# Patient Record
Sex: FEMALE | Race: WHITE | NOT HISPANIC OR LATINO | Employment: OTHER | URBAN - METROPOLITAN AREA
[De-identification: names, ages, dates, MRNs, and addresses within clinical notes are randomized per-mention and may not be internally consistent; named-entity substitution may affect disease eponyms.]

---

## 2017-01-02 ENCOUNTER — APPOINTMENT (OUTPATIENT)
Dept: LAB | Facility: CLINIC | Age: 77
End: 2017-01-02
Payer: MEDICARE

## 2017-01-02 ENCOUNTER — TRANSCRIBE ORDERS (OUTPATIENT)
Dept: LAB | Facility: CLINIC | Age: 77
End: 2017-01-02

## 2017-01-02 DIAGNOSIS — Z95.2 HEART VALVE REPLACED BY TRANSPLANT: Primary | ICD-10-CM

## 2017-01-02 DIAGNOSIS — I48.20 CHRONIC ATRIAL FIBRILLATION (HCC): ICD-10-CM

## 2017-01-02 LAB
INR PPP: 3 (ref 0.86–1.16)
PROTHROMBIN TIME: 30.6 SECONDS (ref 12–14.3)

## 2017-01-02 PROCEDURE — 36415 COLL VENOUS BLD VENIPUNCTURE: CPT

## 2017-01-02 PROCEDURE — 85610 PROTHROMBIN TIME: CPT

## 2017-02-06 ENCOUNTER — TRANSCRIBE ORDERS (OUTPATIENT)
Dept: LAB | Facility: CLINIC | Age: 77
End: 2017-02-06

## 2017-02-06 ENCOUNTER — APPOINTMENT (OUTPATIENT)
Dept: LAB | Facility: CLINIC | Age: 77
End: 2017-02-06
Payer: MEDICARE

## 2017-02-06 DIAGNOSIS — I48.20 CHRONIC ATRIAL FIBRILLATION (HCC): ICD-10-CM

## 2017-02-06 DIAGNOSIS — Z95.2 HEART VALVE REPLACED BY TRANSPLANT: Primary | ICD-10-CM

## 2017-02-06 LAB
INR PPP: 2.99 (ref 0.86–1.16)
PROTHROMBIN TIME: 30.5 SECONDS (ref 12–14.3)

## 2017-02-06 PROCEDURE — 85610 PROTHROMBIN TIME: CPT

## 2017-02-06 PROCEDURE — 36415 COLL VENOUS BLD VENIPUNCTURE: CPT

## 2017-03-07 ENCOUNTER — TRANSCRIBE ORDERS (OUTPATIENT)
Dept: LAB | Facility: CLINIC | Age: 77
End: 2017-03-07

## 2017-03-07 ENCOUNTER — APPOINTMENT (OUTPATIENT)
Dept: LAB | Facility: CLINIC | Age: 77
End: 2017-03-07
Payer: MEDICARE

## 2017-03-07 DIAGNOSIS — Z95.2 HEART VALVE REPLACED BY TRANSPLANT: Primary | ICD-10-CM

## 2017-03-07 DIAGNOSIS — I48.20 CHRONIC ATRIAL FIBRILLATION (HCC): ICD-10-CM

## 2017-03-07 LAB
INR PPP: 3.83 (ref 0.86–1.16)
PROTHROMBIN TIME: 36.8 SECONDS (ref 12–14.3)

## 2017-03-07 PROCEDURE — 36415 COLL VENOUS BLD VENIPUNCTURE: CPT

## 2017-03-07 PROCEDURE — 85610 PROTHROMBIN TIME: CPT

## 2017-03-20 ENCOUNTER — APPOINTMENT (OUTPATIENT)
Dept: LAB | Facility: CLINIC | Age: 77
End: 2017-03-20
Payer: MEDICARE

## 2017-03-20 ENCOUNTER — TRANSCRIBE ORDERS (OUTPATIENT)
Dept: LAB | Facility: CLINIC | Age: 77
End: 2017-03-20

## 2017-03-20 DIAGNOSIS — Z95.2 HEART VALVE REPLACED BY TRANSPLANT: ICD-10-CM

## 2017-03-20 DIAGNOSIS — I48.20 CHRONIC ATRIAL FIBRILLATION (HCC): ICD-10-CM

## 2017-03-20 DIAGNOSIS — E13.8 DIABETES MELLITUS OF OTHER TYPE WITH COMPLICATION: ICD-10-CM

## 2017-03-20 DIAGNOSIS — E55.9 UNSPECIFIED VITAMIN D DEFICIENCY: ICD-10-CM

## 2017-03-20 DIAGNOSIS — I10 ESSENTIAL HYPERTENSION, MALIGNANT: Primary | ICD-10-CM

## 2017-03-20 DIAGNOSIS — E78.2 MIXED HYPERLIPIDEMIA: ICD-10-CM

## 2017-03-20 DIAGNOSIS — Z79.899 ENCOUNTER FOR LONG-TERM (CURRENT) USE OF OTHER MEDICATIONS: ICD-10-CM

## 2017-03-20 LAB
25(OH)D3 SERPL-MCNC: 34.3 NG/ML (ref 30–100)
ALT SERPL W P-5'-P-CCNC: 26 U/L (ref 12–78)
ANION GAP SERPL CALCULATED.3IONS-SCNC: 6 MMOL/L (ref 4–13)
BUN SERPL-MCNC: 15 MG/DL (ref 5–25)
CALCIUM SERPL-MCNC: 8.8 MG/DL (ref 8.3–10.1)
CHLORIDE SERPL-SCNC: 104 MMOL/L (ref 100–108)
CO2 SERPL-SCNC: 29 MMOL/L (ref 21–32)
CREAT SERPL-MCNC: 0.71 MG/DL (ref 0.6–1.3)
EST. AVERAGE GLUCOSE BLD GHB EST-MCNC: 166 MG/DL
GFR SERPL CREATININE-BSD FRML MDRD: >60 ML/MIN/1.73SQ M
GLUCOSE P FAST SERPL-MCNC: 147 MG/DL (ref 65–99)
HBA1C MFR BLD: 7.4 % (ref 4.2–6.3)
HCT VFR BLD AUTO: 42.8 % (ref 34.8–46.1)
HGB BLD-MCNC: 13.7 G/DL (ref 11.5–15.4)
INR PPP: 3.78 (ref 0.86–1.16)
POTASSIUM SERPL-SCNC: 4 MMOL/L (ref 3.5–5.3)
PROTHROMBIN TIME: 36.4 SECONDS (ref 12–14.3)
SODIUM SERPL-SCNC: 139 MMOL/L (ref 136–145)

## 2017-03-20 PROCEDURE — 83036 HEMOGLOBIN GLYCOSYLATED A1C: CPT

## 2017-03-20 PROCEDURE — 84460 ALANINE AMINO (ALT) (SGPT): CPT

## 2017-03-20 PROCEDURE — 85014 HEMATOCRIT: CPT

## 2017-03-20 PROCEDURE — 80048 BASIC METABOLIC PNL TOTAL CA: CPT

## 2017-03-20 PROCEDURE — 85610 PROTHROMBIN TIME: CPT

## 2017-03-20 PROCEDURE — 85018 HEMOGLOBIN: CPT

## 2017-03-20 PROCEDURE — 82306 VITAMIN D 25 HYDROXY: CPT

## 2017-03-20 PROCEDURE — 36415 COLL VENOUS BLD VENIPUNCTURE: CPT

## 2017-03-31 ENCOUNTER — TRANSCRIBE ORDERS (OUTPATIENT)
Dept: LAB | Facility: CLINIC | Age: 77
End: 2017-03-31

## 2017-03-31 ENCOUNTER — APPOINTMENT (OUTPATIENT)
Dept: LAB | Facility: CLINIC | Age: 77
End: 2017-03-31
Payer: MEDICARE

## 2017-03-31 DIAGNOSIS — I48.20 CHRONIC ATRIAL FIBRILLATION (HCC): ICD-10-CM

## 2017-03-31 DIAGNOSIS — Z95.2 HEART VALVE REPLACED BY TRANSPLANT: Primary | ICD-10-CM

## 2017-03-31 LAB
INR PPP: 2.99 (ref 0.86–1.16)
PROTHROMBIN TIME: 30.5 SECONDS (ref 12–14.3)

## 2017-03-31 PROCEDURE — 85610 PROTHROMBIN TIME: CPT

## 2017-03-31 PROCEDURE — 36415 COLL VENOUS BLD VENIPUNCTURE: CPT

## 2017-05-17 ENCOUNTER — APPOINTMENT (OUTPATIENT)
Dept: LAB | Facility: CLINIC | Age: 77
End: 2017-05-17
Payer: MEDICARE

## 2017-05-17 ENCOUNTER — TRANSCRIBE ORDERS (OUTPATIENT)
Dept: LAB | Facility: CLINIC | Age: 77
End: 2017-05-17

## 2017-05-17 DIAGNOSIS — Z95.2 HEART VALVE REPLACED BY TRANSPLANT: ICD-10-CM

## 2017-05-17 DIAGNOSIS — I48.20 CHRONIC ATRIAL FIBRILLATION (HCC): Primary | ICD-10-CM

## 2017-05-17 LAB
INR PPP: 3.34 (ref 0.86–1.16)
PROTHROMBIN TIME: 34.4 SECONDS (ref 12.1–14.4)

## 2017-05-17 PROCEDURE — 36415 COLL VENOUS BLD VENIPUNCTURE: CPT

## 2017-05-17 PROCEDURE — 85610 PROTHROMBIN TIME: CPT

## 2017-06-02 ENCOUNTER — TRANSCRIBE ORDERS (OUTPATIENT)
Dept: ADMINISTRATIVE | Facility: HOSPITAL | Age: 77
End: 2017-06-02

## 2017-06-02 DIAGNOSIS — M81.0 SENILE OSTEOPOROSIS: Primary | ICD-10-CM

## 2017-06-22 ENCOUNTER — APPOINTMENT (OUTPATIENT)
Dept: LAB | Facility: CLINIC | Age: 77
End: 2017-06-22
Payer: MEDICARE

## 2017-06-22 ENCOUNTER — TRANSCRIBE ORDERS (OUTPATIENT)
Dept: LAB | Facility: CLINIC | Age: 77
End: 2017-06-22

## 2017-06-22 DIAGNOSIS — I48.20 CHRONIC ATRIAL FIBRILLATION (HCC): ICD-10-CM

## 2017-06-22 DIAGNOSIS — Z95.2 HEART VALVE REPLACED BY TRANSPLANT: Primary | ICD-10-CM

## 2017-06-22 LAB
INR PPP: 3.31 (ref 0.86–1.16)
PROTHROMBIN TIME: 34.1 SECONDS (ref 12.1–14.4)

## 2017-06-22 PROCEDURE — 36415 COLL VENOUS BLD VENIPUNCTURE: CPT

## 2017-06-22 PROCEDURE — 85610 PROTHROMBIN TIME: CPT

## 2017-06-27 ENCOUNTER — HOSPITAL ENCOUNTER (OUTPATIENT)
Dept: RADIOLOGY | Facility: HOSPITAL | Age: 77
Discharge: HOME | End: 2017-06-27
Attending: INTERNAL MEDICINE
Payer: MEDICARE

## 2017-06-27 DIAGNOSIS — M81.0 SENILE OSTEOPOROSIS: ICD-10-CM

## 2017-06-27 PROCEDURE — 77080 DXA BONE DENSITY AXIAL: CPT

## 2017-07-03 ENCOUNTER — APPOINTMENT (OUTPATIENT)
Dept: LAB | Facility: CLINIC | Age: 77
End: 2017-07-03
Payer: MEDICARE

## 2017-07-03 ENCOUNTER — TRANSCRIBE ORDERS (OUTPATIENT)
Dept: LAB | Facility: CLINIC | Age: 77
End: 2017-07-03

## 2017-07-03 DIAGNOSIS — I10 ESSENTIAL HYPERTENSION, MALIGNANT: ICD-10-CM

## 2017-07-03 DIAGNOSIS — E78.00 PURE HYPERCHOLESTEROLEMIA: Primary | ICD-10-CM

## 2017-07-03 DIAGNOSIS — D64.9 ANEMIA, UNSPECIFIED: ICD-10-CM

## 2017-07-03 DIAGNOSIS — E03.9 UNSPECIFIED HYPOTHYROIDISM: ICD-10-CM

## 2017-07-03 DIAGNOSIS — E13.9 OTHER SPECIFIED DIABETES MELLITUS: ICD-10-CM

## 2017-07-03 LAB
ALBUMIN SERPL BCP-MCNC: 3.8 G/DL (ref 3.5–5)
ALP SERPL-CCNC: 91 U/L (ref 46–116)
ALT SERPL W P-5'-P-CCNC: 26 U/L (ref 12–78)
ANION GAP SERPL CALCULATED.3IONS-SCNC: 7 MMOL/L (ref 4–13)
AST SERPL W P-5'-P-CCNC: 24 U/L (ref 5–45)
BILIRUB SERPL-MCNC: 0.48 MG/DL (ref 0.2–1)
BUN SERPL-MCNC: 10 MG/DL (ref 5–25)
CALCIUM SERPL-MCNC: 8.7 MG/DL (ref 8.3–10.1)
CHLORIDE SERPL-SCNC: 105 MMOL/L (ref 100–108)
CHOLEST SERPL-MCNC: 203 MG/DL (ref 50–200)
CO2 SERPL-SCNC: 28 MMOL/L (ref 21–32)
CREAT SERPL-MCNC: 0.69 MG/DL (ref 0.6–1.3)
CREAT UR-MCNC: 72.2 MG/DL
EST. AVERAGE GLUCOSE BLD GHB EST-MCNC: 160 MG/DL
GFR SERPL CREATININE-BSD FRML MDRD: >60 ML/MIN/1.73SQ M
GLUCOSE P FAST SERPL-MCNC: 165 MG/DL (ref 65–99)
HBA1C MFR BLD: 7.2 % (ref 4.2–6.3)
HCT VFR BLD AUTO: 41.6 % (ref 34.8–46.1)
HDLC SERPL-MCNC: 38 MG/DL (ref 40–60)
HGB BLD-MCNC: 13.1 G/DL (ref 11.5–15.4)
LDLC SERPL CALC-MCNC: 113 MG/DL (ref 0–100)
MICROALBUMIN UR-MCNC: 20 MG/L (ref 0–20)
MICROALBUMIN/CREAT 24H UR: 28 MG/G CREATININE (ref 0–30)
POTASSIUM SERPL-SCNC: 3.9 MMOL/L (ref 3.5–5.3)
PROT SERPL-MCNC: 7.5 G/DL (ref 6.4–8.2)
SODIUM SERPL-SCNC: 140 MMOL/L (ref 136–145)
TRIGL SERPL-MCNC: 258 MG/DL
TSH SERPL DL<=0.05 MIU/L-ACNC: 3.45 UIU/ML (ref 0.36–3.74)

## 2017-07-03 PROCEDURE — 80061 LIPID PANEL: CPT

## 2017-07-03 PROCEDURE — 82043 UR ALBUMIN QUANTITATIVE: CPT

## 2017-07-03 PROCEDURE — 82570 ASSAY OF URINE CREATININE: CPT

## 2017-07-03 PROCEDURE — 84443 ASSAY THYROID STIM HORMONE: CPT

## 2017-07-03 PROCEDURE — 36415 COLL VENOUS BLD VENIPUNCTURE: CPT

## 2017-07-03 PROCEDURE — 80053 COMPREHEN METABOLIC PANEL: CPT

## 2017-07-03 PROCEDURE — 83036 HEMOGLOBIN GLYCOSYLATED A1C: CPT

## 2017-07-03 PROCEDURE — 85018 HEMOGLOBIN: CPT

## 2017-07-03 PROCEDURE — 85014 HEMATOCRIT: CPT

## 2017-07-13 ENCOUNTER — TRANSCRIBE ORDERS (OUTPATIENT)
Dept: ADMINISTRATIVE | Facility: HOSPITAL | Age: 77
End: 2017-07-13

## 2017-07-13 DIAGNOSIS — Z12.31 ENCOUNTER FOR MAMMOGRAM TO ESTABLISH BASELINE MAMMOGRAM: Primary | ICD-10-CM

## 2017-07-20 ENCOUNTER — HOSPITAL ENCOUNTER (OUTPATIENT)
Dept: RADIOLOGY | Facility: HOSPITAL | Age: 77
Discharge: HOME | End: 2017-07-20
Attending: INTERNAL MEDICINE
Payer: MEDICARE

## 2017-07-20 DIAGNOSIS — Z12.31 ENCOUNTER FOR MAMMOGRAM TO ESTABLISH BASELINE MAMMOGRAM: ICD-10-CM

## 2017-07-20 PROCEDURE — G0202 SCR MAMMO BI INCL CAD: HCPCS

## 2017-07-21 ENCOUNTER — APPOINTMENT (OUTPATIENT)
Dept: LAB | Facility: CLINIC | Age: 77
End: 2017-07-21
Payer: MEDICARE

## 2017-07-21 ENCOUNTER — TRANSCRIBE ORDERS (OUTPATIENT)
Dept: LAB | Facility: CLINIC | Age: 77
End: 2017-07-21

## 2017-07-21 DIAGNOSIS — I48.20 CHRONIC ATRIAL FIBRILLATION (HCC): Primary | ICD-10-CM

## 2017-07-21 DIAGNOSIS — Z95.2 HEART VALVE REPLACED BY TRANSPLANT: ICD-10-CM

## 2017-07-21 LAB
INR PPP: 2.81 (ref 0.86–1.16)
PROTHROMBIN TIME: 30 SECONDS (ref 12.1–14.4)

## 2017-07-21 PROCEDURE — 85610 PROTHROMBIN TIME: CPT

## 2017-07-21 PROCEDURE — 36415 COLL VENOUS BLD VENIPUNCTURE: CPT

## 2017-07-28 ENCOUNTER — APPOINTMENT (OUTPATIENT)
Dept: LAB | Facility: CLINIC | Age: 77
End: 2017-07-28
Payer: MEDICARE

## 2017-07-28 ENCOUNTER — TRANSCRIBE ORDERS (OUTPATIENT)
Dept: LAB | Facility: CLINIC | Age: 77
End: 2017-07-28

## 2017-07-28 DIAGNOSIS — Z95.2 HEART VALVE REPLACED BY TRANSPLANT: Primary | ICD-10-CM

## 2017-07-28 DIAGNOSIS — I48.20 CHRONIC ATRIAL FIBRILLATION (HCC): ICD-10-CM

## 2017-07-28 LAB
INR PPP: 3.05 (ref 0.86–1.16)
PROTHROMBIN TIME: 32 SECONDS (ref 12.1–14.4)

## 2017-07-28 PROCEDURE — 85610 PROTHROMBIN TIME: CPT

## 2017-07-28 PROCEDURE — 36415 COLL VENOUS BLD VENIPUNCTURE: CPT

## 2017-08-17 ENCOUNTER — TRANSCRIBE ORDERS (OUTPATIENT)
Dept: LAB | Facility: CLINIC | Age: 77
End: 2017-08-17

## 2017-08-17 ENCOUNTER — APPOINTMENT (OUTPATIENT)
Dept: LAB | Facility: CLINIC | Age: 77
End: 2017-08-17
Payer: MEDICARE

## 2017-08-17 DIAGNOSIS — Z95.2 HEART VALVE REPLACED BY TRANSPLANT: ICD-10-CM

## 2017-08-17 DIAGNOSIS — I48.20 CHRONIC ATRIAL FIBRILLATION (HCC): Primary | ICD-10-CM

## 2017-08-17 LAB
INR PPP: 3.6 (ref 0.86–1.16)
PROTHROMBIN TIME: 36.5 SECONDS (ref 12.1–14.4)

## 2017-08-17 PROCEDURE — 36415 COLL VENOUS BLD VENIPUNCTURE: CPT

## 2017-08-17 PROCEDURE — 85610 PROTHROMBIN TIME: CPT

## 2017-09-19 ENCOUNTER — TRANSCRIBE ORDERS (OUTPATIENT)
Dept: LAB | Facility: CLINIC | Age: 77
End: 2017-09-19

## 2017-09-19 ENCOUNTER — APPOINTMENT (OUTPATIENT)
Dept: LAB | Facility: CLINIC | Age: 77
End: 2017-09-19
Payer: MEDICARE

## 2017-09-19 DIAGNOSIS — I48.20 CHRONIC ATRIAL FIBRILLATION (HCC): ICD-10-CM

## 2017-09-19 DIAGNOSIS — Z95.2 HEART VALVE REPLACED BY TRANSPLANT: Primary | ICD-10-CM

## 2017-09-19 LAB
INR PPP: 3.42 (ref 0.86–1.16)
PROTHROMBIN TIME: 35 SECONDS (ref 12.1–14.4)

## 2017-09-19 PROCEDURE — 85610 PROTHROMBIN TIME: CPT

## 2017-09-19 PROCEDURE — 36415 COLL VENOUS BLD VENIPUNCTURE: CPT

## 2017-10-03 ENCOUNTER — LAB (OUTPATIENT)
Dept: LAB | Facility: CLINIC | Age: 77
End: 2017-10-03
Payer: MEDICARE

## 2017-10-03 ENCOUNTER — TRANSCRIBE ORDERS (OUTPATIENT)
Dept: LAB | Facility: CLINIC | Age: 77
End: 2017-10-03

## 2017-10-03 DIAGNOSIS — T45.1X5A ANEMIA DUE TO CHEMOTHERAPY: ICD-10-CM

## 2017-10-03 DIAGNOSIS — D64.81 ANEMIA DUE TO CHEMOTHERAPY: ICD-10-CM

## 2017-10-03 DIAGNOSIS — E78.00 PURE HYPERCHOLESTEROLEMIA: ICD-10-CM

## 2017-10-03 DIAGNOSIS — E13.9 OTHER SPECIFIED DIABETES MELLITUS: ICD-10-CM

## 2017-10-03 DIAGNOSIS — I10 ESSENTIAL HYPERTENSION, MALIGNANT: Primary | ICD-10-CM

## 2017-10-03 LAB
ALBUMIN SERPL BCP-MCNC: 3.9 G/DL (ref 3.5–5)
ALP SERPL-CCNC: 93 U/L (ref 46–116)
ALT SERPL W P-5'-P-CCNC: 27 U/L (ref 12–78)
ANION GAP SERPL CALCULATED.3IONS-SCNC: 5 MMOL/L (ref 4–13)
AST SERPL W P-5'-P-CCNC: 25 U/L (ref 5–45)
BILIRUB SERPL-MCNC: 0.5 MG/DL (ref 0.2–1)
BUN SERPL-MCNC: 13 MG/DL (ref 5–25)
CALCIUM SERPL-MCNC: 8.7 MG/DL (ref 8.3–10.1)
CHLORIDE SERPL-SCNC: 104 MMOL/L (ref 100–108)
CHOLEST SERPL-MCNC: 193 MG/DL (ref 50–200)
CO2 SERPL-SCNC: 29 MMOL/L (ref 21–32)
CREAT SERPL-MCNC: 0.64 MG/DL (ref 0.6–1.3)
CREAT UR-MCNC: 101 MG/DL
EST. AVERAGE GLUCOSE BLD GHB EST-MCNC: 148 MG/DL
GFR SERPL CREATININE-BSD FRML MDRD: 86 ML/MIN/1.73SQ M
GLUCOSE P FAST SERPL-MCNC: 154 MG/DL (ref 65–99)
HBA1C MFR BLD: 6.8 % (ref 4.2–6.3)
HCT VFR BLD AUTO: 41.8 % (ref 34.8–46.1)
HDLC SERPL-MCNC: 35 MG/DL (ref 40–60)
HGB BLD-MCNC: 13.7 G/DL (ref 11.5–15.4)
LDLC SERPL CALC-MCNC: 108 MG/DL (ref 0–100)
MICROALBUMIN UR-MCNC: 52.8 MG/L (ref 0–20)
MICROALBUMIN/CREAT 24H UR: 52 MG/G CREATININE (ref 0–30)
POTASSIUM SERPL-SCNC: 4.4 MMOL/L (ref 3.5–5.3)
PROT SERPL-MCNC: 7.8 G/DL (ref 6.4–8.2)
SODIUM SERPL-SCNC: 138 MMOL/L (ref 136–145)
TRIGL SERPL-MCNC: 251 MG/DL

## 2017-10-03 PROCEDURE — 82043 UR ALBUMIN QUANTITATIVE: CPT

## 2017-10-03 PROCEDURE — 83036 HEMOGLOBIN GLYCOSYLATED A1C: CPT

## 2017-10-03 PROCEDURE — 82570 ASSAY OF URINE CREATININE: CPT

## 2017-10-03 PROCEDURE — 80061 LIPID PANEL: CPT

## 2017-10-03 PROCEDURE — 36415 COLL VENOUS BLD VENIPUNCTURE: CPT

## 2017-10-03 PROCEDURE — 85014 HEMATOCRIT: CPT

## 2017-10-03 PROCEDURE — 85018 HEMOGLOBIN: CPT

## 2017-10-03 PROCEDURE — 80053 COMPREHEN METABOLIC PANEL: CPT

## 2017-10-09 ENCOUNTER — HOSPITAL ENCOUNTER (EMERGENCY)
Facility: HOSPITAL | Age: 77
Discharge: HOME | End: 2017-10-09
Attending: EMERGENCY MEDICINE | Admitting: EMERGENCY MEDICINE
Payer: MEDICARE

## 2017-10-09 ENCOUNTER — APPOINTMENT (EMERGENCY)
Dept: RADIOLOGY | Facility: HOSPITAL | Age: 77
End: 2017-10-09
Payer: MEDICARE

## 2017-10-09 VITALS
RESPIRATION RATE: 20 BRPM | DIASTOLIC BLOOD PRESSURE: 72 MMHG | WEIGHT: 162 LBS | OXYGEN SATURATION: 96 % | HEART RATE: 65 BPM | SYSTOLIC BLOOD PRESSURE: 148 MMHG | TEMPERATURE: 98.2 F

## 2017-10-09 DIAGNOSIS — R53.1 WEAKNESS: Primary | ICD-10-CM

## 2017-10-09 LAB
ALBUMIN SERPL BCP-MCNC: 3.8 G/DL (ref 3.5–5)
ALP SERPL-CCNC: 101 U/L (ref 46–116)
ALT SERPL W P-5'-P-CCNC: 39 U/L (ref 12–78)
ANION GAP SERPL CALCULATED.3IONS-SCNC: 7 MMOL/L (ref 4–13)
APTT PPP: 40 SECONDS (ref 24–33)
AST SERPL W P-5'-P-CCNC: 39 U/L (ref 5–45)
ATRIAL RATE: 64 BPM
BACTERIA UR QL AUTO: ABNORMAL /HPF
BASOPHILS # BLD AUTO: 0 THOUSANDS/ΜL (ref 0–0.1)
BASOPHILS NFR BLD AUTO: 0 % (ref 0–1)
BILIRUB SERPL-MCNC: 0.5 MG/DL (ref 0.2–1)
BILIRUB UR QL STRIP: NEGATIVE
BUN SERPL-MCNC: 14 MG/DL (ref 5–25)
CALCIUM SERPL-MCNC: 8.7 MG/DL (ref 8.3–10.1)
CHLORIDE SERPL-SCNC: 104 MMOL/L (ref 100–108)
CLARITY UR: CLEAR
CO2 SERPL-SCNC: 29 MMOL/L (ref 21–32)
COLOR UR: YELLOW
CREAT SERPL-MCNC: 0.63 MG/DL (ref 0.6–1.3)
DIGOXIN SERPL-MCNC: 0.5 NG/ML (ref 0.8–2)
EOSINOPHIL # BLD AUTO: 0.1 THOUSAND/ΜL (ref 0–0.61)
EOSINOPHIL NFR BLD AUTO: 2 % (ref 0–6)
ERYTHROCYTE [DISTWIDTH] IN BLOOD BY AUTOMATED COUNT: 15.6 % (ref 11.6–15.1)
GFR SERPL CREATININE-BSD FRML MDRD: 87 ML/MIN/1.73SQ M
GLUCOSE SERPL-MCNC: 173 MG/DL (ref 65–140)
GLUCOSE UR STRIP-MCNC: NEGATIVE MG/DL
HCT VFR BLD AUTO: 39.7 % (ref 37–47)
HGB BLD-MCNC: 13 G/DL (ref 12–16)
HGB UR QL STRIP.AUTO: NEGATIVE
INR PPP: 3.51 (ref 0.86–1.16)
KETONES UR STRIP-MCNC: NEGATIVE MG/DL
LEUKOCYTE ESTERASE UR QL STRIP: ABNORMAL
LYMPHOCYTES # BLD AUTO: 1.6 THOUSANDS/ΜL (ref 0.6–4.47)
LYMPHOCYTES NFR BLD AUTO: 29 % (ref 14–44)
MAGNESIUM SERPL-MCNC: 1.9 MG/DL (ref 1.6–2.6)
MCH RBC QN AUTO: 29.1 PG (ref 27–31)
MCHC RBC AUTO-ENTMCNC: 32.8 G/DL (ref 31.4–37.4)
MCV RBC AUTO: 89 FL (ref 82–98)
MONOCYTES # BLD AUTO: 0.4 THOUSAND/ΜL (ref 0.17–1.22)
MONOCYTES NFR BLD AUTO: 7 % (ref 4–12)
NEUTROPHILS # BLD AUTO: 3.3 THOUSANDS/ΜL (ref 1.85–7.62)
NEUTS SEG NFR BLD AUTO: 62 % (ref 43–75)
NITRITE UR QL STRIP: NEGATIVE
NON-SQ EPI CELLS URNS QL MICRO: ABNORMAL /HPF
NRBC BLD AUTO-RTO: 0 /100 WBCS
PH UR STRIP.AUTO: 6.5 [PH] (ref 5–9)
PLATELET # BLD AUTO: 195 THOUSANDS/UL (ref 130–400)
PMV BLD AUTO: 9 FL (ref 8.9–12.7)
POTASSIUM SERPL-SCNC: 4.2 MMOL/L (ref 3.5–5.3)
PROT SERPL-MCNC: 7.6 G/DL (ref 6.4–8.2)
PROT UR STRIP-MCNC: NEGATIVE MG/DL
PROTHROMBIN TIME: 37.3 SECONDS (ref 9.4–11.7)
QRS AXIS: 52 DEGREES
QRSD INTERVAL: 94 MS
QT INTERVAL: 380 MS
QTC INTERVAL: 401 MS
RBC # BLD AUTO: 4.47 MILLION/UL (ref 4.2–5.4)
RBC #/AREA URNS AUTO: ABNORMAL /HPF
SODIUM SERPL-SCNC: 140 MMOL/L (ref 136–145)
SP GR UR STRIP.AUTO: <=1.005 (ref 1–1.03)
T WAVE AXIS: 53 DEGREES
TROPONIN I SERPL-MCNC: <0.02 NG/ML
UROBILINOGEN UR QL STRIP.AUTO: 0.2 E.U./DL
VENTRICULAR RATE: 67 BPM
WBC # BLD AUTO: 5.4 THOUSAND/UL (ref 4.8–10.8)
WBC #/AREA URNS AUTO: ABNORMAL /HPF

## 2017-10-09 PROCEDURE — 85025 COMPLETE CBC W/AUTO DIFF WBC: CPT | Performed by: EMERGENCY MEDICINE

## 2017-10-09 PROCEDURE — 83735 ASSAY OF MAGNESIUM: CPT | Performed by: EMERGENCY MEDICINE

## 2017-10-09 PROCEDURE — 80162 ASSAY OF DIGOXIN TOTAL: CPT | Performed by: EMERGENCY MEDICINE

## 2017-10-09 PROCEDURE — 84484 ASSAY OF TROPONIN QUANT: CPT | Performed by: EMERGENCY MEDICINE

## 2017-10-09 PROCEDURE — 71020 HB CHEST X-RAY 2VW FRONTAL&LATL: CPT

## 2017-10-09 PROCEDURE — 36415 COLL VENOUS BLD VENIPUNCTURE: CPT | Performed by: EMERGENCY MEDICINE

## 2017-10-09 PROCEDURE — 80053 COMPREHEN METABOLIC PANEL: CPT | Performed by: EMERGENCY MEDICINE

## 2017-10-09 PROCEDURE — 85730 THROMBOPLASTIN TIME PARTIAL: CPT | Performed by: EMERGENCY MEDICINE

## 2017-10-09 PROCEDURE — 81001 URINALYSIS AUTO W/SCOPE: CPT | Performed by: EMERGENCY MEDICINE

## 2017-10-09 PROCEDURE — 93005 ELECTROCARDIOGRAM TRACING: CPT | Performed by: EMERGENCY MEDICINE

## 2017-10-09 PROCEDURE — 85610 PROTHROMBIN TIME: CPT | Performed by: EMERGENCY MEDICINE

## 2017-10-09 PROCEDURE — 70450 CT HEAD/BRAIN W/O DYE: CPT

## 2017-10-09 PROCEDURE — 99285 EMERGENCY DEPT VISIT HI MDM: CPT

## 2017-10-09 RX ORDER — WARFARIN SODIUM 5 MG/1
5 TABLET ORAL
Status: ON HOLD | COMMUNITY
End: 2022-03-14 | Stop reason: SDUPTHER

## 2017-10-09 RX ORDER — DIGOXIN 250 MCG
250 TABLET ORAL DAILY
COMMUNITY
End: 2021-05-20

## 2017-10-09 RX ORDER — WARFARIN SODIUM 2.5 MG/1
2.5 TABLET ORAL 3 TIMES WEEKLY
Status: ON HOLD | COMMUNITY
End: 2022-03-14 | Stop reason: SDUPTHER

## 2017-10-09 NOTE — ED PROVIDER NOTES
History  Chief Complaint   Patient presents with    Shortness of Breath     short of breath, dizzy, headache , woke up this way      69 yo female with sob, weakness and headache x one day  No cp, no fever, cough  No recent travel  No sick contacts at home  No abdominal pain, no nausea, no vomiting, no diarrhea, no change in medications  History provided by:  Patient   used: No    Fatigue   Severity:  Mild  Onset quality:  Gradual  Duration:  1 day  Timing:  Intermittent  Progression:  Unchanged  Chronicity:  New  Relieved by:  Nothing  Worsened by:  Nothing  Ineffective treatments:  None tried  Associated symptoms: headaches and shortness of breath    Associated symptoms: no abdominal pain, no chest pain and no nausea        Prior to Admission Medications   Prescriptions Last Dose Informant Patient Reported? Taking? Cholecalciferol (VITAMIN D PO)   Yes Yes   Sig: Take by mouth   Multiple Vitamins-Minerals (PRESERVISION AREDS PO)   Yes Yes   Sig: Take 2 tablets by mouth daily   NADOLOL PO   Yes Yes   Sig: Take 2 5 mg by mouth daily   digoxin (LANOXIN) 0 25 mg tablet   Yes Yes   Sig: Take 250 mcg by mouth daily   warfarin (COUMADIN) 2 5 mg tablet   Yes Yes   Sig: Take 2 5 mg by mouth 3 (three) times a week   warfarin (COUMADIN) 5 mg tablet   Yes Yes   Sig: Take 5 mg by mouth 4 (four) times a week      Facility-Administered Medications: None       Past Medical History:   Diagnosis Date    Atrial fibrillation (HCC)     Cancer (HCC)     hx of cervical    Cardiac disease     Hyperlipidemia        Past Surgical History:   Procedure Laterality Date    EYE SURGERY      HYSTERECTOMY      MITRAL VALVE REPLACEMENT  1994       History reviewed  No pertinent family history  I have reviewed and agree with the history as documented      Social History   Substance Use Topics    Smoking status: Never Smoker    Smokeless tobacco: Never Used    Alcohol use No        Review of Systems Constitutional: Positive for fatigue  Respiratory: Positive for shortness of breath  Cardiovascular: Negative for chest pain  Gastrointestinal: Negative for abdominal pain and nausea  Neurological: Positive for headaches  All other systems reviewed and are negative  Physical Exam  ED Triage Vitals   Temperature Pulse Respirations Blood Pressure SpO2   10/09/17 1203 10/09/17 1015 10/09/17 1015 10/09/17 1015 10/09/17 1015   98 2 °F (36 8 °C) 70 19 (!) 216/95 96 %      Temp Source Heart Rate Source Patient Position - Orthostatic VS BP Location FiO2 (%)   10/09/17 1203 10/09/17 1015 10/09/17 1015 10/09/17 1015 --   Oral Monitor Lying Left arm       Pain Score       --                  Physical Exam   Constitutional: She is oriented to person, place, and time  She appears well-developed and well-nourished  HENT:   Head: Normocephalic and atraumatic  Eyes: EOM are normal  Pupils are equal, round, and reactive to light  Neck: Normal range of motion  Neck supple  Cardiovascular: Normal rate and regular rhythm  Pulmonary/Chest: Effort normal and breath sounds normal    Abdominal: Soft  Bowel sounds are normal    Musculoskeletal: Normal range of motion  Neurological: She is alert and oriented to person, place, and time  Skin: Skin is warm and dry  Capillary refill takes less than 2 seconds  Psychiatric: She has a normal mood and affect  Her behavior is normal    Nursing note and vitals reviewed        ED Medications  Medications - No data to display    Diagnostic Studies  Labs Reviewed   CBC AND DIFFERENTIAL - Abnormal        Result Value Ref Range Status    RDW 15 6 (*) 11 6 - 15 1 % Final    WBC 5 40  4 80 - 10 80 Thousand/uL Final    RBC 4 47  4 20 - 5 40 Million/uL Final    Hemoglobin 13 0  12 0 - 16 0 g/dL Final    Hematocrit 39 7  37 0 - 47 0 % Final    MCV 89  82 - 98 fL Final    MCH 29 1  27 0 - 31 0 pg Final    MCHC 32 8  31 4 - 37 4 g/dL Final    MPV 9 0  8 9 - 12 7 fL Final Platelets 106  545 - 400 Thousands/uL Final    nRBC 0  /100 WBCs Final    Neutrophils Relative 62  43 - 75 % Final    Lymphocytes Relative 29  14 - 44 % Final    Monocytes Relative 7  4 - 12 % Final    Eosinophils Relative 2  0 - 6 % Final    Basophils Relative 0  0 - 1 % Final    Neutrophils Absolute 3 30  1 85 - 7 62 Thousands/µL Final    Lymphocytes Absolute 1 60  0 60 - 4 47 Thousands/µL Final    Monocytes Absolute 0 40  0 17 - 1 22 Thousand/µL Final    Eosinophils Absolute 0 10  0 00 - 0 61 Thousand/µL Final    Basophils Absolute 0 00  0 00 - 0 10 Thousands/µL Final   COMPREHENSIVE METABOLIC PANEL - Abnormal     Glucose 173 (*) 65 - 140 mg/dL Final    Comment:   If the patient is fasting, the ADA then defines impaired fasting glucose as > 100 mg/dL and diabetes as > or equal to 123 mg/dL  Specimen collection should occur prior to Sulfasalazine administration due to the potential for falsely depressed results  Specimen collection should occur prior to Sulfapyridine administration due to the potential for falsely elevated results  Sodium 140  136 - 145 mmol/L Final    Potassium 4 2  3 5 - 5 3 mmol/L Final    Comment: Slightly Hemolyzed; Results May be Affected    Chloride 104  100 - 108 mmol/L Final    CO2 29  21 - 32 mmol/L Final    Anion Gap 7  4 - 13 mmol/L Final    BUN 14  5 - 25 mg/dL Final    Creatinine 0 63  0 60 - 1 30 mg/dL Final    Comment: Standardized to IDMS reference method    Calcium 8 7  8 3 - 10 1 mg/dL Final    AST 39  5 - 45 U/L Final    Comment: Slightly Hemolyzed; Results May be Affected  Specimen collection should occur prior to Sulfasalazine administration due to the potential for falsely depressed results  ALT 39  12 - 78 U/L Final    Comment:   Specimen collection should occur prior to Sulfasalazine administration due to the potential for falsely depressed results       Alkaline Phosphatase 101  46 - 116 U/L Final    Total Protein 7 6  6 4 - 8 2 g/dL Final    Albumin 3 8  3 5 - 5 0 g/dL Final    Total Bilirubin 0 50  0 20 - 1 00 mg/dL Final    eGFR 87  ml/min/1 73sq m Final    Narrative:     National Kidney Disease Education Program recommendations are as follows:  GFR calculation is accurate only with a steady state creatinine  Chronic Kidney disease less than 60 ml/min/1 73 sq  meters  Kidney failure less than 15 ml/min/1 73 sq  meters  PROTIME-INR - Abnormal     Protime 37 3 (*) 9 4 - 11 7 seconds Final    INR 3 51 (*) 0 86 - 1 16 Final   APTT - Abnormal     PTT 40 (*) 24 - 33 seconds Final    Narrative:      Therapeutic Heparin Range = 60-90 seconds   URINALYSIS WITH REFLEX TO MICROSCOPIC - Abnormal     Leukocytes, UA Small (*) Negative Final    Color, UA Yellow   Final    Clarity, UA Clear   Final    Specific Gravity, UA <=1 005  1 000 - 1 030 Final    pH, UA 6 5  5 0 - 9 0 Final    Nitrite, UA Negative  Negative Final    Protein, UA Negative  Negative mg/dl Final    Glucose, UA Negative  Negative mg/dl Final    Ketones, UA Negative  Negative mg/dl Final    Urobilinogen, UA 0 2  0 2, 1 0 E U /dl E U /dl Final    Bilirubin, UA Negative  Negative Final    Blood, UA Negative  Negative Final   DIGOXIN LEVEL - Abnormal     Digoxin Lvl 0 5 (*) 0 8 - 2 0 ng/mL Final   URINE MICROSCOPIC - Abnormal     WBC, UA 2-4 (*) None Seen, 0-5, 5-55, 5-65 /hpf Final    Bacteria, UA Innumerable (*) None Seen, Occasional /hpf Final    RBC, UA None Seen  None Seen, 0-5 /hpf Final    Epithelial Cells None Seen  None Seen, Occasional /hpf Final   MAGNESIUM - Normal    Magnesium 1 9  1 6 - 2 6 mg/dL Final   TROPONIN I - Normal    Troponin I <0 02  <=0 04 ng/mL Final    Comment: 3Autovalidation override    Narrative:     [employer] Chemistry analyzer 99% cutoff is > 0 04 ng/mL in network labs    o cTnI 99% cutoff is useful only when applied to patients in the clinical setting of myocardial ischemia  o cTnI 99% cutoff should be interpreted in the context of clinical history, ECG findings and possibly cardiac imaging to establish correct diagnosis  o cTnI 99% cutoff may be suggestive but clearly not indicative of a coronary event without the clinical setting of myocardial ischemia  CT head wo contrast   Final Result   No acute intracranial abnormality  Workstation performed: [id number]         XR chest 2 views   Final Result      No acute pulmonary disease, status post cardiac valve replacement surgery                Workstation performed: [id number]             Procedures  ECG 12 Lead Documentation  Date/Time: 10/9/2017 11:53 AM  Performed by: Leona Bryant by: Lynda Room     ECG reviewed by me, the ED Provider: yes    Patient location:  ED  Rate:     ECG rate:  67    ECG rate assessment: normal    Rhythm:     Rhythm: atrial fibrillation    QRS:     QRS axis:  Normal  Conduction:     Conduction: normal    ST segments:     ST segments:  Normal  T waves:     T waves: normal            Phone Contacts  ED Phone Contact    ED Course  ED Course                                MDM  Number of Diagnoses or Management Options  Diagnosis management comments: Weakness, headache    Patient states improvement  Reviewed labs with patient  Patient's urine demonstrates small amount of bacteria which patient states her urologist is already addressing  Patient CT scan of the head is negative  Patient states not truly short of breath however felt like she cannot get a deep breath this morning  Symptoms have resolved  Discussed case with PMD Dr Thomas Goldberg who saw patient in office today as well  He is comfortable discharging patient home for follow-up  Patient do not wish to stay in hospital   I will discharge patient home to follow with PMD in next 1-2 days         Amount and/or Complexity of Data Reviewed  Clinical lab tests: ordered and reviewed  Tests in the radiology section of CPT®: ordered and reviewed  Tests in the medicine section of CPT®: ordered and reviewed  Discuss the patient with other providers: yes    Risk of Complications, Morbidity, and/or Mortality  Presenting problems: high  Diagnostic procedures: high  Management options: high    Patient Progress  Patient progress: stable    CritCare Time    Disposition  Final diagnoses:   Weakness     ED Disposition     ED Disposition Condition Comment    Discharge  Jonathon Vazquez discharge to home/self care  Condition at discharge: Stable        Follow-up Information     Follow up With Specialties Details Why [address]  Internal Medicine In 1 day return If symptoms worsen, take medications as prescribed, call to make an appointment [place] [address]  [phone]          Discharge Medication List as of 10/9/2017 11:56 AM      CONTINUE these medications which have NOT CHANGED    Details   Cholecalciferol (VITAMIN D PO) Take by mouth, Historical Med      digoxin (LANOXIN) 0 25 mg tablet Take 250 mcg by mouth daily, Historical Med      Multiple Vitamins-Minerals (PRESERVISION AREDS PO) Take 2 tablets by mouth daily, Historical Med      NADOLOL PO Take 2 5 mg by mouth daily, Historical Med      !! warfarin (COUMADIN) 2 5 mg tablet Take 2 5 mg by mouth 3 (three) times a week, Historical Med      !! warfarin (COUMADIN) 5 mg tablet Take 5 mg by mouth 4 (four) times a week, Historical Med       !! - Potential duplicate medications found  Please discuss with provider  No discharge procedures on file      ED Provider  Electronically Signed by       Rony Cole MD  10/09/17 9197

## 2017-10-09 NOTE — DISCHARGE INSTRUCTIONS
Weakness   WHAT YOU NEED TO KNOW:   Weakness is a loss of muscle strength  It may be caused by brain, nerve, or muscle problems  Physical and mental conditions such as heart problems, pregnancy, dehydration, or depression may also cause weakness  Reactions to certain drugs can cause weakness  Parts of your body may become weak if you need to wear a cast or splint or have been on bed rest for a long time  DISCHARGE INSTRUCTIONS:   Call 911 for any of the following:   · You have any of the following signs of a stroke:      ¨ Numbness or drooping on one side of your face     ¨ Weakness in an arm or leg    ¨ Confusion or difficulty speaking    ¨ Dizziness, a severe headache, or vision loss    · You lose feeling in your weakened body area  · You have electric shock-like feelings down your arms and legs when you flex or move your neck  · You have sudden or increased trouble speaking, swallowing, or breathing  Return to the emergency department if:   · You have severe pain in your back, arms, or legs that worsens  · You have sudden or worsened muscle weakness or loss of movement  · You are not able to control when you urinate or have a bowel movement  Contact your healthcare provider if:   · You feel depressed or anxious  · You have questions or concerns about your condition or care  Manage weakness:   · Use assistive devices as directed  These help protect you from injury  Examples include a walker or cane  Have someone install handrails in your home  These will help you get out of a bathtub or stand up from a toilet  Use a shower chair so you can sit while you shower  Sit down on the toilet or another chair to dry off and put on your clothes  Get help going up and down stairs if your legs are weak  · Go to physical or occupational therapy if directed  A physical therapist can teach you exercises to help strengthen weak muscles   An occupational therapist can show you ways to do your daily activities more easily  For example, light forks and spoons can be easier to use if you have hand weakness  You may also learn ways to organize your household items so you are not moving heavy items  · Balance rest with exercise  Exercise can help increase your muscle strength and energy  Do not exercise for long periods at a time  Take breaks often to rest  Too much exercise can cause muscle strain or make you more tired  Ask your healthcare provider how much exercise is right for you  · Eat a variety of healthy foods  Too much or too little food may cause weakness or tiredness  Ask your healthcare provider what a healthy amount of food is for you  Healthy foods include fruits, vegetables, whole-grain breads, low-fat dairy products, lean meats and fish, nuts, and cooked beans  · Do not smoke  Nicotine and other chemicals in cigarettes and cigars can make your symptoms worse, and can cause lung damage  Ask your healthcare provider for information if you currently smoke and need help to quit  E-cigarettes or smokeless tobacco still contain nicotine  Talk to your healthcare provider before you use these products  · Do not use caffeine, alcohol, or illegal drugs  These may cause muscle twitching, which could lead to worsened weakness  Follow up with your healthcare provider as directed:  Write down your questions so you remember to ask them during your visits  © 2017 [address] Information is for End User's use only and may not be sold, redistributed or otherwise used for commercial purposes  All illustrations and images included in CareNotes® are the copyrighted property of A D A M , Inc  or Jose Cox  The above information is an  only  It is not intended as medical advice for individual conditions or treatments  Talk to your doctor, nurse or pharmacist before following any medical regimen to see if it is safe and effective for you

## 2017-11-03 ENCOUNTER — APPOINTMENT (OUTPATIENT)
Dept: LAB | Facility: CLINIC | Age: 77
End: 2017-11-03
Payer: MEDICARE

## 2017-11-03 DIAGNOSIS — Z95.2 HEART VALVE REPLACED BY TRANSPLANT: Primary | ICD-10-CM

## 2017-11-03 LAB
INR PPP: 3.28 (ref 0.86–1.16)
PROTHROMBIN TIME: 33.9 SECONDS (ref 12.1–14.4)

## 2017-11-03 PROCEDURE — 85610 PROTHROMBIN TIME: CPT

## 2017-11-03 PROCEDURE — 36415 COLL VENOUS BLD VENIPUNCTURE: CPT

## 2017-12-22 ENCOUNTER — LAB (OUTPATIENT)
Dept: LAB | Facility: CLINIC | Age: 77
End: 2017-12-22
Payer: MEDICARE

## 2017-12-22 ENCOUNTER — TRANSCRIBE ORDERS (OUTPATIENT)
Dept: LAB | Facility: CLINIC | Age: 77
End: 2017-12-22

## 2017-12-22 DIAGNOSIS — I48.20 CHRONIC ATRIAL FIBRILLATION (HCC): ICD-10-CM

## 2017-12-22 DIAGNOSIS — Z95.2 HEART VALVE REPLACED BY TRANSPLANT: Primary | ICD-10-CM

## 2017-12-22 LAB
INR PPP: 3.05 (ref 0.86–1.16)
PROTHROMBIN TIME: 32 SECONDS (ref 12.1–14.4)

## 2017-12-22 PROCEDURE — 36415 COLL VENOUS BLD VENIPUNCTURE: CPT

## 2017-12-22 PROCEDURE — 85610 PROTHROMBIN TIME: CPT

## 2018-01-19 ENCOUNTER — APPOINTMENT (OUTPATIENT)
Dept: LAB | Facility: HOSPITAL | Age: 78
End: 2018-01-19
Attending: FAMILY MEDICINE

## 2018-01-19 ENCOUNTER — TRANSCRIBE ORDERS (OUTPATIENT)
Dept: ADMINISTRATIVE | Facility: HOSPITAL | Age: 78
End: 2018-01-19

## 2018-01-19 DIAGNOSIS — Z11.1 SCREENING EXAMINATION FOR PULMONARY TUBERCULOSIS: ICD-10-CM

## 2018-01-19 DIAGNOSIS — Z11.1 SCREENING EXAMINATION FOR PULMONARY TUBERCULOSIS: Primary | ICD-10-CM

## 2018-01-19 PROCEDURE — 36415 COLL VENOUS BLD VENIPUNCTURE: CPT

## 2018-01-19 PROCEDURE — 86480 TB TEST CELL IMMUN MEASURE: CPT

## 2018-01-21 LAB
ANNOTATION COMMENT IMP: NORMAL
GAMMA INTERFERON BACKGROUND BLD IA-ACNC: 0.04 IU/ML
M TB IFN-G BLD-IMP: NEGATIVE
M TB IFN-G CD4+ BCKGRND COR BLD-ACNC: 0.01 IU/ML
M TB IFN-G CD4+ T-CELLS BLD-ACNC: 0.05 IU/ML
MITOGEN IGNF BLD-ACNC: 7.05 IU/ML
QUANTIFERON-TB GOLD IN TUBE: NORMAL
SERVICE CMNT-IMP: NORMAL

## 2018-01-23 ENCOUNTER — TRANSCRIBE ORDERS (OUTPATIENT)
Dept: LAB | Facility: CLINIC | Age: 78
End: 2018-01-23

## 2018-01-23 ENCOUNTER — LAB (OUTPATIENT)
Dept: LAB | Facility: CLINIC | Age: 78
End: 2018-01-23

## 2018-01-23 ENCOUNTER — APPOINTMENT (OUTPATIENT)
Dept: LAB | Facility: CLINIC | Age: 78
End: 2018-01-23
Payer: MEDICARE

## 2018-01-23 ENCOUNTER — GENERIC CONVERSION - ENCOUNTER (OUTPATIENT)
Dept: OTHER | Facility: OTHER | Age: 78
End: 2018-01-23

## 2018-01-23 DIAGNOSIS — Z95.2 HEART VALVE REPLACED BY TRANSPLANT: ICD-10-CM

## 2018-01-23 DIAGNOSIS — E55.9 VITAMIN D DEFICIENCY: ICD-10-CM

## 2018-01-23 DIAGNOSIS — D64.89 OTHER SPECIFIED ANEMIAS (CODE): ICD-10-CM

## 2018-01-23 DIAGNOSIS — I10 ESSENTIAL HYPERTENSION, MALIGNANT: ICD-10-CM

## 2018-01-23 DIAGNOSIS — Z79.02 ENCOUNTER FOR LONG-TERM (CURRENT) USE OF ANTIPLATELETS/ANTITHROMBOTICS: ICD-10-CM

## 2018-01-23 DIAGNOSIS — I48.20 CHRONIC ATRIAL FIBRILLATION (HCC): ICD-10-CM

## 2018-01-23 DIAGNOSIS — Z79.899 ENCOUNTER FOR LONG-TERM (CURRENT) USE OF MEDICATIONS: Primary | ICD-10-CM

## 2018-01-23 DIAGNOSIS — E78.00 PURE HYPERCHOLESTEROLEMIA: ICD-10-CM

## 2018-01-23 DIAGNOSIS — E13.8 DIABETES MELLITUS OF OTHER TYPE WITH COMPLICATION, UNSPECIFIED LONG TERM INSULIN USE STATUS: ICD-10-CM

## 2018-01-23 LAB
25(OH)D3 SERPL-MCNC: 26 NG/ML (ref 30–100)
ALBUMIN SERPL BCP-MCNC: 4.1 G/DL (ref 3.5–5)
ALP SERPL-CCNC: 84 U/L (ref 46–116)
ALT SERPL W P-5'-P-CCNC: 32 U/L (ref 12–78)
ANION GAP SERPL CALCULATED.3IONS-SCNC: 5 MMOL/L (ref 4–13)
AST SERPL W P-5'-P-CCNC: 30 U/L (ref 5–45)
BILIRUB SERPL-MCNC: 0.56 MG/DL (ref 0.2–1)
BUN SERPL-MCNC: 13 MG/DL (ref 5–25)
CALCIUM SERPL-MCNC: 9 MG/DL (ref 8.3–10.1)
CHLORIDE SERPL-SCNC: 101 MMOL/L (ref 100–108)
CO2 SERPL-SCNC: 30 MMOL/L (ref 21–32)
CREAT SERPL-MCNC: 0.66 MG/DL (ref 0.6–1.3)
CREAT UR-MCNC: 40.8 MG/DL
EST. AVERAGE GLUCOSE BLD GHB EST-MCNC: 157 MG/DL
GFR SERPL CREATININE-BSD FRML MDRD: 85 ML/MIN/1.73SQ M
GLUCOSE P FAST SERPL-MCNC: 139 MG/DL (ref 65–99)
HBA1C MFR BLD: 7.1 % (ref 4.2–6.3)
HCT VFR BLD AUTO: 40.9 % (ref 34.8–46.1)
HGB BLD-MCNC: 13.1 G/DL (ref 11.5–15.4)
INR PPP: 4.49 (ref 0.86–1.16)
MICROALBUMIN UR-MCNC: 10.4 MG/L (ref 0–20)
MICROALBUMIN/CREAT 24H UR: 25 MG/G CREATININE (ref 0–30)
POTASSIUM SERPL-SCNC: 4.1 MMOL/L (ref 3.5–5.3)
PROT SERPL-MCNC: 8 G/DL (ref 6.4–8.2)
PROTHROMBIN TIME: 43.5 SECONDS (ref 12.1–14.4)
SODIUM SERPL-SCNC: 136 MMOL/L (ref 136–145)

## 2018-01-23 PROCEDURE — 82043 UR ALBUMIN QUANTITATIVE: CPT

## 2018-01-23 PROCEDURE — 80053 COMPREHEN METABOLIC PANEL: CPT

## 2018-01-23 PROCEDURE — 85610 PROTHROMBIN TIME: CPT

## 2018-01-23 PROCEDURE — 83036 HEMOGLOBIN GLYCOSYLATED A1C: CPT

## 2018-01-23 PROCEDURE — 85014 HEMATOCRIT: CPT

## 2018-01-23 PROCEDURE — 36415 COLL VENOUS BLD VENIPUNCTURE: CPT

## 2018-01-23 PROCEDURE — 82570 ASSAY OF URINE CREATININE: CPT

## 2018-01-23 PROCEDURE — 85018 HEMOGLOBIN: CPT

## 2018-01-23 PROCEDURE — 82306 VITAMIN D 25 HYDROXY: CPT

## 2018-01-24 NOTE — RESULT NOTES
Verified Results  (1) QUANTIFERON - TB GOLD [id number] 11:33AM Leonid Phoenixkyle Ritter     Test Name Result Flag Reference   QUANTIFERON TB GOLD      Specimen incubated at [place], [place]    Performed at:  [facility] [address]  [id number]  : Kermit Willis MD, Phone:  [phone]   QUANTIFERON TB GOLD Negative  Negative   QUANTIFERON CRITERIA Comment     To be considered positive a specimen should have a TB Ag minus Nil  value greater than or equal to 0 35 IU/mL and in addition the TB Ag  minus Nil value must be greater than or equal to 25% of the Nil  value  There may be insufficient information in these values to  differentiate between some negative and some indeterminate test  values  QUANTIFERON TB AG VALUE 0 05 IU/mL     QUANTIFERON NIL VALUE 0 04 IU/mL     QUANTIFERON MITOGEN VALUE 7 05 IU/mL     QFT TB AG MINUS NIL VALUE 0 01 IU/mL     QFT INTERPRETATION Comment     The QuantiFERON TB Gold (in Tube) assay is intended for use as an aid  in the diagnosis of TB infection  Negative results suggest that there  is no TB infection  In patients with high suspicion of exposure, a  negative test should be repeated  A positive test indicates infection  with Mycobacterium tuberculosis  Among individuals without  tuberculosis infection, a positive test may be due to exposure to  [place], M  martha or M  maripareshum  On the Internet, go to  cdc gov/tb for further details    Performed at:  [facility] [address]  [id number]  : Kermit Willis MD, Phone:  [phone]

## 2018-02-12 ENCOUNTER — APPOINTMENT (OUTPATIENT)
Dept: LAB | Facility: CLINIC | Age: 78
End: 2018-02-12
Payer: MEDICARE

## 2018-02-12 ENCOUNTER — TRANSCRIBE ORDERS (OUTPATIENT)
Dept: LAB | Facility: CLINIC | Age: 78
End: 2018-02-12

## 2018-02-12 DIAGNOSIS — I48.20 CHRONIC ATRIAL FIBRILLATION (HCC): ICD-10-CM

## 2018-02-12 DIAGNOSIS — Z95.2 HEART VALVE REPLACED BY TRANSPLANT: Primary | ICD-10-CM

## 2018-02-12 LAB
INR PPP: 2.52 (ref 0.86–1.16)
PROTHROMBIN TIME: 27.5 SECONDS (ref 12.1–14.4)

## 2018-02-12 PROCEDURE — 36415 COLL VENOUS BLD VENIPUNCTURE: CPT

## 2018-02-12 PROCEDURE — 85610 PROTHROMBIN TIME: CPT

## 2018-02-15 ENCOUNTER — TRANSCRIBE ORDERS (OUTPATIENT)
Dept: LAB | Facility: CLINIC | Age: 78
End: 2018-02-15

## 2018-02-15 ENCOUNTER — APPOINTMENT (OUTPATIENT)
Dept: LAB | Facility: CLINIC | Age: 78
End: 2018-02-15
Payer: MEDICARE

## 2018-02-15 DIAGNOSIS — I48.20 CHRONIC ATRIAL FIBRILLATION (HCC): ICD-10-CM

## 2018-02-15 DIAGNOSIS — Z95.2 HEART VALVE REPLACED BY TRANSPLANT: Primary | ICD-10-CM

## 2018-02-15 LAB
INR PPP: 2.36 (ref 0.86–1.16)
PROTHROMBIN TIME: 26.1 SECONDS (ref 12.1–14.4)

## 2018-02-15 PROCEDURE — 36415 COLL VENOUS BLD VENIPUNCTURE: CPT

## 2018-02-15 PROCEDURE — 85610 PROTHROMBIN TIME: CPT

## 2018-02-23 ENCOUNTER — APPOINTMENT (OUTPATIENT)
Dept: LAB | Facility: CLINIC | Age: 78
End: 2018-02-23
Payer: MEDICARE

## 2018-02-23 ENCOUNTER — TRANSCRIBE ORDERS (OUTPATIENT)
Dept: LAB | Facility: CLINIC | Age: 78
End: 2018-02-23

## 2018-02-23 DIAGNOSIS — Z95.2 HEART VALVE REPLACED BY TRANSPLANT: Primary | ICD-10-CM

## 2018-02-23 DIAGNOSIS — I48.20 CHRONIC ATRIAL FIBRILLATION (HCC): ICD-10-CM

## 2018-02-23 LAB
INR PPP: 3.18 (ref 0.86–1.16)
PROTHROMBIN TIME: 33.1 SECONDS (ref 12.1–14.4)

## 2018-02-23 PROCEDURE — 85610 PROTHROMBIN TIME: CPT

## 2018-02-23 PROCEDURE — 36415 COLL VENOUS BLD VENIPUNCTURE: CPT

## 2018-03-15 ENCOUNTER — APPOINTMENT (OUTPATIENT)
Dept: LAB | Facility: CLINIC | Age: 78
End: 2018-03-15
Payer: MEDICARE

## 2018-03-15 ENCOUNTER — TRANSCRIBE ORDERS (OUTPATIENT)
Dept: LAB | Facility: CLINIC | Age: 78
End: 2018-03-15

## 2018-03-15 DIAGNOSIS — Z95.2 HEART VALVE REPLACED BY TRANSPLANT: Primary | ICD-10-CM

## 2018-03-15 DIAGNOSIS — I48.20 CHRONIC ATRIAL FIBRILLATION (HCC): ICD-10-CM

## 2018-03-15 LAB
INR PPP: 3.22 (ref 0.86–1.16)
PROTHROMBIN TIME: 33.4 SECONDS (ref 12.1–14.4)

## 2018-03-15 PROCEDURE — 36415 COLL VENOUS BLD VENIPUNCTURE: CPT

## 2018-03-15 PROCEDURE — 85610 PROTHROMBIN TIME: CPT

## 2018-03-16 ENCOUNTER — OFFICE VISIT (OUTPATIENT)
Dept: PULMONOLOGY | Facility: MEDICAL CENTER | Age: 78
End: 2018-03-16
Payer: MEDICARE

## 2018-03-16 VITALS
BODY MASS INDEX: 27.48 KG/M2 | HEART RATE: 87 BPM | DIASTOLIC BLOOD PRESSURE: 80 MMHG | SYSTOLIC BLOOD PRESSURE: 138 MMHG | TEMPERATURE: 98.4 F | HEIGHT: 66 IN | RESPIRATION RATE: 18 BRPM | OXYGEN SATURATION: 94 % | WEIGHT: 171 LBS

## 2018-03-16 DIAGNOSIS — Z99.89 OBSTRUCTIVE SLEEP APNEA ON CPAP: ICD-10-CM

## 2018-03-16 DIAGNOSIS — R06.02 SHORTNESS OF BREATH: Primary | ICD-10-CM

## 2018-03-16 DIAGNOSIS — G47.33 OBSTRUCTIVE SLEEP APNEA ON CPAP: ICD-10-CM

## 2018-03-16 PROCEDURE — 94010 BREATHING CAPACITY TEST: CPT | Performed by: INTERNAL MEDICINE

## 2018-03-16 PROCEDURE — 99204 OFFICE O/P NEW MOD 45 MIN: CPT | Performed by: INTERNAL MEDICINE

## 2018-03-16 NOTE — PROGRESS NOTES
Assessment/Plan:       Problem List Items Addressed This Visit        Respiratory    Obstructive sleep apnea on CPAP     Continued compliance with CPAP is encouraged  Her machine appears to be malfunctioning and therefore she will bring this in to be evaluated by the therapist             Other    Shortness of breath - Primary     Patient's shortness of breath may be in relation to deconditioning  However she also has chest congestion at this time  She has postnasal drip and sinus pressure  She will use Flonase for her postnasal drip  She is given Arnuity for her bronchial congestion  Result of her spirometry are reviewed with her today  Will repeat spirometry at her next visit if her symptoms are improved  Relevant Orders    POCT spirometry (Completed)             Follow-up in 2 weeks  All questions are answered to the patient's satisfaction and understanding  She verbalizes understanding  She is encouraged to call with any further questions or concerns  Portions of the record may have been created with voice recognition software  Occasional wrong word or "sound a like" substitutions may have occurred due to the inherent limitations of voice recognition software  Read the chart carefully and recognize, using context, where substitutions have occurred  ______________________________________________________________________    Chief Complaint:   Chief Complaint   Patient presents with    Shortness of Breath     Occasional; on exertion Pt would like to establish new pulmonologist    Cough     Occasional at night    Sleep Apnea     Uses CPAP for 15 + years        Patient ID: Alaina Conway is a 66 y o  y o  female has a past medical history of Atrial fibrillation ([facility] [address] ); Cancer ([facility] [address] ); Cardiac disease; and Hyperlipidemia  3/16/2018  Patient presents for initial visit   She has a history of obstructive sleep apnea and uses a CPAP machine    Also most recently she has been having shortness of breath  Was away for 2 weeks in [place]-over there this is flat ground without any steps  Steps at home  And now has shortness of breath  It is improving  No history of lung problems  She has a history of Arrhythmia  She has not been driving is she has Macular degeneration  Occasionally wakes up at night with cough  Not even once per week  JAKE- originally diagnosed about 30 years ago  Constantly using the machine for about 20 years  Nasal pillows  Changes supplies once every 2 weeks  The tubing and filters change every 3 months  She gets about 7-8 hours of sleep per night  Does wake up refreshed  No drowsy driving  CPAP pressure is 7 cm water  Currently machine knob broken  She would like to have this checked  Currently feels like she has a cold  Head congestion- ?sinus difficulty  Little post nasal drip  No GERD  No prior lung function test      She has arthritis- knuckles are started to swell  Hip and knees are bothering her as well  Shortness of Breath   This is a new problem  The current episode started in the past 7 days  The problem occurs intermittently  The problem has been gradually improving  Pertinent negatives include no abdominal pain, chest pain, sore throat, sputum production or wheezing  The symptoms are aggravated by exercise  She has tried nothing for the symptoms  Cough   Associated symptoms include postnasal drip and shortness of breath  Pertinent negatives include no chest pain, sore throat or wheezing  There is no history of environmental allergies  Occupational/Exposure history: worked in the [employer] business  Travel history: Recently travelled to [place]  Review of Systems   Constitutional: Negative for activity change and unexpected weight change  HENT: Positive for congestion, postnasal drip and sinus pressure  Negative for sore throat  Eyes: Negative for discharge  Respiratory: Positive for cough and shortness of breath  Negative for sputum production and wheezing  Cardiovascular: Negative for chest pain  Gastrointestinal: Negative for abdominal distention and abdominal pain  Endocrine: Negative for polyuria  Genitourinary: Negative for difficulty urinating  Musculoskeletal: Positive for arthralgias  Skin: Negative for color change and pallor  Allergic/Immunologic: Negative for environmental allergies  Neurological: Negative for dizziness and facial asymmetry  Hematological: Negative for adenopathy  Psychiatric/Behavioral: Negative for agitation and behavioral problems  Social history: She reports that she has quit smoking  She has a 60 00 pack-year smoking history  She has never used smokeless tobacco  She reports that she drinks alcohol  She reports that she does not use drugs  Past surgical history:   Past Surgical History:   Procedure Laterality Date    EYE SURGERY      HYSTERECTOMY      MITRAL VALVE REPLACEMENT  1994     Family history: No family history on file  no family history of lung disease known  There is no immunization history on file for this patient  Current Outpatient Prescriptions   Medication Sig Dispense Refill    Calcium Citrate-Vitamin D (CALCITRATE/VITAMIN D PO) Take by mouth 3 (three) times a week      Cholecalciferol (VITAMIN D PO) Take by mouth      digoxin (LANOXIN) 0 25 mg tablet Take 250 mcg by mouth daily      Lactobacillus (PROBIOTIC ACIDOPHILUS PO) Take by mouth      metFORMIN (GLUCOPHAGE) 500 mg tablet       Multiple Vitamins-Minerals (PRESERVISION AREDS PO) Take 2 tablets by mouth daily      NADOLOL PO Take 2 5 mg by mouth daily      warfarin (COUMADIN) 2 5 mg tablet Take 2 5 mg by mouth 3 (three) times a week      warfarin (COUMADIN) 5 mg tablet Take 5 mg by mouth 4 (four) times a week       No current facility-administered medications for this visit        Allergies: Sulfa antibiotics    Objective:  Vitals:    03/16/18 0845   BP: 138/80   BP Location: Right arm   Patient Position: Sitting   Cuff Size: Standard   Pulse: 87   Resp: 18   Temp: 98 4 °F (36 9 °C)   TempSrc: Oral   SpO2: 94%   Weight: 77 6 kg (171 lb)   Height: 5' 5 5" (1 664 m)   Oxygen Therapy  SpO2: 94 %    Wt Readings from Last 3 Encounters:   03/16/18 77 6 kg (171 lb)   10/09/17 73 5 kg (162 lb)     Body mass index is 28 02 kg/m²  Physical Exam   Constitutional: She is oriented to person, place, and time  She appears well-developed and well-nourished  No distress  HENT:   Head: Normocephalic and atraumatic  Mouth/Throat: Oropharynx is clear and moist  No oropharyngeal exudate  Erythema   Eyes: EOM are normal  Pupils are equal, round, and reactive to light  Neck: Normal range of motion  Neck supple  No tracheal deviation present  No thyromegaly present  Cardiovascular: Normal rate and regular rhythm  No murmur heard   +click   Pulmonary/Chest: Effort normal  No respiratory distress  She has no wheezes  She has no rales  She exhibits no tenderness  Patient has bilateral coarse breath sounds  Abdominal: Soft  Bowel sounds are normal  She exhibits no distension  There is no tenderness  Musculoskeletal: Normal range of motion  She exhibits no edema  Lymphadenopathy:     She has no cervical adenopathy  Neurological: She is alert and oriented to person, place, and time  No cranial nerve deficit  Skin: Skin is warm and dry  She is not diaphoretic  Psychiatric: She has a normal mood and affect  Her behavior is normal    Vitals reviewed        Lab Review:   Lab Results   Component Value Date     01/23/2018     11/09/2015    K 4 1 01/23/2018    K 4 1 11/09/2015     01/23/2018     11/09/2015    CO2 30 01/23/2018    CO2 29 11/09/2015    BUN 13 01/23/2018    BUN 13 11/09/2015    CREATININE 0 66 01/23/2018    CREATININE 0 7 11/09/2015    GLUCOSE 173 (H) 10/09/2017    GLUCOSE 147 (H) 11/09/2015    CALCIUM 9 0 01/23/2018    CALCIUM 8 6 11/09/2015 Lab Results   Component Value Date    WBC 5 40 10/09/2017    HGB 13 1 01/23/2018    HGB 13 3 11/09/2015    HCT 40 9 01/23/2018    HCT 40 2 11/09/2015    MCV 89 10/09/2017     10/09/2017       Diagnostics:  I have personally reviewed pertinent reports  and I have personally reviewed pertinent films in PACS  Chest x-ray performed October 2017 was without any infiltrates  Office Spirometry Results:  Spirometry performed in the office today shows decreased obstructive ratio with FEV1 of 89%  Compliance data obtained 2/14/2000  2018 showed adequate usage with minimal leak and average AHI of 1 4/hour on CPAP 7  Data obtained from her prior sleep studies is reviewed  2008 she had a diagnostic study which showed AHI of 22 1  Her weight at that time was 168 lb  Last CPAP titration was 2014

## 2018-03-18 PROBLEM — Z99.89 OBSTRUCTIVE SLEEP APNEA ON CPAP: Status: ACTIVE | Noted: 2018-03-18

## 2018-03-18 PROBLEM — G47.33 OBSTRUCTIVE SLEEP APNEA ON CPAP: Status: ACTIVE | Noted: 2018-03-18

## 2018-03-18 PROBLEM — R06.02 SHORTNESS OF BREATH: Status: ACTIVE | Noted: 2018-03-18

## 2018-03-18 NOTE — ASSESSMENT & PLAN NOTE
Continued compliance with CPAP is encouraged    Her machine appears to be malfunctioning and therefore she will bring this in to be evaluated by the therapist

## 2018-03-18 NOTE — ASSESSMENT & PLAN NOTE
Patient's shortness of breath may be in relation to deconditioning  However she also has chest congestion at this time  She has postnasal drip and sinus pressure  She will use Flonase for her postnasal drip  She is given Arnuity for her bronchial congestion  Result of her spirometry are reviewed with her today  Will repeat spirometry at her next visit if her symptoms are improved

## 2018-04-12 ENCOUNTER — APPOINTMENT (OUTPATIENT)
Dept: LAB | Facility: CLINIC | Age: 78
End: 2018-04-12
Payer: MEDICARE

## 2018-04-12 ENCOUNTER — TRANSCRIBE ORDERS (OUTPATIENT)
Dept: LAB | Facility: CLINIC | Age: 78
End: 2018-04-12

## 2018-04-12 DIAGNOSIS — I48.20 CHRONIC ATRIAL FIBRILLATION (HCC): Primary | ICD-10-CM

## 2018-04-12 DIAGNOSIS — Z95.2 HEART VALVE REPLACED BY TRANSPLANT: ICD-10-CM

## 2018-04-12 LAB
INR PPP: 3.44 (ref 0.86–1.16)
PROTHROMBIN TIME: 35.2 SECONDS (ref 12.1–14.4)

## 2018-04-12 PROCEDURE — 85610 PROTHROMBIN TIME: CPT

## 2018-04-12 PROCEDURE — 36415 COLL VENOUS BLD VENIPUNCTURE: CPT

## 2018-04-13 ENCOUNTER — OFFICE VISIT (OUTPATIENT)
Dept: PULMONOLOGY | Facility: MEDICAL CENTER | Age: 78
End: 2018-04-13
Payer: MEDICARE

## 2018-04-13 VITALS
TEMPERATURE: 97.8 F | RESPIRATION RATE: 16 BRPM | HEIGHT: 66 IN | DIASTOLIC BLOOD PRESSURE: 80 MMHG | HEART RATE: 76 BPM | WEIGHT: 171 LBS | SYSTOLIC BLOOD PRESSURE: 130 MMHG | BODY MASS INDEX: 27.48 KG/M2 | OXYGEN SATURATION: 98 %

## 2018-04-13 DIAGNOSIS — Z99.89 OBSTRUCTIVE SLEEP APNEA ON CPAP: Primary | ICD-10-CM

## 2018-04-13 DIAGNOSIS — R06.02 SHORTNESS OF BREATH: ICD-10-CM

## 2018-04-13 DIAGNOSIS — G47.33 OBSTRUCTIVE SLEEP APNEA ON CPAP: Primary | ICD-10-CM

## 2018-04-13 PROCEDURE — 94010 BREATHING CAPACITY TEST: CPT | Performed by: INTERNAL MEDICINE

## 2018-04-13 PROCEDURE — 99213 OFFICE O/P EST LOW 20 MIN: CPT | Performed by: INTERNAL MEDICINE

## 2018-04-13 NOTE — LETTER
April 14, 2018     Yo Valentino  [address]  [address]    Patient: Janneth Jefferson   YOB: 1940   Date of Visit: 4/13/2018       Dear Dr Rosie Kilpatrick: Thank you for referring Ariadna Tapias to me for evaluation  Below are my notes for this consultation  If you have questions, please do not hesitate to call me  I look forward to following your patient along with you           Sincerely,        Flaco Queen MD        CC: No Recipients

## 2018-04-13 NOTE — PROGRESS NOTES
Assessment/Plan:     Problem List Items Addressed This Visit        Respiratory    Obstructive sleep apnea on CPAP - Primary     Compliance data is reviewed with her today  She will continue with CPAP 7 cm of water  Other    Shortness of breath     Result of her spirometry are reviewed today and are normalized  No evidence of COPD  Shortness of breath is now resolved  Relevant Orders    POCT spirometry (Completed)             Follow-up in 6 months  All questions are answered to the patient's satisfaction and understanding  She verbalizes understanding  She is encouraged to call with any further questions or concerns  Portions of the record may have been created with voice recognition software  Occasional wrong word or "sound a like" substitutions may have occurred due to the inherent limitations of voice recognition software  Read the chart carefully and recognize, using context, where substitutions have occurred  ______________________________________________________________________    Chief Complaint:   Chief Complaint   Patient presents with    Shortness of Breath     At rest -random times       Patient ID: Rory Finley is a 66 y o  y o  female has a past medical history of Atrial fibrillation ([facility] [address] ); Cancer ([facility] [address] ); Cardiac disease; and Hyperlipidemia  4/13/2018  Patient presents today for follow-up visit  She has been feeling much better  No congestion in her chest   She has been walking up and down the stairs without any shortness of breath  She was given Breo at her last visit due to her congestion and diminished lung function however she states that upon review with her family she did not want to take a steroid inhaler  Her symptoms resolved on their own  She does have sleep apnea in utilizes her CPAP every night for the entire duration of her sleep time    She states that she did have her machine checked at her last visit in our office and everything was functioning properly  The knob on her CPAP machine was also fixed  She changes her supplies regularly  She denies any daytime sleepiness  Patient also feels discouraged because she has gained weight  However she states that she has not been as active due to the winter weather  Once the weather improves she states that she will resume her exertional activity  Patient has a history of atrial fibrillation and takes Coumadin  She reports no bleeding  Also no recent palpitations  Review of Systems   All other systems reviewed and are negative  Smoking history: She reports that she quit smoking about 29 years ago  She has a 60 00 pack-year smoking history  She has never used smokeless tobacco     The following portions of the patient's history were reviewed and updated as appropriate: allergies, current medications, past family history, past medical history, past social history, past surgical history and problem list       There is no immunization history on file for this patient  Current Outpatient Prescriptions   Medication Sig Dispense Refill    Calcium Citrate-Vitamin D (CALCITRATE/VITAMIN D PO) Take by mouth 3 (three) times a week      Cholecalciferol (VITAMIN D PO) Take by mouth      digoxin (LANOXIN) 0 25 mg tablet Take 250 mcg by mouth daily      Lactobacillus (PROBIOTIC ACIDOPHILUS PO) Take by mouth      metFORMIN (GLUCOPHAGE) 500 mg tablet       Multiple Vitamins-Minerals (PRESERVISION AREDS PO) Take 2 tablets by mouth daily      NADOLOL PO Take 2 5 mg by mouth daily      warfarin (COUMADIN) 2 5 mg tablet Take 2 5 mg by mouth 3 (three) times a week      warfarin (COUMADIN) 5 mg tablet Take 5 mg by mouth 4 (four) times a week       No current facility-administered medications for this visit        Allergies: Sulfa antibiotics    Objective:  Vitals:    04/13/18 0845   BP: 130/80   BP Location: Right arm   Patient Position: Sitting   Cuff Size: Standard   Pulse: 76   Resp: 16   Temp: 97 8 °F (36 6 °C)   TempSrc: Oral   SpO2: 98%   Weight: 77 6 kg (171 lb)   Height: 5' 6" (1 676 m)   Oxygen Therapy  SpO2: 98 %    Wt Readings from Last 3 Encounters:   04/13/18 77 6 kg (171 lb)   03/16/18 77 6 kg (171 lb)   10/09/17 73 5 kg (162 lb)     Body mass index is 27 6 kg/m²  Physical Exam   Constitutional: She is oriented to person, place, and time  She appears well-developed and well-nourished  No distress  HENT:   Head: Normocephalic and atraumatic  Mouth/Throat: Oropharynx is clear and moist  No oropharyngeal exudate  Eyes: EOM are normal  Pupils are equal, round, and reactive to light  Neck: Normal range of motion  Neck supple  No tracheal deviation present  No thyromegaly present  Cardiovascular: Normal rate and regular rhythm  No murmur heard  Pulmonary/Chest: Effort normal and breath sounds normal  No respiratory distress  She has no wheezes  She has no rales  She exhibits no tenderness  Abdominal: Soft  Bowel sounds are normal  She exhibits no distension  There is no tenderness  Musculoskeletal: Normal range of motion  She exhibits no edema  Lymphadenopathy:     She has no cervical adenopathy  Neurological: She is alert and oriented to person, place, and time  No cranial nerve deficit  Skin: Skin is warm and dry  She is not diaphoretic  Psychiatric: She has a normal mood and affect  Her behavior is normal    Vitals reviewed        Lab Review:  Reviewed  Lab Results   Component Value Date     01/23/2018     11/09/2015    K 4 1 01/23/2018    K 4 1 11/09/2015     01/23/2018     11/09/2015    CO2 30 01/23/2018    CO2 29 11/09/2015    BUN 13 01/23/2018    BUN 13 11/09/2015    CREATININE 0 66 01/23/2018    CREATININE 0 7 11/09/2015    GLUCOSE 173 (H) 10/09/2017    GLUCOSE 147 (H) 11/09/2015    CALCIUM 9 0 01/23/2018    CALCIUM 8 6 11/09/2015     Lab Results   Component Value Date    WBC 5 40 10/09/2017    HGB 13 1 01/23/2018    HGB 13 3 11/09/2015    HCT 40 9 01/23/2018    HCT 40 2 11/09/2015    MCV 89 10/09/2017     10/09/2017       Diagnostics:  Repeat spirometry is performed today and shows normal obstructive ratio with normal lung volumes  Compliance download is obtained from 2/14/2000 18315 2018 and patient's usage is 90% on average 8 hr and 8 min used  Patient is on CPAP 7  Average AHI of 1 4 on the settings with 2 sec leak

## 2018-04-24 ENCOUNTER — TRANSCRIBE ORDERS (OUTPATIENT)
Dept: LAB | Facility: CLINIC | Age: 78
End: 2018-04-24

## 2018-04-24 ENCOUNTER — LAB (OUTPATIENT)
Dept: LAB | Facility: CLINIC | Age: 78
End: 2018-04-24
Payer: MEDICARE

## 2018-04-24 DIAGNOSIS — I10 ESSENTIAL HYPERTENSION, BENIGN: Primary | ICD-10-CM

## 2018-04-24 DIAGNOSIS — E11.9 DIABETES MELLITUS WITHOUT COMPLICATION (HCC): ICD-10-CM

## 2018-04-24 DIAGNOSIS — E78.00 PURE HYPERCHOLESTEROLEMIA: ICD-10-CM

## 2018-04-24 DIAGNOSIS — D50.9 IRON DEFICIENCY ANEMIA, UNSPECIFIED IRON DEFICIENCY ANEMIA TYPE: ICD-10-CM

## 2018-04-24 LAB
ALBUMIN SERPL BCP-MCNC: 4 G/DL (ref 3.5–5)
ALP SERPL-CCNC: 82 U/L (ref 46–116)
ALT SERPL W P-5'-P-CCNC: 28 U/L (ref 12–78)
ANION GAP SERPL CALCULATED.3IONS-SCNC: 8 MMOL/L (ref 4–13)
AST SERPL W P-5'-P-CCNC: 28 U/L (ref 5–45)
BILIRUB SERPL-MCNC: 0.59 MG/DL (ref 0.2–1)
BUN SERPL-MCNC: 12 MG/DL (ref 5–25)
CALCIUM SERPL-MCNC: 8.7 MG/DL (ref 8.3–10.1)
CHLORIDE SERPL-SCNC: 105 MMOL/L (ref 100–108)
CHOLEST SERPL-MCNC: 195 MG/DL (ref 50–200)
CO2 SERPL-SCNC: 26 MMOL/L (ref 21–32)
CREAT SERPL-MCNC: 0.7 MG/DL (ref 0.6–1.3)
CREAT UR-MCNC: 101 MG/DL
EST. AVERAGE GLUCOSE BLD GHB EST-MCNC: 174 MG/DL
GFR SERPL CREATININE-BSD FRML MDRD: 83 ML/MIN/1.73SQ M
GLUCOSE P FAST SERPL-MCNC: 176 MG/DL (ref 65–99)
HBA1C MFR BLD: 7.7 % (ref 4.2–6.3)
HDLC SERPL-MCNC: 36 MG/DL (ref 40–60)
LDLC SERPL CALC-MCNC: 114 MG/DL (ref 0–100)
MICROALBUMIN UR-MCNC: 26.5 MG/L (ref 0–20)
MICROALBUMIN/CREAT 24H UR: 26 MG/G CREATININE (ref 0–30)
NONHDLC SERPL-MCNC: 159 MG/DL
POTASSIUM SERPL-SCNC: 4 MMOL/L (ref 3.5–5.3)
PROT SERPL-MCNC: 7.6 G/DL (ref 6.4–8.2)
SODIUM SERPL-SCNC: 139 MMOL/L (ref 136–145)
TRIGL SERPL-MCNC: 224 MG/DL

## 2018-04-24 PROCEDURE — 80061 LIPID PANEL: CPT

## 2018-04-24 PROCEDURE — 36415 COLL VENOUS BLD VENIPUNCTURE: CPT

## 2018-04-24 PROCEDURE — 80053 COMPREHEN METABOLIC PANEL: CPT

## 2018-04-24 PROCEDURE — 82043 UR ALBUMIN QUANTITATIVE: CPT

## 2018-04-24 PROCEDURE — 82570 ASSAY OF URINE CREATININE: CPT

## 2018-04-24 PROCEDURE — 83036 HEMOGLOBIN GLYCOSYLATED A1C: CPT

## 2018-05-10 ENCOUNTER — TRANSCRIBE ORDERS (OUTPATIENT)
Dept: LAB | Facility: CLINIC | Age: 78
End: 2018-05-10

## 2018-05-10 ENCOUNTER — APPOINTMENT (OUTPATIENT)
Dept: LAB | Facility: CLINIC | Age: 78
End: 2018-05-10
Payer: MEDICARE

## 2018-05-10 DIAGNOSIS — Z95.2 PERSONAL HISTORY OF HEART VALVE REPLACEMENT: ICD-10-CM

## 2018-05-10 DIAGNOSIS — I48.20 CHRONIC ATRIAL FIBRILLATION (HCC): Primary | ICD-10-CM

## 2018-05-10 LAB
INR PPP: 3.11 (ref 0.86–1.16)
PROTHROMBIN TIME: 32.5 SECONDS (ref 12.1–14.4)

## 2018-05-10 PROCEDURE — 36415 COLL VENOUS BLD VENIPUNCTURE: CPT

## 2018-05-10 PROCEDURE — 85610 PROTHROMBIN TIME: CPT

## 2018-05-31 ENCOUNTER — APPOINTMENT (OUTPATIENT)
Dept: LAB | Facility: CLINIC | Age: 78
End: 2018-05-31
Payer: MEDICARE

## 2018-05-31 ENCOUNTER — TRANSCRIBE ORDERS (OUTPATIENT)
Dept: LAB | Facility: CLINIC | Age: 78
End: 2018-05-31

## 2018-05-31 DIAGNOSIS — Z95.2 HEART VALVE REPLACED BY TRANSPLANT: ICD-10-CM

## 2018-05-31 DIAGNOSIS — Z95.2 HEART VALVE REPLACED BY TRANSPLANT: Primary | ICD-10-CM

## 2018-05-31 DIAGNOSIS — I48.20 CHRONIC ATRIAL FIBRILLATION (HCC): ICD-10-CM

## 2018-05-31 LAB
INR PPP: 3.39 (ref 0.86–1.17)
PROTHROMBIN TIME: 34.3 SECONDS (ref 11.8–14.2)

## 2018-05-31 PROCEDURE — 36415 COLL VENOUS BLD VENIPUNCTURE: CPT

## 2018-05-31 PROCEDURE — 85610 PROTHROMBIN TIME: CPT

## 2018-06-15 ENCOUNTER — LAB (OUTPATIENT)
Dept: LAB | Facility: CLINIC | Age: 78
End: 2018-06-15
Payer: MEDICARE

## 2018-06-15 ENCOUNTER — TRANSCRIBE ORDERS (OUTPATIENT)
Dept: LAB | Facility: CLINIC | Age: 78
End: 2018-06-15

## 2018-06-15 DIAGNOSIS — I10 ESSENTIAL HYPERTENSION, MALIGNANT: Primary | ICD-10-CM

## 2018-06-15 DIAGNOSIS — E13.8 DIABETES MELLITUS OF OTHER TYPE WITH COMPLICATION, UNSPECIFIED WHETHER LONG TERM INSULIN USE: ICD-10-CM

## 2018-06-15 LAB
ALT SERPL W P-5'-P-CCNC: 33 U/L (ref 12–78)
ANION GAP SERPL CALCULATED.3IONS-SCNC: 7 MMOL/L (ref 4–13)
BUN SERPL-MCNC: 14 MG/DL (ref 5–25)
CALCIUM SERPL-MCNC: 8.9 MG/DL (ref 8.3–10.1)
CHLORIDE SERPL-SCNC: 104 MMOL/L (ref 100–108)
CO2 SERPL-SCNC: 28 MMOL/L (ref 21–32)
CREAT SERPL-MCNC: 0.66 MG/DL (ref 0.6–1.3)
GFR SERPL CREATININE-BSD FRML MDRD: 85 ML/MIN/1.73SQ M
GLUCOSE SERPL-MCNC: 136 MG/DL (ref 65–140)
POTASSIUM SERPL-SCNC: 4.2 MMOL/L (ref 3.5–5.3)
SODIUM SERPL-SCNC: 139 MMOL/L (ref 136–145)

## 2018-06-15 PROCEDURE — 82985 ASSAY OF GLYCATED PROTEIN: CPT

## 2018-06-15 PROCEDURE — 36415 COLL VENOUS BLD VENIPUNCTURE: CPT

## 2018-06-15 PROCEDURE — 80048 BASIC METABOLIC PNL TOTAL CA: CPT

## 2018-06-15 PROCEDURE — 84460 ALANINE AMINO (ALT) (SGPT): CPT

## 2018-06-16 LAB — FRUCTOSAMINE SERPL-SCNC: 277 UMOL/L (ref 0–285)

## 2018-07-02 ENCOUNTER — TRANSCRIBE ORDERS (OUTPATIENT)
Dept: LAB | Facility: CLINIC | Age: 78
End: 2018-07-02

## 2018-07-03 ENCOUNTER — APPOINTMENT (OUTPATIENT)
Dept: LAB | Facility: CLINIC | Age: 78
End: 2018-07-03
Payer: MEDICARE

## 2018-07-03 ENCOUNTER — TRANSCRIBE ORDERS (OUTPATIENT)
Dept: LAB | Facility: CLINIC | Age: 78
End: 2018-07-03

## 2018-07-03 DIAGNOSIS — I48.91 ATRIAL FIBRILLATION, UNSPECIFIED TYPE (HCC): ICD-10-CM

## 2018-07-03 DIAGNOSIS — I48.91 ATRIAL FIBRILLATION, UNSPECIFIED TYPE (HCC): Primary | ICD-10-CM

## 2018-07-03 LAB
INR PPP: 2.95 (ref 0.86–1.17)
PROTHROMBIN TIME: 30.8 SECONDS (ref 11.8–14.2)

## 2018-07-03 PROCEDURE — 36415 COLL VENOUS BLD VENIPUNCTURE: CPT

## 2018-07-03 PROCEDURE — 85610 PROTHROMBIN TIME: CPT

## 2018-07-31 ENCOUNTER — TRANSCRIBE ORDERS (OUTPATIENT)
Dept: LAB | Facility: CLINIC | Age: 78
End: 2018-07-31

## 2018-07-31 ENCOUNTER — APPOINTMENT (OUTPATIENT)
Dept: LAB | Facility: CLINIC | Age: 78
End: 2018-07-31
Payer: MEDICARE

## 2018-07-31 DIAGNOSIS — I48.91 ATRIAL FIBRILLATION, UNSPECIFIED TYPE (HCC): Primary | ICD-10-CM

## 2018-07-31 LAB
INR PPP: 3.54 (ref 0.86–1.17)
PROTHROMBIN TIME: 35.4 SECONDS (ref 11.8–14.2)

## 2018-07-31 PROCEDURE — 36415 COLL VENOUS BLD VENIPUNCTURE: CPT

## 2018-07-31 PROCEDURE — 85610 PROTHROMBIN TIME: CPT

## 2018-08-17 ENCOUNTER — TRANSCRIBE ORDERS (OUTPATIENT)
Dept: LAB | Facility: CLINIC | Age: 78
End: 2018-08-17

## 2018-08-17 ENCOUNTER — LAB (OUTPATIENT)
Dept: LAB | Facility: CLINIC | Age: 78
End: 2018-08-17
Payer: MEDICARE

## 2018-08-17 DIAGNOSIS — E13.8 DIABETES MELLITUS OF OTHER TYPE WITH COMPLICATION, UNSPECIFIED WHETHER LONG TERM INSULIN USE: ICD-10-CM

## 2018-08-17 DIAGNOSIS — D63.8 CHRONIC DISEASE ANEMIA: ICD-10-CM

## 2018-08-17 DIAGNOSIS — I48.91 ATRIAL FIBRILLATION, UNSPECIFIED TYPE (HCC): ICD-10-CM

## 2018-08-17 DIAGNOSIS — E78.00 PURE HYPERCHOLESTEROLEMIA: ICD-10-CM

## 2018-08-17 DIAGNOSIS — I10 ESSENTIAL HYPERTENSION, MALIGNANT: ICD-10-CM

## 2018-08-17 DIAGNOSIS — I48.20 CHRONIC ATRIAL FIBRILLATION (HCC): ICD-10-CM

## 2018-08-17 DIAGNOSIS — Z79.899 ENCOUNTER FOR LONG-TERM (CURRENT) USE OF MEDICATIONS: Primary | ICD-10-CM

## 2018-08-17 LAB
ALBUMIN SERPL BCP-MCNC: 4 G/DL (ref 3.5–5)
ALP SERPL-CCNC: 83 U/L (ref 46–116)
ALT SERPL W P-5'-P-CCNC: 29 U/L (ref 12–78)
ANION GAP SERPL CALCULATED.3IONS-SCNC: 7 MMOL/L (ref 4–13)
AST SERPL W P-5'-P-CCNC: 24 U/L (ref 5–45)
BILIRUB SERPL-MCNC: 0.37 MG/DL (ref 0.2–1)
BUN SERPL-MCNC: 12 MG/DL (ref 5–25)
CALCIUM SERPL-MCNC: 8.6 MG/DL (ref 8.3–10.1)
CHLORIDE SERPL-SCNC: 102 MMOL/L (ref 100–108)
CHOLEST SERPL-MCNC: 197 MG/DL (ref 50–200)
CO2 SERPL-SCNC: 28 MMOL/L (ref 21–32)
CREAT SERPL-MCNC: 0.66 MG/DL (ref 0.6–1.3)
CREAT UR-MCNC: 153 MG/DL
DIGOXIN SERPL-MCNC: 1 NG/ML (ref 0.8–2)
EST. AVERAGE GLUCOSE BLD GHB EST-MCNC: 143 MG/DL
GFR SERPL CREATININE-BSD FRML MDRD: 85 ML/MIN/1.73SQ M
GLUCOSE P FAST SERPL-MCNC: 152 MG/DL (ref 65–99)
HBA1C MFR BLD: 6.6 % (ref 4.2–6.3)
HCT VFR BLD AUTO: 39.4 % (ref 34.8–46.1)
HDLC SERPL-MCNC: 36 MG/DL (ref 40–60)
HGB BLD-MCNC: 11.6 G/DL (ref 11.5–15.4)
INR PPP: 3.15 (ref 0.86–1.17)
LDLC SERPL CALC-MCNC: 106 MG/DL (ref 0–100)
MICROALBUMIN UR-MCNC: 70 MG/L (ref 0–20)
MICROALBUMIN/CREAT 24H UR: 46 MG/G CREATININE (ref 0–30)
NONHDLC SERPL-MCNC: 161 MG/DL
POTASSIUM SERPL-SCNC: 4.1 MMOL/L (ref 3.5–5.3)
PROT SERPL-MCNC: 7.6 G/DL (ref 6.4–8.2)
PROTHROMBIN TIME: 32.4 SECONDS (ref 11.8–14.2)
SODIUM SERPL-SCNC: 137 MMOL/L (ref 136–145)
TRIGL SERPL-MCNC: 277 MG/DL

## 2018-08-17 PROCEDURE — 80061 LIPID PANEL: CPT

## 2018-08-17 PROCEDURE — 85610 PROTHROMBIN TIME: CPT

## 2018-08-17 PROCEDURE — 85014 HEMATOCRIT: CPT

## 2018-08-17 PROCEDURE — 36415 COLL VENOUS BLD VENIPUNCTURE: CPT

## 2018-08-17 PROCEDURE — 82043 UR ALBUMIN QUANTITATIVE: CPT

## 2018-08-17 PROCEDURE — 80053 COMPREHEN METABOLIC PANEL: CPT

## 2018-08-17 PROCEDURE — 85018 HEMOGLOBIN: CPT

## 2018-08-17 PROCEDURE — 82570 ASSAY OF URINE CREATININE: CPT

## 2018-08-17 PROCEDURE — 83036 HEMOGLOBIN GLYCOSYLATED A1C: CPT

## 2018-08-17 PROCEDURE — 80162 ASSAY OF DIGOXIN TOTAL: CPT

## 2018-09-10 ENCOUNTER — TRANSCRIBE ORDERS (OUTPATIENT)
Dept: ADMINISTRATIVE | Facility: HOSPITAL | Age: 78
End: 2018-09-10

## 2018-09-10 DIAGNOSIS — Z12.39 SCREENING BREAST EXAMINATION: Primary | ICD-10-CM

## 2018-09-24 ENCOUNTER — HOSPITAL ENCOUNTER (OUTPATIENT)
Dept: RADIOLOGY | Facility: HOSPITAL | Age: 78
Discharge: HOME | End: 2018-09-24
Attending: INTERNAL MEDICINE
Payer: MEDICARE

## 2018-09-24 DIAGNOSIS — Z12.39 SCREENING BREAST EXAMINATION: ICD-10-CM

## 2018-09-24 PROCEDURE — 77067 SCR MAMMO BI INCL CAD: CPT

## 2018-10-05 ENCOUNTER — TRANSCRIBE ORDERS (OUTPATIENT)
Dept: LAB | Facility: CLINIC | Age: 78
End: 2018-10-05

## 2018-10-05 ENCOUNTER — APPOINTMENT (OUTPATIENT)
Dept: LAB | Facility: CLINIC | Age: 78
End: 2018-10-05
Payer: MEDICARE

## 2018-10-05 DIAGNOSIS — I48.91 ATRIAL FIBRILLATION, UNSPECIFIED TYPE (HCC): Primary | ICD-10-CM

## 2018-10-05 LAB
INR PPP: 3.37 (ref 0.86–1.17)
PROTHROMBIN TIME: 34.1 SECONDS (ref 11.8–14.2)

## 2018-10-05 PROCEDURE — 36415 COLL VENOUS BLD VENIPUNCTURE: CPT

## 2018-10-05 PROCEDURE — 85610 PROTHROMBIN TIME: CPT

## 2018-11-26 ENCOUNTER — TRANSCRIBE ORDERS (OUTPATIENT)
Dept: LAB | Facility: CLINIC | Age: 78
End: 2018-11-26

## 2018-11-26 ENCOUNTER — LAB (OUTPATIENT)
Dept: LAB | Facility: CLINIC | Age: 78
End: 2018-11-26
Payer: MEDICARE

## 2018-11-26 DIAGNOSIS — E13.8 DIABETES MELLITUS OF OTHER TYPE WITH COMPLICATION, UNSPECIFIED WHETHER LONG TERM INSULIN USE: ICD-10-CM

## 2018-11-26 DIAGNOSIS — I10 ESSENTIAL HYPERTENSION, MALIGNANT: Primary | ICD-10-CM

## 2018-11-26 DIAGNOSIS — I48.91 ATRIAL FIBRILLATION, UNSPECIFIED TYPE (HCC): ICD-10-CM

## 2018-11-26 DIAGNOSIS — E78.00 PURE HYPERCHOLESTEROLEMIA: ICD-10-CM

## 2018-11-26 DIAGNOSIS — D63.8 CHRONIC DISEASE ANEMIA: ICD-10-CM

## 2018-11-26 LAB
ALBUMIN SERPL BCP-MCNC: 4.1 G/DL (ref 3.5–5)
ALP SERPL-CCNC: 88 U/L (ref 46–116)
ALT SERPL W P-5'-P-CCNC: 26 U/L (ref 12–78)
ANION GAP SERPL CALCULATED.3IONS-SCNC: 2 MMOL/L (ref 4–13)
AST SERPL W P-5'-P-CCNC: 24 U/L (ref 5–45)
BILIRUB SERPL-MCNC: 0.44 MG/DL (ref 0.2–1)
BUN SERPL-MCNC: 12 MG/DL (ref 5–25)
CALCIUM SERPL-MCNC: 9.5 MG/DL (ref 8.3–10.1)
CHLORIDE SERPL-SCNC: 102 MMOL/L (ref 100–108)
CHOLEST SERPL-MCNC: 230 MG/DL (ref 50–200)
CO2 SERPL-SCNC: 28 MMOL/L (ref 21–32)
CREAT SERPL-MCNC: 0.69 MG/DL (ref 0.6–1.3)
CREAT UR-MCNC: 65.7 MG/DL
EST. AVERAGE GLUCOSE BLD GHB EST-MCNC: 163 MG/DL
GFR SERPL CREATININE-BSD FRML MDRD: 84 ML/MIN/1.73SQ M
GLUCOSE P FAST SERPL-MCNC: 166 MG/DL (ref 65–99)
HBA1C MFR BLD: 7.3 % (ref 4.2–6.3)
HCT VFR BLD AUTO: 40.9 % (ref 34.8–46.1)
HDLC SERPL-MCNC: 36 MG/DL (ref 40–60)
HGB BLD-MCNC: 12.4 G/DL (ref 11.5–15.4)
INR PPP: 2.77 (ref 0.86–1.17)
LDLC SERPL CALC-MCNC: 139 MG/DL (ref 0–100)
MICROALBUMIN UR-MCNC: 39.4 MG/L (ref 0–20)
MICROALBUMIN/CREAT 24H UR: 60 MG/G CREATININE (ref 0–30)
NONHDLC SERPL-MCNC: 194 MG/DL
POTASSIUM SERPL-SCNC: 4.1 MMOL/L (ref 3.5–5.3)
PROT SERPL-MCNC: 7.8 G/DL (ref 6.4–8.2)
PROTHROMBIN TIME: 29.3 SECONDS (ref 11.8–14.2)
SODIUM SERPL-SCNC: 132 MMOL/L (ref 136–145)
TRIGL SERPL-MCNC: 275 MG/DL

## 2018-11-26 PROCEDURE — 82570 ASSAY OF URINE CREATININE: CPT

## 2018-11-26 PROCEDURE — 85610 PROTHROMBIN TIME: CPT

## 2018-11-26 PROCEDURE — 36415 COLL VENOUS BLD VENIPUNCTURE: CPT

## 2018-11-26 PROCEDURE — 85014 HEMATOCRIT: CPT

## 2018-11-26 PROCEDURE — 82043 UR ALBUMIN QUANTITATIVE: CPT

## 2018-11-26 PROCEDURE — 83036 HEMOGLOBIN GLYCOSYLATED A1C: CPT

## 2018-11-26 PROCEDURE — 80053 COMPREHEN METABOLIC PANEL: CPT

## 2018-11-26 PROCEDURE — 80061 LIPID PANEL: CPT

## 2018-11-26 PROCEDURE — 85018 HEMOGLOBIN: CPT

## 2019-01-02 ENCOUNTER — OFFICE VISIT (OUTPATIENT)
Dept: URGENT CARE | Facility: CLINIC | Age: 79
End: 2019-01-02
Payer: MEDICARE

## 2019-01-02 VITALS
HEIGHT: 65 IN | SYSTOLIC BLOOD PRESSURE: 130 MMHG | OXYGEN SATURATION: 98 % | TEMPERATURE: 97.3 F | HEART RATE: 88 BPM | BODY MASS INDEX: 28.12 KG/M2 | RESPIRATION RATE: 16 BRPM | DIASTOLIC BLOOD PRESSURE: 70 MMHG | WEIGHT: 168.8 LBS

## 2019-01-02 DIAGNOSIS — R07.81 RIB PAIN ON RIGHT SIDE: ICD-10-CM

## 2019-01-02 DIAGNOSIS — R05.9 COUGH: Primary | ICD-10-CM

## 2019-01-02 PROCEDURE — 99213 OFFICE O/P EST LOW 20 MIN: CPT | Performed by: PHYSICIAN ASSISTANT

## 2019-01-02 NOTE — PROGRESS NOTES
[id number] [facility]        NAME: Richard Moncada is a 78 y o  female  : 1940    MRN: [id number]  DATE: January 10, 2019  TIME: 1:09 PM    Assessment and Plan   Cough [R05]  1  Cough     2  Rib pain on right side           Patient Instructions     Discussed pt's condition with her  She has a cough which appears to be driven by PND, most likely due to an acute URI  She wanted reassurance that her lungs were clear and I provided this to her  She has mild anterior lower rib discomfort but I do not suspect a fracture  I rec ice/heat, Tylenol, and OTC cough medicine  Follow up with PCP in 3-5 days  Proceed to  ER if symptoms worsen  Chief Complaint     Chief Complaint   Patient presents with    Cough     right sided rib pain         History of Present Illness       Pt presents with a few day history of cough and now she has acute pain in her right ribcage  She denies significant congestion, ST, fever, chills, SOB, wheezing, or any other symptoms  She just wants to be reassured that her lungs are clear  Review of Systems   Review of Systems   Constitutional: Negative  HENT: Negative  Respiratory: Positive for cough  Negative for shortness of breath and wheezing  Cardiovascular: Negative  Gastrointestinal: Negative  Genitourinary: Negative      Musculoskeletal:        Right rib pain         Current Medications       Current Outpatient Prescriptions:     Calcium Citrate-Vitamin D (CALCITRATE/VITAMIN D PO), Take by mouth 3 (three) times a week, Disp: , Rfl:     Cholecalciferol (VITAMIN D PO), Take by mouth, Disp: , Rfl:     digoxin (LANOXIN) 0 25 mg tablet, Take 250 mcg by mouth daily, Disp: , Rfl:     Lactobacillus (PROBIOTIC ACIDOPHILUS PO), Take by mouth, Disp: , Rfl:     metFORMIN (GLUCOPHAGE) 500 mg tablet, , Disp: , Rfl:     Multiple Vitamins-Minerals (PRESERVISION AREDS PO), Take 2 tablets by mouth daily, Disp: , Rfl:     NADOLOL PO, Take 2 5 mg by mouth daily, Disp: , Rfl:   warfarin (COUMADIN) 2 5 mg tablet, Take 2 5 mg by mouth 3 (three) times a week, Disp: , Rfl:     warfarin (COUMADIN) 5 mg tablet, Take 5 mg by mouth 4 (four) times a week, Disp: , Rfl:     Current Allergies     Allergies as of 01/02/2019 - Reviewed 01/02/2019   Allergen Reaction Noted    Sulfa antibiotics  10/09/2017    Sulfasalazine  10/09/2017            The following portions of the patient's history were reviewed and updated as appropriate: allergies, current medications, past family history, past medical history, past social history, past surgical history and problem list      Past Medical History:   Diagnosis Date    Atrial fibrillation ([facility] [address] )     Cancer ([facility] [address] )     hx of cervical    Cardiac disease     Hyperlipidemia        Past Surgical History:   Procedure Laterality Date    EYE SURGERY      HYSTERECTOMY      MITRAL VALVE REPLACEMENT  1994       No family history on file  Medications have been verified  Objective   /70   Pulse 88   Temp (!) 97 3 °F (36 3 °C)   Resp 16   Ht 5' 5" (1 651 m)   Wt 76 6 kg (168 lb 12 8 oz)   SpO2 98%   BMI 28 09 kg/m²        Physical Exam     Physical Exam   Constitutional: She is oriented to person, place, and time  She appears well-developed and well-nourished  No distress  Pulmonary/Chest: Effort normal and breath sounds normal    Musculoskeletal:   TTP right anterior ribcage just below the breast  No visible or palpable deformity noted  Neurological: She is alert and oriented to person, place, and time  Psychiatric: She has a normal mood and affect  Vitals reviewed

## 2019-01-10 ENCOUNTER — APPOINTMENT (OUTPATIENT)
Dept: LAB | Facility: CLINIC | Age: 79
End: 2019-01-10
Payer: MEDICARE

## 2019-01-10 ENCOUNTER — TRANSCRIBE ORDERS (OUTPATIENT)
Dept: LAB | Facility: CLINIC | Age: 79
End: 2019-01-10

## 2019-01-10 DIAGNOSIS — I48.91 ATRIAL FIBRILLATION, UNSPECIFIED TYPE (HCC): Primary | ICD-10-CM

## 2019-01-10 LAB
INR PPP: 2.64 (ref 0.86–1.17)
PROTHROMBIN TIME: 28.2 SECONDS (ref 11.8–14.2)

## 2019-01-10 PROCEDURE — 36415 COLL VENOUS BLD VENIPUNCTURE: CPT

## 2019-01-10 PROCEDURE — 85610 PROTHROMBIN TIME: CPT

## 2019-02-15 ENCOUNTER — APPOINTMENT (OUTPATIENT)
Dept: LAB | Facility: CLINIC | Age: 79
End: 2019-02-15
Payer: MEDICARE

## 2019-02-15 ENCOUNTER — TRANSCRIBE ORDERS (OUTPATIENT)
Dept: LAB | Facility: CLINIC | Age: 79
End: 2019-02-15

## 2019-02-15 DIAGNOSIS — I48.91 ATRIAL FIBRILLATION, UNSPECIFIED TYPE (HCC): Primary | ICD-10-CM

## 2019-02-15 LAB
INR PPP: 3.08 (ref 0.86–1.17)
PROTHROMBIN TIME: 31.8 SECONDS (ref 11.8–14.2)

## 2019-02-15 PROCEDURE — 36415 COLL VENOUS BLD VENIPUNCTURE: CPT

## 2019-02-15 PROCEDURE — 85610 PROTHROMBIN TIME: CPT

## 2019-02-25 ENCOUNTER — APPOINTMENT (OUTPATIENT)
Dept: LAB | Facility: CLINIC | Age: 79
End: 2019-02-25
Payer: MEDICARE

## 2019-02-25 ENCOUNTER — TRANSCRIBE ORDERS (OUTPATIENT)
Dept: LAB | Facility: CLINIC | Age: 79
End: 2019-02-25

## 2019-02-25 DIAGNOSIS — E55.9 VITAMIN D DEFICIENCY, UNSPECIFIED: ICD-10-CM

## 2019-02-25 DIAGNOSIS — E13.8 DIABETES MELLITUS OF OTHER TYPE WITH COMPLICATION, UNSPECIFIED WHETHER LONG TERM INSULIN USE: ICD-10-CM

## 2019-02-25 DIAGNOSIS — I10 ESSENTIAL HYPERTENSION, MALIGNANT: Primary | ICD-10-CM

## 2019-02-25 DIAGNOSIS — I50.22 CHRONIC SYSTOLIC HEART FAILURE (HCC): ICD-10-CM

## 2019-02-25 DIAGNOSIS — D63.8 CHRONIC DISEASE ANEMIA: ICD-10-CM

## 2019-02-25 DIAGNOSIS — E78.00 PURE HYPERCHOLESTEROLEMIA: ICD-10-CM

## 2019-02-25 LAB
25(OH)D3 SERPL-MCNC: 41.4 NG/ML (ref 30–100)
ALBUMIN SERPL BCP-MCNC: 4.1 G/DL (ref 3.5–5)
ALP SERPL-CCNC: 85 U/L (ref 46–116)
ALT SERPL W P-5'-P-CCNC: 22 U/L (ref 12–78)
ANION GAP SERPL CALCULATED.3IONS-SCNC: 7 MMOL/L (ref 4–13)
AST SERPL W P-5'-P-CCNC: 21 U/L (ref 5–45)
BILIRUB SERPL-MCNC: 0.42 MG/DL (ref 0.2–1)
BUN SERPL-MCNC: 11 MG/DL (ref 5–25)
CALCIUM SERPL-MCNC: 8.9 MG/DL (ref 8.3–10.1)
CHLORIDE SERPL-SCNC: 102 MMOL/L (ref 100–108)
CHOLEST SERPL-MCNC: 203 MG/DL (ref 50–200)
CO2 SERPL-SCNC: 28 MMOL/L (ref 21–32)
CREAT SERPL-MCNC: 0.66 MG/DL (ref 0.6–1.3)
CREAT UR-MCNC: 56.8 MG/DL
EST. AVERAGE GLUCOSE BLD GHB EST-MCNC: 171 MG/DL
GFR SERPL CREATININE-BSD FRML MDRD: 84 ML/MIN/1.73SQ M
GLUCOSE P FAST SERPL-MCNC: 157 MG/DL (ref 65–99)
HBA1C MFR BLD: 7.6 % (ref 4.2–6.3)
HCT VFR BLD AUTO: 38.7 % (ref 34.8–46.1)
HDLC SERPL-MCNC: 34 MG/DL (ref 40–60)
HGB BLD-MCNC: 11.7 G/DL (ref 11.5–15.4)
LDLC SERPL CALC-MCNC: 109 MG/DL (ref 0–100)
MICROALBUMIN UR-MCNC: 34.6 MG/L (ref 0–20)
MICROALBUMIN/CREAT 24H UR: 61 MG/G CREATININE (ref 0–30)
NONHDLC SERPL-MCNC: 169 MG/DL
POTASSIUM SERPL-SCNC: 4.1 MMOL/L (ref 3.5–5.3)
PROT SERPL-MCNC: 8 G/DL (ref 6.4–8.2)
SODIUM SERPL-SCNC: 137 MMOL/L (ref 136–145)
TRIGL SERPL-MCNC: 298 MG/DL

## 2019-02-25 PROCEDURE — 85018 HEMOGLOBIN: CPT

## 2019-02-25 PROCEDURE — 36415 COLL VENOUS BLD VENIPUNCTURE: CPT

## 2019-02-25 PROCEDURE — 82306 VITAMIN D 25 HYDROXY: CPT

## 2019-02-25 PROCEDURE — 80061 LIPID PANEL: CPT

## 2019-02-25 PROCEDURE — 85014 HEMATOCRIT: CPT

## 2019-02-25 PROCEDURE — 83036 HEMOGLOBIN GLYCOSYLATED A1C: CPT

## 2019-02-25 PROCEDURE — 82043 UR ALBUMIN QUANTITATIVE: CPT

## 2019-02-25 PROCEDURE — 82570 ASSAY OF URINE CREATININE: CPT

## 2019-02-25 PROCEDURE — 80053 COMPREHEN METABOLIC PANEL: CPT

## 2019-03-11 ENCOUNTER — APPOINTMENT (OUTPATIENT)
Dept: LAB | Facility: CLINIC | Age: 79
End: 2019-03-11
Payer: MEDICARE

## 2019-03-11 ENCOUNTER — TRANSCRIBE ORDERS (OUTPATIENT)
Dept: LAB | Facility: CLINIC | Age: 79
End: 2019-03-11

## 2019-03-11 DIAGNOSIS — I48.91 ATRIAL FIBRILLATION, UNSPECIFIED TYPE (HCC): Primary | ICD-10-CM

## 2019-03-11 LAB
INR PPP: 2.23 (ref 0.86–1.17)
PROTHROMBIN TIME: 24.8 SECONDS (ref 11.8–14.2)

## 2019-03-11 PROCEDURE — 85610 PROTHROMBIN TIME: CPT

## 2019-03-11 PROCEDURE — 36415 COLL VENOUS BLD VENIPUNCTURE: CPT

## 2019-03-25 ENCOUNTER — TRANSCRIBE ORDERS (OUTPATIENT)
Dept: LAB | Facility: CLINIC | Age: 79
End: 2019-03-25

## 2019-03-25 ENCOUNTER — APPOINTMENT (OUTPATIENT)
Dept: LAB | Facility: CLINIC | Age: 79
End: 2019-03-25
Payer: MEDICARE

## 2019-03-25 DIAGNOSIS — I48.91 ATRIAL FIBRILLATION, UNSPECIFIED TYPE (HCC): Primary | ICD-10-CM

## 2019-03-25 LAB
INR PPP: 2.54 (ref 0.86–1.17)
PROTHROMBIN TIME: 27.4 SECONDS (ref 11.8–14.2)

## 2019-03-25 PROCEDURE — 85610 PROTHROMBIN TIME: CPT

## 2019-03-25 PROCEDURE — 36415 COLL VENOUS BLD VENIPUNCTURE: CPT

## 2019-04-12 ENCOUNTER — TRANSCRIBE ORDERS (OUTPATIENT)
Dept: LAB | Facility: CLINIC | Age: 79
End: 2019-04-12

## 2019-04-12 ENCOUNTER — APPOINTMENT (OUTPATIENT)
Dept: LAB | Facility: CLINIC | Age: 79
End: 2019-04-12
Payer: MEDICARE

## 2019-04-12 DIAGNOSIS — I48.91 ATRIAL FIBRILLATION, UNSPECIFIED TYPE (HCC): Primary | ICD-10-CM

## 2019-04-12 LAB
INR PPP: 2.5 (ref 0.86–1.17)
PROTHROMBIN TIME: 27.1 SECONDS (ref 11.8–14.2)

## 2019-04-12 PROCEDURE — 85610 PROTHROMBIN TIME: CPT

## 2019-04-12 PROCEDURE — 36415 COLL VENOUS BLD VENIPUNCTURE: CPT

## 2019-04-15 ENCOUNTER — OFFICE VISIT (OUTPATIENT)
Dept: PULMONOLOGY | Facility: MEDICAL CENTER | Age: 79
End: 2019-04-15
Payer: MEDICARE

## 2019-04-15 VITALS
HEIGHT: 66 IN | BODY MASS INDEX: 27 KG/M2 | SYSTOLIC BLOOD PRESSURE: 140 MMHG | WEIGHT: 168 LBS | TEMPERATURE: 98.4 F | HEART RATE: 80 BPM | RESPIRATION RATE: 12 BRPM | OXYGEN SATURATION: 96 % | DIASTOLIC BLOOD PRESSURE: 78 MMHG

## 2019-04-15 DIAGNOSIS — Z99.89 OBSTRUCTIVE SLEEP APNEA ON CPAP: Primary | ICD-10-CM

## 2019-04-15 DIAGNOSIS — G47.33 OBSTRUCTIVE SLEEP APNEA ON CPAP: Primary | ICD-10-CM

## 2019-04-15 PROBLEM — R06.02 SHORTNESS OF BREATH: Status: RESOLVED | Noted: 2018-03-18 | Resolved: 2019-04-15

## 2019-04-15 PROCEDURE — 99214 OFFICE O/P EST MOD 30 MIN: CPT | Performed by: NURSE PRACTITIONER

## 2019-04-15 RX ORDER — DIGOXIN 125 MCG
125 TABLET ORAL DAILY
COMMUNITY
Start: 2018-11-30 | End: 2022-07-14

## 2019-04-15 RX ORDER — ACETAMINOPHEN 500 MG
500 TABLET ORAL
COMMUNITY
Start: 2010-06-24 | End: 2022-03-14 | Stop reason: HOSPADM

## 2019-04-15 RX ORDER — WARFARIN SODIUM 5 MG/1
5 TABLET ORAL DAILY
COMMUNITY
Start: 2018-11-30 | End: 2020-06-09

## 2019-05-07 ENCOUNTER — APPOINTMENT (OUTPATIENT)
Dept: LAB | Facility: CLINIC | Age: 79
End: 2019-05-07
Payer: MEDICARE

## 2019-05-07 ENCOUNTER — TRANSCRIBE ORDERS (OUTPATIENT)
Dept: LAB | Facility: CLINIC | Age: 79
End: 2019-05-07

## 2019-05-07 DIAGNOSIS — I48.91 ATRIAL FIBRILLATION, UNSPECIFIED TYPE (HCC): Primary | ICD-10-CM

## 2019-05-07 DIAGNOSIS — I48.91 ATRIAL FIBRILLATION, UNSPECIFIED TYPE (HCC): ICD-10-CM

## 2019-05-07 LAB
INR PPP: 3.47 (ref 0.86–1.17)
PROTHROMBIN TIME: 34.9 SECONDS (ref 11.8–14.2)

## 2019-05-07 PROCEDURE — 36415 COLL VENOUS BLD VENIPUNCTURE: CPT

## 2019-05-07 PROCEDURE — 85610 PROTHROMBIN TIME: CPT

## 2019-05-20 ENCOUNTER — TELEPHONE (OUTPATIENT)
Dept: PULMONOLOGY | Facility: MEDICAL CENTER | Age: 79
End: 2019-05-20

## 2019-05-28 ENCOUNTER — APPOINTMENT (OUTPATIENT)
Dept: LAB | Facility: CLINIC | Age: 79
End: 2019-05-28
Payer: MEDICARE

## 2019-05-28 ENCOUNTER — TRANSCRIBE ORDERS (OUTPATIENT)
Dept: LAB | Facility: CLINIC | Age: 79
End: 2019-05-28

## 2019-05-28 DIAGNOSIS — Z79.01 LONG TERM (CURRENT) USE OF ANTICOAGULANTS: Primary | ICD-10-CM

## 2019-05-28 DIAGNOSIS — Z51.81 ENCOUNTER FOR THERAPEUTIC DRUG MONITORING: ICD-10-CM

## 2019-05-28 DIAGNOSIS — I48.91 ATRIAL FIBRILLATION, UNSPECIFIED TYPE (HCC): ICD-10-CM

## 2019-05-28 LAB
INR PPP: 2.33 (ref 0.86–1.17)
PROTHROMBIN TIME: 25.6 SECONDS (ref 11.8–14.2)

## 2019-05-28 PROCEDURE — 36415 COLL VENOUS BLD VENIPUNCTURE: CPT

## 2019-05-28 PROCEDURE — 85610 PROTHROMBIN TIME: CPT

## 2019-06-03 ENCOUNTER — APPOINTMENT (OUTPATIENT)
Dept: LAB | Facility: CLINIC | Age: 79
End: 2019-06-03
Payer: MEDICARE

## 2019-06-03 DIAGNOSIS — Z51.81 ENCOUNTER FOR THERAPEUTIC DRUG MONITORING: ICD-10-CM

## 2019-06-03 LAB — DIGOXIN SERPL-MCNC: 0.5 NG/ML (ref 0.8–2)

## 2019-06-03 PROCEDURE — 80162 ASSAY OF DIGOXIN TOTAL: CPT

## 2019-06-03 PROCEDURE — 36415 COLL VENOUS BLD VENIPUNCTURE: CPT

## 2019-06-04 ENCOUNTER — LAB (OUTPATIENT)
Dept: LAB | Facility: CLINIC | Age: 79
End: 2019-06-04
Payer: MEDICARE

## 2019-06-04 DIAGNOSIS — E78.00 HYPERCHOLESTEREMIA: ICD-10-CM

## 2019-06-04 DIAGNOSIS — I10 ESSENTIAL HYPERTENSION, MALIGNANT: Primary | ICD-10-CM

## 2019-06-04 DIAGNOSIS — E11.9 DIABETES MELLITUS WITHOUT COMPLICATION (HCC): ICD-10-CM

## 2019-06-04 LAB
ALBUMIN SERPL BCP-MCNC: 4.1 G/DL (ref 3.5–5)
ALP SERPL-CCNC: 78 U/L (ref 46–116)
ALT SERPL W P-5'-P-CCNC: 27 U/L (ref 12–78)
ANION GAP SERPL CALCULATED.3IONS-SCNC: 5 MMOL/L (ref 4–13)
AST SERPL W P-5'-P-CCNC: 25 U/L (ref 5–45)
BILIRUB SERPL-MCNC: 0.41 MG/DL (ref 0.2–1)
BUN SERPL-MCNC: 15 MG/DL (ref 5–25)
CALCIUM SERPL-MCNC: 8.6 MG/DL (ref 8.3–10.1)
CHLORIDE SERPL-SCNC: 106 MMOL/L (ref 100–108)
CHOLEST SERPL-MCNC: 223 MG/DL (ref 50–200)
CO2 SERPL-SCNC: 30 MMOL/L (ref 21–32)
CREAT SERPL-MCNC: 0.64 MG/DL (ref 0.6–1.3)
CREAT UR-MCNC: 55.5 MG/DL
EST. AVERAGE GLUCOSE BLD GHB EST-MCNC: 157 MG/DL
GFR SERPL CREATININE-BSD FRML MDRD: 85 ML/MIN/1.73SQ M
GLUCOSE P FAST SERPL-MCNC: 103 MG/DL (ref 65–99)
HBA1C MFR BLD: 7.1 % (ref 4.2–6.3)
HCT VFR BLD AUTO: 41 % (ref 34.8–46.1)
HDLC SERPL-MCNC: 41 MG/DL (ref 40–60)
HGB BLD-MCNC: 12.2 G/DL (ref 11.5–15.4)
INR PPP: 2.97 (ref 0.86–1.17)
LDLC SERPL CALC-MCNC: 135 MG/DL (ref 0–100)
MICROALBUMIN UR-MCNC: 24.5 MG/L (ref 0–20)
MICROALBUMIN/CREAT 24H UR: 44 MG/G CREATININE (ref 0–30)
NONHDLC SERPL-MCNC: 182 MG/DL
POTASSIUM SERPL-SCNC: 4.2 MMOL/L (ref 3.5–5.3)
PROT SERPL-MCNC: 7.5 G/DL (ref 6.4–8.2)
PROTHROMBIN TIME: 30.9 SECONDS (ref 11.8–14.2)
SODIUM SERPL-SCNC: 141 MMOL/L (ref 136–145)
TRIGL SERPL-MCNC: 233 MG/DL

## 2019-06-04 PROCEDURE — 36415 COLL VENOUS BLD VENIPUNCTURE: CPT

## 2019-06-04 PROCEDURE — 82570 ASSAY OF URINE CREATININE: CPT

## 2019-06-04 PROCEDURE — 80053 COMPREHEN METABOLIC PANEL: CPT

## 2019-06-04 PROCEDURE — 80061 LIPID PANEL: CPT

## 2019-06-04 PROCEDURE — 82043 UR ALBUMIN QUANTITATIVE: CPT

## 2019-06-04 PROCEDURE — 85610 PROTHROMBIN TIME: CPT

## 2019-06-04 PROCEDURE — 83036 HEMOGLOBIN GLYCOSYLATED A1C: CPT

## 2019-06-04 PROCEDURE — 85014 HEMATOCRIT: CPT

## 2019-06-04 PROCEDURE — 85018 HEMOGLOBIN: CPT

## 2019-06-24 ENCOUNTER — TRANSCRIBE ORDERS (OUTPATIENT)
Dept: LAB | Facility: CLINIC | Age: 79
End: 2019-06-24

## 2019-06-24 ENCOUNTER — APPOINTMENT (OUTPATIENT)
Dept: LAB | Facility: CLINIC | Age: 79
End: 2019-06-24
Payer: MEDICARE

## 2019-06-24 DIAGNOSIS — N39.0 URINARY TRACT INFECTION WITHOUT HEMATURIA, SITE UNSPECIFIED: Primary | ICD-10-CM

## 2019-06-24 LAB
BACTERIA UR QL AUTO: ABNORMAL /HPF
BILIRUB UR QL STRIP: NEGATIVE
CLARITY UR: ABNORMAL
COLOR UR: YELLOW
GLUCOSE UR STRIP-MCNC: NEGATIVE MG/DL
HGB UR QL STRIP.AUTO: ABNORMAL
HYALINE CASTS #/AREA URNS LPF: ABNORMAL /LPF
KETONES UR STRIP-MCNC: NEGATIVE MG/DL
LEUKOCYTE ESTERASE UR QL STRIP: ABNORMAL
NITRITE UR QL STRIP: POSITIVE
NON-SQ EPI CELLS URNS QL MICRO: ABNORMAL /HPF
PH UR STRIP.AUTO: 5.5 [PH]
PROT UR STRIP-MCNC: ABNORMAL MG/DL
RBC #/AREA URNS AUTO: ABNORMAL /HPF
SP GR UR STRIP.AUTO: 1.01 (ref 1–1.03)
UROBILINOGEN UR QL STRIP.AUTO: 0.2 E.U./DL
WBC #/AREA URNS AUTO: ABNORMAL /HPF

## 2019-06-24 PROCEDURE — 87086 URINE CULTURE/COLONY COUNT: CPT

## 2019-06-24 PROCEDURE — 81001 URINALYSIS AUTO W/SCOPE: CPT

## 2019-06-24 PROCEDURE — 87186 SC STD MICRODIL/AGAR DIL: CPT

## 2019-06-24 PROCEDURE — 87077 CULTURE AEROBIC IDENTIFY: CPT

## 2019-06-28 LAB
BACTERIA UR CULT: ABNORMAL
BACTERIA UR CULT: ABNORMAL

## 2019-07-02 ENCOUNTER — HOSPITAL ENCOUNTER (OUTPATIENT)
Dept: RADIOLOGY | Facility: HOSPITAL | Age: 79
Discharge: HOME | End: 2019-07-02
Attending: INTERNAL MEDICINE
Payer: MEDICARE

## 2019-07-02 ENCOUNTER — TRANSCRIBE ORDERS (OUTPATIENT)
Dept: ADMINISTRATIVE | Facility: HOSPITAL | Age: 79
End: 2019-07-02

## 2019-07-02 ENCOUNTER — APPOINTMENT (OUTPATIENT)
Dept: LAB | Facility: HOSPITAL | Age: 79
End: 2019-07-02
Attending: INTERNAL MEDICINE
Payer: MEDICARE

## 2019-07-02 DIAGNOSIS — I10 ESSENTIAL HYPERTENSION, MALIGNANT: Primary | ICD-10-CM

## 2019-07-02 DIAGNOSIS — R53.83 FATIGUE, UNSPECIFIED TYPE: ICD-10-CM

## 2019-07-02 DIAGNOSIS — Z79.01 LONG TERM (CURRENT) USE OF ANTICOAGULANTS: ICD-10-CM

## 2019-07-02 DIAGNOSIS — I51.9 MYXEDEMA HEART DISEASE: ICD-10-CM

## 2019-07-02 DIAGNOSIS — I10 ESSENTIAL HYPERTENSION, MALIGNANT: ICD-10-CM

## 2019-07-02 DIAGNOSIS — I48.20 CHRONIC ATRIAL FIBRILLATION (HCC): ICD-10-CM

## 2019-07-02 DIAGNOSIS — R50.9 FEVER, UNSPECIFIED FEVER CAUSE: ICD-10-CM

## 2019-07-02 DIAGNOSIS — A41.9 SEPSIS, DUE TO UNSPECIFIED ORGANISM: ICD-10-CM

## 2019-07-02 DIAGNOSIS — E03.9 MYXEDEMA HEART DISEASE: ICD-10-CM

## 2019-07-02 DIAGNOSIS — Z79.02 ENCOUNTER FOR LONG-TERM (CURRENT) USE OF ANTIPLATELETS/ANTITHROMBOTICS: ICD-10-CM

## 2019-07-02 DIAGNOSIS — J18.9 PNEUMONIA OF LEFT LUNG DUE TO INFECTIOUS ORGANISM, UNSPECIFIED PART OF LUNG: ICD-10-CM

## 2019-07-02 DIAGNOSIS — N39.0 URINARY TRACT INFECTION WITHOUT HEMATURIA, SITE UNSPECIFIED: ICD-10-CM

## 2019-07-02 LAB
ALBUMIN SERPL BCP-MCNC: 4 G/DL (ref 3.5–5)
ALP SERPL-CCNC: 207 U/L (ref 46–116)
ALT SERPL W P-5'-P-CCNC: 341 U/L (ref 12–78)
ANION GAP SERPL CALCULATED.3IONS-SCNC: 9 MMOL/L (ref 4–13)
AST SERPL W P-5'-P-CCNC: 248 U/L (ref 5–45)
BACTERIA UR QL AUTO: ABNORMAL /HPF
BILIRUB SERPL-MCNC: 0.6 MG/DL (ref 0.2–1)
BILIRUB UR QL STRIP: NEGATIVE
BUN SERPL-MCNC: 12 MG/DL (ref 5–25)
CALCIUM SERPL-MCNC: 9 MG/DL (ref 8.3–10.1)
CHLORIDE SERPL-SCNC: 101 MMOL/L (ref 100–108)
CLARITY UR: CLEAR
CO2 SERPL-SCNC: 28 MMOL/L (ref 21–32)
COLOR UR: ABNORMAL
CREAT SERPL-MCNC: 0.61 MG/DL (ref 0.6–1.3)
ERYTHROCYTE [DISTWIDTH] IN BLOOD BY AUTOMATED COUNT: 17.8 % (ref 11.6–15.1)
ERYTHROCYTE [SEDIMENTATION RATE] IN BLOOD: 36 MM/HOUR (ref 2–25)
GFR SERPL CREATININE-BSD FRML MDRD: 87 ML/MIN/1.73SQ M
GLUCOSE SERPL-MCNC: 106 MG/DL (ref 65–140)
GLUCOSE UR STRIP-MCNC: NEGATIVE MG/DL
HCT VFR BLD AUTO: 38.1 % (ref 34.8–46.1)
HGB BLD-MCNC: 11.4 G/DL (ref 11.5–15.4)
HGB UR QL STRIP.AUTO: ABNORMAL
INR PPP: 4.78 (ref 0.86–1.16)
KETONES UR STRIP-MCNC: ABNORMAL MG/DL
LEUKOCYTE ESTERASE UR QL STRIP: ABNORMAL
MCH RBC QN AUTO: 26.3 PG (ref 26.8–34.3)
MCHC RBC AUTO-ENTMCNC: 29.9 G/DL (ref 31.4–37.4)
MCV RBC AUTO: 88 FL (ref 82–98)
NITRITE UR QL STRIP: NEGATIVE
NON-SQ EPI CELLS URNS QL MICRO: ABNORMAL /HPF
PH UR STRIP.AUTO: 6 [PH]
PLATELET # BLD AUTO: 266 THOUSANDS/UL (ref 149–390)
PMV BLD AUTO: 10.7 FL (ref 8.9–12.7)
POTASSIUM SERPL-SCNC: 4 MMOL/L (ref 3.5–5.3)
PROT SERPL-MCNC: 8.1 G/DL (ref 6.4–8.2)
PROT UR STRIP-MCNC: NEGATIVE MG/DL
PROTHROMBIN TIME: 45.9 SECONDS (ref 9.4–11.7)
RBC # BLD AUTO: 4.33 MILLION/UL (ref 3.81–5.12)
RBC #/AREA URNS AUTO: ABNORMAL /HPF
SODIUM SERPL-SCNC: 138 MMOL/L (ref 136–145)
SP GR UR STRIP.AUTO: 1.01 (ref 1–1.03)
TSH SERPL DL<=0.05 MIU/L-ACNC: 3.04 UIU/ML (ref 0.36–3.74)
UROBILINOGEN UR QL STRIP.AUTO: 0.2 E.U./DL
WBC # BLD AUTO: 5.06 THOUSAND/UL (ref 4.31–10.16)
WBC #/AREA URNS AUTO: ABNORMAL /HPF

## 2019-07-02 PROCEDURE — 85652 RBC SED RATE AUTOMATED: CPT | Performed by: INTERNAL MEDICINE

## 2019-07-02 PROCEDURE — 36415 COLL VENOUS BLD VENIPUNCTURE: CPT | Performed by: INTERNAL MEDICINE

## 2019-07-02 PROCEDURE — 87086 URINE CULTURE/COLONY COUNT: CPT

## 2019-07-02 PROCEDURE — 71046 X-RAY EXAM CHEST 2 VIEWS: CPT

## 2019-07-02 PROCEDURE — 85610 PROTHROMBIN TIME: CPT

## 2019-07-02 PROCEDURE — 87040 BLOOD CULTURE FOR BACTERIA: CPT

## 2019-07-02 PROCEDURE — 84443 ASSAY THYROID STIM HORMONE: CPT

## 2019-07-02 PROCEDURE — 80053 COMPREHEN METABOLIC PANEL: CPT | Performed by: INTERNAL MEDICINE

## 2019-07-02 PROCEDURE — 81001 URINALYSIS AUTO W/SCOPE: CPT | Performed by: INTERNAL MEDICINE

## 2019-07-02 PROCEDURE — 85027 COMPLETE CBC AUTOMATED: CPT | Performed by: INTERNAL MEDICINE

## 2019-07-03 ENCOUNTER — TRANSCRIBE ORDERS (OUTPATIENT)
Dept: ADMINISTRATIVE | Facility: HOSPITAL | Age: 79
End: 2019-07-03

## 2019-07-03 ENCOUNTER — HOSPITAL ENCOUNTER (OUTPATIENT)
Dept: RADIOLOGY | Facility: HOSPITAL | Age: 79
Discharge: HOME | End: 2019-07-03
Attending: INTERNAL MEDICINE
Payer: MEDICARE

## 2019-07-03 DIAGNOSIS — N39.0 URINARY TRACT INFECTION WITHOUT HEMATURIA, SITE UNSPECIFIED: ICD-10-CM

## 2019-07-03 DIAGNOSIS — R79.89 ABNORMAL LFTS (LIVER FUNCTION TESTS): ICD-10-CM

## 2019-07-03 DIAGNOSIS — R10.9 STOMACH ACHE: Primary | ICD-10-CM

## 2019-07-03 DIAGNOSIS — R10.9 STOMACH ACHE: ICD-10-CM

## 2019-07-03 PROCEDURE — 76700 US EXAM ABDOM COMPLETE: CPT

## 2019-07-04 LAB — BACTERIA UR CULT: NORMAL

## 2019-07-05 ENCOUNTER — TRANSCRIBE ORDERS (OUTPATIENT)
Dept: ADMINISTRATIVE | Facility: HOSPITAL | Age: 79
End: 2019-07-05

## 2019-07-05 ENCOUNTER — APPOINTMENT (OUTPATIENT)
Dept: LAB | Facility: HOSPITAL | Age: 79
End: 2019-07-05
Attending: INTERNAL MEDICINE
Payer: MEDICARE

## 2019-07-05 DIAGNOSIS — A41.9 UNSPECIFIED SEPTICEMIA(038.9) (HCC): ICD-10-CM

## 2019-07-05 DIAGNOSIS — R94.5 NONSPECIFIC ABNORMAL RESULTS OF LIVER FUNCTION STUDY: ICD-10-CM

## 2019-07-05 DIAGNOSIS — B19.20 HEPATITIS C VIRUS INFECTION WITHOUT HEPATIC COMA, UNSPECIFIED CHRONICITY: ICD-10-CM

## 2019-07-05 DIAGNOSIS — Z79.01 LONG TERM (CURRENT) USE OF ANTICOAGULANTS: ICD-10-CM

## 2019-07-05 DIAGNOSIS — B19.10 HEPATITIS B INFECTION WITHOUT DELTA AGENT WITHOUT HEPATIC COMA, UNSPECIFIED CHRONICITY: ICD-10-CM

## 2019-07-05 DIAGNOSIS — I10 ESSENTIAL HYPERTENSION, MALIGNANT: ICD-10-CM

## 2019-07-05 DIAGNOSIS — I43 DILATED CARDIOMYOPATHY SECONDARY TO HEMOCHROMATOSIS (HCC): ICD-10-CM

## 2019-07-05 DIAGNOSIS — D50.0 IRON DEFICIENCY ANEMIA SECONDARY TO BLOOD LOSS (CHRONIC): ICD-10-CM

## 2019-07-05 DIAGNOSIS — I10 ESSENTIAL HYPERTENSION, MALIGNANT: Primary | ICD-10-CM

## 2019-07-05 DIAGNOSIS — E83.119 DILATED CARDIOMYOPATHY SECONDARY TO HEMOCHROMATOSIS (HCC): ICD-10-CM

## 2019-07-05 DIAGNOSIS — I48.20 CHRONIC ATRIAL FIBRILLATION (HCC): ICD-10-CM

## 2019-07-05 DIAGNOSIS — D63.8 CHRONIC DISEASE ANEMIA: ICD-10-CM

## 2019-07-05 LAB
ALBUMIN SERPL BCP-MCNC: 3.8 G/DL (ref 3.5–5)
ALP SERPL-CCNC: 163 U/L (ref 46–116)
ALT SERPL W P-5'-P-CCNC: 157 U/L (ref 12–78)
ANION GAP SERPL CALCULATED.3IONS-SCNC: 7 MMOL/L (ref 4–13)
AST SERPL W P-5'-P-CCNC: 48 U/L (ref 5–45)
BILIRUB SERPL-MCNC: 0.4 MG/DL (ref 0.2–1)
BUN SERPL-MCNC: 16 MG/DL (ref 5–25)
CALCIUM SERPL-MCNC: 9.1 MG/DL (ref 8.3–10.1)
CHLORIDE SERPL-SCNC: 104 MMOL/L (ref 100–108)
CO2 SERPL-SCNC: 28 MMOL/L (ref 21–32)
CREAT SERPL-MCNC: 0.6 MG/DL (ref 0.6–1.3)
ERYTHROCYTE [DISTWIDTH] IN BLOOD BY AUTOMATED COUNT: 17.9 % (ref 11.6–15.1)
FERRITIN SERPL-MCNC: 23 NG/ML (ref 8–388)
GFR SERPL CREATININE-BSD FRML MDRD: 87 ML/MIN/1.73SQ M
GLUCOSE SERPL-MCNC: 103 MG/DL (ref 65–140)
HBV SURFACE AG SER QL: NORMAL
HCT VFR BLD AUTO: 37.7 % (ref 34.8–46.1)
HCV AB SER QL: NORMAL
HGB BLD-MCNC: 11.2 G/DL (ref 11.5–15.4)
INR PPP: 3.04 (ref 0.86–1.16)
MCH RBC QN AUTO: 26.3 PG (ref 26.8–34.3)
MCHC RBC AUTO-ENTMCNC: 29.7 G/DL (ref 31.4–37.4)
MCV RBC AUTO: 89 FL (ref 82–98)
PLATELET # BLD AUTO: 255 THOUSANDS/UL (ref 149–390)
PMV BLD AUTO: 10.3 FL (ref 8.9–12.7)
POTASSIUM SERPL-SCNC: 4.1 MMOL/L (ref 3.5–5.3)
PROT SERPL-MCNC: 7.7 G/DL (ref 6.4–8.2)
PROTHROMBIN TIME: 30 SECONDS (ref 9.4–11.7)
RBC # BLD AUTO: 4.26 MILLION/UL (ref 3.81–5.12)
SODIUM SERPL-SCNC: 139 MMOL/L (ref 136–145)
WBC # BLD AUTO: 5.85 THOUSAND/UL (ref 4.31–10.16)

## 2019-07-05 PROCEDURE — 82728 ASSAY OF FERRITIN: CPT

## 2019-07-05 PROCEDURE — 85027 COMPLETE CBC AUTOMATED: CPT

## 2019-07-05 PROCEDURE — 85610 PROTHROMBIN TIME: CPT

## 2019-07-05 PROCEDURE — 36415 COLL VENOUS BLD VENIPUNCTURE: CPT | Performed by: INTERNAL MEDICINE

## 2019-07-05 PROCEDURE — 86256 FLUORESCENT ANTIBODY TITER: CPT

## 2019-07-05 PROCEDURE — 86038 ANTINUCLEAR ANTIBODIES: CPT

## 2019-07-05 PROCEDURE — 86803 HEPATITIS C AB TEST: CPT

## 2019-07-05 PROCEDURE — 80053 COMPREHEN METABOLIC PANEL: CPT | Performed by: INTERNAL MEDICINE

## 2019-07-05 PROCEDURE — 87340 HEPATITIS B SURFACE AG IA: CPT

## 2019-07-06 LAB — MITOCHONDRIA M2 IGG SER-ACNC: <20 UNITS (ref 0–20)

## 2019-07-07 LAB
BACTERIA BLD CULT: NORMAL
BACTERIA BLD CULT: NORMAL

## 2019-07-08 LAB — RYE IGE QN: NEGATIVE

## 2019-07-11 ENCOUNTER — APPOINTMENT (OUTPATIENT)
Dept: LAB | Facility: CLINIC | Age: 79
End: 2019-07-11
Payer: MEDICARE

## 2019-07-11 DIAGNOSIS — Z79.01 LONG TERM (CURRENT) USE OF ANTICOAGULANTS: ICD-10-CM

## 2019-07-11 LAB
INR PPP: 2.07 (ref 0.84–1.19)
PROTHROMBIN TIME: 22.8 SECONDS (ref 11.6–14.5)

## 2019-07-11 PROCEDURE — 85610 PROTHROMBIN TIME: CPT

## 2019-07-11 PROCEDURE — 36415 COLL VENOUS BLD VENIPUNCTURE: CPT

## 2019-07-15 ENCOUNTER — APPOINTMENT (OUTPATIENT)
Dept: LAB | Facility: CLINIC | Age: 79
End: 2019-07-15
Payer: MEDICARE

## 2019-07-15 DIAGNOSIS — Z79.01 LONG TERM (CURRENT) USE OF ANTICOAGULANTS: ICD-10-CM

## 2019-07-15 LAB
INR PPP: 2.24 (ref 0.84–1.19)
PROTHROMBIN TIME: 24.3 SECONDS (ref 11.6–14.5)

## 2019-07-15 PROCEDURE — 36415 COLL VENOUS BLD VENIPUNCTURE: CPT

## 2019-07-15 PROCEDURE — 85610 PROTHROMBIN TIME: CPT

## 2019-07-19 ENCOUNTER — APPOINTMENT (OUTPATIENT)
Dept: LAB | Facility: CLINIC | Age: 79
End: 2019-07-19
Payer: MEDICARE

## 2019-07-19 DIAGNOSIS — Z79.01 LONG TERM (CURRENT) USE OF ANTICOAGULANTS: ICD-10-CM

## 2019-07-19 LAB
INR PPP: 1.94 (ref 0.84–1.19)
PROTHROMBIN TIME: 21.7 SECONDS (ref 11.6–14.5)

## 2019-07-19 PROCEDURE — 36415 COLL VENOUS BLD VENIPUNCTURE: CPT

## 2019-07-19 PROCEDURE — 85610 PROTHROMBIN TIME: CPT

## 2019-07-29 ENCOUNTER — APPOINTMENT (OUTPATIENT)
Dept: LAB | Facility: CLINIC | Age: 79
End: 2019-07-29
Payer: MEDICARE

## 2019-07-29 DIAGNOSIS — Z79.01 LONG TERM (CURRENT) USE OF ANTICOAGULANTS: ICD-10-CM

## 2019-07-29 LAB
INR PPP: 3.12 (ref 0.84–1.19)
PROTHROMBIN TIME: 31.6 SECONDS (ref 11.6–14.5)

## 2019-07-29 PROCEDURE — 85610 PROTHROMBIN TIME: CPT

## 2019-07-29 PROCEDURE — 36415 COLL VENOUS BLD VENIPUNCTURE: CPT

## 2019-08-08 ENCOUNTER — APPOINTMENT (OUTPATIENT)
Dept: LAB | Facility: CLINIC | Age: 79
End: 2019-08-08
Payer: MEDICARE

## 2019-08-08 DIAGNOSIS — Z79.01 LONG TERM (CURRENT) USE OF ANTICOAGULANTS: ICD-10-CM

## 2019-08-08 LAB
INR PPP: 2.43 (ref 0.84–1.19)
PROTHROMBIN TIME: 25.9 SECONDS (ref 11.6–14.5)

## 2019-08-08 PROCEDURE — 85610 PROTHROMBIN TIME: CPT

## 2019-08-08 PROCEDURE — 36415 COLL VENOUS BLD VENIPUNCTURE: CPT

## 2019-09-10 ENCOUNTER — TRANSCRIBE ORDERS (OUTPATIENT)
Dept: ADMINISTRATIVE | Facility: HOSPITAL | Age: 79
End: 2019-09-10

## 2019-09-10 DIAGNOSIS — Z12.39 SPECIAL SCREENING EXAMINATION FOR NEOPLASM OF BREAST: Primary | ICD-10-CM

## 2019-12-13 ENCOUNTER — TRANSCRIBE ORDERS (OUTPATIENT)
Dept: LAB | Facility: CLINIC | Age: 79
End: 2019-12-13

## 2019-12-13 ENCOUNTER — APPOINTMENT (OUTPATIENT)
Dept: LAB | Facility: CLINIC | Age: 79
End: 2019-12-13
Payer: MEDICARE

## 2019-12-13 DIAGNOSIS — Z79.01 LONG TERM (CURRENT) USE OF ANTICOAGULANTS: Primary | ICD-10-CM

## 2019-12-13 DIAGNOSIS — I48.91 ATRIAL FIBRILLATION, UNSPECIFIED TYPE (HCC): ICD-10-CM

## 2019-12-13 DIAGNOSIS — Z79.01 LONG TERM (CURRENT) USE OF ANTICOAGULANTS: ICD-10-CM

## 2019-12-13 LAB
INR PPP: 2.86 (ref 0.84–1.19)
PROTHROMBIN TIME: 29.5 SECONDS (ref 11.6–14.5)

## 2019-12-13 PROCEDURE — 36415 COLL VENOUS BLD VENIPUNCTURE: CPT

## 2019-12-13 PROCEDURE — 85610 PROTHROMBIN TIME: CPT

## 2020-01-13 PROBLEM — Z95.2 PRESENCE OF PROSTHETIC HEART VALVE: Status: ACTIVE | Noted: 2018-11-12

## 2020-01-13 PROBLEM — I48.20 CHRONIC ATRIAL FIBRILLATION (HCC): Status: ACTIVE | Noted: 2018-11-12

## 2020-01-13 PROBLEM — Z95.2 H/O MITRAL VALVE REPLACEMENT WITH MECHANICAL VALVE: Status: ACTIVE | Noted: 2018-11-12

## 2020-01-13 PROBLEM — J06.9 VIRAL UPPER RESPIRATORY TRACT INFECTION: Status: ACTIVE | Noted: 2020-01-13

## 2020-01-13 PROBLEM — Z79.01 LONG TERM (CURRENT) USE OF ANTICOAGULANTS: Status: ACTIVE | Noted: 2018-11-12

## 2020-01-13 PROBLEM — E78.00 HYPERCHOLESTEREMIA: Status: ACTIVE | Noted: 2020-01-13

## 2020-01-17 ENCOUNTER — TRANSCRIBE ORDERS (OUTPATIENT)
Dept: LAB | Facility: CLINIC | Age: 80
End: 2020-01-17

## 2020-01-17 ENCOUNTER — APPOINTMENT (OUTPATIENT)
Dept: LAB | Facility: CLINIC | Age: 80
End: 2020-01-17
Payer: MEDICARE

## 2020-01-17 DIAGNOSIS — E11.9 TYPE 2 DIABETES MELLITUS WITHOUT COMPLICATION, WITHOUT LONG-TERM CURRENT USE OF INSULIN (HCC): ICD-10-CM

## 2020-01-17 DIAGNOSIS — J06.9 VIRAL UPPER RESPIRATORY TRACT INFECTION: ICD-10-CM

## 2020-01-17 DIAGNOSIS — I48.20 CHRONIC ATRIAL FIBRILLATION (HCC): ICD-10-CM

## 2020-01-17 DIAGNOSIS — E78.00 HYPERCHOLESTEREMIA: ICD-10-CM

## 2020-01-17 DIAGNOSIS — Z79.01 LONG TERM (CURRENT) USE OF ANTICOAGULANTS: ICD-10-CM

## 2020-01-17 LAB
ALBUMIN SERPL BCP-MCNC: 4.2 G/DL (ref 3.5–5)
ALP SERPL-CCNC: 89 U/L (ref 46–116)
ALT SERPL W P-5'-P-CCNC: 25 U/L (ref 12–78)
ANION GAP SERPL CALCULATED.3IONS-SCNC: 4 MMOL/L (ref 4–13)
AST SERPL W P-5'-P-CCNC: 21 U/L (ref 5–45)
BILIRUB SERPL-MCNC: 0.42 MG/DL (ref 0.2–1)
BUN SERPL-MCNC: 14 MG/DL (ref 5–25)
CALCIUM SERPL-MCNC: 9 MG/DL (ref 8.3–10.1)
CHLORIDE SERPL-SCNC: 108 MMOL/L (ref 100–108)
CHOLEST SERPL-MCNC: 201 MG/DL (ref 50–200)
CO2 SERPL-SCNC: 29 MMOL/L (ref 21–32)
CREAT SERPL-MCNC: 0.69 MG/DL (ref 0.6–1.3)
DIGOXIN SERPL-MCNC: 0.7 NG/ML (ref 0.8–2)
ERYTHROCYTE [DISTWIDTH] IN BLOOD BY AUTOMATED COUNT: 17.8 % (ref 11.6–15.1)
EST. AVERAGE GLUCOSE BLD GHB EST-MCNC: 146 MG/DL
GFR SERPL CREATININE-BSD FRML MDRD: 82 ML/MIN/1.73SQ M
GLUCOSE P FAST SERPL-MCNC: 125 MG/DL (ref 65–99)
HBA1C MFR BLD: 6.7 % (ref 4.2–6.3)
HCT VFR BLD AUTO: 36.8 % (ref 34.8–46.1)
HDLC SERPL-MCNC: 40 MG/DL
HGB BLD-MCNC: 10.7 G/DL (ref 11.5–15.4)
LDLC SERPL CALC-MCNC: 120 MG/DL (ref 0–100)
MCH RBC QN AUTO: 23.7 PG (ref 26.8–34.3)
MCHC RBC AUTO-ENTMCNC: 29.1 G/DL (ref 31.4–37.4)
MCV RBC AUTO: 82 FL (ref 82–98)
NONHDLC SERPL-MCNC: 161 MG/DL
PLATELET # BLD AUTO: 265 THOUSANDS/UL (ref 149–390)
PMV BLD AUTO: 10.9 FL (ref 8.9–12.7)
POTASSIUM SERPL-SCNC: 4.2 MMOL/L (ref 3.5–5.3)
PROT SERPL-MCNC: 7.7 G/DL (ref 6.4–8.2)
RBC # BLD AUTO: 4.51 MILLION/UL (ref 3.81–5.12)
SODIUM SERPL-SCNC: 141 MMOL/L (ref 136–145)
TRIGL SERPL-MCNC: 204 MG/DL
WBC # BLD AUTO: 5.74 THOUSAND/UL (ref 4.31–10.16)

## 2020-01-17 PROCEDURE — 85027 COMPLETE CBC AUTOMATED: CPT

## 2020-01-17 PROCEDURE — 36415 COLL VENOUS BLD VENIPUNCTURE: CPT

## 2020-01-17 PROCEDURE — 80061 LIPID PANEL: CPT

## 2020-01-17 PROCEDURE — 80053 COMPREHEN METABOLIC PANEL: CPT

## 2020-01-17 PROCEDURE — 80162 ASSAY OF DIGOXIN TOTAL: CPT

## 2020-01-17 PROCEDURE — 83036 HEMOGLOBIN GLYCOSYLATED A1C: CPT

## 2020-01-28 ENCOUNTER — OFFICE VISIT (OUTPATIENT)
Dept: PULMONOLOGY | Facility: MEDICAL CENTER | Age: 80
End: 2020-01-28
Payer: MEDICARE

## 2020-01-28 VITALS
OXYGEN SATURATION: 97 % | BODY MASS INDEX: 26.36 KG/M2 | TEMPERATURE: 97.8 F | RESPIRATION RATE: 12 BRPM | WEIGHT: 164 LBS | HEART RATE: 77 BPM | SYSTOLIC BLOOD PRESSURE: 124 MMHG | HEIGHT: 66 IN | DIASTOLIC BLOOD PRESSURE: 82 MMHG

## 2020-01-28 DIAGNOSIS — G47.33 OBSTRUCTIVE SLEEP APNEA: Primary | ICD-10-CM

## 2020-01-28 DIAGNOSIS — I48.20 CHRONIC ATRIAL FIBRILLATION (HCC): ICD-10-CM

## 2020-01-28 PROCEDURE — 99213 OFFICE O/P EST LOW 20 MIN: CPT | Performed by: INTERNAL MEDICINE

## 2020-01-28 NOTE — PROGRESS NOTES
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 and presently is on CPAP setting of 7 cm water  Uses a nasal pillow interface with a chin strap this is working well for her  She has been compliant with using her CPAP and feels benefit from it  She is not have any nocturnal dyspnea and she is feeling she rested satisfactory after sleeping at night    She did have a diagnostic polysomnogram done July 30, 2014 at [address]  She had 2 obstructive apneas and 44 hypopneas for overall AHI of 6 7 and this increased to 14 1 during REM sleep  Mean O2 saturation was 92% with andrey of 84%  She will continue with CPAP of 7 cm water  I did advise her to try to maintain ideal bodyweight  She does have an older CPAP machine and I did order a new auto CPAP machine for [place] because set at 7-10 cm water  Her medical company is [employer]         Relevant Orders    CPAP Auto New DME       Cardiovascular and Mediastinum    Chronic atrial fibrillation     She does have history of mitral valve replacement with mechanical titanium valve 1994  This was done at [facility]   She is on chronic anticoagulation with warfarin  Sleep Apnea (pt has had machine for about 5 yrs )      HPI     Sourav Cardoza has history of mild JAKE and is using CPAP at 7 cm water  She has been compliant and benefitting from the CPAP  She has had obstructive sleep apnea for several years  She uses a nasal pillowsand chin strap and this has been working well for period before she was diagnosed with her JAKE she would often wake up gasping for air at night  She does need a new CPAP her machine and her present company is [employer]   She is not having any excessive daytime somnolence  No morning headache      She does have history of mitral valve replacement surgery 1994 at [facility]   She has attempt to quit:      Years since quittin 1    Smokeless tobacco: Never Used   Substance Use Topics    Alcohol use: Yes     Comment: special occasional         Family History   Problem Relation Age of Onset    Heart failure Mother     Heart attack Father     Sleep apnea Brother        Review of Systems   Constitutional: Negative for activity change and fatigue  HENT: Negative for congestion and rhinorrhea  Eyes: Negative for redness  Respiratory: Negative for shortness of breath  Cardiovascular: Negative for chest pain  Gastrointestinal: Negative for abdominal pain and anal bleeding  Endocrine: Negative for polydipsia and polyphagia  Musculoskeletal: Negative for arthralgias  Neurological: Negative for dizziness  Psychiatric/Behavioral: Negative for decreased concentration  Vitals:    20 1052   BP: 124/82   Pulse: 77   Resp: 12   Temp: 97 8 °F (36 6 °C)   SpO2: 97%           Physical Exam   Constitutional: She is oriented to person, place, and time  She appears well-developed and well-nourished  No distress  HENT:   Head: Normocephalic  Nose: Nose normal    Mouth/Throat: Oropharynx is clear and moist  No oropharyngeal exudate  Mild narrowing of  oropharynx   Eyes: Pupils are equal, round, and reactive to light  Conjunctivae are normal    Neck: Neck supple  No JVD present  Cardiovascular: Normal rate and normal heart sounds  Heart sounds are irregular irregular   Pulmonary/Chest: Effort normal    Lung sounds are clear to auscultation   Abdominal: Soft  She exhibits no distension  There is no tenderness  Musculoskeletal:   No edema   Neurological: She is alert and oriented to person, place, and time  Skin: Skin is warm and dry  Psychiatric: She has a normal mood and affect

## 2020-01-31 ENCOUNTER — TRANSCRIBE ORDERS (OUTPATIENT)
Dept: LAB | Facility: CLINIC | Age: 80
End: 2020-01-31

## 2020-01-31 ENCOUNTER — APPOINTMENT (OUTPATIENT)
Dept: LAB | Facility: CLINIC | Age: 80
End: 2020-01-31
Payer: MEDICARE

## 2020-01-31 DIAGNOSIS — Z79.01 LONG TERM (CURRENT) USE OF ANTICOAGULANTS: Primary | ICD-10-CM

## 2020-01-31 DIAGNOSIS — I48.20 CHRONIC ATRIAL FIBRILLATION (HCC): ICD-10-CM

## 2020-01-31 DIAGNOSIS — Z79.01 LONG TERM (CURRENT) USE OF ANTICOAGULANTS: ICD-10-CM

## 2020-01-31 LAB
INR PPP: 2.88 (ref 0.84–1.19)
PROTHROMBIN TIME: 29.6 SECONDS (ref 11.6–14.5)

## 2020-01-31 PROCEDURE — 85610 PROTHROMBIN TIME: CPT

## 2020-01-31 PROCEDURE — 36415 COLL VENOUS BLD VENIPUNCTURE: CPT

## 2020-02-02 NOTE — ASSESSMENT & PLAN NOTE
Gabriela Marroquin has mild JAKE and presently is on CPAP setting of 7 cm water  Uses a nasal pillow interface with a chin strap this is working well for her  She has been compliant with using her CPAP and feels benefit from it  She is not have any nocturnal dyspnea and she is feeling she rested satisfactory after sleeping at night    She did have a diagnostic polysomnogram done July 30, 2014 at [address]  She had 2 obstructive apneas and 44 hypopneas for overall AHI of 6 7 and this increased to 14 1 during REM sleep  Mean O2 saturation was 92% with andrey of 84%  She will continue with CPAP of 7 cm water  I did advise her to try to maintain ideal bodyweight  She does have an older CPAP machine and I did order a new auto CPAP machine for [place] because set at 7-10 cm water    Her medical company is [facility]

## 2020-02-02 NOTE — ASSESSMENT & PLAN NOTE
She does have history of mitral valve replacement with mechanical titanium valve 1994  This was done at [facility]   She is on chronic anticoagulation with warfarin

## 2020-02-10 PROBLEM — J30.9 ALLERGIC RHINITIS: Status: ACTIVE | Noted: 2020-02-10

## 2020-02-10 PROBLEM — E11.9 TYPE 2 DIABETES MELLITUS WITHOUT COMPLICATION, WITHOUT LONG-TERM CURRENT USE OF INSULIN (HCC): Status: ACTIVE | Noted: 2020-02-10

## 2020-02-10 PROBLEM — N18.9 ANEMIA DUE TO CHRONIC KIDNEY DISEASE: Status: ACTIVE | Noted: 2020-02-10

## 2020-02-10 PROBLEM — D63.1 ANEMIA DUE TO CHRONIC KIDNEY DISEASE: Status: ACTIVE | Noted: 2020-02-10

## 2020-02-28 ENCOUNTER — TRANSCRIBE ORDERS (OUTPATIENT)
Dept: LAB | Facility: CLINIC | Age: 80
End: 2020-02-28

## 2020-02-28 ENCOUNTER — APPOINTMENT (OUTPATIENT)
Dept: LAB | Facility: CLINIC | Age: 80
End: 2020-02-28
Payer: MEDICARE

## 2020-02-28 DIAGNOSIS — Z79.01 LONG TERM (CURRENT) USE OF ANTICOAGULANTS: ICD-10-CM

## 2020-02-28 DIAGNOSIS — I48.20 CHRONIC ATRIAL FIBRILLATION (HCC): ICD-10-CM

## 2020-02-28 DIAGNOSIS — I48.20 CHRONIC ATRIAL FIBRILLATION (HCC): Primary | ICD-10-CM

## 2020-02-28 LAB
INR PPP: 2.23 (ref 0.84–1.19)
PROTHROMBIN TIME: 24.2 SECONDS (ref 11.6–14.5)

## 2020-02-28 PROCEDURE — 36415 COLL VENOUS BLD VENIPUNCTURE: CPT

## 2020-02-28 PROCEDURE — 85610 PROTHROMBIN TIME: CPT

## 2020-03-12 ENCOUNTER — HOSPITAL ENCOUNTER (OUTPATIENT)
Dept: RADIOLOGY | Facility: HOSPITAL | Age: 80
Discharge: HOME | End: 2020-03-12
Attending: INTERNAL MEDICINE
Payer: MEDICARE

## 2020-03-12 ENCOUNTER — TRANSCRIBE ORDERS (OUTPATIENT)
Dept: ADMINISTRATIVE | Facility: HOSPITAL | Age: 80
End: 2020-03-12

## 2020-03-12 ENCOUNTER — APPOINTMENT (OUTPATIENT)
Dept: LAB | Facility: CLINIC | Age: 80
End: 2020-03-12
Payer: MEDICARE

## 2020-03-12 DIAGNOSIS — I48.20 CHRONIC ATRIAL FIBRILLATION (HCC): Primary | ICD-10-CM

## 2020-03-12 DIAGNOSIS — Z95.2 HEART VALVE REPLACED BY TRANSPLANT: ICD-10-CM

## 2020-03-12 DIAGNOSIS — Z12.31 SCREENING MAMMOGRAM, ENCOUNTER FOR: ICD-10-CM

## 2020-03-12 DIAGNOSIS — Z12.39 SPECIAL SCREENING EXAMINATION FOR NEOPLASM OF BREAST: ICD-10-CM

## 2020-03-12 LAB
INR PPP: 2.56 (ref 0.84–1.19)
PROTHROMBIN TIME: 27 SECONDS (ref 11.6–14.5)

## 2020-03-12 PROCEDURE — 85610 PROTHROMBIN TIME: CPT | Performed by: INTERNAL MEDICINE

## 2020-03-12 PROCEDURE — 77067 SCR MAMMO BI INCL CAD: CPT

## 2020-03-12 PROCEDURE — 77063 BREAST TOMOSYNTHESIS BI: CPT

## 2020-03-12 PROCEDURE — 36415 COLL VENOUS BLD VENIPUNCTURE: CPT | Performed by: INTERNAL MEDICINE

## 2020-05-07 ENCOUNTER — APPOINTMENT (OUTPATIENT)
Dept: LAB | Facility: CLINIC | Age: 80
End: 2020-05-07
Payer: MEDICARE

## 2020-06-09 ENCOUNTER — APPOINTMENT (OUTPATIENT)
Dept: LAB | Facility: CLINIC | Age: 80
End: 2020-06-09
Payer: MEDICARE

## 2020-06-09 DIAGNOSIS — I48.20 CHRONIC ATRIAL FIBRILLATION (HCC): ICD-10-CM

## 2020-06-09 DIAGNOSIS — Z79.01 LONG TERM (CURRENT) USE OF ANTICOAGULANTS: ICD-10-CM

## 2020-06-09 DIAGNOSIS — Z95.2 H/O MITRAL VALVE REPLACEMENT WITH MECHANICAL VALVE: ICD-10-CM

## 2020-06-09 DIAGNOSIS — D63.1 ANEMIA DUE TO STAGE 3 CHRONIC KIDNEY DISEASE (HCC): ICD-10-CM

## 2020-06-09 DIAGNOSIS — N18.30 ANEMIA DUE TO STAGE 3 CHRONIC KIDNEY DISEASE (HCC): ICD-10-CM

## 2020-06-09 DIAGNOSIS — E11.9 TYPE 2 DIABETES MELLITUS WITHOUT COMPLICATION, WITHOUT LONG-TERM CURRENT USE OF INSULIN (HCC): ICD-10-CM

## 2020-06-09 DIAGNOSIS — R10.13 EPIGASTRIC PAIN: ICD-10-CM

## 2020-06-09 DIAGNOSIS — D50.0 IRON DEFICIENCY ANEMIA DUE TO CHRONIC BLOOD LOSS: ICD-10-CM

## 2020-06-09 DIAGNOSIS — E53.8 VITAMIN B12 DEFICIENCY: ICD-10-CM

## 2020-06-09 PROBLEM — J06.9 VIRAL UPPER RESPIRATORY TRACT INFECTION: Status: RESOLVED | Noted: 2020-01-13 | Resolved: 2020-06-09

## 2020-06-09 PROBLEM — D50.9 IRON DEFICIENCY ANEMIA: Status: ACTIVE | Noted: 2020-06-09

## 2020-06-09 PROBLEM — E03.8 OTHER SPECIFIED HYPOTHYROIDISM: Status: ACTIVE | Noted: 2020-06-09

## 2020-06-09 LAB
ALBUMIN SERPL BCP-MCNC: 4 G/DL (ref 3.5–5)
ALP SERPL-CCNC: 91 U/L (ref 46–116)
ALT SERPL W P-5'-P-CCNC: 29 U/L (ref 12–78)
AMYLASE SERPL-CCNC: 51 IU/L (ref 25–115)
ANION GAP SERPL CALCULATED.3IONS-SCNC: 7 MMOL/L (ref 4–13)
AST SERPL W P-5'-P-CCNC: 23 U/L (ref 5–45)
BILIRUB SERPL-MCNC: 0.39 MG/DL (ref 0.2–1)
BUN SERPL-MCNC: 16 MG/DL (ref 5–25)
CALCIUM SERPL-MCNC: 8.7 MG/DL (ref 8.3–10.1)
CHLORIDE SERPL-SCNC: 104 MMOL/L (ref 100–108)
CO2 SERPL-SCNC: 27 MMOL/L (ref 21–32)
CREAT SERPL-MCNC: 0.68 MG/DL (ref 0.6–1.3)
ERYTHROCYTE [DISTWIDTH] IN BLOOD BY AUTOMATED COUNT: 20.2 % (ref 11.6–15.1)
EST. AVERAGE GLUCOSE BLD GHB EST-MCNC: 134 MG/DL
FERRITIN SERPL-MCNC: 6 NG/ML (ref 8–388)
FOLATE SERPL-MCNC: 8 NG/ML (ref 3.1–17.5)
GFR SERPL CREATININE-BSD FRML MDRD: 83 ML/MIN/1.73SQ M
GLUCOSE SERPL-MCNC: 161 MG/DL (ref 65–140)
HBA1C MFR BLD: 6.3 %
HCT VFR BLD AUTO: 35.8 % (ref 34.8–46.1)
HGB BLD-MCNC: 10.3 G/DL (ref 11.5–15.4)
IRON SATN MFR SERPL: 6 %
IRON SERPL-MCNC: 27 UG/DL (ref 50–170)
LDH SERPL-CCNC: 270 U/L (ref 81–234)
LIPASE SERPL-CCNC: 191 U/L (ref 73–393)
MCH RBC QN AUTO: 22.1 PG (ref 26.8–34.3)
MCHC RBC AUTO-ENTMCNC: 28.8 G/DL (ref 31.4–37.4)
MCV RBC AUTO: 77 FL (ref 82–98)
PLATELET # BLD AUTO: 279 THOUSANDS/UL (ref 149–390)
PMV BLD AUTO: 10.5 FL (ref 8.9–12.7)
POTASSIUM SERPL-SCNC: 4.1 MMOL/L (ref 3.5–5.3)
PROT SERPL-MCNC: 7.9 G/DL (ref 6.4–8.2)
RBC # BLD AUTO: 4.66 MILLION/UL (ref 3.81–5.12)
RETICS # AUTO: ABNORMAL 10*3/UL (ref 14097–95744)
RETICS # CALC: 1.99 % (ref 0.37–1.87)
SODIUM SERPL-SCNC: 138 MMOL/L (ref 136–145)
TIBC SERPL-MCNC: 484 UG/DL (ref 250–450)
TSH SERPL DL<=0.05 MIU/L-ACNC: 2.21 UIU/ML (ref 0.36–3.74)
VIT B12 SERPL-MCNC: 187 PG/ML (ref 100–900)
WBC # BLD AUTO: 6.27 THOUSAND/UL (ref 4.31–10.16)

## 2020-06-09 PROCEDURE — 85027 COMPLETE CBC AUTOMATED: CPT

## 2020-06-09 PROCEDURE — 83615 LACTATE (LD) (LDH) ENZYME: CPT

## 2020-06-09 PROCEDURE — 80053 COMPREHEN METABOLIC PANEL: CPT

## 2020-06-09 PROCEDURE — 83540 ASSAY OF IRON: CPT

## 2020-06-09 PROCEDURE — 83550 IRON BINDING TEST: CPT

## 2020-06-09 PROCEDURE — 82728 ASSAY OF FERRITIN: CPT

## 2020-06-09 PROCEDURE — 84443 ASSAY THYROID STIM HORMONE: CPT

## 2020-06-09 PROCEDURE — 36415 COLL VENOUS BLD VENIPUNCTURE: CPT

## 2020-06-09 PROCEDURE — 82746 ASSAY OF FOLIC ACID SERUM: CPT

## 2020-06-09 PROCEDURE — 85045 AUTOMATED RETICULOCYTE COUNT: CPT

## 2020-06-09 PROCEDURE — 82607 VITAMIN B-12: CPT

## 2020-06-09 PROCEDURE — 83690 ASSAY OF LIPASE: CPT

## 2020-06-09 PROCEDURE — 83036 HEMOGLOBIN GLYCOSYLATED A1C: CPT

## 2020-06-09 PROCEDURE — 82150 ASSAY OF AMYLASE: CPT

## 2020-06-09 PROCEDURE — 83010 ASSAY OF HAPTOGLOBIN QUANT: CPT

## 2020-06-10 LAB — HAPTOGLOB SERPL-MCNC: <10 MG/DL (ref 42–346)

## 2020-06-15 ENCOUNTER — APPOINTMENT (OUTPATIENT)
Dept: LAB | Facility: CLINIC | Age: 80
End: 2020-06-15
Payer: MEDICARE

## 2020-06-15 ENCOUNTER — TRANSCRIBE ORDERS (OUTPATIENT)
Dept: LAB | Facility: CLINIC | Age: 80
End: 2020-06-15

## 2020-06-15 DIAGNOSIS — D50.9 IRON DEFICIENCY ANEMIA, UNSPECIFIED IRON DEFICIENCY ANEMIA TYPE: Primary | ICD-10-CM

## 2020-06-15 LAB — HEMOCCULT STL QL: POSITIVE

## 2020-06-15 PROCEDURE — 82272 OCCULT BLD FECES 1-3 TESTS: CPT

## 2020-06-30 ENCOUNTER — TRANSCRIBE ORDERS (OUTPATIENT)
Dept: LAB | Facility: CLINIC | Age: 80
End: 2020-06-30

## 2020-06-30 ENCOUNTER — APPOINTMENT (OUTPATIENT)
Dept: LAB | Facility: CLINIC | Age: 80
End: 2020-06-30
Payer: MEDICARE

## 2020-06-30 DIAGNOSIS — D50.0 IRON DEFICIENCY ANEMIA SECONDARY TO BLOOD LOSS (CHRONIC): ICD-10-CM

## 2020-06-30 DIAGNOSIS — D63.8 CHRONIC DISEASE ANEMIA: Primary | ICD-10-CM

## 2020-06-30 LAB
ERYTHROCYTE [DISTWIDTH] IN BLOOD BY AUTOMATED COUNT: 21 % (ref 11.6–15.1)
HCT VFR BLD AUTO: 35.7 % (ref 34.8–46.1)
HGB BLD-MCNC: 10.1 G/DL (ref 11.5–15.4)
MCH RBC QN AUTO: 22.4 PG (ref 26.8–34.3)
MCHC RBC AUTO-ENTMCNC: 28.3 G/DL (ref 31.4–37.4)
MCV RBC AUTO: 79 FL (ref 82–98)
PLATELET # BLD AUTO: 268 THOUSANDS/UL (ref 149–390)
PMV BLD AUTO: 10.5 FL (ref 8.9–12.7)
RBC # BLD AUTO: 4.51 MILLION/UL (ref 3.81–5.12)
WBC # BLD AUTO: 6.69 THOUSAND/UL (ref 4.31–10.16)

## 2020-06-30 PROCEDURE — 36415 COLL VENOUS BLD VENIPUNCTURE: CPT

## 2020-06-30 PROCEDURE — 85027 COMPLETE CBC AUTOMATED: CPT

## 2020-07-14 ENCOUNTER — APPOINTMENT (OUTPATIENT)
Dept: LAB | Facility: CLINIC | Age: 80
End: 2020-07-14
Payer: MEDICARE

## 2020-08-03 PROBLEM — Z96.0 PRESENCE OF PESSARY: Status: ACTIVE | Noted: 2020-08-03

## 2020-08-03 PROBLEM — R31.29 OTHER MICROSCOPIC HEMATURIA: Status: ACTIVE | Noted: 2020-08-03

## 2020-08-03 PROBLEM — N93.9 VAGINAL BLEEDING: Status: ACTIVE | Noted: 2020-08-03

## 2020-08-04 ENCOUNTER — TELEPHONE (OUTPATIENT)
Dept: SURGICAL ONCOLOGY | Facility: CLINIC | Age: 80
End: 2020-08-04

## 2020-08-04 PROBLEM — K90.0 CELIAC DISEASE: Status: ACTIVE | Noted: 2020-08-04

## 2020-08-04 NOTE — TELEPHONE ENCOUNTER
New Patient Encounter    New Patient Intake Form   Patient Details:  Lennie Brito  1940  [id number]    Background Information:  [address] starts by opening a telephone encounter and gathering the following information   Who is calling to schedule? If not self, relationship to patient? self   Referring Provider pcp   What is the diagnosis? R31 29 (ICD-10-CM) - Other microscopic hematuria   I48 20 (ICD-10-CM) - Chronic atrial fibrillation (HCC)   D50 0 (ICD-10-CM) - Iron deficiency anemia due to chronic blood loss   E53 8 (ICD-10-CM) - Vitamin B12 deficiency   Z95 2 (ICD-10-CM) - Presence of prosthetic heart valve   Z79 01 (ICD-10-CM) - Long term (current) use of anticoagulants    Is this diagnosis confirmed? Yes   When was the diagnosis? ongoing   Is there a confirmed diagnosis from a biopsy/tissue reviewed by pathology? na   Is patient aware of diagnosis? Yes   Is there a personal history and what kind? na   Is there a family history and what kind? na   Reason for visit? New Diagnosis   Have you had any imaging or labs done? If so: when, where? yes  [facility]   Are records in "[employer]? yes   Was the patient told to bring a disk? no   Does the patient smoke or Vape? no   If yes, how many packs or cartridges per day? na   Scheduling Information:   Preferred [place]:  [facility]     Are there any dates/time the patient cannot be seen? na   Miscellaneous: na   After completing the above information, please route to Financial Counselor and the appropriate Nurse Navigator for review

## 2020-08-14 ENCOUNTER — APPOINTMENT (OUTPATIENT)
Dept: LAB | Facility: CLINIC | Age: 80
End: 2020-08-14
Payer: MEDICARE

## 2020-08-17 ENCOUNTER — TRANSCRIBE ORDERS (OUTPATIENT)
Dept: LAB | Facility: CLINIC | Age: 80
End: 2020-08-17

## 2020-08-17 ENCOUNTER — APPOINTMENT (OUTPATIENT)
Dept: LAB | Facility: CLINIC | Age: 80
End: 2020-08-17
Payer: MEDICARE

## 2020-08-17 DIAGNOSIS — E53.8 VITAMIN B12 DEFICIENCY: ICD-10-CM

## 2020-08-17 DIAGNOSIS — I48.20 CHRONIC ATRIAL FIBRILLATION (HCC): ICD-10-CM

## 2020-08-17 DIAGNOSIS — Z79.01 LONG TERM (CURRENT) USE OF ANTICOAGULANTS: ICD-10-CM

## 2020-08-17 DIAGNOSIS — Z95.2 PRESENCE OF PROSTHETIC HEART VALVE: ICD-10-CM

## 2020-08-17 DIAGNOSIS — D50.0 IRON DEFICIENCY ANEMIA DUE TO CHRONIC BLOOD LOSS: ICD-10-CM

## 2020-08-17 DIAGNOSIS — R31.29 OTHER MICROSCOPIC HEMATURIA: ICD-10-CM

## 2020-08-17 LAB — HGB BLD-MCNC: 11.8 G/DL (ref 11.5–15.4)

## 2020-08-17 PROCEDURE — 85018 HEMOGLOBIN: CPT

## 2020-08-17 PROCEDURE — 36415 COLL VENOUS BLD VENIPUNCTURE: CPT

## 2020-09-01 ENCOUNTER — APPOINTMENT (OUTPATIENT)
Dept: LAB | Facility: CLINIC | Age: 80
End: 2020-09-01
Payer: MEDICARE

## 2020-09-01 ENCOUNTER — TRANSCRIBE ORDERS (OUTPATIENT)
Dept: LAB | Facility: CLINIC | Age: 80
End: 2020-09-01

## 2020-09-01 DIAGNOSIS — R10.84 GENERALIZED ABDOMINAL PAIN: ICD-10-CM

## 2020-09-01 DIAGNOSIS — D63.1 ANEMIA DUE TO STAGE 3 CHRONIC KIDNEY DISEASE (HCC): ICD-10-CM

## 2020-09-01 DIAGNOSIS — E11.9 TYPE 2 DIABETES MELLITUS WITHOUT COMPLICATION, WITHOUT LONG-TERM CURRENT USE OF INSULIN (HCC): ICD-10-CM

## 2020-09-01 DIAGNOSIS — N18.30 ANEMIA DUE TO STAGE 3 CHRONIC KIDNEY DISEASE (HCC): ICD-10-CM

## 2020-09-01 DIAGNOSIS — D50.0 IRON DEFICIENCY ANEMIA DUE TO CHRONIC BLOOD LOSS: ICD-10-CM

## 2020-09-01 DIAGNOSIS — Z95.2 PRESENCE OF PROSTHETIC HEART VALVE: ICD-10-CM

## 2020-09-01 DIAGNOSIS — K90.0 CELIAC DISEASE: ICD-10-CM

## 2020-09-01 DIAGNOSIS — E78.00 HYPERCHOLESTEREMIA: ICD-10-CM

## 2020-09-01 DIAGNOSIS — R10.13 ABDOMINAL PAIN, EPIGASTRIC: Primary | ICD-10-CM

## 2020-09-01 DIAGNOSIS — E53.8 VITAMIN B12 DEFICIENCY: ICD-10-CM

## 2020-09-01 DIAGNOSIS — D50.0 BLOOD LOSS ANEMIA: ICD-10-CM

## 2020-09-01 DIAGNOSIS — Z79.01 LONG TERM (CURRENT) USE OF ANTICOAGULANTS: ICD-10-CM

## 2020-09-01 LAB
ALBUMIN SERPL BCP-MCNC: 4.2 G/DL (ref 3.5–5)
ALP SERPL-CCNC: 79 U/L (ref 46–116)
ALT SERPL W P-5'-P-CCNC: 29 U/L (ref 12–78)
ANION GAP SERPL CALCULATED.3IONS-SCNC: 6 MMOL/L (ref 4–13)
AST SERPL W P-5'-P-CCNC: 25 U/L (ref 5–45)
BILIRUB SERPL-MCNC: 0.41 MG/DL (ref 0.2–1)
BUN SERPL-MCNC: 13 MG/DL (ref 5–25)
CALCIUM SERPL-MCNC: 9.2 MG/DL (ref 8.3–10.1)
CHLORIDE SERPL-SCNC: 103 MMOL/L (ref 100–108)
CHOLEST SERPL-MCNC: 224 MG/DL (ref 50–200)
CO2 SERPL-SCNC: 30 MMOL/L (ref 21–32)
CREAT SERPL-MCNC: 0.65 MG/DL (ref 0.6–1.3)
ERYTHROCYTE [DISTWIDTH] IN BLOOD BY AUTOMATED COUNT: 21.8 % (ref 11.6–15.1)
GFR SERPL CREATININE-BSD FRML MDRD: 84 ML/MIN/1.73SQ M
GLUCOSE P FAST SERPL-MCNC: 145 MG/DL (ref 65–99)
HCT VFR BLD AUTO: 41.1 % (ref 34.8–46.1)
HDLC SERPL-MCNC: 38 MG/DL
HGB BLD-MCNC: 12.4 G/DL (ref 11.5–15.4)
LDLC SERPL CALC-MCNC: 126 MG/DL (ref 0–100)
MCH RBC QN AUTO: 26 PG (ref 26.8–34.3)
MCHC RBC AUTO-ENTMCNC: 30.2 G/DL (ref 31.4–37.4)
MCV RBC AUTO: 86 FL (ref 82–98)
NONHDLC SERPL-MCNC: 186 MG/DL
PLATELET # BLD AUTO: 222 THOUSANDS/UL (ref 149–390)
PMV BLD AUTO: 10.6 FL (ref 8.9–12.7)
POTASSIUM SERPL-SCNC: 4.1 MMOL/L (ref 3.5–5.3)
PROT SERPL-MCNC: 7.8 G/DL (ref 6.4–8.2)
RBC # BLD AUTO: 4.77 MILLION/UL (ref 3.81–5.12)
SODIUM SERPL-SCNC: 139 MMOL/L (ref 136–145)
TRIGL SERPL-MCNC: 301 MG/DL
WBC # BLD AUTO: 5.57 THOUSAND/UL (ref 4.31–10.16)

## 2020-09-01 PROCEDURE — 82784 ASSAY IGA/IGD/IGG/IGM EACH: CPT

## 2020-09-01 PROCEDURE — 83516 IMMUNOASSAY NONANTIBODY: CPT

## 2020-09-01 PROCEDURE — 85027 COMPLETE CBC AUTOMATED: CPT

## 2020-09-01 PROCEDURE — 80061 LIPID PANEL: CPT

## 2020-09-01 PROCEDURE — 86255 FLUORESCENT ANTIBODY SCREEN: CPT

## 2020-09-01 PROCEDURE — 36415 COLL VENOUS BLD VENIPUNCTURE: CPT

## 2020-09-01 PROCEDURE — 80053 COMPREHEN METABOLIC PANEL: CPT

## 2020-09-02 LAB
ENDOMYSIUM IGA SER QL: NEGATIVE
GLIADIN PEPTIDE IGA SER-ACNC: 11 UNITS (ref 0–19)
GLIADIN PEPTIDE IGG SER-ACNC: 20 UNITS (ref 0–19)
IGA SERPL-MCNC: 191 MG/DL (ref 64–422)
TTG IGA SER-ACNC: 4 U/ML (ref 0–3)
TTG IGG SER-ACNC: <2 U/ML (ref 0–5)

## 2020-09-09 ENCOUNTER — APPOINTMENT (EMERGENCY)
Dept: RADIOLOGY | Facility: HOSPITAL | Age: 80
End: 2020-09-09
Payer: MEDICARE

## 2020-09-09 ENCOUNTER — HOSPITAL ENCOUNTER (EMERGENCY)
Facility: HOSPITAL | Age: 80
Discharge: HOME | End: 2020-09-09
Attending: EMERGENCY MEDICINE | Admitting: EMERGENCY MEDICINE
Payer: MEDICARE

## 2020-09-09 VITALS
OXYGEN SATURATION: 97 % | RESPIRATION RATE: 18 BRPM | SYSTOLIC BLOOD PRESSURE: 160 MMHG | TEMPERATURE: 98.7 F | HEART RATE: 82 BPM | DIASTOLIC BLOOD PRESSURE: 82 MMHG
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
## 2020-09-09 DIAGNOSIS — S53.402A SPRAIN OF LEFT UPPER ARM, INITIAL ENCOUNTER: ICD-10-CM

## 2020-09-09 DIAGNOSIS — M25.559 HIP PAIN: ICD-10-CM

## 2020-09-09 PROCEDURE — 99284 EMERGENCY DEPT VISIT MOD MDM: CPT | Performed by: EMERGENCY MEDICINE

## 2020-09-09 PROCEDURE — 73502 X-RAY EXAM HIP UNI 2-3 VIEWS: CPT

## 2020-09-09 PROCEDURE — 70450 CT HEAD/BRAIN W/O DYE: CPT

## 2020-09-09 PROCEDURE — G1004 CDSM NDSC: HCPCS

## 2020-09-09 PROCEDURE — 99284 EMERGENCY DEPT VISIT MOD MDM: CPT

## 2020-09-09 PROCEDURE — 73060 X-RAY EXAM OF HUMERUS: CPT

## 2020-09-09 PROCEDURE — 71250 CT THORAX DX C-: CPT

## 2020-09-09 RX ORDER — ACETAMINOPHEN 325 MG/1
650 TABLET ORAL ONCE
Status: COMPLETED | OUTPATIENT
Start: 2020-09-09 | End: 2020-09-09

## 2020-09-09 RX ADMIN — ACETAMINOPHEN 650 MG: 325 TABLET, FILM COATED ORAL at 17:16

## 2020-09-09 NOTE — ED PROVIDER NOTES
History  Chief Complaint   Patient presents with   Angelique Ray     states fell while bowling  let go of ball and landed on her L side, hit L side of face on floor  no loc, on warfarin  also c/o L hip and shoulder and rib cage     [de-identified] yo female was bowling and foot got caught while left arm and ball kept going forward and pt  Matthew Gifford onto left side and landed in gutter  Occurred just prior to arrival   C/o left face bruise, left arm pain, left rib pain and left hip pain  Hurts to take a deep breath  Did get up and walk after fall  No LOC  No neck or back pain  No belly pain  Had been well prior to this  Pt  Is on coumadin  History provided by:  Patient   used: No    Fall   Associated symptoms: no abdominal pain, no back pain, no neck pain and no vomiting        Prior to Admission Medications   Prescriptions Last Dose Informant Patient Reported? Taking?    Calcium Citrate-Vitamin D (CALCITRATE/VITAMIN D PO)  Self Yes No   Sig: Take by mouth 3 (three) times a week   Cholecalciferol (VITAMIN D PO)  Self Yes No   Sig: Take by mouth   Multiple Vitamins-Minerals (PRESERVISION AREDS PO)  Self Yes No   Sig: Take 2 tablets by mouth daily   NADOLOL PO  Self Yes No   Sig: Take 2 5 mg by mouth daily   acetaminophen (TYLENOL) 500 mg tablet   Yes No   Sig: Take 500 mg by mouth   digoxin (LANOXIN) 0 125 mg tablet   Yes No   Sig: Take 125 mcg by mouth daily   digoxin (LANOXIN) 0 25 mg tablet  Self Yes No   Sig: Take 250 mcg by mouth daily   ferrous sulfate 324 (65 Fe) mg   Yes No   Sig: Take 324 mg by mouth daily before breakfast   metFORMIN (GLUCOPHAGE) 500 mg tablet   No No   Si tablets Twice Daily   warfarin (COUMADIN) 2 5 mg tablet  Self Yes No   Sig: Take 2 5 mg by mouth 3 (three) times a week   warfarin (COUMADIN) 5 mg tablet  Self Yes No   Sig: Take 5 mg by mouth 4 (four) times a week      Facility-Administered Medications: None       Past Medical History:   Diagnosis Date    [facility] fibrillation ([facility] )     Cancer ([facility] )     hx of cervical    Cardiac disease     Hyperlipidemia        Past Surgical History:   Procedure Laterality Date    EYE SURGERY      HYSTERECTOMY      MITRAL VALVE REPLACEMENT         Family History   Problem Relation Age of Onset    Heart failure Mother     Heart attack Father     Sleep apnea Brother      I have reviewed and agree with the history as documented  E-Cigarette/Vaping    E-Cigarette Use Never User      E-Cigarette/Vaping Substances     Social History     Tobacco Use    Smoking status: Former Smoker     Packs/day: 2 00     Years: 30 00     Pack years: 60 00     Last attempt to quit:      Years since quittin 7    Smokeless tobacco: Never Used   Substance Use Topics    Alcohol use: Yes     Comment: special occasional    Drug use: No       Review of Systems   Constitutional: Negative for fever  Respiratory: Negative for cough and shortness of breath  Gastrointestinal: Negative for abdominal pain, diarrhea and vomiting  Musculoskeletal: Negative for back pain and neck pain  Skin: Negative for wound  Physical Exam  Physical Exam  Vitals signs and nursing note reviewed  Constitutional:       General: She is not in acute distress  Appearance: Normal appearance  She is well-developed  She is not ill-appearing or diaphoretic  HENT:      Head: Normocephalic  Comments: Mild ttp and bruise left cheek, no bony step off or tenderness  Eyes:      Conjunctiva/sclera: Conjunctivae normal    Neck:      Musculoskeletal: Normal range of motion and neck supple  Comments: c-spine not tender  Cardiovascular:      Rate and Rhythm: Normal rate  Rhythm irregular  Heart sounds: Normal heart sounds  No murmur  Pulmonary:      Effort: Pulmonary effort is normal  No respiratory distress  Breath sounds: Normal breath sounds  Chest:      Chest wall: Tenderness present     Abdominal:      General: Bowel sounds are normal  There is no distension  Palpations: Abdomen is soft  Tenderness: There is no abdominal tenderness  Musculoskeletal: Normal range of motion  General: Tenderness present  No deformity  Right lower leg: No edema  Left lower leg: No edema  Comments: Left arm + ttp humerus and elbow with pain in all rom  Distal m/s intact  Left lateral hip ttp  Rest of left leg not tender  Skin:     General: Skin is warm and dry  Coloration: Skin is not pale  Findings: No rash  Neurological:      General: No focal deficit present  Mental Status: She is alert and oriented to person, place, and time  Cranial Nerves: No cranial nerve deficit  Psychiatric:         Mood and Affect: Mood normal          Behavior: Behavior normal          Vital Signs  ED Triage Vitals [09/09/20 1545]   Temperature Pulse Respirations Blood Pressure SpO2   98 7 °F (37 1 °C) 80 20 (!) 182/86 96 %      Temp Source Heart Rate Source Patient Position - Orthostatic VS BP Location FiO2 (%)   Tympanic Monitor Sitting Right arm --      Pain Score       9           Vitals:    09/09/20 1545   BP: (!) 182/86   Pulse: 80   Patient Position - Orthostatic VS: Sitting         Visual Acuity      ED Medications  Medications   acetaminophen (TYLENOL) tablet 650 mg (has no administration in time range)       Diagnostic Studies  Results Reviewed     None                 XR humerus LEFT   ED Interpretation by Ovidio Murphy MD (69/04 1239)   No fracture      Final Result by Anjum Trimble MD (09/09 1656)      No acute osseous abnormality  Workstation performed: [id number]         XR hip/pelv 2-3 vws left   ED Interpretation by Ovidio Murphy MD (99/75 9595)   No fracture      Final Result by Aneudy Colvin MD (09/09 1702)      No acute osseous abnormality  This report is in agreement with the preliminary interpretation           Workstation performed: [id number]         CT chest without contrast   Final Result by Briana Swift Dean Galindo MD (09/09 1653)      No displaced left rib fracture  No fracture about the left shoulder  No hemothorax or pneumothorax  Mild emphysema  Cardiomegaly with left atrial enlargement, status post MVR  Workstation performed: [id number]         CT head without contrast   Final Result by Melchor Mondragon MD (09/09 1648)      No acute intracranial abnormality  Workstation performed: [id number]                    Procedures  Procedures         ED Course                                       MDM  Number of Diagnoses or Management Options  Fall, initial encounter:   Hip pain:   Sprain of left upper arm, initial encounter:   Diagnosis management comments: xrays and scans ok  Advised rest, tylenol prn, follow up if any problems or worsening  Disposition  Final diagnoses:   Fall, initial encounter   Sprain of left upper arm, initial encounter   Hip pain     Time reflects when diagnosis was documented in both MDM as applicable and the Disposition within this note     Time User Action Codes Description Comment    4/0/9789  0:52 PM Mireya Olivas Add [C65  TGAD] Fall, initial encounter     1/2/3585  8:43 PM Bakari MORENO Add [C25 301C] Sprain of left upper arm, initial encounter     2/6/7907  7:51 PM Mireya Kerrys Add [V66 484] Hip pain       ED Disposition     ED Disposition Condition Date/Time Comment    Discharge Stable Wed Sep 9, 2020  5:03 PM Zeenat Marie discharge to home/self care  Follow-up Information     Follow up With Specialties Details Why Contact Heather Segura MD Family Medicine Schedule an appointment as soon as possible for a visit  As needed [address]   [phone]            Patient's Medications   Discharge Prescriptions    No medications on file     No discharge procedures on file      PDMP Review     None          ED Provider  Electronically Signed by           Galina Elder MD  09/09/20 6250

## 2020-09-09 NOTE — DISCHARGE INSTRUCTIONS
Your xrays and CT scans do not show any fractures or acute injuries  The areas are bruised from the impact and you can expect to be sore for a few days  Rest as needed  Tylenol as needed for discomfort  Return to doctor if any severe worsening or unusual symptoms

## 2020-09-21 ENCOUNTER — APPOINTMENT (OUTPATIENT)
Dept: LAB | Facility: CLINIC | Age: 80
End: 2020-09-21
Payer: MEDICARE

## 2020-10-22 ENCOUNTER — APPOINTMENT (OUTPATIENT)
Dept: LAB | Facility: CLINIC | Age: 80
End: 2020-10-22
Payer: MEDICARE

## 2020-10-22 DIAGNOSIS — E11.9 TYPE 2 DIABETES MELLITUS WITHOUT COMPLICATION, WITHOUT LONG-TERM CURRENT USE OF INSULIN (HCC): ICD-10-CM

## 2020-10-22 DIAGNOSIS — I48.20 CHRONIC ATRIAL FIBRILLATION (HCC): ICD-10-CM

## 2020-10-22 DIAGNOSIS — D50.0 IRON DEFICIENCY ANEMIA DUE TO CHRONIC BLOOD LOSS: ICD-10-CM

## 2020-10-22 DIAGNOSIS — E78.00 HYPERCHOLESTEREMIA: ICD-10-CM

## 2020-10-22 DIAGNOSIS — Z95.2 H/O MITRAL VALVE REPLACEMENT WITH MECHANICAL VALVE: ICD-10-CM

## 2020-10-22 LAB
ANION GAP SERPL CALCULATED.3IONS-SCNC: 5 MMOL/L (ref 4–13)
BUN SERPL-MCNC: 12 MG/DL (ref 5–25)
CALCIUM SERPL-MCNC: 9.6 MG/DL (ref 8.3–10.1)
CHLORIDE SERPL-SCNC: 105 MMOL/L (ref 100–108)
CO2 SERPL-SCNC: 29 MMOL/L (ref 21–32)
CREAT SERPL-MCNC: 0.68 MG/DL (ref 0.6–1.3)
EST. AVERAGE GLUCOSE BLD GHB EST-MCNC: 131 MG/DL
GFR SERPL CREATININE-BSD FRML MDRD: 83 ML/MIN/1.73SQ M
GLUCOSE SERPL-MCNC: 142 MG/DL (ref 65–140)
HBA1C MFR BLD: 6.2 %
HGB BLD-MCNC: 12.6 G/DL (ref 11.5–15.4)
POTASSIUM SERPL-SCNC: 4.3 MMOL/L (ref 3.5–5.3)
SODIUM SERPL-SCNC: 139 MMOL/L (ref 136–145)

## 2020-10-22 PROCEDURE — 85018 HEMOGLOBIN: CPT

## 2020-10-22 PROCEDURE — 83036 HEMOGLOBIN GLYCOSYLATED A1C: CPT

## 2020-10-22 PROCEDURE — 80048 BASIC METABOLIC PNL TOTAL CA: CPT

## 2020-11-02 ENCOUNTER — EVALUATION (OUTPATIENT)
Dept: PHYSICAL THERAPY | Facility: CLINIC | Age: 80
End: 2020-11-02
Payer: MEDICARE

## 2020-11-02 DIAGNOSIS — R26.89 BALANCE DISORDER: Primary | ICD-10-CM

## 2020-11-02 DIAGNOSIS — R26.89 OTHER ABNORMALITIES OF GAIT AND MOBILITY: ICD-10-CM

## 2020-11-02 PROCEDURE — 97162 PT EVAL MOD COMPLEX 30 MIN: CPT

## 2020-11-03 ENCOUNTER — OFFICE VISIT (OUTPATIENT)
Dept: PHYSICAL THERAPY | Facility: CLINIC | Age: 80
End: 2020-11-03
Payer: MEDICARE

## 2020-11-03 DIAGNOSIS — R26.89 BALANCE DISORDER: Primary | ICD-10-CM

## 2020-11-03 DIAGNOSIS — R26.89 OTHER ABNORMALITIES OF GAIT AND MOBILITY: ICD-10-CM

## 2020-11-03 PROCEDURE — 97530 THERAPEUTIC ACTIVITIES: CPT

## 2020-11-03 PROCEDURE — 97112 NEUROMUSCULAR REEDUCATION: CPT

## 2020-12-01 ENCOUNTER — OFFICE VISIT (OUTPATIENT)
Dept: PHYSICAL THERAPY | Facility: CLINIC | Age: 80
End: 2020-12-01
Payer: MEDICARE

## 2020-12-01 DIAGNOSIS — R26.89 BALANCE DISORDER: Primary | ICD-10-CM

## 2020-12-01 DIAGNOSIS — R26.89 OTHER ABNORMALITIES OF GAIT AND MOBILITY: ICD-10-CM

## 2020-12-01 PROCEDURE — 97530 THERAPEUTIC ACTIVITIES: CPT

## 2020-12-01 PROCEDURE — 97112 NEUROMUSCULAR REEDUCATION: CPT

## 2020-12-03 ENCOUNTER — APPOINTMENT (OUTPATIENT)
Dept: PHYSICAL THERAPY | Facility: CLINIC | Age: 80
End: 2020-12-03
Payer: MEDICARE

## 2020-12-04 ENCOUNTER — OFFICE VISIT (OUTPATIENT)
Dept: PHYSICAL THERAPY | Facility: CLINIC | Age: 80
End: 2020-12-04
Payer: MEDICARE

## 2020-12-04 DIAGNOSIS — R26.89 OTHER ABNORMALITIES OF GAIT AND MOBILITY: ICD-10-CM

## 2020-12-04 DIAGNOSIS — R26.89 BALANCE DISORDER: Primary | ICD-10-CM

## 2020-12-04 PROCEDURE — 97530 THERAPEUTIC ACTIVITIES: CPT | Performed by: PHYSICAL THERAPIST

## 2020-12-04 PROCEDURE — 97112 NEUROMUSCULAR REEDUCATION: CPT | Performed by: PHYSICAL THERAPIST

## 2020-12-08 ENCOUNTER — TELEPHONE (OUTPATIENT)
Dept: PHYSICAL THERAPY | Facility: CLINIC | Age: 80
End: 2020-12-08

## 2020-12-08 ENCOUNTER — APPOINTMENT (OUTPATIENT)
Dept: PHYSICAL THERAPY | Facility: CLINIC | Age: 80
End: 2020-12-08
Payer: MEDICARE

## 2020-12-10 ENCOUNTER — OFFICE VISIT (OUTPATIENT)
Dept: PHYSICAL THERAPY | Facility: CLINIC | Age: 80
End: 2020-12-10
Payer: MEDICARE

## 2020-12-10 DIAGNOSIS — R26.89 OTHER ABNORMALITIES OF GAIT AND MOBILITY: ICD-10-CM

## 2020-12-10 DIAGNOSIS — R26.89 BALANCE DISORDER: Primary | ICD-10-CM

## 2020-12-10 PROCEDURE — 97112 NEUROMUSCULAR REEDUCATION: CPT

## 2020-12-11 ENCOUNTER — OFFICE VISIT (OUTPATIENT)
Dept: PHYSICAL THERAPY | Facility: CLINIC | Age: 80
End: 2020-12-11
Payer: MEDICARE

## 2020-12-11 DIAGNOSIS — R26.89 OTHER ABNORMALITIES OF GAIT AND MOBILITY: ICD-10-CM

## 2020-12-11 DIAGNOSIS — R26.89 BALANCE DISORDER: Primary | ICD-10-CM

## 2020-12-11 PROCEDURE — 97530 THERAPEUTIC ACTIVITIES: CPT

## 2020-12-11 PROCEDURE — 97112 NEUROMUSCULAR REEDUCATION: CPT

## 2020-12-15 ENCOUNTER — OFFICE VISIT (OUTPATIENT)
Dept: PHYSICAL THERAPY | Facility: CLINIC | Age: 80
End: 2020-12-15
Payer: MEDICARE

## 2020-12-15 DIAGNOSIS — R26.89 BALANCE DISORDER: Primary | ICD-10-CM

## 2020-12-15 DIAGNOSIS — R26.89 OTHER ABNORMALITIES OF GAIT AND MOBILITY: ICD-10-CM

## 2020-12-15 PROCEDURE — 97112 NEUROMUSCULAR REEDUCATION: CPT | Performed by: PHYSICAL THERAPIST

## 2020-12-17 ENCOUNTER — APPOINTMENT (OUTPATIENT)
Dept: PHYSICAL THERAPY | Facility: CLINIC | Age: 80
End: 2020-12-17
Payer: MEDICARE

## 2020-12-18 ENCOUNTER — OFFICE VISIT (OUTPATIENT)
Dept: PHYSICAL THERAPY | Facility: CLINIC | Age: 80
End: 2020-12-18
Payer: MEDICARE

## 2020-12-18 DIAGNOSIS — R26.89 OTHER ABNORMALITIES OF GAIT AND MOBILITY: ICD-10-CM

## 2020-12-18 DIAGNOSIS — R26.89 BALANCE DISORDER: Primary | ICD-10-CM

## 2020-12-18 PROCEDURE — 97112 NEUROMUSCULAR REEDUCATION: CPT

## 2020-12-18 PROCEDURE — 97530 THERAPEUTIC ACTIVITIES: CPT

## 2020-12-22 ENCOUNTER — OFFICE VISIT (OUTPATIENT)
Dept: PHYSICAL THERAPY | Facility: CLINIC | Age: 80
End: 2020-12-22
Payer: MEDICARE

## 2020-12-22 DIAGNOSIS — R26.89 BALANCE DISORDER: Primary | ICD-10-CM

## 2020-12-22 DIAGNOSIS — R26.89 OTHER ABNORMALITIES OF GAIT AND MOBILITY: ICD-10-CM

## 2020-12-22 PROCEDURE — 97112 NEUROMUSCULAR REEDUCATION: CPT | Performed by: PHYSICAL THERAPIST

## 2020-12-24 ENCOUNTER — APPOINTMENT (OUTPATIENT)
Dept: PHYSICAL THERAPY | Facility: CLINIC | Age: 80
End: 2020-12-24
Payer: MEDICARE

## 2020-12-29 ENCOUNTER — OFFICE VISIT (OUTPATIENT)
Dept: PHYSICAL THERAPY | Facility: CLINIC | Age: 80
End: 2020-12-29
Payer: MEDICARE

## 2020-12-29 DIAGNOSIS — R26.89 BALANCE DISORDER: Primary | ICD-10-CM

## 2020-12-29 DIAGNOSIS — R26.89 OTHER ABNORMALITIES OF GAIT AND MOBILITY: ICD-10-CM

## 2020-12-29 PROCEDURE — 97112 NEUROMUSCULAR REEDUCATION: CPT

## 2020-12-31 ENCOUNTER — APPOINTMENT (OUTPATIENT)
Dept: PHYSICAL THERAPY | Facility: CLINIC | Age: 80
End: 2020-12-31
Payer: MEDICARE

## 2021-01-05 ENCOUNTER — APPOINTMENT (OUTPATIENT)
Dept: LAB | Facility: CLINIC | Age: 81
End: 2021-01-05
Payer: MEDICARE

## 2021-01-05 ENCOUNTER — OFFICE VISIT (OUTPATIENT)
Dept: PHYSICAL THERAPY | Facility: CLINIC | Age: 81
End: 2021-01-05
Payer: MEDICARE

## 2021-01-05 DIAGNOSIS — R26.89 OTHER ABNORMALITIES OF GAIT AND MOBILITY: ICD-10-CM

## 2021-01-05 DIAGNOSIS — R26.89 BALANCE DISORDER: Primary | ICD-10-CM

## 2021-01-05 PROCEDURE — 97112 NEUROMUSCULAR REEDUCATION: CPT | Performed by: PHYSICAL THERAPIST

## 2021-01-05 NOTE — PROGRESS NOTES
Progress Note  IE: 2020 (POC: 2021)     Today's date: 2021  Patient name: Iliana Banerjee  : 1940  MRN: [id number]  Referring provider: Jenni Arceo MD  Dx:   Encounter Diagnosis     ICD-10-CM    1  Balance disorder  R26 89    2  Other abnormalities of gait and mobility  R26 89                   Subjective: Pt notes feeling stronger since starting therapy feels more confident with balance  Objective: See treatment diary below    TE:  - STS w/ foam under feet: 4 sets 5 reps timed average time 9 seconds to 12 seconds   - FAEC on foam: 30 sec, 4 reps  - Step taps from foam: 6", 20 reps B/L, 0 UE  - Sidestepping w/ cone taps: 4 cycles, 10 ft down/back, 0 UE  - Sidestepping on foam: 5 cycles, 10 ft down/back    NMR:  Speed walking 10 m 10 times timed: 7 75 to 11 seconds due to fatigue with 3 lb wts on ankles  amb fwd and backwards with P ball kick 5 minutes  amb fwd and backwards with P ball bounce     Assessment: Patient had made excellent progress with skilled therapy since starting  Patient is deemed low risk for fall for all fall risk measures except FGA which still indicates higher risk for falls  Did not test endurance this date will test next session  Expected to achieve all goals in plan of care by end  with expected DC at that time  Patient is agreeable with therapy plan and notes functional improvement in ADLS and iADLs  Patient will benefit from skilled PT for 1 more month to achieve goals  Plan: Continue per plan of care         Short Term Goals (4 weeks):    - Patient will be independent in basic HEP 2-3 weeks - MET  - Patient will improve 5xSTS score by 2 3 seconds from 17 98 seconds to 15 68 seconds to promote improved LE functional strength needed for ADLs - MET  - Patient will be able to perform 30 seconds for FTEC on firm in order to improve her overall safety - NOT MET    Long Term Goals (8 weeks):  - Patient will be independent in a comprehensive home exercise program  - Patient will improve gait speed by 0 59 ft/sec from 3 59 ft/sec to 4 18 ft/sec to improve safety with community ambulation MET  - Patient will improve scoring on FGA by 4 points from 16/30 to 20/30 to progress safety with dynamic tasks  - Patient will be able to demonstrate HT in gait without veering  - Patient will improve 6 Minute Walk Test score by 190 feet to promote improved cardiovascular endurance  - Patient will report 50% reduction in near falls in order to improve safety with functional tasks and reduce his risk for falls  - Patient will report going on walks at least 3 days per week to promote independence and improved cardiovascular endurance  - Patient will be able to ascend/descend stairs reciprocally without UE assist to promote independence and safety with ADLs MET  - Patient will report 50% reduction in near falls when ambulating on uneven terrain    Precautions:   Past Medical History:   Diagnosis Date    Atrial fibrillation (HCC)     Cancer (HCC)     hx of cervical    Cardiac disease     Hyperlipidemia            Outcome Measures IE 11-2-2020 PN  12-1-2020 PN:  1/05/2021    5xSTS 17 98 sec 10 82 sec 9 99 sec     TUG 11 0 sec 8 95 sec 7 77 sec     10 meter 3 59 ft/sec  1 08 m/s 3 59 ft/sec  1 08 m/s 1 27 m/sec    FGA 16/30 16/30     6MWT Next Session 1050 ft     mCTSIB  - FTEO (firm)  - FTEC (firm)  - FTEO (foam)  - FTEC (foam)   30 sec  10 sec  30 sec  3 sec   30 sec  10 sec  30 sec  3 sec   30 sec   30 sec  30 sec   11 sec

## 2021-01-06 DIAGNOSIS — E11.9 TYPE 2 DIABETES MELLITUS WITHOUT COMPLICATION, WITHOUT LONG-TERM CURRENT USE OF INSULIN (HCC): ICD-10-CM

## 2021-01-07 ENCOUNTER — OFFICE VISIT (OUTPATIENT)
Dept: PHYSICAL THERAPY | Facility: CLINIC | Age: 81
End: 2021-01-07
Payer: MEDICARE

## 2021-01-07 DIAGNOSIS — R26.89 BALANCE DISORDER: Primary | ICD-10-CM

## 2021-01-07 DIAGNOSIS — R26.89 OTHER ABNORMALITIES OF GAIT AND MOBILITY: ICD-10-CM

## 2021-01-07 PROCEDURE — 97530 THERAPEUTIC ACTIVITIES: CPT | Performed by: PHYSICAL THERAPIST

## 2021-01-07 PROCEDURE — 97112 NEUROMUSCULAR REEDUCATION: CPT | Performed by: PHYSICAL THERAPIST

## 2021-01-07 NOTE — PROGRESS NOTES
Progress Note  PN 2021 (POC: 2021)     Today's date: 2021  Patient name: Vangie Rose  : 1940  MRN: [id number]  Referring provider: Hank Bailey MD  Dx:   Encounter Diagnosis     ICD-10-CM    1  Balance disorder  R26 89    2  Other abnormalities of gait and mobility  R26 89        Start Time: 1015  Stop Time: 1100  Total time in clinic (min): 45 minutes    Subjective: Client states having mild L knee pain from bowling yesterday  She denies any injury      Objective: See treatment diary below    NMR:  - STS w/ foam under feet: 4 sets 5 reps timed: fluctuated between 10- 14 seconds   - FAEC on foam: 30 sec, 4 reps  - Sidestepping w/ cone taps: 4 cycles, 10 ft down/back, 0 UE  - Sidestepping on foam: 5 cycles, 10 ft down/back  - SLS on foam with fwd cone tapping  - SLS on foam with lat cone tapping    Gait:  - fwd and backwards walking  with P ball kick 30' x 6  - fwd and backwards walking with P ball bounce 30' x 6    Assessment: Client with good participation throughout session  Noted challenged with lateral and backwards activities today  Short duration rest break throughout  She will benefit from further skilled PT services to further improve balance reactions  Plan: Continue per plan of care     6mwt and FGA next session Anticipated d/c by 21        Precautions:   Past Medical History:   Diagnosis Date    [facility]     Cancer ([facility] )     hx of cervical    Cardiac disease     Hyperlipidemia            Outcome Measures IE 2020 PN  2020 PN:  2021    5xSTS 17 98 sec 10 82 sec 9 99 sec     TUG 11 0 sec 8 95 sec 7 77 sec     10 meter 3 59 ft/sec  1 08 m/s 3 59 ft/sec  1 08 m/s 1 27 m/sec    FGA      6MWT Next Session 1050 ft     mCTSIB  - FTEO (firm)  - FTEC (firm)  - FTEO (foam)  - FTEC (foam)   30 sec  10 sec  30 sec  3 sec   30 sec  10 sec  30 sec  3 sec   30 sec   30 sec  30 sec   11 sec

## 2021-01-08 ENCOUNTER — OFFICE VISIT (OUTPATIENT)
Dept: INTERNAL MEDICINE CLINIC | Facility: CLINIC | Age: 81
End: 2021-01-08
Payer: MEDICARE

## 2021-01-08 VITALS
HEART RATE: 83 BPM | BODY MASS INDEX: 26.84 KG/M2 | TEMPERATURE: 97.5 F | OXYGEN SATURATION: 97 % | SYSTOLIC BLOOD PRESSURE: 122 MMHG | HEIGHT: 66 IN | DIASTOLIC BLOOD PRESSURE: 76 MMHG | WEIGHT: 167 LBS

## 2021-01-08 DIAGNOSIS — N18.30 ANEMIA DUE TO STAGE 3 CHRONIC KIDNEY DISEASE, UNSPECIFIED WHETHER STAGE 3A OR 3B CKD (HCC): ICD-10-CM

## 2021-01-08 DIAGNOSIS — D50.0 IRON DEFICIENCY ANEMIA DUE TO CHRONIC BLOOD LOSS: ICD-10-CM

## 2021-01-08 DIAGNOSIS — J30.1 ALLERGIC RHINITIS DUE TO POLLEN, UNSPECIFIED SEASONALITY: ICD-10-CM

## 2021-01-08 DIAGNOSIS — E78.00 HYPERCHOLESTEREMIA: ICD-10-CM

## 2021-01-08 DIAGNOSIS — I48.20 CHRONIC ATRIAL FIBRILLATION (HCC): ICD-10-CM

## 2021-01-08 DIAGNOSIS — E03.8 OTHER SPECIFIED HYPOTHYROIDISM: ICD-10-CM

## 2021-01-08 DIAGNOSIS — Z79.01 LONG TERM (CURRENT) USE OF ANTICOAGULANTS: ICD-10-CM

## 2021-01-08 DIAGNOSIS — D63.1 ANEMIA DUE TO STAGE 3 CHRONIC KIDNEY DISEASE, UNSPECIFIED WHETHER STAGE 3A OR 3B CKD (HCC): ICD-10-CM

## 2021-01-08 DIAGNOSIS — I71.4 ABDOMINAL AORTIC ANEURYSM (AAA) WITHOUT RUPTURE (HCC): ICD-10-CM

## 2021-01-08 DIAGNOSIS — E11.9 TYPE 2 DIABETES MELLITUS WITHOUT COMPLICATION, WITHOUT LONG-TERM CURRENT USE OF INSULIN (HCC): Primary | ICD-10-CM

## 2021-01-08 PROBLEM — I71.40 ABDOMINAL AORTIC ANEURYSM (AAA) WITHOUT RUPTURE: Status: ACTIVE | Noted: 2021-01-08

## 2021-01-08 PROCEDURE — 99214 OFFICE O/P EST MOD 30 MIN: CPT | Performed by: INTERNAL MEDICINE

## 2021-01-08 NOTE — PROGRESS NOTES
[facility] Office Visit Note  21     Radha Armstrong 80 y o  female MRN: [id number]  : 1940    Assessment:     Type 2 diabetes mellitus without complication, without long-term current use of insulin (HCC)  Diabetes    Check your fingerstick Accu-Chek once a day  Please check your fingerstick Accu-Chek different time of the day either at 7:00 a m  or 11:00 a m  for for p m  4 9:00 p m  Nikkie Jimenez Follow Diabetic diet for diabetes as advised  If you would sugar less than 80 or more than 300 to please call us on next visit is day  If the sugar is less than 80 follow-up hypoglycemia instructions as advised  Take your diabetic medicine as advised  Continue meformin      Lab Results   Component Value Date    HGBA1C 6 2 (H) 10/22/2020       Other specified hypothyroidism  Watch periodically tsh    Allergic rhinitis  Allergic rhinitis fair control of symptoms    Recommend : 1  Avoidance of allergan that gives you allergic symptom  2  Saline nasal spray three times a day as needed  3  Nasal [place] and/or Oral Medicine for allergy as advised or prescribed as appropriate        Chronic atrial fibrillation  anticoag and metoprolol    Watch hr controlled    Abdominal aortic aneurysm (AAA) without rupture ([facility] [address] )  Advised to have cardiologist follow through and get surveillance study    Hypercholesteremia  Cholesterol    Eat low cholesterol diet    Continue taking your cholesterol medicine as advised    Call if any side effects    Lipid Profile and LFT prior to next visit or as advised  ( You should Get periodically to monitor liver side effects)    KNOW YOUR DIABETIC GOAL( HBA1C AND SUGAR), BLOOD PRESSURE TARGET NUMBER AND CHOLESTEROL( LDL, HDL AND TRIGLYCERIDE)         Anemia due to chronic kidney disease  Multifactorial    Improved    Check hb and ferritin    Consider stopping ferrous if stable       Diagnoses and all orders for this visit:    Type 2 diabetes mellitus without complication, without long-term current use of insulin (HCC)  -     Hemoglobin A1C; Future  -     Microalbumin / creatinine urine ratio    Other specified hypothyroidism    Allergic rhinitis due to pollen, unspecified seasonality    Long term (current) use of anticoagulants  -     CBC; Future    Iron deficiency anemia due to chronic blood loss  -     CBC; Future  -     Ferritin; Future    Hypercholesteremia  -     Comprehensive metabolic panel; Future  -     Lipid panel; Future    Anemia due to stage 3 chronic kidney disease, unspecified whether stage 3a or 3b CKD  -     CBC; Future    Abdominal aortic aneurysm (AAA) without rupture (HCC)    Chronic atrial fibrillation (HCC)  -     CBC; Future          Discussion Summary and Plan: Today's care plan and medications were reviewed with patient in detail and all their questions answered to their satisfaction  Chief Complaint   Patient presents with    Hypertension    Hyperlipidemia    Diabetes    Atrial Fibrillation    Abnormal Lab     anemia    Follow-up      Subjective:  Patient is here for follow-up of multiple medical problems and anemia and care transition     Hx of  fall while at [employer], resolved, back bowling    Anemia improved, on iron tablet twice a day    She is allergic to sulfur  She is on Coumadin  Celiac panel:  Gliadin Ig G 20 and TTIGA 4 slightly high, pt watches glueten diet, no abdominal pain, no gas    Chronic disease management    Hypertension symptom-free her blood pressure is top-normal   Currently she is not on currently she is on nadolol    Diabetes last hemoglobin A1c as outlined currently on metformin 500 mg 2 tablets twice a day   HbA1c in 6/2020 6 3    Hyperlipidemia not on statin    tg 301 and , treatment advised, pt prefers no meds or statin, target explained    Atrial fibrillation remains on anticoagulation weight control under care of cardiologist     Vitamin-D deficiency last level was normal     Protein in the urine as outlined underneath  INR being monitored by a cardiologist last time was done on December Gait fair  No recent urinary tract infection sees urologist periodically  Macular degeneration under care of ophthalmologist     Anemia will monitor hemoglobin  History of abdominal aortic aneurysm, hepatic steatosis, hepatomegaly will monitor  Remote history of uterine prolapse     No more mammogram    Going for balance center by ENT    Ancillary Orders on 11/13/2020  Protime                   Value: 38  9(seconds)*     Dt: 01/05/2021  INR                       Value: 4 04*              Dt: 01/05/2021  ------------  Appointment on 10/22/2020  Hemoglobin                Value: 12 6(g/dL)         Dt: 10/22/2020  Sodium                    Value: 139(mmol/L)        Dt: 10/22/2020  Potassium                 Value: 4 3(mmol/L)        Dt: 10/22/2020  Chloride                  Value: 105(mmol/L)        Dt: 10/22/2020  CO2                       Value: 29(mmol/L)         Dt: 10/22/2020  ANION GAP                 Value: 5(mmol/L)          Dt: 10/22/2020  BUN                       Value: 12(mg/dL)          Dt: 10/22/2020  Creatinine                Value: 0 68(mg/dL)        Dt: 10/22/2020  Glucose                   Value: 142(mg/dL)*        Dt: 10/22/2020  Calcium                   Value: 9 6(mg/dL)         Dt: 10/22/2020  eGFR                      Value: 83(ml/min/1 73sq m) Dt: 10/22/2020  Hemoglobin A1C            Value: 6 2(%)*            Dt: 10/22/2020  EAG                       Value: 131(mg/dl)         Dt: 10/22/2020  ------------  Office Visit on 09/22/2020  Creatinine, Ur            Value: 69  6(mg/dL)        Dt: 10/22/2020  Microalbum  ,U,Random      Value: 73  9(mg/L)*        Dt: 10/22/2020  Microalb Creat Ratio      Value: 106(mg/g creatinine)*Dt: 10/22/2020  ------------  Ancillary Orders on 09/18/2020  Protime                   Value: 30  2(seconds)*     Dt: 09/21/2020  INR                       Value: 2 91* Dt: 09/21/2020  ------------  Appointment on 09/01/2020  WBC                       Value: 5 57(Thousand/uL)  Dt: 09/01/2020  RBC                       Value: 4 77(Million/uL)   Dt: 09/01/2020  Hemoglobin                Value: 12 4(g/dL)         Dt: 09/01/2020  Hematocrit                Value: 41 1(%)            Dt: 09/01/2020  MCV                       Value: 86(fL)             Dt: 09/01/2020  MCH                       Value: 26 0(pg)*          Dt: 09/01/2020  MCHC                      Value: 30 2(g/dL)*        Dt: 09/01/2020  RDW                       Value: 21 8(%)*           Dt: 09/01/2020  Platelets                 Value: 222(Thousands/uL)  Dt: 09/01/2020  MPV                       Value: 10 6(fL)           Dt: 09/01/2020  Cholesterol               Value: 224(mg/dL)*        Dt: 09/01/2020  Triglycerides             Value: 301(mg/dL)*        Dt: 09/01/2020  HDL, Direct               Value: 38(mg/dL)*         Dt: 09/01/2020  LDL Calculated            Value: 126(mg/dL)*        Dt: 09/01/2020  Non-HDL-Chol (CHOL-HDL)   Value: 186(mg/dl)         Dt: 09/01/2020  Sodium                    Value: 139(mmol/L)        Dt: 09/01/2020  Potassium                 Value: 4  1(mmol/L)        Dt: 09/01/2020  Chloride                  Value: 103(mmol/L)        Dt: 09/01/2020  CO2                       Value: 30(mmol/L)         Dt: 09/01/2020  ANION GAP                 Value:  6(mmol/L)          Dt: 09/01/2020  BUN                       Value: 13(mg/dL)          Dt: 09/01/2020  Creatinine                Value: 0 65(mg/dL)        Dt: 09/01/2020  Glucose, Fasting          Value: 145(mg/dL)*        Dt: 09/01/2020  Calcium                   Value: 9 2(mg/dL)         Dt: 09/01/2020  AST                       Value: 25(U/L)            Dt: 09/01/2020  ALT                       Value: 29(U/L)            Dt: 09/01/2020  Alkaline Phosphatase      Value: 79(U/L)            Dt: 09/01/2020  Total Protein             Value: 7 8(g/dL)          Dt: 09/01/2020  Albumin                   Value: 4 2(g/dL)          Dt: 09/01/2020  Total Bilirubin           Value: 0 41(mg/dL)        Dt: 09/01/2020  eGFR                      Value: 84(ml/min/1 73sq m) Dt: 09/01/2020  ------------  Transcribe Orders on 09/01/2020  IgA                       Value: 191(mg/dL)         Dt: 09/01/2020  Gliadin IgA               Value: 11(units)          Dt: 09/01/2020  Gliadin IgG               Value: 20(units)*         Dt: 09/01/2020  Tissue Transglut Ab IGG   Value: <2(U/mL)           Dt: 09/01/2020  TISSUE TRANSGLUTAMINASE * Value: 4(U/mL)*           Dt: 09/01/2020  Endomysial IgA            Value: Negative           Dt: 09/01/2020  ------------  Appointment on 08/17/2020  Hemoglobin                Value: 11 8(g/dL)         Dt: 08/17/2020  ------------  Ancillary Orders on 07/24/2020  Protime                   Value: 31 4(seconds)*     Dt: 08/14/2020  INR                       Value: 3 06*              Dt: 08/14/2020  ------------ - 6 moths labs      WBC                       Value: 5 57(Thousand/uL)  Dt: 09/01/2020  RBC                       Value: 4 77(Million/uL)   Dt: 09/01/2020  Hemoglobin                Value: 12 4(g/dL)         Dt: 09/01/2020  Hematocrit                Value: 41 1(%)            Dt: 09/01/2020  MCV                       Value: 86(fL)             Dt: 09/01/2020  MCH                       Value: 26 0(pg)*          Dt: 09/01/2020  MCHC                      Value: 30 2(g/dL)*        Dt: 09/01/2020  RDW                       Value: 21 8(%)*           Dt: 09/01/2020  Platelets                 Value: 222(Thousands/uL)  Dt: 09/01/2020  MPV                       Value: 10 6(fL)           Dt: 09/01/2020  Cholesterol               Value: 224(mg/dL)*        Dt: 09/01/2020  Triglycerides             Value: 301(mg/dL)*        Dt: 09/01/2020  HDL, Direct               Value: 38(mg/dL)*         Dt: 09/01/2020  LDL Calculated            Value: 126(mg/dL)*        Dt: 09/01/2020  Non-HDL-Chol (CHOL-HDL)   Value: 186(mg/dl)         Dt: 09/01/2020  Sodium                    Value: 139(mmol/L)        Dt: 09/01/2020  Potassium                 Value: 4  1(mmol/L)        Dt: 09/01/2020  Chloride                  Value: 103(mmol/L)        Dt: 09/01/2020  CO2                       Value: 30(mmol/L)         Dt: 09/01/2020  ANION GAP                 Value: 6(mmol/L)          Dt: 09/01/2020  BUN                       Value: 13(mg/dL)          Dt: 09/01/2020  Creatinine                Value: 0 65(mg/dL)        Dt: 09/01/2020  Glucose, Fasting          Value: 145(mg/dL)*        Dt: 09/01/2020  Calcium                   Value: 9 2(mg/dL)         Dt: 09/01/2020  AST                       Value: 25(U/L)            Dt: 09/01/2020  ALT                       Value: 29(U/L)            Dt: 09/01/2020  Alkaline Phosphatase      Value: 79(U/L)            Dt: 09/01/2020  Total Protein             Value: 7 8(g/dL)          Dt: 09/01/2020  Albumin                   Value: 4 2(g/dL)          Dt: 09/01/2020  Total Bilirubin           Value: 0 41(mg/dL)        Dt: 09/01/2020  eGFR                      Value: 84(ml/min/1 73sq m) Dt: 09/01/2020  ------------  Transcribe Orders on 09/01/2020  IgA                       Value: 191(mg/dL)         Dt: 09/01/2020  Gliadin IgA               Value: 11(units)          Dt: 09/01/2020  Gliadin IgG               Value: 20(units)*         Dt: 09/01/2020  Tissue Transglut Ab IGG   Value: <2(U/mL)           Dt: 09/01/2020  TISSUE TRANSGLUTAMINASE * Value: 4(U/mL)*           Dt: 09/01/2020  Endomysial IgA            Value: Negative           Dt: 09/01/2020  ------------ - 2 month labs    Procedure: Xr Humerus Left    Result Date: 9/9/2020  Narrative: LEFT HUMERUS INDICATION:   fall, pain  COMPARISON:  None VIEWS:  XR HUMERUS LEFT FINDINGS: There is no acute fracture or dislocation  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 is noted  Diffuse osteopenia is present  There are mild degenerative changes of the acromioclavicular joint present  No lytic or blastic osseous lesion  Soft tissues are unremarkable  Impression: No acute osseous abnormality  Workstation performed: [id number]     Procedure: Xr Hip/pelv 2-3 Vws Left    Result Date: 9/9/2020  Narrative: LEFT HIP INDICATION:   fall, pain  COMPARISON:  11/10/2013 VIEWS:  XR HIP/PELV 2-3 VWS LEFT  W PELVIS IF PERFORMED FINDINGS: There is no acute fracture or dislocation  There are bilateral hip degenerative changes  No lytic or blastic osseous lesion  Soft tissues are unremarkable  A pessary is present  Degenerative changes pubic symphysis and visualized lower lumbar spine  Impression: No acute osseous abnormality  This report is in agreement with the preliminary interpretation  Workstation performed: [id number]     Procedure: Ct Head Without Contrast    Result Date: 9/9/2020  Narrative: CT BRAIN - WITHOUT CONTRAST INDICATION:   Head trauma, minor (Age => 65y)  COMPARISON:  917 TECHNIQUE:  CT examination of the brain was performed  In addition to axial images, sagittal and coronal 2D reformatted images were created and submitted for interpretation  Radiation dose length product (DLP) for this visit:  870 42 mGy-cm   This examination, like all CT scans performed in the [facility], was performed utilizing techniques to minimize radiation dose exposure, including the use of iterative  reconstruction and automated exposure control  IMAGE QUALITY:  Diagnostic  FINDINGS: PARENCHYMA: Decreased attenuation is noted in periventricular and subcortical white matter demonstrating an appearance that is statistically most likely to represent mild microangiopathic change  Chronic lacunar infarction(s) are noted in basal ganglia  No CT signs of acute infarction  No intracranial mass, mass effect or midline shift  No acute parenchymal hemorrhage   VENTRICLES AND EXTRA-AXIAL SPACES:  Small bilateral choroid plexus xanthogranulomas are noted  Ventricles are midline in position and have normal configuration  No extra-axial mass, hemorrhage or fluid collection is identified  VISUALIZED ORBITS AND PARANASAL SINUSES: Bilateral ocular lens implants are present  The visualized paranasal sinuses are free of mucosal disease  CALVARIUM AND EXTRACRANIAL SOFT TISSUES:  Normal      Impression: No acute intracranial abnormality  Workstation performed: [id number]     Procedure: Ct Chest Without Contrast    Result Date: 9/9/2020  Narrative: CT CHEST WITHOUT IV CONTRAST INDICATION:   Rib fracture suspected, traumatic Chest trauma, blunt, low energy  Status post fall  Landed on left side  COMPARISON:  Chest radiograph from 7/2/2019  Chest CT from 11/10/2013  Left humerus radiograph from today  TECHNIQUE: CT examination of the chest was performed without intravenous contrast   Axial, sagittal, and coronal 2D reformatted images were created from the source data and submitted for interpretation  Radiation dose length product (DLP) for this visit:  248 08 mGy-cm   This examination, like all CT scans performed in the [facility], was performed utilizing techniques to minimize radiation dose exposure, including the use of iterative  reconstruction and automated exposure control  FINDINGS: LUNGS:  Mild emphysema  No acute disease  No significant filling defects in the trachea and bronchi  PLEURA:  No hemothorax or pneumothorax  HEART/GREAT VESSELS:  Mild cardiomegaly with left atrial enlargement  Moderate coronary artery calcification indicating atherosclerotic heart disease  MVR  MEDIASTINUM AND LILIBETH:  Unremarkable  CHEST WALL AND LOWER NECK:   Unremarkable  VISUALIZED STRUCTURES IN THE UPPER ABDOMEN:  Unremarkable  OSSEOUS STRUCTURES:  No displaced left rib fracture with no fracture about the left shoulder  Moderate bilateral glenohumeral degenerative disease  Mild chronic superior endplate deformity of L5  Impression: No displaced left rib fracture  No fracture about the left shoulder  No hemothorax or pneumothorax  Mild emphysema  Cardiomegaly with left atrial enlargement, status post MVR  Workstation performed: [id number]          Appointment on 01/31/2020  Protime                   Value: 29  6(seconds)*     Dt: 01/31/2020  INR                       Value: 2 88*              Dt: 01/31/2020  ------------  Appointment on 01/17/2020  Sodium                    Value: 141(mmol/L)        Dt: 01/17/2020  Potassium                 Value: 4 2(mmol/L)        Dt: 01/17/2020  Chloride                  Value: 108(mmol/L)        Dt: 01/17/2020  CO2                       Value: 29(mmol/L)         Dt: 01/17/2020  ANION GAP                 Value:  4(mmol/L)          Dt: 01/17/2020  BUN                       Value: 14(mg/dL)          Dt: 01/17/2020  Creatinine                Value: 0 69(mg/dL)        Dt: 01/17/2020  Glucose, Fasting          Value: 125(mg/dL)*        Dt: 01/17/2020  Calcium                   Value: 9 0(mg/dL)         Dt: 01/17/2020  AST                       Value: 21(U/L)            Dt: 01/17/2020  ALT                       Value: 25(U/L)            Dt: 01/17/2020  Alkaline Phosphatase      Value: 89(U/L)            Dt: 01/17/2020  Total Protein             Value: 7 7(g/dL)          Dt: 01/17/2020  Albumin                   Value: 4 2(g/dL)          Dt: 01/17/2020  Total Bilirubin           Value: 0 42(mg/dL)        Dt: 01/17/2020  eGFR                      Value: 82(ml/min/1 73sq m) Dt: 01/17/2020  Hemoglobin A1C            Value: 6 7(%)*            Dt: 01/17/2020  EAG                       Value: 146(mg/dl)         Dt: 01/17/2020  Cholesterol               Value: 201(mg/dL)*        Dt: 01/17/2020  Triglycerides             Value: 204(mg/dL)*        Dt: 01/17/2020  HDL, Direct               Value: 40(mg/dL)          Dt: 01/17/2020  LDL Calculated            Value: 120(mg/dL)*        Dt: 01/17/2020  Non-HDL-Chol (CHOL-HDL)   Value: 161(mg/dl)         Dt: 01/17/2020  Digoxin Lvl               Value: 0 7(ng/mL)*        Dt: 01/17/2020  WBC                       Value: 5 74(Thousand/uL)  Dt: 01/17/2020  RBC                       Value: 4 51(Million/uL)   Dt: 01/17/2020  Hemoglobin                Value: 10 7(g/dL)*        Dt: 01/17/2020  Hematocrit                Value: 36 8(%)            Dt: 01/17/2020  MCV                       Value: 82(fL)             Dt: 01/17/2020  MCH                       Value: 23 7(pg)*          Dt: 01/17/2020  MCHC                      Value: 29 1(g/dL)*        Dt: 01/17/2020  RDW                       Value: 17 8(%)*           Dt: 01/17/2020  Platelets                 Value: 265(Thousands/uL)  Dt: 01/17/2020  MPV                       Value: 10 9(fL)           Dt: 01/17/2020  ------------  Office Visit on 01/13/2020  RAPID FLU A               Value: negative           Dt: 01/13/2020  RAPID FLU B               Value: negative           Dt: 01/13/2020  Creatinine, Ur            Value: 30  5(mg/dL)        Dt: 01/17/2020  Microalbum  ,U,Random      Value: <5 0(mg/L)         Dt: 01/17/2020  Microalb Creat Ratio      Value: <16(mg/g creatinine) Dt: 01/17/2020  ------------ - 2 month labs  ------------ - 2 weeks        The following portions of the patient's history were reviewed and updated as appropriate: allergies, current medications, past family history, past medical history, past social history, past surgical history and problem list     Review of Systems   Constitutional: Negative for chills, fatigue, fever and unexpected weight change  HENT: Positive for hearing loss  Negative for ear pain, nosebleeds, postnasal drip, sinus pain and sore throat  Eyes: Negative for pain, redness and visual disturbance  Respiratory: Negative for cough and shortness of breath  Cardiovascular: Negative for chest pain, palpitations and leg swelling     Gastrointestinal: Negative for abdominal pain, diarrhea and nausea  Endocrine: Negative for cold intolerance, polydipsia and polyuria  Genitourinary: Negative for dysuria, frequency, hematuria and urgency  Musculoskeletal: Negative for arthralgias, gait problem and myalgias  Skin: Negative for rash  Allergic/Immunologic: Negative  Neurological: Negative for dizziness, tremors, syncope, weakness and headaches  Hematological: Negative for adenopathy  Psychiatric/Behavioral: Negative for behavioral problems, dysphoric mood, self-injury and sleep disturbance  The patient is not nervous/anxious and is not hyperactive            Historical Information   Patient Active Problem List   Diagnosis    Obstructive sleep apnea    Chronic atrial fibrillation ([facility] )    H/O mitral valve replacement with mechanical valve    Long term (current) use of anticoagulants    Presence of prosthetic heart valve    Hypercholesteremia    Anemia due to chronic kidney disease    Allergic rhinitis    Type 2 diabetes mellitus without complication, without long-term current use of insulin (HCC)    Iron deficiency anemia    Other specified hypothyroidism    Vitamin B12 deficiency    Other microscopic hematuria    Presence of pessary    Vaginal bleeding    Celiac disease    Abdominal aortic aneurysm (AAA) without rupture (HCC)     Past Medical History:   Diagnosis Date    Atrial fibrillation ([facility] )     Cancer (HCC)     hx of cervical    Cardiac disease     Hyperlipidemia      Past Surgical History:   Procedure Laterality Date    EYE SURGERY      HYSTERECTOMY      MITRAL VALVE REPLACEMENT       Social History     Substance and Sexual Activity   Alcohol Use Yes    Comment: special occasional     Social History     Substance and Sexual Activity   Drug Use No     Social History     Tobacco Use   Smoking Status Former Smoker    Packs/day: 2 00    Years: 30 00    Pack years: 60 00    Quit date: 46    Years since quittin 0   Smokeless Tobacco Never Used     Family History   Problem Relation Age of Onset    Heart failure Mother     Heart attack Father     Sleep apnea Brother      Health Maintenance Due   Topic    Medicare Annual Wellness Visit (AWV)     Diabetic Foot Exam     DM Eye Exam     Depression Screening PHQ     BMI: Followup Plan     COVID-19 Vaccine (1)    Influenza Vaccine (1)    PT PLAN OF CARE       Meds/Allergies       Current Outpatient Medications:     acetaminophen (TYLENOL) 500 mg tablet, Take 500 mg by mouth, Disp: , Rfl:     Calcium Citrate-Vitamin D (CALCITRATE/VITAMIN D PO), Take by mouth 3 (three) times a week, Disp: , Rfl:     Cholecalciferol (VITAMIN D PO), Take by mouth, Disp: , Rfl:     digoxin (LANOXIN) 0 125 mg tablet, Take 125 mcg by mouth daily, Disp: , Rfl:     digoxin (LANOXIN) 0 25 mg tablet, Take 250 mcg by mouth daily, Disp: , Rfl:     ferrous sulfate 324 (65 Fe) mg, Take 1 tablet (324 mg total) by mouth 2 (two) times a day before meals, Disp: 180 tablet, Rfl: 1    metFORMIN (GLUCOPHAGE) 500 mg tablet, 2 tablets Twice Daily, Disp: 360 tablet, Rfl: 1    Multiple Vitamins-Minerals (PRESERVISION AREDS PO), Take 2 tablets by mouth daily, Disp: , Rfl:     NADOLOL PO, Take 2 5 mg by mouth daily, Disp: , Rfl:     warfarin (COUMADIN) 2 5 mg tablet, Take 2 5 mg by mouth 3 (three) times a week, Disp: , Rfl:     warfarin (COUMADIN) 5 mg tablet, Take 5 mg by mouth 4 (four) times a week, Disp: , Rfl:       Objective:    Vitals:   /76 Comment: irregular  Pulse 83   Temp 97 5 °F (36 4 °C)   Ht 5' 6" (1 676 m)   Wt 75 8 kg (167 lb)   SpO2 97%   BMI 26 95 kg/m²   Body mass index is 26 95 kg/m²  Vitals:    01/08/21 1503   Weight: 75 8 kg (167 lb)       Physical Exam  Vitals signs and nursing note reviewed  Constitutional:       Appearance: She is well-developed  Comments: Short grey hair   HENT:      Head: Normocephalic and atraumatic     Eyes:      Conjunctiva/sclera: Conjunctivae normal    Neck:      Thyroid: No thyromegaly  Vascular: No JVD  Cardiovascular:      Rate and Rhythm: Regular rhythm  Pulses: Pulses are weak  Dorsalis pedis pulses are 2+ on the right side and 2+ on the left side  Posterior tibial pulses are 2+ on the right side and 1+ on the left side  Heart sounds: S1 normal  Murmur present  Systolic murmur present with a grade of 2/6  Comments: S2 elevated  Pulmonary:      Effort: No respiratory distress  Breath sounds: Normal breath sounds  No wheezing or rales  Abdominal:      General: Bowel sounds are normal  There is no distension  Palpations: There is no hepatomegaly, mass or pulsatile mass  Tenderness: There is no abdominal tenderness  There is no rebound  Hernia: There is no hernia in the umbilical area, left inguinal area or right inguinal area  Feet:      Right foot:      Skin integrity: No ulcer, skin breakdown, erythema, warmth, callus or dry skin  Left foot:      Skin integrity: No ulcer, skin breakdown, erythema, warmth, callus or dry skin  Lymphadenopathy:      Cervical: No cervical adenopathy  Skin:     General: Skin is warm  Findings: No rash  Neurological:      General: No focal deficit present  Mental Status: Mental status is at baseline  Psychiatric:         Mood and Affect: Mood normal          Behavior: Behavior normal          Thought Content: Thought content normal          Judgment: Judgment normal        Diabetic Foot Exam    Patient's shoes and socks removed  Right Foot/Ankle   Right Foot Inspection  Skin Exam: no dry skin, no warmth, no callus, no erythema, no maceration, no ulcer and no callus                          Toe Exam: right toe deformity  Sensory   Vibration: diminished  Proprioception: diminished   Monofilament testing: diminished  Vascular    The right DP pulse is 2+  The right PT pulse is 2+       Left Foot/Ankle  Left Foot Inspection  Skin Exam: no dry skin, no warmth, no erythema, no maceration, no ulcer and no callus                         Toe Exam: left toe deformity                   Sensory   Vibration: diminished  Proprioception: diminished  Monofilament: diminished  Vascular    The left DP pulse is 2+  The left PT pulse is 1+  Assign Risk Category:  Deformity present; Loss of protective sensation; Weak pulses       Risk: 2    Lab Review   Ancillary Orders on 11/13/2020   Component Date Value Ref Range Status    Protime 01/05/2021 38 9* 11 6 - 14 5 seconds Final    INR 01/05/2021 4 04* 0 84 - 1 19 Final         Patient Instructions   Follow with Consultants as per their and our suggestion    Follow up in 4  week(s) or as needed earlier    Follow all instructions as advised and discussed  Take your medications as prescribed  Call the office immediately if you experience any side effects  Ask questions if you do not understand  Keep your scheduled appointment as advised or come sooner if necessary or in doubt  Best time to call for non-urgent matter or questions on weekdays is between 9am and 12 noon  See physician for any new symptoms or worsening of current symptoms  Urgent or emergent situations call 911 and report to nearest emergency room  I spent  25 minutes taking care of this patient including clinical care, conseling, collaboration, chart, lab and consultaion review  Patient is to get labs 1 week(s) prior to next visit if advised      Hypertension( High Blood Pressure ):    Continue Home BP check daily and bring log, if you are not checking consider checking daily    Take your Blood Pressure medicine as advised    Do not take your Blood pressure medicine if systolic Blood Pressure (top number) is less than 100 or heart rate less than 60  Notify you physician  Diabetes    Check your fingerstick Accu-Chek once a day      Please check your fingerstick Accu-Chek different time of the day either at 7:00 a m  or 11:00 a m  for for p m  4 9:00 p  m     Follow Diabetic 1800 calorie diet for diabetes as advised  If you would sugar less than 80 or more than 300 to please call us on next visit is day  If the sugar is less than 80 follow-up hypoglycemia instructions as advised  Take your diabetic medicine as advised  Cholesterol    Eat low cholesterol diet    Continue taking your cholesterol medicine as advised    Call if any side effects    Lipid Profile and LFT prior to next visit or as advised  ( You should Get periodically to monitor liver side effects)    KNOW YOUR DIABETIC GOAL( HBA1C AND SUGAR), BLOOD PRESSURE TARGET NUMBER AND CHOLESTEROL( LDL, HDL AND TRIGLYCERIDE)   Dr Sade Castrejon MD  [facility]       "This note has been constructed using a voice recognition system  Therefore there may be syntax, spelling, and/or grammatical errors   Please call if you have any questions  "

## 2021-01-08 NOTE — PATIENT INSTRUCTIONS
Follow with Consultants as per their and our suggestion    Follow up in 4  week(s) or as needed earlier    Follow all instructions as advised and discussed  Take your medications as prescribed  Call the office immediately if you experience any side effects  Ask questions if you do not understand  Keep your scheduled appointment as advised or come sooner if necessary or in doubt  Best time to call for non-urgent matter or questions on weekdays is between 9am and 12 noon  See physician for any new symptoms or worsening of current symptoms  Urgent or emergent situations call 911 and report to nearest emergency room  I spent  25 minutes taking care of this patient including clinical care, conseling, collaboration, chart, lab and consultaion review  Patient is to get labs 1 week(s) prior to next visit if advised      Hypertension( High Blood Pressure ):    Continue Home BP check daily and bring log, if you are not checking consider checking daily    Take your Blood Pressure medicine as advised    Do not take your Blood pressure medicine if systolic Blood Pressure (top number) is less than 100 or heart rate less than 60  Notify you physician  Diabetes    Check your fingerstick Accu-Chek once a day  Please check your fingerstick Accu-Chek different time of the day either at 7:00 a m  or 11:00 a m  for for p m  4 9:00 p m  Jesusita Liu Follow Diabetic 1800 calorie diet for diabetes as advised  If you would sugar less than 80 or more than 300 to please call us on next visit is day  If the sugar is less than 80 follow-up hypoglycemia instructions as advised  Take your diabetic medicine as advised  Cholesterol    Eat low cholesterol diet    Continue taking your cholesterol medicine as advised    Call if any side effects    Lipid Profile and LFT prior to next visit or as advised  ( You should Get periodically to monitor liver side effects)    KNOW YOUR DIABETIC GOAL( HBA1C AND SUGAR), BLOOD PRESSURE TARGET NUMBER AND CHOLESTEROL( LDL, HDL AND TRIGLYCERIDE)

## 2021-01-09 NOTE — ASSESSMENT & PLAN NOTE
Cholesterol    Eat low cholesterol diet    Continue taking your cholesterol medicine as advised    Call if any side effects    Lipid Profile and LFT prior to next visit or as advised  ( You should Get periodically to monitor liver side effects)    KNOW YOUR DIABETIC GOAL( HBA1C AND SUGAR), BLOOD PRESSURE TARGET NUMBER AND CHOLESTEROL( LDL, HDL AND TRIGLYCERIDE)

## 2021-01-09 NOTE — ASSESSMENT & PLAN NOTE
Diabetes    Check your fingerstick Accu-Chek once a day  Please check your fingerstick Accu-Chek different time of the day either at 7:00 a m  or 11:00 a m  for for p m  4 9:00 p m  Elicia Given Follow Diabetic diet for diabetes as advised  If you would sugar less than 80 or more than 300 to please call us on next visit is day  If the sugar is less than 80 follow-up hypoglycemia instructions as advised  Take your diabetic medicine as advised        Continue meformin      Lab Results   Component Value Date    HGBA1C 6 2 (H) 10/22/2020

## 2021-01-09 NOTE — ASSESSMENT & PLAN NOTE
Allergic rhinitis fair control of symptoms    Recommend : 1  Avoidance of allergan that gives you allergic symptom  2  Saline nasal spray three times a day as needed  3   Nasal Dalton and/or Oral Medicine for allergy as advised or prescribed as appropriate

## 2021-01-12 ENCOUNTER — OFFICE VISIT (OUTPATIENT)
Dept: PHYSICAL THERAPY | Facility: CLINIC | Age: 81
End: 2021-01-12
Payer: MEDICARE

## 2021-01-12 DIAGNOSIS — R26.89 BALANCE DISORDER: Primary | ICD-10-CM

## 2021-01-12 DIAGNOSIS — R26.89 OTHER ABNORMALITIES OF GAIT AND MOBILITY: ICD-10-CM

## 2021-01-12 PROCEDURE — 97112 NEUROMUSCULAR REEDUCATION: CPT

## 2021-01-12 NOTE — PROGRESS NOTES
Daily Note     Today's date: 2021  Patient name: Sonja Armenta  : 1940  MRN: [id number]  Referring provider: Dilan Buenrostro MD  Dx:   Encounter Diagnosis     ICD-10-CM    1  Balance disorder  R26 89    2  Other abnormalities of gait and mobility  R26 89                   Subjective: pt with no new complaints on arrival, STS bothered her knee during session today        Objective: See treatment diary below      Assessment: Tolerated treatment well  Patient would benefit from continued PT Pt with deficits in SL balance with uneven surfaces, L knee bothersome during [employer] activities  Plan: Continue per plan of care         Precautions:   Past Medical History:   Diagnosis Date    Atrial fibrillation ([facility] [address] )     Cancer (HCC)     hx of cervical    Cardiac disease     Hyperlipidemia      NMR:  - STS on foam w/ 5# DB: 20 reps   -Standing hip flexion: 20 reps, 3 sec holds, 2 UE (to be performed at stable surface, holding on, with a chair behind her)  - Standing hip abduction: 20 reps, 3 sec holds, 2 UE (to be performed at stable surface, holding on, with a chair behind her)  - Standing hip extension: 20 reps, 3 sec holds, 2 UE (to be performed at stable surface, holding on, with a chair behind her)  - Heel raises: 30 reps, 2 UE (to be performed at stable surface, holding on, with a chair behind her)   - Step taps on BOSU: fwd/lat, 20 reps B/L, 0 UE  - Step ups on BOSU w/ 3 sec holds at top w/ 0 UE: 20 reps fwd

## 2021-01-14 ENCOUNTER — LAB (OUTPATIENT)
Dept: LAB | Facility: CLINIC | Age: 81
End: 2021-01-14
Payer: MEDICARE

## 2021-01-14 ENCOUNTER — OFFICE VISIT (OUTPATIENT)
Dept: PHYSICAL THERAPY | Facility: CLINIC | Age: 81
End: 2021-01-14
Payer: MEDICARE

## 2021-01-14 DIAGNOSIS — R26.89 OTHER ABNORMALITIES OF GAIT AND MOBILITY: ICD-10-CM

## 2021-01-14 DIAGNOSIS — R26.89 BALANCE DISORDER: Primary | ICD-10-CM

## 2021-01-14 PROCEDURE — 97112 NEUROMUSCULAR REEDUCATION: CPT

## 2021-01-14 PROCEDURE — 97530 THERAPEUTIC ACTIVITIES: CPT

## 2021-01-14 NOTE — PROGRESS NOTES
Daily Note  PN: 2021 (POC: 2021)     Today's date: 2021  Patient name: Radha Armstrong  : 1940  MRN: [id number]  Referring provider: Ignacia Cartagena MD  Dx:   Encounter Diagnosis     ICD-10-CM    1  Balance disorder  R26 89    2  Other abnormalities of gait and mobility  R26 89        Start Time: 930  Stop Time: 1015  Total time in clinic (min): 45 minutes    Subjective: Pt notes feeling stronger since starting therapy feels more confident with balance  Objective: See treatment diary below    TA:  - FGA:   - 6MWT: 1010 ft    NMR:  - STS w/ foam under feet and EC: 2 sets, 5 reps  - FTEO on foam w/ head turns: 15 reps B/L, PTCS  - Sidestepping on foam w/ cone taps: 2 cycles, 10 ft down/back, PT CS/CG        Assessment: Patient has continued to make steady progress and reassessed FGA today with improvements noted  Discussed with patient to continue with PT for another 2 weeks to continue to focus on dynamic stability and she was in good verbal agreement  Significant difficulty with head turns and balance on foam requiring PT verbal and tactile cues  Patient will benefit from skilled PT for 2 more weeks to achieve goals  Plan: Continue per plan of care         Short Term Goals (4 weeks):    - Patient will be independent in basic HEP 2-3 weeks - MET  - Patient will improve 5xSTS score by 2 3 seconds from 17 98 seconds to 15 68 seconds to promote improved LE functional strength needed for ADLs - MET  - Patient will be able to perform 30 seconds for FTEC on firm in order to improve her overall safety - NOT MET    Long Term Goals (8 weeks):  - Patient will be independent in a comprehensive home exercise program  - Patient will improve gait speed by 0 59 ft/sec from 3 59 ft/sec to 4 18 ft/sec to improve safety with community ambulation MET  - Patient will improve scoring on FGA by 4 points from 16/30 to 20/30 to progress safety with dynamic tasks - NOT MET  - Patient will be able to demonstrate HT in gait without veering - NOT MET  - Patient will improve 6 Minute Walk Test score by 190 feet to promote improved cardiovascular endurance  - Patient will report 50% reduction in near falls in order to improve safety with functional tasks and reduce his risk for falls  - Patient will report going on walks at least 3 days per week to promote independence and improved cardiovascular endurance  - Patient will be able to ascend/descend stairs reciprocally without UE assist to promote independence and safety with ADLs MET  - Patient will report 50% reduction in near falls when ambulating on uneven terrain    Precautions:   Past Medical History:   Diagnosis Date    Atrial fibrillation (HCC)     Cancer (HCC)     hx of cervical    Cardiac disease     Hyperlipidemia            Outcome Measures IE 11-2-2020 PN  12-1-2020 PN:  1/05/2021 1-   5xSTS 17 98 sec 10 82 sec 9 99 sec     TUG 11 0 sec 8 95 sec 7 77 sec     10 meter 3 59 ft/sec  1 08 m/s 3 59 ft/sec  1 08 m/s 1 27 m/sec    FGA 16/30 16/30 19/30   6MWT Next Session 1050 ft  1010 ft   mCTSIB  - FTEO (firm)  - FTEC (firm)  - FTEO (foam)  - FTEC (foam)   30 sec  10 sec  30 sec  3 sec   30 sec  10 sec  30 sec  3 sec   30 sec   30 sec  30 sec   11 sec

## 2021-01-19 ENCOUNTER — OFFICE VISIT (OUTPATIENT)
Dept: PHYSICAL THERAPY | Facility: CLINIC | Age: 81
End: 2021-01-19
Payer: MEDICARE

## 2021-01-19 DIAGNOSIS — R26.89 OTHER ABNORMALITIES OF GAIT AND MOBILITY: ICD-10-CM

## 2021-01-19 DIAGNOSIS — R26.89 BALANCE DISORDER: Primary | ICD-10-CM

## 2021-01-19 PROCEDURE — 97112 NEUROMUSCULAR REEDUCATION: CPT

## 2021-01-19 NOTE — PROGRESS NOTES
Daily Note  PN: 2021 (POC: 2021)     Today's date: 2021  Patient name: Donn Godinez  : 1940  MRN: [id number]  Referring provider: Juan Ramon Ayala MD  Dx:   Encounter Diagnosis     ICD-10-CM    1  Balance disorder  R26 89    2  Other abnormalities of gait and mobility  R26 89                   Subjective: Pt notes feeling stronger since starting therapy feels more confident with balance  She arrives 8 minutes late today  Objective: See treatment diary below      NMR:  - STS w/ foam under feet and EC: 10 reps  - FTEO on foam w/ head turns: 20 reps B/L, PTCS  - Sidestepping on foam w/ cone taps: 3 cycles, 10 ft down/back, PT CS/CG  - Step taps from foam: 8", 20 reps B/L, 0 UE, PT CS  - Bowling w/ 8# med ball on foam: 10 reps  - Bowling w/ walk up w/ 8# med ball: 2 reps        Assessment: Patient able to tolerate treatment session well today with addition of bowling functional task with most balance challenge when stopping following bowing walk up  Improvements noted in stability during head turns on foam and sidestepping on foam beam today with increased use of hip strategy, however LoB as she fatigued requiring UE use/PT Sherif  Patient will benefit from skilled PT for 2 more weeks to achieve goals  Plan: Continue per plan of care    FTEC,          Precautions:   Past Medical History:   Diagnosis Date    At[facility] fibrillation ([facility] [address] )     Cancer ([facility] [address] )     hx of cervical    Cardiac disease     Hyperlipidemia            Outcome Measures IE 2020 PN  2020 PN:  2021   5xSTS 17 98 sec 10 82 sec 9 99 sec     TUG 11 0 sec 8 95 sec 7 77 sec     10 meter 3 59 ft/sec  1 08 m/s 3 59 ft/sec  1 08 m/s 1 27 m/sec    FGA    6MWT Next Session 1050 ft  1010 ft   mCTSIB  - FTEO (firm)  - FTEC (firm)  - FTEO (foam)  - FTEC (foam)   30 sec  10 sec  30 sec  3 sec   30 sec  10 sec  30 sec  3 sec   30 sec   30 sec  30 sec   11 sec

## 2021-01-20 ENCOUNTER — OFFICE VISIT (OUTPATIENT)
Dept: PHYSICAL THERAPY | Facility: CLINIC | Age: 81
End: 2021-01-20
Payer: MEDICARE

## 2021-01-20 DIAGNOSIS — R26.89 OTHER ABNORMALITIES OF GAIT AND MOBILITY: ICD-10-CM

## 2021-01-20 DIAGNOSIS — R26.89 BALANCE DISORDER: Primary | ICD-10-CM

## 2021-01-20 PROCEDURE — 97112 NEUROMUSCULAR REEDUCATION: CPT | Performed by: PHYSICAL THERAPIST

## 2021-01-20 NOTE — PROGRESS NOTES
Daily Note  PN: 2021 (POC: 2021)     Today's date: 2021  Patient name: Kaylee White  : 1940  MRN: [id number]  Referring provider: Vito Rubio MD  Dx:   Encounter Diagnosis     ICD-10-CM    1  Balance disorder  R26 89    2  Other abnormalities of gait and mobility  R26 89                   Subjective: Pt notes feeling stronger since starting therapy feels more confident with balance  She arrives 8 minutes late today  Objective: See treatment diary below      NMR:  - STS w/ foam under feet and EC: 15 reps with 10lb wt   - High knee march with alt knee tap, 50 feet down and back 2 times   - tandem walking 15 feet down and back 3 cycles   - side stepping foam beam, 3 over and back 4 times   - Step taps from foam: 8", 20 reps B/L, 0 UE, PT CS  - Bowling w/ walk up w/ 8# med ball: 2 reps  - braiding 15 feet x 4 cycles over and back  Assessment: Requires Min A for loss of balance on foam pads  Challenged with narrow base of support and sequencing with braiding activity  Patient will benefit from skilled PT for 2 more weeks to achieve goals  Plan: Continue per plan of care    FTEC,          Precautions:   Past Medical History:   Diagnosis Date    Atrial fibrillation ([facility] [address] )     Cancer ([facility] [address] )     hx of cervical    Cardiac disease     Hyperlipidemia            Outcome Measures IE 2020 PN  2020 PN:  2021   5xSTS 17 98 sec 10 82 sec 9 99 sec     TUG 11 0 sec 8 95 sec 7 77 sec     10 meter 3 59 ft/sec  1 08 m/s 3 59 ft/sec  1 08 m/s 1 27 m/sec    FGA /30   6MWT Next Session 1050 ft  1010 ft   mCTSIB  - FTEO (firm)  - FTEC (firm)  - FTEO (foam)  - FTEC (foam)   30 sec  10 sec  30 sec  3 sec   30 sec  10 sec  30 sec  3 sec   30 sec   30 sec  30 sec   11 sec

## 2021-01-21 ENCOUNTER — APPOINTMENT (OUTPATIENT)
Dept: PHYSICAL THERAPY | Facility: CLINIC | Age: 81
End: 2021-01-21
Payer: MEDICARE

## 2021-01-21 ENCOUNTER — APPOINTMENT (OUTPATIENT)
Dept: LAB | Facility: CLINIC | Age: 81
End: 2021-01-21
Payer: MEDICARE

## 2021-01-21 DIAGNOSIS — E78.00 HYPERCHOLESTEREMIA: ICD-10-CM

## 2021-01-21 DIAGNOSIS — Z79.01 LONG TERM (CURRENT) USE OF ANTICOAGULANTS: ICD-10-CM

## 2021-01-21 DIAGNOSIS — D63.1 ANEMIA DUE TO STAGE 3 CHRONIC KIDNEY DISEASE, UNSPECIFIED WHETHER STAGE 3A OR 3B CKD (HCC): ICD-10-CM

## 2021-01-21 DIAGNOSIS — N18.30 ANEMIA DUE TO STAGE 3 CHRONIC KIDNEY DISEASE, UNSPECIFIED WHETHER STAGE 3A OR 3B CKD (HCC): ICD-10-CM

## 2021-01-21 DIAGNOSIS — E11.9 TYPE 2 DIABETES MELLITUS WITHOUT COMPLICATION, WITHOUT LONG-TERM CURRENT USE OF INSULIN (HCC): ICD-10-CM

## 2021-01-21 DIAGNOSIS — I48.20 CHRONIC ATRIAL FIBRILLATION (HCC): ICD-10-CM

## 2021-01-21 DIAGNOSIS — D50.0 IRON DEFICIENCY ANEMIA DUE TO CHRONIC BLOOD LOSS: ICD-10-CM

## 2021-01-21 LAB
ALBUMIN SERPL BCP-MCNC: 4.3 G/DL (ref 3.5–5)
ALP SERPL-CCNC: 81 U/L (ref 46–116)
ALT SERPL W P-5'-P-CCNC: 29 U/L (ref 12–78)
ANION GAP SERPL CALCULATED.3IONS-SCNC: 3 MMOL/L (ref 4–13)
AST SERPL W P-5'-P-CCNC: 23 U/L (ref 5–45)
BILIRUB SERPL-MCNC: 0.57 MG/DL (ref 0.2–1)
BUN SERPL-MCNC: 13 MG/DL (ref 5–25)
CALCIUM SERPL-MCNC: 9.3 MG/DL (ref 8.3–10.1)
CHLORIDE SERPL-SCNC: 106 MMOL/L (ref 100–108)
CHOLEST SERPL-MCNC: 216 MG/DL (ref 50–200)
CO2 SERPL-SCNC: 31 MMOL/L (ref 21–32)
CREAT SERPL-MCNC: 0.66 MG/DL (ref 0.6–1.3)
CREAT UR-MCNC: 55.3 MG/DL
ERYTHROCYTE [DISTWIDTH] IN BLOOD BY AUTOMATED COUNT: 14.9 % (ref 11.6–15.1)
EST. AVERAGE GLUCOSE BLD GHB EST-MCNC: 134 MG/DL
FERRITIN SERPL-MCNC: 17 NG/ML (ref 8–388)
GFR SERPL CREATININE-BSD FRML MDRD: 83 ML/MIN/1.73SQ M
GLUCOSE P FAST SERPL-MCNC: 130 MG/DL (ref 65–99)
HBA1C MFR BLD: 6.3 %
HCT VFR BLD AUTO: 42.6 % (ref 34.8–46.1)
HDLC SERPL-MCNC: 38 MG/DL
HGB BLD-MCNC: 13 G/DL (ref 11.5–15.4)
LDLC SERPL CALC-MCNC: 127 MG/DL (ref 0–100)
MCH RBC QN AUTO: 29.7 PG (ref 26.8–34.3)
MCHC RBC AUTO-ENTMCNC: 30.5 G/DL (ref 31.4–37.4)
MCV RBC AUTO: 98 FL (ref 82–98)
MICROALBUMIN UR-MCNC: 39.6 MG/L (ref 0–20)
MICROALBUMIN/CREAT 24H UR: 72 MG/G CREATININE (ref 0–30)
NONHDLC SERPL-MCNC: 178 MG/DL
PLATELET # BLD AUTO: 252 THOUSANDS/UL (ref 149–390)
PMV BLD AUTO: 11.3 FL (ref 8.9–12.7)
POTASSIUM SERPL-SCNC: 4.4 MMOL/L (ref 3.5–5.3)
PROT SERPL-MCNC: 7.9 G/DL (ref 6.4–8.2)
RBC # BLD AUTO: 4.37 MILLION/UL (ref 3.81–5.12)
SODIUM SERPL-SCNC: 140 MMOL/L (ref 136–145)
TRIGL SERPL-MCNC: 255 MG/DL
WBC # BLD AUTO: 5.96 THOUSAND/UL (ref 4.31–10.16)

## 2021-01-21 PROCEDURE — 82728 ASSAY OF FERRITIN: CPT

## 2021-01-21 PROCEDURE — 80061 LIPID PANEL: CPT

## 2021-01-21 PROCEDURE — 83036 HEMOGLOBIN GLYCOSYLATED A1C: CPT

## 2021-01-21 PROCEDURE — 82043 UR ALBUMIN QUANTITATIVE: CPT | Performed by: INTERNAL MEDICINE

## 2021-01-21 PROCEDURE — 80053 COMPREHEN METABOLIC PANEL: CPT

## 2021-01-21 PROCEDURE — 82570 ASSAY OF URINE CREATININE: CPT | Performed by: INTERNAL MEDICINE

## 2021-01-21 PROCEDURE — 36415 COLL VENOUS BLD VENIPUNCTURE: CPT

## 2021-01-21 PROCEDURE — 85027 COMPLETE CBC AUTOMATED: CPT

## 2021-01-22 ENCOUNTER — APPOINTMENT (OUTPATIENT)
Dept: PHYSICAL THERAPY | Facility: CLINIC | Age: 81
End: 2021-01-22
Payer: MEDICARE

## 2021-01-22 LAB
LEFT EYE DIABETIC RETINOPATHY: NORMAL
RIGHT EYE DIABETIC RETINOPATHY: NORMAL

## 2021-01-26 ENCOUNTER — OFFICE VISIT (OUTPATIENT)
Dept: PHYSICAL THERAPY | Facility: CLINIC | Age: 81
End: 2021-01-26
Payer: MEDICARE

## 2021-01-26 DIAGNOSIS — R26.89 BALANCE DISORDER: ICD-10-CM

## 2021-01-26 DIAGNOSIS — R26.89 OTHER ABNORMALITIES OF GAIT AND MOBILITY: Primary | ICD-10-CM

## 2021-01-26 PROCEDURE — 97112 NEUROMUSCULAR REEDUCATION: CPT | Performed by: PHYSICAL THERAPIST

## 2021-01-26 NOTE — PROGRESS NOTES
Daily Note  PN: 2021 (POC: 2021)     Today's date: 2021  Patient name: Neal Sams  : 1940  MRN: [id number]  Referring provider: Reed Rajan MD  Dx:   Encounter Diagnosis     ICD-10-CM    1  Other abnormalities of gait and mobility  R26 89    2  Balance disorder  R26 89        Start Time: 1018  Stop Time: 1100  Total time in clinic (min): 42 minutes    Subjective: Client reports her balance feels a "little off" due to the weather today  Objective: See treatment diary below      NMR:  - STS w/ foam under feet and EC: 15 reps with 10lb wt   - High knee march with alt knee tap, 50 feet down and back 2 times   - tandem walking 15 feet down and back 3 cycles   - side stepping foam beam, 3 over and back 4 times   - Step taps from foam: 8", 20 reps B/L, 0 UE, PT CS  - 180 degree turns  - braiding 15 feet x 4 cycles over and back  Assessment: Client with good participation throughout session  Initiated 180 degree turns on blue airex today with good tolerance  She is on target for D/C next session        Plan: D/C next session           Precautions:   Past Medical History:   Diagnosis Date    Atrial fibrillation ([facility] [address] )     Cancer ([facility] [address] )     hx of cervical    Cardiac disease     Hyperlipidemia            Outcome Measures IE 2020 PN  2020 PN:  2021   5xSTS 17 98 sec 10 82 sec 9 99 sec     TUG 11 0 sec 8 95 sec 7 77 sec     10 meter 3 59 ft/sec  1 08 m/s 3 59 ft/sec  1 08 m/s 1 27 m/sec    FGA   19/30   6MWT Next Session 1050 ft  1010 ft   mCTSIB  - FTEO (firm)  - FTEC (firm)  - FTEO (foam)  - FTEC (foam)   30 sec  10 sec  30 sec  3 sec   30 sec  10 sec  30 sec  3 sec   30 sec   30 sec  30 sec   11 sec

## 2021-01-28 ENCOUNTER — OFFICE VISIT (OUTPATIENT)
Dept: PHYSICAL THERAPY | Facility: CLINIC | Age: 81
End: 2021-01-28
Payer: MEDICARE

## 2021-01-28 DIAGNOSIS — R26.89 BALANCE DISORDER: Primary | ICD-10-CM

## 2021-01-28 DIAGNOSIS — R26.89 OTHER ABNORMALITIES OF GAIT AND MOBILITY: ICD-10-CM

## 2021-01-28 PROCEDURE — 97530 THERAPEUTIC ACTIVITIES: CPT | Performed by: PHYSICAL THERAPIST

## 2021-01-28 PROCEDURE — 97112 NEUROMUSCULAR REEDUCATION: CPT | Performed by: PHYSICAL THERAPIST

## 2021-01-28 NOTE — PROGRESS NOTES
Discharge Note      Today's date: 2021  Patient name: Julianna Luna  : 1940  MRN: [id number]  Referring provider: Sondra Olson MD  Dx:   Encounter Diagnosis     ICD-10-CM    1  Balance disorder  R26 89    2  Other abnormalities of gait and mobility  R26 89        Start Time: 1021  Stop Time: 1100  Total time in clinic (min): 39 minutes    Subjective: Client reports feeling like she made great progress  Objective: See treatment diary below    NMR:  - this PT provided and reviewed comprehensive HEP for her balance  This PT reviewed safety strategies to complete while standing in a corner of a room in order to maximize her safety  She verbalized good understanding  Assessment: Patient had made excellent progress with skilled therapy since starting  She's met  of her personalized STG/LTG  She demonstrated good understanding of her comprehensive HEP and all questions addressed and reviewed  Client denies any other functional limitations at this time  She verbalized having "improved awareness to balance recovery" since starting therapy  She was educated on [employer] if she ever begins to feel unsteady or worsening balance  Client and this PT in agreement for d/c today due to meeting goals        Plan: d/c to HEP      Short Term Goals (4 weeks):    - Patient will be independent in basic HEP 2-3 weeks - MET  - Patient will improve 5xSTS score by 2 3 seconds from 17 98 seconds to 15 68 seconds to promote improved LE functional strength needed for ADLs - MET  - Patient will be able to perform 30 seconds for FTEC on firm in order to improve her overall safety - NOT MET    Long Term Goals (8 weeks):  - Patient will be independent in a comprehensive home exercise program MET  - Patient will improve gait speed by 0 59 ft/sec from 3 59 ft/sec to 4 18 ft/sec to improve safety with community ambulation MET  - Patient will improve scoring on FGA by 4 points from 16/30 to 20/30 to progress safety with dynamic tasks MET  - Patient will be able to demonstrate HT in gait without veering MET  - Patient will improve 6 Minute Walk Test score by 190 feet to promote improved cardiovascular endurance NOT MET  - Patient will report 50% reduction in near falls in order to improve safety with functional tasks and reduce his risk for falls MET  - Patient will report going on walks at least 3 days per week to promote independence and improved cardiovascular endurance NOT MET (due to weather, not endurance)  - Patient will be able to ascend/descend stairs reciprocally without UE assist to promote independence and safety with ADLs MET  - Patient will report 50% reduction in near falls when ambulating on uneven terrain MET    Precautions:   Past Medical History:   Diagnosis Date    Atrial fibrillation (HCC)     Cancer (HCC)     hx of cervical    Cardiac disease     Hyperlipidemia            Outcome Measures IE 11-2-2020 PN  12-1-2020 PN:  1/05/2021 D/C   5xSTS 17 98 sec 10 82 sec 9 99 sec     TUG 11 0 sec 8 95 sec 7 77 sec     10 meter 3 59 ft/sec  1 08 m/s 3 59 ft/sec  1 08 m/s 1 27 m/sec    FGA 16/30 16/30 23/30    6MWT Next Session 1050 ft 1075 feet    mCTSIB  - FTEO (firm)  - FTEC (firm)  - FTEO (foam)  - FTEC (foam)   30 sec  10 sec  30 sec  3 sec   30 sec  10 sec  30 sec  3 sec   30 sec   30 sec  30 sec   11 sec

## 2021-02-09 ENCOUNTER — OFFICE VISIT (OUTPATIENT)
Dept: INTERNAL MEDICINE CLINIC | Facility: CLINIC | Age: 81
End: 2021-02-09
Payer: MEDICARE

## 2021-02-09 ENCOUNTER — TELEPHONE (OUTPATIENT)
Dept: ADMINISTRATIVE | Facility: OTHER | Age: 81
End: 2021-02-09

## 2021-02-09 VITALS
DIASTOLIC BLOOD PRESSURE: 80 MMHG | SYSTOLIC BLOOD PRESSURE: 142 MMHG | OXYGEN SATURATION: 97 % | BODY MASS INDEX: 26.68 KG/M2 | HEIGHT: 66 IN | TEMPERATURE: 96.7 F | WEIGHT: 166 LBS | HEART RATE: 70 BPM

## 2021-02-09 DIAGNOSIS — Z79.01 LONG TERM (CURRENT) USE OF ANTICOAGULANTS: ICD-10-CM

## 2021-02-09 DIAGNOSIS — I71.4 ABDOMINAL AORTIC ANEURYSM (AAA) WITHOUT RUPTURE (HCC): ICD-10-CM

## 2021-02-09 DIAGNOSIS — I48.20 CHRONIC ATRIAL FIBRILLATION (HCC): ICD-10-CM

## 2021-02-09 DIAGNOSIS — E11.9 TYPE 2 DIABETES MELLITUS WITHOUT COMPLICATION, WITHOUT LONG-TERM CURRENT USE OF INSULIN (HCC): ICD-10-CM

## 2021-02-09 DIAGNOSIS — R51.9 ACUTE NONINTRACTABLE HEADACHE, UNSPECIFIED HEADACHE TYPE: Primary | ICD-10-CM

## 2021-02-09 DIAGNOSIS — E78.00 HYPERCHOLESTEREMIA: ICD-10-CM

## 2021-02-09 DIAGNOSIS — R27.0 ATAXIA: ICD-10-CM

## 2021-02-09 DIAGNOSIS — J30.1 ALLERGIC RHINITIS DUE TO POLLEN, UNSPECIFIED SEASONALITY: ICD-10-CM

## 2021-02-09 DIAGNOSIS — Z95.2 PRESENCE OF PROSTHETIC HEART VALVE: ICD-10-CM

## 2021-02-09 DIAGNOSIS — E03.8 OTHER SPECIFIED HYPOTHYROIDISM: ICD-10-CM

## 2021-02-09 DIAGNOSIS — R31.29 OTHER MICROSCOPIC HEMATURIA: ICD-10-CM

## 2021-02-09 DIAGNOSIS — D50.0 IRON DEFICIENCY ANEMIA DUE TO CHRONIC BLOOD LOSS: ICD-10-CM

## 2021-02-09 PROBLEM — N93.9 VAGINAL BLEEDING: Status: RESOLVED | Noted: 2020-08-03 | Resolved: 2021-02-09

## 2021-02-09 PROCEDURE — 99214 OFFICE O/P EST MOD 30 MIN: CPT | Performed by: INTERNAL MEDICINE

## 2021-02-09 NOTE — TELEPHONE ENCOUNTER
Upon review of the In Basket request and the patient's chart, initial outreach has been made via fax, please see Contacts section for details       Thank you  Cee Crane MA

## 2021-02-09 NOTE — PROGRESS NOTES
[facility] Office Visit Note  21     Jazmín Boyle 80 y o  female MRN: [id number]  : 1940    Assessment:     No problem-specific Assessment & Plan notes found for this encounter  Diagnoses and all orders for this visit:    Acute nonintractable headache, unspecified headache type    Ataxia    Type 2 diabetes mellitus without complication, without long-term current use of insulin (HCC)    Other specified hypothyroidism    Allergic rhinitis due to pollen, unspecified seasonality    Chronic atrial fibrillation (HCC)    Presence of prosthetic heart valve    Iron deficiency anemia due to chronic blood loss    Hypercholesteremia    Long term (current) use of anticoagulants    Abdominal aortic aneurysm (AAA) without rupture ([facility] [address] )    Other microscopic hematuria          Discussion Summary and Plan: Today's care plan and medications were reviewed with patient in detail and all their questions answered to their satisfaction  Patient has a mild nonspecific headache for 1 day on top of the head without any focal neurological finding  Recommend CT scan of the brain see wants to wait she will try Tylenol if she is no better or worse he will go to the emergency room  Of patient remains on chronic anticoagulation  Patient has a wall replacement mitral wall with mechanical valve in the past     Hypertension fair control  Atrial fibrillation fair controlled  Anticoagulation per cardiologist   Anemia better  Diabetes is well controlled  Up-to-date with eye examination  Urine for microalbumin 72  Patient does have a pessary no vaginal bleeding  Celiac disease stable  Anemia fair  LDL high patient does not want to take statin  Of    Of see does have a chronic off balance feeling a patient [facility] and was seen by ENT physician no significant ENT symptoms  Recommend to use cane fall precautions reviewed  Will recheck back in 3 days      Chief Complaint   Patient presents with   Christina Jackson Headache    Gait Problem    Hypertension    Hyperlipidemia    Diabetes    Abnormal Lab    Follow-up     anemia  , sp/valve replacement, a  Fib, chronic anticoag      Subjective:  Patient is here for follow-up of multiple medical problems  Patient also has a new complain as outlined underneath  Patient complains of the headache mild on top of the head started yesterday  Patient also had is feeling off balance since yesterday though she had problem with off balance in the past   His patient denies any fever chills  Her allergy is symptoms are well controlled  She denies any fall injury loss of consciousness or passing out  She denies any focal weakness on 1 side of the body denies any tingling numbness  Patient is on chronic anticoagulation  Chronic disease management as underneath      Anemia improved, on iron tablet twice a day  Hemoglobin 13  Ferritin 17  She is allergic to sulfur  She is on Coumadin  Celiac disease :Celiac panel: slightly high, pt watches glueten diet, no abdominal pain, no gas      Hypertension symptom-free her blood pressure is top-normal   Currently she is not on currently she is on nadolol    Diabetes last hemoglobin A1c as outlined currently on metformin 500 mg 2 tablets twice a day  HbA1c  6 3    Hyperlipidemia not on statin    tg 255 and , treatment advised, pt prefers no meds or statin, target explained    Atrial fibrillation remains on anticoagulation weight control under care of cardiologist     Vitamin-D deficiency last level was normal     Protein in the urine as outlined underneath  INR being monitored by a cardiologist last time was done on December  Gait fair  No recent urinary tract infection sees urologist periodically  Macular degeneration under care of ophthalmologist     Anemia will monitor hemoglobin  History of abdominal aortic aneurysm, hepatic steatosis, hepatomegaly will monitor      Remote history of uterine prolapse No more mammogram    Patient finished [facility] last week  Patient had seen ear nose throat doctor in January  No visits with results within 2 Week(s) from this visit  Latest known visit with results is:  Appointment on 01/21/2021  WBC                       Value: 5 96(Thousand/uL)  Dt: 01/21/2021  RBC                       Value: 4 37(Million/uL)   Dt: 01/21/2021  Hemoglobin                Value: 13 0(g/dL)         Dt: 01/21/2021  Hematocrit                Value: 42 6(%)            Dt: 01/21/2021  MCV                       Value: 98(fL)             Dt: 01/21/2021  MCH                       Value: 29 7(pg)           Dt: 01/21/2021  MCHC                      Value: 30 5(g/dL)*        Dt: 01/21/2021  RDW                       Value: 14 9(%)            Dt: 01/21/2021  Platelets                 Value: 252(Thousands/uL)  Dt: 01/21/2021  MPV                       Value: 11 3(fL)           Dt: 01/21/2021  Sodium                    Value: 140(mmol/L)        Dt: 01/21/2021  Potassium                 Value: 4 4(mmol/L)        Dt: 01/21/2021  Chloride                  Value: 106(mmol/L)        Dt: 01/21/2021  CO2                       Value: 31(mmol/L)         Dt: 01/21/2021  ANION GAP                 Value:  3(mmol/L)*         Dt: 01/21/2021  BUN                       Value: 13(mg/dL)          Dt: 01/21/2021  Creatinine                Value: 0 66(mg/dL)        Dt: 01/21/2021  Glucose, Fasting          Value: 130(mg/dL)*        Dt: 01/21/2021  Calcium                   Value: 9 3(mg/dL)         Dt: 01/21/2021  AST                       Value: 23(U/L)            Dt: 01/21/2021  ALT                       Value: 29(U/L)            Dt: 01/21/2021  Alkaline Phosphatase      Value: 81(U/L)            Dt: 01/21/2021  Total Protein             Value: 7 9(g/dL)          Dt: 01/21/2021  Albumin                   Value: 4 3(g/dL)          Dt: 01/21/2021  Total Bilirubin           Value: 0 57(mg/dL)        Dt: 01/21/2021  eGFR                      Value: 83(ml/min/1 73sq m) Dt: 01/21/2021  Hemoglobin A1C            Value: 6 3(%)*            Dt: 01/21/2021  EAG                       Value: 134(mg/dl)         Dt: 01/21/2021  Cholesterol               Value: 216(mg/dL)*        Dt: 01/21/2021  Triglycerides             Value: 255(mg/dL)*        Dt: 01/21/2021  HDL, Direct               Value: 38(mg/dL)*         Dt: 01/21/2021  LDL Calculated            Value: 127(mg/dL)*        Dt: 01/21/2021  Non-HDL-Chol (CHOL-HDL)   Value: 178(mg/dl)         Dt: 01/21/2021  Ferritin                  Value: 17(ng/mL)          Dt: 01/21/2021  ------------ - 2 weeks    Ancillary Orders on 11/13/2020  Protime                   Value: 38  9(seconds)*     Dt: 01/05/2021  INR                       Value: 4 04*              Dt: 01/05/2021  ------------  Appointment on 10/22/2020  Hemoglobin                Value: 12 6(g/dL)         Dt: 10/22/2020  Sodium                    Value: 139(mmol/L)        Dt: 10/22/2020  Potassium                 Value: 4 3(mmol/L)        Dt: 10/22/2020  Chloride                  Value: 105(mmol/L)        Dt: 10/22/2020  CO2                       Value: 29(mmol/L)         Dt: 10/22/2020  ANION GAP                 Value: 5(mmol/L)          Dt: 10/22/2020  BUN                       Value: 12(mg/dL)          Dt: 10/22/2020  Creatinine                Value: 0 68(mg/dL)        Dt: 10/22/2020  Glucose                   Value: 142(mg/dL)*        Dt: 10/22/2020  Calcium                   Value: 9 6(mg/dL)         Dt: 10/22/2020  eGFR                      Value: 83(ml/min/1 73sq m) Dt: 10/22/2020  Hemoglobin A1C            Value: 6 2(%)*            Dt: 10/22/2020  EAG                       Value: 131(mg/dl)         Dt: 10/22/2020  ------------  Office Visit on 09/22/2020  Creatinine, Ur            Value: 69  6(mg/dL)        Dt: 10/22/2020  Microalbum  ,U,Random      Value: 73  9(mg/L)*        Dt: 10/22/2020  Microalb Creat Ratio Value: 106(mg/g creatinine)*Dt: 10/22/2020  ------------  Ancillary Orders on 09/18/2020  Protime                   Value: 30  2(seconds)*     Dt: 09/21/2020  INR                       Value: 2 91*              Dt: 09/21/2020  ------------  Appointment on 09/01/2020  WBC                       Value: 5 57(Thousand/uL)  Dt: 09/01/2020  RBC                       Value: 4 77(Million/uL)   Dt: 09/01/2020  Hemoglobin                Value: 12 4(g/dL)         Dt: 09/01/2020  Hematocrit                Value: 41 1(%)            Dt: 09/01/2020  MCV                       Value: 86(fL)             Dt: 09/01/2020  MCH                       Value: 26 0(pg)*          Dt: 09/01/2020  MCHC                      Value: 30 2(g/dL)*        Dt: 09/01/2020  RDW                       Value: 21 8(%)*           Dt: 09/01/2020  Platelets                 Value: 222(Thousands/uL)  Dt: 09/01/2020  MPV                       Value: 10 6(fL)           Dt: 09/01/2020  Cholesterol               Value: 224(mg/dL)*        Dt: 09/01/2020  Triglycerides             Value: 301(mg/dL)*        Dt: 09/01/2020  HDL, Direct               Value: 38(mg/dL)*         Dt: 09/01/2020  LDL Calculated            Value: 126(mg/dL)*        Dt: 09/01/2020  Non-HDL-Chol (CHOL-HDL)   Value: 186(mg/dl)         Dt: 09/01/2020  Sodium                    Value: 139(mmol/L)        Dt: 09/01/2020  Potassium                 Value: 4  1(mmol/L)        Dt: 09/01/2020  Chloride                  Value: 103(mmol/L)        Dt: 09/01/2020  CO2                       Value: 30(mmol/L)         Dt: 09/01/2020  ANION GAP                 Value:  6(mmol/L)          Dt: 09/01/2020  BUN                       Value: 13(mg/dL)          Dt: 09/01/2020  Creatinine                Value: 0 65(mg/dL)        Dt: 09/01/2020  Glucose, Fasting          Value: 145(mg/dL)*        Dt: 09/01/2020  Calcium                   Value: 9 2(mg/dL)         Dt: 09/01/2020  AST                       Value: 25(U/L) Dt: 09/01/2020  ALT                       Value: 29(U/L)            Dt: 09/01/2020  Alkaline Phosphatase      Value: 79(U/L)            Dt: 09/01/2020  Total Protein             Value: 7 8(g/dL)          Dt: 09/01/2020  Albumin                   Value: 4 2(g/dL)          Dt: 09/01/2020  Total Bilirubin           Value: 0 41(mg/dL)        Dt: 09/01/2020  eGFR                      Value: 84(ml/min/1 73sq m) Dt: 09/01/2020  ------------  Transcribe Orders on 09/01/2020  IgA                       Value: 191(mg/dL)         Dt: 09/01/2020  Gliadin IgA               Value: 11(units)          Dt: 09/01/2020  Gliadin IgG               Value: 20(units)*         Dt: 09/01/2020  Tissue Transglut Ab IGG   Value: <2(U/mL)           Dt: 09/01/2020  TISSUE TRANSGLUTAMINASE * Value: 4(U/mL)*           Dt: 09/01/2020  Endomysial IgA            Value: Negative           Dt: 09/01/2020  ------------  Appointment on 08/17/2020  Hemoglobin                Value: 11 8(g/dL)         Dt: 08/17/2020  ------------  Ancillary Orders on 07/24/2020  Protime                   Value: 31 4(seconds)*     Dt: 08/14/2020  INR                       Value: 3 06*              Dt: 08/14/2020  ------------ - 6 moths labs      WBC                       Value: 5 57(Thousand/uL)  Dt: 09/01/2020  RBC                       Value: 4 77(Million/uL)   Dt: 09/01/2020  Hemoglobin                Value: 12 4(g/dL)         Dt: 09/01/2020  Hematocrit                Value: 41 1(%)            Dt: 09/01/2020  MCV                       Value: 86(fL)             Dt: 09/01/2020  MCH                       Value: 26 0(pg)*          Dt: 09/01/2020  MCHC                      Value: 30 2(g/dL)*        Dt: 09/01/2020  RDW                       Value: 21 8(%)*           Dt: 09/01/2020  Platelets                 Value: 222(Thousands/uL)  Dt: 09/01/2020  MPV                       Value: 10 6(fL)           Dt: 09/01/2020  Cholesterol               Value: 224(mg/dL)*        Dt: 09/01/2020  Triglycerides             Value: 301(mg/dL)*        Dt: 09/01/2020  HDL, Direct               Value: 38(mg/dL)*         Dt: 09/01/2020  LDL Calculated            Value: 126(mg/dL)*        Dt: 09/01/2020  Non-HDL-Chol (CHOL-HDL)   Value: 186(mg/dl)         Dt: 09/01/2020  Sodium                    Value: 139(mmol/L)        Dt: 09/01/2020  Potassium                 Value: 4  1(mmol/L)        Dt: 09/01/2020  Chloride                  Value: 103(mmol/L)        Dt: 09/01/2020  CO2                       Value: 30(mmol/L)         Dt: 09/01/2020  ANION GAP                 Value:  6(mmol/L)          Dt: 09/01/2020  BUN                       Value: 13(mg/dL)          Dt: 09/01/2020  Creatinine                Value: 0 65(mg/dL)        Dt: 09/01/2020  Glucose, Fasting          Value: 145(mg/dL)*        Dt: 09/01/2020  Calcium                   Value: 9 2(mg/dL)         Dt: 09/01/2020  AST                       Value: 25(U/L)            Dt: 09/01/2020  ALT                       Value: 29(U/L)            Dt: 09/01/2020  Alkaline Phosphatase      Value: 79(U/L)            Dt: 09/01/2020  Total Protein             Value: 7 8(g/dL)          Dt: 09/01/2020  Albumin                   Value: 4 2(g/dL)          Dt: 09/01/2020  Total Bilirubin           Value: 0 41(mg/dL)        Dt: 09/01/2020  eGFR                      Value: 84(ml/min/1 73sq m) Dt: 09/01/2020  ------------  Transcribe Orders on 09/01/2020  IgA                       Value: 191(mg/dL)         Dt: 09/01/2020  Gliadin IgA               Value: 11(units)          Dt: 09/01/2020  Gliadin IgG               Value: 20(units)*         Dt: 09/01/2020  Tissue Transglut Ab IGG   Value: <2(U/mL)           Dt: 09/01/2020  TISSUE TRANSGLUTAMINASE * Value: 4(U/mL)*           Dt: 09/01/2020  Endomysial IgA            Value: Negative           Dt: 09/01/2020  ------------ - 2 month labs    Procedure: Xr Humerus Left    Result Date: 9/9/2020  Narrative: LEFT HUMERUS INDICATION: fall, pain  COMPARISON:  None VIEWS:  XR HUMERUS LEFT FINDINGS: There is no acute fracture or dislocation  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 is noted  Diffuse osteopenia is present  There are mild degenerative changes of the acromioclavicular joint present  No lytic or blastic osseous lesion  Soft tissues are unremarkable  Impression: No acute osseous abnormality  Workstation performed: [id number]     Procedure: Xr Hip/pelv 2-3 Vws Left    Result Date: 9/9/2020  Narrative: LEFT HIP INDICATION:   fall, pain  COMPARISON:  11/10/2013 VIEWS:  XR HIP/PELV 2-3 VWS LEFT  W PELVIS IF PERFORMED FINDINGS: There is no acute fracture or dislocation  There are bilateral hip degenerative changes  No lytic or blastic osseous lesion  Soft tissues are unremarkable  A pessary is present  Degenerative changes pubic symphysis and visualized lower lumbar spine  Impression: No acute osseous abnormality  This report is in agreement with the preliminary interpretation  Workstation performed: [id number]     Procedure: Ct Head Without Contrast    Result Date: 9/9/2020  Narrative: CT BRAIN - WITHOUT CONTRAST INDICATION:   Head trauma, minor (Age => 65y)  COMPARISON:  917 TECHNIQUE:  CT examination of the brain was performed  In addition to axial images, sagittal and coronal 2D reformatted images were created and submitted for interpretation  Radiation dose length product (DLP) for this visit:  870 42 mGy-cm   This examination, like all CT scans performed in the [facility], was performed utilizing techniques to minimize radiation dose exposure, including the use of iterative  reconstruction and automated exposure control  IMAGE QUALITY:  Diagnostic  FINDINGS: PARENCHYMA: Decreased attenuation is noted in periventricular and subcortical white matter demonstrating an appearance that is statistically most likely to represent mild microangiopathic change    Chronic lacunar infarction(s) are noted in basal ganglia  No CT signs of acute infarction  No intracranial mass, mass effect or midline shift  No acute parenchymal hemorrhage  VENTRICLES AND EXTRA-AXIAL SPACES:  Small bilateral choroid plexus xanthogranulomas are noted  Ventricles are midline in position and have normal configuration  No extra-axial mass, hemorrhage or fluid collection is identified  VISUALIZED ORBITS AND PARANASAL SINUSES: Bilateral ocular lens implants are present  The visualized paranasal sinuses are free of mucosal disease  CALVARIUM AND EXTRACRANIAL SOFT TISSUES:  Normal      Impression: No acute intracranial abnormality  Workstation performed: [id number]     Procedure: Ct Chest Without Contrast    Result Date: 9/9/2020  Narrative: CT CHEST WITHOUT IV CONTRAST INDICATION:   Rib fracture suspected, traumatic Chest trauma, blunt, low energy  Status post fall  Landed on left side  COMPARISON:  Chest radiograph from 7/2/2019  Chest CT from 11/10/2013  Left humerus radiograph from today  TECHNIQUE: CT examination of the chest was performed without intravenous contrast   Axial, sagittal, and coronal 2D reformatted images were created from the source data and submitted for interpretation  Radiation dose length product (DLP) for this visit:  248 08 mGy-cm   This examination, like all CT scans performed in the [facility], was performed utilizing techniques to minimize radiation dose exposure, including the use of iterative  reconstruction and automated exposure control  FINDINGS: LUNGS:  Mild emphysema  No acute disease  No significant filling defects in the trachea and bronchi  PLEURA:  No hemothorax or pneumothorax  HEART/GREAT VESSELS:  Mild cardiomegaly with left atrial enlargement  Moderate coronary artery calcification indicating atherosclerotic heart disease  MVR  MEDIASTINUM AND LILIBETH:  Unremarkable  CHEST WALL AND LOWER NECK:   Unremarkable  VISUALIZED STRUCTURES IN THE UPPER ABDOMEN:  Unremarkable  OSSEOUS STRUCTURES:  No displaced left rib fracture with no fracture about the left shoulder  Moderate bilateral glenohumeral degenerative disease  Mild chronic superior endplate deformity of L5  Impression: No displaced left rib fracture  No fracture about the left shoulder  No hemothorax or pneumothorax  Mild emphysema  Cardiomegaly with left atrial enlargement, status post MVR  Workstation performed: [id number]          Appointment on 01/31/2020  Protime                   Value: 29  6(seconds)*     Dt: 01/31/2020  INR                       Value: 2 88*              Dt: 01/31/2020  ------------  Appointment on 01/17/2020  Sodium                    Value: 141(mmol/L)        Dt: 01/17/2020  Potassium                 Value: 4 2(mmol/L)        Dt: 01/17/2020  Chloride                  Value: 108(mmol/L)        Dt: 01/17/2020  CO2                       Value: 29(mmol/L)         Dt: 01/17/2020  ANION GAP                 Value:  4(mmol/L)          Dt: 01/17/2020  BUN                       Value: 14(mg/dL)          Dt: 01/17/2020  Creatinine                Value: 0 69(mg/dL)        Dt: 01/17/2020  Glucose, Fasting          Value: 125(mg/dL)*        Dt: 01/17/2020  Calcium                   Value: 9 0(mg/dL)         Dt: 01/17/2020  AST                       Value: 21(U/L)            Dt: 01/17/2020  ALT                       Value: 25(U/L)            Dt: 01/17/2020  Alkaline Phosphatase      Value: 89(U/L)            Dt: 01/17/2020  Total Protein             Value: 7 7(g/dL)          Dt: 01/17/2020  Albumin                   Value: 4 2(g/dL)          Dt: 01/17/2020  Total Bilirubin           Value: 0 42(mg/dL)        Dt: 01/17/2020  eGFR                      Value: 82(ml/min/1 73sq m) Dt: 01/17/2020  Hemoglobin A1C            Value: 6 7(%)*            Dt: 01/17/2020  EAG                       Value: 146(mg/dl)         Dt: 01/17/2020  Cholesterol               Value: 201(mg/dL)*        Dt: 01/17/2020  Triglycerides Value: 204(mg/dL)*        Dt: 01/17/2020  HDL, Direct               Value: 40(mg/dL)          Dt: 01/17/2020  LDL Calculated            Value: 120(mg/dL)*        Dt: 01/17/2020  Non-HDL-Chol (CHOL-HDL)   Value: 161(mg/dl)         Dt: 01/17/2020  Digoxin Lvl               Value: 0 7(ng/mL)*        Dt: 01/17/2020  WBC                       Value: 5 74(Thousand/uL)  Dt: 01/17/2020  RBC                       Value: 4 51(Million/uL)   Dt: 01/17/2020  Hemoglobin                Value: 10 7(g/dL)*        Dt: 01/17/2020  Hematocrit                Value: 36 8(%)            Dt: 01/17/2020  MCV                       Value: 82(fL)             Dt: 01/17/2020  MCH                       Value: 23 7(pg)*          Dt: 01/17/2020  MCHC                      Value: 29 1(g/dL)*        Dt: 01/17/2020  RDW                       Value: 17 8(%)*           Dt: 01/17/2020  Platelets                 Value: 265(Thousands/uL)  Dt: 01/17/2020  MPV                       Value: 10 9(fL)           Dt: 01/17/2020  ------------  Office Visit on 01/13/2020  RAPID FLU A               Value: negative           Dt: 01/13/2020  RAPID FLU B               Value: negative           Dt: 01/13/2020  Creatinine, Ur            Value: 30  5(mg/dL)        Dt: 01/17/2020  Microalbum  ,U,Random      Value: <5 0(mg/L)         Dt: 01/17/2020  Microalb Creat Ratio      Value: <16(mg/g creatinine) Dt: 01/17/2020  ------------ - 2 month labs  ------------ - 2 weeks        The following portions of the patient's history were reviewed and updated as appropriate: allergies, current medications, past family history, past medical history, past social history, past surgical history and problem list     Review of Systems   Constitutional: Negative for chills, fatigue, fever and unexpected weight change  HENT: Positive for hearing loss  Negative for ear pain, nosebleeds, postnasal drip, sinus pain and sore throat  Eyes: Positive for visual disturbance   Negative for pain and redness  Respiratory: Negative for cough and shortness of breath  Cardiovascular: Negative for chest pain, palpitations and leg swelling  Gastrointestinal: Negative for abdominal pain, diarrhea and nausea  Endocrine: Negative for cold intolerance, polydipsia and polyuria  Genitourinary: Negative for dysuria, frequency, hematuria and urgency  Musculoskeletal: Positive for gait problem  Negative for arthralgias and myalgias  Skin: Negative for rash  Allergic/Immunologic: Negative  Neurological: Positive for headaches  Negative for dizziness, tremors, seizures, syncope, facial asymmetry, speech difficulty, weakness, light-headedness and numbness  Hematological: Negative for adenopathy  Psychiatric/Behavioral: Negative for behavioral problems, dysphoric mood, self-injury and sleep disturbance  The patient is not nervous/anxious and is not hyperactive            Historical Information   Patient Active Problem List   Diagnosis    Obstructive sleep apnea    Chronic atrial fibrillation ([facility] )    H/O mitral valve replacement with mechanical valve    Long term (current) use of anticoagulants    Presence of prosthetic heart valve    Hypercholesteremia    Anemia due to chronic kidney disease    Allergic rhinitis    Type 2 diabetes mellitus without complication, without long-term current use of insulin (HCC)    Iron deficiency anemia    Other specified hypothyroidism    Vitamin B12 deficiency    Other microscopic hematuria    Presence of pessary    Celiac disease    Abdominal aortic aneurysm (AAA) without rupture (HCC)    Headache    Ataxia     Past Medical History:   Diagnosis Date    Atrial fibrillation ([facility] )     Cancer ([facility] )     hx of cervical    Cardiac disease     Hyperlipidemia      Past Surgical History:   Procedure Laterality Date    EYE SURGERY      HYSTERECTOMY      MITRAL VALVE REPLACEMENT  1994     Social History     Substance and Sexual Activity   Alcohol Use Yes Comment: special occasional     Social History     Substance and Sexual Activity   Drug Use No     Social History     Tobacco Use   Smoking Status Former Smoker    Packs/day: 2 00    Years: 30 00    Pack years: 60 00    Quit date:     Years since quittin 1   Smokeless Tobacco Never Used     Family History   Problem Relation Age of Onset    Heart failure Mother     Heart attack Father     Sleep apnea Brother      Health Maintenance Due   Topic    Medicare Annual Wellness Visit (AWV)     DM Eye Exam     Depression Screening PHQ     COVID-19 Vaccine (1 of 2)    Influenza Vaccine (1)      Meds/Allergies       Current Outpatient Medications:     acetaminophen (TYLENOL) 500 mg tablet, Take 500 mg by mouth, Disp: , Rfl:     Calcium Citrate-Vitamin D (CALCITRATE/VITAMIN D PO), Take by mouth 3 (three) times a week, Disp: , Rfl:     Cholecalciferol (VITAMIN D PO), Take by mouth, Disp: , Rfl:     digoxin (LANOXIN) 0 25 mg tablet, Take 250 mcg by mouth daily, Disp: , Rfl:     ferrous sulfate 324 (65 Fe) mg, Take 1 tablet (324 mg total) by mouth 2 (two) times a day before meals, Disp: 180 tablet, Rfl: 1    metFORMIN (GLUCOPHAGE) 500 mg tablet, 2 tablets Twice Daily, Disp: 360 tablet, Rfl: 1    Multiple Vitamins-Minerals (PRESERVISION AREDS PO), Take 2 tablets by mouth daily, Disp: , Rfl:     NADOLOL PO, Take 2 5 mg by mouth daily, Disp: , Rfl:     warfarin (COUMADIN) 2 5 mg tablet, Take 2 5 mg by mouth 3 (three) times a week, Disp: , Rfl:     warfarin (COUMADIN) 5 mg tablet, Take 5 mg by mouth 4 (four) times a week, Disp: , Rfl:     digoxin (LANOXIN) 0 125 mg tablet, Take 125 mcg by mouth daily, Disp: , Rfl:       Objective:    Vitals:   /80   Pulse 70   Temp (!) 96 7 °F (35 9 °C)   Ht 5' 6" (1 676 m)   Wt 75 3 kg (166 lb)   SpO2 97%   BMI 26 79 kg/m²   Body mass index is 26 79 kg/m²    Vitals:    21 0945   Weight: 75 3 kg (166 lb)       Physical Exam  Vitals signs and nursing note reviewed  Constitutional:       General: She is not in acute distress  Appearance: She is well-developed  She is not ill-appearing, toxic-appearing or diaphoretic  Comments: Short grey hair   HENT:      Head: Normocephalic and atraumatic  Eyes:      Conjunctiva/sclera: Conjunctivae normal    Neck:      Thyroid: No thyroid mass, thyromegaly or thyroid tenderness  Vascular: Normal carotid pulses  No carotid bruit or JVD  Cardiovascular:      Rate and Rhythm: Rhythm irregularly irregular  Pulses:           Dorsalis pedis pulses are 2+ on the right side and 2+ on the left side  Posterior tibial pulses are 2+ on the right side and 1+ on the left side  Heart sounds: S1 normal  Murmur present  Systolic murmur present with a grade of 2/6  Comments: S2 elevated  Pulmonary:      Effort: No respiratory distress  Breath sounds: Normal breath sounds  No wheezing or rales  Abdominal:      General: Bowel sounds are normal  There is no distension  Palpations: There is no hepatomegaly, mass or pulsatile mass  Tenderness: There is no abdominal tenderness  There is no rebound  Hernia: There is no hernia in the umbilical area, left inguinal area or right inguinal area  Musculoskeletal:      Right lower leg: No edema  Left lower leg: No edema  Feet:      Right foot:      Skin integrity: No ulcer, skin breakdown, erythema, warmth, callus or dry skin  Left foot:      Skin integrity: No ulcer, skin breakdown, erythema, warmth, callus or dry skin  Lymphadenopathy:      Cervical: No cervical adenopathy  Right cervical: No superficial cervical adenopathy  Skin:     General: Skin is warm  Findings: No rash  Neurological:      General: No focal deficit present  Mental Status: Mental status is at baseline  Cranial Nerves: Cranial nerves are intact  Sensory: Sensation is intact  Motor: Motor function is intact        Gait: Tandem walk abnormal  Gait normal    Psychiatric:         Mood and Affect: Mood normal          Behavior: Behavior normal          Thought Content: Thought content normal          Judgment: Judgment normal          Lab Review   Appointment on 01/21/2021   Component Date Value Ref Range Status    WBC 01/21/2021 5 96  4 31 - 10 16 Thousand/uL Final    RBC 01/21/2021 4 37  3 81 - 5 12 Million/uL Final    Hemoglobin 01/21/2021 13 0  11 5 - 15 4 g/dL Final    Hematocrit 01/21/2021 42 6  34 8 - 46 1 % Final    MCV 01/21/2021 98  82 - 98 fL Final    MCH 01/21/2021 29 7  26 8 - 34 3 pg Final    MCHC 01/21/2021 30 5* 31 4 - 37 4 g/dL Final    RDW 01/21/2021 14 9  11 6 - 15 1 % Final    Platelets 30/11/7395 252  149 - 390 Thousands/uL Final    MPV 01/21/2021 11 3  8 9 - 12 7 fL Final    Sodium 01/21/2021 140  136 - 145 mmol/L Final    Potassium 01/21/2021 4 4  3 5 - 5 3 mmol/L Final    Chloride 01/21/2021 106  100 - 108 mmol/L Final    CO2 01/21/2021 31  21 - 32 mmol/L Final    ANION GAP 01/21/2021 3* 4 - 13 mmol/L Final    BUN 01/21/2021 13  5 - 25 mg/dL Final    Creatinine 01/21/2021 0 66  0 60 - 1 30 mg/dL Final    Standardized to IDMS reference method    Glucose, Fasting 01/21/2021 130* 65 - 99 mg/dL Final    Specimen collection should occur prior to Sulfasalazine administration due to the potential for falsely depressed results  Specimen collection should occur prior to Sulfapyridine administration due to the potential for falsely elevated results   Calcium 01/21/2021 9 3  8 3 - 10 1 mg/dL Final    AST 01/21/2021 23  5 - 45 U/L Final    Specimen collection should occur prior to Sulfasalazine administration due to the potential for falsely depressed results   ALT 01/21/2021 29  12 - 78 U/L Final    Specimen collection should occur prior to Sulfasalazine and/or Sulfapyridine administration due to the potential for falsely depressed results       Alkaline Phosphatase 01/21/2021 81  46 - 116 U/L Final    Total Protein 01/21/2021 7 9  6 4 - 8 2 g/dL Final    Albumin 01/21/2021 4 3  3 5 - 5 0 g/dL Final    Total Bilirubin 01/21/2021 0 57  0 20 - 1 00 mg/dL Final    Use of this assay is not recommended for patients undergoing treatment with eltrombopag due to the potential for falsely elevated results   eGFR 01/21/2021 83  ml/min/1 73sq m Final    Hemoglobin A1C 01/21/2021 6 3* Normal 3 8-5 6%; PreDiabetic 5 7-6 4%; Diabetic >=6 5%; Glycemic control for adults with diabetes <7 0% % Final    EAG 01/21/2021 134  mg/dl Final    Cholesterol 01/21/2021 216* 50 - 200 mg/dL Final    Cholesterol:       Desirable         <200 mg/dl       Borderline         200-239 mg/dl       High              >239           Triglycerides 01/21/2021 255* <=150 mg/dL Final    Triglyceride:     Normal          <150 mg/dl     Borderline High 150-199 mg/dl     High            200-499 mg/dl        Very High       >499 mg/dl    Specimen collection should occur prior to N-Acetylcysteine or Metamizole administration due to the potential for falsely depressed results   HDL, Direct 01/21/2021 38* >=40 mg/dL Final    HDL Cholesterol:       Low     <41 mg/dL  Specimen collection should occur prior to Metamizole administration due to the potential for falsley depressed results   LDL Calculated 01/21/2021 127* 0 - 100 mg/dL Final    LDL Cholesterol:     Optimal           <100 mg/dl     Near Optimal      100-129 mg/dl     Above Optimal       Borderline High 130-159 mg/dl       High            160-189 mg/dl       Very High       >189 mg/dl         This screening LDL is a calculated result  It does not have the accuracy of the Direct Measured LDL in the monitoring of patients with hyperlipidemia and/or statin therapy  Direct Measure LDL (KZJ040) must be ordered separately in these patients      Non-HDL-Chol (CHOL-HDL) 01/21/2021 178  mg/dl Final    Ferritin 01/21/2021 17  8 - 388 ng/mL Final   Office Visit on 01/08/2021   Component Date Value Ref Range Status    Creatinine, Ur 01/21/2021 55 3  mg/dL Final    Microalbum  ,U,Random 01/21/2021 39 6* 0 0 - 20 0 mg/L Final    Microalb Creat Ratio 01/21/2021 72* 0 - 30 mg/g creatinine Final   Ancillary Orders on 01/08/2021   Component Date Value Ref Range Status    Protime 01/14/2021 30 0* 11 6 - 14 5 seconds Final    INR 01/14/2021 2 88* 0 84 - 1 19 Final         Patient Instructions   Take Tylenol  If headache does not get better or gets worse go to the emergency room  I would suggest to get CT scan of the brain we will not do it as per your request for right now but if you get worse you will need to do it  If you have any other neurological symptoms call 911  The consider using cane  Follow with Consultants as per their and our suggestion    Follow up in 3  day(s) or as needed earlier    Follow all instructions as advised and discussed  Take your medications as prescribed  Call the office immediately if you experience any side effects  Ask questions if you do not understand  Keep your scheduled appointment as advised or come sooner if necessary or in doubt  Best time to call for non-urgent matter or questions on weekdays is between 9am and 12 noon  See physician for any new symptoms or worsening of current symptoms  Urgent or emergent situations call 911 and report to nearest emergency room  I spent  30 minutes taking care of this patient including clinical care, conseling, collaboration, chart, lab and consultaion review  Dr Kamaljit Walsh MD  [facility]       "This note has been constructed using a voice recognition system  Therefore there may be syntax, spelling, and/or grammatical errors   Please call if you have any questions  "

## 2021-02-09 NOTE — LETTER
Diabetic Eye Exam Form    Date Requested: 21  Patient: Vangie Rose  Patient : 1940   Referring Provider: Cindy Geller MD    Dilated Retinal Exam, Optomap-Iris Exam, or Fundus Photography Done         Yes (Knik one above)         No     Date of Diabetic Eye Exam ______________________________  Left Eye      Exam did show retinopathy    Exam did not show retinopathy         Mild       Moderate       None       Proliferative       Severe     Right Eye     Exam did show retinopathy    Exam did not show retinopathy         Mild       Moderate       None       Proliferative       Severe     Comments __________________________________________________________    Practice Providing Exam ______________________________________________    Exam Performed By (print name) _______________________________________      Provider Signature ___________________________________________________      These reports are needed for  compliance    Please fax this completed form and a copy of the Diabetic Eye Exam report to our office located at [place] [address] as soon as possible to [phone] attention Hui: Phone [phone]    We thank you for your assistance in treating our mutual patient     (sent to [facility])

## 2021-02-09 NOTE — PATIENT INSTRUCTIONS
Take Tylenol  If headache does not get better or gets worse go to the emergency room  I would suggest to get CT scan of the brain we will not do it as per your request for right now but if you get worse you will need to do it  If you have any other neurological symptoms call 911  The consider using cane  Follow with Consultants as per their and our suggestion    Follow up in 3  day(s) or as needed earlier    Follow all instructions as advised and discussed  Take your medications as prescribed  Call the office immediately if you experience any side effects  Ask questions if you do not understand  Keep your scheduled appointment as advised or come sooner if necessary or in doubt  Best time to call for non-urgent matter or questions on weekdays is between 9am and 12 noon  See physician for any new symptoms or worsening of current symptoms  Urgent or emergent situations call 911 and report to nearest emergency room  I spent  30 minutes taking care of this patient including clinical care, conseling, collaboration, chart, lab and consultaion review

## 2021-02-09 NOTE — TELEPHONE ENCOUNTER
----- Message from Charlesetta Mortimer, MD sent at 2/9/2021  9:56 AM EST -----  Regarding: retinopathy  02/09/21 9:57 AM    Hello, our patient Layla Kramer has had Diabetic Eye Exam completed/performed  Please assist in updating the patient chart by making an External outreach to Dr Alfred Board facility located in [place] ( at retina center)  The date of service is January 21, 2021      Thank you,  Charlesetta Mortimer, MD  [facility]

## 2021-02-11 NOTE — TELEPHONE ENCOUNTER
As a follow-up, a second attempt has been made for outreach via telephone call, please see Contacts section for details      Thank you  Yoon Domingo MA

## 2021-02-11 NOTE — TELEPHONE ENCOUNTER
Revieved record request response form with incomplete information- missing Date of Service & type of exam (if Diabetic)

## 2021-02-12 ENCOUNTER — TELEMEDICINE (OUTPATIENT)
Dept: INTERNAL MEDICINE CLINIC | Facility: CLINIC | Age: 81
End: 2021-02-12
Payer: MEDICARE

## 2021-02-12 DIAGNOSIS — R51.9 ACUTE NONINTRACTABLE HEADACHE, UNSPECIFIED HEADACHE TYPE: ICD-10-CM

## 2021-02-12 DIAGNOSIS — D50.0 IRON DEFICIENCY ANEMIA DUE TO CHRONIC BLOOD LOSS: ICD-10-CM

## 2021-02-12 DIAGNOSIS — Z23 ENCOUNTER FOR IMMUNIZATION: ICD-10-CM

## 2021-02-12 DIAGNOSIS — I48.20 CHRONIC ATRIAL FIBRILLATION (HCC): ICD-10-CM

## 2021-02-12 DIAGNOSIS — E03.8 OTHER SPECIFIED HYPOTHYROIDISM: ICD-10-CM

## 2021-02-12 DIAGNOSIS — R27.0 ATAXIA: Primary | ICD-10-CM

## 2021-02-12 DIAGNOSIS — E11.9 TYPE 2 DIABETES MELLITUS WITHOUT COMPLICATION, WITHOUT LONG-TERM CURRENT USE OF INSULIN (HCC): ICD-10-CM

## 2021-02-12 PROCEDURE — 99442 PR PHYS/QHP TELEPHONE EVALUATION 11-20 MIN: CPT | Performed by: INTERNAL MEDICINE

## 2021-02-12 NOTE — PROGRESS NOTES
Virtual Brief Visit    Assessment/Plan:  Headache and dizziness and ataxia resolved  Patient is back to the baseline  Multiple chronic disease diabetes will need hemoglobin A1c and urine for microalbumin prior to next visit  Of a chronic anticoagulation for atrial fibrillation being monitored by cardiologist   Anemia multifactorial will monitor CBC prior to the next visit  Hypothyroidism clinically stable will check TSH  How will also check CMP for surveillance of the lid renal functions  Hyperlipidemia will continue to monitor as statin related li for diet  No statin per her choice  Problem List Items Addressed This Visit        Endocrine    Type 2 diabetes mellitus without complication, without long-term current use of insulin ([address] )     Diabetes    Check your fingerstick Accu-Chek once a day  Please check your fingerstick Accu-Chek different time of the day either at 7:00 a m  or 11:00 a m  for for p m  4 9:00 p m  Andre Reyes Follow Diabetic diet for diabetes as advised  If you would sugar less than 80 or more than 300 to please call us on next visit is day  If the sugar is less than 80 follow-up hypoglycemia instructions as advised  Take your diabetic medicine as advised  Lab Results   Component Value Date    HGBA1C 6 3 (H) 01/21/2021            Relevant Orders    Comprehensive metabolic panel    Hemoglobin A1C    Microalbumin / creatinine urine ratio    CBC    TSH, 3rd generation with Free T4 reflex    Other specified hypothyroidism     Stable clinically  Periodic TSH  Continue same levothyroxine  Relevant Orders    TSH, 3rd generation with Free T4 reflex       Cardiovascular and Mediastinum    Chronic atrial fibrillation [facility])     Per cardiologist   Continue anticoagulation  Watch hemoglobin  INR being monitored by cardiologist             Other    Iron deficiency anemia     Follow-up CBC prior to the next visit           Relevant Orders    CBC    Headache     Headache resolved and symptom-free for 48 hours  Ataxia - Primary     In improved dizziness ataxia walking normally per patient                     Reason for visit is   Chief Complaint   Patient presents with    Virtual Brief Visit    Headache     positionla dizziness        Encounter provider Cristal Holly MD    Provider located at [address] [id number]    Recent Visits  Date Type Provider Dept   02/09/21 Office Visit Cristal Holly MD [facility] Internal Med   Showing recent visits within past 7 days and meeting all other requirements     Today's Visits  Date Type Provider Dept   02/12/21 Telemedicine Cristal Holly MD [facility] Internal Med   Showing today's visits and meeting all other requirements     Future Appointments  No visits were found meeting these conditions  Showing future appointments within next 150 days and meeting all other requirements        After connecting through telephone, the patient was identified by name and date of birth  Radha Armstrong was informed that this is a telemedicine visit and that the visit is being conducted through telephone  My office door was closed  No one else was in the room  She acknowledged consent and understanding of privacy and security of the platform  The patient has agreed to participate and understands she can discontinue the visit at any time  Patient is aware this is a billable service  Subjective    Radha Armstrong is a 80 y o  female headache, ataxia  This telephone visit is done for follow-up of her headache and dizziness  Patient offers no further headache in last 2 days  Patient denies any dizziness  Denies any positional symptoms  Patient is on chronic anticoagulation  Chronic disease management as underneath      Anemia improved, on iron tablet twice a day  Hemoglobin 13  Ferritin 17       She is allergic to sulfur  She is on Coumadin  Celiac disease :Celiac panel: slightly high, pt watches glueten diet, no abdominal pain, no gas      Hypertension symptom-free her blood pressure is top-normal   Currently she is not on currently she is on nadolol    Diabetes last hemoglobin A1c as outlined currently on metformin 500 mg 2 tablets twice a day  HbA1c  6 3    Hyperlipidemia not on statin    tg 255 and , treatment advised, pt prefers no meds or statin, target explained    Atrial fibrillation remains on anticoagulation weight control under care of cardiologist     Vitamin-D deficiency last level was normal     Protein in the urine as outlined underneath  INR being monitored by a cardiologist last time was done on December  Gait fair  No recent urinary tract infection sees urologist periodically  Macular degeneration under care of ophthalmologist     Anemia will monitor hemoglobin  History of abdominal aortic aneurysm, hepatic steatosis, hepatomegaly will monitor  Remote history of uterine prolapse     No more mammogram    Patient finished [facility] last week  Patient had seen ear nose throat doctor in January  No visits with results within 2 Week(s) from this visit    Latest known visit with results is:  Appointment on 01/21/2021  WBC                       Value: 5 96(Thousand/uL)  Dt: 01/21/2021  RBC                       Value: 4 37(Million/uL)   Dt: 01/21/2021  Hemoglobin                Value: 13 0(g/dL)         Dt: 01/21/2021  Hematocrit                Value: 42 6(%)            Dt: 01/21/2021  MCV                       Value: 98(fL)             Dt: 01/21/2021  MCH                       Value: 29 7(pg)           Dt: 01/21/2021  MCHC                      Value: 30 5(g/dL)*        Dt: 01/21/2021  RDW                       Value: 14 9(%)            Dt: 01/21/2021  Platelets                 Value: 252(Thousands/uL)  Dt: 01/21/2021  MPV                       Value: 11 3(fL)           Dt: 01/21/2021  Sodium                    Value: 140(mmol/L)        Dt: 01/21/2021  Potassium                 Value: 4 4(mmol/L)        Dt: 01/21/2021  Chloride                  Value: 106(mmol/L)        Dt: 01/21/2021  CO2                       Value: 31(mmol/L)         Dt: 01/21/2021  ANION GAP                 Value:  3(mmol/L)*         Dt: 01/21/2021  BUN                       Value: 13(mg/dL)          Dt: 01/21/2021  Creatinine                Value: 0 66(mg/dL)        Dt: 01/21/2021  Glucose, Fasting          Value: 130(mg/dL)*        Dt: 01/21/2021  Calcium                   Value: 9 3(mg/dL)         Dt: 01/21/2021  AST                       Value: 23(U/L)            Dt: 01/21/2021  ALT                       Value: 29(U/L)            Dt: 01/21/2021  Alkaline Phosphatase      Value: 81(U/L)            Dt: 01/21/2021  Total Protein             Value: 7 9(g/dL)          Dt: 01/21/2021  Albumin                   Value: 4 3(g/dL)          Dt: 01/21/2021  Total Bilirubin           Value: 0 57(mg/dL)        Dt: 01/21/2021  eGFR                      Value: 83(ml/min/1 73sq m) Dt: 01/21/2021  Hemoglobin A1C            Value: 6 3(%)*            Dt: 01/21/2021  EAG                       Value: 134(mg/dl)         Dt: 01/21/2021  Cholesterol               Value: 216(mg/dL)*        Dt: 01/21/2021  Triglycerides             Value: 255(mg/dL)*        Dt: 01/21/2021  HDL, Direct               Value: 38(mg/dL)*         Dt: 01/21/2021  LDL Calculated            Value: 127(mg/dL)*        Dt: 01/21/2021  Non-HDL-Chol (CHOL-HDL)   Value: 178(mg/dl)         Dt: 01/21/2021  Ferritin                  Value: 17(ng/mL)          Dt: 01/21/2021  ------------ - 2 weeks    ------------  Transcribe Orders on 09/01/2020  IgA                       Value: 191(mg/dL)         Dt: 09/01/2020  Gliadin IgA               Value: 11(units)          Dt: 09/01/2020  Gliadin IgG               Value: 20(units)*         Dt: 09/01/2020  Tissue Transglut Ab IGG Value: <2(U/mL)           Dt: 09/01/2020  TISSUE TRANSGLUTAMINASE * Value: 4(U/mL)*           Dt: 09/01/2020  Endomysial IgA            Value: Negative           Dt: 09/01/2020  ------------ - 2 month labs    Procedure: Xr Humerus Left    Result Date: 9/9/2020  Narrative: LEFT HUMERUS INDICATION:   fall, pain  COMPARISON:  None VIEWS:  XR HUMERUS LEFT FINDINGS: There is no acute fracture or dislocation  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 is noted  Diffuse osteopenia is present  There are mild degenerative changes of the acromioclavicular joint present  No lytic or blastic osseous lesion  Soft tissues are unremarkable  Impression: No acute osseous abnormality  Workstation performed: [id number]           Appointment on 01/31/2020  Protime                   Value: 29  6(seconds)*     Dt: 01/31/2020  INR                       Value: 2 88*              Dt: 01/31/2020  ------------  Appointment on 01/17/2020  Sodium                    Value: 141(mmol/L)        Dt: 01/17/2020  Potassium                 Value: 4 2(mmol/L)        Dt: 01/17/2020  Chloride                  Value: 108(mmol/L)        Dt: 01/17/2020  CO2                       Value: 29(mmol/L)         Dt: 01/17/2020  ANION GAP                 Value:  4(mmol/L)          Dt: 01/17/2020  BUN                       Value: 14(mg/dL)          Dt: 01/17/2020  Creatinine                Value: 0 69(mg/dL)        Dt: 01/17/2020  Glucose, Fasting          Value: 125(mg/dL)*        Dt: 01/17/2020  Calcium                   Value: 9 0(mg/dL)         Dt: 01/17/2020  AST                       Value: 21(U/L)            Dt: 01/17/2020  ALT                       Value: 25(U/L)            Dt: 01/17/2020  Alkaline Phosphatase      Value: 89(U/L)            Dt: 01/17/2020  Total Protein             Value: 7 7(g/dL)          Dt: 01/17/2020  Albumin                   Value: 4 2(g/dL)          Dt: 01/17/2020  Total Bilirubin           Value: 0 42(mg/dL)        Dt: 01/17/2020  eGFR                      Value: 82(ml/min/1 73sq m) Dt: 01/17/2020  Hemoglobin A1C            Value: 6 7(%)*            Dt: 01/17/2020  EAG                       Value: 146(mg/dl)         Dt: 01/17/2020  Cholesterol               Value: 201(mg/dL)*        Dt: 01/17/2020  Triglycerides             Value: 204(mg/dL)*        Dt: 01/17/2020  HDL, Direct               Value: 40(mg/dL)          Dt: 01/17/2020  LDL Calculated            Value: 120(mg/dL)*        Dt: 01/17/2020  Non-HDL-Chol (CHOL-HDL)   Value: 161(mg/dl)         Dt: 01/17/2020  Digoxin Lvl               Value: 0 7(ng/mL)*        Dt: 01/17/2020  WBC                       Value: 5 74(Thousand/uL)  Dt: 01/17/2020  RBC                       Value: 4 51(Million/uL)   Dt: 01/17/2020  Hemoglobin                Value: 10 7(g/dL)*        Dt: 01/17/2020  Hematocrit                Value: 36 8(%)            Dt: 01/17/2020  MCV                       Value: 82(fL)             Dt: 01/17/2020  MCH                       Value: 23 7(pg)*          Dt: 01/17/2020  MCHC                      Value: 29 1(g/dL)*        Dt: 01/17/2020  RDW                       Value: 17 8(%)*           Dt: 01/17/2020  Platelets                 Value: 265(Thousands/uL)  Dt: 01/17/2020  MPV                       Value: 10 9(fL)           Dt: 01/17/2020  ------------         Past Medical History:   Diagnosis Date    Atrial fibrillation (HCC)     Cancer (HCC)     hx of cervical    Cardiac disease     Hyperlipidemia        Past Surgical History:   Procedure Laterality Date    EYE SURGERY      HYSTERECTOMY      MITRAL VALVE REPLACEMENT  1994       Current Outpatient Medications   Medication Sig Dispense Refill    acetaminophen (TYLENOL) 500 mg tablet Take 500 mg by mouth      Calcium Citrate-Vitamin D (CALCITRATE/VITAMIN D PO) Take by mouth 3 (three) times a week      Cholecalciferol (VITAMIN D PO) Take by mouth      digoxin (LANOXIN) 0 25 mg tablet Take 250 mcg by mouth daily  ferrous sulfate 324 (65 Fe) mg Take 1 tablet (324 mg total) by mouth 2 (two) times a day before meals 180 tablet 1    metFORMIN (GLUCOPHAGE) 500 mg tablet 2 tablets Twice Daily 360 tablet 1    Multiple Vitamins-Minerals (PRESERVISION AREDS PO) Take 2 tablets by mouth daily      NADOLOL PO Take 2 5 mg by mouth daily      warfarin (COUMADIN) 2 5 mg tablet Take 2 5 mg by mouth 3 (three) times a week      warfarin (COUMADIN) 5 mg tablet Take 5 mg by mouth 4 (four) times a week      digoxin (LANOXIN) 0 125 mg tablet Take 125 mcg by mouth daily       No current facility-administered medications for this visit  Allergies   Allergen Reactions    Sulfa Antibiotics     Sulfasalazine        Review of Systems   Constitutional: Negative for chills, fatigue, fever and unexpected weight change  HENT: Positive for hearing loss  Negative for ear pain, nosebleeds, postnasal drip, sinus pain and sore throat  Eyes: Positive for visual disturbance  Negative for pain and redness  Respiratory: Negative for cough and shortness of breath  Cardiovascular: Negative for chest pain, palpitations and leg swelling  Gastrointestinal: Negative for abdominal pain, diarrhea and nausea  Endocrine: Negative for cold intolerance, polydipsia and polyuria  Genitourinary: Negative for dysuria, frequency, hematuria and urgency  Musculoskeletal: Positive for gait problem  Negative for arthralgias and myalgias  Skin: Negative for rash  Allergic/Immunologic: Negative  Neurological: Positive for headaches  Negative for dizziness, tremors, seizures, syncope, facial asymmetry, speech difficulty, weakness, light-headedness and numbness  Hematological: Negative for adenopathy  Psychiatric/Behavioral: Negative for behavioral problems, dysphoric mood, self-injury and sleep disturbance  The patient is not nervous/anxious and is not hyperactive  There were no vitals filed for this visit        I spent 15 minutes directly with the patient during this visit    VIRTUAL VISIT Shelbie William acknowledges that she has consented to an online visit or consultation  She understands that the online visit is based solely on information provided by her, and that, in the absence of a face-to-face physical evaluation by the physician, the diagnosis she receives is both limited and provisional in terms of accuracy and completeness  This is not intended to replace a full medical face-to-face evaluation by the physician  Rah Randhawa understands and accepts these terms

## 2021-02-12 NOTE — ASSESSMENT & PLAN NOTE
Per cardiologist   Continue anticoagulation  Watch hemoglobin    INR being monitored by cardiologist

## 2021-02-12 NOTE — ASSESSMENT & PLAN NOTE
Diabetes    Check your fingerstick Accu-Chek once a day  Please check your fingerstick Accu-Chek different time of the day either at 7:00 a m  or 11:00 a m  for for p m  4 9:00 p m  Shanique Serum Follow Diabetic diet for diabetes as advised  If you would sugar less than 80 or more than 300 to please call us on next visit is day  If the sugar is less than 80 follow-up hypoglycemia instructions as advised  Take your diabetic medicine as advised              Lab Results   Component Value Date    HGBA1C 6 3 (H) 01/21/2021

## 2021-02-15 ENCOUNTER — TRANSCRIBE ORDERS (OUTPATIENT)
Dept: LAB | Facility: CLINIC | Age: 81
End: 2021-02-15

## 2021-02-15 ENCOUNTER — LAB (OUTPATIENT)
Dept: LAB | Facility: CLINIC | Age: 81
End: 2021-02-15
Payer: MEDICARE

## 2021-02-15 NOTE — TELEPHONE ENCOUNTER
Upon review of the In Basket request we were able to locate, review, and update the patient chart as requested for Diabetic Eye Exam     Any additional questions or concerns should be emailed to the Practice Liaisons via [email or web address] email, please do not reply via In Basket      Thank you  Chito Carter MA

## 2021-03-16 ENCOUNTER — APPOINTMENT (OUTPATIENT)
Dept: LAB | Facility: CLINIC | Age: 81
End: 2021-03-16
Payer: MEDICARE

## 2021-03-16 ENCOUNTER — TRANSCRIBE ORDERS (OUTPATIENT)
Dept: LAB | Facility: CLINIC | Age: 81
End: 2021-03-16

## 2021-03-16 DIAGNOSIS — I48.20 CHRONIC A-FIB (HCC): Primary | ICD-10-CM

## 2021-03-16 DIAGNOSIS — Z51.81 ADMISSION FOR LONG-TERM (CURRENT) USE OF ANTICOAGULANTS: ICD-10-CM

## 2021-03-16 DIAGNOSIS — Z79.01 ADMISSION FOR LONG-TERM (CURRENT) USE OF ANTICOAGULANTS: ICD-10-CM

## 2021-03-16 DIAGNOSIS — I48.20 CHRONIC A-FIB (HCC): ICD-10-CM

## 2021-03-16 LAB
INR PPP: 3.05 (ref 0.84–1.19)
PROTHROMBIN TIME: 31.3 SECONDS (ref 11.6–14.5)

## 2021-03-16 PROCEDURE — 85610 PROTHROMBIN TIME: CPT

## 2021-03-16 PROCEDURE — 36415 COLL VENOUS BLD VENIPUNCTURE: CPT

## 2021-04-03 ENCOUNTER — NURSE TRIAGE (OUTPATIENT)
Dept: OTHER | Facility: OTHER | Age: 81
End: 2021-04-03

## 2021-04-03 NOTE — TELEPHONE ENCOUNTER
Regarding: Covid/ Symptomatic/ Lost of taste and smell  ----- Message from Danny Coronado, [address] [place] sent at 4/3/2021  7:26 PM EDT -----  "Im calling for medical advice my mother is congested and has lost her sense of taste and smell "  1  Were you within 6 feet or less, for up to 15 minutes or more with a person that has a confirmed COVID-19 test? Yes   2  What was the date of your exposure? 3/28/21  3  Are you experiencing any symptoms attributed to the virus?  (Assess for SOB, cough, fever, difficulty breathing) no taste no smell  4  HIGH RISK: Do you have any history heart or lung conditions, weakened immune system, diabetes, Asthma, CHF, HIV, COPD, Chemo, renal failure, sickle cell, etc? Heart disease   5  PREGNANCY: Are you pregnant or did you recently give birth? Spoke with emergency contact Erick Pope 83 stated mom was exposed and now has lost of taste and smell adv to have her tested said that she lives 45 mins away andno one can take her advised to monitor her symptoms and told her what to watch for and call back if mom becomes worse

## 2021-04-03 NOTE — TELEPHONE ENCOUNTER
Reason for Disposition   [1] COVID-19 infection suspected by caller or triager AND [2] mild symptoms (cough, fever, or others) AND [9] no complications or SOB    Additional Information   [1] Symptoms of COVID-19 (e g , cough, fever, SOB, or others) AND [2] within 14 days of EXPOSURE (close contact) with diagnosed or suspected COVID-19 patient   Negative: [1] COVID-19 diagnosed by positive lab test AND [2] mild symptoms (e g , cough, fever, others) AND [7] no complications or SOB    Protocols used: CORONAVIRUS (COVID-19) DIAGNOSED OR SUSPECTED-ADULT-, CORONAVIRUS (COVID-19) EXPOSURE-ADULT-AH

## 2021-04-05 ENCOUNTER — TELEPHONE (OUTPATIENT)
Dept: INTERNAL MEDICINE CLINIC | Facility: CLINIC | Age: 81
End: 2021-04-05

## 2021-04-05 DIAGNOSIS — R43.0 LOSS OF SMELL: ICD-10-CM

## 2021-04-05 DIAGNOSIS — R43.2 LOSS OF TASTE: Primary | ICD-10-CM

## 2021-04-05 DIAGNOSIS — R09.81 HEAD CONGESTION: ICD-10-CM

## 2021-04-05 NOTE — TELEPHONE ENCOUNTER
Called pt in response to her daughter's phone call  She feels like she has a head cold  No SOB  No fever  She lost her taste and smell on Friday  She refused COVID 19 test because she does not drive and she has no one to take her  Setting up virtual aapt for today or tomorrow

## 2021-04-06 ENCOUNTER — TELEMEDICINE (OUTPATIENT)
Dept: INTERNAL MEDICINE CLINIC | Facility: CLINIC | Age: 81
End: 2021-04-06
Payer: MEDICARE

## 2021-04-06 VITALS
SYSTOLIC BLOOD PRESSURE: 139 MMHG | BODY MASS INDEX: 25.71 KG/M2 | TEMPERATURE: 97.6 F | DIASTOLIC BLOOD PRESSURE: 70 MMHG | HEIGHT: 66 IN | WEIGHT: 160 LBS

## 2021-04-06 DIAGNOSIS — R43.2 LOSS OF TASTE: ICD-10-CM

## 2021-04-06 DIAGNOSIS — R09.81 HEAD CONGESTION: ICD-10-CM

## 2021-04-06 DIAGNOSIS — R43.0 LOSS OF SMELL: ICD-10-CM

## 2021-04-06 DIAGNOSIS — U07.1 COVID-19: Primary | ICD-10-CM

## 2021-04-06 PROCEDURE — U0003 INFECTIOUS AGENT DETECTION BY NUCLEIC ACID (DNA OR RNA); SEVERE ACUTE RESPIRATORY SYNDROME CORONAVIRUS 2 (SARS-COV-2) (CORONAVIRUS DISEASE [COVID-19]), AMPLIFIED PROBE TECHNIQUE, MAKING USE OF HIGH THROUGHPUT TECHNOLOGIES AS DESCRIBED BY CMS-2020-01-R: HCPCS | Performed by: INTERNAL MEDICINE

## 2021-04-06 PROCEDURE — 99213 OFFICE O/P EST LOW 20 MIN: CPT | Performed by: INTERNAL MEDICINE

## 2021-04-06 PROCEDURE — U0005 INFEC AGEN DETEC AMPLI PROBE: HCPCS | Performed by: INTERNAL MEDICINE

## 2021-04-06 NOTE — ASSESSMENT & PLAN NOTE
Extensive discussion with the patient  The patient is unable to go further testing home  We did educate that patient's symptoms should get worse such as fever chills confusion shortness of breath wheezing or temperature more than 100 4 to go to the emergency room she can not get tested as well as get can be checked  Patient's daughter checks on to mom 3 to 4 times a day  They were educated about the but the stages the 1st 5 days and last 5 days  Patient is advised 14 days of isolation and quarantine  Keep social distancing at least 6 feet, Drink plenty of fluid, Take temp  Twice a day or more frequently as needed  Use Mask to cover your nose and face when appropriate and possible  Take enough rest   If your breathing gets worse, or persistent high fever or if you get worse go to Emergency room  Ask questions  Treatment options including monoclonal antibody etc   Were reviewed  Patient is sounds like patient has COVID-19 dose he is not sick fortunately  But is at risk  All were reviewed with the patient  Will follow back on Monday    Patient does not want to go for test at this time

## 2021-04-06 NOTE — PROGRESS NOTES
Virtual Brief Visit    Assessment/Plan:  Patient with headache with loss of smell and taste with his time sequence as outlined above  Extensive discussion with patient and patient's daughter on conference call  Patient has nobody to take for the COVID-19 testing  Of the importance of testing were reviewed with them which gives us the diagnosis as well as possible guideline for the monoclonal antibody as well as what to expect  Patient does not sound very sick  Patient prefers to stay home home will monitor herself will hydrate will take vitamin-C 500 mg seeing 25 mg and vitamin-D 1000 unit  She will monitor temperature  If any worse with see will go to the emergency room  The home patient's daughter will keep an eye on her  Sequence of events and what to anticipate over next 10 days reviewed  I would recommend from based upon the timeline it could be a exposure on 03/31 on Tuesday the or it could be prior to 327  In any event I would extend that that she should stay home till 417  She should reschedule her eye doctor's appointment  Usual home COVID prevention protections symptoms to watch were watch for reviewed  Keep social distancing at least 6 feet, Drink plenty of fluid, Take temp  Twice a day or more frequently as needed  Use Mask to cover your nose and face when appropriate and possible  Take enough rest   If your breathing gets worse, or persistent high fever or if you get worse go to Emergency room  Ask questions  [address]    Your healthcare provider and/or public health staff have evaluated you and have determined that you do not need to remain in the hospital at this time  At this time you can be isolated at home where you will be monitored by staff from your local or state health department   You should carefully follow the prevention and isolation steps below until a healthcare provider or local or state health department says that you can return to your normal activities  Stay home except to get medical care    People who are mildly ill with COVID-19 are able to isolate at home during their illness  You should restrict activities outside your home, except for getting medical care  Do not go to work, school, or public areas  Avoid using public transportation, ride-sharing, or taxis  Separate yourself from other people and animals in your home    People: As much as possible, you should stay in a specific room and away from other people in your home  Also, you should use a separate bathroom, if available  Animals: You should restrict contact with pets and other animals while you are sick with COVID-19, just like you would around other people  Although there have not been reports of pets or other animals becoming sick with COVID-19, it is still recommended that people sick with COVID-19 limit contact with animals until more information is known about the virus  When possible, have another member of your household care for your animals while you are sick  If you are sick with COVID-19, avoid contact with your pet, including petting, snuggling, being kissed or licked, and sharing food  If you must care for your pet or be around animals while you are sick, wash your hands before and after you interact with pets and wear a facemask  See COVID-19 and Animals for more information  Call ahead before visiting your doctor    If you have a medical appointment, call the healthcare provider and tell them that you have or may have COVID-19  This will help the healthcare providers office take steps to keep other people from getting infected or exposed  Wear a facemask    You should wear a facemask when you are around other people (e g , sharing a room or vehicle) or pets and before you enter a healthcare providers office   If you are not able to wear a facemask (for example, because it causes trouble breathing), then people who live with you should not stay in the same room with you, or they should wear a facemask if they enter your room  Cover your coughs and sneezes    Cover your mouth and nose with a tissue when you cough or sneeze  Throw used tissues in a lined trash can  Immediately wash your hands with soap and water for at least 20 seconds or, if soap and water are not available, clean your hands with an alcohol-based hand  that contains at least 60% alcohol  Clean your hands often    Wash your hands often with soap and water for at least 20 seconds, especially after blowing your nose, coughing, or sneezing; going to the bathroom; and before eating or preparing food  If soap and water are not readily available, use an alcohol-based hand  with at least 60% alcohol, covering all surfaces of your hands and rubbing them together until they feel dry  Soap and water are the best option if hands are visibly dirty  Avoid touching your eyes, nose, and mouth with unwashed hands  Avoid sharing personal household items    You should not share dishes, drinking glasses, cups, eating utensils, towels, or bedding with other people or pets in your home  After using these items, they should be washed thoroughly with soap and water  Clean all high-touch surfaces everyday    High touch surfaces include counters, tabletops, doorknobs, bathroom fixtures, toilets, phones, keyboards, tablets, and bedside tables  Also, clean any surfaces that may have blood, stool, or body fluids on them  Use a household cleaning spray or wipe, according to the label instructions  Labels contain instructions for safe and effective use of the cleaning product including precautions you should take when applying the product, such as wearing gloves and making sure you have good ventilation during use of the product  Monitor your symptoms    Seek prompt medical attention if your illness is worsening (e g , difficulty breathing)   Before seeking care, call your healthcare provider and tell them that you have, or are being evaluated for, COVID-19  Put on a facemask before you enter the facility  These steps will help the healthcare providers office to keep other people in the office or waiting room from getting infected or exposed  Ask your healthcare provider to call the local or [facility] health department  Persons who are placed under active monitoring or facilitated self-monitoring should follow instructions provided by their local health department or occupational health professionals, as appropriate  If you have a medical emergency and need to call 911, notify the dispatch personnel that you have, or are being evaluated for COVID-19  If possible, put on a facemask before emergency medical services arrive  Discontinuing home isolation    Patients with confirmed COVID-19 should remain under home isolation precautions until the following conditions are met:   - They have had no fever for at least 24 hours (that is one full day of no fever without the use medicine that reduces fevers)  AND  - other symptoms have improved (for example, when their cough or shortness of breath have improved)  AND  - If had mild or moderate illness, at least 10 days have passed since their symptoms first appeared or if severe illness (needed oxygen) or immunosuppressed, at least 20 days have passed since symptoms first appeared  Patients with confirmed COVID-19 should also notify close contacts (including their workplace) and ask that they self-quarantine  Currently, close contact is defined as being within 6 feet for 15 minutes or more from the period 24 hours starting 48 hours before symptom onset to the time at which the patient went into isolation  Close contacts of patients diagnosed with COVID-19 should be instructed by the patient to self-quarantine for 14 days from the last time of their last contact with the patient       Source: [employer]       Problem List Items Addressed This Visit        Other    COVID-19 - Primary     Extensive discussion with the patient  The patient is unable to go further testing home  We did educate that patient's symptoms should get worse such as fever chills confusion shortness of breath wheezing or temperature more than 100 4 to go to the emergency room she can not get tested as well as get can be checked  Patient's daughter checks on to mom 3 to 4 times a day  They were educated about the but the stages the 1st 5 days and last 5 days  Patient is advised 14 days of isolation and quarantine  Keep social distancing at least 6 feet, Drink plenty of fluid, Take temp  Twice a day or more frequently as needed  Use Mask to cover your nose and face when appropriate and possible  Take enough rest   If your breathing gets worse, or persistent high fever or if you get worse go to Emergency room  Ask questions  Treatment options including monoclonal antibody etc   Were reviewed  Patient is sounds like patient has COVID-19 dose he is not sick fortunately  But is at risk  All were reviewed with the patient  Will follow back on Monday    Patient does not want to go for test at this time                     Reason for visit is   Chief Complaint   Patient presents with    Virtual Brief Visit    Headache     Loss of smell and taste as well as exposure last Tuesday to COVID-19        Encounter provider Elke Warren MD    Provider located at [address] [place] [id number] [phone]    Recent Visits  Date Type Provider Dept   04/05/21 Telephone [address] [place] [address] Internal Med   Showing recent visits within past 7 days and meeting all other requirements     Today's Visits  Date Type Provider Dept   04/06/21 Telemedicine Elke Warren MD [facility]   Showing today's visits and meeting all other requirements     Future Appointments  No visits were found meeting these conditions  Showing future appointments within next 150 days and meeting all other requirements        After connecting through telephone, the patient was identified by name and date of birth  Zeenat Marie was informed that this is a telemedicine visit and that the visit is being conducted through telephone  My office door was closed  No one else was in the room  She acknowledged consent and understanding of privacy and security of the platform  The patient has agreed to participate and understands she can discontinue the visit at any time  Patient is aware this is a billable service  Subjective    Zeenat Marie is a 80 y o  female headaches loss of smell loss loss of taste post COVID exposure evaluation we had reached out to the patient yesterday and offered to go for the testing patient did not want to and does not want to go for any testing as he does not have any ride available  Pt started feeling 3-  Pt started with headache off and on and then felt like getting head cold or sinus or allergy  Did not have fever or cough or SOB or Smell or taste or abdominal pain or nausea or vomiting or diarrhea  Saturday morning 4-3-2021 pt noticed  Loss of smell and taste  Since Pt does have tickle cough couple of times a day   Does get some headache off and on constant but better with tylenol  Since Saturday pt reports no stuffy nose or runny nose or fever or chills or sob or abdominal pain or nausea or vomiting or diarrhea  Appetite is borderline  Pt is trying to drink and hydrate  Pt was exposed to neighbor across the street  Neighbor was diagnosed last Wednesday afternoon  Our pt was with her Tuesday one week ago for couple of hours  Pt did not get vaccine yet             Past Medical History:   Diagnosis Date    Atrial fibrillation ([facility] [address] )  Cancer ([facility] [address] )     hx of cervical    Cardiac disease     Hyperlipidemia        Past Surgical History:   Procedure Laterality Date    EYE SURGERY      HYSTERECTOMY      MITRAL VALVE REPLACEMENT  1994       Current Outpatient Medications   Medication Sig Dispense Refill    acetaminophen (TYLENOL) 500 mg tablet Take 500 mg by mouth      Calcium Citrate-Vitamin D (CALCITRATE/VITAMIN D PO) Take by mouth 3 (three) times a week      Cholecalciferol (VITAMIN D PO) Take by mouth      digoxin (LANOXIN) 0 25 mg tablet Take 250 mcg by mouth daily      ferrous sulfate 324 (65 Fe) mg Take 1 tablet (324 mg total) by mouth 2 (two) times a day before meals 180 tablet 1    metFORMIN (GLUCOPHAGE) 500 mg tablet 2 tablets Twice Daily 360 tablet 1    Multiple Vitamins-Minerals (PRESERVISION AREDS PO) Take 2 tablets by mouth daily      NADOLOL PO Take 2 5 mg by mouth daily      warfarin (COUMADIN) 2 5 mg tablet Take 2 5 mg by mouth 3 (three) times a week      warfarin (COUMADIN) 5 mg tablet Take 5 mg by mouth 4 (four) times a week      digoxin (LANOXIN) 0 125 mg tablet Take 125 mcg by mouth daily       No current facility-administered medications for this visit  Allergies   Allergen Reactions    Sulfa Antibiotics     Sulfasalazine        Review of Systems   Constitutional: Positive for activity change and appetite change  Negative for chills, fatigue, fever and unexpected weight change  HENT: Positive for hearing loss  Negative for ear pain, nosebleeds, postnasal drip, sinus pressure, sinus pain, sneezing, sore throat, tinnitus, trouble swallowing and voice change  Eyes: Negative for pain, redness and visual disturbance  Respiratory: Negative for cough and shortness of breath  Cardiovascular: Negative for chest pain, palpitations and leg swelling  Gastrointestinal: Negative for abdominal pain, anal bleeding, diarrhea and nausea  Endocrine: Negative for cold intolerance, polydipsia and polyuria  Genitourinary: Negative for decreased urine volume, dysuria, frequency, hematuria, urgency and vaginal bleeding  Musculoskeletal: Negative for arthralgias, gait problem and myalgias  Skin: Negative for rash  Allergic/Immunologic: Negative  Neurological: Positive for headaches  Negative for dizziness, tremors, seizures, syncope, speech difficulty, weakness, light-headedness and numbness  Hematological: Negative for adenopathy  Psychiatric/Behavioral: Negative for agitation, behavioral problems, confusion, dysphoric mood, self-injury and sleep disturbance  The patient is not nervous/anxious and is not hyperactive  Vitals:    04/06/21 1144   BP: 139/70   Temp: 97 6 °F (36 4 °C)   Weight: 72 6 kg (160 lb)   Height: 5' 6" (1 676 m)         I spent 20 minutes directly with the patient during this visit    VIRTUAL VISIT [address] acknowledges that she has consented to an online visit or consultation  She understands that the online visit is based solely on information provided by her, and that, in the absence of a face-to-face physical evaluation by the physician, the diagnosis she receives is both limited and provisional in terms of accuracy and completeness  This is not intended to replace a full medical face-to-face evaluation by the physician  Angela Casanova understands and accepts these terms

## 2021-04-06 NOTE — PATIENT INSTRUCTIONS
Keep social distancing at least 6 feet, Drink plenty of fluid, Take temp  Twice a day or more frequently as needed  Use Mask to cover your nose and face when appropriate and possible  Take enough rest   If your breathing gets worse, or persistent high fever or if you get worse go to Emergency room  Ask questions  [address]    Your healthcare provider and/or public health staff have evaluated you and have determined that you do not need to remain in the hospital at this time  At this time you can be isolated at home where you will be monitored by staff from your local or state health department  You should carefully follow the prevention and isolation steps below until a healthcare provider or local or state health department says that you can return to your normal activities  Stay home except to get medical care    People who are mildly ill with COVID-19 are able to isolate at home during their illness  You should restrict activities outside your home, except for getting medical care  Do not go to work, school, or public areas  Avoid using public transportation, ride-sharing, or taxis  Separate yourself from other people and animals in your home    People: As much as possible, you should stay in a specific room and away from other people in your home  Also, you should use a separate bathroom, if available  Animals: You should restrict contact with pets and other animals while you are sick with COVID-19, just like you would around other people  Although there have not been reports of pets or other animals becoming sick with COVID-19, it is still recommended that people sick with COVID-19 limit contact with animals until more information is known about the virus  When possible, have another member of your household care for your animals while you are sick  If you are sick with COVID-19, avoid contact with your pet, including petting, snuggling, being kissed or licked, and sharing food  If you must care for your pet or be around animals while you are sick, wash your hands before and after you interact with pets and wear a facemask  See COVID-19 and Animals for more information  Call ahead before visiting your doctor    If you have a medical appointment, call the healthcare provider and tell them that you have or may have COVID-19  This will help the healthcare providers office take steps to keep other people from getting infected or exposed  Wear a facemask    You should wear a facemask when you are around other people (e g , sharing a room or vehicle) or pets and before you enter a healthcare providers office  If you are not able to wear a facemask (for example, because it causes trouble breathing), then people who live with you should not stay in the same room with you, or they should wear a facemask if they enter your room  Cover your coughs and sneezes    Cover your mouth and nose with a tissue when you cough or sneeze  Throw used tissues in a lined trash can  Immediately wash your hands with soap and water for at least 20 seconds or, if soap and water are not available, clean your hands with an alcohol-based hand  that contains at least 60% alcohol  Clean your hands often    Wash your hands often with soap and water for at least 20 seconds, especially after blowing your nose, coughing, or sneezing; going to the bathroom; and before eating or preparing food  If soap and water are not readily available, use an alcohol-based hand  with at least 60% alcohol, covering all surfaces of your hands and rubbing them together until they feel dry  Soap and water are the best option if hands are visibly dirty  Avoid touching your eyes, nose, and mouth with unwashed hands  Avoid sharing personal household items    You should not share dishes, drinking glasses, cups, eating utensils, towels, or bedding with other people or pets in your home   After using these items, they should be washed thoroughly with soap and water  Clean all high-touch surfaces everyday    High touch surfaces include counters, tabletops, doorknobs, bathroom fixtures, toilets, phones, keyboards, tablets, and bedside tables  Also, clean any surfaces that may have blood, stool, or body fluids on them  Use a household cleaning spray or wipe, according to the label instructions  Labels contain instructions for safe and effective use of the cleaning product including precautions you should take when applying the product, such as wearing gloves and making sure you have good ventilation during use of the product  Monitor your symptoms    Seek prompt medical attention if your illness is worsening (e g , difficulty breathing)  Before seeking care, call your healthcare provider and tell them that you have, or are being evaluated for, COVID-19  Put on a facemask before you enter the facility  These steps will help the healthcare providers office to keep other people in the office or waiting room from getting infected or exposed  Ask your healthcare provider to call the local or [facility] health department  Persons who are placed under active monitoring or facilitated self-monitoring should follow instructions provided by their local health department or occupational health professionals, as appropriate  If you have a medical emergency and need to call 911, notify the dispatch personnel that you have, or are being evaluated for COVID-19  If possible, put on a facemask before emergency medical services arrive      Discontinuing home isolation    Patients with confirmed COVID-19 should remain under home isolation precautions until the following conditions are met:   - They have had no fever for at least 24 hours (that is one full day of no fever without the use medicine that reduces fevers)  AND  - other symptoms have improved (for example, when their cough or shortness of breath have improved)  AND  - If had mild or moderate illness, at least 10 days have passed since their symptoms first appeared or if severe illness (needed oxygen) or immunosuppressed, at least 20 days have passed since symptoms first appeared  Patients with confirmed COVID-19 should also notify close contacts (including their workplace) and ask that they self-quarantine  Currently, close contact is defined as being within 6 feet for 15 minutes or more from the period 24 hours starting 48 hours before symptom onset to the time at which the patient went into isolation  Close contacts of patients diagnosed with COVID-19 should be instructed by the patient to self-quarantine for 14 days from the last time of their last contact with the patient       Source: [employer]

## 2021-04-07 LAB — SARS-COV-2 RNA RESP QL NAA+PROBE: POSITIVE

## 2021-04-08 ENCOUNTER — TELEPHONE (OUTPATIENT)
Dept: INTERNAL MEDICINE CLINIC | Facility: CLINIC | Age: 81
End: 2021-04-08

## 2021-04-08 NOTE — TELEPHONE ENCOUNTER
Pt and her daughter called  She is feeling much better today  Some of her taste and smell are coming back  She refused to go for Monoclonnal Antibody treatment because she feels better  She will continue quarantine just to be safe and keep her follow up appt on the phone with us on Monday the 12th

## 2021-04-09 DIAGNOSIS — R51.9 ACUTE NONINTRACTABLE HEADACHE, UNSPECIFIED HEADACHE TYPE: ICD-10-CM

## 2021-04-09 DIAGNOSIS — U07.1 COVID-19: Primary | ICD-10-CM

## 2021-04-12 ENCOUNTER — TRANSCRIBE ORDERS (OUTPATIENT)
Dept: LAB | Facility: CLINIC | Age: 81
End: 2021-04-12

## 2021-04-12 ENCOUNTER — APPOINTMENT (OUTPATIENT)
Dept: LAB | Facility: CLINIC | Age: 81
End: 2021-04-12
Payer: MEDICARE

## 2021-04-12 ENCOUNTER — TELEMEDICINE (OUTPATIENT)
Dept: INTERNAL MEDICINE CLINIC | Facility: CLINIC | Age: 81
End: 2021-04-12
Payer: MEDICARE

## 2021-04-12 VITALS — WEIGHT: 160 LBS | BODY MASS INDEX: 25.71 KG/M2 | HEIGHT: 66 IN | TEMPERATURE: 97.8 F

## 2021-04-12 DIAGNOSIS — Z95.2 HEART VALVE REPLACED BY TRANSPLANT: Primary | ICD-10-CM

## 2021-04-12 DIAGNOSIS — I48.20 CHRONIC ATRIAL FIBRILLATION (HCC): ICD-10-CM

## 2021-04-12 DIAGNOSIS — Z79.01 LONG TERM (CURRENT) USE OF ANTICOAGULANTS: ICD-10-CM

## 2021-04-12 DIAGNOSIS — U07.1 COVID-19: Primary | ICD-10-CM

## 2021-04-12 LAB
INR PPP: 2.98 (ref 0.84–1.19)
PROTHROMBIN TIME: 30.8 SECONDS (ref 11.6–14.5)

## 2021-04-12 PROCEDURE — 85610 PROTHROMBIN TIME: CPT

## 2021-04-12 PROCEDURE — 36415 COLL VENOUS BLD VENIPUNCTURE: CPT

## 2021-04-12 PROCEDURE — 99442 PR PHYS/QHP TELEPHONE EVALUATION 11-20 MIN: CPT | Performed by: INTERNAL MEDICINE

## 2021-04-12 NOTE — PROGRESS NOTES
Virtual Brief Visit    Assessment/Plan:  [id number] with initial symptoms started 03/27/2021  Patient was related to go for test as recommended by health call as well as by us on last Monday puff finally again after discussion on Tuesday patient went for the test test came back positive on Wednesday  Patient symptoms were minor patient was starting to feel much better  We talked about monoclonal antibodies  On 04/07/2021 when test came back positive  Patient refused to go next day call that she does not want to go for the monoclonal antibodies  In patient is being followed today  Patient is real reasonably totally symptom-free except for residual problem with smell  Headache is gone  Patient wants to go for repeat testing for eye examination  Recommend to go any time upper for 15  We also advised to go for the vaccine sometime at your convenience  Patient has recovered    Problem List Items Addressed This Visit     None                Reason for visit is   Chief Complaint   Patient presents with    Virtual Brief Visit        Encounter provider Madelyn Sanchez MD    Provider located at [address]    Recent Visits  Date Type Provider Dept   04/08/21 Telephone [facility] Internal Med   04/06/21 Telemedicine Madelyn Sanchez MD [id number] Med   04/05/21 Telephone [facility] Med   Showing recent visits within past 7 days and meeting all other requirements     Today's Visits  Date Type Provider Dept   04/12/21 Telemedicine Madelyn Sanchez MD [facility] Internal Med   Showing today's visits and meeting all other requirements     Future Appointments  No visits were found meeting these conditions     Showing future appointments within next 150 days and meeting all other requirements        After connecting through telephone, the patient was identified by name and date of birth  Aguila Lund was informed that this is a telemedicine visit and that the visit is being conducted through telephone  My office door was closed  No one else was in the room  She acknowledged consent and understanding of privacy and security of the platform  The patient has agreed to participate and understands she can discontinue the visit at any time  Patient is aware this is a billable service  Subjective    Aguila Lund is a 80 y o  female COVID-19 follow-up  Patient had initially started with symptoms of 03/27/2021 with headache off and on  Patient is friend also got sick at the same time who came back positive  Patient had called health call over the weekend  On Saturday 04/03/2021 as patient noticed loss of smell and taste at that point  We had talked to her on Monday we had advised to go for testing patient did not want to go for testing we had talked to her again on Tuesday 04/06/2021  After extensive is discussion patient went for the testing which came back positive  We talked to her on 04/07/2021 with patient and daughter  Advised monoclonal antibodies  Patient pretty much had no major symptoms at that point except for some residual headache and some smell issue  Pay    Patient wanted to think it over of his we talked about the window or narrow  Patient call next day and decided not to go for monoclonal antibodies  Patient is here for follow-up today  Patient is essentially symptom-free predicted except for the leftovers problem with smell  Headache is gone  Patient denies any cough chest congestion shortness of breath abdominal pain nausea vomiting diarrhea stuffy nose runny nose sore throat headache body ache  Appetite is fairly good is a residual smell smell problem is that taste is fairly good      Patient has appointment with ophthalmologist and they would require negative test before the can see her  Patient advised to go for test anytime after for 15 they have appointment after 420  Patient will call any time if there is any recurrent symptoms         Past Medical History:   Diagnosis Date    Atrial fibrillation [facility] [address] )     Cancer ([facility] [address] )     hx of cervical    Cardiac disease     Hyperlipidemia        Past Surgical History:   Procedure Laterality Date    EYE SURGERY      HYSTERECTOMY      MITRAL VALVE REPLACEMENT  1994       Current Outpatient Medications   Medication Sig Dispense Refill    acetaminophen (TYLENOL) 500 mg tablet Take 500 mg by mouth      Calcium Citrate-Vitamin D (CALCITRATE/VITAMIN D PO) Take by mouth 3 (three) times a week      Cholecalciferol (VITAMIN D PO) Take by mouth      digoxin (LANOXIN) 0 125 mg tablet Take 125 mcg by mouth daily      digoxin (LANOXIN) 0 25 mg tablet Take 250 mcg by mouth daily      ferrous sulfate 324 (65 Fe) mg Take 1 tablet (324 mg total) by mouth 2 (two) times a day before meals 180 tablet 1    metFORMIN (GLUCOPHAGE) 500 mg tablet 2 tablets Twice Daily 360 tablet 1    Multiple Vitamins-Minerals (PRESERVISION AREDS PO) Take 2 tablets by mouth daily      NADOLOL PO Take 2 5 mg by mouth daily      warfarin (COUMADIN) 2 5 mg tablet Take 2 5 mg by mouth 3 (three) times a week      warfarin (COUMADIN) 5 mg tablet Take 5 mg by mouth 4 (four) times a week       No current facility-administered medications for this visit  Allergies   Allergen Reactions    Sulfa Antibiotics     Sulfasalazine        Review of Systems   All other systems reviewed and are negative  Vitals:    04/12/21 0854   Temp: 97 8 °F (36 6 °C)   Weight: 72 6 kg (160 lb)   Height: 5' 6" (1 676 m)         I spent 15 minutes directly with the patient during this visit    VIRTUAL VISIT [address] acknowledges that she has consented to an online visit or consultation   She understands that the online visit is based solely on information provided by her, and that, in the absence of a face-to-face physical evaluation by the physician, the diagnosis she receives is both limited and provisional in terms of accuracy and completeness  This is not intended to replace a full medical face-to-face evaluation by the physician  Ra Drew understands and accepts these terms

## 2021-04-12 NOTE — PATIENT INSTRUCTIONS
1  Go for repeat testing as required by your eye doctor any type after 04/15/2021      2  Consider taking vaccine any time strongly recommend 3  If there is any recurrent symptoms or any new issues give us a call      Now we will see you as per your scheduled visit    No further isolation or quadrant and needed

## 2021-04-19 DIAGNOSIS — U07.1 COVID-19: ICD-10-CM

## 2021-04-19 DIAGNOSIS — R51.9 ACUTE NONINTRACTABLE HEADACHE, UNSPECIFIED HEADACHE TYPE: ICD-10-CM

## 2021-04-19 PROCEDURE — U0003 INFECTIOUS AGENT DETECTION BY NUCLEIC ACID (DNA OR RNA); SEVERE ACUTE RESPIRATORY SYNDROME CORONAVIRUS 2 (SARS-COV-2) (CORONAVIRUS DISEASE [COVID-19]), AMPLIFIED PROBE TECHNIQUE, MAKING USE OF HIGH THROUGHPUT TECHNOLOGIES AS DESCRIBED BY CMS-2020-01-R: HCPCS | Performed by: INTERNAL MEDICINE

## 2021-04-19 PROCEDURE — U0005 INFEC AGEN DETEC AMPLI PROBE: HCPCS | Performed by: INTERNAL MEDICINE

## 2021-04-20 LAB — SARS-COV-2 RNA RESP QL NAA+PROBE: POSITIVE

## 2021-05-03 ENCOUNTER — TRANSCRIBE ORDERS (OUTPATIENT)
Dept: LAB | Facility: CLINIC | Age: 81
End: 2021-05-03

## 2021-05-03 ENCOUNTER — APPOINTMENT (OUTPATIENT)
Dept: LAB | Facility: CLINIC | Age: 81
End: 2021-05-03
Payer: MEDICARE

## 2021-05-03 DIAGNOSIS — I48.91 ATRIAL FIBRILLATION, UNSPECIFIED TYPE (HCC): ICD-10-CM

## 2021-05-03 DIAGNOSIS — Z79.01 LONG TERM (CURRENT) USE OF ANTICOAGULANTS: ICD-10-CM

## 2021-05-03 DIAGNOSIS — Z95.2 HEART VALVE REPLACED: Primary | ICD-10-CM

## 2021-05-03 DIAGNOSIS — Z95.2 HEART VALVE REPLACED: ICD-10-CM

## 2021-05-03 DIAGNOSIS — Z79.01 LONG TERM (CURRENT) USE OF ANTICOAGULANTS: Primary | ICD-10-CM

## 2021-05-03 LAB
INR PPP: 3 (ref 0.84–1.19)
PROTHROMBIN TIME: 30.9 SECONDS (ref 11.6–14.5)

## 2021-05-03 PROCEDURE — 85610 PROTHROMBIN TIME: CPT

## 2021-05-03 PROCEDURE — 36415 COLL VENOUS BLD VENIPUNCTURE: CPT

## 2021-05-19 PROBLEM — J43.9 PULMONARY EMPHYSEMA (HCC): Status: ACTIVE | Noted: 2021-05-19

## 2021-05-20 ENCOUNTER — OFFICE VISIT (OUTPATIENT)
Dept: INTERNAL MEDICINE CLINIC | Facility: CLINIC | Age: 81
End: 2021-05-20
Payer: MEDICARE

## 2021-05-20 VITALS
SYSTOLIC BLOOD PRESSURE: 150 MMHG | BODY MASS INDEX: 26.68 KG/M2 | OXYGEN SATURATION: 97 % | TEMPERATURE: 97.3 F | DIASTOLIC BLOOD PRESSURE: 60 MMHG | WEIGHT: 166 LBS | HEIGHT: 66 IN | HEART RATE: 82 BPM

## 2021-05-20 DIAGNOSIS — K90.0 CELIAC DISEASE: ICD-10-CM

## 2021-05-20 DIAGNOSIS — D50.0 IRON DEFICIENCY ANEMIA DUE TO CHRONIC BLOOD LOSS: ICD-10-CM

## 2021-05-20 DIAGNOSIS — Z79.01 LONG TERM (CURRENT) USE OF ANTICOAGULANTS: ICD-10-CM

## 2021-05-20 DIAGNOSIS — U07.1 COVID-19: Primary | ICD-10-CM

## 2021-05-20 DIAGNOSIS — E03.8 OTHER SPECIFIED HYPOTHYROIDISM: ICD-10-CM

## 2021-05-20 DIAGNOSIS — J30.1 ALLERGIC RHINITIS DUE TO POLLEN, UNSPECIFIED SEASONALITY: ICD-10-CM

## 2021-05-20 DIAGNOSIS — I71.4 ABDOMINAL AORTIC ANEURYSM (AAA) WITHOUT RUPTURE (HCC): ICD-10-CM

## 2021-05-20 DIAGNOSIS — Z96.0 PRESENCE OF PESSARY: ICD-10-CM

## 2021-05-20 DIAGNOSIS — Z95.2 H/O MITRAL VALVE REPLACEMENT WITH MECHANICAL VALVE: ICD-10-CM

## 2021-05-20 DIAGNOSIS — I48.20 CHRONIC ATRIAL FIBRILLATION (HCC): ICD-10-CM

## 2021-05-20 DIAGNOSIS — E11.9 TYPE 2 DIABETES MELLITUS WITHOUT COMPLICATION, WITHOUT LONG-TERM CURRENT USE OF INSULIN (HCC): ICD-10-CM

## 2021-05-20 DIAGNOSIS — Z00.00 MEDICARE ANNUAL WELLNESS VISIT, SUBSEQUENT: ICD-10-CM

## 2021-05-20 DIAGNOSIS — G47.33 OBSTRUCTIVE SLEEP APNEA: ICD-10-CM

## 2021-05-20 PROCEDURE — 99214 OFFICE O/P EST MOD 30 MIN: CPT | Performed by: INTERNAL MEDICINE

## 2021-05-20 PROCEDURE — G0439 PPPS, SUBSEQ VISIT: HCPCS | Performed by: INTERNAL MEDICINE

## 2021-05-20 PROCEDURE — 1123F ACP DISCUSS/DSCN MKR DOCD: CPT | Performed by: INTERNAL MEDICINE

## 2021-05-20 RX ORDER — FERROUS SULFATE TAB EC 324 MG (65 MG FE EQUIVALENT) 324 (65 FE) MG
324 TABLET DELAYED RESPONSE ORAL
Qty: 180 TABLET | Refills: 1 | Status: SHIPPED | OUTPATIENT
Start: 2021-05-20 | End: 2022-04-01

## 2021-05-20 NOTE — PATIENT INSTRUCTIONS
Medicare Preventive Visit Patient Instructions  Thank you for completing your Welcome to Medicare Visit or Medicare Annual Wellness Visit today  Your next wellness visit will be due in one year (5/21/2022)  The screening/preventive services that you may require over the next 5-10 years are detailed below  Some tests may not apply to you based off risk factors and/or age  Screening tests ordered at today's visit but not completed yet may show as past due  Also, please note that scanned in results may not display below  Preventive Screenings:  Service Recommendations Previous Testing/Comments   Colorectal Cancer Screening  * Colonoscopy    * Fecal Occult Blood Test (FOBT)/Fecal Immunochemical Test (FIT)  * Fecal DNA/Cologuard Test  * Flexible Sigmoidoscopy Age: 54-65 years old   Colonoscopy: every 10 years (may be performed more frequently if at higher risk)  OR  FOBT/FIT: every 1 year  OR  Cologuard: every 3 years  OR  Sigmoidoscopy: every 5 years  Screening may be recommended earlier than age 48 if at higher risk for colorectal cancer  Also, an individualized decision between you and your healthcare provider will decide whether screening between the ages of 74-80 would be appropriate  Colonoscopy: Not on file  FOBT/FIT: 06/15/2020  Cologuard: Not on file  Sigmoidoscopy: Not on file          Breast Cancer Screening Age: 36 years old  Frequency: every 1-2 years  Not required if history of left and right mastectomy Mammogram: 03/12/2020        Cervical Cancer Screening Between the ages of 21-29, pap smear recommended once every 3 years  Between the ages of 33-67, can perform pap smear with HPV co-testing every 5 years     Recommendations may differ for women with a history of total hysterectomy, cervical cancer, or abnormal pap smears in past  Pap Smear: Not on file        Hepatitis C Screening Once for adults born between [facility]  More frequently in patients at high risk for Hepatitis C Hep C Antibody: 07/05/2019        Diabetes Screening 1-2 times per year if you're at risk for diabetes or have pre-diabetes Fasting glucose: 130 mg/dL   A1C: 6 3 %        Cholesterol Screening Once every 5 years if you don't have a lipid disorder  May order more often based on risk factors  Lipid panel: 01/21/2021          Other Preventive Screenings Covered by Medicare:  1  Abdominal Aortic Aneurysm (AAA) Screening: covered once if your at risk  You're considered to be at risk if you have a family history of AAA  2  Lung Cancer Screening: covers low dose CT scan once per year if you meet all of the following conditions: (1) Age 50-69; (2) No signs or symptoms of lung cancer; (3) Current smoker or have quit smoking within the last 15 years; (4) You have a tobacco smoking history of at least 30 pack years (packs per day multiplied by number of years you smoked); (5) You get a written order from a healthcare provider  3  Glaucoma Screening: covered annually if you're considered high risk: (1) You have diabetes OR (2) Family history of glaucoma OR (3)  aged 48 and older OR (3)  American aged 72 and older  3  Osteoporosis Screening: covered every 2 years if you meet one of the following conditions: (1) You're estrogen deficient and at risk for osteoporosis based off medical history and other findings; (2) Have a vertebral abnormality; (3) On glucocorticoid therapy for more than 3 months; (4) Have primary hyperparathyroidism; (5) On osteoporosis medications and need to assess response to drug therapy  · Last bone density test (DXA Scan): 06/27/2017  5  HIV Screening: covered annually if you're between the age of 12-76  Also covered annually if you are younger than 13 and older than 72 with risk factors for HIV infection  For pregnant patients, it is covered up to 3 times per pregnancy      Immunizations:  Immunization Recommendations   Influenza Vaccine Annual influenza vaccination during flu season is recommended for all persons aged >= 6 months who do not have contraindications   Pneumococcal Vaccine (Prevnar and Pneumovax)  * Prevnar = PCV13  * Pneumovax = PPSV23   Adults 25-60 years old: 1-3 doses may be recommended based on certain risk factors  Adults 72 years old: Prevnar (PCV13) vaccine recommended followed by Pneumovax (PPSV23) vaccine  If already received PPSV23 since turning 65, then PCV13 recommended at least one year after PPSV23 dose  Hepatitis B Vaccine 3 dose series if at intermediate or high risk (ex: diabetes, end stage renal disease, liver disease)   Tetanus (Td) Vaccine - COST NOT COVERED BY MEDICARE PART B Following completion of primary series, a booster dose should be given every 10 years to maintain immunity against tetanus  Td may also be given as tetanus wound prophylaxis  Tdap Vaccine - COST NOT COVERED BY MEDICARE PART B Recommended at least once for all adults  For pregnant patients, recommended with each pregnancy  Shingles Vaccine (Shingrix) - COST NOT COVERED BY MEDICARE PART B  2 shot series recommended in those aged 48 and above     Health Maintenance Due:      Topic Date Due    Hepatitis C Screening  Completed     Immunizations Due:      Topic Date Due    COVID-19 Vaccine (1) Never done    DTaP,Tdap,and Td Vaccines (1 - Tdap) Never done    Pneumococcal Vaccine: 65+ Years (1 of 1 - PPSV23) Never done     Advance Directives   What are advance directives? Advance directives are legal documents that state your wishes and plans for medical care  These plans are made ahead of time in case you lose your ability to make decisions for yourself  Advance directives can apply to any medical decision, such as the treatments you want, and if you want to donate organs  What are the types of advance directives? There are many types of advance directives, and each state has rules about how to use them  You may choose a combination of any of the following:  · Living will:   This is a written record of the treatment you want  You can also choose which treatments you do not want, which to limit, and which to stop at a certain time  This includes surgery, medicine, IV fluid, and tube feedings  · Durable power of  for healthcare [facility]): This is a written record that states who you want to make healthcare choices for you when you are unable to make them for yourself  This person, called a proxy, is usually a family member or a friend  You may choose more than 1 proxy  · Do not resuscitate (DNR) order:  A DNR order is used in case your heart stops beating or you stop breathing  It is a request not to have certain forms of treatment, such as CPR  A DNR order may be included in other types of advance directives  · Medical directive: This covers the care that you want if you are in a coma, near death, or unable to make decisions for yourself  You can list the treatments you want for each condition  Treatment may include pain medicine, surgery, blood transfusions, dialysis, IV or tube feedings, and a ventilator (breathing machine)  · Values history: This document has questions about your views, beliefs, and how you feel and think about life  This information can help others choose the care that you would choose  Why are advance directives important? An advance directive helps you control your care  Although spoken wishes may be used, it is better to have your wishes written down  Spoken wishes can be misunderstood, or not followed  Treatments may be given even if you do not want them  An advance directive may make it easier for your family to make difficult choices about your care  Weight Management   Why it is important to manage your weight:  Being overweight increases your risk of health conditions such as heart disease, high blood pressure, type 2 diabetes, and certain types of cancer  It can also increase your risk for osteoarthritis, sleep apnea, and other respiratory problems  Aim for a slow, steady weight loss  Even a small amount of weight loss can lower your risk of health problems  How to lose weight safely:  A safe and healthy way to lose weight is to eat fewer calories and get regular exercise  You can lose up about 1 pound a week by decreasing the number of calories you eat by 500 calories each day  Healthy meal plan for weight management:  A healthy meal plan includes a variety of foods, contains fewer calories, and helps you stay healthy  A healthy meal plan includes the following:  · Eat whole-grain foods more often  A healthy meal plan should contain fiber  Fiber is the part of grains, fruits, and vegetables that is not broken down by your body  Whole-grain foods are healthy and provide extra fiber in your diet  Some examples of whole-grain foods are whole-wheat breads and pastas, oatmeal, brown rice, and bulgur  · Eat a variety of vegetables every day  Include dark, leafy greens such as spinach, kale, reina greens, and mustard greens  Eat yellow and orange vegetables such as carrots, sweet potatoes, and winter squash  · Eat a variety of fruits every day  Choose fresh or canned fruit (canned in its own juice or light syrup) instead of juice  Fruit juice has very little or no fiber  · Eat low-fat dairy foods  Drink fat-free (skim) milk or 1% milk  Eat fat-free yogurt and low-fat cottage cheese  Try low-fat cheeses such as mozzarella and other reduced-fat cheeses  · Choose meat and other protein foods that are low in fat  Choose beans or other legumes such as split peas or lentils  Choose fish, skinless poultry (chicken or turkey), or lean cuts of red meat (beef or pork)  Before you cook meat or poultry, cut off any visible fat  · Use less fat and oil  Try baking foods instead of frying them  Add less fat, such as margarine, sour cream, regular salad dressing and mayonnaise to foods  Eat fewer high-fat foods   Some examples of high-fat foods include french fries, doughnuts, ice cream, and cakes  · Eat fewer sweets  Limit foods and drinks that are high in sugar  This includes candy, cookies, regular soda, and sweetened drinks  Exercise:  Exercise at least 30 minutes per day on most days of the week  Some examples of exercise include walking, biking, dancing, and swimming  You can also fit in more physical activity by taking the stairs instead of the elevator or parking farther away from stores  Ask your healthcare provider about the best exercise plan for you  © Copyright [employer] 2018 Information is for End User's use only and may not be sold, redistributed or otherwise used for commercial purposes  All illustrations and images included in CareNotes® are the copyrighted property of A D A M [employer] Inc  or [facility] Elias Russo  Follow with Consultants as per their and our suggestion    Follow up in 12 week(s) or as needed earlier    Follow all instructions as advised and discussed  Take your medications as prescribed  Call the office immediately if you experience any side effects  Ask questions if you do not understand  Keep your scheduled appointment as advised or come sooner if necessary or in doubt  Best time to call for non-urgent matter or questions on weekdays is between 9am and 12 noon  See physician for any new symptoms or worsening of current symptoms  Urgent or emergent situations call 911 and report to nearest emergency room      I spent  30 -40 minutes taking care of this patient including clinical care, conseling, collaboration, chart, lab and consultaion review as appropriate    Patient is to get labs 1 week(s) prior to next visit if advised

## 2021-05-20 NOTE — PROGRESS NOTES
Assessment and Plan:     Problem List Items Addressed This Visit     None      Visit Diagnoses     Medicare annual wellness visit, subsequent    -  Primary        BMI Counseling: There is no height or weight on file to calculate BMI  The BMI is above normal  Nutrition recommendations include decreasing portion sizes, encouraging healthy choices of fruits and vegetables, decreasing fast food intake, consuming healthier snacks, limiting drinks that contain sugar, moderation in carbohydrate intake, increasing intake of lean protein, reducing intake of saturated and trans fat and reducing intake of cholesterol  Exercise recommendations include exercising 3-5 times per week  Preventive health issues were discussed with patient, and age appropriate screening tests were ordered as noted in patient's After Visit Summary  Personalized health advice and appropriate referrals for health education or preventive services given if needed, as noted in patient's After Visit Summary       History of Present Illness:     Patient presents for Medicare Annual Wellness visit    Patient Care Team:  Gen Alexandre MD as PCP - General (Internal Medicine)     Problem List:     Patient Active Problem List   Diagnosis    Obstructive sleep apnea    Chronic atrial fibrillation ([facility] )    H/O mitral valve replacement with mechanical valve    Long term (current) use of anticoagulants    Presence of prosthetic heart valve    Hypercholesteremia    Anemia due to chronic kidney disease    Allergic rhinitis    Type 2 diabetes mellitus without complication, without long-term current use of insulin (HCC)    Iron deficiency anemia    Other specified hypothyroidism    Vitamin B12 deficiency    Other microscopic hematuria    Presence of pessary    Celiac disease    Abdominal aortic aneurysm (AAA) without rupture ([facility] [address] )    Headache    Ataxia    COVID-19    [facility]      Past Medical and Surgical History:     Past Medical History:   Diagnosis Date    Atrial fibrillation ([facility] [address] [facility]     Cancer (HCC)     hx of cervical    Cardiac disease     Hyperlipidemia      Past Surgical History:   Procedure Laterality Date    EYE SURGERY      HYSTERECTOMY      MITRAL VALVE REPLACEMENT        Family History:     Family History   Problem Relation Age of Onset    Heart failure Mother     Heart attack Father     Sleep apnea Brother       Social History:        Social History     Socioeconomic History    Marital status:      Spouse name: Not on file    Number of children: Not on file    Years of education: Not on file    Highest education level: Not on file   Occupational History    Not on file   Social Needs    Financial resource strain: Not on file    Food insecurity     Worry: Not on file     Inability: Not on file    Transportation needs     Medical: Not on file     Non-medical: Not on file   Tobacco Use    Smoking status: Former Smoker     Packs/day: 2 00     Years: 30 00     Pack years: 60 00     Quit date:      Years since quittin 4    Smokeless tobacco: Never Used   Substance and Sexual Activity    Alcohol use: Yes     Comment: special occasional    Drug use: No    Sexual activity: Not on file   Lifestyle    Physical activity     Days per week: Not on file     Minutes per session: Not on file    Stress: Not on file   Relationships    Social connections     Talks on phone: Not on file     Gets together: Not on file     Attends Advent service: Not on file     Active member of club or organization: Not on file     Attends meetings of clubs or organizations: Not on file     Relationship status: Not on file    Intimate partner violence     Fear of current or ex partner: Not on file     Emotionally abused: Not on file     Physically abused: Not on file     Forced sexual activity: Not on file   Other Topics Concern    Not on file   Social History Narrative    Not on file      Medications and Allergies:     Current Outpatient Medications   Medication Sig Dispense Refill    acetaminophen (TYLENOL) 500 mg tablet Take 500 mg by mouth      Calcium Citrate-Vitamin D (CALCITRATE/VITAMIN D PO) Take by mouth 3 (three) times a week      Cholecalciferol (VITAMIN D PO) Take by mouth      digoxin (LANOXIN) 0 125 mg tablet Take 125 mcg by mouth daily      digoxin (LANOXIN) 0 25 mg tablet Take 250 mcg by mouth daily      ferrous sulfate 324 (65 Fe) mg Take 1 tablet (324 mg total) by mouth 2 (two) times a day before meals 180 tablet 1    metFORMIN (GLUCOPHAGE) 500 mg tablet 2 tablets Twice Daily 360 tablet 1    Multiple Vitamins-Minerals (PRESERVISION AREDS PO) Take 2 tablets by mouth daily      NADOLOL PO Take 2 5 mg by mouth daily      warfarin (COUMADIN) 2 5 mg tablet Take 2 5 mg by mouth 3 (three) times a week      warfarin (COUMADIN) 5 mg tablet Take 5 mg by mouth 4 (four) times a week       No current facility-administered medications for this visit  Allergies   Allergen Reactions    Sulfa Antibiotics     Sulfasalazine       Immunizations: There is no immunization history on file for this patient  Health Maintenance:         Topic Date Due    Hepatitis C Screening  Completed         Topic Date Due    COVID-19 Vaccine (1) Never done    DTaP,Tdap,and Td Vaccines (1 - Tdap) Never done    Pneumococcal Vaccine: 65+ Years (1 of 1 - PPSV23) Never done      Medicare Health Risk Assessment:     There were no vitals taken for this visit  Pavithra Toribio is here for her Subsequent Wellness visit  Last Medicare Wellness visit information reviewed, patient interviewed and updates made to the record today  Health Risk Assessment:   Patient rates overall health as very good  Patient feels that their physical health rating is same  Patient is satisfied with their life  Eyesight was rated as slightly worse  Hearing was rated as same   Patient feels that their emotional and mental health rating is same  Patients states they are never, rarely angry  Patient states they are often unusually tired/fatigued  Pain experienced in the last 7 days has been none  Patient states that she has experienced weight loss or gain in last 6 months  Used to weigh 172  Does not think this is an issue  She has a good appetite  She has some tiredness post covid infection  Her eye sight is not great  She gave up driving 2 years ago because of this  She sees a specialist for her eye issues  Depression Screening:   PHQ-2 Score: 0      Fall Risk Screening: In the past year, patient has experienced: no history of falling in past year      Urinary Incontinence Screening:   Patient has not leaked urine accidently in the last six months  Home Safety:  Patient does not have trouble with stairs inside or outside of their home  Patient has working smoke alarms and has working carbon monoxide detector  Home safety hazards include: none  No Home hazards  She feels safe in her home  She is regularly up and down her stairs  Nutrition:   Current diet is Regular  Eats a balanced diet  She does not drink  Medications:   Patient is currently taking over-the-counter supplements  OTC medications include: see medication list  Patient is able to manage medications  Pt is very independent  She mages her meds with no issues  Activities of Daily Living (ADLs)/Instrumental Activities of Daily Living (IADLs):   Walk and transfer into and out of bed and chair?: Yes  Dress and groom yourself?: Yes    Bathe or shower yourself?: Yes    Feed yourself? Yes  Do your laundry/housekeeping?: Yes  Manage your money, pay your bills and track your expenses?: Yes  Make your own meals?: Yes    Do your own shopping?: Yes    ADL comments: Pt is independent  She no longer drives because of deteriorating eye sight  Family assists her in driving      Previous Hospitalizations:   Any hospitalizations or ED visits within the last 12 months?: No      Hospitalization Comments: Her chronic conditions have been stable  No ED or admissions in the past one year  Advance Care Planning:   Living will: Yes    Durable POA for healthcare: Yes    Advanced directive: Yes    Advanced directive counseling given: No    Five wishes given: No    Patient declined ACP directive: No    End of Life Decisions reviewed with patient: No    Provider agrees with end of life decisions: Yes      Comments: Pt has a Living will and healthcare POA  She will bring a copy for upload at next visit  Cognitive Screening:   Provider or family/friend/caregiver concerned regarding cognition?: No    PREVENTIVE SCREENINGS      Cardiovascular Screening:    General: Screening Not Indicated and History Lipid Disorder      Diabetes Screening:     General: Screening Not Indicated and History Diabetes      Colorectal Cancer Screening:     General: Screening Current      Breast Cancer Screening:     General: Screening Current      Cervical Cancer Screening:    General: Screening Not Indicated      Osteoporosis Screening:    General: Risks and Benefits Discussed and Patient Declines      Abdominal Aortic Aneurysm (AAA) Screening:        General: Screening Not Indicated and History AAA      Lung Cancer Screening:     General: Screening Not Indicated      Hepatitis C Screening:    General: Screening Current      Preventive Screening Comments: [facility]  She will think about a pneumonia vax  Rec Dtap if not boosted in last 10 years  She does not wish to have any more mammograms or screening colonoscopies  Does not want Dexa Scan    Screening, Brief Intervention, and Referral to Treatment (SBIRT)    Screening  Typical number of drinks in a day: 0  Typical number of drinks in a week: 0  Interpretation: Low risk drinking behavior      Single Item Drug Screening:  How often have you used an illegal drug (including marijuana) or a prescription medication for non-medical reasons in the past year? never    Single Item Drug Screen Score: 0  Interpretation: Negative screen for possible drug use disorder    Brief Intervention  Alcohol & drug use screenings were reviewed  No concerns regarding substance use disorder identified  No issues      Other Counseling Topics:   Skin self-exam        Hany Solomon MD

## 2021-06-01 ENCOUNTER — APPOINTMENT (OUTPATIENT)
Dept: LAB | Facility: CLINIC | Age: 81
End: 2021-06-01
Payer: MEDICARE

## 2021-06-01 ENCOUNTER — TRANSCRIBE ORDERS (OUTPATIENT)
Dept: LAB | Facility: CLINIC | Age: 81
End: 2021-06-01

## 2021-06-15 ENCOUNTER — TRANSCRIBE ORDERS (OUTPATIENT)
Dept: ADMINISTRATIVE | Facility: HOSPITAL | Age: 81
End: 2021-06-15

## 2021-06-15 ENCOUNTER — APPOINTMENT (OUTPATIENT)
Dept: LAB | Facility: CLINIC | Age: 81
End: 2021-06-15
Payer: MEDICARE

## 2021-06-15 DIAGNOSIS — R10.84 ABDOMINAL PAIN, GENERALIZED: ICD-10-CM

## 2021-06-15 DIAGNOSIS — R10.84 GENERALIZED ABDOMINAL PAIN: ICD-10-CM

## 2021-06-15 DIAGNOSIS — R10.84 GENERALIZED ABDOMINAL PAIN: Primary | ICD-10-CM

## 2021-06-15 LAB
ANION GAP SERPL CALCULATED.3IONS-SCNC: 6 MMOL/L (ref 4–13)
BUN SERPL-MCNC: 14 MG/DL (ref 5–25)
CALCIUM SERPL-MCNC: 9.4 MG/DL (ref 8.3–10.1)
CHLORIDE SERPL-SCNC: 101 MMOL/L (ref 100–108)
CO2 SERPL-SCNC: 30 MMOL/L (ref 21–32)
CREAT SERPL-MCNC: 0.58 MG/DL (ref 0.6–1.3)
GFR SERPL CREATININE-BSD FRML MDRD: 87 ML/MIN/1.73SQ M
GLUCOSE SERPL-MCNC: 91 MG/DL (ref 65–140)
POTASSIUM SERPL-SCNC: 4.1 MMOL/L (ref 3.5–5.3)
SODIUM SERPL-SCNC: 137 MMOL/L (ref 136–145)

## 2021-06-15 PROCEDURE — 80048 BASIC METABOLIC PNL TOTAL CA: CPT

## 2021-06-15 PROCEDURE — 36415 COLL VENOUS BLD VENIPUNCTURE: CPT

## 2021-06-17 ENCOUNTER — HOSPITAL ENCOUNTER (OUTPATIENT)
Dept: RADIOLOGY | Facility: HOSPITAL | Age: 81
Discharge: HOME | End: 2021-06-17
Attending: UROLOGY
Payer: MEDICARE

## 2021-06-17 DIAGNOSIS — R10.84 GENERALIZED ABDOMINAL PAIN: ICD-10-CM

## 2021-06-17 PROCEDURE — G1004 CDSM NDSC: HCPCS

## 2021-06-17 PROCEDURE — 74177 CT ABD & PELVIS W/CONTRAST: CPT

## 2021-06-17 RX ADMIN — IOHEXOL 100 ML: 350 INJECTION, SOLUTION INTRAVENOUS at 09:46

## 2021-06-18 ENCOUNTER — APPOINTMENT (OUTPATIENT)
Dept: LAB | Facility: CLINIC | Age: 81
End: 2021-06-18
Payer: MEDICARE

## 2021-06-18 DIAGNOSIS — K90.0 CELIAC DISEASE: ICD-10-CM

## 2021-06-18 DIAGNOSIS — E03.8 OTHER SPECIFIED HYPOTHYROIDISM: ICD-10-CM

## 2021-06-18 DIAGNOSIS — E11.9 TYPE 2 DIABETES MELLITUS WITHOUT COMPLICATION, WITHOUT LONG-TERM CURRENT USE OF INSULIN (HCC): ICD-10-CM

## 2021-06-18 DIAGNOSIS — D50.0 IRON DEFICIENCY ANEMIA DUE TO CHRONIC BLOOD LOSS: ICD-10-CM

## 2021-06-18 LAB
ALBUMIN SERPL BCP-MCNC: 4.2 G/DL (ref 3.5–5)
ALP SERPL-CCNC: 82 U/L (ref 46–116)
ALT SERPL W P-5'-P-CCNC: 33 U/L (ref 12–78)
ANION GAP SERPL CALCULATED.3IONS-SCNC: 5 MMOL/L (ref 4–13)
AST SERPL W P-5'-P-CCNC: 28 U/L (ref 5–45)
BASOPHILS # BLD AUTO: 0.05 THOUSANDS/ΜL (ref 0–0.1)
BASOPHILS NFR BLD AUTO: 1 % (ref 0–1)
BILIRUB SERPL-MCNC: 0.47 MG/DL (ref 0.2–1)
BUN SERPL-MCNC: 13 MG/DL (ref 5–25)
CALCIUM SERPL-MCNC: 9.5 MG/DL (ref 8.3–10.1)
CHLORIDE SERPL-SCNC: 100 MMOL/L (ref 100–108)
CO2 SERPL-SCNC: 31 MMOL/L (ref 21–32)
CREAT SERPL-MCNC: 0.62 MG/DL (ref 0.6–1.3)
CREAT UR-MCNC: 101 MG/DL
EOSINOPHIL # BLD AUTO: 0.15 THOUSAND/ΜL (ref 0–0.61)
EOSINOPHIL NFR BLD AUTO: 3 % (ref 0–6)
ERYTHROCYTE [DISTWIDTH] IN BLOOD BY AUTOMATED COUNT: 14.5 % (ref 11.6–15.1)
EST. AVERAGE GLUCOSE BLD GHB EST-MCNC: 143 MG/DL
FERRITIN SERPL-MCNC: 18 NG/ML (ref 8–388)
GFR SERPL CREATININE-BSD FRML MDRD: 85 ML/MIN/1.73SQ M
GLUCOSE P FAST SERPL-MCNC: 149 MG/DL (ref 65–99)
HBA1C MFR BLD: 6.6 %
HCT VFR BLD AUTO: 40.1 % (ref 34.8–46.1)
HGB BLD-MCNC: 12.7 G/DL (ref 11.5–15.4)
IMM GRANULOCYTES # BLD AUTO: 0.01 THOUSAND/UL (ref 0–0.2)
IMM GRANULOCYTES NFR BLD AUTO: 0 % (ref 0–2)
LYMPHOCYTES # BLD AUTO: 1.27 THOUSANDS/ΜL (ref 0.6–4.47)
LYMPHOCYTES NFR BLD AUTO: 27 % (ref 14–44)
MCH RBC QN AUTO: 30.5 PG (ref 26.8–34.3)
MCHC RBC AUTO-ENTMCNC: 31.7 G/DL (ref 31.4–37.4)
MCV RBC AUTO: 96 FL (ref 82–98)
MICROALBUMIN UR-MCNC: 70.9 MG/L (ref 0–20)
MICROALBUMIN/CREAT 24H UR: 70 MG/G CREATININE (ref 0–30)
MONOCYTES # BLD AUTO: 0.54 THOUSAND/ΜL (ref 0.17–1.22)
MONOCYTES NFR BLD AUTO: 11 % (ref 4–12)
NEUTROPHILS # BLD AUTO: 2.75 THOUSANDS/ΜL (ref 1.85–7.62)
NEUTS SEG NFR BLD AUTO: 58 % (ref 43–75)
NRBC BLD AUTO-RTO: 0 /100 WBCS
PLATELET # BLD AUTO: 232 THOUSANDS/UL (ref 149–390)
PMV BLD AUTO: 11.3 FL (ref 8.9–12.7)
POTASSIUM SERPL-SCNC: 4.5 MMOL/L (ref 3.5–5.3)
PROT SERPL-MCNC: 7.8 G/DL (ref 6.4–8.2)
RBC # BLD AUTO: 4.17 MILLION/UL (ref 3.81–5.12)
SODIUM SERPL-SCNC: 136 MMOL/L (ref 136–145)
TSH SERPL DL<=0.05 MIU/L-ACNC: 2.49 UIU/ML (ref 0.36–3.74)
WBC # BLD AUTO: 4.77 THOUSAND/UL (ref 4.31–10.16)

## 2021-06-18 PROCEDURE — 82570 ASSAY OF URINE CREATININE: CPT | Performed by: INTERNAL MEDICINE

## 2021-06-18 PROCEDURE — 84443 ASSAY THYROID STIM HORMONE: CPT

## 2021-06-18 PROCEDURE — 36415 COLL VENOUS BLD VENIPUNCTURE: CPT

## 2021-06-18 PROCEDURE — 80053 COMPREHEN METABOLIC PANEL: CPT

## 2021-06-18 PROCEDURE — 82728 ASSAY OF FERRITIN: CPT

## 2021-06-18 PROCEDURE — 83036 HEMOGLOBIN GLYCOSYLATED A1C: CPT

## 2021-06-18 PROCEDURE — 82043 UR ALBUMIN QUANTITATIVE: CPT | Performed by: INTERNAL MEDICINE

## 2021-06-18 PROCEDURE — 85025 COMPLETE CBC W/AUTO DIFF WBC: CPT

## 2021-06-22 ENCOUNTER — OFFICE VISIT (OUTPATIENT)
Dept: INTERNAL MEDICINE CLINIC | Facility: CLINIC | Age: 81
End: 2021-06-22
Payer: MEDICARE

## 2021-06-22 VITALS
HEIGHT: 66 IN | TEMPERATURE: 95 F | SYSTOLIC BLOOD PRESSURE: 142 MMHG | DIASTOLIC BLOOD PRESSURE: 80 MMHG | WEIGHT: 164 LBS | OXYGEN SATURATION: 96 % | BODY MASS INDEX: 26.36 KG/M2 | HEART RATE: 68 BPM

## 2021-06-22 DIAGNOSIS — N30.90 CYSTITIS: ICD-10-CM

## 2021-06-22 DIAGNOSIS — I10 ESSENTIAL HYPERTENSION: ICD-10-CM

## 2021-06-22 DIAGNOSIS — E11.9 TYPE 2 DIABETES MELLITUS WITHOUT COMPLICATION, WITHOUT LONG-TERM CURRENT USE OF INSULIN (HCC): ICD-10-CM

## 2021-06-22 DIAGNOSIS — D50.0 IRON DEFICIENCY ANEMIA DUE TO CHRONIC BLOOD LOSS: ICD-10-CM

## 2021-06-22 DIAGNOSIS — I48.20 CHRONIC ATRIAL FIBRILLATION (HCC): ICD-10-CM

## 2021-06-22 DIAGNOSIS — E53.8 VITAMIN B12 DEFICIENCY: ICD-10-CM

## 2021-06-22 DIAGNOSIS — J30.1 ALLERGIC RHINITIS DUE TO POLLEN, UNSPECIFIED SEASONALITY: ICD-10-CM

## 2021-06-22 DIAGNOSIS — I10 HYPERTENSION, UNSPECIFIED TYPE: ICD-10-CM

## 2021-06-22 DIAGNOSIS — E03.8 OTHER SPECIFIED HYPOTHYROIDISM: ICD-10-CM

## 2021-06-22 DIAGNOSIS — I71.4 ABDOMINAL AORTIC ANEURYSM (AAA) WITHOUT RUPTURE (HCC): ICD-10-CM

## 2021-06-22 DIAGNOSIS — K90.0 CELIAC DISEASE: ICD-10-CM

## 2021-06-22 DIAGNOSIS — E78.00 HYPERCHOLESTEREMIA: ICD-10-CM

## 2021-06-22 DIAGNOSIS — Z85.41 HISTORY OF CERVICAL CANCER: Primary | ICD-10-CM

## 2021-06-22 DIAGNOSIS — Z87.442 HISTORY OF RENAL CALCULI: ICD-10-CM

## 2021-06-22 DIAGNOSIS — R80.8 OTHER PROTEINURIA: ICD-10-CM

## 2021-06-22 DIAGNOSIS — Z96.0 PRESENCE OF PESSARY: ICD-10-CM

## 2021-06-22 DIAGNOSIS — R10.13 EPIGASTRIC PAIN: ICD-10-CM

## 2021-06-22 DIAGNOSIS — Z79.01 LONG TERM (CURRENT) USE OF ANTICOAGULANTS: ICD-10-CM

## 2021-06-22 DIAGNOSIS — Z95.2 PRESENCE OF PROSTHETIC HEART VALVE: ICD-10-CM

## 2021-06-22 PROBLEM — U07.1 COVID-19: Status: RESOLVED | Noted: 2021-04-06 | Resolved: 2021-06-22

## 2021-06-22 PROCEDURE — 99214 OFFICE O/P EST MOD 30 MIN: CPT | Performed by: INTERNAL MEDICINE

## 2021-06-22 RX ORDER — LOSARTAN POTASSIUM 25 MG/1
25 TABLET ORAL DAILY
Qty: 90 TABLET | Refills: 1 | Status: SHIPPED | OUTPATIENT
Start: 2021-06-22 | End: 2021-08-06 | Stop reason: SDUPTHER

## 2021-06-22 NOTE — ASSESSMENT & PLAN NOTE
Patient had a CT scan of abdomen and pelvis done will be interesting for the follow-up of abdominal aortic aneurysm

## 2021-06-22 NOTE — PROGRESS NOTES
[facility] Office Visit Note  21     Mello Marsh 80 y o  female MRN: [id number]  : 1940    Assessment:     History of cervical cancer  Being followed by urologist   At 8:36 a m  No reoccurrence  Status post hysterectomy  History of renal calculi  No symptoms related to kidney stone  Recently had a CT scan of abdomen and pelvis done    Iron deficiency anemia  Resolved  Hemoglobin is normal     Hypercholesteremia  Cholesterol well controlled  Continue diet    LDL was 127  We talked about going on statin  Will consider next visit after checking new data is in 3 months    Type 2 diabetes mellitus without complication, without long-term current use of insulin (HCC)    Lab Results   Component Value Date    HGBA1C 6 6 (H) 2021   Diabetes is excellent control  Recommend continue home sugar checkup  Patient is up-to-date with the of high examination  Protein in the urine persistent  We talked about going on Ace or Arb    The patient is willing to go on it  Also blood pressure is on the high normal this will help blood pressure 2  Recheck blood pressure back in 2-3 weeks    Will add losartan 25 mg 1 a day    Essential hypertension  Mildly elevated blood pressure  Will start losartan 25 mg daily that should have protein in the urine as well as her cardiac status 2  Risk benefits were reviewed  Recommend  A blood pressure check when possible at home  Hypertension( High Blood Pressure ):    Continue Home BP check daily and bring log, if you are not checking consider checking daily    Take your Blood Pressure medicine as advised    Do not take your Blood pressure medicine if systolic Blood Pressure (top number) is less than 100 or heart rate less than 60  Notify you physician  Chronic atrial fibrillation (HCC)  Controlled rate    Continue digoxin as well as warfarin as well as the nadolol per cardiologist    Abdominal aortic aneurysm (AAA) without rupture [facility])  Patient had a CT scan of abdomen and pelvis done will be interesting for the follow-up of abdominal aortic aneurysm    Presence of pessary  Recommend to follow-up with gynecologist regarding patient he changed periodically    Vitamin B12 deficiency  Continue vitamin B12 supplement    Cystitis  Seen by urologist   The recently had 100,000 colonies of E coli in the urine sensitive to cephalosporin finishing up CefaDoxil  Also recently had a CT scan done report of which is awaited  Other microscopic hematuria  Seen by urologist   The recently had 100,000 colonies of E coli in the urine sensitive to cephalosporin finishing up CefaDoxil  Also recently had a CT scan done report of which is awaited  Other proteinuria  Mild persistent protein in the urine more likely related to diabetes hypertension a  We will start losartan 25 mg daily  The risk benefits were reviewed  Epigastric pain  Mild, intermittent, every couple of months, related to foot, had history of upper GI endoscopy done in the past P, no acid reflux  Home    Will do ultrasound of upper abdomen  CT scan of abdomen and pelvis awaited  Diagnoses and all orders for this visit:    History of cervical cancer    History of renal calculi    Presence of pessary    Iron deficiency anemia due to chronic blood loss    Hypercholesteremia    Celiac disease    Type 2 diabetes mellitus without complication, without long-term current use of insulin (HCC)    Other specified hypothyroidism    Chronic atrial fibrillation (HCC)    Allergic rhinitis due to pollen, unspecified seasonality    Essential hypertension    Abdominal aortic aneurysm (AAA) without rupture (HCC)    Presence of prosthetic heart valve    Long term (current) use of anticoagulants    Vitamin B12 deficiency    Cystitis    Other proteinuria  -     losartan (COZAAR) 25 mg tablet;  Take 1 tablet (25 mg total) by mouth daily    Epigastric pain  -     US abdomen limited; Future    Hypertension, unspecified type  -     losartan (COZAAR) 25 mg tablet; Take 1 tablet (25 mg total) by mouth daily  -     US abdomen limited; Future          Discussion Summary and Plan: Today's care plan and medications were reviewed with patient in detail and all their questions answered to their satisfaction  Intermittent every couple of months for few minutes epigastric discomfort they are interested in getting gallbladder check will do ultrasound had upper GI endoscopy no persistent symptoms if there is a persistent symptoms they will need to go back to gastroenterologist and get repeat upper GI endoscopy done and consider PPI a educated differential diagnosis discussed CT scan of the abdomen done by urologist report of which is awaited  Hypertension mildly elevated  Also has protein in the urine we will add losartan 25 mg daily  Paragraphs anemia fair  Home diabetes excellent control  Paragraphs vision remains unchanged  Recent UTI with E coli recurrent now finishing antibiotic head CT scan done seen by urologist   No further hematuria  History of iron deficiency anemia multifactorial fair  Remains on vitamin B 12 supplement  Atrial fibrillation controlled ventricular rate 2, home no active bleeding,    History of renal stone symptom-free  History of pessary the seeing urologist periodically  Remains on chronic anticoagulation with hypothyroidism clinically and chemically stable  Abdominal aortic aneurysm CT scan of abdomen and pelvis awaited  History of prostatic heart wall stable  History of vitamin B12 deficiency event vitamin-D deficiency on supplement  Discussed with daughter at length  Chief Complaint   Patient presents with    Hypertension    Hyperlipidemia    Diabetes    Cancer    Follow-up     Anemia,    Atrial Fibrillation    Abdominal Pain    Urinary Tract Infection      Subjective:  Patient is here for follow-up of COVID 19      Patient went to see urologist on regular checkup find to have bacteria in the urine as well as microscopic blood in the urine which is not uncommon for her she does get urinary tract infection periodically  His patient had a CT scan of abdomen and pelvis done patient empirically started on antibiotics short course with further decision to adjust per culture  Per urologist note:  1  Postmenopausal atrophic vaginitis, Estrace cream 3x/wk  Uses pessary since fall 2018  Dr Jordan Medeiros  2  Microscopic & gross hematuria  3  Remote Hx of renal calculi  4  Cervical CA - s/p hysterectomy  NO XRT  5  Chronic cystitis - feels better after antibiotics  Urine analysis noted  Urine culture grew more than 100,000 colonies of E coli sensitive to a cephalo seen  Patient was put on the cefadroxil 500 mg twice a day for 3 days  CT scan of abdomen was done on 17th  Report of which is awaited  COVID-19 resolved  Chronic disease management as underneath  Anemia, a stable   Most likely it is combination of factor including patient being on 1  Anticoagulation with warfarin 2  Mechanical wall probably destroying some of the red blood cell 3  Had micro hematuria 4  His celiac disease suspected on biopsy 5  Possible bleed from vaginal or pessary 6  Probable to poor intake cannot be ruled out  Celiac disease:  Symptom-free  Hypertension:  Mildly elevated, continue same regimen with nadolol and add losartan for multiple purpose  Hyperlipidemia how continue to follow lipid profile and ALT periodically  Atrial fibrillation on chronic anticoagulation by cardiologist  Vitamin D deficiency continue supplement  The protein urea , blood pressure mildly elevated  Will add losartan 25 mg daily      INR being followed by cardiologist   Iron deficiency anemia with negative workup for obvious GI loss now on iron supplement with improving hemoglobin  Abdominal aortic aneurysm, hepatic steatosis, hepatomegaly all stable    Macular degeneration be being followed by ophthalmologist   Diabetes:  Hemoglobin A1c 6 6  Diagnosed:  Current Medicine for Diabetes: Per Med List  Finger stick: Once a day  Glucose Log:   Hypoglycemia event and intervention: None  Diet: reviewed,  Exercise: None  Comorbidity: see HPI and Assessment/Plan  Last Eye Exam:  He patient was seen by eye doctor yesterday  Last Foot exam      LDL was 127 in January we talked about putting on statin  Protein in the urine noted  We talked about going on Ace or Arb MA/Creat: 79    Had a recurrent epigastric pain lasting for few minutes every couple of months isolated episode family interested in getting gallbladder check does not want to go for upper GI endoscopy does not have any associated nausea vomiting reflux symptoms also had history of abdominal aortic aneurysm CT scan done report awaited  No weight loss no hematemesis melena melena hematochezia no urinary symptoms asymptomatic bacteriuria found treated with days of antibiotic seen by urologist     Differential diagnosis of epigastric pain discussed with the family and patient    They do not want to pursue any other further workup      Appointment on 06/18/2021  Hemoglobin A1C            Value: 6 6(%)*            Dt: 06/18/2021  EAG                       Value: 143(mg/dl)         Dt: 06/18/2021  Ferritin                  Value: 18(ng/mL)          Dt: 06/18/2021  WBC                       Value: 4 77(Thousand/uL)  Dt: 06/18/2021  RBC                       Value: 4 17(Million/uL)   Dt: 06/18/2021  Hemoglobin                Value: 12 7(g/dL)         Dt: 06/18/2021  Hematocrit                Value: 40 1(%)            Dt: 06/18/2021  MCV                       Value: 96(fL)             Dt: 06/18/2021  MCH                       Value: 30 5(pg)           Dt: 06/18/2021  MCHC                      Value: 31 7(g/dL)         Dt: 06/18/2021  RDW                       Value: 14 5(%)            Dt: 06/18/2021  MPV                       Value: 11 3(fL) Dt: 06/18/2021  Platelets                 Value: 232(Thousands/uL)  Dt: 06/18/2021  nRBC                      Value: 0(/100 WBCs)       Dt: 06/18/2021  Neutrophils Relative      Value: 58(%)              Dt: 06/18/2021  Immat GRANS %             Value: 0(%)               Dt: 06/18/2021  Lymphocytes Relative      Value: 27(%)              Dt: 06/18/2021  Monocytes Relative        Value: 11(%)              Dt: 06/18/2021  Eosinophils Relative      Value: 3(%)               Dt: 06/18/2021  Basophils Relative        Value: 1(%)               Dt: 06/18/2021  Neutrophils Absolute      Value: 2 75(Thousands/µL) Dt: 06/18/2021  Immature Grans Absolute   Value: 0 01(Thousand/uL)  Dt: 06/18/2021  Lymphocytes Absolute      Value: 1 27(Thousands/µL) Dt: 06/18/2021  Monocytes Absolute        Value: 0 54(Thousand/µL)  Dt: 06/18/2021  Eosinophils Absolute      Value: 0 15(Thousand/µL)  Dt: 06/18/2021  Basophils Absolute        Value: 0 05(Thousands/µL) Dt: 06/18/2021  Sodium                    Value: 136(mmol/L)        Dt: 06/18/2021  Potassium                 Value: 4 5(mmol/L)        Dt: 06/18/2021  Chloride                  Value: 100(mmol/L)        Dt: 06/18/2021  CO2                       Value: 31(mmol/L)         Dt: 06/18/2021  ANION GAP                 Value: 5(mmol/L)          Dt: 06/18/2021  BUN                       Value: 13(mg/dL)          Dt: 06/18/2021  Creatinine                Value: 0 62(mg/dL)        Dt: 06/18/2021  Glucose, Fasting          Value: 149(mg/dL)*        Dt: 06/18/2021  Calcium                   Value: 9 5(mg/dL)         Dt: 06/18/2021  AST                       Value: 28(U/L)            Dt: 06/18/2021  ALT                       Value: 33(U/L)            Dt: 06/18/2021  Alkaline Phosphatase      Value: 82(U/L)            Dt: 06/18/2021  Total Protein             Value: 7 8(g/dL)          Dt: 06/18/2021  Albumin                   Value: 4 2(g/dL)          Dt: 06/18/2021  Total Bilirubin Value: 0 47(mg/dL)        Dt: 06/18/2021  eGFR                      Value: 85(ml/min/1 73sq m) Dt: 06/18/2021  TSH 3RD GENERATON         Value: 2 490(uIU/mL)      Dt: 06/18/2021  ------------  Appointment on 06/15/2021  Sodium                    Value: 137(mmol/L)        Dt: 06/15/2021  Potassium                 Value: 4  1(mmol/L)        Dt: 06/15/2021  Chloride                  Value: 101(mmol/L)        Dt: 06/15/2021  CO2                       Value: 30(mmol/L)         Dt: 06/15/2021  ANION GAP                 Value: 6(mmol/L)          Dt: 06/15/2021  BUN                       Value: 14(mg/dL)          Dt: 06/15/2021  Creatinine                Value: 0 58(mg/dL)*       Dt: 06/15/2021  Glucose                   Value: 91(mg/dL)          Dt: 06/15/2021  Calcium                   Value: 9 4(mg/dL)         Dt: 06/15/2021  eGFR                      Value: 87(ml/min/1 73sq m) Dt: 06/15/2021  ------------ - 2 weeks                  The following portions of the patient's history were reviewed and updated as appropriate: allergies, current medications, past family history, past medical history, past social history, past surgical history and problem list     Review of Systems   Constitutional: Negative for chills, fatigue, fever and unexpected weight change  HENT: Negative for ear pain, postnasal drip, sinus pain and sore throat  Eyes: Negative for pain and redness  Respiratory: Negative for cough and shortness of breath  Cardiovascular: Negative for chest pain, palpitations and leg swelling  Gastrointestinal: Negative for abdominal pain, blood in stool, constipation, diarrhea, nausea, rectal pain and vomiting  Endocrine: Negative for cold intolerance, polydipsia and polyuria  Genitourinary: Negative for dysuria, frequency and urgency  Musculoskeletal: Negative for arthralgias, gait problem and myalgias  Skin: Negative for rash  Allergic/Immunologic: Negative      Neurological: Negative for dizziness and headaches  Hematological: Negative for adenopathy  Psychiatric/Behavioral: Negative for behavioral problems           Historical Information   Patient Active Problem List   Diagnosis    Obstructive sleep apnea    Chronic atrial fibrillation ([address] )    H/O mitral valve replacement with mechanical valve    Long term (current) use of anticoagulants    Presence of prosthetic heart valve    Hypercholesteremia    Anemia due to chronic kidney disease    Allergic rhinitis    Type 2 diabetes mellitus without complication, without long-term current use of insulin (HCC)    Iron deficiency anemia    Other specified hypothyroidism    Vitamin B12 deficiency    Other microscopic hematuria    Presence of pessary    Celiac disease    Abdominal aortic aneurysm (AAA) without rupture (HCC)    Ataxia    Pulmonary emphysema (HCC)    Cystitis    History of renal calculi    History of cervical cancer    Essential hypertension    Other proteinuria    Epigastric pain     Past Medical History:   Diagnosis Date    Atrial fibrillation (HCC)     Cancer (HCC)     hx of cervical    Cardiac disease     Hyperlipidemia      Past Surgical History:   Procedure Laterality Date    EYE SURGERY      HYSTERECTOMY      MITRAL VALVE REPLACEMENT       Social History     Substance and Sexual Activity   Alcohol Use Yes    Comment: special occasional     Social History     Substance and Sexual Activity   Drug Use No     Social History     Tobacco Use   Smoking Status Former Smoker    Packs/day: 2 00    Years: 30 00    Pack years: 60 00    Quit date:     Years since quittin 4   Smokeless Tobacco Never Used     Family History   Problem Relation Age of Onset    Heart failure Mother     Heart attack Father     Sleep apnea Brother      Health Maintenance Due   Topic    Pneumococcal Vaccine: 65+ Years (1 of 2 - PPSV23)    COVID-19 Vaccine (1)    DTaP,Tdap,and Td Vaccines (1 - Tdap)    Influenza Vaccine (Season Ended)      Meds/Allergies       Current Outpatient Medications:     acetaminophen (TYLENOL) 500 mg tablet, Take 500 mg by mouth, Disp: , Rfl:     Cholecalciferol (VITAMIN D PO), Take by mouth, Disp: , Rfl:     digoxin (LANOXIN) 0 125 mg tablet, Take 125 mcg by mouth daily, Disp: , Rfl:     ferrous sulfate 324 (65 Fe) mg, Take 1 tablet (324 mg total) by mouth 2 (two) times a day before meals, Disp: 180 tablet, Rfl: 1    metFORMIN (GLUCOPHAGE) 500 mg tablet, 2 tablets Twice Daily, Disp: 360 tablet, Rfl: 1    Multiple Vitamins-Minerals (PRESERVISION AREDS PO), Take 2 tablets by mouth daily, Disp: , Rfl:     NADOLOL PO, Take 2 5 mg by mouth daily, Disp: , Rfl:     warfarin (COUMADIN) 2 5 mg tablet, Take 2 5 mg by mouth 3 (three) times a week, Disp: , Rfl:     warfarin (COUMADIN) 5 mg tablet, Take 5 mg by mouth 4 (four) times a week, Disp: , Rfl:     losartan (COZAAR) 25 mg tablet, Take 1 tablet (25 mg total) by mouth daily, Disp: 90 tablet, Rfl: 1      Objective:    Vitals:   /80   Pulse 68   Temp (!) 95 °F (35 °C)   Ht 5' 6" (1 676 m)   Wt 74 4 kg (164 lb)   SpO2 96%   BMI 26 47 kg/m²   Body mass index is 26 47 kg/m²  Vitals:    06/22/21 1210   Weight: 74 4 kg (164 lb)       Physical Exam  Vitals and nursing note reviewed  Constitutional:       General: She is not in acute distress  Appearance: Normal appearance  She is well-developed  She is not ill-appearing, toxic-appearing or diaphoretic  Comments: Short grey hair   HENT:      Head: Normocephalic and atraumatic  Right Ear: External ear normal       Left Ear: External ear normal    Eyes:      General: No scleral icterus  Conjunctiva/sclera: Conjunctivae normal    Neck:      Thyroid: No thyroid mass, thyromegaly or thyroid tenderness  Vascular: Normal carotid pulses  No carotid bruit or JVD  Cardiovascular:      Rate and Rhythm: Normal rate and regular rhythm        Pulses:           Dorsalis pedis pulses are 2+ on the right side and 2+ on the left side  Posterior tibial pulses are 2+ on the right side and 1+ on the left side  Heart sounds: S1 normal  Murmur heard  Systolic murmur is present with a grade of 2/6  Comments: S2 elevated  Pulmonary:      Effort: No respiratory distress  Breath sounds: Normal breath sounds  No stridor  No wheezing, rhonchi or rales  Abdominal:      General: Bowel sounds are normal  There is no distension  Palpations: There is no shifting dullness, fluid wave, hepatomegaly, mass or pulsatile mass  Tenderness: There is no abdominal tenderness  There is no rebound  Hernia: There is no hernia in the umbilical area, ventral area, left inguinal area, left femoral area or right inguinal area  Musculoskeletal:         General: Normal range of motion  Cervical back: Normal range of motion  Right lower leg: No edema  Left lower leg: No edema  Feet:      Right foot:      Skin integrity: No ulcer, skin breakdown, erythema, warmth, callus or dry skin  Left foot:      Skin integrity: No ulcer, skin breakdown, erythema, warmth, callus or dry skin  Lymphadenopathy:      Cervical: No cervical adenopathy  Right cervical: No superficial cervical adenopathy  Skin:     General: Skin is warm  Findings: No rash  Neurological:      General: No focal deficit present  Mental Status: She is alert  Mental status is at baseline  Cranial Nerves: Cranial nerves are intact  Sensory: Sensation is intact  Motor: Motor function is intact  Gait: Tandem walk abnormal  Gait normal    Psychiatric:         Mood and Affect: Mood normal          Behavior: Behavior normal          Thought Content: Thought content normal          Judgment: Judgment normal          Lab Review   Appointment on 06/18/2021   Component Date Value Ref Range Status    Hemoglobin A1C 06/18/2021 6 6* Normal 3 8-5 6%; PreDiabetic 5 7-6 4%;  Diabetic >=6 5%; Glycemic control for adults with diabetes <7 0% % Final    EAG 06/18/2021 143  mg/dl Final    Ferritin 06/18/2021 18  8 - 388 ng/mL Final    WBC 06/18/2021 4 77  4 31 - 10 16 Thousand/uL Final    RBC 06/18/2021 4 17  3 81 - 5 12 Million/uL Final    Hemoglobin 06/18/2021 12 7  11 5 - 15 4 g/dL Final    Hematocrit 06/18/2021 40 1  34 8 - 46 1 % Final    MCV 06/18/2021 96  82 - 98 fL Final    MCH 06/18/2021 30 5  26 8 - 34 3 pg Final    MCHC 06/18/2021 31 7  31 4 - 37 4 g/dL Final    RDW 06/18/2021 14 5  11 6 - 15 1 % Final    MPV 06/18/2021 11 3  8 9 - 12 7 fL Final    Platelets 67/76/4011 232  149 - 390 Thousands/uL Final    nRBC 06/18/2021 0  /100 WBCs Final    Neutrophils Relative 06/18/2021 58  43 - 75 % Final    Immat GRANS % 06/18/2021 0  0 - 2 % Final    Lymphocytes Relative 06/18/2021 27  14 - 44 % Final    Monocytes Relative 06/18/2021 11  4 - 12 % Final    Eosinophils Relative 06/18/2021 3  0 - 6 % Final    Basophils Relative 06/18/2021 1  0 - 1 % Final    Neutrophils Absolute 06/18/2021 2 75  1 85 - 7 62 Thousands/µL Final    Immature Grans Absolute 06/18/2021 0 01  0 00 - 0 20 Thousand/uL Final    Lymphocytes Absolute 06/18/2021 1 27  0 60 - 4 47 Thousands/µL Final    Monocytes Absolute 06/18/2021 0 54  0 17 - 1 22 Thousand/µL Final    Eosinophils Absolute 06/18/2021 0 15  0 00 - 0 61 Thousand/µL Final    Basophils Absolute 06/18/2021 0 05  0 00 - 0 10 Thousands/µL Final    Sodium 06/18/2021 136  136 - 145 mmol/L Final    Potassium 06/18/2021 4 5  3 5 - 5 3 mmol/L Final    Chloride 06/18/2021 100  100 - 108 mmol/L Final    CO2 06/18/2021 31  21 - 32 mmol/L Final    ANION GAP 06/18/2021 5  4 - 13 mmol/L Final    BUN 06/18/2021 13  5 - 25 mg/dL Final    Creatinine 06/18/2021 0 62  0 60 - 1 30 mg/dL Final    Standardized to IDMS reference method    Glucose, Fasting 06/18/2021 149* 65 - 99 mg/dL Final    Specimen collection should occur prior to Sulfasalazine administration due to the potential for falsely depressed results  Specimen collection should occur prior to Sulfapyridine administration due to the potential for falsely elevated results   Calcium 06/18/2021 9 5  8 3 - 10 1 mg/dL Final    AST 06/18/2021 28  5 - 45 U/L Final    Specimen collection should occur prior to Sulfasalazine administration due to the potential for falsely depressed results   ALT 06/18/2021 33  12 - 78 U/L Final    Specimen collection should occur prior to Sulfasalazine and/or Sulfapyridine administration due to the potential for falsely depressed results   Alkaline Phosphatase 06/18/2021 82  46 - 116 U/L Final    Total Protein 06/18/2021 7 8  6 4 - 8 2 g/dL Final    Albumin 06/18/2021 4 2  3 5 - 5 0 g/dL Final    Total Bilirubin 06/18/2021 0 47  0 20 - 1 00 mg/dL Final    Use of this assay is not recommended for patients undergoing treatment with eltrombopag due to the potential for falsely elevated results   eGFR 06/18/2021 85  ml/min/1 73sq m Final    TSH 3RD GENERATON 06/18/2021 2 490  0 358 - 3 740 uIU/mL Final    The recommended reference ranges for TSH during pregnancy are as follows:   First trimester 0 1 to 2 5 uIU/mL   Second trimester  0 2 to 3 0 uIU/mL   Third trimester 0 3 to 3 0 uIU/m    Note: Normal ranges may not apply to patients who are transgender, non-binary, or whose legal sex, sex at birth, and gender identity differ     Appointment on 06/15/2021   Component Date Value Ref Range Status    Sodium 06/15/2021 137  136 - 145 mmol/L Final    Potassium 06/15/2021 4 1  3 5 - 5 3 mmol/L Final    Chloride 06/15/2021 101  100 - 108 mmol/L Final    CO2 06/15/2021 30  21 - 32 mmol/L Final    ANION GAP 06/15/2021 6  4 - 13 mmol/L Final    BUN 06/15/2021 14  5 - 25 mg/dL Final    Creatinine 06/15/2021 0 58* 0 60 - 1 30 mg/dL Final    Standardized to IDMS reference method    Glucose 06/15/2021 91  65 - 140 mg/dL Final    If the patient is fasting, the ADA then defines impaired fasting glucose as > 100 mg/dL and diabetes as > or equal to 123 mg/dL  Specimen collection should occur prior to Sulfasalazine administration due to the potential for falsely depressed results  Specimen collection should occur prior to Sulfapyridine administration due to the potential for falsely elevated results   Calcium 06/15/2021 9 4  8 3 - 10 1 mg/dL Final    eGFR 06/15/2021 87  ml/min/1 73sq m Final   Ancillary Orders on 05/28/2021   Component Date Value Ref Range Status    Protime 06/01/2021 32 4* 11 6 - 14 5 seconds Final    INR 06/01/2021 3 19* 0 84 - 1 19 Final   Appointment on 05/03/2021   Component Date Value Ref Range Status    Protime 05/03/2021 30 9* 11 6 - 14 5 seconds Final    INR 05/03/2021 3 00* 0 84 - 1 19 Final         Patient Instructions     Hypertension   AMBULATORY CARE:   Hypertension  is high blood pressure  Your blood pressure is the force of your blood moving against the walls of your arteries  Hypertension causes your blood pressure to get so high that your heart has to work much harder than normal  This can damage your heart  The cause of hypertension may not be known  This is called essential or primary hypertension  Hypertension caused by another medical condition, such as kidney disease, is called secondary hypertension  Common symptoms include the following:   · Headache     · Blurred vision     · Chest pain     · Dizziness or weakness     · Trouble breathing    · Nosebleeds    Call 911 for any of the following:   · You have chest pain  · You have any of the following signs of a heart attack:      ? Squeezing, pressure, or pain in your chest    ? You may  also have any of the following:     § Discomfort or pain in your back, neck, jaw, stomach, or arm    § Shortness of breath    § Nausea or vomiting    § Lightheadedness or a sudden cold sweat    · You become confused or have difficulty speaking  · You suddenly feel lightheaded or have trouble breathing      Seek care immediately if:   · You have a severe headache or vision loss  · You have weakness in an arm or leg  Contact your healthcare provider if:   · You feel faint, dizzy, confused, or drowsy  · You have been taking your blood pressure medicine but your pressure is higher than your provider says it should be  · You have questions or concerns about your condition or care  What you need to know about the stages of hypertension:       · Normal blood pressure is 119/79 or lower   Your healthcare provider may only check your blood pressure each year if it stays at a normal level  · Elevated blood pressure is 120/79 to 129/79   This is sometimes called prehypertension  Your healthcare provider may suggest lifestyle changes to help lower your blood pressure to a normal level  He or she may then check it again in 3 to 6 months  · Stage 1 hypertension is 130/80  to 139/89   Your provider may recommend lifestyle changes, medication, and checks every 3 to 6 months until your blood pressure is controlled  · Stage 2 hypertension is 140/90 or higher   Your provider will recommend lifestyle changes and have you take 2 kinds of hypertension medicines  You will also need to have your blood pressure checked monthly until it is controlled  Treatment for hypertension  may include medicine to lower your blood pressure and lower your cholesterol level  A low cholesterol level helps prevent heart disease and makes it easier to control your blood pressure  Take your medicine exactly as directed  You may also need to make lifestyle changes  Manage hypertension:   · Check your blood pressure at home  Avoid smoking, caffeine, and exercise at least 30 minutes before checking your blood pressure  Sit and rest for 5 minutes before you take your blood pressure  Extend your arm and support it on a flat surface  Your arm should be at the same level as your heart   Follow the directions that came with your blood pressure monitor  Check your blood pressure 2 times, 1 minute apart, before you take your medicine in the morning  Also check your blood pressure before your evening meal  Keep a record of your readings and bring it to your follow-up visits  Ask your healthcare provider what your blood pressure should be  · Manage any other health conditions you have  Health conditions such as diabetes can increase your risk for hypertension  Follow your healthcare provider's instructions and take all your medicines as directed  · Ask about all medicines  Certain medicines can increase your blood pressure  Examples include oral birth control pills, decongestants, herbal supplements, and NSAIDs, such as ibuprofen  Your healthcare provider can tell you which medicines are safe for you to take  This includes prescription and over-the-counter medicines  Lifestyle changes you can make to manage hypertension:   · Limit sodium (salt) as directed  Too much sodium can affect your fluid balance  Check labels to find low-sodium or no-salt-added foods  Some low-sodium foods use potassium salts for flavor  Too much potassium can also cause health problems  Your healthcare provider will tell you how much sodium and potassium are safe for you to have in a day  He or she may recommend that you limit sodium to 2,300 mg a day  · Follow the meal plan recommended by your healthcare provider  A dietitian or your provider can give you more information on low-sodium plans or the DASH (Dietary Approaches to Stop Hypertension) eating plan  The DASH plan is low in sodium, unhealthy fats, and total fat  It is high in potassium, calcium, and fiber  · Exercise to maintain a healthy weight  Exercise at least 30 minutes per day, on most days of the week  This will help decrease your blood pressure  Ask your healthcare provider about the best exercise plan for you  · Decrease stress  This may help lower your blood pressure  Learn ways to relax, such as deep breathing or listening to music  · Limit alcohol as directed  Alcohol can increase your blood pressure  A drink of alcohol is 12 ounces of beer, 5 ounces of wine, or 1½ ounces of liquor  · Do not smoke  Nicotine and other chemicals in cigarettes and cigars can increase your blood pressure and also cause lung damage  Ask your healthcare provider for information if you currently smoke and need help to quit  E-cigarettes or smokeless tobacco still contain nicotine  Talk to your healthcare provider before you use these products  Follow up with your healthcare provider as directed: You will need to return to have your blood pressure checked and to have other lab tests done  Write down your questions so you remember to ask them during your visits  © Copyright [address] Information is for End User's use only and may not be sold, redistributed or otherwise used for commercial purposes  All illustrations and images included in CareNotes® are the copyrighted property of A D A M [employer]  or [address] [place] [address]  The above information is an  only  It is not intended as medical advice for individual conditions or treatments  Talk to your doctor, nurse or pharmacist before following any medical regimen to see if it is safe and effective for you  Type 2 Diabetes in Adults: New Diagnosis   AMBULATORY CARE:   Type 2 diabetes  is a disease that affects how your body uses glucose (sugar)  Normally, when the blood sugar level increases, the pancreas makes more insulin  Insulin helps move sugar out of the blood so it can be used for energy  Type 2 diabetes develops because either the body cannot make enough insulin, or it cannot use the insulin correctly  Type 2 diabetes can be controlled to prevent damage to your heart, blood vessels, and other organs     Common symptoms include the following:   · Numbness in your fingers or toes    · Blurred vision     · Frequent urination     · More hunger or thirst than usual    Have someone call your local emergency number (911 in the [address]) if:   · You have any of the following signs of a stroke:      ? Numbness or drooping on one side of your face     ? Weakness in an arm or leg    ? Confusion or difficulty speaking    ? Dizziness, a severe headache, or vision loss    · You have any of the following signs of a heart attack:      ? Squeezing, pressure, or pain in your chest    ? You may  also have any of the following:     § Discomfort or pain in your back, neck, jaw, stomach, or arm    § Shortness of breath    § Nausea or vomiting    § Lightheadedness or a sudden cold sweat    · You have trouble breathing  Call your doctor or care team if:   · You have severe abdominal pain  · You vomit for more than 2 hours  · You have trouble staying awake or focusing  · You are shaking or sweating  · You feel weak or more tired than usual      · You have blurred or double vision  · Your breath has a fruity, sweet smell  · Your arms and legs are swollen  · You have an upset stomach and cannot eat the foods on your meal plan  · You feel dizzy, have headaches, or are easily irritated  · Your skin is red, warm, dry, or swollen  · You have a wound that does not heal      · You have numbness in your arms or legs  · You have trouble coping with your illness, or you feel anxious or depressed  · You have questions or concerns about your condition or care  Treatment for type 2 diabetes  helps prevent or delay complications, including heart and kidney disease  You must eat healthy meals and do regular physical activity  You may  need any of the following:  · Diabetes medicines or insulin  may be given to decrease the amount of sugar in your blood  · Blood pressure medicine  may be given to lower your blood pressure  · Cholesterol-lowering medicine  may be given to prevent heart disease  · Antiplatelets , such as aspirin, help prevent blood clots  Take your antiplatelet medicine exactly as directed  These medicines make it more likely for you to bleed or bruise  If you are told to take aspirin, do not take acetaminophen or ibuprofen instead  · Take your medicine as directed  Contact your healthcare provider if you think your medicine is not helping or if you have side effects  Tell him or her if you are allergic to any medicine  Keep a list of the medicines, vitamins, and herbs you take  Include the amounts, and when and why you take them  Bring the list or the pill bottles to follow-up visits  Carry your medicine list with you in case of an emergency  Diabetes education:  You may have providers come to your house and teach you more about diabetes  You may instead attend classes with others who have diabetes  You will be taught the following:  · About nutrition:  A dietitian will help you make a meal plan to keep your blood sugar level steady  You will learn how food affects your blood sugar levels  You will also learn to keep track of sugar and starchy foods (carbohydrates)  Do not skip meals  Your blood sugar level may drop too low if you have taken diabetes medicine and do not eat  You may be taught to use the plate method when eating  The plate method will help with portion control  With the plate method, ½ of your plate contains vegetables  The other half is divided so that ¼ of your plate contains protein or meat, and ¼ contains starches, such as potatoes  · Physical activity and diabetes: You will learn why physical activity, such as walking, is important  You and your care team provider will make a plan for your activity  Your care team provider will tell you what a healthy weight will be for you  He or she will help you make a plan to get to that weight and stay there  Maintain a healthy weight to help delay or prevent complications of diabetes       · How to check your blood sugar level: You will learn what your blood sugar level should be  You will be given information on when to check your blood sugar level  You will learn what to do if your level is too high or too low  Write down the times of your checks and your levels  Take them to all follow-up appointments  · If you need insulin:  You and your family members will be taught how to draw up and give insulin  You will learn how much insulin you need and what time to inject insulin  You will be taught when to not give insulin  Your education team will also teach you how to dispose of needles and syringes  Other ways to help manage type 2 diabetes:   · Check your feet each day for sores  Wear shoes and socks that fit correctly  Do not trim your toenails  Ask your care team provider for more information about foot care  · Know the risks if you choose to drink alcohol  Alcohol can cause your blood sugar levels to be low if you use insulin  Alcohol can cause high blood sugar levels and weight gain if you drink too much  Women 21 years or older and men 72 years or older should limit alcohol to 1 drink a day  Men aged 24 to 59 years should limit alcohol to 2 drinks a day  A drink of alcohol is 12 ounces of beer, 5 ounces of wine, or 1½ ounces of liquor  · Do not smoke  Nicotine and other chemicals in cigarettes and cigars can cause lung damage and make diabetes harder to manage  Ask your care team provider for information if you currently smoke and need help to quit  E-cigarettes or smokeless tobacco still contain nicotine  Talk to your care team provider before you use these products  · Check your blood pressure as directed  You may have high blood pressure when you have type 2 diabetes  Talk to your care team provider about your blood pressure goals  Together you can create a plan to lower your blood pressure if needed and keep it in a healthy range  The plan may include lifestyle changes or medicines   A normal blood pressure is 119/79 or lower  A normal blood pressure can help prevent or delay certain complications from diabetes  Examples include retinopathy (eye damage) and kidney damage  · Wear medical alert identification  Wear medical alert jewelry or carry a card that says you have diabetes  Ask your care team provider where to get these items  · Ask about vaccines  You have a higher risk for serious illness if you get the flu, pneumonia, or hepatitis  Ask your care team provider if you should get a flu, pneumonia, or hepatitis B vaccine, and when to get the vaccine  Follow up with your diabetes care team providers as directed: You may need to return to have your A1c checked every 3 months  You will need to return at least once each year to have your feet checked  You will need an eye exam once a year to check for retinopathy  You will also need urine tests every year to check for kidney problems  You may need tests to monitor for heart disease, such as an EKG, stress test, blood pressure monitoring, and blood tests  Write down your questions so you remember to ask them during your visits  © Copyright [address] Information is for End User's use only and may not be sold, redistributed or otherwise used for commercial purposes  All illustrations and images included in CareNotes® are the copyrighted property of [employer] M [employer]  or [address] [place] [address]  The above information is an  only  It is not intended as medical advice for individual conditions or treatments  Talk to your doctor, nurse or pharmacist before following any medical regimen to see if it is safe and effective for you  Gallstones   WHAT YOU NEED TO KNOW:   What are gallstones? Gallstones are hard substances that form in your gallbladder or bile duct  Your gallbladder and bile duct are located on the right side of your abdomen, near your liver  Your gallbladder stores bile   Bile helps break down the fat that you eat  Your gallbladder also helps remove certain chemicals from your body  What causes gallstones? Gallstones develop when your gallbladder does not empty correctly  Stones can form from different bile materials  The following may increase your risk:  · Obesity or not enough physical activity    · Pregnancy    · A family history of gallstones    · A health condition such as diabetes, cirrhosis, or nonalcoholic fatty liver disease    · Rapid weight loss    · Surgery on your intestines    · Certain medicines, such as estrogen, antibiotics, and cholesterol-lowering medicines    What are the signs and symptoms of gallstones? The most common symptom is severe, constant pain in the right upper abdomen  It is usually just below the ribcage  The pain may also be felt in the right shoulder and between the shoulder blades  You may also have any of the following:  · Nausea and vomiting    · Feeling bloated    · [place]-colored bowel movements    · Dark urine    How are gallstones diagnosed? Ultrasound pictures may be used to check for gallstones  If these tests do not show clear signs of gallstones, you may need a test that uses contrast liquid  Examples include a gallbladder scan, endoscopic retrograde cholangiopancreatography (ERCP), and an oral cholecystography  For any of these tests, tell your healthcare provider if you have ever had an allergic reaction to contrast liquid  If you are a woman, tell your healthcare provider if there is a chance you may be pregnant  How are gallstones treated? You may not need treatment if you do not have signs or symptoms  Your healthcare provider may want to monitor the gallstones over time  You may need any of the following:  · Antinausea medicine  may be given to calm your stomach and to help prevent vomiting  · Prescription pain medicine  may be given  Ask your healthcare provider how to take this medicine safely   Some prescription pain medicines contain acetaminophen  Do not take other medicines that contain acetaminophen without talking to your healthcare provider  Too much acetaminophen may cause liver damage  Prescription pain medicine may cause constipation  Ask your healthcare provider how to prevent or treat constipation  · Surgery  may be needed to remove your gallbladder  This may be done after symptoms become severe or you develop health problems from the gallstones  Your healthcare provider may recommend gallbladder removal before you develop symptoms  This may be done if you are at high risk for gallstones or health problems they can cause  What can I do to manage or prevent gallstones? · Eat a variety of healthy foods  This may help you have more energy and heal faster  Healthy foods include fruits, vegetables, whole-grain breads, low-fat dairy products, beans, lean meat, and fish  Ask if you need to be on a special diet  Try to eat regular meals during the day  This will help your gallbladder empty  · Exercise as directed  Talk to your healthcare provider about the best exercise plan for you  Exercise can help you lose weight and improve your health  · Manage your weight  If you are overweight, it is important to reach a healthy weight  You will need to lose weight slowly because rapid weight loss can increase your risk for gallstones  Talk to your healthcare provider about your weight  He or she can help you create a safe weight loss plan if you need to lose weight  When should I seek immediate care? · You have a fever and chills  · Your eyes or skin turn yellow  · You have severe pain in your upper abdomen, just below the right ribcage  When should I contact my healthcare provider? · You have nausea and are vomiting  · Your urine is dark  · You have justin-colored bowel movements  · You have questions or concerns about your condition or care  CARE AGREEMENT:   You have the right to help plan your care  Learn about your health condition and how it may be treated  Discuss treatment options with your healthcare providers to decide what care you want to receive  You always have the right to refuse treatment  The above information is an  only  It is not intended as medical advice for individual conditions or treatments  Talk to your doctor, nurse or pharmacist before following any medical regimen to see if it is safe and effective for you  © Copyright [address] Information is for End User's use only and may not be sold, redistributed or otherwise used for commercial purposes  All illustrations and images included in CareNotes® are the copyrighted property of [employer]  or [employer]       Follow with Consultants as per their and our suggestion    Follow up in one month or as needed earlier    Follow all instructions as advised and discussed  Take your medications as prescribed  Call the office immediately if you experience any side effects  Ask questions if you do not understand  Keep your scheduled appointment as advised or come sooner if necessary or in doubt  Best time to call for non-urgent matter or questions on weekdays is between 9am and 12 noon  See physician for any new symptoms or worsening of current symptoms  Urgent or emergent situations call 911 and report to nearest emergency room  I spent  30 -40 minutes taking care of this patient including clinical care, conseling, collaboration, chart, lab and consultaion review as appropriate    Patient is to get labs 1 week(s) prior to next visit if advised    We will start losartan 25 mg daily  Schedule ultrasound of the gallbladder  If you need any assistance please give us    A call [facility]           Dr Yovany Chow MD  [facility]       "This note has been constructed using a voice recognition system  Therefore there may be syntax, spelling, and/or grammatical errors   Please call if you have any questions  "

## 2021-06-22 NOTE — ASSESSMENT & PLAN NOTE
Lab Results   Component Value Date    HGBA1C 6 6 (H) 06/18/2021   Diabetes is excellent control  Recommend continue home sugar checkup  Patient is up-to-date with the of high examination  Protein in the urine persistent  We talked about going on Ace or Arb    The patient is willing to go on it  Also blood pressure is on the high normal this will help blood pressure 2   Recheck blood pressure back in 2-3 weeks    Will add losartan 25 mg 1 a day

## 2021-06-22 NOTE — PATIENT INSTRUCTIONS
Hypertension   AMBULATORY CARE:   Hypertension  is high blood pressure  Your blood pressure is the force of your blood moving against the walls of your arteries  Hypertension causes your blood pressure to get so high that your heart has to work much harder than normal  This can damage your heart  The cause of hypertension may not be known  This is called essential or primary hypertension  Hypertension caused by another medical condition, such as kidney disease, is called secondary hypertension  Common symptoms include the following:   · Headache     · Blurred vision     · Chest pain     · Dizziness or weakness     · Trouble breathing    · Nosebleeds    Call 911 for any of the following:   · You have chest pain  · You have any of the following signs of a heart attack:      ? Squeezing, pressure, or pain in your chest    ? You may  also have any of the following:     § Discomfort or pain in your back, neck, jaw, stomach, or arm    § Shortness of breath    § Nausea or vomiting    § Lightheadedness or a sudden cold sweat    · You become confused or have difficulty speaking  · You suddenly feel lightheaded or have trouble breathing  Seek care immediately if:   · You have a severe headache or vision loss  · You have weakness in an arm or leg  Contact your healthcare provider if:   · You feel faint, dizzy, confused, or drowsy  · You have been taking your blood pressure medicine but your pressure is higher than your provider says it should be  · You have questions or concerns about your condition or care  What you need to know about the stages of hypertension:       · Normal blood pressure is 119/79 or lower   Your healthcare provider may only check your blood pressure each year if it stays at a normal level  · Elevated blood pressure is 120/79 to 129/79   This is sometimes called prehypertension   Your healthcare provider may suggest lifestyle changes to help lower your blood pressure to a normal level  He or she may then check it again in 3 to 6 months  · Stage 1 hypertension is 130/80  to 139/89   Your provider may recommend lifestyle changes, medication, and checks every 3 to 6 months until your blood pressure is controlled  · Stage 2 hypertension is 140/90 or higher   Your provider will recommend lifestyle changes and have you take 2 kinds of hypertension medicines  You will also need to have your blood pressure checked monthly until it is controlled  Treatment for hypertension  may include medicine to lower your blood pressure and lower your cholesterol level  A low cholesterol level helps prevent heart disease and makes it easier to control your blood pressure  Take your medicine exactly as directed  You may also need to make lifestyle changes  Manage hypertension:   · Check your blood pressure at home  Avoid smoking, caffeine, and exercise at least 30 minutes before checking your blood pressure  Sit and rest for 5 minutes before you take your blood pressure  Extend your arm and support it on a flat surface  Your arm should be at the same level as your heart  Follow the directions that came with your blood pressure monitor  Check your blood pressure 2 times, 1 minute apart, before you take your medicine in the morning  Also check your blood pressure before your evening meal  Keep a record of your readings and bring it to your follow-up visits  Ask your healthcare provider what your blood pressure should be  · Manage any other health conditions you have  Health conditions such as diabetes can increase your risk for hypertension  Follow your healthcare provider's instructions and take all your medicines as directed  · Ask about all medicines  Certain medicines can increase your blood pressure  Examples include oral birth control pills, decongestants, herbal supplements, and NSAIDs, such as ibuprofen  Your healthcare provider can tell you which medicines are safe for you to take  This includes prescription and over-the-counter medicines  Lifestyle changes you can make to manage hypertension:   · Limit sodium (salt) as directed  Too much sodium can affect your fluid balance  Check labels to find low-sodium or no-salt-added foods  Some low-sodium foods use potassium salts for flavor  Too much potassium can also cause health problems  Your healthcare provider will tell you how much sodium and potassium are safe for you to have in a day  He or she may recommend that you limit sodium to 2,300 mg a day  · Follow the meal plan recommended by your healthcare provider  A dietitian or your provider can give you more information on low-sodium plans or the DASH (Dietary Approaches to Stop Hypertension) eating plan  The DASH plan is low in sodium, unhealthy fats, and total fat  It is high in potassium, calcium, and fiber  · Exercise to maintain a healthy weight  Exercise at least 30 minutes per day, on most days of the week  This will help decrease your blood pressure  Ask your healthcare provider about the best exercise plan for you  · Decrease stress  This may help lower your blood pressure  Learn ways to relax, such as deep breathing or listening to music  · Limit alcohol as directed  Alcohol can increase your blood pressure  A drink of alcohol is 12 ounces of beer, 5 ounces of wine, or 1½ ounces of liquor  · Do not smoke  Nicotine and other chemicals in cigarettes and cigars can increase your blood pressure and also cause lung damage  Ask your healthcare provider for information if you currently smoke and need help to quit  E-cigarettes or smokeless tobacco still contain nicotine  Talk to your healthcare provider before you use these products  Follow up with your healthcare provider as directed: You will need to return to have your blood pressure checked and to have other lab tests done   Write down your questions so you remember to ask them during your visits  © Copyright [address] Information is for End User's use only and may not be sold, redistributed or otherwise used for commercial purposes  All illustrations and images included in CareNotes® are the copyrighted property of [employer]  or Kathie Russo  The above information is an  only  It is not intended as medical advice for individual conditions or treatments  Talk to your doctor, nurse or pharmacist before following any medical regimen to see if it is safe and effective for you  Type 2 Diabetes in Adults: New Diagnosis   AMBULATORY CARE:   Type 2 diabetes  is a disease that affects how your body uses glucose (sugar)  Normally, when the blood sugar level increases, the pancreas makes more insulin  Insulin helps move sugar out of the blood so it can be used for energy  Type 2 diabetes develops because either the body cannot make enough insulin, or it cannot use the insulin correctly  Type 2 diabetes can be controlled to prevent damage to your heart, blood vessels, and other organs  Common symptoms include the following:   · Numbness in your fingers or toes    · Blurred vision     · Frequent urination     · More hunger or thirst than usual    Have someone call your local emergency number (911 in the [address] [place] [address],[address]) if:   · You have any of the following signs of a stroke:      ? Numbness or drooping on one side of your face     ? Weakness in an arm or leg    ? Confusion or difficulty speaking    ? Dizziness, a severe headache, or vision loss    · You have any of the following signs of a heart attack:      ? Squeezing, pressure, or pain in your chest    ? You may  also have any of the following:     § Discomfort or pain in your back, neck, jaw, stomach, or arm    § Shortness of breath    § Nausea or vomiting    § Lightheadedness or a sudden cold sweat    · You have trouble breathing  Call your doctor or care team if:   · You have severe abdominal pain       · You vomit for more than 2 hours  · You have trouble staying awake or focusing  · You are shaking or sweating  · You feel weak or more tired than usual      · You have blurred or double vision  · Your breath has a fruity, sweet smell  · Your arms and legs are swollen  · You have an upset stomach and cannot eat the foods on your meal plan  · You feel dizzy, have headaches, or are easily irritated  · Your skin is red, warm, dry, or swollen  · You have a wound that does not heal      · You have numbness in your arms or legs  · You have trouble coping with your illness, or you feel anxious or depressed  · You have questions or concerns about your condition or care  Treatment for type 2 diabetes  helps prevent or delay complications, including heart and kidney disease  You must eat healthy meals and do regular physical activity  You may  need any of the following:  · Diabetes medicines or insulin  may be given to decrease the amount of sugar in your blood  · Blood pressure medicine  may be given to lower your blood pressure  · Cholesterol-lowering medicine  may be given to prevent heart disease  · Antiplatelets , such as aspirin, help prevent blood clots  Take your antiplatelet medicine exactly as directed  These medicines make it more likely for you to bleed or bruise  If you are told to take aspirin, do not take acetaminophen or ibuprofen instead  · Take your medicine as directed  Contact your healthcare provider if you think your medicine is not helping or if you have side effects  Tell him or her if you are allergic to any medicine  Keep a list of the medicines, vitamins, and herbs you take  Include the amounts, and when and why you take them  Bring the list or the pill bottles to follow-up visits  Carry your medicine list with you in case of an emergency  Diabetes education:  You may have providers come to your house and teach you more about diabetes   You may instead attend classes with others who have diabetes  You will be taught the following:  · About nutrition:  A dietitian will help you make a meal plan to keep your blood sugar level steady  You will learn how food affects your blood sugar levels  You will also learn to keep track of sugar and starchy foods (carbohydrates)  Do not skip meals  Your blood sugar level may drop too low if you have taken diabetes medicine and do not eat  You may be taught to use the plate method when eating  The plate method will help with portion control  With the plate method, ½ of your plate contains vegetables  The other half is divided so that ¼ of your plate contains protein or meat, and ¼ contains starches, such as potatoes  · Physical activity and diabetes: You will learn why physical activity, such as walking, is important  You and your care team provider will make a plan for your activity  Your care team provider will tell you what a healthy weight will be for you  He or she will help you make a plan to get to that weight and stay there  Maintain a healthy weight to help delay or prevent complications of diabetes  · How to check your blood sugar level: You will learn what your blood sugar level should be  You will be given information on when to check your blood sugar level  You will learn what to do if your level is too high or too low  Write down the times of your checks and your levels  Take them to all follow-up appointments  · If you need insulin:  You and your family members will be taught how to draw up and give insulin  You will learn how much insulin you need and what time to inject insulin  You will be taught when to not give insulin  Your education team will also teach you how to dispose of needles and syringes  Other ways to help manage type 2 diabetes:   · Check your feet each day for sores  Wear shoes and socks that fit correctly  Do not trim your toenails   Ask your care team provider for more information about foot care  · Know the risks if you choose to drink alcohol  Alcohol can cause your blood sugar levels to be low if you use insulin  Alcohol can cause high blood sugar levels and weight gain if you drink too much  Women 21 years or older and men 72 years or older should limit alcohol to 1 drink a day  Men aged 24 to 59 years should limit alcohol to 2 drinks a day  A drink of alcohol is 12 ounces of beer, 5 ounces of wine, or 1½ ounces of liquor  · Do not smoke  Nicotine and other chemicals in cigarettes and cigars can cause lung damage and make diabetes harder to manage  Ask your care team provider for information if you currently smoke and need help to quit  E-cigarettes or smokeless tobacco still contain nicotine  Talk to your care team provider before you use these products  · Check your blood pressure as directed  You may have high blood pressure when you have type 2 diabetes  Talk to your care team provider about your blood pressure goals  Together you can create a plan to lower your blood pressure if needed and keep it in a healthy range  The plan may include lifestyle changes or medicines  A normal blood pressure is 119/79 or lower  A normal blood pressure can help prevent or delay certain complications from diabetes  Examples include retinopathy (eye damage) and kidney damage  · Wear medical alert identification  Wear medical alert jewelry or carry a card that says you have diabetes  Ask your care team provider where to get these items  · Ask about vaccines  You have a higher risk for serious illness if you get the flu, pneumonia, or hepatitis  Ask your care team provider if you should get a flu, pneumonia, or hepatitis B vaccine, and when to get the vaccine  Follow up with your diabetes care team providers as directed: You may need to return to have your A1c checked every 3 months   You will need to return at least once each year to have your feet checked  You will need an eye exam once a year to check for retinopathy  You will also need urine tests every year to check for kidney problems  You may need tests to monitor for heart disease, such as an EKG, stress test, blood pressure monitoring, and blood tests  Write down your questions so you remember to ask them during your visits  © Copyright [address] Information is for End User's use only and may not be sold, redistributed or otherwise used for commercial purposes  All illustrations and images included in CareNotes® are the copyrighted property of [employer] Inc  or [facility] Elias Blandon   The above information is an  only  It is not intended as medical advice for individual conditions or treatments  Talk to your doctor, nurse or pharmacist before following any medical regimen to see if it is safe and effective for you  Gallstones   WHAT YOU NEED TO KNOW:   What are gallstones? Gallstones are hard substances that form in your gallbladder or bile duct  Your gallbladder and bile duct are located on the right side of your abdomen, near your liver  Your gallbladder stores bile  Bile helps break down the fat that you eat  Your gallbladder also helps remove certain chemicals from your body  What causes gallstones? Gallstones develop when your gallbladder does not empty correctly  Stones can form from different bile materials  The following may increase your risk:  · Obesity or not enough physical activity    · Pregnancy    · A family history of gallstones    · A health condition such as diabetes, cirrhosis, or nonalcoholic fatty liver disease    · Rapid weight loss    · Surgery on your intestines    · Certain medicines, such as estrogen, antibiotics, and cholesterol-lowering medicines    What are the signs and symptoms of gallstones? The most common symptom is severe, constant pain in the right upper abdomen  It is usually just below the ribcage   The pain may also be felt in the right shoulder and between the shoulder blades  You may also have any of the following:  · Nausea and vomiting    · Feeling bloated    · [place]-colored bowel movements    · Dark urine    How are gallstones diagnosed? Ultrasound pictures may be used to check for gallstones  If these tests do not show clear signs of gallstones, you may need a test that uses contrast liquid  Examples include a gallbladder scan, endoscopic retrograde cholangiopancreatography (ERCP), and an oral cholecystography  For any of these tests, tell your healthcare provider if you have ever had an allergic reaction to contrast liquid  If you are a woman, tell your healthcare provider if there is a chance you may be pregnant  How are gallstones treated? You may not need treatment if you do not have signs or symptoms  Your healthcare provider may want to monitor the gallstones over time  You may need any of the following:  · Antinausea medicine  may be given to calm your stomach and to help prevent vomiting  · Prescription pain medicine  may be given  Ask your healthcare provider how to take this medicine safely  Some prescription pain medicines contain acetaminophen  Do not take other medicines that contain acetaminophen without talking to your healthcare provider  Too much acetaminophen may cause liver damage  Prescription pain medicine may cause constipation  Ask your healthcare provider how to prevent or treat constipation  · Surgery  may be needed to remove your gallbladder  This may be done after symptoms become severe or you develop health problems from the gallstones  Your healthcare provider may recommend gallbladder removal before you develop symptoms  This may be done if you are at high risk for gallstones or health problems they can cause  What can I do to manage or prevent gallstones? · Eat a variety of healthy foods  This may help you have more energy and heal faster   Healthy foods include fruits, vegetables, whole-grain breads, low-fat dairy products, beans, lean meat, and fish  Ask if you need to be on a special diet  Try to eat regular meals during the day  This will help your gallbladder empty  · Exercise as directed  Talk to your healthcare provider about the best exercise plan for you  Exercise can help you lose weight and improve your health  · Manage your weight  If you are overweight, it is important to reach a healthy weight  You will need to lose weight slowly because rapid weight loss can increase your risk for gallstones  Talk to your healthcare provider about your weight  He or she can help you create a safe weight loss plan if you need to lose weight  When should I seek immediate care? · You have a fever and chills  · Your eyes or skin turn yellow  · You have severe pain in your upper abdomen, just below the right ribcage  When should I contact my healthcare provider? · You have nausea and are vomiting  · Your urine is dark  · You have justin-colored bowel movements  · You have questions or concerns about your condition or care  CARE AGREEMENT:   You have the right to help plan your care  Learn about your health condition and how it may be treated  Discuss treatment options with your healthcare providers to decide what care you want to receive  You always have the right to refuse treatment  The above information is an  only  It is not intended as medical advice for individual conditions or treatments  Talk to your doctor, nurse or pharmacist before following any medical regimen to see if it is safe and effective for you  © Copyright [address] Information is for End User's use only and may not be sold, redistributed or otherwise used for commercial purposes   All illustrations and images included in CareNotes® are the copyrighted property of [employer]  or Kathie Russo      Follow with Consultants as per their and our suggestion    Follow up in one month or as needed earlier    Follow all instructions as advised and discussed  Take your medications as prescribed  Call the office immediately if you experience any side effects  Ask questions if you do not understand  Keep your scheduled appointment as advised or come sooner if necessary or in doubt  Best time to call for non-urgent matter or questions on weekdays is between 9am and 12 noon  See physician for any new symptoms or worsening of current symptoms  Urgent or emergent situations call 911 and report to nearest emergency room  I spent  30 -40 minutes taking care of this patient including clinical care, conseling, collaboration, chart, lab and consultaion review as appropriate    Patient is to get labs 1 week(s) prior to next visit if advised    We will start losartan 25 mg daily  Schedule ultrasound of the gallbladder  If you need any assistance please give us    A call  [facility]

## 2021-06-22 NOTE — ASSESSMENT & PLAN NOTE
Mildly elevated blood pressure  Will start losartan 25 mg daily that should have protein in the urine as well as her cardiac status 2  Risk benefits were reviewed  Recommend  A blood pressure check when possible at home  Hypertension( High Blood Pressure ):    Continue Home BP check daily and bring log, if you are not checking consider checking daily    Take your Blood Pressure medicine as advised    Do not take your Blood pressure medicine if systolic Blood Pressure (top number) is less than 100 or heart rate less than 60  Notify you physician

## 2021-06-22 NOTE — ASSESSMENT & PLAN NOTE
Seen by urologist   The recently had 100,000 colonies of E coli in the urine sensitive to cephalosporin finishing up CefaDoxil  Also recently had a CT scan done report of which is awaited

## 2021-06-22 NOTE — ASSESSMENT & PLAN NOTE
Mild persistent protein in the urine more likely related to diabetes hypertension a  We will start losartan 25 mg daily  The risk benefits were reviewed

## 2021-06-22 NOTE — ASSESSMENT & PLAN NOTE
Cholesterol well controlled  Continue diet    LDL was 127  We talked about going on statin    Will consider next visit after checking new data is in 3 months

## 2021-06-22 NOTE — ASSESSMENT & PLAN NOTE
Mild, intermittent, every couple of months, related to foot, had history of upper GI endoscopy done in the past P, no acid reflux  Home    Will do ultrasound of upper abdomen  CT scan of abdomen and pelvis awaited

## 2021-06-29 ENCOUNTER — APPOINTMENT (OUTPATIENT)
Dept: LAB | Facility: CLINIC | Age: 81
End: 2021-06-29
Payer: MEDICARE

## 2021-07-01 ENCOUNTER — HOSPITAL ENCOUNTER (OUTPATIENT)
Dept: RADIOLOGY | Facility: HOSPITAL | Age: 81
Discharge: HOME | End: 2021-07-01
Attending: INTERNAL MEDICINE
Payer: MEDICARE

## 2021-07-01 DIAGNOSIS — R10.13 EPIGASTRIC PAIN: ICD-10-CM

## 2021-07-01 DIAGNOSIS — I10 HYPERTENSION, UNSPECIFIED TYPE: ICD-10-CM

## 2021-07-01 PROCEDURE — 76705 ECHO EXAM OF ABDOMEN: CPT

## 2021-07-24 DIAGNOSIS — E11.9 TYPE 2 DIABETES MELLITUS WITHOUT COMPLICATION, WITHOUT LONG-TERM CURRENT USE OF INSULIN (HCC): ICD-10-CM

## 2021-07-30 ENCOUNTER — APPOINTMENT (OUTPATIENT)
Dept: LAB | Facility: CLINIC | Age: 81
End: 2021-07-30
Payer: MEDICARE

## 2021-08-03 ENCOUNTER — OFFICE VISIT (OUTPATIENT)
Dept: INTERNAL MEDICINE CLINIC | Facility: CLINIC | Age: 81
End: 2021-08-03
Payer: MEDICARE

## 2021-08-03 VITALS
OXYGEN SATURATION: 96 % | WEIGHT: 161.8 LBS | HEART RATE: 93 BPM | HEIGHT: 66 IN | BODY MASS INDEX: 26 KG/M2 | DIASTOLIC BLOOD PRESSURE: 41 MMHG | SYSTOLIC BLOOD PRESSURE: 139 MMHG

## 2021-08-03 DIAGNOSIS — I10 ESSENTIAL HYPERTENSION: ICD-10-CM

## 2021-08-03 DIAGNOSIS — Z95.2 H/O MITRAL VALVE REPLACEMENT WITH MECHANICAL VALVE: ICD-10-CM

## 2021-08-03 DIAGNOSIS — I71.4 ABDOMINAL AORTIC ANEURYSM (AAA) WITHOUT RUPTURE (HCC): ICD-10-CM

## 2021-08-03 DIAGNOSIS — D50.0 IRON DEFICIENCY ANEMIA DUE TO CHRONIC BLOOD LOSS: ICD-10-CM

## 2021-08-03 DIAGNOSIS — J43.2 CENTRILOBULAR EMPHYSEMA (HCC): ICD-10-CM

## 2021-08-03 DIAGNOSIS — E78.00 HYPERCHOLESTEREMIA: ICD-10-CM

## 2021-08-03 DIAGNOSIS — E03.8 OTHER SPECIFIED HYPOTHYROIDISM: ICD-10-CM

## 2021-08-03 DIAGNOSIS — R80.8 OTHER PROTEINURIA: ICD-10-CM

## 2021-08-03 DIAGNOSIS — Z79.01 LONG TERM (CURRENT) USE OF ANTICOAGULANTS: ICD-10-CM

## 2021-08-03 DIAGNOSIS — R10.13 EPIGASTRIC PAIN: ICD-10-CM

## 2021-08-03 DIAGNOSIS — I48.20 CHRONIC ATRIAL FIBRILLATION (HCC): ICD-10-CM

## 2021-08-03 DIAGNOSIS — E11.9 TYPE 2 DIABETES MELLITUS WITHOUT COMPLICATION, WITHOUT LONG-TERM CURRENT USE OF INSULIN (HCC): Primary | ICD-10-CM

## 2021-08-03 DIAGNOSIS — R31.29 OTHER MICROSCOPIC HEMATURIA: ICD-10-CM

## 2021-08-03 PROCEDURE — 99214 OFFICE O/P EST MOD 30 MIN: CPT | Performed by: INTERNAL MEDICINE

## 2021-08-03 RX ORDER — CEFADROXIL 500 MG/1
500 CAPSULE ORAL 2 TIMES DAILY
COMMUNITY
Start: 2021-06-29 | End: 2021-08-03

## 2021-08-03 NOTE — ASSESSMENT & PLAN NOTE
Symptom-free  The finish antibiotic  Home no hematuria microscopic gross  His microscopic due to multiple as outlined in the HPI    Seen by urologist

## 2021-08-03 NOTE — PATIENT INSTRUCTIONS
Check your blood pressure daily  If blood pressure systolic top number is less than 100 skip losartan that day  If blood pressure is between 100-130 please take losartan  Hypertension( High Blood Pressure ):    Continue Home BP check daily and bring log, if you are not checking consider checking daily    Take your Blood Pressure medicine as advised    Do not take your Blood pressure medicine if systolic Blood Pressure (top number) is less than 100 or heart rate less than 60  Notify you physician  Diabetes    Check your fingerstick Accu-Chek once a day  Please check your fingerstick Accu-Chek different time of the day either at 7:00 a m  or 11:00 a m  for for p m  4 9:00 p m  Catalina Reynolds Follow Diabetic 1800 calorie diet for diabetes as advised  If you would sugar less than 80 or more than 300 to please call us on next visit is day  If the sugar is less than 80 follow-up hypoglycemia instructions as advised  Take your diabetic medicine as advised  Cholesterol    Eat low cholesterol diet    Continue taking your cholesterol medicine as advised    Call if any side effects    Lipid Profile and LFT prior to next visit or as advised  ( You should Get periodically to monitor liver side effects)    KNOW YOUR DIABETIC GOAL( HBA1C AND SUGAR), BLOOD PRESSURE TARGET NUMBER AND CHOLESTEROL( LDL, HDL AND TRIGLYCERIDE)   Follow with Consultants as per their and our suggestion    Follow up in 12 week(s) or as needed earlier    Follow all instructions as advised and discussed  Take your medications as prescribed  Call the office immediately if you experience any side effects  Ask questions if you do not understand  Keep your scheduled appointment as advised or come sooner if necessary or in doubt  Best time to call for non-urgent matter or questions on weekdays is between 9am and 12 noon  See physician for any new symptoms or worsening of current symptoms      Urgent or emergent situations call 911 and report to nearest emergency room      I spent  30 -40 minutes taking care of this patient including clinical care, conseling, collaboration, chart, lab and consultaion review as appropriate    Patient is to get labs 1 week(s) prior to next visit if advised

## 2021-08-03 NOTE — ASSESSMENT & PLAN NOTE
Generally symptom-free hemoglobin A1c was to the target remains on metformin 500 mg   Will do hemoglobin A1c and urine for microalbumin prior to next visit  Lab Results   Component Value Date    HGBA1C 6 6 (H) 06/18/2021

## 2021-08-03 NOTE — ASSESSMENT & PLAN NOTE
06/17/2021:  CT scan of abdomen and pelvis     No acute inflammatory stranding  No bowel obstruction  Diverticulosis  Incidentally detected small infrarenal abdominal aortic aneurysm which measures about 3 1 cm, surveillance with ultrasound can be performed to evaluate growth     Mild hepatic steatosis,

## 2021-08-03 NOTE — ASSESSMENT & PLAN NOTE
Suboptimal control of the blood pressure  Recommend to start taking losartan 25 mg daily  Continue nadolol  Check blood pressure daily  If blood pressure is less than 076 systolic see will is hold losartan that day  Target systolic blood pressure is between 100-130  And target diastolic blood pressure should be less than 85

## 2021-08-03 NOTE — ASSESSMENT & PLAN NOTE
Epigastric pain resolved  Ultrasound unremarkable        Patient will see gastroenterologist if recurrent pain for endoscopy

## 2021-08-03 NOTE — ASSESSMENT & PLAN NOTE
Controlled ventricular rate    Will continue nadolol as well as anticoagulation being monitored by cardiologist

## 2021-08-03 NOTE — PROGRESS NOTES
[facility] Office Visit Note  21     Mello Pinon 80 y o  female MRN: [id number]  : 1940    Assessment:     No problem-specific Assessment & Plan notes found for this encounter  Diagnoses and all orders for this visit:    Type 2 diabetes mellitus without complication, without long-term current use of insulin (HCC)    Other specified hypothyroidism    Chronic atrial fibrillation (HCC)    Abdominal aortic aneurysm (AAA) without rupture ([address] )    Other orders  -     cefadroxil (DURICEF) 500 mg capsule; Take 500 mg by mouth 2 (two) times a day          Discussion Summary and Plan: Today's care plan and medications were reviewed with patient in detail and all their questions answered to their satisfaction  No chief complaint on file  Subjective:  Patient is here for follow-up     For epigastric pain  She does not have any further epigastric pain  Ultrasound of upper abdomen was unremarkable  She is not requiring any medications  Patient is also here for follow-up of her high blood pressure  Home blood pressure is in the range of 139  Blood pressure is here 150  Patient was going away on vacation to [place] and she did not get a chance to start her losartan  I strongly recommend that she should start losartan not only for the blood pressure purpose of but also for the protein in the urine in a diabetic patients  Also it will help her heart  She will restart the Cl radius medicine at home  Of see does not have any further blood in the urine  She finished antibiotic without any side effect    Per urologist note:  1  Postmenopausal atrophic vaginitis, Estrace cream 3x/wk  Uses pessary since 2018  Dr Romy Byers  2  Microscopic & gross hematuria  3  Remote Hx of renal calculi  4  Cervical CA - s/p hysterectomy  NO XRT  5  Chronic cystitis - feels better after antibiotics  Urine analysis noted    Urine culture grew more than 100,000 colonies of E coli sensitive to a cephalo seen   Patient was put on the cefadroxil 500 mg twice a day for 3 days  CT scan of abdomen was done on 17th  Report of which is awaited  COVID-19 resolved  Chronic disease management as underneath  Anemia, a stable   Most likely it is combination of factor including patient being on 1  Anticoagulation with warfarin 2  Mechanical wall probably destroying some of the red blood cell 3  Had micro hematuria 4  His celiac disease suspected on biopsy 5  Possible bleed from vaginal or pessary 6  Probable to poor intake cannot be ruled out  Celiac disease:  Symptom-free  Hypertension:  Mildly elevated, continue same regimen with nadolol and add losartan for multiple purpose  Hyperlipidemia how continue to follow lipid profile and ALT periodically  Atrial fibrillation on chronic anticoagulation by cardiologist  Vitamin D deficiency continue supplement  The protein urea , blood pressure mildly elevated  Will add losartan 25 mg daily  INR being followed by cardiologist   Iron deficiency anemia with negative workup for obvious GI loss now on iron supplement with improving hemoglobin  Abdominal aortic aneurysm, hepatic steatosis, hepatomegaly all stable  Macular degeneration be being followed by ophthalmologist         LDL was 127 in January we talked about putting on statin  Protein in the urine noted  We talked about going on Ace or Arb MA/Creat: 79    Had a recurrent epigastric pain lasting for few minutes every couple of months isolated episode family interested in getting gallbladder check does not want to go for upper GI endoscopy does not have any associated nausea vomiting reflux symptoms also had history of abdominal aortic aneurysm CT scan done report awaited      No weight loss no hematemesis melena melena hematochezia no urinary symptoms asymptomatic bacteriuria found treated with days of antibiotic seen by urologist     Differential diagnosis of epigastric pain discussed with the family and patient  They do not want to pursue any other further workup    06/18/2021:  Hemoglobin A1c 6 6, CMP normal except blood sugar 149, CBC normal, with hemoglobin 12 7 normal WBC and ferritin 18 as well as TSH 2 49  06/15/2021:  Blood sugar 91, GFR 87      07/01/2021:  Ultrasound abdomen:Hepatomegaly   Hepatic moderate steatosis   Mid/distal aorta aneurysm again noted without significant change    No gallstones    06/17/2021:  CT scan of abdomen pelvis:  No acute inflammatory stranding  No bowel obstruction  Diverticulosis  Incidentally detected small infrarenal abdominal aortic aneurysm which measures about 3 1 cm, surveillance with ultrasound can be performed to evaluate growth     Mild hepatic steatosis,                   The following portions of the patient's history were reviewed and updated as appropriate: allergies, current medications, past family history, past medical history, past social history, past surgical history and problem list     Review of Systems   Constitutional: Negative for chills, fatigue, fever and unexpected weight change  HENT: Negative for ear pain, postnasal drip, sinus pain and sore throat  Eyes: Negative for pain and redness  Respiratory: Negative for cough and shortness of breath  Cardiovascular: Negative for chest pain, palpitations and leg swelling  Gastrointestinal: Negative for abdominal pain, blood in stool, constipation, diarrhea, nausea, rectal pain and vomiting  Endocrine: Negative for cold intolerance, polydipsia and polyuria  Genitourinary: Negative for dysuria, frequency and urgency  Musculoskeletal: Negative for arthralgias, gait problem and myalgias  Skin: Negative for rash  Allergic/Immunologic: Negative  Neurological: Negative for dizziness and headaches  Hematological: Negative for adenopathy  Psychiatric/Behavioral: Negative for behavioral problems           Historical Information   Patient Active Problem List   Diagnosis    Obstructive sleep apnea    Chronic atrial fibrillation (HCC)    H/O mitral valve replacement with mechanical valve    Long term (current) use of anticoagulants    Presence of prosthetic heart valve    Hypercholesteremia    Anemia due to chronic kidney disease    Allergic rhinitis    Type 2 diabetes mellitus without complication, without long-term current use of insulin (HCC)    Iron deficiency anemia    Other specified hypothyroidism    Vitamin B12 deficiency    Other microscopic hematuria    Presence of pessary    Celiac disease    Abdominal aortic aneurysm (AAA) without rupture (HCC)    Ataxia    Pulmonary emphysema (HCC)    Cystitis    History of renal calculi    History of cervical cancer    Essential hypertension    Other proteinuria    Epigastric pain     Past Medical History:   Diagnosis Date    Atrial fibrillation (HCC)     Cancer (HCC)     hx of cervical    Cardiac disease     Hyperlipidemia      Past Surgical History:   Procedure Laterality Date    EYE SURGERY      HYSTERECTOMY      MITRAL VALVE REPLACEMENT       Social History     Substance and Sexual Activity   Alcohol Use Yes    Comment: special occasional     Social History     Substance and Sexual Activity   Drug Use No     Social History     Tobacco Use   Smoking Status Former Smoker    Packs/day: 2 00    Years: 30 00    Pack years: 60 00    Quit date:     Years since quittin 6   Smokeless Tobacco Never Used     Family History   Problem Relation Age of Onset    Heart failure Mother     Heart attack Father     Sleep apnea Brother      Health Maintenance Due   Topic    Pneumococcal Vaccine: 65+ Years (1 of 2 - PPSV23)    COVID-19 Vaccine (1)    DTaP,Tdap,and Td Vaccines (1 - Tdap)    Influenza Vaccine (1)      Meds/Allergies       Current Outpatient Medications:     acetaminophen (TYLENOL) 500 mg tablet, Take 500 mg by mouth, Disp: , Rfl:     cefadroxil (DURICEF) 500 mg capsule, Take 500 mg by mouth 2 (two) times a day, Disp: , Rfl:     Cholecalciferol (VITAMIN D PO), Take by mouth, Disp: , Rfl:     digoxin (LANOXIN) 0 125 mg tablet, Take 125 mcg by mouth daily, Disp: , Rfl:     ferrous sulfate 324 (65 Fe) mg, Take 1 tablet (324 mg total) by mouth 2 (two) times a day before meals, Disp: 180 tablet, Rfl: 1    losartan (COZAAR) 25 mg tablet, Take 1 tablet (25 mg total) by mouth daily, Disp: 90 tablet, Rfl: 1    metFORMIN (GLUCOPHAGE) 500 mg tablet, Take 2 tablets by mouth twice daily, Disp: 360 tablet, Rfl: 0    Multiple Vitamins-Minerals (PRESERVISION AREDS PO), Take 2 tablets by mouth daily, Disp: , Rfl:     NADOLOL PO, Take 2 5 mg by mouth daily, Disp: , Rfl:     warfarin (COUMADIN) 2 5 mg tablet, Take 2 5 mg by mouth 3 (three) times a week, Disp: , Rfl:     warfarin (COUMADIN) 5 mg tablet, Take 5 mg by mouth 4 (four) times a week, Disp: , Rfl:       Objective:    Vitals:   Pulse 93   Ht 5' 6" (1 676 m)   Wt 73 4 kg (161 lb 12 8 oz)   SpO2 96%   BMI 26 12 kg/m²   Body mass index is 26 12 kg/m²  Vitals:    08/03/21 1359   Weight: 73 4 kg (161 lb 12 8 oz)       Physical Exam  Vitals and nursing note reviewed  Constitutional:       General: She is not in acute distress  Appearance: Normal appearance  She is well-developed  She is not ill-appearing, toxic-appearing or diaphoretic  Comments: Short grey hair   HENT:      Head: Normocephalic and atraumatic  Right Ear: External ear normal       Left Ear: External ear normal    Eyes:      General: No scleral icterus  Conjunctiva/sclera: Conjunctivae normal    Neck:      Thyroid: No thyroid mass, thyromegaly or thyroid tenderness  Vascular: Normal carotid pulses  No carotid bruit or JVD  Cardiovascular:      Rate and Rhythm: Normal rate and regular rhythm  Pulses:           Dorsalis pedis pulses are 2+ on the right side and 2+ on the left side  Posterior tibial pulses are 2+ on the right side and 1+ on the left side  Heart sounds: S1 normal  Murmur heard  Systolic murmur is present with a grade of 2/6  Comments: S2 elevated  Pulmonary:      Effort: No respiratory distress  Breath sounds: Normal breath sounds  No stridor  No wheezing, rhonchi or rales  Abdominal:      General: Bowel sounds are normal  There is no distension  Palpations: There is no shifting dullness, fluid wave, hepatomegaly, mass or pulsatile mass  Tenderness: There is no abdominal tenderness  There is no rebound  Hernia: There is no hernia in the umbilical area, ventral area, left inguinal area, left femoral area or right inguinal area  Musculoskeletal:         General: Normal range of motion  Cervical back: Normal range of motion  Right lower leg: No edema  Left lower leg: No edema  Feet:      Right foot:      Skin integrity: No ulcer, skin breakdown, erythema, warmth, callus or dry skin  Left foot:      Skin integrity: No ulcer, skin breakdown, erythema, warmth, callus or dry skin  Lymphadenopathy:      Cervical: No cervical adenopathy  Right cervical: No superficial cervical adenopathy  Skin:     General: Skin is warm  Findings: No rash  Neurological:      General: No focal deficit present  Mental Status: She is alert  Mental status is at baseline  Cranial Nerves: Cranial nerves are intact  Sensory: Sensation is intact  Motor: Motor function is intact  Gait: Tandem walk abnormal  Gait normal    Psychiatric:         Mood and Affect: Mood normal          Behavior: Behavior normal          Thought Content:  Thought content normal          Judgment: Judgment normal          Lab Review   Transcribe Orders on 07/30/2021   Component Date Value Ref Range Status    Protime 07/30/2021 34 6* 11 6 - 14 5 seconds Final    INR 07/30/2021 3 47* 0 84 - 1 19 Final   Orders Only on 07/27/2021   Component Date Value Ref Range Status    Right Eye Diabetic Retinopathy 07/27/2021 None Final    Left Eye Diabetic Retinopathy 07/27/2021 None   Final   Transcribe Orders on 06/29/2021   Component Date Value Ref Range Status    Lipoprotein (a) 06/29/2021 43 1  <75 0 nmol/L Final    Note:  Values greater than or equal to 75 0 nmol/L may         indicate an independent risk factor for CHD,         but must be evaluated with caution when applied         to non- populations due to the         influence of genetic factors on Lp(a) across         ethnicities  Ancillary Orders on 06/25/2021   Component Date Value Ref Range Status    Protime 06/29/2021 33 9* 11 6 - 14 5 seconds Final    INR 06/29/2021 3 37* 0 84 - 1 19 Final   Appointment on 06/18/2021   Component Date Value Ref Range Status    Hemoglobin A1C 06/18/2021 6 6* Normal 3 8-5 6%; PreDiabetic 5 7-6 4%;  Diabetic >=6 5%; Glycemic control for adults with diabetes <7 0% % Final    EAG 06/18/2021 143  mg/dl Final    Ferritin 06/18/2021 18  8 - 388 ng/mL Final    WBC 06/18/2021 4 77  4 31 - 10 16 Thousand/uL Final    RBC 06/18/2021 4 17  3 81 - 5 12 Million/uL Final    Hemoglobin 06/18/2021 12 7  11 5 - 15 4 g/dL Final    Hematocrit 06/18/2021 40 1  34 8 - 46 1 % Final    MCV 06/18/2021 96  82 - 98 fL Final    MCH 06/18/2021 30 5  26 8 - 34 3 pg Final    MCHC 06/18/2021 31 7  31 4 - 37 4 g/dL Final    RDW 06/18/2021 14 5  11 6 - 15 1 % Final    MPV 06/18/2021 11 3  8 9 - 12 7 fL Final    Platelets 65/81/6814 232  149 - 390 Thousands/uL Final    nRBC 06/18/2021 0  /100 WBCs Final    Neutrophils Relative 06/18/2021 58  43 - 75 % Final    Immat GRANS % 06/18/2021 0  0 - 2 % Final    Lymphocytes Relative 06/18/2021 27  14 - 44 % Final    Monocytes Relative 06/18/2021 11  4 - 12 % Final    Eosinophils Relative 06/18/2021 3  0 - 6 % Final    Basophils Relative 06/18/2021 1  0 - 1 % Final    Neutrophils Absolute 06/18/2021 2 75  1 85 - 7 62 Thousands/µL Final    Immature Grans Absolute 06/18/2021 0 01  0 00 - 0 20 Thousand/uL Final    Lymphocytes Absolute 06/18/2021 1 27  0 60 - 4 47 Thousands/µL Final    Monocytes Absolute 06/18/2021 0 54  0 17 - 1 22 Thousand/µL Final    Eosinophils Absolute 06/18/2021 0 15  0 00 - 0 61 Thousand/µL Final    Basophils Absolute 06/18/2021 0 05  0 00 - 0 10 Thousands/µL Final    Sodium 06/18/2021 136  136 - 145 mmol/L Final    Potassium 06/18/2021 4 5  3 5 - 5 3 mmol/L Final    Chloride 06/18/2021 100  100 - 108 mmol/L Final    CO2 06/18/2021 31  21 - 32 mmol/L Final    ANION GAP 06/18/2021 5  4 - 13 mmol/L Final    BUN 06/18/2021 13  5 - 25 mg/dL Final    Creatinine 06/18/2021 0 62  0 60 - 1 30 mg/dL Final    Standardized to IDMS reference method    Glucose, Fasting 06/18/2021 149* 65 - 99 mg/dL Final    Specimen collection should occur prior to Sulfasalazine administration due to the potential for falsely depressed results  Specimen collection should occur prior to Sulfapyridine administration due to the potential for falsely elevated results   Calcium 06/18/2021 9 5  8 3 - 10 1 mg/dL Final    AST 06/18/2021 28  5 - 45 U/L Final    Specimen collection should occur prior to Sulfasalazine administration due to the potential for falsely depressed results   ALT 06/18/2021 33  12 - 78 U/L Final    Specimen collection should occur prior to Sulfasalazine and/or Sulfapyridine administration due to the potential for falsely depressed results   Alkaline Phosphatase 06/18/2021 82  46 - 116 U/L Final    Total Protein 06/18/2021 7 8  6 4 - 8 2 g/dL Final    Albumin 06/18/2021 4 2  3 5 - 5 0 g/dL Final    Total Bilirubin 06/18/2021 0 47  0 20 - 1 00 mg/dL Final    Use of this assay is not recommended for patients undergoing treatment with eltrombopag due to the potential for falsely elevated results      eGFR 06/18/2021 85  ml/min/1 73sq m Final    TSH 3RD GENERATON 06/18/2021 2 490  0 358 - 3 740 uIU/mL Final    The recommended reference ranges for TSH during pregnancy are as follows:   First trimester 0 1 to 2 5 uIU/mL   Second trimester  0 2 to 3 0 uIU/mL   Third trimester 0 3 to 3 0 uIU/m    Note: Normal ranges may not apply to patients who are transgender, non-binary, or whose legal sex, sex at birth, and gender identity differ  Appointment on 06/15/2021   Component Date Value Ref Range Status    Sodium 06/15/2021 137  136 - 145 mmol/L Final    Potassium 06/15/2021 4 1  3 5 - 5 3 mmol/L Final    Chloride 06/15/2021 101  100 - 108 mmol/L Final    CO2 06/15/2021 30  21 - 32 mmol/L Final    ANION GAP 06/15/2021 6  4 - 13 mmol/L Final    BUN 06/15/2021 14  5 - 25 mg/dL Final    Creatinine 06/15/2021 0 58* 0 60 - 1 30 mg/dL Final    Standardized to IDMS reference method    Glucose 06/15/2021 91  65 - 140 mg/dL Final    If the patient is fasting, the ADA then defines impaired fasting glucose as > 100 mg/dL and diabetes as > or equal to 123 mg/dL  Specimen collection should occur prior to Sulfasalazine administration due to the potential for falsely depressed results  Specimen collection should occur prior to Sulfapyridine administration due to the potential for falsely elevated results   Calcium 06/15/2021 9 4  8 3 - 10 1 mg/dL Final    eGFR 06/15/2021 87  ml/min/1 73sq m Final         There are no Patient Instructions on file for this visit  Dr Ariela Bateman MD  [facility]       "This note has been constructed using a voice recognition system  Therefore there may be syntax, spelling, and/or grammatical errors   Please call if you have any questions  "

## 2021-08-06 DIAGNOSIS — R80.8 OTHER PROTEINURIA: ICD-10-CM

## 2021-08-06 DIAGNOSIS — I10 HYPERTENSION, UNSPECIFIED TYPE: ICD-10-CM

## 2021-08-06 RX ORDER — LOSARTAN POTASSIUM 25 MG/1
25 TABLET ORAL DAILY
Qty: 90 TABLET | Refills: 1 | Status: ON HOLD | OUTPATIENT
Start: 2021-08-06 | End: 2022-03-14 | Stop reason: SDUPTHER

## 2021-08-31 ENCOUNTER — APPOINTMENT (OUTPATIENT)
Dept: LAB | Facility: CLINIC | Age: 81
End: 2021-08-31
Payer: MEDICARE

## 2021-09-30 ENCOUNTER — APPOINTMENT (OUTPATIENT)
Dept: LAB | Facility: CLINIC | Age: 81
End: 2021-09-30
Payer: MEDICARE

## 2021-09-30 DIAGNOSIS — Z95.2 HEART VALVE REPLACED BY TRANSPLANT: ICD-10-CM

## 2021-09-30 DIAGNOSIS — I48.91 ATRIAL FIBRILLATION, UNSPECIFIED TYPE (HCC): ICD-10-CM

## 2021-09-30 DIAGNOSIS — Z79.01 LONG TERM (CURRENT) USE OF ANTICOAGULANTS: ICD-10-CM

## 2021-09-30 LAB
INR PPP: 3 (ref 0.84–1.19)
PROTHROMBIN TIME: 29.6 SECONDS (ref 11.6–14.5)

## 2021-09-30 PROCEDURE — 36415 COLL VENOUS BLD VENIPUNCTURE: CPT

## 2021-09-30 PROCEDURE — 85610 PROTHROMBIN TIME: CPT

## 2021-10-29 ENCOUNTER — APPOINTMENT (OUTPATIENT)
Dept: LAB | Facility: CLINIC | Age: 81
End: 2021-10-29
Payer: MEDICARE

## 2021-10-29 DIAGNOSIS — E78.00 HYPERCHOLESTEREMIA: ICD-10-CM

## 2021-10-29 DIAGNOSIS — E11.9 TYPE 2 DIABETES MELLITUS WITHOUT COMPLICATION, WITHOUT LONG-TERM CURRENT USE OF INSULIN (HCC): ICD-10-CM

## 2021-10-29 DIAGNOSIS — I10 ESSENTIAL HYPERTENSION: ICD-10-CM

## 2021-10-29 LAB
ALBUMIN SERPL BCP-MCNC: 4 G/DL (ref 3.5–5)
ALP SERPL-CCNC: 75 U/L (ref 46–116)
ALT SERPL W P-5'-P-CCNC: 33 U/L (ref 12–78)
ANION GAP SERPL CALCULATED.3IONS-SCNC: 1 MMOL/L (ref 4–13)
AST SERPL W P-5'-P-CCNC: 27 U/L (ref 5–45)
BILIRUB SERPL-MCNC: 0.36 MG/DL (ref 0.2–1)
BUN SERPL-MCNC: 18 MG/DL (ref 5–25)
CALCIUM SERPL-MCNC: 9.3 MG/DL (ref 8.3–10.1)
CHLORIDE SERPL-SCNC: 106 MMOL/L (ref 100–108)
CHOLEST SERPL-MCNC: 223 MG/DL (ref 50–200)
CO2 SERPL-SCNC: 30 MMOL/L (ref 21–32)
CREAT SERPL-MCNC: 0.72 MG/DL (ref 0.6–1.3)
CREAT UR-MCNC: 28.3 MG/DL
EST. AVERAGE GLUCOSE BLD GHB EST-MCNC: 134 MG/DL
GFR SERPL CREATININE-BSD FRML MDRD: 79 ML/MIN/1.73SQ M
GLUCOSE P FAST SERPL-MCNC: 156 MG/DL (ref 65–99)
HBA1C MFR BLD: 6.3 %
HDLC SERPL-MCNC: 37 MG/DL
LDLC SERPL CALC-MCNC: 124 MG/DL (ref 0–100)
MICROALBUMIN UR-MCNC: 12.1 MG/L (ref 0–20)
MICROALBUMIN/CREAT 24H UR: 43 MG/G CREATININE (ref 0–30)
NONHDLC SERPL-MCNC: 186 MG/DL
POTASSIUM SERPL-SCNC: 4.1 MMOL/L (ref 3.5–5.3)
PROT SERPL-MCNC: 7.8 G/DL (ref 6.4–8.2)
SODIUM SERPL-SCNC: 137 MMOL/L (ref 136–145)
TRIGL SERPL-MCNC: 309 MG/DL

## 2021-10-29 PROCEDURE — 82043 UR ALBUMIN QUANTITATIVE: CPT | Performed by: INTERNAL MEDICINE

## 2021-10-29 PROCEDURE — 83036 HEMOGLOBIN GLYCOSYLATED A1C: CPT

## 2021-10-29 PROCEDURE — 80061 LIPID PANEL: CPT

## 2021-10-29 PROCEDURE — 80053 COMPREHEN METABOLIC PANEL: CPT

## 2021-10-29 PROCEDURE — 82570 ASSAY OF URINE CREATININE: CPT | Performed by: INTERNAL MEDICINE

## 2021-10-29 PROCEDURE — 36415 COLL VENOUS BLD VENIPUNCTURE: CPT

## 2021-11-02 ENCOUNTER — TRANSCRIBE ORDERS (OUTPATIENT)
Dept: LAB | Facility: CLINIC | Age: 81
End: 2021-11-02

## 2021-11-02 ENCOUNTER — APPOINTMENT (OUTPATIENT)
Dept: LAB | Facility: CLINIC | Age: 81
End: 2021-11-02
Payer: MEDICARE

## 2021-11-02 DIAGNOSIS — Z79.01 LONG TERM (CURRENT) USE OF ANTICOAGULANTS: ICD-10-CM

## 2021-11-02 DIAGNOSIS — Z95.2 HEART VALVE REPLACED: Primary | ICD-10-CM

## 2021-11-02 LAB
INR PPP: 2.79 (ref 0.84–1.19)
PROTHROMBIN TIME: 28 SECONDS (ref 11.6–14.5)

## 2021-11-02 PROCEDURE — 85610 PROTHROMBIN TIME: CPT

## 2021-11-02 PROCEDURE — 36415 COLL VENOUS BLD VENIPUNCTURE: CPT

## 2021-11-04 DIAGNOSIS — E11.9 TYPE 2 DIABETES MELLITUS WITHOUT COMPLICATION, WITHOUT LONG-TERM CURRENT USE OF INSULIN (HCC): ICD-10-CM

## 2021-11-09 ENCOUNTER — OFFICE VISIT (OUTPATIENT)
Dept: INTERNAL MEDICINE CLINIC | Facility: CLINIC | Age: 81
End: 2021-11-09
Payer: MEDICARE

## 2021-11-09 VITALS
OXYGEN SATURATION: 97 % | SYSTOLIC BLOOD PRESSURE: 120 MMHG | HEART RATE: 76 BPM | HEIGHT: 66 IN | WEIGHT: 158 LBS | DIASTOLIC BLOOD PRESSURE: 60 MMHG | BODY MASS INDEX: 25.39 KG/M2

## 2021-11-09 DIAGNOSIS — E55.9 VITAMIN D DEFICIENCY: ICD-10-CM

## 2021-11-09 DIAGNOSIS — G47.33 OBSTRUCTIVE SLEEP APNEA: ICD-10-CM

## 2021-11-09 DIAGNOSIS — J43.9 PULMONARY EMPHYSEMA, UNSPECIFIED EMPHYSEMA TYPE (HCC): ICD-10-CM

## 2021-11-09 DIAGNOSIS — E78.00 HYPERCHOLESTEREMIA: ICD-10-CM

## 2021-11-09 DIAGNOSIS — I71.4 ABDOMINAL AORTIC ANEURYSM (AAA) WITHOUT RUPTURE (HCC): ICD-10-CM

## 2021-11-09 DIAGNOSIS — E03.8 OTHER SPECIFIED HYPOTHYROIDISM: ICD-10-CM

## 2021-11-09 DIAGNOSIS — K90.0 CELIAC DISEASE: ICD-10-CM

## 2021-11-09 DIAGNOSIS — E11.9 TYPE 2 DIABETES MELLITUS WITHOUT COMPLICATION, WITHOUT LONG-TERM CURRENT USE OF INSULIN (HCC): ICD-10-CM

## 2021-11-09 DIAGNOSIS — R80.8 OTHER PROTEINURIA: ICD-10-CM

## 2021-11-09 DIAGNOSIS — Z95.2 H/O MITRAL VALVE REPLACEMENT WITH MECHANICAL VALVE: ICD-10-CM

## 2021-11-09 DIAGNOSIS — D50.0 IRON DEFICIENCY ANEMIA DUE TO CHRONIC BLOOD LOSS: ICD-10-CM

## 2021-11-09 DIAGNOSIS — I48.20 CHRONIC ATRIAL FIBRILLATION (HCC): Primary | ICD-10-CM

## 2021-11-09 DIAGNOSIS — I10 ESSENTIAL HYPERTENSION: ICD-10-CM

## 2021-11-09 PROBLEM — R10.13 EPIGASTRIC PAIN: Status: RESOLVED | Noted: 2021-06-22 | Resolved: 2021-11-09

## 2021-11-09 PROBLEM — Z96.0 PRESENCE OF PESSARY: Status: RESOLVED | Noted: 2020-08-03 | Resolved: 2021-11-09

## 2021-11-09 PROCEDURE — 99214 OFFICE O/P EST MOD 30 MIN: CPT | Performed by: INTERNAL MEDICINE

## 2021-12-21 ENCOUNTER — APPOINTMENT (OUTPATIENT)
Dept: LAB | Facility: CLINIC | Age: 81
End: 2021-12-21
Payer: MEDICARE

## 2022-01-25 ENCOUNTER — APPOINTMENT (OUTPATIENT)
Dept: LAB | Facility: CLINIC | Age: 82
End: 2022-01-25
Payer: MEDICARE

## 2022-01-28 ENCOUNTER — APPOINTMENT (OUTPATIENT)
Dept: RADIOLOGY | Facility: CLINIC | Age: 82
End: 2022-01-28
Payer: MEDICARE

## 2022-01-28 ENCOUNTER — OFFICE VISIT (OUTPATIENT)
Dept: OBGYN CLINIC | Facility: CLINIC | Age: 82
End: 2022-01-28
Payer: MEDICARE

## 2022-01-28 VITALS
WEIGHT: 157 LBS | HEIGHT: 66 IN | BODY MASS INDEX: 25.23 KG/M2 | DIASTOLIC BLOOD PRESSURE: 76 MMHG | HEART RATE: 85 BPM | SYSTOLIC BLOOD PRESSURE: 136 MMHG

## 2022-01-28 DIAGNOSIS — M19.012 ARTHRITIS OF BOTH GLENOHUMERAL JOINTS: Primary | ICD-10-CM

## 2022-01-28 DIAGNOSIS — M25.511 RIGHT SHOULDER PAIN, UNSPECIFIED CHRONICITY: ICD-10-CM

## 2022-01-28 DIAGNOSIS — M19.011 ARTHRITIS OF BOTH GLENOHUMERAL JOINTS: Primary | ICD-10-CM

## 2022-01-28 DIAGNOSIS — E11.9 TYPE 2 DIABETES MELLITUS WITHOUT COMPLICATION, WITHOUT LONG-TERM CURRENT USE OF INSULIN (HCC): ICD-10-CM

## 2022-01-28 PROCEDURE — 99204 OFFICE O/P NEW MOD 45 MIN: CPT | Performed by: ORTHOPAEDIC SURGERY

## 2022-01-28 PROCEDURE — 73030 X-RAY EXAM OF SHOULDER: CPT
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
## 2022-01-28 NOTE — PATIENT INSTRUCTIONS
Exercises for Internal and External Shoulder Rotation   WHAT YOU NEED TO KNOW:   Exercises for internal shoulder rotation work the muscles in your chest and front of your shoulder  Exercises for external shoulder rotation work the muscles in the back of your shoulder and upper back  DISCHARGE INSTRUCTIONS:   Contact your healthcare provider if:   · You have sharp or worsening pain during exercise or at rest     · You have questions or concerns about your shoulder exercises  Before you exercise:  Warm up and stretch before you exercise  Walk or ride a stationary bike for 5 to 10 minutes to help you warm up  Stretching helps increase range of motion  It may also decrease muscle soreness and help prevent another injury  Your healthcare provider will tell you which of the following stretches to do:  · Crossover arm stretch:  Relax your shoulders  Hold your upper arm with the opposite hand  Pull your arm across your chest until you feel a stretch  Hold the stretch for 30 seconds  Return to the starting position  · Shoulder flexion stretch:  Stand facing a wall  Slowly walk your fingers up the wall until you feel a stretch  Hold the stretch for 30 seconds  Return to the starting position  · Sleeper stretch:  Lie on your injured side on a firm, flat surface  Bend the elbow of your injured arm 90° with your hand facing up  Use your arm that is not injured to slowly push your injured arm down  Stop when you feel a stretch at the back of your injured shoulder  Hold the stretch for 30 seconds  Slowly return to the starting position  How to exercise with a weight:  Your healthcare provider will tell you how much weight to exercise with  · Shoulder internal rotation:  Sit in a chair  Place a rolled up towel between your elbow and your side  Bend your elbow to 90°  Gently squeeze the towel with your elbow to prevent it from falling out  Hold the weight with your thumb pointing up   Slowly move the weight across your chest  Stop when your hand reaches your opposite arm  Hold this position for as many seconds as directed  Slowly return to the starting position  · Shoulder external rotation:  Lie on your side with your injured shoulder facing up  Bend your elbow 90°  Place a rolled up towel between your elbow and your side  Hold a weight in your hand  Gently squeeze the towel with your elbow to prevent it from falling out  Slowly rotate your arm outward, but keep your elbow bent  Stop when you feel a stretch  Hold this position for 30 seconds or as directed  Slowly return to the starting position  How to exercise with an exercise band:   · Shoulder internal rotation:  Tie one end of the exercise band to a heavy, secure object  Sit in a chair  Place a rolled up towel between your elbow and your side  Bend your elbow to 90°  Gently squeeze the towel with your elbow to prevent it from falling out  Slowly pull the band across your chest  Stop when your hand reaches your opposite arm  Hold this position for as many seconds as directed  Slowly return to the starting position  · Shoulder external rotation:  Hold one end of the exercise band on the side that is not injured  Place a rolled up towel between your elbow and your side  Bend your elbow 90°  Squeeze the towel with your elbow  Grab the end of the band and slowly turn your arm outward, but keep your elbow bent  Stop when you feel a stretch  Hold this position for 30 seconds or as directed  Slowly return to the starting position  Follow up with your physical therapist as directed:  Write down your questions so you remember to ask them at your visits  © Copyright [employer] 2021 Information is for End User's use only and may not be sold, redistributed or otherwise used for commercial purposes   All illustrations and images included in CareNotes® are the copyrighted property of [employer]  or Kathie Russo  The above information is an  only  It is not intended as medical advice for individual conditions or treatments  Talk to your doctor, nurse or pharmacist before following any medical regimen to see if it is safe and effective for you  Exercises for Shoulder Abduction and Adduction   WHAT YOU NEED TO KNOW:   Shoulder abduction and adduction exercises work the muscles at the back of your shoulder and your upper back  DISCHARGE INSTRUCTIONS:   Contact your healthcare provider if:   · You have sharp or worsening pain during exercise or at rest     · You have questions or concerns about your shoulder exercises  Before you exercise:  Warm up and stretch before you exercise  Walk or ride a stationary bike for 5 to 10 minutes to help you warm up  Stretching helps increase range of motion  It may also decrease muscle soreness and help prevent another injury  Your healthcare provider will tell you which of the following stretches to do:  · Crossover arm stretch:  Relax your shoulders  Hold your upper arm with the opposite hand  Pull your arm across your chest until you feel a stretch  Hold the stretch for 30 seconds  Return to the starting position  · Shoulder flexion stretch:  Stand facing a wall  Slowly walk your fingers up the wall until you feel a stretch  Hold the stretch for 30 seconds  Return to the starting position  · Sleeper stretch:  Lie on your injured side on a firm, flat surface  Bend the elbow of your injured arm 90° with your hand facing up  Use your arm that is not injured to slowly push your injured arm down  Stop when you feel a stretch at the back of your injured shoulder  Hold the stretch for 30 seconds  Slowly return to the starting position  How to exercise with a weight:  Your healthcare provider will tell you how much weight to use  · Shoulder abduction:  Stand and hold a weight in your hand with your palm facing your body   Slowly raise your arm to the side with your thumb pointing up  Then raise your arm over your head as far as you can without pain  Hold this position for as long as directed  Do not raise your arm over your head unless your healthcare provider says it is okay  · Shoulder adduction:  Lie on your back on a firm surface  Extend your arm out to a "T " Bend your elbow so your forearm in the air  Hold a weight in your hand  Slowly raise your arm toward the ceiling and straighten your elbow  Hold this position for as long as directed  Slowly return to the starting position  How to exercise with an exercise band:   · Shoulder abduction:  Wrap the exercise band around a heavy, stable object near your foot  Grab the band with the hand of your injured shoulder  Keep your arm straight  Slowly raise your arm to the side with your thumb pointing up  Then, slowly pull the band over your head as far as you can without pain  Do not raise your arm over your head unless your healthcare provider says it is okay  Do not let your shoulder shrug  Hold this position for as long as directed  Slowly return to the starting position  · Shoulder adduction:  Wrap the exercise band around a heavy, stable object  Stand and face away from where the band is anchored  Hold each end of the band in both hands with your elbows bent  Your elbows should not be behind your body  Keep your arms parallel to the floor and slowly straighten your elbows  Hold this position for as long as directed  Slowly return to the starting position  Follow up with your physical therapist as directed:  Write down your questions so you remember to ask them at your visits  © Copyright [employer] 2021 Information is for End User's use only and may not be sold, redistributed or otherwise used for commercial purposes  All illustrations and images included in CareNotes® are the copyrighted property of [employer]  or Kathie Russo  The above information is an  only   It is not intended as medical advice for individual conditions or treatments  Talk to your doctor, nurse or pharmacist before following any medical regimen to see if it is safe and effective for you

## 2022-02-17 ENCOUNTER — LAB (OUTPATIENT)
Dept: LAB | Facility: CLINIC | Age: 82
End: 2022-02-17
Payer: MEDICARE

## 2022-02-17 DIAGNOSIS — E55.9 VITAMIN D DEFICIENCY: ICD-10-CM

## 2022-02-17 DIAGNOSIS — Z95.2 H/O MITRAL VALVE REPLACEMENT WITH MECHANICAL VALVE: ICD-10-CM

## 2022-02-17 DIAGNOSIS — I48.20 CHRONIC ATRIAL FIBRILLATION (HCC): ICD-10-CM

## 2022-02-17 DIAGNOSIS — E03.8 OTHER SPECIFIED HYPOTHYROIDISM: ICD-10-CM

## 2022-02-17 DIAGNOSIS — E11.9 TYPE 2 DIABETES MELLITUS WITHOUT COMPLICATION, WITHOUT LONG-TERM CURRENT USE OF INSULIN (HCC): ICD-10-CM

## 2022-02-17 DIAGNOSIS — K90.0 CELIAC DISEASE: ICD-10-CM

## 2022-02-17 DIAGNOSIS — D50.0 IRON DEFICIENCY ANEMIA DUE TO CHRONIC BLOOD LOSS: ICD-10-CM

## 2022-02-17 DIAGNOSIS — J43.9 PULMONARY EMPHYSEMA, UNSPECIFIED EMPHYSEMA TYPE (HCC): ICD-10-CM

## 2022-02-17 DIAGNOSIS — E78.00 HYPERCHOLESTEREMIA: ICD-10-CM

## 2022-02-17 LAB
25(OH)D3 SERPL-MCNC: 90.9 NG/ML (ref 30–100)
ALBUMIN SERPL BCP-MCNC: 4.1 G/DL (ref 3.5–5)
ALP SERPL-CCNC: 78 U/L (ref 46–116)
ALT SERPL W P-5'-P-CCNC: 36 U/L (ref 12–78)
ANION GAP SERPL CALCULATED.3IONS-SCNC: 4 MMOL/L (ref 4–13)
AST SERPL W P-5'-P-CCNC: 29 U/L (ref 5–45)
BASOPHILS # BLD AUTO: 0.05 THOUSANDS/ΜL (ref 0–0.1)
BASOPHILS NFR BLD AUTO: 1 % (ref 0–1)
BILIRUB SERPL-MCNC: 0.41 MG/DL (ref 0.2–1)
BUN SERPL-MCNC: 15 MG/DL (ref 5–25)
CALCIUM SERPL-MCNC: 9.2 MG/DL (ref 8.3–10.1)
CHLORIDE SERPL-SCNC: 104 MMOL/L (ref 100–108)
CHOLEST SERPL-MCNC: 228 MG/DL
CO2 SERPL-SCNC: 28 MMOL/L (ref 21–32)
CREAT SERPL-MCNC: 0.73 MG/DL (ref 0.6–1.3)
CREAT UR-MCNC: 80.7 MG/DL
DIGOXIN SERPL-MCNC: 1 NG/ML (ref 0.8–2)
EOSINOPHIL # BLD AUTO: 0.12 THOUSAND/ΜL (ref 0–0.61)
EOSINOPHIL NFR BLD AUTO: 2 % (ref 0–6)
ERYTHROCYTE [DISTWIDTH] IN BLOOD BY AUTOMATED COUNT: 15.8 % (ref 11.6–15.1)
EST. AVERAGE GLUCOSE BLD GHB EST-MCNC: 148 MG/DL
GFR SERPL CREATININE-BSD FRML MDRD: 76 ML/MIN/1.73SQ M
GLUCOSE P FAST SERPL-MCNC: 178 MG/DL (ref 65–99)
HBA1C MFR BLD: 6.8 %
HCT VFR BLD AUTO: 40.6 % (ref 34.8–46.1)
HDLC SERPL-MCNC: 39 MG/DL
HGB BLD-MCNC: 12.6 G/DL (ref 11.5–15.4)
IMM GRANULOCYTES # BLD AUTO: 0.01 THOUSAND/UL (ref 0–0.2)
IMM GRANULOCYTES NFR BLD AUTO: 0 % (ref 0–2)
LDLC SERPL CALC-MCNC: 136 MG/DL (ref 0–100)
LYMPHOCYTES # BLD AUTO: 1.32 THOUSANDS/ΜL (ref 0.6–4.47)
LYMPHOCYTES NFR BLD AUTO: 25 % (ref 14–44)
MCH RBC QN AUTO: 29.3 PG (ref 26.8–34.3)
MCHC RBC AUTO-ENTMCNC: 31 G/DL (ref 31.4–37.4)
MCV RBC AUTO: 94 FL (ref 82–98)
MICROALBUMIN UR-MCNC: 40.2 MG/L (ref 0–20)
MICROALBUMIN/CREAT 24H UR: 50 MG/G CREATININE (ref 0–30)
MONOCYTES # BLD AUTO: 0.44 THOUSAND/ΜL (ref 0.17–1.22)
MONOCYTES NFR BLD AUTO: 8 % (ref 4–12)
NEUTROPHILS # BLD AUTO: 3.36 THOUSANDS/ΜL (ref 1.85–7.62)
NEUTS SEG NFR BLD AUTO: 64 % (ref 43–75)
NONHDLC SERPL-MCNC: 189 MG/DL
NRBC BLD AUTO-RTO: 0 /100 WBCS
PLATELET # BLD AUTO: 248 THOUSANDS/UL (ref 149–390)
PMV BLD AUTO: 10.6 FL (ref 8.9–12.7)
POTASSIUM SERPL-SCNC: 4.4 MMOL/L (ref 3.5–5.3)
PROT SERPL-MCNC: 8 G/DL (ref 6.4–8.2)
RBC # BLD AUTO: 4.3 MILLION/UL (ref 3.81–5.12)
SODIUM SERPL-SCNC: 136 MMOL/L (ref 136–145)
TRIGL SERPL-MCNC: 263 MG/DL
TSH SERPL DL<=0.05 MIU/L-ACNC: 1.64 UIU/ML (ref 0.36–3.74)
WBC # BLD AUTO: 5.3 THOUSAND/UL (ref 4.31–10.16)

## 2022-02-17 PROCEDURE — 80061 LIPID PANEL: CPT

## 2022-02-17 PROCEDURE — 82043 UR ALBUMIN QUANTITATIVE: CPT | Performed by: INTERNAL MEDICINE

## 2022-02-17 PROCEDURE — 36415 COLL VENOUS BLD VENIPUNCTURE: CPT

## 2022-02-17 PROCEDURE — 82306 VITAMIN D 25 HYDROXY: CPT

## 2022-02-17 PROCEDURE — 82570 ASSAY OF URINE CREATININE: CPT | Performed by: INTERNAL MEDICINE

## 2022-02-17 PROCEDURE — 85025 COMPLETE CBC W/AUTO DIFF WBC: CPT

## 2022-02-17 PROCEDURE — 80162 ASSAY OF DIGOXIN TOTAL: CPT

## 2022-02-17 PROCEDURE — 83036 HEMOGLOBIN GLYCOSYLATED A1C: CPT

## 2022-02-17 PROCEDURE — 80053 COMPREHEN METABOLIC PANEL: CPT

## 2022-02-17 PROCEDURE — 84443 ASSAY THYROID STIM HORMONE: CPT

## 2022-02-21 ENCOUNTER — OFFICE VISIT (OUTPATIENT)
Dept: INTERNAL MEDICINE CLINIC | Facility: CLINIC | Age: 82
End: 2022-02-21
Payer: MEDICARE

## 2022-02-21 VITALS
SYSTOLIC BLOOD PRESSURE: 120 MMHG | BODY MASS INDEX: 25.88 KG/M2 | WEIGHT: 161 LBS | DIASTOLIC BLOOD PRESSURE: 80 MMHG | OXYGEN SATURATION: 98 % | HEIGHT: 66 IN | HEART RATE: 71 BPM

## 2022-02-21 DIAGNOSIS — J43.9 PULMONARY EMPHYSEMA, UNSPECIFIED EMPHYSEMA TYPE (HCC): ICD-10-CM

## 2022-02-21 DIAGNOSIS — I48.20 CHRONIC ATRIAL FIBRILLATION (HCC): ICD-10-CM

## 2022-02-21 DIAGNOSIS — Z95.2 H/O MITRAL VALVE REPLACEMENT WITH MECHANICAL VALVE: ICD-10-CM

## 2022-02-21 DIAGNOSIS — N18.30 ANEMIA DUE TO STAGE 3 CHRONIC KIDNEY DISEASE, UNSPECIFIED WHETHER STAGE 3A OR 3B CKD (HCC): ICD-10-CM

## 2022-02-21 DIAGNOSIS — D63.1 ANEMIA DUE TO STAGE 3 CHRONIC KIDNEY DISEASE, UNSPECIFIED WHETHER STAGE 3A OR 3B CKD (HCC): ICD-10-CM

## 2022-02-21 DIAGNOSIS — I71.4 ABDOMINAL AORTIC ANEURYSM (AAA) WITHOUT RUPTURE (HCC): ICD-10-CM

## 2022-02-21 DIAGNOSIS — D50.0 IRON DEFICIENCY ANEMIA DUE TO CHRONIC BLOOD LOSS: ICD-10-CM

## 2022-02-21 DIAGNOSIS — E03.8 OTHER SPECIFIED HYPOTHYROIDISM: ICD-10-CM

## 2022-02-21 DIAGNOSIS — I10 ESSENTIAL HYPERTENSION: ICD-10-CM

## 2022-02-21 DIAGNOSIS — E78.00 HYPERCHOLESTEREMIA: ICD-10-CM

## 2022-02-21 DIAGNOSIS — J30.1 ALLERGIC RHINITIS DUE TO POLLEN, UNSPECIFIED SEASONALITY: ICD-10-CM

## 2022-02-21 DIAGNOSIS — E11.9 TYPE 2 DIABETES MELLITUS WITHOUT COMPLICATION, WITHOUT LONG-TERM CURRENT USE OF INSULIN (HCC): Primary | ICD-10-CM

## 2022-02-21 DIAGNOSIS — R80.8 OTHER PROTEINURIA: ICD-10-CM

## 2022-02-21 DIAGNOSIS — K90.0 CELIAC DISEASE: ICD-10-CM

## 2022-02-21 PROBLEM — K76.0 HEPATIC STEATOSIS: Status: ACTIVE | Noted: 2022-02-21

## 2022-02-21 PROBLEM — N30.90 CYSTITIS: Status: RESOLVED | Noted: 2021-06-15 | Resolved: 2022-02-21

## 2022-02-21 PROCEDURE — 99214 OFFICE O/P EST MOD 30 MIN: CPT | Performed by: INTERNAL MEDICINE

## 2022-02-21 NOTE — ASSESSMENT & PLAN NOTE
Diabetes excellent control  Hemoglobin A1c/slightly went of the still within acceptable range  Up-to-date with the eye examination  Urine the does reveal some protein in the urine    Continue same regimen diet more carefully continue metformin  Lab Results   Component Value Date    HGBA1C 6 8 (H) 02/17/2022

## 2022-02-21 NOTE — ASSESSMENT & PLAN NOTE
Lab Results   Component Value Date    [id number] 1 640 02/17/2022   Hypothyroidism clinically and chemically stable  Continue TSH periodically    Patient is not hypothyroid bind lab

## 2022-02-21 NOTE — PROGRESS NOTES
Marguerite Quiñonez Office Visit Note  22     Aguila Lund 80 y o  female MRN: [id number]  : 1940    Assessment:     1  Type 2 diabetes mellitus without complication, without long-term current use of insulin ([facility])  Assessment & Plan:  Diabetes excellent control  Hemoglobin A1c/slightly went of the still within acceptable range  Up-to-date with the eye examination  Urine the does reveal some protein in the urine  Continue same regimen diet more carefully continue metformin  Lab Results   Component Value Date    HGBA1C 6 8 (H) 2022         2  Other specified hypothyroidism  Assessment & Plan:  Lab Results   Component Value Date    [id number] 1 640 2022   Hypothyroidism clinically and chemically stable  Continue TSH periodically  Patient is not hypothyroid bind lab      3  Allergic rhinitis due to pollen, unspecified seasonality  Assessment & Plan:  Allergic rhinitis symptoms are fair control continue saline nasal      4  Pulmonary emphysema, unspecified emphysema type ([address] )  Assessment & Plan:  COPD fair not requiring any inhaler up-to-date with vaccinations      5  Celiac disease    6  Chronic atrial fibrillation [facility])  Assessment & Plan:  Atrial fibrillation controlled ventricular rate  Continue days, and nadolol, warfarin  The INR being monitored by cardiologist   Hemoglobin stable      7  Essential hypertension  Assessment & Plan:  Excellent control of the blood pressure  Will continue losartan 25 mg daily as well as nadolol 2 5 mg daily      8  H/O mitral valve replacement with mechanical valve    9  Hypercholesteremia  Assessment & Plan:  Continue diet  No statin per patient's choice patient does have a hepatic steatosis      10  Anemia due to stage 3 chronic kidney disease, unspecified whether stage 3a or 3b CKD (HCC)  Assessment & Plan:  Hemoglobin normal   MCV normal   The need of iron pill      11   Iron deficiency anemia due to chronic blood loss  Assessment & Plan:  No bleeding  Iron deficiency state is resolved  Number watch monitor periodically not enough are      12  Other proteinuria  Assessment & Plan:  Protein in the urine present  Continue losartan 25 mg daily  Recommend risk factor control      13  Abdominal aortic aneurysm (AAA) without rupture [facility])  Assessment & Plan:  06/17/2021:  A CT scan revealed 3 1 cm abdominal aortic aneurysm  Recommend follow-up CT scan on 06/17/2022          Discussion Summary and Plan: Today's care plan and medications were reviewed with patient in detail and all their questions answered to their satisfaction  Chief Complaint   Patient presents with    Hypertension    Hyperlipidemia    Diabetes    Hypothyroidism    Allergic Rhinitis    COPD    Follow-up     anemia      Subjective:  Patient is here for chronic disease management         epigastric pain  She does not have any further epigastric pain  Ultrasound of upper abdomen was unremarkable  She is not requiring any medications  UTI:  Resolved,  Postmenopausal atrophic vaginitis symptom-free remains on Estrace cream  Pessary was taken out due to sore internally which has healed at this time and see prefers to keep the pessary out by gynecologist  Remote history of renal calculi symptom-free  History of cervical CA status post hysterectomy no radiation treatment  Chronic cystitis symptom-free      Hypertension:  Symptom-free  Patient is taking losartan daily  CT scan of abdomen was done on 17th  Report of which is awaited  COVID-19 resolved  Chronic disease management as underneath  Anemia, a stable   Most likely it is combination of factor including patient being on 1  Anticoagulation with warfarin 2  Mechanical wall probably destroying some of the red blood cell 3  Had micro hematuria 4  His celiac disease suspected on biopsy 5  Possible bleed from vaginal or pessary 6  Probable to poor intake cannot be ruled out    Celiac disease:  Symptom-free  Hypertension: Mildly elevated, continue same regimen with nadolol and add losartan for multiple purpose  Hyperlipidemia how continue to follow lipid profile and ALT periodically  Atrial fibrillation on chronic anticoagulation by cardiologist  Vitamin D deficiency continue supplement  The protein urea , blood pressure mildly elevated  Will add losartan 25 mg daily  INR being followed by cardiologist   Iron deficiency anemia with negative workup for obvious GI loss now on iron supplement with improving hemoglobin  Abdominal aortic aneurysm, hepatic steatosis, hepatomegaly all stable  Macular degeneration be being followed by ophthalmologist         LDL was 127 in January we talked about putting on statin  Protein in the urine noted  We talked about going on Ace or Arb MA/Creat: 79    Had a recurrent epigastric pain lasting for few minutes every couple of months isolated episode family interested in getting gallbladder check does not want to go for upper GI endoscopy does not have any associated nausea vomiting reflux symptoms also had history of abdominal aortic aneurysm CT scan done report awaited      02/17/2022:  Blood sugar 178 GFR 76 rest of the CMP normal, CBC normal cholesterol 228, , triglyceride 263, HDL 39, vitamin-D 90,tsh 1 64, days level 1 0, hemoglobin A1c 6 8, urine for microalbumin/creatinine ratio 50,  10/29/2021:  CMP normal except blood sugar 156, hemoglobin A1c 6 3, cholesterol 223, ,, triglyceride 309 microalbumin/creatinine ratio 43 mildly elevated  06/18/2021:  Hemoglobin A1c 6 6, CMP normal except blood sugar 149, CBC normal, with hemoglobin 12 7 normal WBC and ferritin 18 as well as TSH 2 49  06/15/2021:  Blood sugar 91, GFR 87      07/01/2021:  Ultrasound abdomen:Hepatomegaly   Hepatic moderate steatosis   Mid/distal aorta aneurysm again noted without significant change    No gallstones    06/17/2021:  CT scan of abdomen pelvis:  No acute inflammatory stranding  No bowel obstruction  Diverticulosis  Incidentally detected small infrarenal abdominal aortic aneurysm which measures about 3 1 cm, surveillance with ultrasound can be performed to evaluate growth     Mild hepatic steatosis,                 The following portions of the patient's history were reviewed and updated as appropriate: allergies, current medications, past family history, past medical history, past social history, past surgical history and problem list     Review of Systems   All other systems reviewed and are negative  Historical Information   Patient Active Problem List   Diagnosis    Obstructive sleep apnea    Chronic atrial fibrillation ([facility] )    H/O mitral valve replacement with mechanical valve    Long term (current) use of anticoagulants    Presence of prosthetic heart valve    Hypercholesteremia    Anemia due to chronic kidney disease    Allergic rhinitis    Type 2 diabetes mellitus without complication, without long-term current use of insulin (HCC)    Iron deficiency anemia    Other specified hypothyroidism    Vitamin B12 deficiency    Other microscopic hematuria    Celiac disease    Abdominal aortic aneurysm (AAA) ([facility] )    Ataxia    Pulmonary emphysema (HCC)    History of renal calculi    History of cervical cancer    Essential hypertension    Other proteinuria    Hepatic steatosis     History reviewed  No pertinent past medical history    Past Surgical History:   Procedure Laterality Date    EYE SURGERY      HYSTERECTOMY      MITRAL VALVE REPLACEMENT       Social History     Substance and Sexual Activity   Alcohol Use Yes    Comment: special occasional     Social History     Substance and Sexual Activity   Drug Use No     Social History     Tobacco Use   Smoking Status Former Smoker    Packs/day: 2 00    Years: 30 00    Pack years: 60 00    Quit date:     Years since quittin 1   Smokeless Tobacco Never Used     Family History   Problem Relation Age of Onset  Heart failure Mother     Heart attack Father     Sleep apnea Brother      Health Maintenance Due   Topic    COVID-19 Vaccine (1)    Pneumococcal Vaccine: 65+ Years (1 of 2 - PPSV23)    DTaP,Tdap,and Td Vaccines (1 - Tdap)    Influenza Vaccine (1)    Diabetic Foot Exam     Fall Risk     Medicare Annual Wellness Visit (AWV)     BMI: Followup Plan     Depression Screening       Meds/Allergies       Current Outpatient Medications:     acetaminophen (TYLENOL) 500 mg tablet, Take 500 mg by mouth, Disp: , Rfl:     Cholecalciferol (VITAMIN D PO), Take by mouth, Disp: , Rfl:     digoxin (LANOXIN) 0 125 mg tablet, Take 125 mcg by mouth daily, Disp: , Rfl:     ferrous sulfate 324 (65 Fe) mg, Take 1 tablet (324 mg total) by mouth 2 (two) times a day before meals, Disp: 180 tablet, Rfl: 1    losartan (COZAAR) 25 mg tablet, Take 1 tablet (25 mg total) by mouth daily, Disp: 90 tablet, Rfl: 1    metFORMIN (GLUCOPHAGE) 500 mg tablet, Take 2 tablets by mouth twice daily, Disp: 360 tablet, Rfl: 0    Multiple Vitamins-Minerals (PRESERVISION AREDS PO), Take 2 tablets by mouth daily  , Disp: , Rfl:     NADOLOL PO, Take 2 5 mg by mouth daily, Disp: , Rfl:     warfarin (COUMADIN) 2 5 mg tablet, Take 2 5 mg by mouth 3 (three) times a week, Disp: , Rfl:     warfarin (COUMADIN) 5 mg tablet, Take 5 mg by mouth 4 (four) times a week, Disp: , Rfl:       Objective:    Vitals:   /80   Pulse 71   Ht 5' 6" (1 676 m)   Wt 73 kg (161 lb)   SpO2 98%   BMI 25 99 kg/m²   Body mass index is 25 99 kg/m²  Vitals:    02/21/22 0911   Weight: 73 kg (161 lb)       Physical Exam  Vitals and nursing note reviewed  Constitutional:       General: She is not in acute distress  Appearance: Normal appearance  She is well-developed  She is not ill-appearing, toxic-appearing or diaphoretic  Comments: Short grey hair   HENT:      Head: Normocephalic and atraumatic        Right Ear: External ear normal       Left Ear: External ear normal    Eyes:      General: No scleral icterus  Right eye: No discharge  Left eye: No discharge  Conjunctiva/sclera: Conjunctivae normal    Neck:      Thyroid: No thyroid mass, thyromegaly or thyroid tenderness  Vascular: Normal carotid pulses  No carotid bruit or JVD  Cardiovascular:      Rate and Rhythm: Normal rate  Rhythm irregular  Pulses: Pulses are weak  Dorsalis pedis pulses are 2+ on the right side and 2+ on the left side  Posterior tibial pulses are 1+ on the right side and 1+ on the left side  Heart sounds: S1 normal  Murmur heard  Systolic murmur is present with a grade of 2/6  Comments: S2 elevated  Pulmonary:      Effort: No respiratory distress  Breath sounds: Normal breath sounds  No stridor  No wheezing, rhonchi or rales  Abdominal:      General: Bowel sounds are normal  There is no distension  Palpations: There is no shifting dullness, fluid wave, hepatomegaly, mass or pulsatile mass  Tenderness: There is no abdominal tenderness  There is no rebound  Hernia: There is no hernia in the umbilical area, ventral area, left inguinal area, left femoral area or right inguinal area  Musculoskeletal:         General: Normal range of motion  Cervical back: Normal range of motion  Right lower leg: No edema  Left lower leg: No edema  Feet:      Right foot:      Skin integrity: Callus present  No ulcer, skin breakdown, erythema, warmth or dry skin  Left foot:      Skin integrity: Callus present  No ulcer, skin breakdown, erythema, warmth or dry skin  Lymphadenopathy:      Cervical: No cervical adenopathy  Right cervical: No superficial cervical adenopathy  Skin:     General: Skin is warm  Findings: No rash  Neurological:      General: No focal deficit present  Mental Status: She is alert and oriented to person, place, and time  Mental status is at baseline        Cranial Nerves: Cranial nerves are intact  Sensory: Sensation is intact  Motor: Motor function is intact  Gait: Tandem walk abnormal  Gait normal    Psychiatric:         Mood and Affect: Mood normal          Behavior: Behavior normal          Thought Content: Thought content normal          Judgment: Judgment normal        Diabetic Foot Exam    Patient's shoes and socks removed  Right Foot/Ankle   Right Foot Inspection  Skin Exam: callus and callus  No dry skin, no warmth, no erythema, no maceration and no ulcer  Toe Exam: No tenderness and  no right toe deformity    Sensory   Proprioception: intact  Monofilament testing: intact    Vascular  Capillary refills: < 3 seconds  The right DP pulse is 2+  The right PT pulse is 1+  Left Foot/Ankle  Left Foot Inspection  Skin Exam: callus  No dry skin, no warmth, no erythema, no maceration and no ulcer  Toe Exam: No tenderness and no left toe deformity  Sensory   Proprioception: intact  Monofilament testing: intact    Vascular  Capillary refills: < 3 seconds  The left DP pulse is 2+  The left PT pulse is 1+  Assign Risk Category  No deformity present  No loss of protective sensation  Weak pulses  Risk: 1    Lab Review   Lab on 02/17/2022   Component Date Value Ref Range Status    Sodium 02/17/2022 136  136 - 145 mmol/L Final    Potassium 02/17/2022 4 4  3 5 - 5 3 mmol/L Final    Chloride 02/17/2022 104  100 - 108 mmol/L Final    CO2 02/17/2022 28  21 - 32 mmol/L Final    ANION GAP 02/17/2022 4  4 - 13 mmol/L Final    BUN 02/17/2022 15  5 - 25 mg/dL Final    Creatinine 02/17/2022 0 73  0 60 - 1 30 mg/dL Final    Standardized to IDMS reference method    Glucose, Fasting 02/17/2022 178* 65 - 99 mg/dL Final    Specimen collection should occur prior to Sulfasalazine administration due to the potential for falsely depressed results   Specimen collection should occur prior to Sulfapyridine administration due to the potential for falsely elevated results   Calcium 02/17/2022 9 2  8 3 - 10 1 mg/dL Final    AST 02/17/2022 29  5 - 45 U/L Final    Specimen collection should occur prior to Sulfasalazine administration due to the potential for falsely depressed results   ALT 02/17/2022 36  12 - 78 U/L Final    Specimen collection should occur prior to Sulfasalazine and/or Sulfapyridine administration due to the potential for falsely depressed results   Alkaline Phosphatase 02/17/2022 78  46 - 116 U/L Final    Total Protein 02/17/2022 8 0  6 4 - 8 2 g/dL Final    Albumin 02/17/2022 4 1  3 5 - 5 0 g/dL Final    Total Bilirubin 02/17/2022 0 41  0 20 - 1 00 mg/dL Final    Use of this assay is not recommended for patients undergoing treatment with eltrombopag due to the potential for falsely elevated results      eGFR 02/17/2022 76  ml/min/1 73sq m Final    WBC 02/17/2022 5 30  4 31 - 10 16 Thousand/uL Final    RBC 02/17/2022 4 30  3 81 - 5 12 Million/uL Final    Hemoglobin 02/17/2022 12 6  11 5 - 15 4 g/dL Final    Hematocrit 02/17/2022 40 6  34 8 - 46 1 % Final    MCV 02/17/2022 94  82 - 98 fL Final    MCH 02/17/2022 29 3  26 8 - 34 3 pg Final    MCHC 02/17/2022 31 0* 31 4 - 37 4 g/dL Final    RDW 02/17/2022 15 8* 11 6 - 15 1 % Final    MPV 02/17/2022 10 6  8 9 - 12 7 fL Final    Platelets 37/62/6739 248  149 - 390 Thousands/uL Final    nRBC 02/17/2022 0  /100 WBCs Final    Neutrophils Relative 02/17/2022 64  43 - 75 % Final    Immat GRANS % 02/17/2022 0  0 - 2 % Final    Lymphocytes Relative 02/17/2022 25  14 - 44 % Final    Monocytes Relative 02/17/2022 8  4 - 12 % Final    Eosinophils Relative 02/17/2022 2  0 - 6 % Final    Basophils Relative 02/17/2022 1  0 - 1 % Final    Neutrophils Absolute 02/17/2022 3 36  1 85 - 7 62 Thousands/µL Final    Immature Grans Absolute 02/17/2022 0 01  0 00 - 0 20 Thousand/uL Final    Lymphocytes Absolute 02/17/2022 1 32  0 60 - 4 47 Thousands/µL Final    Monocytes Absolute 02/17/2022 0 44  0 17 - 1 22 Thousand/µL Final    Eosinophils Absolute 02/17/2022 0 12  0 00 - 0 61 Thousand/µL Final    Basophils Absolute 02/17/2022 0 05  0 00 - 0 10 Thousands/µL Final    Cholesterol 02/17/2022 228* See Comment mg/dL Final    Cholesterol:         Pediatric <18 Years        Desirable          <170 mg/dL      Borderline High    170-199 mg/dL      High               >=200 mg/dL        Adult >=18 Years            Desirable         <200 mg/dL      Borderline High   200-239 mg/dL      High              >239 mg/dL      Triglycerides 02/17/2022 263* See Comment mg/dL Final    Triglyceride:     0-9Y            <75mg/dL     10Y-17Y         <90 mg/dL       >=18Y     Normal          <150 mg/dL     Borderline High 150-199 mg/dL     High            200-499 mg/dL        Very High       >499 mg/dL    Specimen collection should occur prior to N-Acetylcysteine or Metamizole administration due to the potential for falsely depressed results   HDL, Direct 02/17/2022 39* >=50 mg/dL Final    Specimen collection should occur prior to Metamizole administration due to the potential for falsley depressed results   LDL Calculated 02/17/2022 136* 0 - 100 mg/dL Final    LDL Cholesterol:     Optimal           <100 mg/dl     Near Optimal      100-129 mg/dl     Above Optimal       Borderline High 130-159 mg/dl       High            160-189 mg/dl       Very High       >189 mg/dl         This screening LDL is a calculated result  It does not have the accuracy of the Direct Measured LDL in the monitoring of patients with hyperlipidemia and/or statin therapy  Direct Measure LDL (AVH923) must be ordered separately in these patients      Non-HDL-Chol (CHOL-HDL) 02/17/2022 189  mg/dl Final    Vit D, 25-Hydroxy 02/17/2022 90 9  30 0 - 100 0 ng/mL Final    TSH 3RD GENERATON 02/17/2022 1 640  0 358 - 3 740 uIU/mL Final    The recommended reference ranges for TSH during pregnancy are as follows:   First trimester 0 1 to 2 5 uIU/mL   Second trimester  0 2 to 3 0 uIU/mL   Third trimester 0 3 to 3 0 uIU/m    Note: Normal ranges may not apply to patients who are transgender, non-binary, or whose legal sex, sex at birth, and gender identity differ   Hemoglobin A1C 02/17/2022 6 8* Normal 3 8-5 6%; PreDiabetic 5 7-6 4%; Diabetic >=6 5%; Glycemic control for adults with diabetes <7 0% % Final    EAG 02/17/2022 148  mg/dl Final    Digoxin Lvl 02/17/2022 1 0  0 8 - 2 0 ng/mL Final   Transcribe Orders on 01/25/2022   Component Date Value Ref Range Status    Protime 01/25/2022 28 4* 11 6 - 14 5 seconds Final    INR 01/25/2022 2 84* 0 84 - 1 19 Final         Patient Instructions   Follow with Consultants as per their and our suggestion    Follow up in 12 week(s) or as needed earlier    Follow all instructions as advised and discussed  Take your medications as prescribed  Call the office immediately if you experience any side effects  Ask questions if you do not understand  Keep your scheduled appointment as advised or come sooner if necessary or in doubt  Best time to call for non-urgent matter or questions on weekdays is between 9am and 12 noon  See physician for any new symptoms or worsening of current symptoms  Urgent or emergent situations call 911 and report to nearest emergency room  I spent  30 -40 minutes taking care of this patient including clinical care, conseling, collaboration, chart, lab and consultaion review as appropriate    Patient is to get labs 1 week(s) prior to next visit if advised               Dr Jonn Elaine MD  [facility]       "This note has been constructed using a voice recognition system  Therefore there may be syntax, spelling, and/or grammatical errors   Please call if you have any questions  "

## 2022-02-21 NOTE — ASSESSMENT & PLAN NOTE
Excellent control of the blood pressure    Will continue losartan 25 mg daily as well as nadolol 2 5 mg daily

## 2022-02-21 NOTE — ASSESSMENT & PLAN NOTE
06/17/2021:  A CT scan revealed 3 1 cm abdominal aortic aneurysm    Recommend follow-up CT scan on 06/17/2022

## 2022-02-21 NOTE — ASSESSMENT & PLAN NOTE
Atrial fibrillation controlled ventricular rate  Continue days, and nadolol, warfarin    The INR being monitored by cardiologist   Hemoglobin stable

## 2022-03-01 ENCOUNTER — APPOINTMENT (OUTPATIENT)
Dept: LAB | Facility: CLINIC | Age: 82
End: 2022-03-01
Payer: MEDICARE

## 2022-03-11 ENCOUNTER — APPOINTMENT (EMERGENCY)
Dept: RADIOLOGY | Facility: HOSPITAL | Age: 82
DRG: 065 | End: 2022-03-11
Payer: MEDICARE

## 2022-03-11 ENCOUNTER — HOSPITAL ENCOUNTER (INPATIENT)
Facility: HOSPITAL | Age: 82
LOS: 2 days | DRG: 065 | End: 2022-03-14
Attending: EMERGENCY MEDICINE | Admitting: INTERNAL MEDICINE
Payer: MEDICARE

## 2022-03-11 DIAGNOSIS — G45.9 TIA (TRANSIENT ISCHEMIC ATTACK): ICD-10-CM

## 2022-03-11 DIAGNOSIS — R51.9 HEADACHE: ICD-10-CM

## 2022-03-11 DIAGNOSIS — R40.4 EPISODES OF STARING: ICD-10-CM

## 2022-03-11 DIAGNOSIS — R53.1 RIGHT SIDED WEAKNESS: ICD-10-CM

## 2022-03-11 DIAGNOSIS — N39.0 UTI (URINARY TRACT INFECTION): ICD-10-CM

## 2022-03-11 DIAGNOSIS — I10 ESSENTIAL HYPERTENSION: ICD-10-CM

## 2022-03-11 DIAGNOSIS — G47.00 INSOMNIA: ICD-10-CM

## 2022-03-11 DIAGNOSIS — R09.81 NASAL CONGESTION: ICD-10-CM

## 2022-03-11 DIAGNOSIS — Z95.2 H/O MITRAL VALVE REPLACEMENT WITH MECHANICAL VALVE: ICD-10-CM

## 2022-03-11 DIAGNOSIS — R80.8 OTHER PROTEINURIA: ICD-10-CM

## 2022-03-11 DIAGNOSIS — I48.20 CHRONIC ATRIAL FIBRILLATION (HCC): ICD-10-CM

## 2022-03-11 DIAGNOSIS — E11.9 TYPE 2 DIABETES MELLITUS WITHOUT COMPLICATION, WITHOUT LONG-TERM CURRENT USE OF INSULIN (HCC): ICD-10-CM

## 2022-03-11 DIAGNOSIS — I63.9 CEREBROVASCULAR ACCIDENT (CVA), UNSPECIFIED MECHANISM (HCC): Primary | ICD-10-CM

## 2022-03-11 DIAGNOSIS — I10 HYPERTENSION, UNSPECIFIED TYPE: ICD-10-CM

## 2022-03-11 LAB
2HR DELTA HS TROPONIN: 1 NG/L
ANION GAP SERPL CALCULATED.3IONS-SCNC: 8 MMOL/L (ref 4–13)
APTT PPP: 46 SECONDS (ref 23–37)
BUN SERPL-MCNC: 15 MG/DL (ref 5–25)
CALCIUM SERPL-MCNC: 8.6 MG/DL (ref 8.3–10.1)
CARDIAC TROPONIN I PNL SERPL HS: 4 NG/L
CARDIAC TROPONIN I PNL SERPL HS: 5 NG/L
CHLORIDE SERPL-SCNC: 99 MMOL/L (ref 100–108)
CO2 SERPL-SCNC: 29 MMOL/L (ref 21–32)
CREAT SERPL-MCNC: 0.74 MG/DL (ref 0.6–1.3)
DIGOXIN SERPL-MCNC: 0.5 NG/ML (ref 0.8–2)
ERYTHROCYTE [DISTWIDTH] IN BLOOD BY AUTOMATED COUNT: 14.9 % (ref 11.6–15.1)
FLUAV RNA RESP QL NAA+PROBE: NEGATIVE
FLUBV RNA RESP QL NAA+PROBE: NEGATIVE
GFR SERPL CREATININE-BSD FRML MDRD: 75 ML/MIN/1.73SQ M
GLUCOSE SERPL-MCNC: 118 MG/DL (ref 65–140)
GLUCOSE SERPL-MCNC: 129 MG/DL (ref 65–140)
GLUCOSE SERPL-MCNC: 164 MG/DL (ref 65–140)
GLUCOSE SERPL-MCNC: 171 MG/DL (ref 65–140)
HCT VFR BLD AUTO: 40.6 % (ref 34.8–46.1)
HGB BLD-MCNC: 12.6 G/DL (ref 11.5–15.4)
INR PPP: 2.82 (ref 0.84–1.19)
MCH RBC QN AUTO: 28.9 PG (ref 26.8–34.3)
MCHC RBC AUTO-ENTMCNC: 31 G/DL (ref 31.4–37.4)
MCV RBC AUTO: 93 FL (ref 82–98)
PLATELET # BLD AUTO: 211 THOUSANDS/UL (ref 149–390)
PMV BLD AUTO: 10.6 FL (ref 8.9–12.7)
POTASSIUM SERPL-SCNC: 4.1 MMOL/L (ref 3.5–5.3)
PROTHROMBIN TIME: 28.7 SECONDS (ref 11.6–14.5)
QRS AXIS: 40 DEGREES
QRSD INTERVAL: 92 MS
QT INTERVAL: 370 MS
QTC INTERVAL: 390 MS
RBC # BLD AUTO: 4.36 MILLION/UL (ref 3.81–5.12)
RSV RNA RESP QL NAA+PROBE: NEGATIVE
SARS-COV-2 RNA RESP QL NAA+PROBE: NEGATIVE
SODIUM SERPL-SCNC: 136 MMOL/L (ref 136–145)
T WAVE AXIS: 26 DEGREES
VENTRICULAR RATE: 67 BPM
WBC # BLD AUTO: 5.71 THOUSAND/UL (ref 4.31–10.16)

## 2022-03-11 PROCEDURE — 93005 ELECTROCARDIOGRAM TRACING: CPT

## 2022-03-11 PROCEDURE — 84484 ASSAY OF TROPONIN QUANT: CPT | Performed by: EMERGENCY MEDICINE

## 2022-03-11 PROCEDURE — 80048 BASIC METABOLIC PNL TOTAL CA: CPT | Performed by: EMERGENCY MEDICINE

## 2022-03-11 PROCEDURE — 80162 ASSAY OF DIGOXIN TOTAL: CPT | Performed by: INTERNAL MEDICINE

## 2022-03-11 PROCEDURE — 99223 1ST HOSP IP/OBS HIGH 75: CPT | Performed by: PSYCHIATRY & NEUROLOGY

## 2022-03-11 PROCEDURE — 99220 PR INITIAL OBSERVATION CARE/DAY 70 MINUTES: CPT | Performed by: INTERNAL MEDICINE

## 2022-03-11 PROCEDURE — 94760 N-INVAS EAR/PLS OXIMETRY 1: CPT

## 2022-03-11 PROCEDURE — 36415 COLL VENOUS BLD VENIPUNCTURE: CPT | Performed by: EMERGENCY MEDICINE

## 2022-03-11 PROCEDURE — 99285 EMERGENCY DEPT VISIT HI MDM: CPT

## 2022-03-11 PROCEDURE — 82948 REAGENT STRIP/BLOOD GLUCOSE: CPT

## 2022-03-11 PROCEDURE — 93010 ELECTROCARDIOGRAM REPORT: CPT | Performed by: INTERNAL MEDICINE

## 2022-03-11 PROCEDURE — G1004 CDSM NDSC: HCPCS

## 2022-03-11 PROCEDURE — 70496 CT ANGIOGRAPHY HEAD: CPT

## 2022-03-11 PROCEDURE — 70498 CT ANGIOGRAPHY NECK: CPT

## 2022-03-11 PROCEDURE — 85027 COMPLETE CBC AUTOMATED: CPT | Performed by: EMERGENCY MEDICINE

## 2022-03-11 PROCEDURE — 99285 EMERGENCY DEPT VISIT HI MDM: CPT | Performed by: EMERGENCY MEDICINE

## 2022-03-11 PROCEDURE — 0241U HB NFCT DS VIR RESP RNA 4 TRGT: CPT | Performed by: EMERGENCY MEDICINE

## 2022-03-11 PROCEDURE — 85610 PROTHROMBIN TIME: CPT | Performed by: EMERGENCY MEDICINE

## 2022-03-11 PROCEDURE — 85730 THROMBOPLASTIN TIME PARTIAL: CPT | Performed by: EMERGENCY MEDICINE

## 2022-03-11 PROCEDURE — 94660 CPAP INITIATION&MGMT: CPT

## 2022-03-11 RX ORDER — SODIUM CHLORIDE 9 MG/ML
75 INJECTION, SOLUTION INTRAVENOUS CONTINUOUS
Status: DISCONTINUED | OUTPATIENT
Start: 2022-03-11 | End: 2022-03-13

## 2022-03-11 RX ORDER — ASPIRIN 81 MG/1
81 TABLET ORAL DAILY
Status: DISCONTINUED | OUTPATIENT
Start: 2022-03-12 | End: 2022-03-14 | Stop reason: HOSPADM

## 2022-03-11 RX ORDER — WARFARIN SODIUM 5 MG/1
5 TABLET ORAL
Status: DISCONTINUED | OUTPATIENT
Start: 2022-03-12 | End: 2022-03-14

## 2022-03-11 RX ORDER — DIGOXIN 125 MCG
125 TABLET ORAL DAILY
Status: DISCONTINUED | OUTPATIENT
Start: 2022-03-12 | End: 2022-03-14 | Stop reason: HOSPADM

## 2022-03-11 RX ORDER — ASPIRIN 81 MG/1
81 TABLET, CHEWABLE ORAL DAILY
Status: DISCONTINUED | OUTPATIENT
Start: 2022-03-12 | End: 2022-03-11

## 2022-03-11 RX ORDER — ASPIRIN 325 MG
325 TABLET ORAL ONCE
Status: COMPLETED | OUTPATIENT
Start: 2022-03-11 | End: 2022-03-11

## 2022-03-11 RX ORDER — WARFARIN SODIUM 2.5 MG/1
2.5 TABLET ORAL
Status: DISCONTINUED | OUTPATIENT
Start: 2022-03-11 | End: 2022-03-14

## 2022-03-11 RX ORDER — ACETAMINOPHEN 325 MG/1
650 TABLET ORAL EVERY 6 HOURS PRN
Status: DISCONTINUED | OUTPATIENT
Start: 2022-03-11 | End: 2022-03-14 | Stop reason: HOSPADM

## 2022-03-11 RX ORDER — ATORVASTATIN CALCIUM 80 MG/1
80 TABLET, FILM COATED ORAL EVERY EVENING
Status: DISCONTINUED | OUTPATIENT
Start: 2022-03-11 | End: 2022-03-14 | Stop reason: HOSPADM

## 2022-03-11 RX ORDER — CLOPIDOGREL BISULFATE 75 MG/1
300 TABLET ORAL ONCE
Status: COMPLETED | OUTPATIENT
Start: 2022-03-11 | End: 2022-03-11

## 2022-03-11 RX ORDER — ATORVASTATIN CALCIUM 40 MG/1
40 TABLET, FILM COATED ORAL EVERY EVENING
Status: DISCONTINUED | OUTPATIENT
Start: 2022-03-11 | End: 2022-03-11

## 2022-03-11 RX ADMIN — ATORVASTATIN CALCIUM 80 MG: 80 TABLET, FILM COATED ORAL at 15:45

## 2022-03-11 RX ADMIN — IOHEXOL 85 ML: 350 INJECTION, SOLUTION INTRAVENOUS at 10:38

## 2022-03-11 RX ADMIN — CLOPIDOGREL BISULFATE 300 MG: 75 TABLET ORAL at 15:45

## 2022-03-11 RX ADMIN — WARFARIN SODIUM 2.5 MG: 2.5 TABLET ORAL at 22:00

## 2022-03-11 RX ADMIN — ASPIRIN 325 MG: 325 TABLET ORAL at 12:47

## 2022-03-11 RX ADMIN — ACETAMINOPHEN 650 MG: 325 TABLET, FILM COATED ORAL at 18:29

## 2022-03-11 RX ADMIN — SODIUM CHLORIDE 100 ML/HR: 0.9 INJECTION, SOLUTION INTRAVENOUS at 15:24

## 2022-03-11 NOTE — CONSULTS
[address]   Neurology Initial Consult    Esther Coy is a 80 y o  female  [address] [facility]-*          Information obtained from:   Chief Complaint   Patient presents with    STROKE Alert     right side  defecits started at 0930, since resolved, now has headache         Assessment/Plan:    1  TIA vs left lacunar stroke  2  A fib  3  HTN  4  HLD  5  DM II    Patient is experiencing right sided symptoms concerning for left small lacunar ischemic event  There is no thrombus or flow limiting stenosis on CTA  Adding plavix load one time as she had recrudescence  of symptoms  She has aspirin earlier  Will decide on long term AP after MRI brain  lipitor 80mg  MRI brain if mitral valve is compatible  Patient had MRI in 2016 despite having metal valve since 1996  Permissive HTN  TTE w/ shunt  Cont warfarin  PT/OT  Speech and swallow evaluation  Discussed plan with family and primary team            HPI:  Esther Coy is an 79 yo F with PMH Of MVR, A fib that presents with cc of right sided weakness  Patient was home and on phone with her daughter  She felt she couldn't move her all of right side  She felt some speech disturbance as well  Her symptoms lasted about half an hour  She's on coumadin and her INR today is 2 8  During encounter, she had returned to her baseline  Around 3:40pm, patient had recurrence of same symptoms with right sided headache  She is not a tPA candidate due to INR being >1 7  At home, she's not on AP  Her most recent LDL is 136 and she's not on statin either  Patient lives alone and is very independent per daughter  History reviewed  No pertinent past medical history      Past Surgical History:   Procedure Laterality Date    EYE SURGERY      HYSTERECTOMY      MITRAL VALVE REPLACEMENT  1994       Allergies   Allergen Reactions    Sulfa Antibiotics     Sulfasalazine          Current Facility-Administered Medications:     [START ON 3/12/2022] aspirin chewable tablet 81 mg, 81 mg, Oral, Daily, Migdalia Spring MD    atorvastatin (LIPITOR) tablet 40 mg, 40 mg, Oral, QPM, Migdalia Spring MD    sodium chloride 0 9 % infusion, 100 mL/hr, Intravenous, Continuous, Migdalia Spring MD, Last Rate: 100 mL/hr at 22 1524, 100 mL/hr at 22 1524    Social History     Socioeconomic History    Marital status:      Spouse name: Not on file    Number of children: Not on file    Years of education: Not on file    Highest education level: Not on file   Occupational History    Not on file   Tobacco Use    Smoking status: Former Smoker     Packs/day: 2 00     Years: 30 00     Pack years: 60 00     Quit date:      Years since quittin 2    Smokeless tobacco: Never Used   Vaping Use    Vaping Use: Never used   Substance and Sexual Activity    Alcohol use: Yes     Comment: special occasional    Drug use: No    Sexual activity: Not on file   Other Topics Concern    Not on file   Social History Narrative    Not on file     Social Determinants of Health     Financial Resource Strain: Not on file   Food Insecurity: Not on file   Transportation Needs: Not on file   Physical Activity: Not on file   Stress: Not on file   Social Connections: Not on file   Intimate Partner Violence: Not on file   Housing Stability: Not on file       Family History   Problem Relation Age of Onset    Heart failure Mother     Heart attack Father     Sleep apnea Brother          Review of systems:  Please see HPI for positive symptoms  No fever, no chills, no weight change  Ocular: No drainage, no blurred vision  HEENT:  No sore throat, earache, or congestion  No neck pain  COR:  No chest pain  No palpitations  Lungs:  no sob, wheezing,  GI:  no  nausea, no vomiting, no diarrhea, no constipation, no anorexia  :  No dysuria, frequency, or urgency  No hematuria  Musculoskeletal:  No joint pain or swelling or edema  Skin:  No rash or itching   Psychiatric:  no anxiety, no depression  Endocrine:  No polyuria or polydipsia  Physical examination:  Vitals:    03/11/22 1510   BP: 149/84   Pulse: 76   Resp:    Temp:    SpO2: 97%       GENERAL APPEARANCE:  The patient is alert, oriented  He is in no acute distress  HEENT:  Head is normocephalic  The sinuses are otherwise nontender  Pupils are equal and reactive  NECK:  Supple without lymphadenopathy  HEART:  Regular rate and rhythm  LUNGS:  clear to auscultation  No crackles or wheezes are heard  ABDOMEN:  Soft, nontender, nondistended with good bowel sounds heard  EXTREMITIES:  Without cyanosis, clubbing or edema  Mental status: The patient is alert, attentive, and oriented  Speech is clear and fluent, good repetition, comprehension, and naming  he recalls 3/3 objects at 5 minutes  Cranial nerves:  CN II: Visual fields are full to confrontation  Fundoscopic exam is normal with sharp discs and no vascular changes    Pupils are 3 mm and briskly reactive to light  CN III, IV, VI: At primary gaze, there is no eye deviation  CN V: Facial sensation is intact to pinprick in all 3 divisions bilaterally  Corneal responses are intact  CN VII: Face is symmetric with normal eye closure and smile  CN VIII: Hearing is normal to rubbing fingers  CN IX, X: Palate elevates symmetrically  Phonation is normal   CN XI: Head turning and shoulder shrug are intact  CN XII: Tongue is midline with normal movements and no atrophy  Motor: There is no pronator drift of out-stretched arms    Muscle bulk and tone are normal      Muscle exam  Arm Right Left Leg Right Left   Deltoid 5/5 5/5 Iliopsoas 5/5 5/5   Biceps 5/5 5/5 Quads 5/5 5/5   Triceps 5/5 5/5 Hamstrings 5/5 5/5   Wrist Extension 5/5 5/5 Ankle Dorsi Flexion 5/5 5/5   Wrist Flexion 5/5 5/5 Ankle Plantar Flexion 5/5 5/5   Interossei 5/5 5/5 Ankle Eversion 5/5 5/5   APB 5/5 5/5 Ankle Inversion 5/5 5/5       Reflexes   RJ BJ TJ KJ AJ Plantars Montes's   Right 2+ 2+ 2+ 2+ 2+ Downgoing Not present   Left 2+ 2+ 2+ 2+ 2+ Downgoing Not present     Sensory:  Light touch, pinprick, position sense, and vibration sense are intact in fingers and toes  Coordination:  Rapid alternating movements and fine finger movements are intact  There is no dysmetria on finger-to-nose and heel-knee-shin  There are no abnormal or extraneous movements  Romberg negative  Gait/Stance:  Deferred due to multiple attachments     Lab Results   Component Value Date    WBC 5 71 03/11/2022    HGB 12 6 03/11/2022    HCT 40 6 03/11/2022    MCV 93 03/11/2022     03/11/2022     Lab Results   Component Value Date    HGBA1C 6 8 (H) 02/17/2022     Lab Results   Component Value Date    ALT 36 02/17/2022    AST 29 02/17/2022    ALKPHOS 78 02/17/2022    BILITOT 0 3 11/09/2015     Lab Results   Component Value Date    GLUCOSE 147 (H) 11/09/2015    CALCIUM 8 6 03/11/2022     11/09/2015    K 4 1 03/11/2022    CO2 29 03/11/2022    CL 99 (L) 03/11/2022    BUN 15 03/11/2022    CREATININE 0 74 03/11/2022         Radiology          Review of reports and notes reveal:    Independent Interpretation of images or specimens:  CT stroke alert brain    Result Date: 3/11/2022  No acute intracranial abnormality  CTA stroke alert (head/neck)    Result Date: 3/11/2022  Negative CTA head and neck for large vessel occlusion, dissection, aneurysm, or high-grade stenosis  Additional chronic/incidental findings as detailed above  Thank you for this consult  Total time of encounter: 70 min  More than 50% of time was spent in counseling and coordination of care of patient  TOM Mathis    [facility]  [address]  [address]

## 2022-03-11 NOTE — QUICK NOTE
Stroke Alert    Called 10:18am    79 y/o F with HTN, HLD, DM, Afib on warfarin, and hx of mechanical mitral valve replacement that presents with onset of confusion, dysarthria, and R sided weakness  LKN around 9am  Symptoms have since resolved and currently has no appreciable deficit on exam per discussion with ED  NIHSS 0  Most recent INR on 3/1 was 3 05; unclear if dose was changed  HCT and CTA reviewed - no acute evidence of ischemia/hemorrhage; chronic lacunar infarcts; no significant vascular pathology  Not a tPA candidate as pt has no current deficit  Would admit to stroke pathway as TIA  Continue warfarin if not supratherapeutic; can bridge if subtherapeutic  Does not require permissive HTN if asymptomatic

## 2022-03-11 NOTE — H&P
History and Physical - [facility] Internal Medicine    Patient Information: Mello Marsh 80 y o  female MRN: [id number]  Unit/Bed#: [address] [address] Encounter: [id number]  Admitting Physician: Ildefonso Alves MD  PCP: Danielle Donnelly MD  Date of Admission:  03/11/22        Hospital Problem List:     Principal Problem:    TIA (transient ischemic attack)  Active Problems:    [facility]ronic [facility] )    H/O mitral valve replacement with mechanical valve    Type 2 diabetes mellitus without complication, without long-term current use of insulin ([facility] )    Essential hypertension    Obstructive sleep apnea    Hypercholesteremia      Assessment/Plan:    * TIA (transient ischemic attack)  Assessment & Plan  Presented with speech difficulties, right-sided weakness, headache started 9:00 a m  On day of presentation, self resolved in 30 minutes  NIH stroke scale 0 on presentation  CT head and neck without any acute ischemia/hemorrhage  Evidence of old lacunar infarct  No evidence of vascular abnormality    Not a candidate for tPA due to improvement in symptoms and anticoagulation  Patient again had a recurrence of symptoms around 3:30 p m  and again improved gradually  Read by Neurology, input appreciated  · Telemetry, neuro checks  ·  Loaded with aspirin 325 mg, also loaded with Plavix due to recurrence of symptoms  · Lipitor 80 mg  · Permissive hypertension, continue IV fluids  · 2D echo with bubble study  · MRI of the brain (patient has MVR since 1986 but had MRI in 2016)  · Check LDL, hemoglobin A1c  · Continue anticoagulation with Coumadin, monitor PT/INR  · Neurology evaluation  · PT/OT/ST    Essential hypertension  Assessment & Plan  Hold nadolol and losartan to allow permissive hypertension    Type 2 diabetes mellitus without complication, without long-term current use of insulin [facility])  Assessment & Plan  Lab Results   Component Value Date    HGBA1C 6 8 (H) 02/17/2022       Recent Labs     03/11/22  1052 03/11/22  1628 03/11/22 2020   POCGLU 171* 129 118       Blood Sugar Average: Last 72 hrs:  (P) 987 [id number]     Control  Hold metformin  Monitor on sliding scale at present    H/O mitral valve replacement with mechanical valve  Assessment & Plan  History of mechanical MVR, 1996    Chronic atrial fibrillation (HCC)  Assessment & Plan  Controlled  · Continue digoxin, Coumadin  · Digoxin level  · Holding nadolol      Hypercholesteremia  Assessment & Plan  Previous , 2/22  Patient is not on statin due to history of hepatic steatosis  · Start Lipitor 80 mg daily    Obstructive sleep apnea  Assessment & Plan  On CPAP 7 cm H2O          VTE Prophylaxis: Warfarin (Coumadin)  / sequential compression device   Code Status: Level 1 - Full Code    Anticipated Length of Stay:  Patient will be admitted on an Observation basis with an anticipated length of stay of  > 2 midnights  Justification for Hospital Stay:  TIA, CVA    Total Time for Visit, including Counseling / Coordination of Care: 45 minutes  Greater than 50% of this total time spent on direct patient counseling and coordination of care  Chief Complaint:     STROKE Alert (right side  defecits started at 0930, since resolved, now has headache)    History of Present Illness:    Deena Thompson is a 80 y o  female with history of diabetes mellitus type 2, hypothyroidism, emphysema, chronic AFib on anticoagulation, hypertension, history of MVR with mechanical valve, dyslipidemia, anemia, history of AAA who presents with sudden onset of difficulty in speaking, right-sided weakness, headache and confusion restarted around 9:00 a m  while talking her daughter on the phone  Patient was brought to ER by EMT  Patient's symptoms self-resolved within 30 minutes upon presentation to ED  Patient's blood pressure was elevated other vital signs were stable  Stroke alert was initiated, NIH stroke scale was 0    CTA head and neck revealed no evidence of ischemia or hemorrhage  Old lacunar infarcts noted  No significant vascular abnormality noted  Patient was not deemed a tPA candidate due to resolution of symptoms and elevated INR  Patient remained stable and was subsequently admitted for further workup  After admission , patient again had recurrence of symptoms with mild speech difficulty, recurrence of the right-sided headache though milder and right upper extremity and lower extremity weakness  Repeat NIH stroke scale was 7, blood pressure noted to be 149/84  Patient was started on IV fluid  Case was discussed with Neurology, Dr Ramona Conti, recommended Plavix load in addition to aspirin  Recommended to continue permissive hypertension  Symptoms again improved gradually  Review of Systems:    Review of Systems   Constitutional: Positive for activity change  Negative for chills and fever  HENT: Positive for voice change  Negative for sore throat and trouble swallowing  Eyes: Negative for visual disturbance  Respiratory: Negative for cough, chest tightness and shortness of breath  Cardiovascular: Negative for chest pain and leg swelling  Gastrointestinal: Negative for abdominal pain, blood in stool, nausea and vomiting  Genitourinary: Negative for difficulty urinating  Musculoskeletal: Positive for gait problem  Neurological: Positive for speech difficulty, weakness and headaches  Negative for dizziness and tremors  Psychiatric/Behavioral: Negative for behavioral problems  Past Medical and Surgical History:     History reviewed  No pertinent past medical history  Past Surgical History:   Procedure Laterality Date    EYE SURGERY      HYSTERECTOMY      MITRAL VALVE REPLACEMENT  1994       Meds/Allergies:    PTA meds:   Prior to Admission Medications   Prescriptions Last Dose Informant Patient Reported? Taking?    Cholecalciferol (VITAMIN D PO) 3/11/2022 at Unknown time Self Yes Yes   Sig: Take by mouth   Multiple Vitamins-Minerals (PRESERVISION AREDS PO) Unknown at Unknown time Self Yes No   Sig: Take 2 tablets by mouth daily     NADOLOL PO  Self Yes No   Sig: Take 2 5 mg by mouth daily   acetaminophen (TYLENOL) 500 mg tablet 3/11/2022 at Unknown time  Yes Yes   Sig: Take 500 mg by mouth   digoxin (LANOXIN) 0 125 mg tablet   Yes No   Sig: Take 125 mcg by mouth daily   ferrous sulfate 324 (65 Fe) mg 3/11/2022 at Unknown time  No Yes   Sig: Take 1 tablet (324 mg total) by mouth 2 (two) times a day before meals   losartan (COZAAR) 25 mg tablet 3/11/2022 at Unknown time  No Yes   Sig: Take 1 tablet (25 mg total) by mouth daily   metFORMIN (GLUCOPHAGE) 500 mg tablet 3/11/2022 at Unknown time  No Yes   Sig: Take 2 tablets by mouth twice daily   warfarin (COUMADIN) 2 5 mg tablet Past Week at Unknown time Self Yes Yes   Sig: Take 2 5 mg by mouth 3 (three) times a week   warfarin (COUMADIN) 5 mg tablet 3/10/2022 at Unknown time Self Yes Yes   Sig: Take 5 mg by mouth 4 (four) times a week      Facility-Administered Medications: None       Allergies:    Allergies   Allergen Reactions    Sulfa Antibiotics     Sulfasalazine      History:     Marital Status:      Substance Use History:   Social History     Substance and Sexual Activity   Alcohol Use Yes    Comment: special occasional     Social History     Tobacco Use   Smoking Status Former Smoker    Packs/day: 2 00    Years: 30 00    Pack years: 60 00    Quit date: Patsy Guidry Years since quittin 2   Smokeless Tobacco Never Used     Social History     Substance and Sexual Activity   Drug Use No       Family History:    Family History   Problem Relation Age of Onset    Heart failure Mother     Heart attack Father     Sleep apnea Brother        Physical Exam:     Vitals:   Blood Pressure: 149/84 (22 1510)  Pulse: 76 (22 1510)  Temperature: 98 7 °F (37 1 °C) (22)  Temp Source: Oral (22)  Respirations: 16 (22)  Height: 5' 5 5" (166 4 cm) (03/11/22 1045)  Weight - Scale: 72 1 kg (159 lb) (03/11/22 1045)  SpO2: 97 % (03/11/22 1510)    Physical Exam  Constitutional:       General: She is not in acute distress  HENT:      Head: Normocephalic and atraumatic  Eyes:      General: No visual field deficit  Cardiovascular:      Rate and Rhythm: Normal rate  Rhythm irregular  Heart sounds: Murmur heard  Pulmonary:      Effort: Pulmonary effort is normal  No respiratory distress  Breath sounds: Normal breath sounds  No wheezing or rales  Abdominal:      General: Bowel sounds are normal  There is no distension  Palpations: Abdomen is soft  Tenderness: There is no abdominal tenderness  There is no guarding or rebound  Musculoskeletal:      Right lower leg: No edema  Left lower leg: No edema  Skin:     General: Skin is warm and dry  Findings: No rash  Neurological:      Mental Status: She is alert and oriented to person, place, and time  GCS: GCS eye subscore is 4  GCS verbal subscore is 5  GCS motor subscore is 6  Cranial Nerves: Cranial nerve deficit (Mild facial droop) and dysarthria present  Sensory: No sensory deficit  Motor: Weakness (Right upper and lower extremity) present  Coordination: Coordination normal    Psychiatric:         Mood and Affect: Mood normal                  Lab Results: I have personally reviewed pertinent reports  Results from last 7 days   Lab Units 03/11/22  1053   WBC Thousand/uL 5 71   HEMOGLOBIN g/dL 12 6   HEMATOCRIT % 40 6   PLATELETS Thousands/uL 211     Results from last 7 days   Lab Units 03/11/22  1053   POTASSIUM mmol/L 4 1   CHLORIDE mmol/L 99*   CO2 mmol/L 29   BUN mg/dL 15   CREATININE mg/dL 0 74   CALCIUM mg/dL 8 6     Results from last 7 days   Lab Units 03/11/22  1053   INR  2 82*       Imaging: I have personally reviewed pertinent reports        CT stroke alert brain    Result Date: 3/11/2022  Narrative: CT BRAIN - STROKE ALERT PROTOCOL INDICATION:   Neuro deficit, acute, stroke suspected stroke evaluation in progress  COMPARISON:  Same day CTA stroke alert head neck  CT head without contrast September 9, 2022  TECHNIQUE:  CT examination of the brain was performed  In addition to axial images, coronal reformatted images were created and submitted for interpretation  Radiation dose length product (DLP) for this visit:  894 73 mGy-cm   This examination, like all CT scans performed in the [facility], was performed utilizing techniques to minimize radiation dose exposure, including the use of iterative  reconstruction and automated exposure control  IMAGE QUALITY:  Diagnostic  FINDINGS:  PARENCHYMA: Decreased attenuation is noted in periventricular and subcortical white matter demonstrating an appearance that is statistically most likely to represent mild microangiopathic change  Unchanged chronic lacunar infarcts in bilateral caudate heads  Unchanged old small infarct in right cerebellum  No CT signs of acute infarction  No intracranial mass, mass effect or midline shift  No acute parenchymal hemorrhage  Mild calcification of the cavernous internal carotid arteries and left intradural vertebral artery  VENTRICLES AND EXTRA-AXIAL SPACES:  Normal for patient's age  VISUALIZED ORBITS AND PARANASAL SINUSES:  Sequela of bilateral cataract surgery  Clear sinuses  CALVARIUM AND EXTRACRANIAL SOFT TISSUES:   Normal      Impression: No acute intracranial abnormality  Findings were directly discussed with Adonay Maldonado at 10:45 AM  Workstation performed: [id number]     CTA stroke alert (head/neck)    Result Date: 3/11/2022  Narrative: CTA NECK AND BRAIN WITH CONTRAST INDICATION: Neuro deficit, acute, stroke suspected stroke evaluatio in progress COMPARISON:   Same day CT stroke alert brain  CT chest without contrast September 9, 2020   TECHNIQUE:   Post contrast imaging was performed after administration of iodinated contrast through the neck and brain  Post contrast axial 0 625 mm images timed to opacify the arterial system  3D rendering was performed on an independent workstation  MIP reconstructions performed  Coronal reconstructions were performed of the noncontrast portion of the brain  Radiation dose length product (DLP) for this visit:  466 71 mGy-cm   This examination, like all CT scans performed in the [facility], was performed utilizing techniques to minimize radiation dose exposure, including the use of iterative  reconstruction and automated exposure control  IV Contrast:  85 mL of iohexol (OMNIPAQUE)  IMAGE QUALITY:   Diagnostic FINDINGS: CERVICAL VASCULATURE AORTIC ARCH AND GREAT VESSELS:  Moderate atherosclerotic disease of the arch and great vessels  RIGHT VERTEBRAL ARTERY CERVICAL SEGMENT:  Normal origin  The vessel is mildly diminutive in caliber throughout the neck  LEFT VERTEBRAL ARTERY CERVICAL SEGMENT:  Normal origin  The vessel is normal and dominant in caliber throughout the neck  RIGHT EXTRACRANIAL CAROTID SEGMENT:  Mild calcified atherosclerotic disease of the distal common carotid artery and proximal cervical internal carotid artery without significant stenosis compared to the more distal ICA  Less than 50% stenosis  No dissection  LEFT EXTRACRANIAL CAROTID SEGMENT:  Mild calcified atherosclerotic disease of the distal common carotid artery and proximal cervical internal carotid artery without significant stenosis compared to the more distal ICA  Less than 50% stenosis  No dissection  NASCET criteria was used to determine the degree of internal carotid artery diameter stenosis  INTRACRANIAL VASCULATURE INTERNAL CAROTID ARTERIES: Mild calcified atherosclerotic disease in bilateral ICA (bilateral cavernous, clinoid, supraclinoid segments) without significant stenosis  Normal enhancement of the intracranial portions of the internal carotid arteries  Normal ophthalmic artery origins    Normal ICA terminus  ANTERIOR CIRCULATION:  Hypoplastic right A1 segment  Normal caliber left A1 segment  Anterior cerebral arteries with normal enhancement  Normal anterior communicating artery  MIDDLE CEREBRAL ARTERY CIRCULATION:  M1 segment and middle cerebral artery branches demonstrate normal enhancement bilaterally  DISTAL VERTEBRAL ARTERIES:  Patent dominant left and mildly diminutive right distal vertebral arteries  Minimal calcified atherosclerotic disease in bilateral V4 segments (left greater than right) without significant stenosis  Posterior inferior cerebellar artery origins are normal  Normal vertebral basilar junction  BASILAR ARTERY:  Basilar artery is normal in caliber  Normal superior cerebellar arteries  POSTERIOR CEREBRAL ARTERIES: Both posterior cerebral arteries arises from the basilar tip  Both arteries demonstrate normal enhancement  VENOUS STRUCTURES:  Normal  NON VASCULAR ANATOMY BONY STRUCTURES:  No acute osseous abnormality  Diffuse osteopenia  Poststernotomy  Mild multilevel spinal degenerative changes  Chronic T5 superior endplate compression deformity with mild height loss  SOFT TISSUES OF THE NECK:  Normal  THORACIC INLET:  Mild emphysema  No focal consolidation in visualized upper lung zones  Partially imaged left atrial enlargement  Impression: Negative CTA head and neck for large vessel occlusion, dissection, aneurysm, or high-grade stenosis  Additional chronic/incidental findings as detailed above  Findings were directly discussed with Bubba Scales at 10:45 AM  Workstation performed: [id number]       CTA stroke alert (head/neck)   Final Result      Negative CTA head and neck for large vessel occlusion, dissection, aneurysm, or high-grade stenosis  Additional chronic/incidental findings as detailed above              Findings were directly discussed with Bubba Scales at 10:45 AM                      Workstation performed: [id number]         CT stroke alert brain   Final Result      No acute intracranial abnormality  Findings were directly discussed with Andressa Montoya at 10:45 AM       Workstation performed: [id number]         MRI Inpatient Order    (Results Pending)       EKG, Pathology, and Other Studies Reviewed on Admission:   · EKG-AFib    Allscripts/EPIC Records Reviewed: Yes     ** Please Note: "This note has been constructed using a voice recognition system  Therefore there may be syntax, spelling, and/or grammatical errors   Please call if you have any questions  "**

## 2022-03-12 ENCOUNTER — APPOINTMENT (OUTPATIENT)
Dept: RADIOLOGY | Facility: HOSPITAL | Age: 82
DRG: 065 | End: 2022-03-12
Payer: MEDICARE

## 2022-03-12 ENCOUNTER — APPOINTMENT (OUTPATIENT)
Dept: NON INVASIVE DIAGNOSTICS | Facility: HOSPITAL | Age: 82
DRG: 065 | End: 2022-03-12
Payer: MEDICARE

## 2022-03-12 PROBLEM — I63.9 CVA (CEREBRAL VASCULAR ACCIDENT) (HCC): Status: ACTIVE | Noted: 2022-03-11

## 2022-03-12 LAB
ALBUMIN SERPL BCP-MCNC: 3.5 G/DL (ref 3.5–5)
ALP SERPL-CCNC: 67 U/L (ref 46–116)
ALT SERPL W P-5'-P-CCNC: 35 U/L (ref 12–78)
ANION GAP SERPL CALCULATED.3IONS-SCNC: 10 MMOL/L (ref 4–13)
AORTIC ROOT: 3 CM
AORTIC VALVE MEAN VELOCITY: 8.5 M/S
APICAL FOUR CHAMBER EJECTION FRACTION: 45 %
AST SERPL W P-5'-P-CCNC: 27 U/L (ref 5–45)
AV AREA BY CONTINUOUS VTI: 3 CM2
AV AREA PEAK VELOCITY: 2.1 CM2
AV LVOT MEAN GRADIENT: 3 MMHG
AV LVOT PEAK GRADIENT: 6 MMHG
AV MEAN GRADIENT: 4 MMHG
AV PEAK GRADIENT: 12 MMHG
AV VALVE AREA: 2.95 CM2
AV VELOCITY RATIO: 0.73
BILIRUB SERPL-MCNC: 0.34 MG/DL (ref 0.2–1)
BUN SERPL-MCNC: 15 MG/DL (ref 5–25)
CALCIUM SERPL-MCNC: 8.1 MG/DL (ref 8.3–10.1)
CHLORIDE SERPL-SCNC: 105 MMOL/L (ref 100–108)
CHOLEST SERPL-MCNC: 200 MG/DL
CO2 SERPL-SCNC: 26 MMOL/L (ref 21–32)
CREAT SERPL-MCNC: 0.63 MG/DL (ref 0.6–1.3)
DOP CALC AO PEAK VEL: 1.7 M/S
DOP CALC AO VTI: 25.26 CM
DOP CALC LVOT AREA: 2.83 CM2
DOP CALC LVOT DIAMETER: 1.9 CM
DOP CALC LVOT PEAK VEL VTI: 26.33 CM
DOP CALC LVOT PEAK VEL: 1.24 M/S
DOP CALC LVOT STROKE INDEX: 41.7 ML/M2
DOP CALC LVOT STROKE VOLUME: 74.62 CM3
DOP CALC MV VTI: 40.73 CM
E WAVE DECELERATION TIME: 202 MS
ERYTHROCYTE [DISTWIDTH] IN BLOOD BY AUTOMATED COUNT: 15 % (ref 11.6–15.1)
EST. AVERAGE GLUCOSE BLD GHB EST-MCNC: 140 MG/DL
FRACTIONAL SHORTENING: 26 % (ref 28–44)
GFR SERPL CREATININE-BSD FRML MDRD: 83 ML/MIN/1.73SQ M
GLUCOSE P FAST SERPL-MCNC: 129 MG/DL (ref 65–99)
GLUCOSE SERPL-MCNC: 118 MG/DL (ref 65–140)
GLUCOSE SERPL-MCNC: 120 MG/DL (ref 65–140)
GLUCOSE SERPL-MCNC: 129 MG/DL (ref 65–140)
GLUCOSE SERPL-MCNC: 153 MG/DL (ref 65–140)
GLUCOSE SERPL-MCNC: 198 MG/DL (ref 65–140)
GLUCOSE SERPL-MCNC: 96 MG/DL (ref 65–140)
HBA1C MFR BLD: 6.5 %
HCT VFR BLD AUTO: 38.3 % (ref 34.8–46.1)
HDLC SERPL-MCNC: 38 MG/DL
HGB BLD-MCNC: 12.1 G/DL (ref 11.5–15.4)
INR PPP: 2.99 (ref 0.84–1.19)
INTERVENTRICULAR SEPTUM IN DIASTOLE (PARASTERNAL SHORT AXIS VIEW): 1.1 CM
INTERVENTRICULAR SEPTUM: 1.1 CM (ref 0.52–0.96)
LAAS-AP2: 34.9 CM2
LAAS-AP4: 34.1 CM2
LDLC SERPL CALC-MCNC: 116 MG/DL (ref 0–100)
LEFT ATRIUM AREA SYSTOLE SINGLE PLANE A4C: 31.8 CM2
LEFT ATRIUM SIZE: 3.9 CM
LEFT INTERNAL DIMENSION IN SYSTOLE: 3.2 CM (ref 2.56–3.88)
LEFT VENTRICULAR INTERNAL DIMENSION IN DIASTOLE: 4.3 CM (ref 4.18–6.23)
LEFT VENTRICULAR POSTERIOR WALL IN END DIASTOLE: 1.2 CM (ref 0.5–0.95)
LEFT VENTRICULAR STROKE VOLUME: 43 ML
LVSV (TEICH): 43 ML
MCH RBC QN AUTO: 29.5 PG (ref 26.8–34.3)
MCHC RBC AUTO-ENTMCNC: 31.6 G/DL (ref 31.4–37.4)
MCV RBC AUTO: 93 FL (ref 82–98)
MV E'TISSUE VEL-LAT: 10 CM/S
MV E'TISSUE VEL-SEP: 6 CM/S
MV MEAN GRADIENT: 5 MMHG
MV PEAK A VEL: 0.75 M/S
MV PEAK E VEL: 187 CM/S
MV PEAK GRADIENT: 13 MMHG
MV VALVE AREA BY CONTINUITY EQUATION: 1.83 CM2
PLATELET # BLD AUTO: 198 THOUSANDS/UL (ref 149–390)
PMV BLD AUTO: 10.6 FL (ref 8.9–12.7)
POTASSIUM SERPL-SCNC: 4.1 MMOL/L (ref 3.5–5.3)
PROT SERPL-MCNC: 6.9 G/DL (ref 6.4–8.2)
PROTHROMBIN TIME: 30 SECONDS (ref 11.6–14.5)
RBC # BLD AUTO: 4.1 MILLION/UL (ref 3.81–5.12)
RIGHT ATRIUM AREA SYSTOLE A4C: 19.2 CM2
RIGHT VENTRICLE ID DIMENSION: 2.9 CM
SL CV LEFT ATRIUM LENGTH A2C: 7.9 CM
SL CV LV EF: 52
SL CV PED ECHO LEFT VENTRICLE DIASTOLIC VOLUME (MOD BIPLANE) 2D: 83 ML
SL CV PED ECHO LEFT VENTRICLE SYSTOLIC VOLUME (MOD BIPLANE) 2D: 40 ML
SODIUM SERPL-SCNC: 141 MMOL/L (ref 136–145)
TR MAX PG: 28 MMHG
TR PEAK VELOCITY: 2.7 M/S
TRICUSPID VALVE PEAK REGURGITATION VELOCITY: 2.66 M/S
TRIGL SERPL-MCNC: 230 MG/DL
WBC # BLD AUTO: 4.74 THOUSAND/UL (ref 4.31–10.16)
Z-SCORE OF INTERVENTRICULAR SEPTUM IN END DIASTOLE: 3.13
Z-SCORE OF LEFT VENTRICULAR DIMENSION IN END DIASTOLE: -1.72
Z-SCORE OF LEFT VENTRICULAR DIMENSION IN END SYSTOLE: 0.12
Z-SCORE OF LEFT VENTRICULAR POSTERIOR WALL IN END DIASTOLE: 4.11

## 2022-03-12 PROCEDURE — 85027 COMPLETE CBC AUTOMATED: CPT | Performed by: INTERNAL MEDICINE

## 2022-03-12 PROCEDURE — 82948 REAGENT STRIP/BLOOD GLUCOSE: CPT

## 2022-03-12 PROCEDURE — 97167 OT EVAL HIGH COMPLEX 60 MIN: CPT

## 2022-03-12 PROCEDURE — 97163 PT EVAL HIGH COMPLEX 45 MIN: CPT

## 2022-03-12 PROCEDURE — 85610 PROTHROMBIN TIME: CPT | Performed by: INTERNAL MEDICINE

## 2022-03-12 PROCEDURE — 83036 HEMOGLOBIN GLYCOSYLATED A1C: CPT | Performed by: INTERNAL MEDICINE

## 2022-03-12 PROCEDURE — 70551 MRI BRAIN STEM W/O DYE: CPT

## 2022-03-12 PROCEDURE — 99232 SBSQ HOSP IP/OBS MODERATE 35: CPT | Performed by: INTERNAL MEDICINE

## 2022-03-12 PROCEDURE — 93306 TTE W/DOPPLER COMPLETE: CPT

## 2022-03-12 PROCEDURE — 97530 THERAPEUTIC ACTIVITIES: CPT

## 2022-03-12 PROCEDURE — 80061 LIPID PANEL: CPT | Performed by: INTERNAL MEDICINE

## 2022-03-12 PROCEDURE — 97110 THERAPEUTIC EXERCISES: CPT

## 2022-03-12 PROCEDURE — 80053 COMPREHEN METABOLIC PANEL: CPT | Performed by: INTERNAL MEDICINE

## 2022-03-12 PROCEDURE — 93306 TTE W/DOPPLER COMPLETE: CPT | Performed by: INTERNAL MEDICINE

## 2022-03-12 PROCEDURE — G1004 CDSM NDSC: HCPCS

## 2022-03-12 PROCEDURE — 97535 SELF CARE MNGMENT TRAINING: CPT

## 2022-03-12 RX ADMIN — Medication 1 TABLET: at 08:00

## 2022-03-12 RX ADMIN — ACETAMINOPHEN 650 MG: 325 TABLET, FILM COATED ORAL at 16:13

## 2022-03-12 RX ADMIN — INSULIN LISPRO 1 UNITS: 100 INJECTION, SOLUTION INTRAVENOUS; SUBCUTANEOUS at 07:58

## 2022-03-12 RX ADMIN — ACETAMINOPHEN 650 MG: 325 TABLET, FILM COATED ORAL at 08:10

## 2022-03-12 RX ADMIN — ASPIRIN 81 MG: 81 TABLET, COATED ORAL at 08:00

## 2022-03-12 RX ADMIN — ATORVASTATIN CALCIUM 80 MG: 80 TABLET, FILM COATED ORAL at 17:17

## 2022-03-12 RX ADMIN — SODIUM CHLORIDE 75 ML/HR: 0.9 INJECTION, SOLUTION INTRAVENOUS at 21:35

## 2022-03-12 RX ADMIN — WARFARIN SODIUM 5 MG: 5 TABLET ORAL at 17:17

## 2022-03-12 RX ADMIN — SODIUM CHLORIDE 100 ML/HR: 0.9 INJECTION, SOLUTION INTRAVENOUS at 07:57

## 2022-03-12 RX ADMIN — INSULIN LISPRO 1 UNITS: 100 INJECTION, SOLUTION INTRAVENOUS; SUBCUTANEOUS at 13:00

## 2022-03-12 RX ADMIN — DIGOXIN 125 MCG: 125 TABLET ORAL at 08:00

## 2022-03-12 NOTE — PROGRESS NOTES
[facility] Progress Note  Patient: Evaristo Sheehan 80 y o  female   MRN: [id number]  PCP: Diana Blanc MD  Unit/Bed#: [address] [place] [address] Encounter: [id number]  Date Of Visit: 03/12/22    Problem List:    Principal Problem:    CVA (cerebral vascular accident) [facility])  Active Problems:    Chronic atrial fibrillation ([facility] )    H/O mitral valve replacement with mechanical valve    Type 2 diabetes mellitus without complication, without long-term current use of insulin ([facility] )    Essential hypertension    Obstructive sleep apnea    Hypercholesteremia      Assessment & Plan:    * CVA (cerebral vascular accident) [facility])  Assessment & Plan  Presented with speech difficulties, right-sided weakness, headache started 9:00 a m  On day of presentation, self resolved in 30 minutes  NIH stroke scale 0 on presentation  Patient again had a recurrence of symptoms around 3:30 p m  on 3/11 and again improved gradually  CT head and neck without any acute ischemia/hemorrhage  Evidence of old lacunar infarct  No evidence of vascular abnormality  Not a candidate for tPA due to improvement in symptoms and anticoagulation  MRI of the brain confirmed-left lacunar infarct involving left basal ganglia, extending into the periventricular white matter of the left parietal lobe, adjacent to the posterior aspect of the left lateral ventricle  Discussed with Neurology,, input appreciated  Remains with mild right-sided weakness  · Telemetry, neuro checks  ·  Loaded with aspirin 325 mg, also loaded with Plavix on 03/11 due to recurrence of symptoms  · Continue aspirin 81 mg, Lipitor 80 mg  · Permissive hypertension, continue IV fluids  Continue to hold antihypertensive  · 2D echo with bubble study-EF 52%, positive PFO  Mechanical MVR    · , hemoglobin A1c 6 5  · Continue anticoagulation with Coumadin, monitor PT/INR  · Neurology follow-up  · PT/OT/ST-consider acute rehab    Essential hypertension  Assessment & Plan  Hold nadolol and losartan to allow permissive hypertension    Type 2 diabetes mellitus without complication, without long-term current use of insulin [facility])  Assessment & Plan  Lab Results   Component Value Date    HGBA1C 6 5 (H) 03/12/2022       Recent Labs     03/12/22  0128 03/12/22  0724 03/12/22  1109 03/12/22  1612   POCGLU 118 153* 198* 96       Blood Sugar Average: Last 72 hrs:  (P) 140 [id number]     Controlled  Hold metformin  Monitor on sliding scale at present    H/O mitral valve replacement with mechanical valve  Assessment & Plan  History of mechanical MVR, 1996    Chronic atrial fibrillation (HCC)  Assessment & Plan  Controlled  · Continue digoxin, Coumadin  · Digoxin level-0 5  · Holding nadolol  · Will monitor heart rate with activity      Hypercholesteremia  Assessment & Plan  Previous , 2/22  Patient is not on statin due to history of hepatic steatosis   currently  · Continue Lipitor 80 mg daily, patient requested rosuvastatin on discharge    Obstructive sleep apnea  Assessment & Plan  On CPAP 7 cm H2O          VTE Pharmacologic Prophylaxis: VTE Score: 9 High Risk (Score >/= 5) - Pharmacological DVT Prophylaxis Ordered: warfarin (Coumadin)  Sequential Compression Devices Ordered  Patient Centered Rounds: I performed bedside rounds with nursing staff today  Discussions with Specialists or Other Care Team Provider:  Neurology    Education and Discussions with Family / Patient: Updated  (son) at bedside  Time Spent for Care: 45 minutes  More than 50% of total time spent on counseling and coordination of care as described above  Current Length of Stay: 0 day(s)  Current Patient Status: Inpatient   Certification Statement: The patient, admitted on an observation basis, will now require > 2 midnight hospital stay due to Acute CVA  Discharge Plan: Anticipate discharge in 48-72 hrs to rehab facility      Code Status: Level 1 - Full Code    Subjective:   Reports right-sided weakness, speech appears better  Mild right-sided headache  Tolerating diet without any difficulties  Denies chest pain or shortness of breath    Objective:     Vitals:   Temp (24hrs), Av 7 °F (36 5 °C), Min:96 5 °F (35 8 °C), Max:98 7 °F (37 1 °C)    Temp:  [96 5 °F (35 8 °C)-98 7 °F (37 1 °C)] 98 °F (36 7 °C)  HR:  [67-76] 71  Resp:  [16-20] 16  BP: (147-152)/(74-84) 152/74  SpO2:  [95 %-99 %] 99 % on room air  Body mass index is 26 06 kg/m²  Input and Output Summary (last 24 hours): Intake/Output Summary (Last 24 hours) at 3/12/2022 1357  Last data filed at 3/12/2022 0230  Gross per 24 hour   Intake --   Output 400 ml   Net -400 ml       Physical Exam:   Physical Exam  Constitutional:       General: She is not in acute distress  HENT:      Head: Normocephalic and atraumatic  Cardiovascular:      Rate and Rhythm: Normal rate  Rhythm irregular  Heart sounds: Murmur heard  Pulmonary:      Effort: Pulmonary effort is normal  No respiratory distress  Breath sounds: Normal breath sounds  No wheezing or rales  Abdominal:      General: Bowel sounds are normal  There is no distension  Palpations: Abdomen is soft  Tenderness: There is no abdominal tenderness  There is no guarding or rebound  Musculoskeletal:      Right lower leg: No edema  Left lower leg: No edema  Skin:     General: Skin is warm and dry  Findings: No rash  Neurological:      Mental Status: She is alert and oriented to person, place, and time  GCS: GCS eye subscore is 4  GCS verbal subscore is 5  GCS motor subscore is 6  Cranial Nerves: No facial asymmetry        Comments: Mild right-sided weakness, able to elevate extremity against gravity         Additional Data:     Labs:  Results from last 7 days   Lab Units 22   WBC Thousand/uL 4 74   HEMOGLOBIN g/dL 12 1   HEMATOCRIT % 38 3   PLATELETS Thousands/uL 198     Results from last 7 days   Lab Units 22 SODIUM mmol/L 141   POTASSIUM mmol/L 4 1   CHLORIDE mmol/L 105   CO2 mmol/L 26   BUN mg/dL 15   CREATININE mg/dL 0 63   ANION GAP mmol/L 10   CALCIUM mg/dL 8 1*   ALBUMIN g/dL 3 5   TOTAL BILIRUBIN mg/dL 0 34   ALK PHOS U/L 67   ALT U/L 35   AST U/L 27   GLUCOSE RANDOM mg/dL 129     Results from last 7 days   Lab Units 03/12/22  0449   INR  2 99*     Results from last 7 days   Lab Units 03/12/22  1109 03/12/22  0724 03/12/22  0128 03/11/22  2020 03/11/22  1628 03/11/22  1052   POC GLUCOSE mg/dl 198* 153* 118 118 129 171*               Lines/Drains:  Invasive Devices  Report    Peripheral Intravenous Line            Peripheral IV 03/11/22 Left Antecubital 1 day                  Telemetry:  Telemetry Orders (From admission, onward)             48 Hour Telemetry Monitoring  Continuous x 48 hours        References:    Telemetry Guidelines   Question:  Reason for 48 Hour Telemetry  Answer:  Acute CVA (<24 hrs old, hemispheric strokes, selected brainstem strokes, cardiac arrhythmias)                 Telemetry Reviewed: Atrial fibrillation   HR averaging With SVR during night  Indication for Continued Telemetry Use: Arrthymias requiring medical therapy             Imaging: Reviewed radiology reports from this admission including: MRI brain    Recent Cultures (last 7 days):         Last 24 Hours Medication List:   Current Facility-Administered Medications   Medication Dose Route Frequency Provider Last Rate    acetaminophen  650 mg Oral Q6H PRN Do Sosa MD      aspirin  81 mg Oral Daily Do Ssoa MD      atorvastatin  80 mg Oral QPM Do Sosa MD      digoxin  125 mcg Oral Daily Do Sosa MD      insulin lispro  1-5 Units Subcutaneous HS Do Sosa MD      insulin lispro  1-5 Units Subcutaneous 0200 Do Sosa MD      insulin lispro  1-5 Units Subcutaneous TID AC Do Sosa MD      multivitamin-minerals  1 tablet Oral Daily Do Sosa MD      sodium chloride  100 mL/hr Intravenous Continuous Tyron Ritter  mL/hr (03/12/22 0757)    warfarin  2 5 mg Oral Once per day on Mon Wed Fri Tyron Ritter MD      And    warfarin  5 mg Oral Once per day on Sun Tue Thu Sat Tyron Ritter MD          Today, Patient Was Seen By: Tyron Ritter MD    ** Please Note: "This note has been constructed using a voice recognition system  Therefore there may be syntax, spelling, and/or grammatical errors   Please call if you have any questions  "**

## 2022-03-12 NOTE — SPEECH THERAPY NOTE
Consult received and chart reviewed  Per chart review, and discussion with RN, pt passed RN dysphagia assessment and is tolerating regular diet with thin liquids with no s/s of aspiration  No dysarthria or language deficits noted or reported  Therefore, formal speech/swallow evaluation not indicated at this time  If new concerns arise, please reconsult       TOM Cisse S , [id number] [facility]  Speech Language Pathologist   Available via [address] [place] [address] #[id number]  [place] #[id number]

## 2022-03-12 NOTE — PHYSICAL THERAPY NOTE
PHYSICAL THERAPY EVALUATION/TREATMENT     03/12/22 0920   Note Type   Note type Evaluation   Pain Assessment   Pain Assessment Tool 0-10   Pain Score No Pain   Restrictions/Precautions   Other Precautions Fall Risk;Bed Alarm; Chair Alarm   Home Living   Type of [address] Two level;Bed/bath upstairs  (3 GLADYS)   Home Equipment Cane   Prior Function   Level of [place] Independent with ADLs and functional mobility  (uses a cane outside prn)   Lives With Alone   ADL Assistance Independent   General   Additional Pertinent History Pt admitted with intermittent R hemibody weakness and difficulty speaking  Pt's R hemibody weakness is now persistent  MRI brain pending  Cognition   Overall Cognitive Status WFL   Arousal/Participation Cooperative   Subjective   Subjective "I feel blah"   LUE Assessment   LUE Assessment X   RLE Assessment   RLE Assessment   (ROM WFL, MMT hip 3-/5, knee 4-/5, ankle 4-/5)   LLE Assessment   LLE Assessment   (ROM WFL, MMT 5/5)   Bed Mobility   Supine to Sit 2  Maximal assistance   Transfers   Sit to Stand 4  Minimal assistance   Stand to Sit 4  Minimal assistance   Stand pivot 4  Minimal assistance   Additional items Verbal cues; Increased time required  (with walker)   Ambulation/Elevation   Gait pattern   (slow gary, impaired R LE control in stance and swing)   Gait Assistance 4  Minimal assist progressing to mod assist with fatigue    Additional items Tactile cues   Assistive Device Rolling walker   Distance 30 feet   Balance   Static Sitting Fair +   Dynamic Sitting Fair   Static Standing Fair -   Dynamic Standing Poor   Activity Tolerance   Activity Tolerance Patient limited by fatigue   Nurse Made Aware Pt oob in chair   Assessment   Prognosis Good   Problem List Decreased strength; Impaired balance;Decreased mobility; Impaired vision   Assessment Patient seen for Physical Therapy evaluation  Patient admitted with TIA (transient ischemic attack)    Comorbidities affecting patient's physical performance include: htn, afib, DM, ataxia  Personal factors affecting patient at time of initial evaluation include: lives in 2 story house, stairs to enter home, inability to ambulate household distances, inability to navigate level surfaces without external assistance and limited home support  Prior to admission, patient was independent with functional mobility without assistive device and independent with ADLS  Please find objective findings from Physical Therapy assessment regarding body systems outlined above with impairments and limitations including weakness, impaired balance, gait deviations, decreased activity tolerance, decreased functional mobility tolerance and fall risk  The Barthel Index was used as a functional outcome tool presenting with a score of Barthel Index Score: 40 today indicating marked limitations of functional mobility and ADLS  Patient's clinical presentation is currently unstable/unpredictable as seen in patient's presentation of increased fall risk, new onset of impairment of functional mobility and new onset of weakness  Pt would benefit from continued Physical Therapy treatment to address deficits as defined above and maximize level of functional mobility  As demonstrated by objective findings, the assigned level of complexity for this evaluation is high  The patient's AM-[facility] Basic Mobility Inpatient Short Form Raw Score is 13  A Raw score of less than or equal to 16 suggests the patient may benefit from discharge to post-acute rehabilitation services  2   Goals   Patient Goals "I want to be independent"   STG Expiration Date 03/19/22   Short Term Goal #1 improve bed mobiltiy to supervision, improved transfers to supervision, patient will ambulate with a rolling walker with minimal assist x 60 ft in good right lower extremity control, Min assist up and down 8 steps   LTG Expiration Date 03/26/22   Long Term Goal #1 Independent bed mobility, independent transfers, supervision ambulation with least restrictive assistive device x 150 ft indoor level surfaces, supervision up and down 1 flight of steps, improve right lower extremity strength at least 4/ 5   Plan   Treatment/Interventions ADL retraining;LE strengthening/ROM; Functional transfer training;Elevations; Therapeutic exercise; Endurance training;Gait training;Bed mobility; Equipment eval/education;Patient/family training   PT Frequency Other (Comment)  (daily)   Recommendation   PT Discharge Recommendation Post acute rehabilitation services   Equipment Recommended Walker   AM-PAC Basic Mobility Inpatient   Turning in Bed Without Bedrails 2   Lying on Back to Sitting on Edge of Flat Bed 2   Moving Bed to Chair 2   Standing Up From Chair 3   Walk in Room 2   Climb 3-5 Stairs 2   Basic Mobility Inpatient Raw Score 13   Basic Mobility Standardized Score 33 99   Highest Level Of Mobility   [place] Goal 4: Move to chair/commode   [place] Highest Level of Mobility 7: Walk 25 feet or more   [place] Goal Achieved Yes   Modified Gleason Scale   Modified Gleason Scale 4   Barthel Index   Feeding 5   Bathing 0   Grooming Score 0   Dressing Score 5   Bladder Score 10   Bowels Score 10   Toilet Use Score 5   Transfers (Bed/Chair) Score 5   Mobility (Level Surface) Score 0   Stairs Score 0   Barthel Index Score 40   Additional Treatment Session   Start Time 0910   End Time 0920   Treatment Assessment "I want to be independent"  Pt performed x 10 each R LE hip abd/add, heel slides, SAQ, AAROM SLR, hip ir/er, ankle pumps  Pt encouraged to perform AROM of her UE and LE often  Pt is motivated and should do well at rehab     Licensure   [place] License Number  Tristian PENALOZA [id number]

## 2022-03-12 NOTE — OCCUPATIONAL THERAPY NOTE
Occupational Therapy Evaluation/Treatment       03/12/22 1055   Note Type   Note type Evaluation   Restrictions/Precautions   Other Precautions Chair Alarm; Bed Alarm; Fall Risk  (R sided weakness)   Pain Assessment   Pain Assessment Tool 0-10   Pain Score 9   Pain Location/Orientation Orientation: Right;Location: Shoulder  (chronic )   Home Living   Type of Home House   Home Layout Two level   Bathroom Shower/Tub Tub/shower unit   Home Equipment Cane  (uses cane mostly only at night )   Prior Function   Level of [place] Independent with ADLs and functional mobility   Lives With Alone   Receives Help From Family   ADL Assistance Independent   IADLs Independent   ADL   Eating Assistance 4  Minimal Assistance   Grooming Assistance 4  Minimal Assistance   UB Bathing Assistance 4  Minimal Assistance   LB Bathing Assistance 3  Moderate Assistance   UB Dressing Assistance 4  Minimal Assistance   LB Dressing Assistance 2  Maximal [address]  3  Moderate Assistance   Bed Mobility   Supine to Sit 2  Maximal assistance   Additional items Assist x 1;Verbal cues   Transfers   Sit to Stand 4  Minimal assistance   Stand to Sit 4  Minimal assistance   Additional Comments pt is left handed    Functional Mobility   Functional Mobility 3  Moderate assistance   Additional Comments 15 feet   Additional items Rolling walker   Balance   Static Sitting Fair +   Dynamic Sitting Fair   Static Standing Fair -   Dynamic Standing Poor +   Activity Tolerance   Activity Tolerance Patient limited by fatigue;Patient limited by pain  (R sided weakness/decreased coordination)   Nurse Made Aware yes   RUE Overall AROM   R Shoulder Flexion 90 degrees, MMT 3-/5   R Elbow Flexion unable to fully flex to bring hand to mouth, MMT 3-/5   R Mass Grasp WFL,  MMT 3+/5   LUE Assessment   LUE Assessment WFL   Hand Function   Gross Motor Coordination Impaired  (LUE/LE)   Fine Motor Coordination Impaired  (LUE)   Sensation   Light Touch No apparent deficits   Vision-Basic Assessment   Visual History Macular degeneration   Vision - Complex Assessment   Acuity   (unable to read name badge or clock)   Additional Comments pt reports no change in vision over the last few days    Cognition   Overall Cognitive Status WFL   Arousal/Participation Cooperative   Attention Within functional limits   Orientation Level Oriented X4   Following Commands Follows all commands and directions without difficulty   Comments pt is very motivated to live atleast another 10 years    Assessment   Limitation Decreased ADL status; Decreased Safe judgement during ADL;Decreased endurance;Decreased self-care trans;Decreased high-level ADLs; Decreased UE strength;Decreased UE ROM  (decreased balance and mobility )   Prognosis Good   Assessment Patient evaluated by Occupational Therapy  Patient admitted with TIA (transient ischemic attack)  The patients occupational profile, medical and therapy history includes a extensive additional review of physical, cognitive, or psychosocial history related to current functional performance  Comorbidities affecting functional mobility and ADLS include:ataxia, Afib, diabetes and hypertension  Prior to admission, patient was independent with functional mobility with cane at night mostly, independent with ADLS and independent with IADLS  The evaluation identifies the following performance deficits: weakness, decreased ROM, impaired balance, decreased endurance, decreased coordination, increased fall risk, new onset of impairment of functional mobility, decreased ADLS, decreased IADLS, pain, decreased activity tolerance, decreased safety awareness, impaired judgement and decreased strength, that result in activity limitations and/or participation restrictions   This evaluation requires clinical decision making of high complexity, because the patient presents with comorbidites that affect occupational performance and required significant modification of tasks or assistance with consideration of multiple treatment options  The Barthel Index was used as a functional outcome tool presenting with a score of Barthel Index Score: 40, indicating marked limitations of functional mobility and ADLS  The patient's raw score on the -PAC Daily Activity inpatient short form is 15, standardized score is 34 69, less than 39 4  Patients at this level are likely to benefit from DC to post-acute rehabilitation services  Please refer to the recommendation of the Occupational Therapist for safe DC planning  Patient will benefit from skilled Occupational Therapy services to address above deficits and facilitate a safe return to prior level of function  Goals   Patient Goals to be independent   STG Time Frame   (1-7 days)   Short Term Goal  Goals established to promote Patient Goals: to be independent:  Patient will increase standing tolerance to 3 minutes during ADL task to decrease assistance level and decrease fall risk; Patient will increase bed mobility to mod assist in preparation for ADLS and transfers; Patient will increase functional mobility to and from bathroom with rolling walker with min assist to increase performance with ADLS and to use a toilet; Patient will tolerate 10 minutes of UE ROM/strengthening/fine motor coordination to increase general activity tolerance and performance in ADLS/IADLS; Patient will improve functional activity tolerance to 10 minutes of sustained functional tasks to increase participation in basic self-care and decrease assistance level; Patient will increase dynamic sitting balance to fair+ to improve the ability to sit at edge of bed or on a chair for ADLS;  Patient will increase dynamic standing balance to fair- to improve postural stability and decrease fall risk during standing ADLS and transfers       LTG Time Frame   (8-14 days)   Long Term Goal Patient will increase standing tolerance to 6 minutes during ADL task to decrease assistance level and decrease fall risk; Patient will increase bed mobility to min assist in preparation for ADLS and transfers; Patient will increase functional mobility to and from bathroom with rolling walker with supervision to increase performance with ADLS and to use a toilet; Patient will tolerate 20 minutes of UE ROM/strengthening/fine motor coordination to increase general activity tolerance and performance in ADLS/IADLS; Patient will improve functional activity tolerance to 20 minutes of sustained functional tasks to increase participation in basic self-care and decrease assistance level; Patient will increase dynamic sitting balance to good to improve the ability to sit at edge of bed or on a chair for ADLS;  Patient will increase dynamic standing balance to fair to improve postural stability and decrease fall risk during standing ADLS and transfers  Pt will score >/= 19/24 on AM-PAC Daily Activity Inpatient scale to promote safe independence with ADLs and functional mobility; Pt will score >/= 70/100 on Barthel Index in order to decrease caregiver assistance needed and increase ability to perform ADLs and functional mobility  Functional Transfer Goals   Pt Will Perform All Functional Transfers   (STG supervision LTG independent)   ADL Goals   Pt Will Perform Eating   (STG supervision LTG Independent )   Pt Will Perform Grooming   (STG supervision LTG Independent )   Pt Will Perform Bathing   (STG min assist LTG supervision )   Pt Will Perform UE Dressing   (STG supervision LTG Independent )   Pt Will Perform LE Dressing   (STG mod assist LTG min assist )   Pt Will Perform Toileting   (STG min assist LTG supervision )   Plan   Treatment Interventions ADL retraining;Functional transfer training;UE strengthening/ROM; Endurance training;Patient/family training; Activityengagement; Compensatory technique education;Equipment evaluation/education; Neuromuscular reeducation   Goal Expiration Date 03/26/22   OT Frequency 5x/wk   Additional Treatment Session   Start Time 1038   End Time 1101   Treatment Assessment S: 9/10 chronic R shoulder pain O: Completed functional mobility 15 feet with RW with mod assist   Stand to sit mod assist   Sit to stand mod assist   Few steps chair to commode with min assist with RW  Urination on commode, mod assist for hygiene in stance for balance and mod assist for underwear management  Functional mobility 5 feet with hand hold assist   Stand to sit mod assist   Patient encouraged to completed home exercise program of opening and closing items on tray, using R arm for ADLS including feeding safely and picking up items on tray and putting them down  Patient demonstrates motivated to return to baseline level of function of being home alone therefore would benefit from STR    P: STR    Recommendation   OT Discharge Recommendation Post acute rehabilitation services   AM-PAC Daily Activity Inpatient   Lower Body Dressing 2   Bathing 2   Toileting 2   Upper Body Dressing 3   Grooming 3   Eating 3   Daily Activity Raw Score 15   Daily Activity Standardized Score (Calc for Raw Score >=11) 34 69   AM-PAC Applied Cognition Inpatient   Following a Speech/Presentation 4   Understanding Ordinary Conversation 4   Taking Medications 4   Remembering Where Things Are Placed or Put Away 4   Remembering List of 4-5 Errands 4   Taking Care of Complicated Tasks 4   Applied Cognition Raw Score 24   Applied Cognition Standardized Score 62 21   Barthel Index   Feeding 5   Bathing 0   Grooming Score 0   Dressing Score 5   Bladder Score 10   Bowels Score 10   Toilet Use Score 5   Transfers (Bed/Chair) Score 5   Mobility (Level Surface) Score 0   Stairs Score 0   Barthel Index Score 40   Modified [place] Scale   Modified [place] Scale 4   Licensure   [place] License Number  Macarena Mattveronika Gerardo 87 OTR/L [id number]

## 2022-03-12 NOTE — PLAN OF CARE
Problem: NEUROSENSORY - ADULT  Goal: Achieves stable or improved neurological status  Description: INTERVENTIONS  - Monitor and report changes in neurological status  - Monitor vital signs such as temperature, blood pressure, glucose, and any other labs ordered   - Initiate measures to prevent increased intracranial pressure  - Monitor for seizure activity and implement precautions if appropriate      Outcome: Progressing  Goal: Achieves maximal functionality and self care  Description: INTERVENTIONS  - Monitor swallowing and airway patency with patient fatigue and changes in neurological status  - Encourage and assist patient to increase activity and self care     - Encourage visually impaired, hearing impaired and aphasic patients to use assistive/communication devices  Outcome: Progressing     Problem: CARDIOVASCULAR - ADULT  Goal: Maintains optimal cardiac output and hemodynamic stability  Description: INTERVENTIONS:  - Monitor I/O, vital signs and rhythm  - Monitor for S/S and trends of decreased cardiac output  - Administer and titrate ordered vasoactive medications to optimize hemodynamic stability  - Assess quality of pulses, skin color and temperature  - Assess for signs of decreased coronary artery perfusion  - Instruct patient to report change in severity of symptoms  Outcome: Progressing  Goal: Absence of cardiac dysrhythmias or at baseline rhythm  Description: INTERVENTIONS:  - Continuous cardiac monitoring, vital signs, obtain 12 lead EKG if ordered  - Administer antiarrhythmic and heart rate control medications as ordered  - Monitor electrolytes and administer replacement therapy as ordered  Outcome: Progressing     Problem: RESPIRATORY - ADULT  Goal: Achieves optimal ventilation and oxygenation  Description: INTERVENTIONS:  - Assess for changes in respiratory status  - Assess for changes in mentation and behavior  - Position to facilitate oxygenation and minimize respiratory effort  - Oxygen administered by appropriate delivery if ordered  - Initiate smoking cessation education as indicated  - Encourage broncho-pulmonary hygiene including cough, deep breathe, Incentive Spirometry  - Assess the need for suctioning and aspirate as needed  - Assess and instruct to report SOB or any respiratory difficulty  - Respiratory Therapy support as indicated  Outcome: Progressing     Problem: GASTROINTESTINAL - ADULT  Goal: Maintains adequate nutritional intake  Description: INTERVENTIONS:  - Monitor percentage of each meal consumed  - Identify factors contributing to decreased intake, treat as appropriate  - Assist with meals as needed  - Monitor I&O, weight, and lab values if indicated  - Obtain nutrition services referral as needed  Outcome: Progressing     Problem: METABOLIC, FLUID AND ELECTROLYTES - ADULT  Goal: Electrolytes maintained within normal limits  Description: INTERVENTIONS:  - Monitor labs and assess patient for signs and symptoms of electrolyte imbalances  - Administer electrolyte replacement as ordered  - Monitor response to electrolyte replacements, including repeat lab results as appropriate  - Instruct patient on fluid and nutrition as appropriate  Outcome: Progressing  Goal: Fluid balance maintained  Description: INTERVENTIONS:  - Monitor labs   - Monitor I/O and WT  - Instruct patient on fluid and nutrition as appropriate  - Assess for signs & symptoms of volume excess or deficit  Outcome: Progressing  Goal: Glucose maintained within target range  Description: INTERVENTIONS:  - Monitor Blood Glucose as ordered  - Assess for signs and symptoms of hyperglycemia and hypoglycemia  - Administer ordered medications to maintain glucose within target range  - Assess nutritional intake and initiate nutrition service referral as needed  Outcome: Progressing     Problem: SKIN/TISSUE INTEGRITY - ADULT  Goal: Skin Integrity remains intact(Skin Breakdown Prevention)  Description: Assess:    -Assess extremities for adequate circulation and sensation     Bed Management:  -Have minimal linens on bed & keep smooth, unwrinkled  -Change linens as needed when moist or perspiring      Toileting:  -Offer bedside commode      Skin Care:  -Avoid use of baby powder, tape, friction and shearing, hot water or constrictive clothing    -Do not massage red bony areas    Next Steps:    Outcome: Progressing     Problem: HEMATOLOGIC - ADULT  Goal: Maintains hematologic stability  Description: INTERVENTIONS  - Assess for signs and symptoms of bleeding or hemorrhage  - Monitor labs  - Administer supportive blood products/factors as ordered and appropriate  Outcome: Progressing     Problem: MUSCULOSKELETAL - ADULT  Goal: Maintain or return mobility to safest level of function  Description: INTERVENTIONS:  - Assess patient's ability to carry out ADLs; assess patient's baseline for ADL function and identify physical deficits which impact ability to perform ADLs (bathing, care of mouth/teeth, toileting, grooming, dressing, etc )  - Assess/evaluate cause of self-care deficits   - Assess range of motion  - Assess patient's mobility  - Assess patient's need for assistive devices and provide as appropriate  - Encourage maximum independence but intervene and supervise when necessary  - Involve family in performance of ADLs  - Assess for home care needs following discharge   - Consider OT consult to assist with ADL evaluation and planning for discharge  - Provide patient education as appropriate  Outcome: Progressing     Problem: Neurological Deficit  Goal: Neurological status is stable or improving  Description: Interventions:  - Monitor and assess patient's level of consciousness, motor function, sensory function, and level of assistance needed for ADLs  - Monitor and report changes from baseline  Collaborate with interdisciplinary team to initiate plan and implement interventions as ordered     - Provide and maintain a safe environment  - Consider seizure precautions  - Consider fall precautions  - Consider aspiration precautions  - Consider bleeding precautions  Outcome: Progressing     Problem: Activity Intolerance/Impaired Mobility  Goal: Mobility/activity is maintained at optimum level for patient  Description: Interventions:  - Assess and monitor patient  barriers to mobility and need for assistive/adaptive devices  - Assess patient's emotional response to limitations  - Collaborate with interdisciplinary team and initiate plans and interventions as ordered  - Encourage independent activity per ability   - Maintain proper body alignment  - Perform active/passive rom as tolerated/ordered  - Plan activities to conserve energy   - Turn patient as appropriate  Outcome: Progressing     Problem: Communication Impairment  Goal: Ability to express needs and understand communication  Description: Assess patient's communication skills and ability to understand information  Patient will demonstrate use of effective communication techniques, alternative methods of communication and understanding even if not able to speak  - Encourage communication and provide alternate methods of communication as needed  - Collaborate with case management/ for discharge needs  - Include patient/family/caregiver in decisions related to communication  Outcome: Progressing     Problem: Potential for Aspiration  Goal: Non-ventilated patient's risk of aspiration is minimized  Description: Assess and monitor vital signs, respiratory status, and labs (WBC)  Monitor for signs of aspiration (tachypnea, cough, rales, wheezing, cyanosis, fever)  - Assess and monitor patient's ability to swallow  - Place patient up in chair to eat if possible  - HOB up at 90 degrees to eat if unable to get patient up into chair   - Supervise patient during oral intake  - Instruct patient/ family to take small bites    - Instruct patient/ family to take small single sips when taking liquids  - Follow patient-specific strategies generated by speech pathologist   Outcome: Progressing     Problem: Nutrition  Goal: Nutrition/Hydration status is improving  Description: Monitor and assess patient's nutrition/hydration status for malnutrition (ex- brittle hair, bruises, dry skin, pale skin and conjunctiva, muscle wasting, smooth red tongue, and disorientation)  Collaborate with interdisciplinary team and initiate plan and interventions as ordered  Monitor patient's weight and dietary intake as ordered or per policy  Utilize nutrition screening tool and intervene per policy  Determine patient's food preferences and provide high-protein, high-caloric foods as appropriate  - Assist patient with eating   - Allow adequate time for meals   - Encourage patient to take dietary supplement as ordered  - Collaborate with clinical nutritionist   - Include patient/family/caregiver in decisions related to nutrition    Outcome: Progressing     Problem: MOBILITY - ADULT  Goal: Maintain or return to baseline ADL function  Description: INTERVENTIONS:  -  Assess patient's ability to carry out ADLs; assess patient's baseline for ADL function and identify physical deficits which impact ability to perform ADLs (bathing, care of mouth/teeth, toileting, grooming, dressing, etc )  - Assess/evaluate cause of self-care deficits   - Assess range of motion  - Assess patient's mobility; develop plan if impaired  - Assess patient's need for assistive devices and provide as appropriate  - Encourage maximum independence but intervene and supervise when necessary  - Involve family in performance of ADLs  - Assess for home care needs following discharge   - Consider OT consult to assist with ADL evaluation and planning for discharge  - Provide patient education as appropriate  Outcome: Progressing  Goal: Maintains/Returns to pre admission functional level  Description: INTERVENTIONS:  - Perform BMAT or MOVE assessment daily    - Set and communicate daily mobility goal to care team and patient/family/caregiver     - Collaborate with rehabilitation services on mobility goals if consulted  - Out of bed for toileting  - Record patient progress and toleration of activity level   Outcome: Progressing

## 2022-03-12 NOTE — ASSESSMENT & PLAN NOTE
Controlled, no further bradycardia/SVR on telemetry  · Continue digoxin, Coumadin  · Digoxin level-0 5  · Resuming nadolol  · Monitor PT/INR closely with start of antibiotics for UTI

## 2022-03-12 NOTE — ED PROVIDER NOTES
History  Chief Complaint   Patient presents with   [address]     right side  defecits started at 0930, since resolved, now has headache     HPI  Patient is an 45-year-old female history of mitral valve replacement anticoagulated on Coumadin presenting for evaluation of stroke-like episode  Patient had right-sided facial droop, right arm and leg weakness and numbness, speech difficulty starting around 0930  Patient states the symptoms lasted for about 10 minutes then began to resolve  Patient arrived to emergency department by squad  At time of initial evaluation, patient complaining of a headache and stating some continued minor difficulty getting her words out however denying any ongoing facial weakness or numbness, arm or leg weakness or numbness  Patient denies prior history of CVA  Patient denies prior similar episodes or history of complex migraine  Patient denies any recent fall or head trauma  Patient denies fevers, chills, chest pain, shortness of breath, fatigue  Patient states that prior to the onset of symptoms she was at baseline health  Prior to Admission Medications   Prescriptions Last Dose Informant Patient Reported? Taking?    Cholecalciferol (VITAMIN D PO) 3/11/2022 at Unknown time Self Yes Yes   Sig: Take by mouth   Multiple Vitamins-Minerals (PRESERVISION AREDS PO) Unknown at Unknown time Self Yes No   Sig: Take 2 tablets by mouth daily     NADOLOL PO  Self Yes No   Sig: Take 2 5 mg by mouth daily   acetaminophen (TYLENOL) 500 mg tablet 3/11/2022 at Unknown time  Yes Yes   Sig: Take 500 mg by mouth   digoxin (LANOXIN) 0 125 mg tablet   Yes No   Sig: Take 125 mcg by mouth daily   ferrous sulfate 324 (65 Fe) mg 3/11/2022 at Unknown time  No Yes   Sig: Take 1 tablet (324 mg total) by mouth 2 (two) times a day before meals   losartan (COZAAR) 25 mg tablet 3/11/2022 at Unknown time  No Yes   Sig: Take 1 tablet (25 mg total) by mouth daily   metFORMIN (GLUCOPHAGE) 500 mg tablet 3/11/2022 at Unknown time  No Yes   Sig: Take 2 tablets by mouth twice daily   warfarin (COUMADIN) 2 5 mg tablet Past Week at Unknown time Self Yes Yes   Sig: Take 2 5 mg by mouth 3 (three) times a week   warfarin (COUMADIN) 5 mg tablet 3/10/2022 at Unknown time Self Yes Yes   Sig: Take 5 mg by mouth 4 (four) times a week      Facility-Administered Medications: None       History reviewed  No pertinent past medical history  Past Surgical History:   Procedure Laterality Date    EYE SURGERY      HYSTERECTOMY      MITRAL VALVE REPLACEMENT         Family History   Problem Relation Age of Onset    Heart failure Mother     Heart attack Father     Sleep apnea Brother      I have reviewed and agree with the history as documented  E-Cigarette/Vaping    E-Cigarette Use Never User      E-Cigarette/Vaping Substances     Social History     Tobacco Use    Smoking status: Former Smoker     Packs/day: 2 00     Years: 30 00     Pack years: 60 00     Quit date:      Years since quittin 2    Smokeless tobacco: Never Used   Vaping Use    Vaping Use: Never used   Substance Use Topics    Alcohol use: Yes     Comment: special occasional    Drug use: No       Review of Systems   Constitutional: Negative for chills, fatigue and fever  HENT: Negative for sore throat  Eyes: Negative for visual disturbance  Respiratory: Negative for cough, chest tightness and shortness of breath  Cardiovascular: Negative for chest pain  Gastrointestinal: Negative for abdominal distention, abdominal pain, constipation, diarrhea, nausea and vomiting  Endocrine: Negative for polydipsia and polyuria  Genitourinary: Negative for dysuria and hematuria  Musculoskeletal: Negative for arthralgias and myalgias  Skin: Negative for color change and rash  Neurological: Positive for facial asymmetry, speech difficulty, weakness, numbness and headaches  Negative for dizziness, tremors, seizures, syncope and light-headedness  Psychiatric/Behavioral: Negative for confusion  All other systems reviewed and are negative  Physical Exam  Physical Exam  Vitals reviewed  Constitutional:       General: She is not in acute distress  Appearance: She is well-developed  She is not diaphoretic  HENT:      Head: Normocephalic and atraumatic  Comments: Moist mucous membranes     Right Ear: External ear normal       Left Ear: External ear normal       Nose: Nose normal       Mouth/Throat:      Pharynx: No oropharyngeal exudate  Eyes:      Pupils: Pupils are equal, round, and reactive to light  Cardiovascular:      Rate and Rhythm: Normal rate and regular rhythm  Heart sounds: Normal heart sounds  No murmur heard  No friction rub  No gallop  Comments: Regular rate and rhythm, no murmurs rubs or gallops  Pulmonary:      Effort: Pulmonary effort is normal  No respiratory distress  Breath sounds: Normal breath sounds  No wheezing  Comments: No increased work of breathing  Lungs clear to auscultation bilaterally  Satting 99% on room air indicating adequate oxygenation  Chest:      Chest wall: No tenderness  Abdominal:      General: Bowel sounds are normal  There is no distension  Palpations: Abdomen is soft  There is no mass  Tenderness: There is no abdominal tenderness  There is no guarding  Musculoskeletal:         General: No deformity  Normal range of motion  Cervical back: Normal range of motion  Comments: No lower extremity swelling, erythema, or calf tenderness  Lymphadenopathy:      Cervical: No cervical adenopathy  Skin:     General: Skin is warm and dry  Capillary Refill: Capillary refill takes less than 2 seconds  Neurological:      Mental Status: She is alert and oriented to person, place, and time  Comments: AAO x4  Patient stating subjective dysarthria however not appreciated on exam   No facial droop  Cranial nerves 2-12 intact  No pronator drift  Full strength and sensation bilaterally in upper and lower extremities           Vital Signs  ED Triage Vitals   Temperature Pulse Respirations Blood Pressure SpO2   03/11/22 1045 03/11/22 1020 03/11/22 1020 03/11/22 1020 03/11/22 1020   98 6 °F (37 °C) 80 18 (!) 187/80 97 %      Temp Source Heart Rate Source Patient Position - Orthostatic VS BP Location FiO2 (%)   03/11/22 1430 03/11/22 1430 03/11/22 1050 03/11/22 1430 --   Oral Monitor Sitting Left arm       Pain Score       03/11/22 1020       7           Vitals:    03/11/22 1510 03/11/22 2212 03/12/22 0300 03/12/22 0831   BP: 149/84 152/81 152/74 152/74   Pulse: 76 67 67 71   Patient Position - Orthostatic VS: Sitting            Visual Acuity  Visual Acuity      Most Recent Value   L Pupil Size (mm) 2   R Pupil Size (mm) 2   L Pupil Shape Round   R Pupil Shape Round          ED Medications  Medications   sodium chloride 0 9 % infusion (100 mL/hr Intravenous New Bag 3/12/22 0757)   atorvastatin (LIPITOR) tablet 80 mg (80 mg Oral Given 3/11/22 1545)   insulin lispro (HumaLOG) 100 units/mL subcutaneous injection 1-5 Units (1 Units Subcutaneous Not Given 3/11/22 2200)   insulin lispro (HumaLOG) 100 units/mL subcutaneous injection 1-5 Units (1 Units Subcutaneous Not Given 3/12/22 0136)   insulin lispro (HumaLOG) 100 units/mL subcutaneous injection 1-5 Units (1 Units Subcutaneous Given 3/12/22 1300)   multivitamin-minerals (CENTRUM) tablet 1 tablet (1 tablet Oral Given 3/12/22 0800)   digoxin (LANOXIN) tablet 125 mcg (125 mcg Oral Given 3/12/22 0800)   acetaminophen (TYLENOL) tablet 650 mg (650 mg Oral Given 3/12/22 0810)   warfarin (COUMADIN) tablet 2 5 mg (2 5 mg Oral Given 3/11/22 2200)     And   warfarin (COUMADIN) tablet 5 mg (has no administration in time range)   aspirin (ECOTRIN LOW STRENGTH) EC tablet 81 mg (81 mg Oral Given 3/12/22 0800)   iohexol (OMNIPAQUE) 350 MG/ML injection (SINGLE-DOSE) 85 mL (85 mL Intravenous Given 3/11/22 1038)   aspirin tablet 325 mg (325 mg Oral Given 3/11/22 1247)   clopidogrel (PLAVIX) tablet 300 mg (300 mg Oral Given 3/11/22 1545)       Diagnostic Studies  Results Reviewed     Procedure Component Value Units Date/Time    Digoxin level [[id number]]  (Abnormal) Collected: 03/11/22 1250    Lab Status: Final result Specimen: Blood from Arm, Left Updated: 03/11/22 1627     Digoxin Lvl 0 5 ng/mL     HS Troponin I 2hr [[id number]]  (Normal) Collected: 03/11/22 1250    Lab Status: Final result Specimen: Blood from Arm, Left Updated: 03/11/22 1321     hs TnI 2hr 5 ng/L      Delta 2hr hsTnI 1 ng/L     HS Troponin I 4hr [[id number]]     Lab Status: No result Specimen: Blood     COVID/FLU/RSV - 2 hour TAT [[id number]]  (Normal) Collected: 03/11/22 1055    Lab Status: Final result Specimen: Nares from Nose Updated: 03/11/22 1154     SARS-CoV-2 Negative     INFLUENZA A PCR Negative     INFLUENZA B PCR Negative     RSV PCR Negative    Narrative:      FOR PEDIATRIC PATIENTS - copy/paste COVID Guidelines URL to browser: [email or web address]    SARS-CoV-2 assay is a Nucleic Acid Amplification assay intended for the  qualitative detection of nucleic acid from SARS-CoV-2 in nasopharyngeal  swabs  Results are for the presumptive identification of SARS-CoV-2 RNA  Positive results are indicative of infection with SARS-CoV-2, the virus  causing COVID-19, but do not rule out bacterial infection or co-infection  with other viruses  Laboratories within the [place] and its  territories are required to report all positive results to the appropriate  public health authorities  Negative results do not preclude SARS-CoV-2  infection and should not be used as the sole basis for treatment or other  patient management decisions  Negative results must be combined with  clinical observations, patient history, and epidemiological information  This test has not been FDA cleared or approved      This test has been authorized by FDA under an Emergency Use Authorization  (EUA)  This test is only authorized for the duration of time the  declaration that circumstances exist justifying the authorization of the  emergency use of an in vitro diagnostic tests for detection of SARS-CoV-2  virus and/or diagnosis of COVID-19 infection under section 564(b)(1) of  the Act, 21 U  S C  486WJG-3(M)(9), unless the authorization is terminated  or revoked sooner  The test has been validated but independent review by FDA  and CLIA is pending  Test performed using [employer] Gene[employer]pert: This RT-PCR assay targets N2,  a region unique to SARS-CoV-2  A conserved region in the E-gene was chosen  for pan-Sarbecovirus detection which includes SARS-CoV-2      HS Troponin 0hr (reflex protocol) [[id number]]  (Normal) Collected: 03/11/22 1053    Lab Status: Final result Specimen: Blood from Arm, Left Updated: 03/11/22 1131     hs TnI 0hr 4 ng/L     Protime-INR [[id number]]  (Abnormal) Collected: 03/11/22 1053    Lab Status: Final result Specimen: Blood from Arm, Left Updated: 03/11/22 1126     Protime 28 7 seconds      INR 2 82    APTT [[id number]]  (Abnormal) Collected: 03/11/22 1053    Lab Status: Final result Specimen: Blood from Arm, Left Updated: 03/11/22 1126     PTT 46 seconds     Basic metabolic panel [[id number]]  (Abnormal) Collected: 03/11/22 1053    Lab Status: Final result Specimen: Blood from Arm, Left Updated: 03/11/22 1118     Sodium 136 mmol/L      Potassium 4 1 mmol/L      Chloride 99 mmol/L      CO2 29 mmol/L      ANION GAP 8 mmol/L      BUN 15 mg/dL      Creatinine 0 74 mg/dL      Glucose 164 mg/dL      Calcium 8 6 mg/dL      eGFR 75 ml/min/1 73sq m     Narrative:      [place] guidelines for Chronic Kidney Disease (CKD):     Stage 1 with normal or high GFR (GFR > 90 mL/min/1 73 square meters)    Stage 2 Mild CKD (GFR = 60-89 mL/min/1 73 square meters)    Stage 3A Moderate CKD (GFR = 45-59 mL/min/1 73 square meters)    Stage 3B Moderate CKD (GFR = 30-44 mL/min/1 73 square meters)    Stage 4 Severe CKD (GFR = 15-29 mL/min/1 73 square meters)    Stage 5 End Stage CKD (GFR <15 mL/min/1 73 square meters)  Note: GFR calculation is accurate only with a steady state creatinine    CBC and Platelet [[id number]]  (Abnormal) Collected: 03/11/22 1053    Lab Status: Final result Specimen: Blood from Arm, Left Updated: 03/11/22 1103     WBC 5 71 Thousand/uL      RBC 4 36 Million/uL      Hemoglobin 12 6 g/dL      Hematocrit 40 6 %      MCV 93 fL      MCH 28 9 pg      MCHC 31 0 g/dL      RDW 14 9 %      Platelets 549 Thousands/uL      MPV 10 6 fL     Fingerstick Glucose (POCT) [[id number]]  (Abnormal) Collected: 03/11/22 1052    Lab Status: Final result Updated: 03/11/22 1100     POC Glucose 171 mg/dl                  MRI brain wo contrast   Final Result by Modesta Grant DO (03/12 1300)   1  Acute lacunar infarction left basal ganglia, extending into the periventricular white matter of the left parietal lobe, adjacent to the posterior aspect of the left lateral ventricle  2   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  The study was marked in [facility] for immediate notification  Workstation performed: [id number]         CTA stroke alert (head/neck)   Final Result by Andrew Hopson MD (03/11 1057)      Negative CTA head and neck for large vessel occlusion, dissection, aneurysm, or high-grade stenosis  Additional chronic/incidental findings as detailed above  Findings were directly discussed with Dylan Contreras at 10:45 AM                      Workstation performed: [id number]         CT stroke alert brain   Final Result by Andrew Hopson MD (03/11 1046)      No acute intracranial abnormality        Findings were directly discussed with Dylan Contreras at 10:45 AM       Workstation performed: [id number]                    Procedures  Procedures         ED Course                  Stroke Assessment     Row Name 03/11/22 1021             NIH Stroke Scale    Interval Baseline      Level of Consciousness (1a ) 0      LOC Questions (1b ) 0      LOC Commands (1c ) 0      Best Gaze (2 ) 0      Visual (3 ) 0      Facial Palsy (4 ) 1      Motor Arm, Left (5a ) 0      Motor Arm, Right (5b ) 0      Motor Leg, Left (6a ) 0      Motor Leg, Right (6b ) 0      Limb Ataxia (7 ) 0      Sensory (8 ) 0      Best Language (9 ) 0      Dysarthria (10 ) 0      Extinction and Inattention (11 ) (Formerly Neglect) 0      Total 1                            SBIRT 20yo+      Most Recent Value   SBIRT (23 yo +)    In order to provide better care to our patients, we are screening all of our patients for alcohol and drug use  Would it be okay to ask you these screening questions? No Filed at: 03/11/2022 1103                    MDM  Number of Diagnoses or Management Options  Headache  Right sided weakness  TIA (transient ischemic attack)  Diagnosis management comments: 25-year-old female with transient, now almost entirely resolved right-sided symptoms and dysarthria  Grossly nonfocal neuro exam at time of arrival in emergency department however given patient's continued subjective symptoms and presentation within window patient stroke alerted and emergently taken to CT scan where CT and CTA were performed without acute changes or evidence of large vessel occlusion  Patient with minimal symptoms, NIH stroke scale of 1, and tPA contraindicated given patient's anticoagulation on Coumadin  Patient treated with aspirin, neurology consulted, lab evaluation including CBC, CMP, troponin, as well as EKG all performed and grossly unremarkable  Patient admitted on stroke pathway  Patient updated on plan and agreeable        Disposition  Final diagnoses:   Right sided weakness   TIA (transient ischemic attack)   Headache     Time reflects when diagnosis was documented in both MDM as applicable and the Disposition within this note     Time User Action Codes Description Comment    3/11/2022 10:27 AM Karolina Aguilar Add [R53 1] Right sided weakness     3/11/2022 11:30 AM Karolina Aguilar Add [G45 9] TIA (transient ischemic attack)     3/11/2022 11:30 AM Karolina Aguilar Add [R51 9] Headache     3/11/2022  9:38 PM Ayla Handler Add [R40 4] Episodes of staring       ED Disposition     ED Disposition Condition Date/Time Comment    Admit Stable Fri Mar 11, 2022 11:30 AM Case was discussed with DARIUS and the patient's admission status was agreed to be Admission Status: observation status to the service of Dr Wanda Andres    None         Current Discharge Medication List      CONTINUE these medications which have NOT CHANGED    Details   acetaminophen (TYLENOL) 500 mg tablet Take 500 mg by mouth      Cholecalciferol (VITAMIN D PO) Take by mouth      ferrous sulfate 324 (65 Fe) mg Take 1 tablet (324 mg total) by mouth 2 (two) times a day before meals  Qty: 180 tablet, Refills: 1    Associated Diagnoses: Iron deficiency anemia due to chronic blood loss      losartan (COZAAR) 25 mg tablet Take 1 tablet (25 mg total) by mouth daily  Qty: 90 tablet, Refills: 1    Associated Diagnoses: Other proteinuria; Hypertension, unspecified type      metFORMIN (GLUCOPHAGE) 500 mg tablet Take 2 tablets by mouth twice daily  Qty: 360 tablet, Refills: 0    Associated Diagnoses: Type 2 diabetes mellitus without complication, without long-term current use of insulin ([address] )      ! ! warfarin (COUMADIN) 2 5 mg tablet Take 2 5 mg by mouth 3 (three) times a week      !! warfarin (COUMADIN) 5 mg tablet Take 5 mg by mouth 4 (four) times a week      digoxin (LANOXIN) 0 125 mg tablet Take 125 mcg by mouth daily      Multiple Vitamins-Minerals (PRESERVISION AREDS PO) Take 2 tablets by mouth daily        NADOLOL PO Take 2 5 mg by mouth daily       ! ! - Potential duplicate medications found  Please discuss with provider  No discharge procedures on file      PDMP Review     None          ED Provider  Electronically Signed by           Ita Santiago MD  03/12/22 4230

## 2022-03-12 NOTE — ASSESSMENT & PLAN NOTE
Presented with speech difficulties, right-sided weakness, headache started 9:00 a m  On day of presentation, self resolved in 30 minutes  NIH stroke scale 0 on presentation  Patient again had a recurrence of symptoms around 3:30 p m  on 3/11 and again improved gradually  CT head and neck without any acute ischemia/hemorrhage  Evidence of old lacunar infarct  No evidence of vascular abnormality  Not a candidate for tPA due to improvement in symptoms and anticoagulation  MRI of the brain confirmed-left lacunar infarct involving left basal ganglia, extending into the periventricular white matter of the left parietal lobe, adjacent to the posterior aspect of the left lateral ventricle  Repeat CT scan of head 3/13 due to headache-reveals evolving  changes related to CVA  No evidence of hemorrhage  Neurology following, input appreciated  Neuro exam stable/improving with PT  Telemetry-controlled AFib  Neurochecks remains stable  ·  Loaded with aspirin 325 mg, also loaded with Plavix on 03/11 due to recurrence of symptoms  · Continue aspirin 81 mg, Lipitor 80 mg/Crestor 40 mg on discharge  · Maintained Permissive hypertension initially  Now resuming antihypertensive with holding parameters  Monitor blood pressure  Maintain normotension  · 2D echo with bubble study-EF 52%, positive PFO  Mechanical MVR  · , hemoglobin A1c 6 5  · Continue anticoagulation with Coumadin, monitor PT/INR-remains therapeutic  · Neurology follow-up after discharge  · PT/OT/ST-recommended rehab  Patient is being discharged to acute rehab

## 2022-03-12 NOTE — PLAN OF CARE
Problem: NEUROSENSORY - ADULT  Goal: Achieves stable or improved neurological status  Description: INTERVENTIONS  - Monitor and report changes in neurological status  - Monitor vital signs such as temperature, blood pressure, glucose, and any other labs ordered   - Initiate measures to prevent increased intracranial pressure  - Monitor for seizure activity and implement precautions if appropriate      Outcome: Progressing  Goal: Achieves maximal functionality and self care  Description: INTERVENTIONS  - Monitor swallowing and airway patency with patient fatigue and changes in neurological status  - Encourage and assist patient to increase activity and self care     - Encourage visually impaired, hearing impaired and aphasic patients to use assistive/communication devices  Outcome: Progressing     Problem: CARDIOVASCULAR - ADULT  Goal: Maintains optimal cardiac output and hemodynamic stability  Description: INTERVENTIONS:  - Monitor I/O, vital signs and rhythm  - Monitor for S/S and trends of decreased cardiac output  - Administer and titrate ordered vasoactive medications to optimize hemodynamic stability  - Assess quality of pulses, skin color and temperature  - Assess for signs of decreased coronary artery perfusion  - Instruct patient to report change in severity of symptoms  Outcome: Progressing  Goal: Absence of cardiac dysrhythmias or at baseline rhythm  Description: INTERVENTIONS:  - Continuous cardiac monitoring, vital signs, obtain 12 lead EKG if ordered  - Administer antiarrhythmic and heart rate control medications as ordered  - Monitor electrolytes and administer replacement therapy as ordered  Outcome: Progressing     Problem: RESPIRATORY - ADULT  Goal: Achieves optimal ventilation and oxygenation  Description: INTERVENTIONS:  - Assess for changes in respiratory status  - Assess for changes in mentation and behavior  - Position to facilitate oxygenation and minimize respiratory effort  - Oxygen administered by appropriate delivery if ordered  - Initiate smoking cessation education as indicated  - Encourage broncho-pulmonary hygiene including cough, deep breathe, Incentive Spirometry  - Assess the need for suctioning and aspirate as needed  - Assess and instruct to report SOB or any respiratory difficulty  - Respiratory Therapy support as indicated  Outcome: Progressing     Problem: GASTROINTESTINAL - ADULT  Goal: Maintains adequate nutritional intake  Description: INTERVENTIONS:  - Monitor percentage of each meal consumed  - Identify factors contributing to decreased intake, treat as appropriate  - Assist with meals as needed  - Monitor I&O, weight, and lab values if indicated  - Obtain nutrition services referral as needed  Outcome: Progressing     Problem: METABOLIC, FLUID AND ELECTROLYTES - ADULT  Goal: Electrolytes maintained within normal limits  Description: INTERVENTIONS:  - Monitor labs and assess patient for signs and symptoms of electrolyte imbalances  - Administer electrolyte replacement as ordered  - Monitor response to electrolyte replacements, including repeat lab results as appropriate  - Instruct patient on fluid and nutrition as appropriate  Outcome: Progressing  Goal: Fluid balance maintained  Description: INTERVENTIONS:  - Monitor labs   - Monitor I/O and WT  - Instruct patient on fluid and nutrition as appropriate  - Assess for signs & symptoms of volume excess or deficit  Outcome: Progressing  Goal: Glucose maintained within target range  Description: INTERVENTIONS:  - Monitor Blood Glucose as ordered  - Assess for signs and symptoms of hyperglycemia and hypoglycemia  - Administer ordered medications to maintain glucose within target range  - Assess nutritional intake and initiate nutrition service referral as needed  Outcome: Progressing     Problem: SKIN/TISSUE INTEGRITY - ADULT  Goal: Skin Integrity remains intact(Skin Breakdown Prevention)  Description: Assess:  -Perform Saulo assessment every shift  -Clean and moisturize skin every shift  -Inspect skin when repositioning, toileting, and assisting with ADLS  -Assess under medical devices   -Assess extremities for adequate circulation and sensation     Bed Management:  -Have minimal linens on bed & keep smooth, unwrinkled  -Change linens as needed when moist or perspiring  -Avoid sitting or lying in one position for more than 4 hours while in bed  -Keep HOB at [address]:  -Offer bedside commode  -Assess for incontinence every shift  -Use incontinent care products after each incontinent episode   Activity:  -Mobilize patient 2 times a day  -Encourage activity and walks on unit  -Encourage or provide ROM exercises   -Turn and reposition patient every 2 Hours  -Use appropriate equipment to lift or move patient in bed  -Instruct/ Assist with weight shifting every hour when out of bed in chair  -Consider limitation of chair time 2 hour intervals    Skin Care:  -Avoid use of baby powder, tape, friction and shearing, hot water or constrictive clothing  -Relieve pressure over bony prominences using cushion  -Do not massage red bony areas    Next Steps:  -Teach patient strategies to minimize risks such as weight shifting   -Consider consults to  interdisciplinary teams such as pt/ot  Outcome: Progressing     Problem: HEMATOLOGIC - ADULT  Goal: Maintains hematologic stability  Description: INTERVENTIONS  - Assess for signs and symptoms of bleeding or hemorrhage  - Monitor labs  - Administer supportive blood products/factors as ordered and appropriate  Outcome: Progressing     Problem: MUSCULOSKELETAL - ADULT  Goal: Maintain or return mobility to safest level of function  Description: INTERVENTIONS:  - Assess patient's ability to carry out ADLs; assess patient's baseline for ADL function and identify physical deficits which impact ability to perform ADLs (bathing, care of mouth/teeth, toileting, grooming, dressing, etc )  - Assess/evaluate cause of self-care deficits   - Assess range of motion  - Assess patient's mobility  - Assess patient's need for assistive devices and provide as appropriate  - Encourage maximum independence but intervene and supervise when necessary  - Involve family in performance of ADLs  - Assess for home care needs following discharge   - Consider OT consult to assist with ADL evaluation and planning for discharge  - Provide patient education as appropriate  Outcome: Progressing     Problem: Neurological Deficit  Goal: Neurological status is stable or improving  Description: Interventions:  - Monitor and assess patient's level of consciousness, motor function, sensory function, and level of assistance needed for ADLs  - Monitor and report changes from baseline  Collaborate with interdisciplinary team to initiate plan and implement interventions as ordered  - Provide and maintain a safe environment  - Consider seizure precautions  - Consider fall precautions  - Consider aspiration precautions  - Consider bleeding precautions  Outcome: Progressing     Problem: Activity Intolerance/Impaired Mobility  Goal: Mobility/activity is maintained at optimum level for patient  Description: Interventions:  - Assess and monitor patient  barriers to mobility and need for assistive/adaptive devices  - Assess patient's emotional response to limitations  - Collaborate with interdisciplinary team and initiate plans and interventions as ordered  - Encourage independent activity per ability   - Maintain proper body alignment  - Perform active/passive rom as tolerated/ordered  - Plan activities to conserve energy   - Turn patient as appropriate  Outcome: Progressing     Problem: Communication Impairment  Goal: Ability to express needs and understand communication  Description: Assess patient's communication skills and ability to understand information    Patient will demonstrate use of effective communication techniques, alternative methods of communication and understanding even if not able to speak  - Encourage communication and provide alternate methods of communication as needed  - Collaborate with case management/ for discharge needs  - Include patient/family/caregiver in decisions related to communication  Outcome: Progressing     Problem: Potential for Aspiration  Goal: Non-ventilated patient's risk of aspiration is minimized  Description: Assess and monitor vital signs, respiratory status, and labs (WBC)  Monitor for signs of aspiration (tachypnea, cough, rales, wheezing, cyanosis, fever)  - Assess and monitor patient's ability to swallow  - Place patient up in chair to eat if possible  - HOB up at 90 degrees to eat if unable to get patient up into chair   - Supervise patient during oral intake  - Instruct patient/ family to take small bites  - Instruct patient/ family to take small single sips when taking liquids  - Follow patient-specific strategies generated by speech pathologist   Outcome: Progressing     Problem: Nutrition  Goal: Nutrition/Hydration status is improving  Description: Monitor and assess patient's nutrition/hydration status for malnutrition (ex- brittle hair, bruises, dry skin, pale skin and conjunctiva, muscle wasting, smooth red tongue, and disorientation)  Collaborate with interdisciplinary team and initiate plan and interventions as ordered  Monitor patient's weight and dietary intake as ordered or per policy  Utilize nutrition screening tool and intervene per policy  Determine patient's food preferences and provide high-protein, high-caloric foods as appropriate  - Assist patient with eating   - Allow adequate time for meals   - Encourage patient to take dietary supplement as ordered  - Collaborate with clinical nutritionist   - Include patient/family/caregiver in decisions related to nutrition    Outcome: Progressing     Problem: MOBILITY - ADULT  Goal: Maintain or return to baseline ADL function  Description: INTERVENTIONS:  -  Assess patient's ability to carry out ADLs; assess patient's baseline for ADL function and identify physical deficits which impact ability to perform ADLs (bathing, care of mouth/teeth, toileting, grooming, dressing, etc )  - Assess/evaluate cause of self-care deficits   - Assess range of motion  - Assess patient's mobility; develop plan if impaired  - Assess patient's need for assistive devices and provide as appropriate  - Encourage maximum independence but intervene and supervise when necessary  - Involve family in performance of ADLs  - Assess for home care needs following discharge   - Consider OT consult to assist with ADL evaluation and planning for discharge  - Provide patient education as appropriate  Outcome: Progressing  Goal: Maintains/Returns to pre admission functional level  Description: INTERVENTIONS:  - Perform BMAT or MOVE assessment daily    - Set and communicate daily mobility goal to care team and patient/family/caregiver  - Collaborate with rehabilitation services on mobility goals if consulted  - Perform Range of Motion 2 times a day  - Reposition patient every 2 hours    - Dangle patient 2 times a day  - Stand patient 2 times a day  - Ambulate patient 2 times a day  - Out of bed to chair 3 times a day   - Out of bed for meals 3 times a day  - Out of bed for toileting  - Record patient progress and toleration of activity level   Outcome: Progressing Pt tried Tylenol, but no relief./none

## 2022-03-12 NOTE — ASSESSMENT & PLAN NOTE
Previous , 2/22  Patient is not on statin due to history of hepatic steatosis   currently  · Started on Lipitor 80 mg daily, patient requested rosuvastatin on discharge    Will change to rosuvastatin 40 mg

## 2022-03-12 NOTE — ASSESSMENT & PLAN NOTE
Lab Results   Component Value Date    HGBA1C 6 5 (H) 03/12/2022       Recent Labs     03/13/22 2037 03/14/22  0737 03/14/22  1127 03/14/22  1555   POCGLU 138 152* 168* 115       Blood Sugar Average: Last 72 hrs:  (P) 908 [id number]     Hemoglobin A1c at goal  Resume metformin on discharge

## 2022-03-13 ENCOUNTER — APPOINTMENT (INPATIENT)
Dept: RADIOLOGY | Facility: HOSPITAL | Age: 82
DRG: 065 | End: 2022-03-13
Payer: MEDICARE

## 2022-03-13 LAB
ANION GAP SERPL CALCULATED.3IONS-SCNC: 10 MMOL/L (ref 4–13)
BUN SERPL-MCNC: 14 MG/DL (ref 5–25)
CALCIUM SERPL-MCNC: 8.3 MG/DL (ref 8.3–10.1)
CHLORIDE SERPL-SCNC: 104 MMOL/L (ref 100–108)
CO2 SERPL-SCNC: 27 MMOL/L (ref 21–32)
CREAT SERPL-MCNC: 0.68 MG/DL (ref 0.6–1.3)
ERYTHROCYTE [DISTWIDTH] IN BLOOD BY AUTOMATED COUNT: 14.9 % (ref 11.6–15.1)
GFR SERPL CREATININE-BSD FRML MDRD: 81 ML/MIN/1.73SQ M
GLUCOSE SERPL-MCNC: 137 MG/DL (ref 65–140)
GLUCOSE SERPL-MCNC: 138 MG/DL (ref 65–140)
GLUCOSE SERPL-MCNC: 146 MG/DL (ref 65–140)
GLUCOSE SERPL-MCNC: 146 MG/DL (ref 65–140)
GLUCOSE SERPL-MCNC: 148 MG/DL (ref 65–140)
GLUCOSE SERPL-MCNC: 188 MG/DL (ref 65–140)
HCT VFR BLD AUTO: 39.6 % (ref 34.8–46.1)
HGB BLD-MCNC: 12.5 G/DL (ref 11.5–15.4)
INR PPP: 2.89 (ref 0.84–1.19)
MCH RBC QN AUTO: 29.5 PG (ref 26.8–34.3)
MCHC RBC AUTO-ENTMCNC: 31.6 G/DL (ref 31.4–37.4)
MCV RBC AUTO: 93 FL (ref 82–98)
PLATELET # BLD AUTO: 181 THOUSANDS/UL (ref 149–390)
PMV BLD AUTO: 9.8 FL (ref 8.9–12.7)
POTASSIUM SERPL-SCNC: 4 MMOL/L (ref 3.5–5.3)
PROTHROMBIN TIME: 29.2 SECONDS (ref 11.6–14.5)
RBC # BLD AUTO: 4.24 MILLION/UL (ref 3.81–5.12)
SODIUM SERPL-SCNC: 141 MMOL/L (ref 136–145)
WBC # BLD AUTO: 7.57 THOUSAND/UL (ref 4.31–10.16)

## 2022-03-13 PROCEDURE — 94660 CPAP INITIATION&MGMT: CPT

## 2022-03-13 PROCEDURE — 97116 GAIT TRAINING THERAPY: CPT

## 2022-03-13 PROCEDURE — 99232 SBSQ HOSP IP/OBS MODERATE 35: CPT | Performed by: INTERNAL MEDICINE

## 2022-03-13 PROCEDURE — 85027 COMPLETE CBC AUTOMATED: CPT | Performed by: INTERNAL MEDICINE

## 2022-03-13 PROCEDURE — 70450 CT HEAD/BRAIN W/O DYE: CPT

## 2022-03-13 PROCEDURE — G1004 CDSM NDSC: HCPCS

## 2022-03-13 PROCEDURE — 80048 BASIC METABOLIC PNL TOTAL CA: CPT | Performed by: INTERNAL MEDICINE

## 2022-03-13 PROCEDURE — 97110 THERAPEUTIC EXERCISES: CPT

## 2022-03-13 PROCEDURE — 94760 N-INVAS EAR/PLS OXIMETRY 1: CPT

## 2022-03-13 PROCEDURE — 82948 REAGENT STRIP/BLOOD GLUCOSE: CPT

## 2022-03-13 PROCEDURE — 97535 SELF CARE MNGMENT TRAINING: CPT

## 2022-03-13 PROCEDURE — 85610 PROTHROMBIN TIME: CPT | Performed by: INTERNAL MEDICINE

## 2022-03-13 RX ORDER — OXYCODONE HYDROCHLORIDE AND ACETAMINOPHEN 5; 325 MG/1; MG/1
1 TABLET ORAL ONCE
Status: COMPLETED | OUTPATIENT
Start: 2022-03-13 | End: 2022-03-13

## 2022-03-13 RX ORDER — LANOLIN ALCOHOL/MO/W.PET/CERES
3 CREAM (GRAM) TOPICAL
Status: DISCONTINUED | OUTPATIENT
Start: 2022-03-13 | End: 2022-03-14 | Stop reason: HOSPADM

## 2022-03-13 RX ORDER — ECHINACEA PURPUREA EXTRACT 125 MG
1 TABLET ORAL
Status: DISCONTINUED | OUTPATIENT
Start: 2022-03-13 | End: 2022-03-14 | Stop reason: HOSPADM

## 2022-03-13 RX ADMIN — Medication 3 MG: at 22:42

## 2022-03-13 RX ADMIN — WARFARIN SODIUM 5 MG: 5 TABLET ORAL at 17:23

## 2022-03-13 RX ADMIN — Medication 1 TABLET: at 08:58

## 2022-03-13 RX ADMIN — INSULIN LISPRO 1 UNITS: 100 INJECTION, SOLUTION INTRAVENOUS; SUBCUTANEOUS at 11:42

## 2022-03-13 RX ADMIN — ACETAMINOPHEN 650 MG: 325 TABLET, FILM COATED ORAL at 22:42

## 2022-03-13 RX ADMIN — DIGOXIN 125 MCG: 125 TABLET ORAL at 08:58

## 2022-03-13 RX ADMIN — ASPIRIN 81 MG: 81 TABLET, COATED ORAL at 08:58

## 2022-03-13 RX ADMIN — ACETAMINOPHEN 650 MG: 325 TABLET, FILM COATED ORAL at 17:30

## 2022-03-13 RX ADMIN — ATORVASTATIN CALCIUM 80 MG: 80 TABLET, FILM COATED ORAL at 17:23

## 2022-03-13 RX ADMIN — OXYCODONE HYDROCHLORIDE AND ACETAMINOPHEN 1 TABLET: 5; 325 TABLET ORAL at 05:18

## 2022-03-13 RX ADMIN — ACETAMINOPHEN 650 MG: 325 TABLET, FILM COATED ORAL at 03:22

## 2022-03-13 NOTE — PLAN OF CARE
Problem: NEUROSENSORY - ADULT  Goal: Achieves stable or improved neurological status  Description: INTERVENTIONS  - Monitor and report changes in neurological status  - Monitor vital signs such as temperature, blood pressure, glucose, and any other labs ordered   - Initiate measures to prevent increased intracranial pressure  - Monitor for seizure activity and implement precautions if appropriate      Outcome: Progressing  Goal: Achieves maximal functionality and self care  Description: INTERVENTIONS  - Monitor swallowing and airway patency with patient fatigue and changes in neurological status  - Encourage and assist patient to increase activity and self care     - Encourage visually impaired, hearing impaired and aphasic patients to use assistive/communication devices  Outcome: Progressing     Problem: CARDIOVASCULAR - ADULT  Goal: Maintains optimal cardiac output and hemodynamic stability  Description: INTERVENTIONS:  - Monitor I/O, vital signs and rhythm  - Monitor for S/S and trends of decreased cardiac output  - Administer and titrate ordered vasoactive medications to optimize hemodynamic stability  - Assess quality of pulses, skin color and temperature  - Assess for signs of decreased coronary artery perfusion  - Instruct patient to report change in severity of symptoms  Outcome: Progressing  Goal: Absence of cardiac dysrhythmias or at baseline rhythm  Description: INTERVENTIONS:  - Continuous cardiac monitoring, vital signs, obtain 12 lead EKG if ordered  - Administer antiarrhythmic and heart rate control medications as ordered  - Monitor electrolytes and administer replacement therapy as ordered  Outcome: Progressing     Problem: RESPIRATORY - ADULT  Goal: Achieves optimal ventilation and oxygenation  Description: INTERVENTIONS:  - Assess for changes in respiratory status  - Assess for changes in mentation and behavior  - Position to facilitate oxygenation and minimize respiratory effort  - Oxygen administered by appropriate delivery if ordered  - Initiate smoking cessation education as indicated  - Encourage broncho-pulmonary hygiene including cough, deep breathe, Incentive Spirometry  - Assess the need for suctioning and aspirate as needed  - Assess and instruct to report SOB or any respiratory difficulty  - Respiratory Therapy support as indicated  Outcome: Progressing     Problem: GASTROINTESTINAL - ADULT  Goal: Maintains adequate nutritional intake  Description: INTERVENTIONS:  - Monitor percentage of each meal consumed  - Identify factors contributing to decreased intake, treat as appropriate  - Assist with meals as needed  - Monitor I&O, weight, and lab values if indicated  - Obtain nutrition services referral as needed  Outcome: Progressing     Problem: METABOLIC, FLUID AND ELECTROLYTES - ADULT  Goal: Electrolytes maintained within normal limits  Description: INTERVENTIONS:  - Monitor labs and assess patient for signs and symptoms of electrolyte imbalances  - Administer electrolyte replacement as ordered  - Monitor response to electrolyte replacements, including repeat lab results as appropriate  - Instruct patient on fluid and nutrition as appropriate  Outcome: Progressing  Goal: Fluid balance maintained  Description: INTERVENTIONS:  - Monitor labs   - Monitor I/O and WT  - Instruct patient on fluid and nutrition as appropriate  - Assess for signs & symptoms of volume excess or deficit  Outcome: Progressing  Goal: Glucose maintained within target range  Description: INTERVENTIONS:  - Monitor Blood Glucose as ordered  - Assess for signs and symptoms of hyperglycemia and hypoglycemia  - Administer ordered medications to maintain glucose within target range  - Assess nutritional intake and initiate nutrition service referral as needed  Outcome: Progressing     Problem: SKIN/TISSUE INTEGRITY - ADULT  Goal: Skin Integrity remains intact(Skin Breakdown Prevention)  Description: Assess:  -Perform Saulo assessment every shift  -Clean and moisturize skin every shift  -Inspect skin when repositioning, toileting, and assisting with ADLS  -Assess under medical devices  -Assess extremities for adequate circulation and sensation     Bed Management:  -Have minimal linens on bed & keep smooth, unwrinkled  -Change linens as needed when moist or perspiring  -Avoid sitting or lying in one position for more than 2 hours while in bed  -Keep HOB at [address]:  -Offer bedside commode  -Assess for incontinence every shift  -Use incontinent care products after each incontinent episode     Activity:  -Mobilize patient 2 times a day  -Encourage activity and walks on unit  -Encourage or provide ROM exercises   -Turn and reposition patient every 2 Hours  -Use appropriate equipment to lift or move patient in bed  -Instruct/ Assist with weight shifting every hour when out of bed in chair  -Consider limitation of chair time 2 hour intervals    Skin Care:  -Avoid use of baby powder, tape, friction and shearing, hot water or constrictive clothing  -Relieve pressure over bony prominences   -Do not massage red bony areas    Next Steps:  -Teach patient strategies to minimize risks such as weight shift   -Consider consults to  interdisciplinary teams such as PT/OT  Outcome: Progressing     Problem: HEMATOLOGIC - ADULT  Goal: Maintains hematologic stability  Description: INTERVENTIONS  - Assess for signs and symptoms of bleeding or hemorrhage  - Monitor labs  - Administer supportive blood products/factors as ordered and appropriate  Outcome: Progressing     Problem: MUSCULOSKELETAL - ADULT  Goal: Maintain or return mobility to safest level of function  Description: INTERVENTIONS:  - Assess patient's ability to carry out ADLs; assess patient's baseline for ADL function and identify physical deficits which impact ability to perform ADLs (bathing, care of mouth/teeth, toileting, grooming, dressing, etc )  - Assess/evaluate cause of self-care deficits   - Assess range of motion  - Assess patient's mobility  - Assess patient's need for assistive devices and provide as appropriate  - Encourage maximum independence but intervene and supervise when necessary  - Involve family in performance of ADLs  - Assess for home care needs following discharge   - Consider OT consult to assist with ADL evaluation and planning for discharge  - Provide patient education as appropriate  Outcome: Progressing     Problem: Neurological Deficit  Goal: Neurological status is stable or improving  Description: Interventions:  - Monitor and assess patient's level of consciousness, motor function, sensory function, and level of assistance needed for ADLs  - Monitor and report changes from baseline  Collaborate with interdisciplinary team to initiate plan and implement interventions as ordered  - Provide and maintain a safe environment  - Consider seizure precautions  - Consider fall precautions  - Consider aspiration precautions  - Consider bleeding precautions  Outcome: Progressing     Problem: Activity Intolerance/Impaired Mobility  Goal: Mobility/activity is maintained at optimum level for patient  Description: Interventions:  - Assess and monitor patient  barriers to mobility and need for assistive/adaptive devices  - Assess patient's emotional response to limitations  - Collaborate with interdisciplinary team and initiate plans and interventions as ordered  - Encourage independent activity per ability   - Maintain proper body alignment  - Perform active/passive rom as tolerated/ordered  - Plan activities to conserve energy   - Turn patient as appropriate  Outcome: Progressing     Problem: Communication Impairment  Goal: Ability to express needs and understand communication  Description: Assess patient's communication skills and ability to understand information    Patient will demonstrate use of effective communication techniques, alternative methods of communication and understanding even if not able to speak  - Encourage communication and provide alternate methods of communication as needed  - Collaborate with case management/ for discharge needs  - Include patient/family/caregiver in decisions related to communication  Outcome: Progressing     Problem: Potential for Aspiration  Goal: Non-ventilated patient's risk of aspiration is minimized  Description: Assess and monitor vital signs, respiratory status, and labs (WBC)  Monitor for signs of aspiration (tachypnea, cough, rales, wheezing, cyanosis, fever)  - Assess and monitor patient's ability to swallow  - Place patient up in chair to eat if possible  - HOB up at 90 degrees to eat if unable to get patient up into chair   - Supervise patient during oral intake  - Instruct patient/ family to take small bites  - Instruct patient/ family to take small single sips when taking liquids  - Follow patient-specific strategies generated by speech pathologist   Outcome: Progressing     Problem: Nutrition  Goal: Nutrition/Hydration status is improving  Description: Monitor and assess patient's nutrition/hydration status for malnutrition (ex- brittle hair, bruises, dry skin, pale skin and conjunctiva, muscle wasting, smooth red tongue, and disorientation)  Collaborate with interdisciplinary team and initiate plan and interventions as ordered  Monitor patient's weight and dietary intake as ordered or per policy  Utilize nutrition screening tool and intervene per policy  Determine patient's food preferences and provide high-protein, high-caloric foods as appropriate  - Assist patient with eating   - Allow adequate time for meals   - Encourage patient to take dietary supplement as ordered  - Collaborate with clinical nutritionist   - Include patient/family/caregiver in decisions related to nutrition    Outcome: Progressing     Problem: MOBILITY - ADULT  Goal: Maintain or return to baseline ADL function  Description: INTERVENTIONS:  -  Assess patient's ability to carry out ADLs; assess patient's baseline for ADL function and identify physical deficits which impact ability to perform ADLs (bathing, care of mouth/teeth, toileting, grooming, dressing, etc )  - Assess/evaluate cause of self-care deficits   - Assess range of motion  - Assess patient's mobility; develop plan if impaired  - Assess patient's need for assistive devices and provide as appropriate  - Encourage maximum independence but intervene and supervise when necessary  - Involve family in performance of ADLs  - Assess for home care needs following discharge   - Consider OT consult to assist with ADL evaluation and planning for discharge  - Provide patient education as appropriate  Outcome: Progressing  Goal: Maintains/Returns to pre admission functional level  Description: INTERVENTIONS:  - Perform BMAT or MOVE assessment daily    - Set and communicate daily mobility goal to care team and patient/family/caregiver  - Collaborate with rehabilitation services on mobility goals if consulted  - Perform Range of Motion 2 times a day  - Reposition patient every 2 hours    - Dangle patient 2 times a day  - Stand patient 2 times a day  - Ambulate patient 2 times a day  - Out of bed to chair 3 times a day   - Out of bed for meals 3 times a day  - Out of bed for toileting  - Record patient progress and toleration of activity level   Outcome: Progressing     Problem: Prexisting or High Potential for Compromised Skin Integrity  Goal: Skin integrity is maintained or improved  Description: INTERVENTIONS:  - Identify patients at risk for skin breakdown  - Assess and monitor skin integrity  - Assess and monitor nutrition and hydration status  - Monitor labs   - Assess for incontinence   - Turn and reposition patient  - Assist with mobility/ambulation  - Relieve pressure over bony prominences  - Avoid friction and shearing  - Provide appropriate hygiene as needed including keeping skin clean and dry  - Evaluate need for skin moisturizer/barrier cream  - Collaborate with interdisciplinary team   - Patient/family teaching  - Consider wound care consult   Outcome: Progressing

## 2022-03-13 NOTE — PROGRESS NOTES
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

## 2022-03-13 NOTE — OCCUPATIONAL THERAPY NOTE
OT TREATMENT       03/13/22 1052   Note Type   Note Type Treatment   Restrictions/Precautions   Other Precautions Chair Alarm; Bed Alarm; Fall Risk  (R hemibody weakness)   Pain Assessment   Pain Assessment Tool 0-10   Pain Score No Pain   ADL   Grooming Assistance 4  Minimal Assistance   Grooming Deficit Denture care;Wash/dry face   Grooming Comments seated in chair with increased time to complete due to decreased coordination and strength in RUE   ROM- Right Upper Extremities   R Shoulder AROM; Flexion; Horizontal ABduction   R Elbow AROM;Elbow flexion;Elbow extension   R Wrist AROM; Wrist flexion;Wrist extension   R Hand AROM; Thumb; Index finger; Long finger;Ring finger;Little finger   R Weight/Reps/Sets 2x10 reps seated in chair, shoulder flexion to 80 degrees, pt reports hx of shoulder injury/pain prior to CVA    Coordination   Fine Motor pt practiced opening ADLs bottles and caps  Patient with difficulty with coordination in R hand mostly neeing to stabilize with R hand and manipulate with L hand  Dexterity Patient given wash cloths to practice folding  Patient able to fold independently with increased time with multiple folds on each washcloth    Assessment   Assessment Patient is very motivated  Patient is cooperative and pleasant  Patient reports feeling lightheaded today  Worked with PT this am   Patient is a good candidate for STR  The patient's raw score on the AM-PAC Daily Activity inpatient short form is 15, standardized score is 34 69, less than 39 4  Patients at this level are likely to benefit from DC to post-acute rehabilitation services  Please refer to the recommendation of the Occupational Therapist for safe DC planning  Plan   Treatment Interventions ADL retraining;Functional transfer training;UE strengthening/ROM; Endurance training;Patient/family training;Equipment evaluation/education; Activityengagement; Compensatory technique education   OT Frequency 5x/wk   Recommendation   OT Discharge Recommendation Post acute rehabilitation services   AM-PAC Daily Activity Inpatient   Lower Body Dressing 2   Bathing 2   Toileting 2   Upper Body Dressing 3   Grooming 3   Eating 3   Daily Activity Raw Score 15   Daily Activity Standardized Score (Calc for Raw Score >=11) 34 69   AM-PAC Applied Cognition Inpatient   Following a Speech/Presentation 4   Understanding Ordinary Conversation 4   Taking Medications 4   Remembering Where Things Are Placed or Put Away 4   Remembering List of 4-5 Errands 4   Taking Care of Complicated Tasks 4   Applied Cognition Raw Score 24   Applied Cognition Standardized Score 83 68   Licensure   [place] License Number  Gayathri Grimaldo Gerardo 87 OTR/L [id number]

## 2022-03-13 NOTE — PROGRESS NOTES
RN notified me that patient has severe headache and epistaxis     Acute CVA diagnosed on MRI brain wo contrast yesterday  No new focal deficit  Tylenol ordered and given       Discussed with RN, CT head without contrast ordered     Vitals stable   /85

## 2022-03-13 NOTE — QUICK NOTE
Neurology Note    MRI imaging reviewed - acute ischemic infarct extending from L basal ganglia to L chen  INR was therapeutic at 2 82 on admission and at the onset of her symptoms  Suspect small vessel ischemia as etiology for her current infarct rather than failure of her warfarin  TTE with EF 52%, identification of PFO, and normal mechanical mitral valve with severely dilated L atrium  No acute thrombus noted  Would maintain on both ASA 81mg and atorvastatin along with patient's warfarin given her small vessel disease and corresponding risk factors  She should follow with Neurology as an outpatient  No further work-up is needed at this time  Call with questions

## 2022-03-13 NOTE — PROGRESS NOTES
[facility] Internal Medicine Progress Note  Patient: Lennie Height 80 y o  female   MRN: [id number]  PCP: Garrett Winters MD  Unit/Bed#: [address] [place] [address] Encounter: [id number]  Date Of Visit: 03/13/22    Problem List:    Principal Problem:    CVA (cerebral vascular accident) [facility])  Active Problems:    Chronic atrial fibrillation ([facility] [facility] )    H/O mitral valve replacement with mechanical valve    Type 2 diabetes mellitus without complication, without long-term current use of insulin ([facility] )    Essential hypertension    Obstructive sleep apnea    Hypercholesteremia      Assessment & Plan:    * CVA (cerebral vascular accident) [facility])  Assessment & Plan  Presented with speech difficulties, right-sided weakness, headache started 9:00 a m  On day of presentation, self resolved in 30 minutes  NIH stroke scale 0 on presentation  Patient again had a recurrence of symptoms around 3:30 p m  on 3/11 and again improved gradually  CT head and neck without any acute ischemia/hemorrhage  Evidence of old lacunar infarct  No evidence of vascular abnormality  Not a candidate for tPA due to improvement in symptoms and anticoagulation  MRI of the brain confirmed-left lacunar infarct involving left basal ganglia, extending into the periventricular white matter of the left parietal lobe, adjacent to the posterior aspect of the left lateral ventricle  Repeat CT scan of head 3/13 due to headache-reveals able with changes related to CVA  No evidence of hemorrhage  Neurology following, input appreciated  Neuro exam stable/improving with PT  · Telemetry, neuro checks  ·  Loaded with aspirin 325 mg, also loaded with Plavix on 03/11 due to recurrence of symptoms  · Continue aspirin 81 mg, Lipitor 80 mg  · Maintained Permissive hypertension initially  Discontinue IV fluid  Continue to hold antihypertensive today  · 2D echo with bubble study-EF 52%, positive PFO  Mechanical MVR    · , hemoglobin A1c 6 5  · Continue anticoagulation with Coumadin, monitor PT/INR-remains therapeutic  · Neurology follow-up  · PT/OT/ST-consider acute rehab    Essential hypertension  Assessment & Plan  Hold nadolol and losartan to allow permissive hypertension    Type 2 diabetes mellitus without complication, without long-term current use of insulin [facility])  Assessment & Plan  Lab Results   Component Value Date    HGBA1C 6 5 (H) 03/12/2022       Recent Labs     03/13/22  0624 03/13/22  1118 03/13/22  1558 03/13/22 2037   POCGLU 137 188* 148* 138       Blood Sugar Average: Last 72 hrs:  (P) 707 [id number]     Acceptable  Hold metformin-will resume on discharge  Monitor on sliding scale at present    H/O mitral valve replacement with mechanical valve  Assessment & Plan  History of mechanical MVR, 1996    Chronic atrial fibrillation (HCC)  Assessment & Plan  Controlled, no further bradycardia  · Continue digoxin, Coumadin  · Digoxin level-0 5  · Holding nadolol today      Hypercholesteremia  Assessment & Plan  Previous , 2/22  Patient is not on statin due to history of hepatic steatosis   currently  · Continue Lipitor 80 mg daily, patient requested rosuvastatin on discharge    Obstructive sleep apnea  Assessment & Plan  On CPAP 7 cm H2O          VTE Pharmacologic Prophylaxis: VTE Score: 9 High Risk (Score >/= 5) - Pharmacological DVT Prophylaxis Ordered: warfarin (Coumadin)  Sequential Compression Devices Ordered  Patient Centered Rounds: I performed bedside rounds with nursing staff today  Discussions with Specialists or Other Care Team Provider:  yes    Education and Discussions with Family / Patient: Updated  (son and daughter) at bedside  Time Spent for Care: 45 minutes  More than 50% of total time spent on counseling and coordination of care as described above      Current Length of Stay: 1 day(s)  Current Patient Status: Inpatient   Certification Statement: The patient, admitted on an observation basis, will now require > 2 midnight hospital stay due to Acute CVA  Discharge Plan: Anticipate discharge in 24-48 hrs to rehab facility  Code Status: Level 1 - Full Code    Subjective:   Headache overnight, improved after Percocet  Repeat CT showed evolving changes related to CVA  No evidence of acute abnormality or hemorrhage  Mild self-limiting epistaxis overnight    Right-sided weakness and speech is stable    Denies chest pain or shortness of breath      Objective:     Vitals:   Temp (24hrs), Av 3 °F (36 8 °C), Min:98 °F (36 7 °C), Max:98 9 °F (37 2 °C)    Temp:  [98 °F (36 7 °C)-98 9 °F (37 2 °C)] 98 °F (36 7 °C)  HR:  [73-91] 77  Resp:  [16-20] 18  BP: (144-161)/(69-97) 144/72  SpO2:  [92 %-97 %] 92 % on room air  Body mass index is 26 06 kg/m²  Input and Output Summary (last 24 hours): Intake/Output Summary (Last 24 hours) at 3/13/2022 1155  Last data filed at 3/13/2022 6305  Gross per 24 hour   Intake 3963 75 ml   Output 1825 ml   Net 2138 75 ml       Physical Exam:   Physical Exam  Constitutional:       General: She is not in acute distress  HENT:      Head: Normocephalic and atraumatic  Cardiovascular:      Rate and Rhythm: Normal rate  Rhythm irregular  Heart sounds: Murmur heard  Pulmonary:      Effort: Pulmonary effort is normal  No respiratory distress  Breath sounds: Normal breath sounds  No wheezing or rales  Abdominal:      General: Bowel sounds are normal  There is no distension  Palpations: Abdomen is soft  Tenderness: There is no abdominal tenderness  There is no guarding or rebound  Musculoskeletal:      Right lower leg: No edema  Left lower leg: No edema  Skin:     General: Skin is warm and dry  Findings: No rash  Neurological:      Mental Status: She is alert  Mental status is at baseline        Comments: Mild right-sided weakness, able to elevate extremity against gravity         Additional Data:     Labs:  Results from last 7 days   Lab Units 03/13/22  0441   WBC Thousand/uL 7 57   HEMOGLOBIN g/dL 12 5   HEMATOCRIT % 39 6   PLATELETS Thousands/uL 181     Results from last 7 days   Lab Units 03/13/22  0441 03/12/22  0449 03/12/22  0449   SODIUM mmol/L 141   < > 141   POTASSIUM mmol/L 4 0   < > 4 1   CHLORIDE mmol/L 104   < > 105   CO2 mmol/L 27   < > 26   BUN mg/dL 14   < > 15   CREATININE mg/dL 0 68   < > 0 63   ANION GAP mmol/L 10   < > 10   CALCIUM mg/dL 8 3   < > 8 1*   ALBUMIN g/dL  --   --  3 5   TOTAL BILIRUBIN mg/dL  --   --  0 34   ALK PHOS U/L  --   --  67   ALT U/L  --   --  35   AST U/L  --   --  27   GLUCOSE RANDOM mg/dL 146*   < > 129    < > = values in this interval not displayed  Results from last 7 days   Lab Units 03/13/22  0441   INR  2 89*     Results from last 7 days   Lab Units 03/13/22  1118 03/13/22  0624 03/13/22  0325 03/12/22  2121 03/12/22  1612 03/12/22  1109 03/12/22  0724 03/12/22  0128 03/11/22  2020 03/11/22  1628 03/11/22  1052   POC GLUCOSE mg/dl 188* 137 146* 120 96 198* 153* 118 118 129 171*     Results from last 7 days   Lab Units 03/12/22  0449   HEMOGLOBIN A1C % 6 5*           Lines/Drains:  Invasive Devices  Report    Peripheral Intravenous Line            Peripheral IV 03/11/22 Left Antecubital 2 days                  Telemetry:  Telemetry Orders (From admission, onward)             48 Hour Telemetry Monitoring  Continuous x 48 hours        References:    Telemetry Guidelines   Question:  Reason for 48 Hour Telemetry  Answer:  Acute CVA (<24 hrs old, hemispheric strokes, selected brainstem strokes, cardiac arrhythmias)                 Telemetry Reviewed: Atrial fibrillation    Controlled  Indication for Continued Telemetry Use: Arrthymias requiring medical therapy             Imaging: Reviewed radiology reports from this admission including: MRI brain    Recent Cultures (last 7 days):         Last 24 Hours Medication List:   Current Facility-Administered Medications   Medication Dose Route Frequency Provider Last Rate    acetaminophen  650 mg Oral Q6H PRN Rickey Jason MD      aspirin  81 mg Oral Daily Rickey Jason MD      atorvastatin  80 mg Oral QPM Rickey Jason MD      digoxin  125 mcg Oral Daily Rickey Jason MD      insulin lispro  1-5 Units Subcutaneous HS Rickey Jason MD      insulin lispro  1-5 Units Subcutaneous 0200 Rickey Jason MD      insulin lispro  1-5 Units Subcutaneous TID AC Rickey Jason MD      multivitamin-minerals  1 tablet Oral Daily Rickey Jason MD      warfarin  2 5 mg Oral Once per day on Mon Wed Fri Rickey Jason MD      And    warfarin  5 mg Oral Once per day on Sun Tue Thu Sat Rickey Jason MD          Today, Patient Was Seen By: Rickey Jason MD    ** Please Note: "This note has been constructed using a voice recognition system  Therefore there may be syntax, spelling, and/or grammatical errors   Please call if you have any questions  "**

## 2022-03-13 NOTE — PLAN OF CARE
Problem: NEUROSENSORY - ADULT  Goal: Achieves stable or improved neurological status  Description: INTERVENTIONS  - Monitor and report changes in neurological status  - Monitor vital signs such as temperature, blood pressure, glucose, and any other labs ordered   - Initiate measures to prevent increased intracranial pressure  - Monitor for seizure activity and implement precautions if appropriate      Outcome: Progressing     Problem: Activity Intolerance/Impaired Mobility  Goal: Mobility/activity is maintained at optimum level for patient  Description: Interventions:  - Assess and monitor patient  barriers to mobility and need for assistive/adaptive devices  - Assess patient's emotional response to limitations  - Collaborate with interdisciplinary team and initiate plans and interventions as ordered  - Encourage independent activity per ability   - Maintain proper body alignment  - Perform active/passive rom as tolerated/ordered    - Plan activities to conserve energy   - Turn patient as appropriate  Outcome: Progressing

## 2022-03-13 NOTE — PROGRESS NOTES
Discussed with dr Marj Christianson neuro on call  Regd findings of evolving stroke on CT head last night done due to new headache  He will review case and put recommendations in his note  Clinically patient stable  No new focal deficit and headache resolved with one dose of Percocet

## 2022-03-13 NOTE — PLAN OF CARE
Problem: OCCUPATIONAL THERAPY ADULT  Goal: Performs self-care activities at highest level of function for planned discharge setting  See evaluation for individualized goals  Description: Treatment Interventions: ADL retraining,Functional transfer training,UE strengthening/ROM,Endurance training,Patient/family training,Activityengagement,Compensatory technique education,Equipment evaluation/education,Neuromuscular reeducation          See flowsheet documentation for full assessment, interventions and recommendations  Outcome: Progressing  Note: Limitation: Decreased ADL status,Decreased Safe judgement during ADL,Decreased endurance,Decreased self-care trans,Decreased high-level ADLs,Decreased UE strength,Decreased UE ROM (decreased balance and mobility )  Prognosis: Good  Assessment: Patient is very motivated  Patient is cooperative and pleasant  Patient reports feeling lightheaded today  Worked with PT this am   Patient is a good candidate for STR         OT Discharge Recommendation: Post acute rehabilitation services

## 2022-03-13 NOTE — PHYSICAL THERAPY NOTE
PT TREATMENT     03/13/22 0915   Note Type   Note Type Treatment   Pain Assessment   Pain Assessment Tool 0-10   Restrictions/Precautions   Other Precautions Chair Alarm; Bed Alarm; Fall Risk  (R hemibody weakness)   General   Chart Reviewed Yes   Additional Pertinent History 3/12 MRI brain showed L lacunar infarct L basal ganglia, CT brain 3/12 showed evolving area of ischemia  Cognition   Overall Cognitive Status WFL   Subjective   Subjective "I want to work hard to get better"   Bed Mobility   Supine to Sit 4  Minimal assistance   Additional items Increased time required;Verbal cues; Bedrails   Transfers   Sit to Stand 4  Minimal assistance   Stand to Sit 4  Minimal assistance   Stand pivot 4  Minimal assistance   Additional items Verbal cues; Increased time required  (with walker )   Toilet transfer 3  Moderate assistance   Additional items Commode; Increased time required;Verbal cues   Ambulation/Elevation   Gait pattern   (unsteady, R LE fatigues with activity)   Additional items Verbal cues   Assistive Device Rolling walker   Distance 30 feet, 50 feet   Balance   Static Sitting Fair +   Dynamic Sitting Fair -   Static Standing Fair   Dynamic Standing Poor +   Activity Tolerance   Activity Tolerance Patient limited by fatigue;Patient tolerated treatment well   Exercises   Neuro re-ed x 10 each R ankle pumps, SAQ, heel slide, hip abd/add, hip ER/IR prior to getting OOB   Assessment   Prognosis Good   Problem List Decreased strength;Decreased endurance; Impaired balance;Decreased mobility   Assessment Pt demonstrates improving activity tolerance but still requires at least min assist for all mobility s/p CVA with R hemibody weakness  Pt was able to increase the distance she walked today but fatigues with activity and had occasional balance loss  Pt is highly motivated and should do well at STR  The patient's AM-PAC Basic Mobility Inpatient Short Form Raw Score is 13   A Raw score of less than or equal to 16 suggests the patient may benefit from discharge to post-acute rehabilitation services  Plan   Treatment/Interventions ADL retraining;Functional transfer training;LE strengthening/ROM; Elevations; Therapeutic exercise;Gait training;Bed mobility; Equipment eval/education;Patient/family training   Progress Progressing toward goals   PT Frequency Other (Comment)  (daily)   Recommendation   PT Discharge Recommendation Post acute rehabilitation services   AM-PAC Basic Mobility Inpatient   Turning in Bed Without Bedrails 2   Lying on Back to Sitting on Edge of Flat Bed 2   Moving Bed to Chair 2   Standing Up From Chair 3   Walk in Room 2   Climb 3-5 Stairs 2   Basic Mobility Inpatient Raw Score 13   Basic Mobility Standardized Score 33 99   Highest Level Of Mobility   -[facility] Goal 4: Move to chair/commode   [facility] Highest Level of Mobility 7: Walk 25 feet or more   [facility] Goal Achieved Yes   End of Consult   Patient Position at End of Consult Bed/Chair alarm activated; All needs within reach; Bedside chair   Licensure   [place] License Number  Tristian PT [id number]

## 2022-03-14 ENCOUNTER — EPISODE CHANGES (OUTPATIENT)
Dept: CASE MANAGEMENT | Facility: OTHER | Age: 82
End: 2022-03-14

## 2022-03-14 VITALS
SYSTOLIC BLOOD PRESSURE: 165 MMHG | OXYGEN SATURATION: 98 % | WEIGHT: 159 LBS | RESPIRATION RATE: 18 BRPM | HEART RATE: 83 BPM | HEIGHT: 66 IN | TEMPERATURE: 98 F | DIASTOLIC BLOOD PRESSURE: 96 MMHG | BODY MASS INDEX: 25.55 KG/M2

## 2022-03-14 PROBLEM — N30.00 ACUTE CYSTITIS WITHOUT HEMATURIA: Status: RESOLVED | Noted: 2021-06-15 | Resolved: 2022-03-14

## 2022-03-14 PROBLEM — N30.00 ACUTE CYSTITIS WITHOUT HEMATURIA: Status: ACTIVE | Noted: 2021-06-15

## 2022-03-14 LAB
ANION GAP SERPL CALCULATED.3IONS-SCNC: 10 MMOL/L (ref 4–13)
BACTERIA UR QL AUTO: ABNORMAL /HPF
BILIRUB UR QL STRIP: NEGATIVE
BUN SERPL-MCNC: 17 MG/DL (ref 5–25)
CALCIUM SERPL-MCNC: 8.4 MG/DL (ref 8.3–10.1)
CHLORIDE SERPL-SCNC: 104 MMOL/L (ref 100–108)
CLARITY UR: ABNORMAL
CO2 SERPL-SCNC: 27 MMOL/L (ref 21–32)
COLOR UR: YELLOW
CREAT SERPL-MCNC: 0.6 MG/DL (ref 0.6–1.3)
ERYTHROCYTE [DISTWIDTH] IN BLOOD BY AUTOMATED COUNT: 15 % (ref 11.6–15.1)
GFR SERPL CREATININE-BSD FRML MDRD: 85 ML/MIN/1.73SQ M
GLUCOSE SERPL-MCNC: 115 MG/DL (ref 65–140)
GLUCOSE SERPL-MCNC: 146 MG/DL (ref 65–140)
GLUCOSE SERPL-MCNC: 152 MG/DL (ref 65–140)
GLUCOSE SERPL-MCNC: 168 MG/DL (ref 65–140)
GLUCOSE UR STRIP-MCNC: NEGATIVE MG/DL
HCT VFR BLD AUTO: 39.2 % (ref 34.8–46.1)
HGB BLD-MCNC: 12.5 G/DL (ref 11.5–15.4)
HGB UR QL STRIP.AUTO: ABNORMAL
INR PPP: 3.3 (ref 0.84–1.19)
KETONES UR STRIP-MCNC: NEGATIVE MG/DL
LEUKOCYTE ESTERASE UR QL STRIP: ABNORMAL
MCH RBC QN AUTO: 29.8 PG (ref 26.8–34.3)
MCHC RBC AUTO-ENTMCNC: 31.9 G/DL (ref 31.4–37.4)
MCV RBC AUTO: 93 FL (ref 82–98)
NITRITE UR QL STRIP: NEGATIVE
NON-SQ EPI CELLS URNS QL MICRO: ABNORMAL /HPF
PH UR STRIP.AUTO: 6 [PH]
PLATELET # BLD AUTO: 188 THOUSANDS/UL (ref 149–390)
PMV BLD AUTO: 9.9 FL (ref 8.9–12.7)
POTASSIUM SERPL-SCNC: 4.1 MMOL/L (ref 3.5–5.3)
PROT UR STRIP-MCNC: NEGATIVE MG/DL
PROTHROMBIN TIME: 32.3 SECONDS (ref 11.6–14.5)
RBC # BLD AUTO: 4.2 MILLION/UL (ref 3.81–5.12)
RBC #/AREA URNS AUTO: ABNORMAL /HPF
SODIUM SERPL-SCNC: 141 MMOL/L (ref 136–145)
SP GR UR STRIP.AUTO: 1.01 (ref 1–1.03)
UROBILINOGEN UR QL STRIP.AUTO: 0.2 E.U./DL
WBC # BLD AUTO: 6.94 THOUSAND/UL (ref 4.31–10.16)
WBC #/AREA URNS AUTO: ABNORMAL /HPF

## 2022-03-14 PROCEDURE — 87186 SC STD MICRODIL/AGAR DIL: CPT | Performed by: INTERNAL MEDICINE

## 2022-03-14 PROCEDURE — 87077 CULTURE AEROBIC IDENTIFY: CPT | Performed by: INTERNAL MEDICINE

## 2022-03-14 PROCEDURE — 97116 GAIT TRAINING THERAPY: CPT

## 2022-03-14 PROCEDURE — 80048 BASIC METABOLIC PNL TOTAL CA: CPT | Performed by: INTERNAL MEDICINE

## 2022-03-14 PROCEDURE — 99233 SBSQ HOSP IP/OBS HIGH 50: CPT | Performed by: NURSE PRACTITIONER

## 2022-03-14 PROCEDURE — 81001 URINALYSIS AUTO W/SCOPE: CPT | Performed by: INTERNAL MEDICINE

## 2022-03-14 PROCEDURE — 85610 PROTHROMBIN TIME: CPT | Performed by: INTERNAL MEDICINE

## 2022-03-14 PROCEDURE — 87086 URINE CULTURE/COLONY COUNT: CPT | Performed by: INTERNAL MEDICINE

## 2022-03-14 PROCEDURE — 99239 HOSP IP/OBS DSCHRG MGMT >30: CPT | Performed by: INTERNAL MEDICINE

## 2022-03-14 PROCEDURE — 97110 THERAPEUTIC EXERCISES: CPT

## 2022-03-14 PROCEDURE — 97535 SELF CARE MNGMENT TRAINING: CPT

## 2022-03-14 PROCEDURE — 82948 REAGENT STRIP/BLOOD GLUCOSE: CPT

## 2022-03-14 PROCEDURE — 85027 COMPLETE CBC AUTOMATED: CPT | Performed by: INTERNAL MEDICINE

## 2022-03-14 PROCEDURE — 97530 THERAPEUTIC ACTIVITIES: CPT

## 2022-03-14 RX ORDER — ECHINACEA PURPUREA EXTRACT 125 MG
1 TABLET ORAL AS NEEDED
Refills: 0
Start: 2022-03-14 | End: 2022-04-01

## 2022-03-14 RX ORDER — WARFARIN SODIUM 2.5 MG/1
2.5 TABLET ORAL
Status: DISCONTINUED | OUTPATIENT
Start: 2022-03-16 | End: 2022-03-14 | Stop reason: HOSPADM

## 2022-03-14 RX ORDER — ACETAMINOPHEN 325 MG/1
650 TABLET ORAL EVERY 6 HOURS PRN
Refills: 0
Start: 2022-03-14

## 2022-03-14 RX ORDER — ROSUVASTATIN CALCIUM 40 MG/1
40 TABLET, COATED ORAL DAILY
Refills: 0
Start: 2022-03-14 | End: 2022-04-01

## 2022-03-14 RX ORDER — NITROFURANTOIN 25; 75 MG/1; MG/1
100 CAPSULE ORAL 2 TIMES DAILY WITH MEALS
Qty: 10 CAPSULE | Refills: 0
Start: 2022-03-14 | End: 2022-03-19

## 2022-03-14 RX ORDER — WARFARIN SODIUM 5 MG/1
5 TABLET ORAL
Refills: 0
Start: 2022-03-15

## 2022-03-14 RX ORDER — NADOLOL 20 MG/1
10 TABLET ORAL DAILY
Refills: 0
Start: 2022-03-14

## 2022-03-14 RX ORDER — WARFARIN SODIUM 5 MG/1
5 TABLET ORAL
Status: DISCONTINUED | OUTPATIENT
Start: 2022-03-15 | End: 2022-03-14 | Stop reason: HOSPADM

## 2022-03-14 RX ORDER — LOSARTAN POTASSIUM 25 MG/1
25 TABLET ORAL DAILY
Qty: 90 TABLET | Refills: 0
Start: 2022-03-14 | End: 2022-04-01

## 2022-03-14 RX ORDER — WARFARIN SODIUM 2.5 MG/1
2.5 TABLET ORAL 3 TIMES WEEKLY
Refills: 0
Start: 2022-03-14

## 2022-03-14 RX ORDER — ASPIRIN 81 MG/1
81 TABLET ORAL DAILY
Refills: 0
Start: 2022-03-15

## 2022-03-14 RX ORDER — NITROFURANTOIN 25; 75 MG/1; MG/1
100 CAPSULE ORAL 2 TIMES DAILY WITH MEALS
Status: DISCONTINUED | OUTPATIENT
Start: 2022-03-14 | End: 2022-03-14 | Stop reason: HOSPADM

## 2022-03-14 RX ORDER — LANOLIN ALCOHOL/MO/W.PET/CERES
3 CREAM (GRAM) TOPICAL
Refills: 0
Start: 2022-03-14 | End: 2022-07-14

## 2022-03-14 RX ORDER — NADOLOL 20 MG/1
10 TABLET ORAL DAILY
Status: DISCONTINUED | OUTPATIENT
Start: 2022-03-14 | End: 2022-03-14 | Stop reason: HOSPADM

## 2022-03-14 RX ADMIN — DIGOXIN 125 MCG: 125 TABLET ORAL at 08:51

## 2022-03-14 RX ADMIN — NADOLOL 10 MG: 20 TABLET ORAL at 17:10

## 2022-03-14 RX ADMIN — Medication 1 TABLET: at 08:51

## 2022-03-14 RX ADMIN — ATORVASTATIN CALCIUM 80 MG: 80 TABLET, FILM COATED ORAL at 17:10

## 2022-03-14 RX ADMIN — NITROFURANTOIN (MONOHYDRATE/MACROCRYSTALS) 100 MG: 75; 25 CAPSULE ORAL at 17:10

## 2022-03-14 RX ADMIN — ASPIRIN 81 MG: 81 TABLET, COATED ORAL at 08:51

## 2022-03-14 RX ADMIN — INSULIN LISPRO 1 UNITS: 100 INJECTION, SOLUTION INTRAVENOUS; SUBCUTANEOUS at 12:01

## 2022-03-14 RX ADMIN — INSULIN LISPRO 1 UNITS: 100 INJECTION, SOLUTION INTRAVENOUS; SUBCUTANEOUS at 07:43

## 2022-03-14 NOTE — OCCUPATIONAL THERAPY NOTE
OT TREATMENT       03/14/22 1220   Note Type   Note Type Treatment   Restrictions/Precautions   Other Precautions Chair Alarm; Bed Alarm; Fall Risk;Limb alert  (right hemiparesis)   Pain Assessment   Pain Assessment Tool 0-10   Pain Score No Pain   ADL   Where Assessed Chair   Eating Assistance 5  Supervision/Setup   Eating Deficit Setup; Increased time to complete   Grooming Assistance 4  Minimal Assistance   Grooming Deficit Setup;Verbal cueing; Increased time to complete   UB Bathing Assistance 4  Minimal Assistance   UB Bathing Deficit Setup;Verbal cueing; Increased time to complete   LB Bathing Assistance 3  Moderate Assistance   LB Bathing Deficit Setup;Steadying;Verbal cueing; Increased time to complete   UB Dressing Assistance 3  Moderate Assistance   UB Dressing Deficit Setup;Verbal cueing; Increased time to complete   LB Dressing Assistance 3  Moderate Assistance   LB Dressing Deficit Setup;Steadying;Verbal cueing; Increased time to complete   Toileting Assistance  2  Maximal Assistance   Toileting Deficit Steadying;Verbal cueing;Clothing management up;Clothing management down;Perineal hygiene   Toileting Comments incontinent of urine   Transfers   Sit to Stand 4  Minimal assistance   Additional items Assist x 1;Verbal cues   Stand to Sit 4  Minimal assistance   Additional items Assist x 1;Verbal cues   Stand pivot 4  Minimal assistance   Additional items Assist x 1;Verbal cues  (with RW)   ROM- Right Upper Extremities   R Shoulder AAROM; Flexion;ABduction; Extension; External rotation; Internal rotation   R Elbow AAROM;Elbow flexion;Elbow extension   R Wrist AAROM;Wrist flexion;Wrist extension   R Hand AAROM; Thumb; Index finger; Long finger;Ring finger;Little finger   R Weight/Reps/Sets 10 reps each   Coordination   Gross Motor pt attempting to use right hand as a gross assit more during eating and grooming tasks     Fine Motor difficulty holding small items like a sugar packet or toothpaste tube, weak pinch and grasp Cognition   Overall Cognitive Status WFL   Activity Tolerance   Activity Tolerance Patient tolerated treatment well   Assessment   Assessment Pt demonstrating steadily improving strength, coordination, balance and functional activity tolerance allowing for increased active participation with ADL and mobility tasks  Good progress towards goals  Pt left sitting in chair with all needs within reach,  and tab alarm in place  Cont OT per POC  The patient's raw score on the AM-PAC Daily Activity inpatient short form is 15, standardized score is 34 69, which is less than 39 4  Patients at this level are likely to benefit from discharge to post-acute rehabilitation services  Possible d/c to STR today or tomorrow  Plan   Treatment Interventions ADL retraining;Functional transfer training;UE strengthening/ROM; Endurance training;Patient/family training;Equipment evaluation/education; Neuromuscular reeducation; Fine motor coordination activities; Compensatory technique education; Activityengagement   OT Frequency 5x/wk   Recommendation   OT Discharge Recommendation Post acute rehabilitation services   AM-[facility] Daily Activity Inpatient   Lower Body Dressing 2   Bathing 2   Toileting 2   Upper Body Dressing 3   Grooming 3   Eating 3   Daily Activity Raw Score 15   Daily Activity Standardized Score (Calc for Raw Score >=11) 34 69   AM-PAC Applied Cognition Inpatient   Following a Speech/Presentation 4   Understanding Ordinary Conversation 4   Taking Medications 4   Remembering Where Things Are Placed or Put Away 4   Remembering List of 4-5 Errands 4   Taking Care of Complicated Tasks 4   Applied Cognition Raw Score 24   Applied Cognition Standardized Score 62 21   Modified [place] Scale   Modified [place] Scale 4   Licensure   [place] License Number  Lenka Grimaldo Gerardo 87, OTR/L, [place] Lic# [id number]

## 2022-03-14 NOTE — PHYSICAL THERAPY NOTE
PT TREATMENT     03/14/22 0955   PT Last Visit   PT Visit Date 03/14/22   Note Type   Note Type Treatment   Pain Assessment   Pain Assessment Tool 0-10   Pain Score No Pain   Restrictions/Precautions   Other Precautions Fall Risk;Bed Alarm; Chair Alarm;Visual impairment  (Right hemibody weakness)   General   Chart Reviewed Yes   Subjective   Subjective I am ready to walk   Bed Mobility   Supine to Sit 3  Moderate assistance   Additional items Assist x 1;Verbal cues; Increased time required   Transfers   Sit to Stand 4  Minimal assistance   Additional items Assist x 1;Verbal cues   Stand to Sit 4  Minimal assistance   Additional items Assist x 1;Verbal cues   Additional Comments While seated edge of bed, PT assisted pt to [facility] underwear and an incontinence pad  Pt needs mod assist for standing balance and mod assist for donning underwear/pad for balance  After underwear/pad was donned, pt was incontinent of a large amount of urine which underwear/pad was unable to contain  Hygiene performed with pt, new underwear/pad was brought and [facility] to pt, patient's gown was changed and the floor was cleaned as well  Ambulation/Elevation   Gait pattern R Foot drag;Improper Weight shift;Decreased foot clearance;Decreased R stance; Inconsistent gary; Redundant gait at times;Knees flexed  (Pt lists to right side; slow gait)   Gait Assistance 4  Minimal assist   Additional items Assist x 1;Verbal cues; Tactile cues   Assistive Device Rolling walker   Distance 60 ft with change in direction with rest in between walks; continue will verbal/tactile cuing for RLE placement, posturing due to pt listing to the right side     Balance   Static Sitting   (F to occasional F-)   Static Standing Fair -   Dynamic Standing   (F-to occasional P+)   Ambulatory   (F-to occasional P+)   Activity Tolerance   Activity Tolerance Patient limited by fatigue;Treatment limited secondary to medical complications (Comment)  (Right hemibody weakness) Exercises   Quad Sets Bilateral;10 reps; Supine   Heelslides Bilateral;10 reps; Supine   Hip Abduction Bilateral;10 reps; Supine  (In hook lying)   Hip Adduction Bilateral;10 reps; Supine  (In hooklying)   Ankle Pumps Bilateral;10 reps; Supine   Neuro-Developmental Treatment 10 reps  (Lower trunk rotation and bridging in hook lying)   Assessment   Assessment Pt with good participation and tolerance to exercises in the bed prior to initiating transfers and ambulation, and good tolerance to bed mobility, transfers, functional mobility and gait with a RW and ADL donning/doffing underwear/pad twice  Pt is making steady progress with all gait and functional mobility as well as coordination and control of RLE  Pt will continue to benefit from skilled physical therapy services with progression as able  Pt remains appropriate for post acute rehab services when medically stable for discharge  The patient's [facility] Basic Mobility Inpatient Short Form Raw Score is 15  A Raw score of less than or equal to 16 suggests the patient may benefit from discharge to post-acute rehabilitation services  Please also refer to the recommendation of the Physical Therapist for safe discharge planning  Plan   Treatment/Interventions ADL retraining;Functional transfer training;LE strengthening/ROM; Therapeutic exercise; Endurance training;Patient/family training;Equipment eval/education; Bed mobility;Gait training; Compensatory technique education;Elevations   PT Frequency Other (Comment)  (Daily)   Recommendation   PT Discharge Recommendation Post acute rehabilitation services   [facility] Basic Mobility Inpatient   Turning in Bed Without Bedrails 3   Lying on Back to Sitting on Edge of Flat Bed 2   Moving Bed to Chair 3   Standing Up From Chair 3   Walk in Room 3   Climb 3-5 Stairs 1   Basic Mobility Inpatient Raw Score 15   Basic Mobility Standardized Score 36 97   Highest Level Of Mobility   -[facility] Goal 4: Move to chair/commode   -[facility] Highest Level of Mobility 7: Walk 25 feet or more   -[facility] Goal Achieved Yes   Education   Education Provided Mobility training;Home exercise program;Assistive device   Patient Reinforcement needed   End of Consult   Patient Position at End of Consult Bedside chair;Bed/Chair alarm activated; All needs within reach   [facility] Insurance Number  Codi Taft PT [id number]     Portions of the documentation may have been created using voice recognition software  Occasional wrong word or sound alike substitutions may have occurred due to the inherent limitation of the voice recognition software  Read the chart carefully and recognize, using context, where substitutions have occurred

## 2022-03-14 NOTE — CASE MANAGEMENT
Case Management Assessment & Discharge Planning Note    Patient name Alisson Mcdaniel  Location [address] [facility] [address] 55-* MRN [id number]  : 1940 Date 3/14/2022       Current Admission Date: 3/11/2022  Current Admission Diagnosis:CVA (cerebral vascular accident) [facility])   Patient Active Problem List    Diagnosis Date Noted    CVA (cerebral vascular accident) ([facility] ) 2022    Hepatic steatosis 2022    History of cervical cancer 2021    Essential hypertension 2021    Other proteinuria 2021    History of renal calculi 06/15/2021    Pulmonary emphysema ([place] [address] ) 2021    Ataxia 2021    Abdominal aortic aneurysm (AAA) ([place] [address] ) 2021    Celiac disease 2020    Other microscopic hematuria 2020    Iron deficiency anemia 2020    Other specified hypothyroidism 2020    Vitamin B12 deficiency 2020    Anemia due to chronic kidney disease 02/10/2020    Allergic rhinitis 02/10/2020    Type 2 diabetes mellitus without complication, without long-term current use of insulin ([facility] ) 02/10/2020    Hypercholesteremia 2020    Chronic atrial fibrillation ([place] [address] ) 2018    H/O mitral valve replacement with mechanical valve 2018    Long term (current) use of anticoagulants 2018    Presence of prosthetic heart valve 2018    Obstructive sleep apnea 2018      LOS (days): 2  Geometric Mean LOS (GMLOS) (days): 2 90  Days to GMLOS:1 2     OBJECTIVE:    Risk of Unplanned Readmission Score: 13      Current admission status: Inpatient  Referral Reason: Stroke    Preferred Pharmacy:   [facility] [address] [place] [address]  [id number]  Phone: [phone] Fax: [phone]    Primary Care Provider:  Contreras Andrews MD    Primary Insurance: MEDICARE  Secondary Insurance: COMMERCIAL MISCELLANEOUS    ASSESSMENT:  Active Health Care Proxies    There are no active Health Care Proxies on file  Patient Information  Admitted from[de-identified] Home  Mental Status: Alert  During Assessment patient was accompanied by: Not accompanied during assessment  Assessment information provided by[de-identified] Patient  Primary Caregiver: Self  Support Systems: Family members  [place] of Residence: [facility] [place] do you live in?: [place] [place] 45 entry access options   Select all that apply : Stairs  Number of steps to enter home : 3  Type of Current Residence: 2 story home  Upon entering residence, is there a bedroom on the main floor (no further steps)?: No  A bedroom is located on the following floor levels of residence (select all that apply):: 2nd Floor  Upon entering residence, is there a bathroom on the main floor (no further steps)?: No  Indicate which floors of current residence have a bathroom (select all the apply):: 2nd Floor  Number of steps to 2nd floor from main floor: One Flight  In the last 12 months, was there a time when you were not able to pay the mortgage or rent on time?: No  In the last 12 months, how many places have you lived?: 1  In the last 12 months, was there a time when you did not have a steady place to sleep or slept in a shelter (including now)?: No  Homeless/housing insecurity resource given?: N/A  Living Arrangements: Lives Alone    Activities of Daily Living Prior to Admission  Functional Status: Independent  Completes ADLs independently?: Yes  Ambulates independently?: Yes  Does patient use assisted devices?: Yes  Assisted Devices (DME) used: Straight Cane (uses PRN)  Does patient currently own DME?: Yes  What DME does the patient currently own?: Straight Cane  Does patient have a history of Outpatient Therapy (PT/OT)?: No  Does the patient have a history of Short-Term Rehab?: No  Does patient currently have [facility]?: No     Patient Information Continued  Income Source: Pension/group home  Does patient have prescription coverage?: Yes  Within the past 12 months, you worried that your food would run out before you got the money to buy more : Never true  Within the past 12 months, the food you bought just didnt last and you didnt have money to get more : Never true  Food insecurity resource given?: N/A  Does patient receive dialysis treatments?: No  Does patient have a history of substance abuse?: No  Does patient have a history of Mental Health Diagnosis?: No     Means of Transportation  Means of Transport to Appts[de-identified] Family transport  In the past 12 months, has lack of transportation kept you from medical appointments or from getting medications?: No  In the past 12 months, has lack of transportation kept you from meetings, work, or from getting things needed for daily living?: No  Was application for public transport provided?: N/A    DISCHARGE DETAILS:    Discharge planning discussed with[de-identified] Patient  Freedom of Choice: Yes  Comments - Freedom of Choice: Acute Rehab Listing reviewed with patient  Patient chose [facility] for SW to send referrals to    CM contacted family/caregiver?: No- see comments Leta Archer coming in person this afternoon)  Were Treatment Team discharge recommendations reviewed with patient/caregiver?: Yes  Did patient/caregiver verbalize understanding of patient care needs?: Yes  Were patient/caregiver advised of the risks associated with not following Treatment Team discharge recommendations?: Yes    Contacts  Patient Contacts: [address]         Is the patient interested in [facility] at discharge?: No    DME Referral Provided  Referral made for DME?: No    Other Referral/Resources/Interventions Provided:  Interventions: Acute Rehab    Would you like to participate in our [address] service program?  : No - Declined    Treatment Team Recommendation: Acute Rehab  Discharge Destination Plan[de-identified] Acute Rehab     IMM Given (Date):: 03/14/22  IMM Given to[de-identified] Patient  IMM reviewed with patient, patient agrees with discharge determination  SW met with patient to introduce role, complete assessment, and discuss discharge plan  Patient advised of evaluations by PT/OT and recommendations for post-acute rehab  SW discussed different levels of rehab including Acute and Sub-Acute  Patient is agreeable and would like to pursue admission to acute rehab  FOC reviewed  Patient requested referrals be sent to [facility] in [place]  Patient states her dtr Aleta Ann lives in [place], [place]  Thiago Estrada her DIL and another family member live in [place]  Dtr Aleta Ann is coming to visit bedside this afternoon  SW will meet with her then to review discharge plan

## 2022-03-14 NOTE — PLAN OF CARE
Problem: NEUROSENSORY - ADULT  Goal: Achieves stable or improved neurological status  Description: INTERVENTIONS  - Monitor and report changes in neurological status  - Monitor vital signs such as temperature, blood pressure, glucose, and any other labs ordered   - Initiate measures to prevent increased intracranial pressure  - Monitor for seizure activity and implement precautions if appropriate      Outcome: Progressing     Problem: CARDIOVASCULAR - ADULT  Goal: Maintains optimal cardiac output and hemodynamic stability  Description: INTERVENTIONS:  - Monitor I/O, vital signs and rhythm  - Monitor for S/S and trends of decreased cardiac output  - Administer and titrate ordered vasoactive medications to optimize hemodynamic stability  - Assess quality of pulses, skin color and temperature  - Assess for signs of decreased coronary artery perfusion  - Instruct patient to report change in severity of symptoms  Outcome: Progressing

## 2022-03-14 NOTE — PROGRESS NOTES
Neurology Consult Follow Up      Angela Casanova is a 80 y o  female  [address] [place]        Assessment/Recommendations: 1  Acute Ischemic Lt Basal Ganglion Infarct-extending into the corona  2  Mitral Valve replacement on Coumadin  3  HTN  4  HLD  5  DM    -continued on Telemetry  -NeuroAssessments per stroke pathway  -Continue with Coumadin for [facility] therapy  -continue on BASA 81mg daily upon discharge  -continue with Lipitor 80mg daily for now  -Tylenol for mild headaches  -Maintain Normotensive BP  -MRI of the Brain has been completed and confirmed Acute Ischemic Lt BG to parietal lobe infarct   -Echo with EF of 52%  Lt atrium is severely dilated and Rt atrium is mildly dilated Positive for PFO at rest with predominant rt to left shunting  -Labs: Lipid profile and A1C complted  -PT/OT  -SW assist for placement in [facility]  Pt is an Dahiana Nixon 54 old female with fairly recent history of CVA who noted onset of Rt sided weakness and difficulty with speech  Pt noted that she had been independent at home and ambulatory without DME, is now weaker and requiring a RW for assist    Pt was brought to the ED and was stroke alert which showed likely evolving lt BG infarct with mild stenosis of the cervical ICA bilaterally and bilat distal VA  Pt had been on Coumadin in the OP setting due to mechanical MV, no AP therapy  Pt was given a FD ASA and continues on daily BASA as well as FD statin  Pt MRI confirmed Lt BG infarct extending into the Parietal lobe  At this time, pt has residual rt sided weakness with Rt facial assymetry and mild slurring of her speech as she speaks for prolonged period of time  Pt has some mild ataxia and Rt pronator drift due to weakness, does not drop  Echo was completed and noted severely dilated Lt atrium with mildly dilated in the Rt atrium    Cardiology did note the presence of a PFO, followed up with cardio and noted that this was a small shunt, no likely intervention needed  Pt is weak and prefers to go to [facility] due to being independent at home  She has steps at home to navigate and will need increased therapy to rebuild her strength  Tha Mcgovern will need follow up in 8-10 weeks with general attending or advance practitioner  She will not require outpatient neurological testing  Chief Complaint:    Subjective:   Pt reports having weakness in her Rt arm and leg  Requiring RW for ambulation  Pt also noted a mild headache yesterday releived by Tylenol  Was doing well this am, currently starting with a rt sided headache above her Rt eye  History reviewed  No pertinent past medical history  Social History     Socioeconomic History    Marital status:      Spouse name: Not on file    Number of children: Not on file    Years of education: Not on file    Highest education level: Not on file   Occupational History    Not on file   Tobacco Use    Smoking status: Former Smoker     Packs/day: 2 00     Years: 30 00     Pack years: 60 00     Quit date:      Years since quittin 2    Smokeless tobacco: Never Used   Vaping Use    Vaping Use: Never used   Substance and Sexual Activity    Alcohol use: Yes     Comment: special occasional    Drug use: No    Sexual activity: Not on file   Other Topics Concern    Not on file   Social History Narrative    Not on file     Social Determinants of Health     Financial Resource Strain: Not on file   Food Insecurity: No Food Insecurity    Worried About [address] in the Last Year: Never true    [address] in the Last Year: Never true   Transportation Needs: No Transportation Needs    Lack of Transportation (Medical): No    Lack of Transportation (Non-Medical):  No   Physical Activity: Not on file   Stress: Not on file   Social Connections: Not on file   Intimate Partner Violence: Not on file   Housing Stability: Low Risk     Unable to Pay for Housing in the Last Year: No    Number of Places Lived in the Last Year: 1    Unstable Housing in the Last Year: No     Family History   Problem Relation Age of Onset    Heart failure Mother     Heart attack Father     Sleep apnea Brother        ROS:  Please see HPI for positive symptoms  No fever, no chills, no weight change  Ocular: No diplopia, no blurred vision, spot/zigzag lines   HEENT:  No sore throat or congestion  No neck pain  +mild headache over Rt eye  COR:  No chest pain  No palpitations  Lungs:  no sob  GI:  no  nausea, no vomiting, no diarrhea, no constipation, no anorexia  :  No dysuria, frequency, or urgency  No hematuria  Musculoskeletal:  No joint pain or swelling   +weakness  Skin:  No rash or itching  Psychiatric:  no anxiety, no depression  Endocrine:  No polyuria or polydipsia  Objective:  /82 (BP Location: Left arm)   Pulse 78   Temp 98 3 °F (36 8 °C) (Oral)   Resp 18   Ht 5' 5 5" (1 664 m)   Wt 72 1 kg (159 lb)   SpO2 93%   BMI 26 06 kg/m²     General: alert   Mental status: oriented x3  Attention: normal  Knowledge: fair  Language and Speech: normal to start, will start to slur with prolonged speech  Cranial nerves:   CN II: Visual fields are full to confrontation  Fundoscopic exam is normal with sharp discs and no vascular changes  Pupils are 3 mm and briskly reactive to light  CN III, IV, VI: At primary gaze, there is no eye deviation  CN V: Facial sensation is intact in all 3 divisions bilaterally  Corneal responses are intact  CN VII: Face is assymmetrical, with normal eye closure and smile  +mild facial flattening of the Rt mouth  CN VIII: Hearing is normal to rubbing fingers  CN IX, X: Palate elevates symmetrically  Phonation is normal   CN XI: Head turning and shoulder shrug are intact  CN XII: Tongue is midline with normal movements and no atrophy  Motor: There is + RUE pronator drift of out-stretched arms  Arm does not drop     Muscle bulk and tone are normal       Muscle exam  Arm Right Left Leg Right Left   Deltoid 4+/5 5/5 Iliopsoas 4+/5 5/5   Biceps 4+/5 5/5 Quads 4+/5 5/5   Triceps 4+/5 5/5 Hamstrings 4+/5 5/5   Wrist Extension 4+/5 5/5 Ankle Dorsi Flexion 4+/5 5/5   Wrist Flexion 4+/5 5/5 Ankle Plantar Flexion 4+/5 5/5       Sensory: Light touch, temperature, vibration are diminished to the RUE and RLE  Proprioception intact  Gait: unsteady-deferred gait testing due to fall risk  Coordination: finger to nose and heel to toe with mild rt sided dysmetria in the RUE     Reflexes    RJ BJ TJ KJ AJ Plantars Montes's   Right 2+ 2+ 2+ 2+ 2+ Downgoing Not present   Left 2+ 2+ 2+ 2+ 2+ Downgoing Not present      Heart: Regular rate   Lung: No audible wheezing or stridor heard  Abd: soft, non-tender, non-distended with positive bowel sounds in all quads  Skin: dry and intact    Labs:      Lab Results   Component Value Date    WBC 6 94 03/14/2022    HGB 12 5 03/14/2022    HCT 39 2 03/14/2022    MCV 93 03/14/2022     03/14/2022     Lab Results   Component Value Date    HGBA1C 6 5 (H) 03/12/2022     Lab Results   Component Value Date    ALT 35 03/12/2022    AST 27 03/12/2022    ALKPHOS 67 03/12/2022    BILITOT 0 3 11/09/2015     Lab Results   Component Value Date    GLUCOSE 147 (H) 11/09/2015    CALCIUM 8 4 03/14/2022     11/09/2015    K 4 1 03/14/2022    CO2 27 03/14/2022     03/14/2022    BUN 17 03/14/2022    CREATININE 0 60 03/14/2022     Chol 200      Review of reports and notes reveal:   Independent review of films/reports:  CT head wo contrast  Result Date: 3/13/2022  1  Evolving lacunar infarction left basal ganglia, extending into the periventricular white matter of the left parietal lobe  2   Stable cerebral atrophy with chronic small vessel ischemic white matter disease  No acute intracranial abnormality  Workstation performed: [id number]     MRI brain wo contrast  Result Date: 3/12/2022  1    Acute lacunar infarction left basal ganglia, extending into the periventricular white matter of the left parietal lobe, adjacent to the posterior aspect of the left lateral ventricle  2   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  The study was marked in [facility] for immediate notification  Workstation performed: [id number]     CT stroke alert brain  Result Date: 3/11/2022  No acute intracranial abnormality  Findings were directly discussed with Juliana Sahu at 10:45 AM  Workstation performed: [id number]     CTA stroke alert (head/neck)  Result Date: 3/11/2022  Negative CTA head and neck for large vessel occlusion, dissection, aneurysm, or high-grade stenosis  Additional chronic/incidental findings as detailed above  Findings were directly discussed with Juliana Sahu at 10:45 AM  Workstation performed: [id number]         Thank you for this consult      Total time of encounter: 430 min  More than 50% of the time was used in counseling and/or coordination of care  Extent of couseling and/or coordination of care

## 2022-03-14 NOTE — [place] UNIVERSAL TRANSFER FORM
[place] UNIV[place] FORM  (ALL ITEMS MUST BE COMPLETED)    1  TRANSFER FROM: [address]      TRANSFER TO: [facility]    2  DATE OF TRANSFER: 3/14/2022                        TIME OF TRANSFER: 1800    3  PATIENT NAME: TOM Bishop      YOB: 1940                             GENDER: female    4  LANGUAGE:   English    5  PHYSICIAN NAME:  Zara Velazquez MD                   PHONE: [phone]    6  CODE STATUS: Level 1 - Full Code        Out of Hospital DNR Attached: No    7  :                                      :  Extended Emergency Contact Information  Primary Emergency Contact: Cheo [address] Phone: [phone]  Relation: Other  Secondary Emergency Contact: Alison Schwarz  [address] [employer] Phone: [phone]  Relation: Daughter           Health Care Representative/Proxy:  No           Legal Guardian:  No             NAME OF:           HEALTH CARE REPRESENTATIVE/PROXY:                                         OR           LEGAL GUARDIAN, IF NOT :                                               PHONE:  (Day)           (Night)                        (Cell)    8  REASON FOR TRANSFER: (Must include brief medical history and recent changes in physical function or cognition ) short term rehab            V/S: /79   Pulse 75   Temp 98 °F (36 7 °C)   Resp 18   Ht 5' 5 5" (1 664 m)   Wt 72 1 kg (159 lb)   SpO2 93%   BMI 26 06 kg/m²           PAIN: None    9  PRIMARY DIAGNOSIS: CVA (cerebral vascular accident) ([facility] [address] )      Secondary Diagnosis:         Pacemaker: No      Internal Defib: No          Mental Health Diagnosis (if Applicable):    10  RESTRAINTS: No     11  RESPIRATORY NEEDS: CPAP    12  ISOLATION/PRECAUTION: None    13  ALLERGY: Sulfa antibiotics and Sulfasalazine    14  SENSORY:       Vision Good, Hearing Good  and Speech Clear    15   SKIN CONDITION: No Wounds    16  DIET: Regular    17  IV ACCESS: None    18  PERSONAL ITEMS SENT WITH PATIENT: Glasses and Dentures Upper Full and Lower Full    19  ATTACHED DOCUMENTS: MUST ATTACH CURRENT MEDICATION INFORMATION Face Sheet, MAR, Medication Reconciliation, Diagnostic Studies, Labs, Respiratory Care, Code Status, Discharge Summary, PT Note, OT Note, ST Note and HX/PE    20  AT RISK ALERTS:None        HARM TO: N/A    21  WEIGHT BEARING STATUS:         Left Leg: Full        Right Leg: Full    22  MENTAL STATUS:Alert and Oriented    23  FUNCTION:        Walk: With Help        Transfer: With Help        Toilet: With Help        Feed: Self    24  IMMUNIZATIONS/SCREENING:     There is no immunization history on file for this patient  25  BOWEL: Continent    26  BLADDER: Continent    27   SENDING FACILITY CONTACT: [facility]                  Title: [facility]        [facility]        Phone: [phone] [address] (if known):        Title:        Unit:         Phone:         FORM PREFILLED BY (if applicable)       Title:       Unit:        Phone:         FORM COMPLETED BY Palma Benites RN      Title: [facility]      Phone: [phone]

## 2022-03-14 NOTE — PLAN OF CARE
Problem: NEUROSENSORY - ADULT  Goal: Achieves stable or improved neurological status  Description: INTERVENTIONS  - Monitor and report changes in neurological status  - Monitor vital signs such as temperature, blood pressure, glucose, and any other labs ordered   - Initiate measures to prevent increased intracranial pressure  - Monitor for seizure activity and implement precautions if appropriate      Outcome: Progressing  Goal: Achieves maximal functionality and self care  Description: INTERVENTIONS  - Monitor swallowing and airway patency with patient fatigue and changes in neurological status  - Encourage and assist patient to increase activity and self care     - Encourage visually impaired, hearing impaired and aphasic patients to use assistive/communication devices  Outcome: Progressing     Problem: CARDIOVASCULAR - ADULT  Goal: Maintains optimal cardiac output and hemodynamic stability  Description: INTERVENTIONS:  - Monitor I/O, vital signs and rhythm  - Monitor for S/S and trends of decreased cardiac output  - Administer and titrate ordered vasoactive medications to optimize hemodynamic stability  - Assess quality of pulses, skin color and temperature  - Assess for signs of decreased coronary artery perfusion  - Instruct patient to report change in severity of symptoms  Outcome: Progressing  Goal: Absence of cardiac dysrhythmias or at baseline rhythm  Description: INTERVENTIONS:  - Continuous cardiac monitoring, vital signs, obtain 12 lead EKG if ordered  - Administer antiarrhythmic and heart rate control medications as ordered  - Monitor electrolytes and administer replacement therapy as ordered  Outcome: Progressing

## 2022-03-14 NOTE — ASSESSMENT & PLAN NOTE
Patient reported urinary urgency similar to her prior symptoms of UTI  Afebrile with normal white count  UA with large leukocytes, moderate blood, innumerable WBC and RBC  · Started on Macrobid based on prior cultures    · Follow-up pending urine culture, obtained on 3/14

## 2022-03-14 NOTE — CASE MANAGEMENT
Case Management Discharge Planning Note    Patient name Deena Thompson  Location [id number] [place] [address] [address]-* MRN [id number]  : 1940 Date 3/14/2022       Current Admission Date: 3/11/2022  Current Admission Diagnosis:CVA (cerebral vascular accident) [facility])   Patient Active Problem List    Diagnosis Date Noted    CVA [facility]cerebral vascular accident) ([facility] ) 2022    Hepatic steatosis 2022    History of cervical cancer 2021    Essential hypertension 2021    Other proteinuria 2021    History of renal calculi 06/15/2021    Pulmonary emphysema ([place] [address] ) 2021    Ataxia 2021    Abdominal aortic aneurysm (AAA) ([place] [address] ) 2021    Celiac disease 2020    Other microscopic hematuria 2020    Iron deficiency anemia 2020    Other specified hypothyroidism 2020    Vitamin B12 deficiency 2020    Anemia due to chronic kidney disease 02/10/2020    Allergic rhinitis 02/10/2020    Type 2 diabetes mellitus without complication, without long-term current use of insulin ([facility] ) 02/10/2020    Hypercholesteremia 2020    Chronic atrial fibrillation ([place] [address] ) 2018    H/O mitral valve replacement with mechanical valve 2018    Long term (current) use of anticoagulants 2018    Presence of prosthetic heart valve 2018    Obstructive sleep apnea 2018      LOS (days): 2  Geometric Mean LOS (GMLOS) (days): 2 90  Days to GMLOS:1     OBJECTIVE:  Risk of Unplanned Readmission Score: 13      Current admission status: Inpatient   Preferred Pharmacy:   [facility] [address], [phone] [address]  Phone: [phone] Fax: [phone]    Primary Care Provider:  Deven Baez MD    Primary Insurance: MEDICARE  Secondary Insurance: COMMERCIAL MISCELLANEOUS    DISCHARGE DETAILS:     Transport at Discharge : [facility]     Number/Name of Dispatcher: Max Hastings and Unit #): T[facility] [place]  ETA of Transport (Date): 03/14/22 at [address] [facility] [facility] [address], [address] : [address]  Receiving Facility/Agency Phone Number: [phone]      SW met with patient, dtr Jumana Israel, and dtr Ki Israel and Ki Dodge made aware that [facility] and [facility] are able to accept  [facility] has bed today  [facility] has bed tomorrow  Jumana Israel and Ki Dodge requested SW send referral to [facility]  Referral sent and pt was accepted  Patient and family ultimately decided to pursue admission to [facility] today  Call made to liaallison Delong  She confirmed bed is available today  Dtr Jumana Israel to bring pt's CPAP in for use at facility  [facility] transport arranged with [facility] for 1800 pickup  Medical necessity form completed and provided to nursing  Meghan Mai, Attending, and unit nurse aware of above

## 2022-03-14 NOTE — [facility] ADMISSION
This writer received referral on patient for possible [facility] placement  Upon review of patients case with [facility] physician , patient is deemed [facility] appropriate at this time pending medical readiness / bed availability  CM made aware

## 2022-03-14 NOTE — DISCHARGE SUMMARY
Discharge Summary - [facility]    Patient Information: Evaristo Sheehan 80 y o  female MRN: [id number]  Unit/Bed#: [address] [place] [address] Encounter: [id number]    Discharging Physician / Practitioner: Mckayla Ballesteros MD  PCP: Diana Blanc MD  Admission Date: 3/11/2022  Discharge Date: 03/14/22    Reason for Admission: STROKE Alert (right side  defecits started at 0930, since resolved, now has headache)      Discharge Diagnoses:     Principal Problem:    CVA (cerebral vascular accident) [facility])  Active Problems:    Chronic atrial fibrillation ([facility] [address] )    H/O mitral valve replacement with mechanical valve    Type 2 diabetes mellitus without complication, without long-term current use of insulin ([facility] )    Acute cystitis without hematuria    Essential hypertension    Obstructive sleep apnea    Hypercholesteremia  Resolved Problems:    * No resolved hospital problems  *        * CVA (cerebral vascular accident) [facility])  Assessment & Plan  Presented with speech difficulties, right-sided weakness, headache started 9:00 a m  On day of presentation, self resolved in 30 minutes  NIH stroke scale 0 on presentation  Patient again had a recurrence of symptoms around 3:30 p m  on 3/11 and again improved gradually  CT head and neck without any acute ischemia/hemorrhage  Evidence of old lacunar infarct  No evidence of vascular abnormality  Not a candidate for tPA due to improvement in symptoms and anticoagulation  MRI of the brain confirmed-left lacunar infarct involving left basal ganglia, extending into the periventricular white matter of the left parietal lobe, adjacent to the posterior aspect of the left lateral ventricle  Repeat CT scan of head 3/13 due to headache-reveals evolving  changes related to CVA  No evidence of hemorrhage  Neurology following, input appreciated  Neuro exam stable/improving with PT  Telemetry-controlled AFib  Neurochecks remains stable    ·  Loaded with aspirin 325 mg, also loaded with Plavix on 03/11 due to recurrence of symptoms  · Continue aspirin 81 mg, Lipitor 80 mg/Crestor 40 mg on discharge  · Maintained Permissive hypertension initially  Now resuming antihypertensive with holding parameters  Monitor blood pressure  Maintain normotension  · 2D echo with bubble study-EF 52%, positive PFO  Mechanical MVR  · , hemoglobin A1c 6 5  · Continue anticoagulation with Coumadin, monitor PT/INR-remains therapeutic  · Neurology follow-up after discharge  · PT/OT/ST-recommended rehab  Patient is being discharged to acute rehab  Essential hypertension  Assessment & Plan  Resume nadolol and losartan with holding parameter  Maintain normotension    Acute cystitis without hematuria  Assessment & Plan  Patient reported urinary urgency similar to her prior symptoms of UTI  Afebrile with normal white count  UA with large leukocytes, moderate blood, innumerable WBC and RBC  · Started on Macrobid based on prior cultures  · Follow-up pending urine culture, obtained on 3/14    Type 2 diabetes mellitus without complication, without long-term current use of insulin [facility])  Assessment & Plan  Lab Results   Component Value Date    HGBA1C 6 5 (H) 03/12/2022       Recent Labs     03/13/22  2037 03/14/22  0737 03/14/22  1127 03/14/22  1555   POCGLU 138 152* 168* 115       Blood Sugar Average: Last 72 hrs:  (P) 533 [id number]     Hemoglobin A1c at goal  Resume metformin on discharge    H/O mitral valve replacement with mechanical valve  Assessment & Plan  History of mechanical MVR, 1996    Chronic atrial fibrillation (HCC)  Assessment & Plan  Controlled, no further bradycardia/SVR on telemetry  · Continue digoxin, Coumadin  · Digoxin level-0 5  · Resuming nadolol  · Monitor PT/INR closely with start of antibiotics for UTI      Hypercholesteremia  Assessment & Plan  Previous , 2/22    Patient is not on statin due to history of hepatic steatosis   currently  · Started on Lipitor 80 mg daily, patient requested rosuvastatin on discharge  Will change to rosuvastatin 40 mg    Obstructive sleep apnea  Assessment & Plan  On CPAP 7 cm H2O      Consultations During Hospital Stay:  [address]  IP CONSULT TO NEUROLOGY  IP CONSULT TO CASE MANAGEMENT  IP CONSULT TO NUTRITION SERVICES    Procedures Performed:     · Echocardiogram    Significant Findings:     · Refer to hospital course and above listed diagnosis related plan for details    Imaging while in hospital:    CT head wo contrast    Result Date: 3/13/2022  Narrative: CT BRAIN - WITHOUT CONTRAST INDICATION:   Headache, new or worsening (Age >= 50y) acute CVA yesterday - new headache with epistaxis today  Severe headache and epistaxis  Initially presented March 11, 2022 with right facial droop, right arm and leg weakness  Numbness, confusion and dysarthria  Found to have evolving lacunar infarction left basal ganglia and periventricular white matter left parietal lobe March 12, 2022  COMPARISON:  Multiple prior studies, most recent of which is a noncontrast MR brain March 12, 2022, noncontrast CT brain March 11, 2022 and CTA stroke alert March 11, 2022  TECHNIQUE:  CT examination of the brain was performed  In addition to axial images, sagittal and coronal 2D reformatted images were created and submitted for interpretation  Radiation dose length product (DLP) for this visit:  919 14 mGy-cm   This examination, like all CT scans performed in the [facility], was performed utilizing techniques to minimize radiation dose exposure, including the use of iterative  reconstruction and automated exposure control  IMAGE QUALITY:  Diagnostic  FINDINGS: PARENCHYMA:  No acute intraparenchymal hemorrhage or extra-axial fluid collections  There is an evolving area of decreased attenuation in the periventricular white matter of the left parietal lobe, extending into the left basal ganglia (series 2, images 19 through 23)    This corresponds to the areas of diffusion weighted abnormality, compatible with evolving lacunar infarction  No new areas of decreased attenuation are identified  Decreased attenuation in the periventricular regions and centrum semiovale bilaterally, consistent with chronic small vessel ischemic white matter disease  Area of chronic infarction in the posterior left frontal lobe  Bilateral internal carotid artery and vertebral artery calcifications  VENTRICLES AND EXTRA-AXIAL SPACES:  Enlargement of ventricles and extra-axial CSF spaces, out of proportion to the patient's age most consistent with cerebral and cerebellar atrophy  VISUALIZED ORBITS AND PARANASAL SINUSES:  No acute abnormality involving the orbits  Bilateral lens surgery  Mild scattered sinus mucosal thickening is noted  No fluid levels are seen  CALVARIUM AND EXTRACRANIAL SOFT TISSUES:  Normal      Impression: 1  Evolving lacunar infarction left basal ganglia, extending into the periventricular white matter of the left parietal lobe  2   Stable cerebral atrophy with chronic small vessel ischemic white matter disease  No acute intracranial abnormality  Workstation performed: [id number]     MRI brain wo contrast    Result Date: 3/12/2022  Narrative: MRI BRAIN WITHOUT CONTRAST INDICATION: CVA/TIA  81 y/o F with HTN, HLD, DM, Afib on warfarin, and hx of mechanical mitral valve replacement that presents with onset of confusion, dysarthria, and R sided weakness  LKN around 9am  Symptoms have since resolved and currently has no appreciable deficit on exam per discussion with ED  NIHSS 0  Most recent INR on 3/1 was 3 05; unclear if dose was changed  The patient's entire past medical history was obtained directly from the attending neurologist notes in [address] [place] [address]  The information was copied and pasted directly from Epic   COMPARISON:   Multiple prior studies, most recent of which is a CT stroke alert March 11, 2022, CTA stroke alert March 11, 2022 and noncontrast MR brain February 26, 2016  TECHNIQUE:  Sagittal T1, axial T2, axial FLAIR, axial T1, axial [place] and axial diffusion imaging  IMAGE QUALITY:  Diagnostic  FINDINGS: BRAIN PARENCHYMA:  No acute intraparenchymal hemorrhage or extra-axial fluid collections  There is an ill-defined area of decreased T1 weighted signal and hyperintense T2 and FLAIR weighted signal in the periventricular white matter adjacent to the posterior aspect of the left lateral ventricle, extending into the left basal ganglia  This area is hyperintense on the diffusion-weighted sequencing, hypointense on ADC mapping and hyperintense on the EAD mapping  Findings are most compatible with an acute area of ischemia  Additional foci of hyperintense T2 and FLAIR weighted signal are present in the periventricular regions and centrum semiovale bilaterally  These areas are new from on the diffusion-weighted sequencing  There are multiple small foci of blooming artifact in the right cerebellar hemisphere as well as along the high right parietal convexity  Findings are similar to the prior study and likely represent areas of amyloid angiopathy or calcification/hemosiderin deposition in small vascular malformations such as a developmental venous anomalies  VENTRICLES:  Ventricles and cerebral sulci are mildly dilated  SELLA AND PITUITARY GLAND:  Normal  ORBITS:  Bilateral lens surgery  PARANASAL SINUSES:  Mild mucosal thickening throughout the visualized paranasal sinuses  VASCULATURE:  Evaluation of the major intracranial vasculature demonstrates appropriate flow voids  CALVARIUM AND SKULL BASE:  Normal  EXTRACRANIAL SOFT TISSUES:  Normal      Impression: 1  Acute lacunar infarction left basal ganglia, extending into the periventricular white matter of the left parietal lobe, adjacent to the posterior aspect of the left lateral ventricle  2   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   The study was marked in [facility] for immediate notification  Workstation performed: [id number]     CT stroke alert brain    Result Date: 3/11/2022  Narrative: CT BRAIN - STROKE ALERT PROTOCOL INDICATION:   Neuro deficit, acute, stroke suspected stroke evaluation in progress  COMPARISON:  Same day CTA stroke alert head neck  CT head without contrast September 9, 2022  TECHNIQUE:  CT examination of the brain was performed  In addition to axial images, coronal reformatted images were created and submitted for interpretation  Radiation dose length product (DLP) for this visit:  894 73 mGy-cm   This examination, like all CT scans performed in the [facility], was performed utilizing techniques to minimize radiation dose exposure, including the use of iterative  reconstruction and automated exposure control  IMAGE QUALITY:  Diagnostic  FINDINGS:  PARENCHYMA: Decreased attenuation is noted in periventricular and subcortical white matter demonstrating an appearance that is statistically most likely to represent mild microangiopathic change  Unchanged chronic lacunar infarcts in bilateral caudate heads  Unchanged old small infarct in right cerebellum  No CT signs of acute infarction  No intracranial mass, mass effect or midline shift  No acute parenchymal hemorrhage  Mild calcification of the cavernous internal carotid arteries and left intradural vertebral artery  VENTRICLES AND EXTRA-AXIAL SPACES:  Normal for patient's age  VISUALIZED ORBITS AND PARANASAL SINUSES:  Sequela of bilateral cataract surgery  Clear sinuses  CALVARIUM AND EXTRACRANIAL SOFT TISSUES:   Normal      Impression: No acute intracranial abnormality   Findings were directly discussed with Clara Gifford at 10:45 AM  Workstation performed: [id number]     CTA stroke alert (head/neck)    Result Date: 3/11/2022  Narrative: CTA NECK AND BRAIN WITH CONTRAST INDICATION: Neuro deficit, acute, stroke suspected stroke evaluatio in progress COMPARISON:   Same day CT stroke alert brain   CT chest without contrast September 9, 2020  TECHNIQUE:   Post contrast imaging was performed after administration of iodinated contrast through the neck and brain  Post contrast axial 0 625 mm images timed to opacify the arterial system  3D rendering was performed on an independent workstation  MIP reconstructions performed  Coronal reconstructions were performed of the noncontrast portion of the brain  Radiation dose length product (DLP) for this visit:  466 71 mGy-cm   This examination, like all CT scans performed in the [facility], was performed utilizing techniques to minimize radiation dose exposure, including the use of iterative  reconstruction and automated exposure control  IV Contrast:  85 mL of iohexol (OMNIPAQUE)  IMAGE QUALITY:   Diagnostic FINDINGS: CERVICAL VASCULATURE AORTIC ARCH AND GREAT VESSELS:  Moderate atherosclerotic disease of the arch and great vessels  RIGHT VERTEBRAL ARTERY CERVICAL SEGMENT:  Normal origin  The vessel is mildly diminutive in caliber throughout the neck  LEFT VERTEBRAL ARTERY CERVICAL SEGMENT:  Normal origin  The vessel is normal and dominant in caliber throughout the neck  RIGHT EXTRACRANIAL CAROTID SEGMENT:  Mild calcified atherosclerotic disease of the distal common carotid artery and proximal cervical internal carotid artery without significant stenosis compared to the more distal ICA  Less than 50% stenosis  No dissection  LEFT EXTRACRANIAL CAROTID SEGMENT:  Mild calcified atherosclerotic disease of the distal common carotid artery and proximal cervical internal carotid artery without significant stenosis compared to the more distal ICA  Less than 50% stenosis  No dissection  NASCET criteria was used to determine the degree of internal carotid artery diameter stenosis   INTRACRANIAL VASCULATURE INTERNAL CAROTID ARTERIES: Mild calcified atherosclerotic disease in bilateral ICA (bilateral cavernous, clinoid, supraclinoid segments) without significant stenosis  Normal enhancement of the intracranial portions of the internal carotid arteries  Normal ophthalmic artery origins  Normal ICA terminus  ANTERIOR CIRCULATION:  Hypoplastic right A1 segment  Normal caliber left A1 segment  Anterior cerebral arteries with normal enhancement  Normal anterior communicating artery  MIDDLE CEREBRAL ARTERY CIRCULATION:  M1 segment and middle cerebral artery branches demonstrate normal enhancement bilaterally  DISTAL VERTEBRAL ARTERIES:  Patent dominant left and mildly diminutive right distal vertebral arteries  Minimal calcified atherosclerotic disease in bilateral V4 segments (left greater than right) without significant stenosis  Posterior inferior cerebellar artery origins are normal  Normal vertebral basilar junction  BASILAR ARTERY:  Basilar artery is normal in caliber  Normal superior cerebellar arteries  POSTERIOR CEREBRAL ARTERIES: Both posterior cerebral arteries arises from the basilar tip  Both arteries demonstrate normal enhancement  VENOUS STRUCTURES:  Normal  NON VASCULAR ANATOMY BONY STRUCTURES:  No acute osseous abnormality  Diffuse osteopenia  Poststernotomy  Mild multilevel spinal degenerative changes  Chronic T5 superior endplate compression deformity with mild height loss  SOFT TISSUES OF THE NECK:  Normal  THORACIC INLET:  Mild emphysema  No focal consolidation in visualized upper lung zones  Partially imaged left atrial enlargement  Impression: Negative CTA head and neck for large vessel occlusion, dissection, aneurysm, or high-grade stenosis  Additional chronic/incidental findings as detailed above  Findings were directly discussed with Charan Ayala at 10:45 AM  Workstation performed: [id number]     Echo complete w/ contrast if indicated    Result Date: 3/12/2022  Narrative: Hui Laureano  Left Ventricle: Left ventricular cavity size is normal  Wall thickness is mildly increased   The left ventricular ejection fraction is 52% by single dimension measurement  Systolic function is low normal  Wall motion is normal    The left ventricular wall motion is normal    Right Ventricle: Systolic function is low normal  Abnormal tricuspid annular plane systolic excursion (TAPSE) < 1 7 cm    Left Atrium: The atrium is severely dilated (>48 mL/m2)    Right Atrium: The atrium is mildly dilated    Atrial Septum: There is a patent foramen ovale confirmed at rest with predominant right to left shunting by saline contrast    Mitral Valve: There is a mechanical mitral valve prosthesis  The prosthetic valve appears to be well-seated  Valve leaflet motion is normal  There is trace paravalvular regurgitation  There is no evidence of transvalvular regurgitation  The gradient recorded across the prosthetic mitral valve is within the expected range  Incidental Findings:   · None    Test Results Pending at Discharge (will require follow up):   · As per After Visit Summary     Outpatient Tests Requested:  · CBC, CMP, PT/INR    Complications:  Refer to hospital course and above listed diagnosis related plan, if any    Hospital Course: As per HPI  Christine Campuzano is a 80 y o  female patient with history of diabetes mellitus type 2, hypothyroidism, emphysema, chronic a fib on anticoagulation, hypertension, history of MVR with mechanical valve, dyslipidemia, anemia, history of AAA who originally presented to the hospital on 3/11/2022 due to sudden onset of difficulty in speaking, right-sided weakness, headache and confusion rstarted around 9:00 a m  On day of presentation while talking her daughter on the phone  Patient was brought to ER by EMT  Patient's symptoms somewhat-resolved within 30 minutes upon presentation to ED  Patient's blood pressure was elevated other vital signs were stable  Stroke alert was initiated, NIH stroke scale was 0  CTA head and neck revealed no evidence of ischemia or hemorrhage  Old lacunar infarcts noted    No significant vascular abnormality noted  Patient was not deemed a tPA candidate due to resolution of symptoms and elevated INR  Patient remained stable and was subsequently admitted for further workup      After admission , patient again had recurrence of symptoms with mild speech difficulty, recurrence of the right-sided headache though milder and right upper extremity and lower extremity weakness  Repeat NIH stroke scale was 7, blood pressure noted to be 149/84  Patient was started on IV fluid  Case was discussed with Neurology, Dr Justina Flores, recommended Plavix load in addition to aspirin  Patient was continued on permissive hypertension  Symptoms again improved gradually  Subsequently patient symptom remains stable with gradual improvement in right-sided weakness with physical therapy  Patient was continued on aspirin and statin in addition of therapeutic anticoagulation  During hospitalization patient had intermittent right-sided headache without worsening of neurological symptoms repeat CT scan did not reveal any evidence of bleeding or acute abnormality  Patient was followed by Neurology during hospitalization  Patient was continued to improve gradually with physical therapy  Rehab was recommended  Patient was referred to acute rehab when accepted  Patient is now being discharged to acute rehab  Patient will follow-up with primary Cardiology and Neurology after discharge  Please see above list of diagnoses and related plan for additional information  Condition at Discharge: stable     Discharge Day Visit / Exam:     Subjective:  Reports that she is feeling stronger every day  Headache is minimal now  Ambulating with physical therapy , looking forward to go to rehab  Denies any dizziness, chest pain, shortness of breath    No further epistaxis  Reports having urinary urgency similar to her UTI    Vitals: Blood Pressure: 145/79 (03/14/22 1509)  Pulse: 75 (03/14/22 1509)  Temperature: 98 °F (36 7 °C) (03/14/22 1509)  Temp Source: Oral (03/14/22 6360)  Respirations: 18 (03/14/22 1509)  Height: 5' 5 5" (166 4 cm) (03/12/22 0831)  Weight - Scale: 72 1 kg (159 lb) (03/12/22 0831)  SpO2: 93 % (03/14/22 1509)  Exam:   Physical Exam  Constitutional:       General: She is not in acute distress  HENT:      Head: Normocephalic and atraumatic  Cardiovascular:      Rate and Rhythm: Normal rate  Rhythm irregular  Heart sounds: Murmur heard  Pulmonary:      Effort: Pulmonary effort is normal  No respiratory distress  Breath sounds: Normal breath sounds  No wheezing or rales  Abdominal:      General: Bowel sounds are normal  There is no distension  Palpations: Abdomen is soft  Tenderness: There is no abdominal tenderness  There is no guarding or rebound  Musculoskeletal:      Right lower leg: No edema  Left lower leg: No edema  Skin:     General: Skin is warm and dry  Findings: No rash  Neurological:      Mental Status: She is alert and oriented to person, place, and time  Mental status is at baseline  Comments: Mild flattening of nasolabial fold, mild right-sided weakness  Stable/improving         Discharge instructions/Information to patient and family:(Discharge Medications and Follow up):   See after visit summary for information provided to patient and family  Provisions for Follow-Up Care:  See after visit summary for information related to follow-up care and any pertinent home health orders  Disposition: Acute rehab at [facility] Readmission:  No     Discharge Statement:  I spent 45 minutes discharging the patient  This time was spent on the day of discharge  I had direct contact with the patient on the day of discharge  Greater than 50% of the total time was spent examining patient, answering all patient questions, arranging and discussing plan of care with patient as well as directly providing post-discharge instructions    Additional time then spent on discharge activities  Coordinated with Neurology, Dr Ramona Conti the time of discharge  Also I received call from patient's primary cardiologist and updated regarding patient's diagnosis and management  Discussed with patient's daughter regarding discharge and follow-up    Discharge Medications:  See after visit summary for reconciled discharge medications provided to patient and family  ** Please Note: "This note has been constructed using a voice recognition system  Therefore there may be syntax, spelling, and/or grammatical errors   Please call if you have any questions  "**

## 2022-03-15 ENCOUNTER — PATIENT OUTREACH (OUTPATIENT)
Dept: CASE MANAGEMENT | Facility: OTHER | Age: 82
End: 2022-03-15

## 2022-03-15 NOTE — NURSING NOTE
Patients belongings taken by daughter  Transport team picked up pt @2100  Yoelimo removed and IV taken out

## 2022-03-15 NOTE — PROGRESS NOTES
Patient identified as New Medicare Bundle through report  Email with bundle communication tool sent to facility with bundle dates, DRG, and LOS  This care manager assistant will continue to monitor via chart and CarePort review throughout bundle episode  Physical Therapy Eval  Ambulated 75 w/ RW and CGA for steadying  Negotiated 4 stairs with MIN A, mild R knee buckling noted on descent  Transfers with MIN A for force production  Occupational Therapy Eval  Toileting with MAX A for clothing management  Bathing while seated with MIN A   Upper body dressing CGA  Lower body dressing MOD A    SLP Eval  Minimal cognitive communication deficits  Mild dysarthria  Swallowing WFL, on regular diet    This care manager assistant will continue to monitor via chart review throughout bundle episode

## 2022-03-16 LAB — BACTERIA UR CULT: ABNORMAL

## 2022-03-17 ENCOUNTER — TELEPHONE (OUTPATIENT)
Dept: NEUROLOGY | Facility: CLINIC | Age: 82
End: 2022-03-17

## 2022-03-17 NOTE — TELEPHONE ENCOUNTER
Spoke with Michelle at [employer] in [address] [place] [address] Rd # [phone] sched HFU appt with Pelon Mcduffie on 6/17/22 at 1:45 pm  Pt added to the [facility]        HFU/SLWA/ATT/AP/Acute Ischemic Lt Basal Ganglion Infarct-extending into the corona/DC3/14    Note from Chart:     Vimal Ashby will need follow up in 8-10 weeks with general attending or advance practitioner  She will not require outpatient neurological testing

## 2022-03-22 ENCOUNTER — PATIENT OUTREACH (OUTPATIENT)
Dept: CASE MANAGEMENT | Facility: OTHER | Age: 82
End: 2022-03-22

## 2022-03-22 NOTE — PROGRESS NOTES
Chart review completed  Email sent to facility requesting update on patient  This care manager assistant will continue to monitor via chart review throughout bundle episode  Per update the patient has a anticipated discharge of 3/28/22  The patient is functioning at the current levels:   PT Current Status  Car transfers with MIN A (due to incidental knee buckle)  Ambulating 200 with RW and supervision  Negotiates 12 stairs with CGA    She is at a set-up or supervision level for ADLs  Her functional goal is to be modified independent w/ RW at discharge  She lived alone PTA and will be returning home alone

## 2022-03-28 ENCOUNTER — PATIENT OUTREACH (OUTPATIENT)
Dept: CASE MANAGEMENT | Facility: OTHER | Age: 82
End: 2022-03-28

## 2022-03-28 NOTE — PROGRESS NOTES
Chart review completed  Patient has a discharge date of 3/28/22  Email sent to facility requesting update on patient  This care manager assistant will continue to monitor via chart review throughout bundle episode

## 2022-03-29 ENCOUNTER — PATIENT OUTREACH (OUTPATIENT)
Dept: CASE MANAGEMENT | Facility: OTHER | Age: 82
End: 2022-03-29

## 2022-03-29 NOTE — PROGRESS NOTES
Chart review completed  Email sent to facility requesting update on patient  The patient had a anticipated discharge of 3/28/22  This care manager assistant will continue to monitor via chart review throughout bundle episode

## 2022-03-30 ENCOUNTER — PATIENT OUTREACH (OUTPATIENT)
Dept: CASE MANAGEMENT | Facility: OTHER | Age: 82
End: 2022-03-30

## 2022-03-31 ENCOUNTER — PATIENT OUTREACH (OUTPATIENT)
Dept: CASE MANAGEMENT | Facility: OTHER | Age: 82
End: 2022-03-31

## 2022-03-31 ENCOUNTER — PATIENT OUTREACH (OUTPATIENT)
Dept: INTERNAL MEDICINE CLINIC | Facility: CLINIC | Age: 82
End: 2022-03-31

## 2022-03-31 NOTE — PROGRESS NOTES
[facility]  Bundle patient  Chart review completed  Outside records through [facility] requested and refreshed  Patient hospitalized   [facility]-3/11/22-3/14/22  [facility]-3/14/22-3/28/22     Past Medical History  See problem list for complete list of medical diagnosis  Future appointments:  4/1/22-[facility]    CM contacted patient/daughter Mauro Sender to introduce self, explain the role of the OP CM and purpose of this phone call is to assess patient's general wellbeing or for any assistance needed with follow-up care, medication costs, or financial difficulty  Explanation given regarding the free Medicare BPCI-A program that allows for added telephonic nursing support for 90 days to help coordinate and manage further care, as needed  In addition, CM explained there would be a one time telephonic assessment to collect a medical and social history intake  CM added that outreach calls would be weekly/ biweekly/ monthly to ensure patient/caregiver is progressing  CM would communicate with other healthcare providers on the patient care team to ensure their care is coordinated  Patient declined future outreach of CM  Mauro Gutierrez reports her Mother is doing fine  Patient agrees in the back round  She adds they are waiting for the [facility] to call them  Mauro Gutierrez and her Mother state they are doing well and don't feel outreach calls by this CM are necessary  Mauro Davieser tells this CM she has this CM's number and will reach out if they have any needs  Mauro Gutierrez and her Mother thank this CM for calling  CM  encouraged patient/daughter to contact CM if needed

## 2022-03-31 NOTE — PROGRESS NOTES
Call placed to facility who confirmed the patient discharged 3/28/22 to home with [facility]  A email was sent to the facility requesting discharge instructions  When CM assistant has received the Discharge paperwork CM assistant will attach to this episode  I have removed myself off of the care team, added the CM to the care team who will follow the patient through the bundle episode, sent the care manager a inbasket notifying them of the bundle episode, updated the BPCI form, and updated the care coordination note

## 2022-04-01 ENCOUNTER — OFFICE VISIT (OUTPATIENT)
Dept: INTERNAL MEDICINE CLINIC | Facility: CLINIC | Age: 82
End: 2022-04-01
Payer: MEDICARE

## 2022-04-01 VITALS — HEIGHT: 66 IN | HEART RATE: 56 BPM | BODY MASS INDEX: 24.75 KG/M2 | WEIGHT: 154 LBS | OXYGEN SATURATION: 98 %

## 2022-04-01 DIAGNOSIS — D50.0 IRON DEFICIENCY ANEMIA DUE TO CHRONIC BLOOD LOSS: ICD-10-CM

## 2022-04-01 DIAGNOSIS — Z95.2 PRESENCE OF PROSTHETIC HEART VALVE: ICD-10-CM

## 2022-04-01 DIAGNOSIS — E78.00 HYPERCHOLESTEREMIA: ICD-10-CM

## 2022-04-01 DIAGNOSIS — I48.20 CHRONIC ATRIAL FIBRILLATION (HCC): ICD-10-CM

## 2022-04-01 DIAGNOSIS — Z79.01 LONG TERM (CURRENT) USE OF ANTICOAGULANTS: ICD-10-CM

## 2022-04-01 DIAGNOSIS — J43.9 PULMONARY EMPHYSEMA, UNSPECIFIED EMPHYSEMA TYPE (HCC): ICD-10-CM

## 2022-04-01 DIAGNOSIS — E11.69 TYPE 2 DIABETES MELLITUS WITH OTHER SPECIFIED COMPLICATION, WITHOUT LONG-TERM CURRENT USE OF INSULIN (HCC): ICD-10-CM

## 2022-04-01 DIAGNOSIS — Z95.2 H/O MITRAL VALVE REPLACEMENT WITH MECHANICAL VALVE: ICD-10-CM

## 2022-04-01 DIAGNOSIS — I63.9 CEREBROVASCULAR ACCIDENT (CVA), UNSPECIFIED MECHANISM (HCC): Primary | ICD-10-CM

## 2022-04-01 DIAGNOSIS — I71.4 ABDOMINAL AORTIC ANEURYSM (AAA) WITHOUT RUPTURE (HCC): ICD-10-CM

## 2022-04-01 PROBLEM — E11.9 DIABETES MELLITUS (HCC): Status: ACTIVE | Noted: 2022-03-14

## 2022-04-01 PROCEDURE — 99215 OFFICE O/P EST HI 40 MIN: CPT | Performed by: INTERNAL MEDICINE

## 2022-04-01 RX ORDER — ATORVASTATIN CALCIUM 80 MG/1
80 TABLET, FILM COATED ORAL DAILY
COMMUNITY
End: 2022-04-01 | Stop reason: SDUPTHER

## 2022-04-01 RX ORDER — ATORVASTATIN CALCIUM 80 MG/1
80 TABLET, FILM COATED ORAL DAILY
Qty: 90 TABLET | Refills: 1 | Status: SHIPPED | OUTPATIENT
Start: 2022-04-01

## 2022-04-01 NOTE — ASSESSMENT & PLAN NOTE
03/12/2022:  Echocardiogram:  Ejection fraction 52, atrium severely dilated on left side, patent foraminal ovale confirmed at rest with right to left shunting of saline contrast, mechanical mitral wall prosthesis,  Done during hospitalization for CVA 03/11/2022 up to 03/14/2022    Atrial fibrillation control  Rate controlled    Ca continue to monitor anticoagulation by cardiologist

## 2022-04-01 NOTE — ASSESSMENT & PLAN NOTE
Lab Results   Component Value Date    CHOLESTEROL 200 03/12/2022    CHOLESTEROL 228 (H) 02/17/2022    CHOLESTEROL 223 (H) 10/29/2021     Lab Results   Component Value Date    HDL 38 (L) 03/12/2022    HDL 39 (L) 02/17/2022    HDL 37 (L) 10/29/2021     Lab Results   Component Value Date    TRIG 230 (H) 03/12/2022    TRIG 263 (H) 02/17/2022    TRIG 309 (H) 10/29/2021     Lab Results   Component Value Date    NONHDLC 189 02/17/2022    [place] 186 10/29/2021    [place] 178 01/21/2021   Patient discharged from hospital on rosuvastatin 40 mg daily

## 2022-04-01 NOTE — ASSESSMENT & PLAN NOTE
Lab Results   Component Value Date    WBC 6 94 03/14/2022    HGB 12 5 03/14/2022    HCT 39 2 03/14/2022    MCV 93 03/14/2022     03/14/2022

## 2022-04-01 NOTE — PROGRESS NOTES
[facility] Office Visit Note  22     Francy Doherty 80 y o  female MRN: [id number]  : 1940    Assessment:     1  Cerebrovascular accident (CVA), unspecified mechanism ([facility] )  Assessment & Plan:  Hospitalization 2022 discharged 2022  Admitted at [facility]-2022 discharged 2022:  CT of the brain:  Evolving lacunar infarct in the left basal ganglia extended into periventricular white matter disease of the left parietal lobe, set stable cerebral atrophy, chronic small-vessel disease  2022:  MRI of the brain acute lacunar infarct left basal ganglia extending into periventricular white matter disease of the left parietal lobe and adjacent posterior aspect of the left ventricle  CTA 2022:  Negative CTA head and neck for the large vessel occlusion, dissection, aneurysm or high-grade stenosis      2022:  Echocardiogram:  Ejection fraction 52, atrium severely dilated on left side, patent foraminal ovale confirmed at rest with right to left shunting of saline contrast, mechanical mitral wall prosthesis,  Done during hospitalization for CVA 2022 up to 2022      2  Chronic atrial fibrillation [facility])  Assessment & Plan:  2022:  Echocardiogram:  Ejection fraction 52, atrium severely dilated on left side, patent foraminal ovale confirmed at rest with right to left shunting of saline contrast, mechanical mitral wall prosthesis,  Done during hospitalization for CVA 2022 up to 2022      3  Pulmonary emphysema, unspecified emphysema type ([facility] )    4  Type 2 diabetes mellitus with other specified complication, without long-term current use of insulin ([facility] )    5  H/O mitral valve replacement with mechanical valve    6  Long term (current) use of anticoagulants    7  Presence of prosthetic heart valve    8   Hypercholesteremia  Assessment & Plan:  Lab Results   Component Value Date    CHOLESTEROL 200 2022    CHOLESTEROL 228 (H) 02/17/2022    CHOLESTEROL 223 (H) 10/29/2021     Lab Results   Component Value Date    HDL 38 (L) 03/12/2022    HDL 39 (L) 02/17/2022    HDL 37 (L) 10/29/2021     Lab Results   Component Value Date    TRIG 230 (H) 03/12/2022    TRIG 263 (H) 02/17/2022    TRIG 309 (H) 10/29/2021     Lab Results   Component Value Date    NONHDLC 189 02/17/2022    [place] 186 10/29/2021    [place] 178 01/21/2021   Patient discharged from hospital on rosuvastatin 40 mg daily        9  Iron deficiency anemia due to chronic blood loss  Assessment & Plan:  Lab Results   Component Value Date    WBC 6 94 03/14/2022    HGB 12 5 03/14/2022    HCT 39 2 03/14/2022    MCV 93 03/14/2022     03/14/2022               Discussion Summary and Plan: Today's care plan and medications were reviewed with patient in detail and all their questions answered to their satisfaction  Chief Complaint   Patient presents with    Heat Exposure    Hypertension    Hyperlipidemia    Diabetes    Atrial Fibrillation     Status post MVR, chronic anticoagulation,    Follow-up     Care transition: hospital admission 03/11/2022 up to 03/14/2022      Subjective:  Patient was recently hospitalized on 03/11/2022 and discharged on 03/14/2022  Principal diagnosis CVA  CVA:  Patient presented with speech difficulty, right-sided weakness, headache that started at 9:00 a m  On the day of the admission self resolved in 30 minute  A NIH score was 0  Patient again had a reoccurrence of the symptoms at 3:30 p m     Again he improved gradually  T ischemia or hemorrhage  Patient was not a candidate for tPA due to improvement  MRI of the brain confirmed left lacunar infarct involving left basal ganglia extending into the periventricular white matter of the left parietal lobe adjacent to posterior aspect of the left lateral ventricle    any acute he patient's initial CT scan did not reveal  The patient was seen by neurologist had telemetry monitoring patient had controlled atrial fibrillation patient was loaded with aspirin and Plavix on 03/11 due to reoccurrence of the symptoms  Patient had 2D echocardiogram with bubble study which revealed ejection fraction 52 person and positive for PFO and mechanical mitral wall replacement  LDL was 160  Patient was continued on anticoagulation  Hemoglobin A1c was 6 5  Hypertension was managed with nadolol and losartan with holding parameters  Patient also had a CT cystitis without hematuria and received short course of antibiotic  Diabetes was monitored hemoglobin A1c was 6 5  And status post MVR since 1996  Chronic atrial fibrillation the patient was continued on each nadolol and Coumadin INR were monitored  Hyperlipidemia LDL was 116 patient was started on Lipitor 80 mg patient requested rosuvastatin on discharge and was switched to rosuvastatin 40 mg  Hospital stay reviewed  Patient was seen by neurologist   Patient remains stable patient was discharged to short-term rehab  Paragraphs short-term rehab discharge summary reviewed  Patient was discharged on the life alert    02/17/2022:  Creatinine 0 73 blood sugar 178 rest CMP unremarkable CBC unremarkable,  vitamin-D 90 9 TSH 1 64 hemoglobin A1c 6 8 digoxin level 1 0  INR:  03/01/2022 3 05 ,  01/25/2022:  2 84, 12/21/2021:  2 92, urine for microalbumin/creatinine ratio 50 normal  03/14/2022:  CBC normal, BMP normal except elevated blood sugar 146  03/12/2022:  Hemoglobin A1c 6 5 lipid lipid profile reveals , HDL 38, triglyceride 230      Today patient complains of subtle minor weakness on the right side both right upper and right lower extremity  Generally she has recovered by 95% per her  Her speech is also much better occasionally difficulty getting the right word out  She can clearly understands clearly think and clearly how expressed  Offers no other specific neurological complaints      Also denies any specific cardiopulmonary GI  CNS are CS complaint  Patient's daughter is here  We talked about life alert, Mon video monitoring safety, apple watch,  Patient will be getting home physical therapy occupational therapy and visiting nurse  Her chronic disease are fairly stable including hypertension, him MVR, atrial fibrillation, chronic anticoagulation, how the the her history of remote history of anemia is stable  INR has been fairly stable  No further UTI symptoms  The following portions of the patient's history were reviewed and updated as appropriate: allergies, current medications, past family history, past medical history, past social history, past surgical history and problem list     Review of Systems   All other systems reviewed and are negative  Historical Information   Patient Active Problem List   Diagnosis    Obstructive sleep apnea    Chronic atrial fibrillation ([facility] )    H/O mitral valve replacement with mechanical valve    Long term (current) use of anticoagulants    Presence of prosthetic heart valve    Hypercholesteremia    Anemia due to chronic kidney disease    Allergic rhinitis    Type 2 diabetes mellitus without complication, without long-term current use of insulin (HCC)    Iron deficiency anemia    Other specified hypothyroidism    Vitamin B12 deficiency    Other microscopic hematuria    Celiac disease    Abdominal aortic aneurysm (AAA) ([facility] )    Ataxia    Pulmonary emphysema (HCC)    Acute cystitis without hematuria    History of renal calculi    History of cervical cancer    Essential hypertension    Other proteinuria    Hepatic steatosis    CVA (cerebral vascular accident) ([facility] )    Diabetes mellitus ([facility] )     No past medical history on file    Past Surgical History:   Procedure Laterality Date    EYE SURGERY      HYSTERECTOMY      MITRAL VALVE REPLACEMENT  1994     Social History     Substance and Sexual Activity   Alcohol Use Yes    Comment: special occasional     Social History Substance and Sexual Activity   Drug Use No     Social History     Tobacco Use   Smoking Status Former Smoker    Packs/day: 2 00    Years: 30 00    Pack years: 60 00    Quit date:     Years since quittin 2   Smokeless Tobacco Never Used     Family History   Problem Relation Age of Onset    Heart failure Mother     Heart attack Father     Sleep apnea Brother      Health Maintenance Due   Topic    COVID-19 Vaccine (1)    Pneumococcal Vaccine: 65+ Years (1 of 2 - PPSV23)    DTaP,Tdap,and Td Vaccines (1 - Tdap)    Influenza Vaccine (1)    Fall Risk     Medicare Annual Wellness Visit (AWV)     BMI: Followup Plan     Depression Screening       Meds/Allergies       Current Outpatient Medications:     acetaminophen (TYLENOL) 325 mg tablet, Take 2 tablets (650 mg total) by mouth every 6 (six) hours as needed for mild pain or headaches, Disp: , Rfl: 0    aspirin (ECOTRIN LOW STRENGTH) 81 mg EC tablet, Take 1 tablet (81 mg total) by mouth daily, Disp: , Rfl: 0    atorvastatin (LIPITOR) 80 mg tablet, Take 80 mg by mouth daily, Disp: , Rfl:     Cholecalciferol (VITAMIN D PO), Take by mouth, Disp: , Rfl:     digoxin (LANOXIN) 0 125 mg tablet, Take 125 mcg by mouth daily, Disp: , Rfl:     melatonin 3 mg, Take 1 tablet (3 mg total) by mouth daily at bedtime as needed (Insomnia), Disp: , Rfl: 0    metFORMIN (GLUCOPHAGE) 500 mg tablet, Take 2 tablets by mouth twice daily (Patient taking differently: 500 mg 2 (two) times a day with meals  ), Disp: 360 tablet, Rfl: 0    Multiple Vitamins-Minerals (PRESERVISION AREDS PO), Take 2 tablets by mouth daily  , Disp: , Rfl:     nadolol (CORGARD) 20 mg tablet, Take 0 5 tablets (10 mg total) by mouth daily, Disp: , Rfl: 0    warfarin (COUMADIN) 2 5 mg tablet, Take 1 tablet (2 5 mg total) by mouth 3 (three) times a week On  and Friday, Disp: , Rfl: 0    warfarin (COUMADIN) 5 mg tablet, Take 1 tablet (5 mg total) by mouth 4 (four) times a week On Sunday, Tuesday, Thursday, Saturday, Disp: , Rfl: 0      Objective:    Vitals:   Pulse 56   Ht 5' 5 5" (1 664 m)   Wt 69 9 kg (154 lb)   SpO2 98%   BMI 25 24 kg/m²   Body mass index is 25 24 kg/m²  Vitals:    04/01/22 1005   Weight: 69 9 kg (154 lb)       Physical Exam  Vitals and nursing note reviewed  Constitutional:       General: She is not in acute distress  Appearance: Normal appearance  She is well-developed  She is not ill-appearing, toxic-appearing or diaphoretic  Comments: Short grey hair   HENT:      Head: Normocephalic and atraumatic  Right Ear: External ear normal       Left Ear: External ear normal    Eyes:      General: No visual field deficit or scleral icterus  Right eye: No discharge  Left eye: No discharge  Conjunctiva/sclera: Conjunctivae normal    Neck:      Thyroid: No thyroid mass, thyromegaly or thyroid tenderness  Vascular: Normal carotid pulses  No carotid bruit or JVD  Cardiovascular:      Rate and Rhythm: Normal rate  Rhythm irregular  Pulses:           Dorsalis pedis pulses are 2+ on the right side and 2+ on the left side  Posterior tibial pulses are 1+ on the right side and 1+ on the left side  Heart sounds: S1 normal  Murmur heard  Systolic murmur is present with a grade of 2/6  Comments: S2 elevated  Pulmonary:      Effort: No respiratory distress  Breath sounds: Normal breath sounds  No stridor  No wheezing, rhonchi or rales  Abdominal:      General: Bowel sounds are normal  There is no distension  Palpations: There is no shifting dullness, fluid wave, hepatomegaly, mass or pulsatile mass  Tenderness: There is no abdominal tenderness  There is no rebound  Hernia: There is no hernia in the umbilical area, ventral area, left inguinal area, left femoral area or right inguinal area  Musculoskeletal:         General: Normal range of motion  Cervical back: Normal range of motion  Right lower leg: No edema  Left lower leg: No edema  Feet:      Right foot:      Skin integrity: Callus present  No ulcer, skin breakdown, erythema, warmth or dry skin  Left foot:      Skin integrity: Callus present  No ulcer, skin breakdown, erythema, warmth or dry skin  Lymphadenopathy:      Cervical: No cervical adenopathy  Right cervical: No superficial cervical adenopathy  Skin:     General: Skin is warm  Findings: No rash  Neurological:      General: No focal deficit present  Mental Status: She is alert and oriented to person, place, and time  Mental status is at baseline  Cranial Nerves: Cranial nerves are intact  No cranial nerve deficit, dysarthria or facial asymmetry  Sensory: Sensation is intact  Motor: Motor function is intact  No weakness, tremor, atrophy, abnormal muscle tone, seizure activity or pronator drift  Coordination: Coordination is intact  Gait: Tandem walk abnormal  Gait normal       Comments: Motor strength both the right upper extremity and right lower extremity 5-over 5+  Left upper extremity and left lower extremity 5+ over 5+  Speech is normal    Psychiatric:         Mood and Affect: Mood normal          Behavior: Behavior normal          Thought Content: Thought content normal          Judgment: Judgment normal          Lab Review   No results displayed because visit has over 200 results        Transcribe Orders on 03/01/2022   Component Date Value Ref Range Status    Protime 03/01/2022 30 0* 11 6 - 14 5 seconds Final    INR 03/01/2022 3 05* 0 84 - 1 19 Final   Lab on 02/17/2022   Component Date Value Ref Range Status    Sodium 02/17/2022 136  136 - 145 mmol/L Final    Potassium 02/17/2022 4 4  3 5 - 5 3 mmol/L Final    Chloride 02/17/2022 104  100 - 108 mmol/L Final    CO2 02/17/2022 28  21 - 32 mmol/L Final    ANION GAP 02/17/2022 4  4 - 13 mmol/L Final    BUN 02/17/2022 15  5 - 25 mg/dL Final    Creatinine 02/17/2022 0 73  0 60 - 1 30 mg/dL Final    Standardized to IDMS reference method    Glucose, Fasting 02/17/2022 178* 65 - 99 mg/dL Final    Specimen collection should occur prior to Sulfasalazine administration due to the potential for falsely depressed results  Specimen collection should occur prior to Sulfapyridine administration due to the potential for falsely elevated results   Calcium 02/17/2022 9 2  8 3 - 10 1 mg/dL Final    AST 02/17/2022 29  5 - 45 U/L Final    Specimen collection should occur prior to Sulfasalazine administration due to the potential for falsely depressed results   ALT 02/17/2022 36  12 - 78 U/L Final    Specimen collection should occur prior to Sulfasalazine and/or Sulfapyridine administration due to the potential for falsely depressed results   Alkaline Phosphatase 02/17/2022 78  46 - 116 U/L Final    Total Protein 02/17/2022 8 0  6 4 - 8 2 g/dL Final    Albumin 02/17/2022 4 1  3 5 - 5 0 g/dL Final    Total Bilirubin 02/17/2022 0 41  0 20 - 1 00 mg/dL Final    Use of this assay is not recommended for patients undergoing treatment with eltrombopag due to the potential for falsely elevated results      eGFR 02/17/2022 76  ml/min/1 73sq m Final    WBC 02/17/2022 5 30  4 31 - 10 16 Thousand/uL Final    RBC 02/17/2022 4 30  3 81 - 5 12 Million/uL Final    Hemoglobin 02/17/2022 12 6  11 5 - 15 4 g/dL Final    Hematocrit 02/17/2022 40 6  34 8 - 46 1 % Final    MCV 02/17/2022 94  82 - 98 fL Final    MCH 02/17/2022 29 3  26 8 - 34 3 pg Final    MCHC 02/17/2022 31 0* 31 4 - 37 4 g/dL Final    RDW 02/17/2022 15 8* 11 6 - 15 1 % Final    MPV 02/17/2022 10 6  8 9 - 12 7 fL Final    Platelets 87/73/0221 248  149 - 390 Thousands/uL Final    nRBC 02/17/2022 0  /100 WBCs Final    Neutrophils Relative 02/17/2022 64  43 - 75 % Final    Immat GRANS % 02/17/2022 0  0 - 2 % Final    Lymphocytes Relative 02/17/2022 25  14 - 44 % Final    Monocytes Relative 02/17/2022 8  4 - 12 % Final    Eosinophils Relative 02/17/2022 2  0 - 6 % Final    Basophils Relative 02/17/2022 1  0 - 1 % Final    Neutrophils Absolute 02/17/2022 3 36  1 85 - 7 62 Thousands/µL Final    Immature Grans Absolute 02/17/2022 0 01  0 00 - 0 20 Thousand/uL Final    Lymphocytes Absolute 02/17/2022 1 32  0 60 - 4 47 Thousands/µL Final    Monocytes Absolute 02/17/2022 0 44  0 17 - 1 22 Thousand/µL Final    Eosinophils Absolute 02/17/2022 0 12  0 00 - 0 61 Thousand/µL Final    Basophils Absolute 02/17/2022 0 05  0 00 - 0 10 Thousands/µL Final    Cholesterol 02/17/2022 228* See Comment mg/dL Final    Cholesterol:         Pediatric <18 Years        Desirable          <170 mg/dL      Borderline High    170-199 mg/dL      High               >=200 mg/dL        Adult >=18 Years            Desirable         <200 mg/dL      Borderline High   200-239 mg/dL      High              >239 mg/dL      Triglycerides 02/17/2022 263* See Comment mg/dL Final    Triglyceride:     0-9Y            <75mg/dL     10Y-17Y         <90 mg/dL       >=18Y     Normal          <150 mg/dL     Borderline High 150-199 mg/dL     High            200-499 mg/dL        Very High       >499 mg/dL    Specimen collection should occur prior to N-Acetylcysteine or Metamizole administration due to the potential for falsely depressed results   HDL, Direct 02/17/2022 39* >=50 mg/dL Final    Specimen collection should occur prior to Metamizole administration due to the potential for falsley depressed results   LDL Calculated 02/17/2022 136* 0 - 100 mg/dL Final    LDL Cholesterol:     Optimal           <100 mg/dl     Near Optimal      100-129 mg/dl     Above Optimal       Borderline High 130-159 mg/dl       High            160-189 mg/dl       Very High       >189 mg/dl         This screening LDL is a calculated result  It does not have the accuracy of the Direct Measured LDL in the monitoring of patients with hyperlipidemia and/or statin therapy     Direct Measure LDL (HKS984) must be ordered separately in these patients   Non-HDL-Chol (CHOL-HDL) 02/17/2022 189  mg/dl Final    Vit D, 25-Hydroxy 02/17/2022 90 9  30 0 - 100 0 ng/mL Final    TSH 3RD GENERATON 02/17/2022 1 640  0 358 - 3 740 uIU/mL Final    The recommended reference ranges for TSH during pregnancy are as follows:   First trimester 0 1 to 2 5 uIU/mL   Second trimester  0 2 to 3 0 uIU/mL   Third trimester 0 3 to 3 0 uIU/m    Note: Normal ranges may not apply to patients who are transgender, non-binary, or whose legal sex, sex at birth, and gender identity differ   Hemoglobin A1C 02/17/2022 6 8* Normal 3 8-5 6%; PreDiabetic 5 7-6 4%; Diabetic >=6 5%; Glycemic control for adults with diabetes <7 0% % Final    EAG 02/17/2022 148  mg/dl Final    Digoxin Lvl 02/17/2022 1 0  0 8 - 2 0 ng/mL Final         There are no Patient Instructions on file for this visit  Dr Denis Olivas MD  [facility]       "This note has been constructed using a voice recognition system  Therefore there may be syntax, spelling, and/or grammatical errors   Please call if you have any questions  "

## 2022-04-01 NOTE — ASSESSMENT & PLAN NOTE
Lab Results   Component Value Date    HGBA1C 6 5 (H) 03/12/2022   No diabetic symptoms  Will continue same regimen with diet    Metformin dose was decreased to 1 tablet twice a day from 2 tablet twice a day recently during hospitalization    Will monitor trend

## 2022-04-01 NOTE — PATIENT INSTRUCTIONS
Follow with Consultants as per their and our suggestion    Follow up in 6  week(s) or as needed earlier    Follow all instructions as advised and discussed  Take your medications as prescribed  Call the office immediately if you experience any side effects  Ask questions if you do not understand  Keep your scheduled appointment as advised or come sooner if necessary or in doubt  Best time to call for non-urgent matter or questions on weekdays is between 9am and 12 noon  See physician for any new symptoms or worsening of current symptoms  Urgent or emergent situations call 911 and report to nearest emergency room  I spent  45 minutes taking care of this patient including clinical care, conseling, collaboration, chart, lab and consultaion review  Patient is to get labs 3 week(s) prior to next visit if advised      Continue home physical therapy occupational therapy  Also [facility] to follow-up  Continue using walker

## 2022-04-01 NOTE — ASSESSMENT & PLAN NOTE
Hospitalization 03/11/2022 discharged 03/14/2022  [facility] 03/28/2022 03/13/2022:  CT of the brain:  Evolving lacunar infarct in the left basal ganglia extended into periventricular white matter disease of the left parietal lobe, set stable cerebral atrophy, chronic small-vessel disease  03/12/2022:  MRI of the brain acute lacunar infarct left basal ganglia extending into periventricular white matter disease of the left parietal lobe and adjacent posterior aspect of the left ventricle  CTA 03/11/2022:  Negative CTA head and neck for the large vessel occlusion, dissection, aneurysm or high-grade stenosis      03/12/2022:  Echocardiogram:  Ejection fraction 52, atrium severely dilated on left side, patent foraminal ovale confirmed at rest with right to left shunting of saline contrast, mechanical mitral wall prosthesis,  Done during hospitalization for CVA 03/11/2022 up to 03/14/2022  Generally significantly recovered  Residual deficit is present  For on therapy physical therapy occupational therapy [facility]  Will needs follow-up CBC and CMP in 2 weeks      Educated about all aspect of secondary prevention

## 2022-04-05 ENCOUNTER — TELEPHONE (OUTPATIENT)
Dept: INTERNAL MEDICINE CLINIC | Facility: CLINIC | Age: 82
End: 2022-04-05

## 2022-04-05 NOTE — TELEPHONE ENCOUNTER
Per DR Inga Daniels  Patient may visit Orthopedic for his rec of possible cortisone shot  Pt contacted

## 2022-04-05 NOTE — TELEPHONE ENCOUNTER
Pt forgot to mention that she has shoulder pain and wants to see Orthopedic  Can she have the ok for a cortisone shot

## 2022-04-22 ENCOUNTER — RA CDI HCC (OUTPATIENT)
Dept: OTHER | Facility: HOSPITAL | Age: 82
End: 2022-04-22

## 2022-04-22 NOTE — PROGRESS NOTES
E11 22    [facility]  coding opportunities          Chart Reviewed number of suggestions sent to Provider: 1     Patients Insurance     Medicare Insurance: [employer] with patient

## 2022-04-26 ENCOUNTER — APPOINTMENT (OUTPATIENT)
Dept: LAB | Facility: CLINIC | Age: 82
End: 2022-04-26
Payer: MEDICARE

## 2022-04-26 DIAGNOSIS — E11.69 TYPE 2 DIABETES MELLITUS WITH OTHER SPECIFIED COMPLICATION, WITHOUT LONG-TERM CURRENT USE OF INSULIN (HCC): ICD-10-CM

## 2022-04-26 DIAGNOSIS — I63.9 CEREBROVASCULAR ACCIDENT (CVA), UNSPECIFIED MECHANISM (HCC): ICD-10-CM

## 2022-04-26 DIAGNOSIS — J43.9 PULMONARY EMPHYSEMA, UNSPECIFIED EMPHYSEMA TYPE (HCC): ICD-10-CM

## 2022-04-26 DIAGNOSIS — I48.20 CHRONIC ATRIAL FIBRILLATION (HCC): ICD-10-CM

## 2022-04-26 LAB
ALBUMIN SERPL BCP-MCNC: 4.2 G/DL (ref 3.5–5)
ALP SERPL-CCNC: 79 U/L (ref 46–116)
ALT SERPL W P-5'-P-CCNC: 35 U/L (ref 12–78)
ANION GAP SERPL CALCULATED.3IONS-SCNC: 4 MMOL/L (ref 4–13)
AST SERPL W P-5'-P-CCNC: 30 U/L (ref 5–45)
BILIRUB SERPL-MCNC: 0.69 MG/DL (ref 0.2–1)
BUN SERPL-MCNC: 16 MG/DL (ref 5–25)
CALCIUM SERPL-MCNC: 9.3 MG/DL (ref 8.3–10.1)
CHLORIDE SERPL-SCNC: 107 MMOL/L (ref 100–108)
CO2 SERPL-SCNC: 28 MMOL/L (ref 21–32)
CREAT SERPL-MCNC: 0.69 MG/DL (ref 0.6–1.3)
ERYTHROCYTE [DISTWIDTH] IN BLOOD BY AUTOMATED COUNT: 14.6 % (ref 11.6–15.1)
GFR SERPL CREATININE-BSD FRML MDRD: 81 ML/MIN/1.73SQ M
GLUCOSE P FAST SERPL-MCNC: 148 MG/DL (ref 65–99)
HCT VFR BLD AUTO: 35 % (ref 34.8–46.1)
HGB BLD-MCNC: 10.5 G/DL (ref 11.5–15.4)
INR PPP: 1.87 (ref 0.84–1.19)
MCH RBC QN AUTO: 27.7 PG (ref 26.8–34.3)
MCHC RBC AUTO-ENTMCNC: 30 G/DL (ref 31.4–37.4)
MCV RBC AUTO: 92 FL (ref 82–98)
PLATELET # BLD AUTO: 249 THOUSANDS/UL (ref 149–390)
PMV BLD AUTO: 10.7 FL (ref 8.9–12.7)
POTASSIUM SERPL-SCNC: 4.2 MMOL/L (ref 3.5–5.3)
PROT SERPL-MCNC: 7.7 G/DL (ref 6.4–8.2)
PROTHROMBIN TIME: 20.7 SECONDS (ref 11.6–14.5)
RBC # BLD AUTO: 3.79 MILLION/UL (ref 3.81–5.12)
SODIUM SERPL-SCNC: 139 MMOL/L (ref 136–145)
WBC # BLD AUTO: 5.64 THOUSAND/UL (ref 4.31–10.16)

## 2022-04-26 PROCEDURE — 36415 COLL VENOUS BLD VENIPUNCTURE: CPT

## 2022-04-26 PROCEDURE — 85610 PROTHROMBIN TIME: CPT

## 2022-04-26 PROCEDURE — 85027 COMPLETE CBC AUTOMATED: CPT

## 2022-04-26 PROCEDURE — 80053 COMPREHEN METABOLIC PANEL: CPT

## 2022-04-27 ENCOUNTER — TELEPHONE (OUTPATIENT)
Dept: INTERNAL MEDICINE CLINIC | Facility: CLINIC | Age: 82
End: 2022-04-27

## 2022-04-27 NOTE — TELEPHONE ENCOUNTER
Spoke with pt regarding her INR 1 87  Her cardiologist is managing coumadin  Asked pt to reach out to the cardiologist for dose adjustment as she should be a bit higher  Pt agreed and understood

## 2022-05-03 ENCOUNTER — OFFICE VISIT (OUTPATIENT)
Dept: OBGYN CLINIC | Facility: CLINIC | Age: 82
End: 2022-05-03
Payer: MEDICARE

## 2022-05-03 ENCOUNTER — TRANSCRIBE ORDERS (OUTPATIENT)
Dept: LAB | Facility: CLINIC | Age: 82
End: 2022-05-03

## 2022-05-03 ENCOUNTER — OFFICE VISIT (OUTPATIENT)
Dept: NEUROLOGY | Facility: CLINIC | Age: 82
End: 2022-05-03
Payer: MEDICARE

## 2022-05-03 ENCOUNTER — APPOINTMENT (OUTPATIENT)
Dept: LAB | Facility: CLINIC | Age: 82
End: 2022-05-03
Payer: MEDICARE

## 2022-05-03 VITALS
SYSTOLIC BLOOD PRESSURE: 177 MMHG | HEART RATE: 64 BPM | HEIGHT: 66 IN | DIASTOLIC BLOOD PRESSURE: 70 MMHG | BODY MASS INDEX: 24.59 KG/M2 | WEIGHT: 153 LBS

## 2022-05-03 VITALS
BODY MASS INDEX: 24.91 KG/M2 | WEIGHT: 155 LBS | TEMPERATURE: 97.5 F | HEIGHT: 66 IN | SYSTOLIC BLOOD PRESSURE: 136 MMHG | DIASTOLIC BLOOD PRESSURE: 62 MMHG | HEART RATE: 70 BPM

## 2022-05-03 DIAGNOSIS — I63.9 CEREBROVASCULAR ACCIDENT (CVA), UNSPECIFIED MECHANISM (HCC): Primary | ICD-10-CM

## 2022-05-03 DIAGNOSIS — Z95.2 HEART VALVE REPLACED: Primary | ICD-10-CM

## 2022-05-03 DIAGNOSIS — E78.00 HYPERCHOLESTEREMIA: ICD-10-CM

## 2022-05-03 DIAGNOSIS — E11.9 TYPE 2 DIABETES MELLITUS WITHOUT COMPLICATION, WITHOUT LONG-TERM CURRENT USE OF INSULIN (HCC): ICD-10-CM

## 2022-05-03 DIAGNOSIS — Z79.01 LONG TERM (CURRENT) USE OF ANTICOAGULANTS: ICD-10-CM

## 2022-05-03 DIAGNOSIS — I10 ESSENTIAL HYPERTENSION: ICD-10-CM

## 2022-05-03 DIAGNOSIS — M19.011 OSTEOARTHRITIS OF GLENOHUMERAL JOINT, RIGHT: Primary | ICD-10-CM

## 2022-05-03 DIAGNOSIS — Z95.2 H/O MITRAL VALVE REPLACEMENT WITH MECHANICAL VALVE: ICD-10-CM

## 2022-05-03 LAB
INR PPP: 2.61 (ref 0.84–1.19)
PROTHROMBIN TIME: 26.6 SECONDS (ref 11.6–14.5)

## 2022-05-03 PROCEDURE — 36415 COLL VENOUS BLD VENIPUNCTURE: CPT

## 2022-05-03 PROCEDURE — 99214 OFFICE O/P EST MOD 30 MIN: CPT | Performed by: NURSE PRACTITIONER

## 2022-05-03 PROCEDURE — 20610 DRAIN/INJ JOINT/BURSA W/O US: CPT | Performed by: ORTHOPAEDIC SURGERY

## 2022-05-03 PROCEDURE — 85610 PROTHROMBIN TIME: CPT

## 2022-05-03 PROCEDURE — 99213 OFFICE O/P EST LOW 20 MIN: CPT | Performed by: ORTHOPAEDIC SURGERY

## 2022-05-03 RX ORDER — LIDOCAINE HYDROCHLORIDE 10 MG/ML
4 INJECTION, SOLUTION INFILTRATION; PERINEURAL
Status: COMPLETED | OUTPATIENT
Start: 2022-05-03 | End: 2022-05-03

## 2022-05-03 RX ORDER — DEXAMETHASONE SODIUM PHOSPHATE 100 MG/10ML
40 INJECTION INTRAMUSCULAR; INTRAVENOUS
Status: COMPLETED | OUTPATIENT
Start: 2022-05-03 | End: 2022-05-03

## 2022-05-03 RX ADMIN — DEXAMETHASONE SODIUM PHOSPHATE 40 MG: 100 INJECTION INTRAMUSCULAR; INTRAVENOUS at 11:18

## 2022-05-03 RX ADMIN — LIDOCAINE HYDROCHLORIDE 4 ML: 10 INJECTION, SOLUTION INFILTRATION; PERINEURAL at 11:18

## 2022-05-03 NOTE — PROGRESS NOTES
Patient ID: Nawaf Guerrero is a 80 y o  female  Assessment/Plan:       Diagnoses and all orders for this visit:    Cerebrovascular accident (CVA), unspecified mechanism ([address] )  -     Ambulatory Referral to Physical Therapy; Future  -     Ambulatory Referral to Occupational Therapy; Future    Hypercholesteremia    H/O mitral valve replacement with mechanical valve    Essential hypertension    Type 2 diabetes mellitus without complication, without long-term current use of insulin ([address] )       Continue with Coumadin therapies per cardiology   Continue with baby aspirin 81mg daily  Continue with Lipitor/atorvastatin 80mg daily  Goal LDL to be <70  Goal A1C to be <7 0  Goal BP to be 120-140/60-80  Can reach out to [facility] office on Aging for options re: Life Alert programs  Start in Outpatient PT/OT  Follow up with Neurology office in 4 months     Subjective/HPI:  Shakir Alarcon Frieda is 79yo female who was admitted to the hospital 03/11/2022 with right-sided weakness and dysarthria  Stroke alert in the ER, showed large evolving basal ganglion infarct with mild stenosis of the cervical ICA bilaterally and bilateral distal vertebral artery  Patient had been on Coumadin for mechanical valve in the outpatient setting, she had not been on any AP therapies  Patient was continued on Coumadin therapy upon discharge, remained on baby aspirin and high-dose statin with Lipitor 80 mg daily upon her discharge  Patient was sent skilled nursing facility for ongoing post stroke rehabilitation for which she was discharged to home therapies  Echo was completed, shows severely dilated left atrium was mildly dilated right atrium as well as presence of PFO  Per Cardiology this is a very small shunt needing no intervention  MRI of the brain confirm large left basal ganglion infarct extending to the parietal lobe    Patient residual deficits were right-sided weakness with right facial asymmetry and mild slurring of her speech which progressed with prolonged conversation  Patient had mild ataxia with right pronator drift due to weakness  Patient is here with outpatient Neurology for hospital follow-up  She is accompanied by her neighbor, has contacted her daughter via telephone for information and questions  Patient reports that after her discharge from the hospital she was in rehab for approximately 2 weeks  Stated she was doing well therefore they discharged her home with home therapies  Patient then had home physical therapy and nursing care for approximately 2 weeks before she was discharged from their services last week  Patient reports that she is ambulating fairly well, she uses a rolling walker however has recently started trying her cane  Patient does have macular degeneration and states that her vision does cause some issues with regards to her coordination and ambulation  Patient is indicated she does primarily use her rolling walker for stability  Patient continues to have some right-sided weakness however is functioning well  She has suffered 1 mechanical fall related to her rolling walker being tangled up  Patient stated that she hit the right side of her shoulder and arm as well as hip, no head injury was sustained  Patient stated that it was a soft fall and she had no significant trauma  Patient states that her right upper extremity is not as strong as it used to be however has had much improvement  Patient is left hand dominant, however she does utilize her right upper extremity for multiple task  Patient stated that she is not quite 100% does have some weakness of  however has noted significant improvement over time  Patient has equal sensation and reflexes throughout  Patient speech is fluent and clear  She denies having any significant dysarthria recently    States that at times if she is talking too frequently she will have some slurring due to fatigue however this has not happened for some time now  Patient has no issues with regards to swallowing  Patient is currently taking Coumadin 2 5 mg 3 times a week and 5 mg 4 times a week  Her last INR was 1 87 for which she reports she had had some medication adjustments 4  Patient stated she recheck her INR this morning, results remain pending at this time  Patient started baby aspirin 81 mg daily as well as Lipitor 80 mg daily  Had discussion with her and neighbor regarding vascular risk factors  Counseled on purpose of each of these medications in the role of vascular risk reduction  Patient aware LDL goal range to be < 70 as well as A1c goal to be < 7 0  Patient's last A1c was 6 5, LDL was 116  Patient's total cholesterol was 200  Patient has history of hypertension and obstructive sleep apnea, she is being well maintained, blood pressure today 136/62  Patient is aware of blood pressure goal ranges  Patient has since been discontinued from home therapies  She does continue to have some weaknesses and is early in her stroke recovery  Recommended patient be seen in outpatient therapy centers for gait and strengthening  Occupational therapies for further training coordination 2nd to visual disturbances related to her macular degeneration and post stroke  Reviewed patient's MRI with her and her neighbor, showed patient location for stroke in counseled extensively on vascular risks as well as risk for additional stroke in the future  Patient will follow-up in the outpatient neurology office 4 months after initiating outpatient therapy and continuing her current regimen          The following portions of the patient's history were reviewed and updated as appropriate: allergies, current medications, past family history, past medical history, past social history, past surgical history and problem list       Past Medical History:   Diagnosis Date    Stroke ([facility] ) 03/11/2022       Past Surgical History:   Procedure Laterality Date    EYE SURGERY      HYSTERECTOMY      MITRAL VALVE REPLACEMENT         Social History     Socioeconomic History    Marital status:      Spouse name: None    Number of children: None    Years of education: None    Highest education level: None   Occupational History    None   Tobacco Use    Smoking status: Former Smoker     Packs/day: 2 00     Years: 30 00     Pack years: 60 00     Quit date:      Years since quittin 3    Smokeless tobacco: Never Used   Vaping Use    Vaping Use: Never used   Substance and Sexual Activity    Alcohol use: Yes     Comment: special occasional    Drug use: No    Sexual activity: None   Other Topics Concern    None   Social History Narrative    None     Social Determinants of Health     Financial Resource Strain: Not on file   Food Insecurity: No Food Insecurity    Worried About Running Out of Food in the Last Year: Never true    Ivan of Food in the Last Year: Never true   Transportation Needs: No Transportation Needs    Lack of Transportation (Medical): No    Lack of Transportation (Non-Medical):  No   Physical Activity: Not on file   Stress: Not on file   Social Connections: Not on file   Intimate Partner Violence: Not on file   Housing Stability: Low Risk     Unable to Pay for Housing in the Last Year: No    Number of Places Lived in the Last Year: 1    Unstable Housing in the Last Year: No       Family History   Problem Relation Age of Onset    Heart failure Mother     Heart attack Father     Sleep apnea Brother          Current Outpatient Medications:     acetaminophen (TYLENOL) 325 mg tablet, Take 2 tablets (650 mg total) by mouth every 6 (six) hours as needed for mild pain or headaches, Disp: , Rfl: 0    aspirin (ECOTRIN LOW STRENGTH) 81 mg EC tablet, Take 1 tablet (81 mg total) by mouth daily, Disp: , Rfl: 0    atorvastatin (LIPITOR) 80 mg tablet, Take 1 tablet (80 mg total) by mouth daily, Disp: 90 tablet, Rfl: 1    Cholecalciferol (VITAMIN D PO), Take by mouth, Disp: , Rfl:     digoxin (LANOXIN) 0 125 mg tablet, Take 125 mcg by mouth daily, Disp: , Rfl:     melatonin 3 mg, Take 1 tablet (3 mg total) by mouth daily at bedtime as needed (Insomnia), Disp: , Rfl: 0    metFORMIN (GLUCOPHAGE) 500 mg tablet, Take 2 tablets by mouth twice daily (Patient taking differently: 500 mg 2 (two) times a day with meals  ), Disp: 360 tablet, Rfl: 0    Multiple Vitamins-Minerals (PRESERVISION AREDS PO), Take 2 tablets by mouth daily  , Disp: , Rfl:     nadolol (CORGARD) 20 mg tablet, Take 0 5 tablets (10 mg total) by mouth daily, Disp: , Rfl: 0    warfarin (COUMADIN) 2 5 mg tablet, Take 1 tablet (2 5 mg total) by mouth 3 (three) times a week On Monday Wednesday and Friday, Disp: , Rfl: 0    warfarin (COUMADIN) 5 mg tablet, Take 1 tablet (5 mg total) by mouth 4 (four) times a week On Sunday, Tuesday, Thursday, Saturday, Disp: , Rfl: 0  No current facility-administered medications for this visit  Allergies   Allergen Reactions    Sulfa Antibiotics     Sulfasalazine         Blood pressure 136/62, pulse 70, temperature 97 5 °F (36 4 °C), temperature source Tympanic, height 5' 5 5" (1 664 m), weight 70 3 kg (155 lb)  Objective:    Blood pressure 136/62, pulse 70, temperature 97 5 °F (36 4 °C), temperature source Tympanic, height 5' 5 5" (1 664 m), weight 70 3 kg (155 lb)  Physical Exam  Vitals reviewed  Constitutional:       Appearance: Normal appearance  She is well-developed  HENT:      Head: Normocephalic  Nose: Nose normal       Mouth/Throat:      Mouth: Mucous membranes are moist    Eyes:      General: Lids are normal       Extraocular Movements: Extraocular movements intact  Pupils: Pupils are equal, round, and reactive to light  Cardiovascular:      Rate and Rhythm: Normal rate  Pulmonary:      Effort: Pulmonary effort is normal    Abdominal:      Palpations: Abdomen is soft     Musculoskeletal:      Cervical back: Normal range of motion  Skin:     General: Skin is warm and dry  Neurological:      Mental Status: She is alert  Coordination: Romberg sign negative  Deep Tendon Reflexes: Reflexes are normal and symmetric  Psychiatric:         Attention and Perception: Attention and perception normal          Mood and Affect: Mood and affect normal          Speech: Speech normal          Behavior: Behavior normal  Behavior is cooperative  Thought Content: Thought content normal          Cognition and Memory: Cognition and memory normal          Judgment: Judgment normal          Neurological Exam  Mental Status  Alert  Oriented to person, place, time and situation  Recent and remote memory are intact  Speech is normal  Language is fluent with no aphasia  Attention and concentration are normal  Fund of knowledge is appropriate for level of education  Cranial Nerves  CN II: Visual acuity is normal  Visual fields full to confrontation  CN III, IV, VI: Extraocular movements intact bilaterally  Normal lids and orbits bilaterally  Pupils equal round and reactive to light bilaterally  CN V: Facial sensation is normal   CN VII: Full and symmetric facial movement  CN VIII: Hearing is normal   CN IX, X: Palate elevates symmetrically  Normal gag reflex  CN XI: Shoulder shrug strength is normal   CN XII: Tongue midline without atrophy or fasciculations  Motor  Normal muscle bulk throughout  Normal muscle tone  No abnormal involuntary movements  Strength is 5/5 in all four extremities except as noted  Right pronator drift                                               Right                     Left  Deltoid                                  5-                            Biceps                                  5-                           Brachioradialis                     5-                            Triceps                                 5-                            Iliopsoas                              5- Quadriceps                          5-                            Hamstring                            5-                           Ankle dorsiflexor                  5                           Ankle plantar flexor             5                            Pt with minimal drift in the RUE  Pt hold RUE lower than the LUE but is steady without drop  Sensory  Light touch is normal in upper and lower extremities  Temperature is normal in upper and lower extremities  Vibration is normal in upper and lower extremities  Proprioception is normal in upper and lower extremities  Reflexes  Deep tendon reflexes are 2+ and symmetric in all four extremities with downgoing toes bilaterally  Right pathological reflexes: Josi's absent  Left pathological reflexes: Josi's absent  Coordination  Right: Finger-to-nose normal  Rapid alternating movement normal  Heel-to-shin normal   Left: Finger-to-nose normal  Rapid alternating movement normal  Heel-to-shin normal     Gait  Casual gait: Wide stance  Reduced stride length  Antalgic gait  Reduced right arm swing  Reduced left arm swing  Romberg is absent  Unable to rise from chair without using arms  ROS:    Review of Systems   Constitutional: Negative  Negative for appetite change and fever  HENT: Positive for hearing loss  Negative for tinnitus, trouble swallowing and voice change  Eyes: Negative  Negative for photophobia and pain  Respiratory: Negative  Negative for shortness of breath  Cardiovascular: Negative  Negative for palpitations  Gastrointestinal: Negative  Negative for nausea and vomiting  Endocrine: Negative  Negative for cold intolerance  Genitourinary: Negative  Negative for dysuria, frequency and urgency  Musculoskeletal: Negative  Negative for myalgias and neck pain  Skin: Negative  Negative for rash  Neurological: Negative    Negative for dizziness, tremors, seizures, syncope, facial asymmetry, speech difficulty, weakness, light-headedness, numbness and headaches  Hematological: Negative  Does not bruise/bleed easily  Psychiatric/Behavioral: Negative  Negative for confusion, hallucinations and sleep disturbance  GELY reviwed and d/w pt, her neighbor and her daughter

## 2022-05-03 NOTE — PATIENT INSTRUCTIONS
Continue with Coumadin therapies per cardiology   Continue with baby aspirin 81mg daily  Continue with Lipitor/atorvastatin 80mg daily  Goal LDL to be <70  Goal A1C to be <7 0  Goal BP to be 120-140/60-80  Can reach out to [facility] office on Aging for options re: Life Alert programs    Start in Outpatient PT/OT  Follow up with Neurology office in 4 months

## 2022-05-03 NOTE — PROGRESS NOTES
Assessment/Plan:  1  Osteoarthritis of glenohumeral joint, right  Large joint arthrocentesis: R glenohumeral       Scribe Attestation    I,:  Dipak Spicer Call am acting as a scribe while in the presence of the attending physician :       I,:  Meagan Hill MD personally performed the services described in this documentation    as scribed in my presence :          Large joint arthrocentesis: R glenohumeral  [place] Protocol:  Consent: Verbal consent obtained  Risks and benefits: risks, benefits and alternatives were discussed  Consent given by: patient  Time out: Immediately prior to procedure a "time out" was called to verify the correct patient, procedure, equipment, support staff and site/side marked as required  Patient understanding: patient states understanding of the procedure being performed  Patient consent: the patient's understanding of the procedure matches consent given    Supporting Documentation  Indications: pain   Procedure Details  Location: shoulder - R glenohumeral  Preparation: Patient was prepped and draped in the usual sterile fashion  Needle size: 25 G  Ultrasound guidance: no  Approach: anterior  Medications administered: 4 mL lidocaine 1 %; 40 mg dexamethasone 100 mg/10 mL    Patient tolerance: patient tolerated the procedure well with no immediate complications  Dressing:  Sterile dressing applied          Sourav Cardoza and I discussed her arthritic condition in detail  I do feel she would benefit from a steroid injection today to help reduce her pain  She tolerated the procedure well  Post-injection instructions were provided  I encouraged her to continue with her home exercise program   She presents today with fairly decent range of motion and strength and I encouraged her to maintain this  Sourav Cardoza should monitor her blood glucose levels over the next 24-72 hours as the steroid can cause her to become hyperglycemic  She should avoid high sugary foods    She verbalized understanding  She can follow-up with me as needed for this  Subjective:   Kendall Leon is a 80 y o  female who presents to the office today for evaluation of her right shoulder  Thao Elder has no history has severe glenohumeral osteoarthritis  I did see her previously back in January and we discussed treatment options  She was not a surgical candidate due to a cardiac history  I did provide her a physician directed home exercise program   We also discussed the possibility of steroid injection  She had deferred the injection at that visit and returns today to further discuss the treatment option  Thao Elder states her right shoulder does ache her quite often  She complains of painful crepitus  She is unable to reach overhead  This will exacerbate her symptoms  Pertinent history includes a history of diabetes and of a stroke suffered on March 11, 2022  Thao Elder states that her diabetes is well controlled and she has attended therapy for her limitations following her stroke  Review of Systems   Constitutional: Negative for chills, fever and unexpected weight change  HENT: Negative for hearing loss, nosebleeds and sore throat  Eyes: Negative for pain, redness and visual disturbance  Respiratory: Negative for cough, shortness of breath and wheezing  Cardiovascular: Negative for chest pain, palpitations and leg swelling  Gastrointestinal: Negative for abdominal pain, nausea and vomiting  Endocrine: Negative for polydipsia and polyuria  Genitourinary: Negative for dysuria and hematuria  Musculoskeletal:        See HPI   Skin: Negative for rash and wound  Neurological: Negative for dizziness, numbness and headaches  Psychiatric/Behavioral: Negative for decreased concentration and suicidal ideas  The patient is not nervous/anxious            Past Medical History:   Diagnosis Date    [facility]roke [facility]) 03/11/2022       Past Surgical History:   Procedure Laterality Date    EYE SURGERY      HYSTERECTOMY      MITRAL VALVE REPLACEMENT         Family History   Problem Relation Age of Onset    Heart failure Mother     Heart attack Father     Sleep apnea Brother        Social History     Occupational History    Not on file   Tobacco Use    Smoking status: Former Smoker     Packs/day: 2 00     Years: 30 00     Pack years: 60 00     Quit date:      Years since quittin 3    Smokeless tobacco: Never Used   Vaping Use    Vaping Use: Never used   Substance and Sexual Activity    Alcohol use: Yes     Comment: special occasional    Drug use: No    Sexual activity: Not on file         Current Outpatient Medications:     acetaminophen (TYLENOL) 325 mg tablet, Take 2 tablets (650 mg total) by mouth every 6 (six) hours as needed for mild pain or headaches, Disp: , Rfl: 0    aspirin (ECOTRIN LOW STRENGTH) 81 mg EC tablet, Take 1 tablet (81 mg total) by mouth daily, Disp: , Rfl: 0    atorvastatin (LIPITOR) 80 mg tablet, Take 1 tablet (80 mg total) by mouth daily, Disp: 90 tablet, Rfl: 1    Cholecalciferol (VITAMIN D PO), Take by mouth, Disp: , Rfl:     melatonin 3 mg, Take 1 tablet (3 mg total) by mouth daily at bedtime as needed (Insomnia), Disp: , Rfl: 0    metFORMIN (GLUCOPHAGE) 500 mg tablet, Take 2 tablets by mouth twice daily (Patient taking differently: 500 mg 2 (two) times a day with meals  ), Disp: 360 tablet, Rfl: 0    Multiple Vitamins-Minerals (PRESERVISION AREDS PO), Take 2 tablets by mouth daily  , Disp: , Rfl:     nadolol (CORGARD) 20 mg tablet, Take 0 5 tablets (10 mg total) by mouth daily, Disp: , Rfl: 0    warfarin (COUMADIN) 2 5 mg tablet, Take 1 tablet (2 5 mg total) by mouth 3 (three) times a week On  and Friday, Disp: , Rfl: 0    warfarin (COUMADIN) 5 mg tablet, Take 1 tablet (5 mg total) by mouth 4 (four) times a week On , Tuesday, Thursday, Saturday, Disp: , Rfl: 0    digoxin (LANOXIN) 0 125 mg tablet, Take 125 mcg by mouth daily, Disp: , Rfl:     Allergies   Allergen Reactions    Sulfa Antibiotics     Sulfasalazine        Objective:  Vitals:    05/03/22 1024   BP: (!) 177/70   Pulse: 64       Right Shoulder Exam     Range of Motion   Active abduction: 140 (crepitus)   External rotation: 60   Forward flexion: 150   Internal rotation 0 degrees: L5     Muscle Strength   Abduction: 4/5   Internal rotation: 5/5   External rotation: 5/5     Other   Sensation: normal  Pulse: present (2+ radial)            Physical Exam  Vitals reviewed  Constitutional:       Appearance: She is well-developed  HENT:      Head: Normocephalic and atraumatic  Eyes:      General:         Right eye: No discharge  Left eye: No discharge  Conjunctiva/sclera: Conjunctivae normal    Cardiovascular:      Rate and Rhythm: Regular rhythm  Pulmonary:      Effort: Pulmonary effort is normal  No respiratory distress  Breath sounds: No stridor  Musculoskeletal:      Cervical back: Normal range of motion and neck supple  Skin:     General: Skin is warm and dry  Neurological:      Mental Status: She is alert and oriented to person, place, and time  Psychiatric:         Behavior: Behavior normal            I have personally reviewed pertinent films in PACS and my interpretation is as follows:  X-rays from January 28, 2022 were reviewed  There is evidence of severe glenohumeral osteoarthritis  There is little to no joint space  There was a loose body located in the inferior glenohumeral joint

## 2022-05-12 ENCOUNTER — OFFICE VISIT (OUTPATIENT)
Dept: INTERNAL MEDICINE CLINIC | Facility: CLINIC | Age: 82
End: 2022-05-12
Payer: MEDICARE

## 2022-05-12 ENCOUNTER — EVALUATION (OUTPATIENT)
Dept: OCCUPATIONAL THERAPY | Facility: CLINIC | Age: 82
End: 2022-05-12
Payer: MEDICARE

## 2022-05-12 VITALS
OXYGEN SATURATION: 97 % | BODY MASS INDEX: 25.07 KG/M2 | HEART RATE: 57 BPM | WEIGHT: 156 LBS | DIASTOLIC BLOOD PRESSURE: 64 MMHG | HEIGHT: 66 IN | SYSTOLIC BLOOD PRESSURE: 114 MMHG

## 2022-05-12 DIAGNOSIS — Z85.41 HISTORY OF CERVICAL CANCER: ICD-10-CM

## 2022-05-12 DIAGNOSIS — Z78.0 ASYMPTOMATIC MENOPAUSAL STATE: ICD-10-CM

## 2022-05-12 DIAGNOSIS — J30.1 ALLERGIC RHINITIS DUE TO POLLEN, UNSPECIFIED SEASONALITY: ICD-10-CM

## 2022-05-12 DIAGNOSIS — E11.69 TYPE 2 DIABETES MELLITUS WITH OTHER SPECIFIED COMPLICATION, WITHOUT LONG-TERM CURRENT USE OF INSULIN (HCC): Primary | ICD-10-CM

## 2022-05-12 DIAGNOSIS — D50.0 IRON DEFICIENCY ANEMIA DUE TO CHRONIC BLOOD LOSS: ICD-10-CM

## 2022-05-12 DIAGNOSIS — E11.9 TYPE 2 DIABETES MELLITUS WITHOUT COMPLICATION, WITHOUT LONG-TERM CURRENT USE OF INSULIN (HCC): ICD-10-CM

## 2022-05-12 DIAGNOSIS — R80.8 OTHER PROTEINURIA: ICD-10-CM

## 2022-05-12 DIAGNOSIS — I63.9 CEREBROVASCULAR ACCIDENT (CVA), UNSPECIFIED MECHANISM (HCC): ICD-10-CM

## 2022-05-12 DIAGNOSIS — I48.20 CHRONIC ATRIAL FIBRILLATION (HCC): ICD-10-CM

## 2022-05-12 DIAGNOSIS — J43.9 PULMONARY EMPHYSEMA, UNSPECIFIED EMPHYSEMA TYPE (HCC): ICD-10-CM

## 2022-05-12 DIAGNOSIS — E03.8 OTHER SPECIFIED HYPOTHYROIDISM: ICD-10-CM

## 2022-05-12 DIAGNOSIS — Z12.11 SCREENING FOR COLORECTAL CANCER: ICD-10-CM

## 2022-05-12 DIAGNOSIS — Z00.00 MEDICARE ANNUAL WELLNESS VISIT, SUBSEQUENT: ICD-10-CM

## 2022-05-12 DIAGNOSIS — Z12.12 SCREENING FOR COLORECTAL CANCER: ICD-10-CM

## 2022-05-12 DIAGNOSIS — E78.00 HYPERCHOLESTEREMIA: ICD-10-CM

## 2022-05-12 DIAGNOSIS — Z95.2 H/O MITRAL VALVE REPLACEMENT WITH MECHANICAL VALVE: ICD-10-CM

## 2022-05-12 DIAGNOSIS — I10 ESSENTIAL HYPERTENSION: ICD-10-CM

## 2022-05-12 PROCEDURE — 99214 OFFICE O/P EST MOD 30 MIN: CPT | Performed by: INTERNAL MEDICINE

## 2022-05-12 PROCEDURE — G0439 PPPS, SUBSEQ VISIT: HCPCS | Performed by: INTERNAL MEDICINE

## 2022-05-12 PROCEDURE — 97167 OT EVAL HIGH COMPLEX 60 MIN: CPT | Performed by: OCCUPATIONAL THERAPIST

## 2022-05-12 NOTE — PROGRESS NOTES
Assessment and Plan:     Problem List Items Addressed This Visit    None     Visit Diagnoses     Medicare annual wellness visit, subsequent        Screening for colorectal cancer            BMI Counseling: Body mass index is 25 56 kg/m²  The BMI is above normal  Nutrition recommendations include decreasing portion sizes, encouraging healthy choices of fruits and vegetables, decreasing fast food intake, consuming healthier snacks, limiting drinks that contain sugar, increasing intake of lean protein, reducing intake of saturated and trans fat and reducing intake of cholesterol  Exercise recommendations include strength training exercises  No pharmacotherapy was ordered  Rationale for BMI follow-up plan is due to patient being overweight or obese  Preventive health issues were discussed with patient, and age appropriate screening tests were ordered as noted in patient's After Visit Summary  Personalized health advice and appropriate referrals for health education or preventive services given if needed, as noted in patient's After Visit Summary       History of Present Illness:     Patient presents for Medicare Annual Wellness visit    Patient Care Team:  Sam Bruce MD as PCP - General (Internal Medicine)  Sav Villarreal CM as RN Care Manager (Care Coordination)     Problem List:     Patient Active Problem List   Diagnosis    Obstructive sleep apnea    [facility]    H/O mitral valve replacement with mechanical valve    Long term (current) use of anticoagulants    Presence of prosthetic heart valve    Hypercholesteremia    Anemia due to chronic kidney disease    Allergic rhinitis    Type 2 diabetes mellitus without complication, without long-term current use of insulin (HCC)    Iron deficiency anemia    Other specified hypothyroidism    Vitamin B12 deficiency    Other microscopic hematuria    Celiac disease    Abdominal aortic aneurysm (AAA) [facility]    Ataxia  Pulmonary emphysema (HCC)    Acute cystitis without hematuria    History of renal calculi    History of cervical cancer    Essential hypertension    Other proteinuria    Hepatic steatosis    CVA [facility]ce[facility] vascular accident) ([facility] )    Diabetes mellitus ([place] [address] )      Past Medical and Surgical History:     Past Medical History:   Diagnosis Date    Stroke ([place] [address] ) 2022     Past Surgical History:   Procedure Laterality Date    EYE SURGERY      HYSTERECTOMY      MITRAL VALVE REPLACEMENT        Family History:     Family History   Problem Relation Age of Onset    Heart failure Mother     Heart attack Father     Sleep apnea Brother       Social History:     Social History     Socioeconomic History    Marital status:      Spouse name: None    Number of children: None    Years of education: None    Highest education level: None   Occupational History    None   Tobacco Use    Smoking status: Former Smoker     Packs/day: 2 00     Years: 30 00     Pack years: 60 00     Quit date:      Years since quittin 3    Smokeless tobacco: Never Used   Vaping Use    Vaping Use: Never used   Substance and Sexual Activity    Alcohol use: Yes     Comment: special occasional    Drug use: No    Sexual activity: None   Other Topics Concern    None   Social History Narrative    None     Social Determinants of Health     Financial Resource Strain: Not on file   Food Insecurity: No Food Insecurity    Worried About Running Out of Food in the Last Year: Never true    Ivan of Food in the Last Year: Never true   Transportation Needs: No Transportation Needs    Lack of Transportation (Medical): No    Lack of Transportation (Non-Medical):  No   Physical Activity: Not on file   Stress: Not on file   Social Connections: Not on file   Intimate Partner Violence: Not on file   Housing Stability: Low Risk     Unable to Pay for Housing in the Last Year: No    Number of Places Lived in the Last Year: 1  Unstable Housing in the Last Year: No      Medications and Allergies:     Current Outpatient Medications   Medication Sig Dispense Refill    acetaminophen (TYLENOL) 325 mg tablet Take 2 tablets (650 mg total) by mouth every 6 (six) hours as needed for mild pain or headaches  0    aspirin (ECOTRIN LOW STRENGTH) 81 mg EC tablet Take 1 tablet (81 mg total) by mouth daily  0    atorvastatin (LIPITOR) 80 mg tablet Take 1 tablet (80 mg total) by mouth daily 90 tablet 1    Cholecalciferol (VITAMIN D PO) Take by mouth      digoxin (LANOXIN) 0 125 mg tablet Take 125 mcg by mouth daily      melatonin 3 mg Take 1 tablet (3 mg total) by mouth daily at bedtime as needed (Insomnia)  0    metFORMIN (GLUCOPHAGE) 500 mg tablet Take 2 tablets by mouth twice daily (Patient taking differently: 500 mg  in the morning and 500 mg in the evening  Take with meals ) 360 tablet 0    Multiple Vitamins-Minerals (PRESERVISION AREDS PO) Take 2 tablets by mouth daily        nadolol (CORGARD) 20 mg tablet Take 0 5 tablets (10 mg total) by mouth daily  0    warfarin (COUMADIN) 2 5 mg tablet Take 1 tablet (2 5 mg total) by mouth 3 (three) times a week On Monday Wednesday and Friday  0    warfarin (COUMADIN) 5 mg tablet Take 1 tablet (5 mg total) by mouth 4 (four) times a week On Sunday, Tuesday, Thursday, Saturday  0     No current facility-administered medications for this visit  Allergies   Allergen Reactions    Sulfa Antibiotics     Sulfasalazine       Immunizations: There is no immunization history on file for this patient     Health Maintenance:         Topic Date Due    Hepatitis C Screening  Completed         Topic Date Due    COVID-19 Vaccine (1) Never done    Pneumococcal Vaccine: 65+ Years (1 - PCV) Never done    DTaP,Tdap,and Td Vaccines (1 - Tdap) Never done      Medicare Health Risk Assessment:     Pulse 57   Ht 5' 5 5" (1 664 m)   Wt 70 8 kg (156 lb)   SpO2 97%   BMI 25 56 kg/m²      Glendon Ormond is here for her Subsequent Wellness visit  Last Medicare Wellness visit information reviewed, patient interviewed and updates made to the record today  Health Risk Assessment:   Patient rates overall health as very good  Patient feels that their physical health rating is same  Patient is satisfied with their life  Eyesight was rated as slightly worse  Hearing was rated as same  Patient feels that their emotional and mental health rating is same  Patients states they are never, rarely angry  Patient states they are often unusually tired/fatigued  Pain experienced in the last 7 days has been none  Patient states that she has experienced weight loss or gain in last 6 months  She has been down some  She has a good appetite  She has some tiredness post covid infection  Her eye sight is not great  She gave up driving 2 years ago because of this  She sees a specialist for her eye issues  Fall Risk Screening: In the past year, patient has experienced: history of falling in past year    Injured during fall?: No    Feels unsteady when standing or walking?: No    Worried about falling?: No      Urinary Incontinence Screening:   Patient has not leaked urine accidently in the last six months  No issues  Home Safety:  Patient does not have trouble with stairs inside or outside of their home  Patient has working smoke alarms and has working carbon monoxide detector  Home safety hazards include: none  No Home hazards  She feels safe in her home  She is regularly up and down her stairs  Some extra rails were installed at stairs and bathrooms  Nutrition:   Current diet is Regular and Diabetic  Eats a balanced diet  She does not drink  Watches her her sugar intake  Medications:   Patient is currently taking over-the-counter supplements  OTC medications include: see medication list  Patient is able to manage medications  Pt is very independent  She mages her meds with no issues      Activities of Daily Living (ADLs)/Instrumental Activities of Daily Living (IADLs):   Walk and transfer into and out of bed and chair?: Yes  Dress and groom yourself?: Yes    Bathe or shower yourself?: Yes    Feed yourself? Yes  Do your laundry/housekeeping?: Yes  Manage your money, pay your bills and track your expenses?: Yes  Make your own meals?: Yes    Do your own shopping?: Yes    ADL comments: Pt is independent  She no longer drives because of deteriorating eye sight  Family assists her in driving  Previous Hospitalizations:   Any hospitalizations or ED visits within the last 12 months?: Yes    How many hospitalizations have you had in the last year?: 1-2    Hospitalization Comments: She had a stroke this year was hospitalized and then went to rehab for therapy  Advance Care Planning:   Living will: Yes    Durable POA for healthcare: Yes    Advanced directive: Yes    Advanced directive counseling given: No    Five wishes given: No    Patient declined ACP directive: No    End of Life Decisions reviewed with patient: No    Provider agrees with end of life decisions: Yes      Comments: Pt has a Living will and healthcare POA  She will bring a copy for upload at next visit      Cognitive Screening:   Provider or family/friend/caregiver concerned regarding cognition?: No    PREVENTIVE SCREENINGS      Cardiovascular Screening:    General: Screening Not Indicated and History Lipid Disorder      Diabetes Screening:     General: Screening Not Indicated and History Diabetes      Colorectal Cancer Screening:     General: Screening Current      Breast Cancer Screening:     General: Screening Current      Cervical Cancer Screening:    General: History Cervical Cancer      Osteoporosis Screening:    General: Risks and Benefits Discussed and Patient Declines      Abdominal Aortic Aneurysm (AAA) Screening:        General: Screening Not Indicated and History AAA      Lung Cancer Screening:     General: Screening Not Indicated      Hepatitis C Screening:    General: Screening Current      Preventive Screening Comments: [facility]  She will think about a pneumonia vax  Rec Dtap if not boosted in last 10 years  She does not wish to have any more mammograms or screening colonoscopies  Screening, Brief Intervention, and Referral to Treatment (SBIRT)    Screening  Typical number of drinks in a day: 0  Typical number of drinks in a week: 0  Interpretation: Low risk drinking behavior  AUDIT-C Screenin) How often did you have a drink containing alcohol in the past year? never  2) How many drinks did you have on a typical day when you were drinking in the past year? 0  3) How often did you have 6 or more drinks on one occasion in the past year? never    AUDIT-C Score: 0  Interpretation: Score 0-2 (female): Negative screen for alcohol misuse    Single Item Drug Screening:  How often have you used an illegal drug (including marijuana) or a prescription medication for non-medical reasons in the past year? never    Single Item Drug Screen Score: 0  Interpretation: Negative screen for possible drug use disorder    Brief Intervention  Alcohol & drug use screenings were reviewed  No concerns regarding substance use disorder identified  No issues      Other Counseling Topics:   Skin self-exam        Jason Villalobos MD

## 2022-05-12 NOTE — PROGRESS NOTES
[facility] Office Visit Note  22     Janice  80 y o  female MRN: [id number]  : 1940    Assessment:     1  Type 2 diabetes mellitus with other specified complication, without long-term current use of insulin ([place] [address] )    2  Medicare annual wellness visit, subsequent    3  Screening for colorectal cancer    4  Asymptomatic menopausal state  -     DXA bone density spine hip and pelvis; Future; Expected date: 2022    5  Cerebrovascular accident (CVA), unspecified mechanism ([place] [address] )    6  Essential hypertension  -     Comprehensive metabolic panel; Future; Expected date: 2022  -     Microalbumin / creatinine urine ratio  -     CBC; Future; Expected date: 2022    7  Pulmonary emphysema, unspecified emphysema type ([place] [address] )    8  Allergic rhinitis due to pollen, unspecified seasonality    9  Other specified hypothyroidism    10  Type 2 diabetes mellitus without complication, without long-term current use of insulin (HCC)  -     Hemoglobin A1C; Future; Expected date: 2022  -     Microalbumin / creatinine urine ratio  -     CBC; Future; Expected date: 2022    11  Chronic atrial fibrillation (HCC)    12  Hypercholesteremia  -     Comprehensive metabolic panel; Future; Expected date: 2022  -     Lipid panel; Future; Expected date: 2022  -     CBC; Future; Expected date: 2022    13  History of cervical cancer    14  Other proteinuria    15  Iron deficiency anemia due to chronic blood loss  -     CBC; Future; Expected date: 2022    16  H/O mitral valve replacement with mechanical valve  -     CBC; Future; Expected date: 2022          Discussion Summary and Plan: Today's care plan and medications were reviewed with patient in detail and all their questions answered to their satisfaction      Chief Complaint   Patient presents with    Medicare Wellness Visit    Follow-up     CVA, history of mitral wall replacement,    Hypertension    Hyperlipidemia    Congestive Heart Failure    Atrial Fibrillation    Diabetes    Hypothyroidism      Subjective:  Patient is here for follow-up of stroke as well as the multiple chronic disease management  CVA:  Patient was recently hospitalized with CVA with predominant deficit was that of a speech  Patient is is getting physical therapy occupational therapy and speech therapy tolerating fairly well  Her gait is reasonable  Walks with a cane  Speech is pretty much back to normal   Denies any focal weakness  Patient was recently hospitalized on 03/11/2022 and discharged on 03/14/2022  Patient was not a candidate for tPA due to improvement  MRI of the brain confirmed left lacunar infarct involving left basal ganglia extending into the periventricular white matter of the left parietal lobe adjacent to posterior aspect of the left lateral ventricle  Diabetes: Denies any polydipsia polyuria hypoglycemic hyperglycemic his cast hyperglycemic symptoms  Patient at 1 time blood sugar was 89  She did not eat day  Of no other major issue as it relates to diabetes  Remains on metformin 500 mg twice a day  Hyperlipidemia symptom-free remains on statin due for lipid profile hemoglobin A1c and urine for microalbumin prior to next visit  Chronic atrial fibrillation remains on anticoagulation INR was somewhat subtherapeutic adjusted by cardiologist   Reji Setting for follow-up INR in 2 weeks they are managing it  History of anemia last hemoglobin was in the range of 10  No symptoms of anemia  Patient is not bleeding  Mild anemia was multifactorial     Status post MVR  History of cystitis and hematuria stable  Home    History of MVR  Paragraphs remains on nadolol losartan with holding parameters  Renal functions are fair  Speech is back to normal walking fairly well      Atrial fibrillation controlled ventricular rate remains on nadolol anticoagulations digoxin patient will continue to follow with cardiologist     Gait is reasonable  She deconditioning better ambulation is better speech is better will keep patient will continue physical therapy per the plan speech therapy per plan and occupational therapy per plan  I will follow-up in 2 months  04/26/2022:  CBC normal except hemoglobin 10 5, blood sugar 148 INR 1 87 LFTs normal GFR 81  02/17/2022:  Creatinine 0 73 blood sugar 178 rest CMP unremarkable CBC unremarkable,  vitamin-D 90 9 TSH 1 64 hemoglobin A1c 6 8 digoxin level 1 0  INR:  03/01/2022 3 05 ,  01/25/2022:  2 84, 12/21/2021:  2 92, urine for microalbumin/creatinine ratio 50 normal  03/14/2022:  CBC normal, BMP normal except elevated blood sugar 146  03/12/2022:  Hemoglobin A1c 6 5 lipid lipid profile reveals , HDL 38, triglyceride 230        The following portions of the patient's history were reviewed and updated as appropriate: allergies, current medications, past family history, past medical history, past social history, past surgical history and problem list     Review of Systems   All other systems reviewed and are negative          Historical Information   Patient Active Problem List   Diagnosis    Obstructive sleep apnea    Chronic atrial fibrillation ([address] )    H/O mitral valve replacement with mechanical valve    Long term (current) use of anticoagulants    Presence of prosthetic heart valve    Hypercholesteremia    Anemia due to chronic kidney disease    Allergic rhinitis    Type 2 diabetes mellitus without complication, without long-term current use of insulin (HCC)    Iron deficiency anemia    Other specified hypothyroidism    Vitamin B12 deficiency    Other microscopic hematuria    Celiac disease    Abdominal aortic aneurysm (AAA) ([address] )    Ataxia    Pulmonary emphysema (HCC)    Acute cystitis without hematuria    History of renal calculi    History of cervical cancer    Essential hypertension    Other proteinuria    Hepatic steatosis    [facility]A [facility]ce[facility] vascular accident) [facility] )    Diabetes mellitus ([place] [address] )     Past Medical History:   Diagnosis Date    Stroke ([place] [address] ) 2022     Past Surgical History:   Procedure Laterality Date    EYE SURGERY      HYSTERECTOMY      MITRAL VALVE REPLACEMENT       Social History     Substance and Sexual Activity   Alcohol Use Yes    Comment: special occasional     Social History     Substance and Sexual Activity   Drug Use No     Social History     Tobacco Use   Smoking Status Former Smoker    Packs/day: 2 00    Years: 30 00    Pack years: 60 00    Quit date:     Years since quittin 3   Smokeless Tobacco Never Used     Family History   Problem Relation Age of Onset    Heart failure Mother     Heart attack Father     Sleep apnea Brother      Health Maintenance Due   Topic    COVID-19 Vaccine (1)    Pneumococcal Vaccine: 65+ Years (1 - PCV)    DTaP,Tdap,and Td Vaccines (1 - Tdap)      Meds/Allergies       Current Outpatient Medications:     acetaminophen (TYLENOL) 325 mg tablet, Take 2 tablets (650 mg total) by mouth every 6 (six) hours as needed for mild pain or headaches, Disp: , Rfl: 0    aspirin (ECOTRIN LOW STRENGTH) 81 mg EC tablet, Take 1 tablet (81 mg total) by mouth daily, Disp: , Rfl: 0    atorvastatin (LIPITOR) 80 mg tablet, Take 1 tablet (80 mg total) by mouth daily, Disp: 90 tablet, Rfl: 1    Cholecalciferol (VITAMIN D PO), Take by mouth, Disp: , Rfl:     digoxin (LANOXIN) 0 125 mg tablet, Take 125 mcg by mouth daily, Disp: , Rfl:     melatonin 3 mg, Take 1 tablet (3 mg total) by mouth daily at bedtime as needed (Insomnia), Disp: , Rfl: 0    metFORMIN (GLUCOPHAGE) 500 mg tablet, Take 2 tablets by mouth twice daily (Patient taking differently: 500 mg  in the morning and 500 mg in the evening   Take with meals ), Disp: 360 tablet, Rfl: 0    Multiple Vitamins-Minerals (PRESERVISION AREDS PO), Take 2 tablets by mouth daily  , Disp: , Rfl:     nadolol (CORGARD) 20 mg tablet, Take 0 5 tablets (10 mg total) by mouth daily, Disp: , Rfl: 0    warfarin (COUMADIN) 2 5 mg tablet, Take 1 tablet (2 5 mg total) by mouth 3 (three) times a week On Monday Wednesday and Friday, Disp: , Rfl: 0    warfarin (COUMADIN) 5 mg tablet, Take 1 tablet (5 mg total) by mouth 4 (four) times a week On Sunday, Tuesday, Thursday, Saturday, Disp: , Rfl: 0      Objective:    Vitals:   Pulse 57   Ht 5' 5 5" (1 664 m)   Wt 70 8 kg (156 lb)   SpO2 97%   BMI 25 56 kg/m²   Body mass index is 25 56 kg/m²  Vitals:    05/12/22 1109   Weight: 70 8 kg (156 lb)       Physical Exam  Vitals and nursing note reviewed  Constitutional:       General: She is not in acute distress  Appearance: Normal appearance  She is well-developed  She is not ill-appearing, toxic-appearing or diaphoretic  Comments: Short grey hair   HENT:      Head: Normocephalic and atraumatic  Right Ear: External ear normal       Left Ear: External ear normal    Eyes:      General: No scleral icterus  Right eye: No discharge  Left eye: No discharge  Conjunctiva/sclera: Conjunctivae normal    Neck:      Thyroid: No thyroid mass, thyromegaly or thyroid tenderness  Vascular: Normal carotid pulses  No carotid bruit or JVD  Cardiovascular:      Rate and Rhythm: Normal rate  Rhythm irregular  Pulses:           Dorsalis pedis pulses are 2+ on the right side and 2+ on the left side  Posterior tibial pulses are 1+ on the right side and 1+ on the left side  Heart sounds: S1 normal  Murmur heard  Systolic murmur is present with a grade of 2/6  Comments: S2 elevated  Pulmonary:      Effort: No respiratory distress  Breath sounds: Normal breath sounds  No stridor  No wheezing, rhonchi or rales  Abdominal:      General: Bowel sounds are normal  There is no distension  Palpations: There is no shifting dullness, fluid wave, hepatomegaly, mass or pulsatile mass  Tenderness: There is no abdominal tenderness   There is no rebound  Hernia: There is no hernia in the umbilical area, ventral area, left inguinal area, left femoral area or right inguinal area  Musculoskeletal:         General: Normal range of motion  Cervical back: Normal range of motion  Right lower leg: No edema  Left lower leg: No edema  Feet:      Right foot:      Skin integrity: Callus present  No ulcer, skin breakdown, erythema, warmth or dry skin  Left foot:      Skin integrity: Callus present  No ulcer, skin breakdown, erythema, warmth or dry skin  Lymphadenopathy:      Cervical: No cervical adenopathy  Right cervical: No superficial cervical adenopathy  Skin:     General: Skin is warm  Findings: No rash  Neurological:      General: No focal deficit present  Mental Status: She is alert and oriented to person, place, and time  Mental status is at baseline  Cranial Nerves: Cranial nerves are intact  Sensory: Sensation is intact  Motor: Motor function is intact  Coordination: Coordination normal       Gait: Tandem walk abnormal  Gait normal       Deep Tendon Reflexes: Reflexes normal       Comments: Speech is normal   Strength proximal and distal both upper and lower extremity right and left are within normal limit gait is reasonable walks with a cane  Psychiatric:         Mood and Affect: Mood normal          Behavior: Behavior normal          Thought Content:  Thought content normal          Judgment: Judgment normal          Lab Review   Transcribe Orders on 05/03/2022   Component Date Value Ref Range Status    Protime 05/03/2022 26 6 (A) 11 6 - 14 5 seconds Final    INR 05/03/2022 2 61 (A) 0 84 - 1 19 Final   Appointment on 04/26/2022   Component Date Value Ref Range Status    WBC 04/26/2022 5 64  4 31 - 10 16 Thousand/uL Final    RBC 04/26/2022 3 79 (A) 3 81 - 5 12 Million/uL Final    Hemoglobin 04/26/2022 10 5 (A) 11 5 - 15 4 g/dL Final    Hematocrit 04/26/2022 35 0  34 8 - 46 1 % Final  MCV 04/26/2022 92  82 - 98 fL Final    MCH 04/26/2022 27 7  26 8 - 34 3 pg Final    MCHC 04/26/2022 30 0 (A) 31 4 - 37 4 g/dL Final    RDW 04/26/2022 14 6  11 6 - 15 1 % Final    Platelets 22/62/3125 249  149 - 390 Thousands/uL Final    MPV 04/26/2022 10 7  8 9 - 12 7 fL Final    Sodium 04/26/2022 139  136 - 145 mmol/L Final    Potassium 04/26/2022 4 2  3 5 - 5 3 mmol/L Final    Chloride 04/26/2022 107  100 - 108 mmol/L Final    CO2 04/26/2022 28  21 - 32 mmol/L Final    ANION GAP 04/26/2022 4  4 - 13 mmol/L Final    BUN 04/26/2022 16  5 - 25 mg/dL Final    Creatinine 04/26/2022 0 69  0 60 - 1 30 mg/dL Final    Standardized to IDMS reference method    Glucose, Fasting 04/26/2022 148 (A) 65 - 99 mg/dL Final    Specimen collection should occur prior to Sulfasalazine administration due to the potential for falsely depressed results  Specimen collection should occur prior to Sulfapyridine administration due to the potential for falsely elevated results   Calcium 04/26/2022 9 3  8 3 - 10 1 mg/dL Final    AST 04/26/2022 30  5 - 45 U/L Final    Specimen collection should occur prior to Sulfasalazine administration due to the potential for falsely depressed results   ALT 04/26/2022 35  12 - 78 U/L Final    Specimen collection should occur prior to Sulfasalazine and/or Sulfapyridine administration due to the potential for falsely depressed results   Alkaline Phosphatase 04/26/2022 79  46 - 116 U/L Final    Total Protein 04/26/2022 7 7  6 4 - 8 2 g/dL Final    Albumin 04/26/2022 4 2  3 5 - 5 0 g/dL Final    Total Bilirubin 04/26/2022 0 69  0 20 - 1 00 mg/dL Final    Use of this assay is not recommended for patients undergoing treatment with eltrombopag due to the potential for falsely elevated results      eGFR 04/26/2022 81  ml/min/1 73sq m Final    Protime 04/26/2022 20 7 (A) 11 6 - 14 5 seconds Final    INR 04/26/2022 1 87 (A) 0 84 - 1 19 Final   No results displayed because visit has over 200 results  Patient Instructions       Medicare Preventive Visit Patient Instructions  Thank you for completing your Welcome to Medicare Visit or Medicare Annual Wellness Visit today  Your next wellness visit will be due in one year (5/13/2023)  The screening/preventive services that you may require over the next 5-10 years are detailed below  Some tests may not apply to you based off risk factors and/or age  Screening tests ordered at today's visit but not completed yet may show as past due  Also, please note that scanned in results may not display below  Preventive Screenings:  Service Recommendations Previous Testing/Comments   Colorectal Cancer Screening  * Colonoscopy    * Fecal Occult Blood Test (FOBT)/Fecal Immunochemical Test (FIT)  * Fecal DNA/Cologuard Test  * Flexible Sigmoidoscopy Age: 54-65 years old   Colonoscopy: every 10 years (may be performed more frequently if at higher risk)  OR  FOBT/FIT: every 1 year  OR  Cologuard: every 3 years  OR  Sigmoidoscopy: every 5 years  Screening may be recommended earlier than age 48 if at higher risk for colorectal cancer  Also, an individualized decision between you and your healthcare provider will decide whether screening between the ages of 74-80 would be appropriate  Colonoscopy: 06/15/2020  FOBT/FIT: 06/15/2020  Cologuard: Not on file  Sigmoidoscopy: Not on file          Breast Cancer Screening Age: 36 years old  Frequency: every 1-2 years  Not required if history of left and right mastectomy Mammogram: 03/12/2020        Cervical Cancer Screening Between the ages of 21-29, pap smear recommended once every 3 years  Between the ages of 33-67, can perform pap smear with HPV co-testing every 5 years     Recommendations may differ for women with a history of total hysterectomy, cervical cancer, or abnormal pap smears in past  Pap Smear: Not on file        Hepatitis C Screening Once for adults born between 1945 and 1965  More frequently in patients at high risk for Hepatitis C Hep C Antibody: 07/05/2019        Diabetes Screening 1-2 times per year if you're at risk for diabetes or have pre-diabetes Fasting glucose: 148 mg/dL   A1C: 6 5 %        Cholesterol Screening Once every 5 years if you don't have a lipid disorder  May order more often based on risk factors  Lipid panel: 03/12/2022          Other Preventive Screenings Covered by Medicare:  1  Abdominal Aortic Aneurysm (AAA) Screening: covered once if your at risk  You're considered to be at risk if you have a family history of AAA  2  Lung Cancer Screening: covers low dose CT scan once per year if you meet all of the following conditions: (1) Age 50-69; (2) No signs or symptoms of lung cancer; (3) Current smoker or have quit smoking within the last 15 years; (4) You have a tobacco smoking history of at least 30 pack years (packs per day multiplied by number of years you smoked); (5) You get a written order from a healthcare provider  3  Glaucoma Screening: covered annually if you're considered high risk: (1) You have diabetes OR (2) Family history of glaucoma OR (3)  aged 48 and older OR (3)  American aged 72 and older  3  Osteoporosis Screening: covered every 2 years if you meet one of the following conditions: (1) You're estrogen deficient and at risk for osteoporosis based off medical history and other findings; (2) Have a vertebral abnormality; (3) On glucocorticoid therapy for more than 3 months; (4) Have primary hyperparathyroidism; (5) On osteoporosis medications and need to assess response to drug therapy  · Last bone density test (DXA Scan): 06/27/2017  5  HIV Screening: covered annually if you're between the age of 12-76  Also covered annually if you are younger than 13 and older than 72 with risk factors for HIV infection  For pregnant patients, it is covered up to 3 times per pregnancy      Immunizations:  Immunization Recommendations   Influenza Vaccine Annual influenza vaccination during flu season is recommended for all persons aged >= 6 months who do not have contraindications   Pneumococcal Vaccine (Prevnar and Pneumovax)  * Prevnar = PCV13  * Pneumovax = PPSV23   Adults 25-60 years old: 1-3 doses may be recommended based on certain risk factors  Adults 72 years old: Prevnar (PCV13) vaccine recommended followed by Pneumovax (PPSV23) vaccine  If already received PPSV23 since turning 65, then PCV13 recommended at least one year after PPSV23 dose  Hepatitis B Vaccine 3 dose series if at intermediate or high risk (ex: diabetes, end stage renal disease, liver disease)   Tetanus (Td) Vaccine - COST NOT COVERED BY MEDICARE PART B Following completion of primary series, a booster dose should be given every 10 years to maintain immunity against tetanus  Td may also be given as tetanus wound prophylaxis  Tdap Vaccine - COST NOT COVERED BY MEDICARE PART B Recommended at least once for all adults  For pregnant patients, recommended with each pregnancy  Shingles Vaccine (Shingrix) - COST NOT COVERED BY MEDICARE PART B  2 shot series recommended in those aged 48 and above     Health Maintenance Due:      Topic Date Due    Hepatitis C Screening  Completed     Immunizations Due:      Topic Date Due    COVID-19 Vaccine (1) Never done    Pneumococcal Vaccine: 65+ Years (1 - PCV) Never done    DTaP,Tdap,and Td Vaccines (1 - Tdap) Never done     Advance Directives   What are advance directives? Advance directives are legal documents that state your wishes and plans for medical care  These plans are made ahead of time in case you lose your ability to make decisions for yourself  Advance directives can apply to any medical decision, such as the treatments you want, and if you want to donate organs  What are the types of advance directives? There are many types of advance directives, and each state has rules about how to use them   You may choose a combination of any of the following:  · Living will: This is a written record of the treatment you want  You can also choose which treatments you do not want, which to limit, and which to stop at a certain time  This includes surgery, medicine, IV fluid, and tube feedings  · Durable power of  for healthcare [facility]): This is a written record that states who you want to make healthcare choices for you when you are unable to make them for yourself  This person, called a proxy, is usually a family member or a friend  You may choose more than 1 proxy  · Do not resuscitate (DNR) order:  A DNR order is used in case your heart stops beating or you stop breathing  It is a request not to have certain forms of treatment, such as CPR  A DNR order may be included in other types of advance directives  · Medical directive: This covers the care that you want if you are in a coma, near death, or unable to make decisions for yourself  You can list the treatments you want for each condition  Treatment may include pain medicine, surgery, blood transfusions, dialysis, IV or tube feedings, and a ventilator (breathing machine)  · Values history: This document has questions about your views, beliefs, and how you feel and think about life  This information can help others choose the care that you would choose  Why are advance directives important? An advance directive helps you control your care  Although spoken wishes may be used, it is better to have your wishes written down  Spoken wishes can be misunderstood, or not followed  Treatments may be given even if you do not want them  An advance directive may make it easier for your family to make difficult choices about your care  Weight Management   Why it is important to manage your weight:  Being overweight increases your risk of health conditions such as heart disease, high blood pressure, type 2 diabetes, and certain types of cancer   It can also increase your risk for osteoarthritis, sleep apnea, and other respiratory problems  Aim for a slow, steady weight loss  Even a small amount of weight loss can lower your risk of health problems  How to lose weight safely:  A safe and healthy way to lose weight is to eat fewer calories and get regular exercise  You can lose up about 1 pound a week by decreasing the number of calories you eat by 500 calories each day  Healthy meal plan for weight management:  A healthy meal plan includes a variety of foods, contains fewer calories, and helps you stay healthy  A healthy meal plan includes the following:  · Eat whole-grain foods more often  A healthy meal plan should contain fiber  Fiber is the part of grains, fruits, and vegetables that is not broken down by your body  Whole-grain foods are healthy and provide extra fiber in your diet  Some examples of whole-grain foods are whole-wheat breads and pastas, oatmeal, brown rice, and bulgur  · Eat a variety of vegetables every day  Include dark, leafy greens such as spinach, kale, reina greens, and mustard greens  Eat yellow and orange vegetables such as carrots, sweet potatoes, and winter squash  · Eat a variety of fruits every day  Choose fresh or canned fruit (canned in its own juice or light syrup) instead of juice  Fruit juice has very little or no fiber  · Eat low-fat dairy foods  Drink fat-free (skim) milk or 1% milk  Eat fat-free yogurt and low-fat cottage cheese  Try low-fat cheeses such as mozzarella and other reduced-fat cheeses  · Choose meat and other protein foods that are low in fat  Choose beans or other legumes such as split peas or lentils  Choose fish, skinless poultry (chicken or turkey), or lean cuts of red meat (beef or pork)  Before you cook meat or poultry, cut off any visible fat  · Use less fat and oil  Try baking foods instead of frying them  Add less fat, such as margarine, sour cream, regular salad dressing and mayonnaise to foods  Eat fewer high-fat foods   Some examples of high-fat foods include french fries, doughnuts, ice cream, and cakes  · Eat fewer sweets  Limit foods and drinks that are high in sugar  This includes candy, cookies, regular soda, and sweetened drinks  Exercise:  Exercise at least 30 minutes per day on most days of the week  Some examples of exercise include walking, biking, dancing, and swimming  You can also fit in more physical activity by taking the stairs instead of the elevator or parking farther away from stores  Ask your healthcare provider about the best exercise plan for you  © Copyright [employer] 2018 Information is for End User's use only and may not be sold, redistributed or otherwise used for commercial purposes  All illustrations and images included in CareNotes® are the copyrighted property of A D [employer]  or Kathie Russo      Follow with Consultants as per their and our suggestion    Follow up in 2 months or as needed earlier    Follow all instructions as advised and discussed  Take your medications as prescribed  Call the office immediately if you experience any side effects  Ask questions if you do not understand  Keep your scheduled appointment as advised or come sooner if necessary or in doubt  Best time to call for non-urgent matter or questions on weekdays is between 9am and 12 noon  See physician for any new symptoms or worsening of current symptoms  Urgent or emergent situations call 911 and report to nearest emergency room  I spent  30+ minutes taking care of this patient including clinical care, conseling, collaboration, chart, lab and consultaion review as appropriate  Additional time were spent in annual wellness examinations    I reviewed with of her and offered her opportunity to ask any questions about annual wellness  Patient is to get labs 1 week(s) prior to next visit if advised         Dr Lars Turcios MD  [facility]       "This note has been constructed using a voice recognition system  Therefore there may be syntax, spelling, and/or grammatical errors   Please call if you have any questions  "

## 2022-05-12 NOTE — PATIENT INSTRUCTIONS
Medicare Preventive Visit Patient Instructions  Thank you for completing your Welcome to Medicare Visit or Medicare Annual Wellness Visit today  Your next wellness visit will be due in one year (5/13/2023)  The screening/preventive services that you may require over the next 5-10 years are detailed below  Some tests may not apply to you based off risk factors and/or age  Screening tests ordered at today's visit but not completed yet may show as past due  Also, please note that scanned in results may not display below  Preventive Screenings:  Service Recommendations Previous Testing/Comments   Colorectal Cancer Screening  * Colonoscopy    * Fecal Occult Blood Test (FOBT)/Fecal Immunochemical Test (FIT)  * Fecal DNA/Cologuard Test  * Flexible Sigmoidoscopy Age: 54-65 years old   Colonoscopy: every 10 years (may be performed more frequently if at higher risk)  OR  FOBT/FIT: every 1 year  OR  Cologuard: every 3 years  OR  Sigmoidoscopy: every 5 years  Screening may be recommended earlier than age 48 if at higher risk for colorectal cancer  Also, an individualized decision between you and your healthcare provider will decide whether screening between the ages of 74-80 would be appropriate  Colonoscopy: 06/15/2020  FOBT/FIT: 06/15/2020  Cologuard: Not on file  Sigmoidoscopy: Not on file          Breast Cancer Screening Age: 36 years old  Frequency: every 1-2 years  Not required if history of left and right mastectomy Mammogram: 03/12/2020        Cervical Cancer Screening Between the ages of 21-29, pap smear recommended once every 3 years  Between the ages of 33-67, can perform pap smear with HPV co-testing every 5 years     Recommendations may differ for women with a history of total hysterectomy, cervical cancer, or abnormal pap smears in past  Pap Smear: Not on file        Hepatitis C Screening Once for adults born between [facility]  More frequently in patients at high risk for Hepatitis C Hep C Antibody: 07/05/2019        Diabetes Screening 1-2 times per year if you're at risk for diabetes or have pre-diabetes Fasting glucose: 148 mg/dL   A1C: 6 5 %        Cholesterol Screening Once every 5 years if you don't have a lipid disorder  May order more often based on risk factors  Lipid panel: 03/12/2022          Other Preventive Screenings Covered by Medicare:  Abdominal Aortic Aneurysm (AAA) Screening: covered once if your at risk  You're considered to be at risk if you have a family history of AAA  Lung Cancer Screening: covers low dose CT scan once per year if you meet all of the following conditions: (1) Age 50-69; (2) No signs or symptoms of lung cancer; (3) Current smoker or have quit smoking within the last 15 years; (4) You have a tobacco smoking history of at least 30 pack years (packs per day multiplied by number of years you smoked); (5) You get a written order from a healthcare provider  Glaucoma Screening: covered annually if you're considered high risk: (1) You have diabetes OR (2) Family history of glaucoma OR (3)  aged 48 and older OR (3)  American aged 72 and older  Osteoporosis Screening: covered every 2 years if you meet one of the following conditions: (1) You're estrogen deficient and at risk for osteoporosis based off medical history and other findings; (2) Have a vertebral abnormality; (3) On glucocorticoid therapy for more than 3 months; (4) Have primary hyperparathyroidism; (5) On osteoporosis medications and need to assess response to drug therapy  Last bone density test (DXA Scan): 06/27/2017  HIV Screening: covered annually if you're between the age of 12-76  Also covered annually if you are younger than 13 and older than 72 with risk factors for HIV infection  For pregnant patients, it is covered up to 3 times per pregnancy      Immunizations:  Immunization Recommendations   Influenza Vaccine Annual influenza vaccination during flu season is recommended for all persons aged >= 6 months who do not have contraindications   Pneumococcal Vaccine (Prevnar and Pneumovax)  * Prevnar = PCV13  * Pneumovax = PPSV23   Adults 25-60 years old: 1-3 doses may be recommended based on certain risk factors  Adults 72 years old: Prevnar (PCV13) vaccine recommended followed by Pneumovax (PPSV23) vaccine  If already received PPSV23 since turning 65, then PCV13 recommended at least one year after PPSV23 dose  Hepatitis B Vaccine 3 dose series if at intermediate or high risk (ex: diabetes, end stage renal disease, liver disease)   Tetanus (Td) Vaccine - COST NOT COVERED BY MEDICARE PART B Following completion of primary series, a booster dose should be given every 10 years to maintain immunity against tetanus  Td may also be given as tetanus wound prophylaxis  Tdap Vaccine - COST NOT COVERED BY MEDICARE PART B Recommended at least once for all adults  For pregnant patients, recommended with each pregnancy  Shingles Vaccine (Shingrix) - COST NOT COVERED BY MEDICARE PART B  2 shot series recommended in those aged 48 and above     Health Maintenance Due:      Topic Date Due    Hepatitis C Screening  Completed     Immunizations Due:      Topic Date Due    COVID-19 Vaccine (1) Never done    Pneumococcal Vaccine: 65+ Years (1 - PCV) Never done    DTaP,Tdap,and Td Vaccines (1 - Tdap) Never done     Advance Directives   What are advance directives? Advance directives are legal documents that state your wishes and plans for medical care  These plans are made ahead of time in case you lose your ability to make decisions for yourself  Advance directives can apply to any medical decision, such as the treatments you want, and if you want to donate organs  What are the types of advance directives? There are many types of advance directives, and each state has rules about how to use them  You may choose a combination of any of the following:  Living will:   This is a written record of the treatment you want  You can also choose which treatments you do not want, which to limit, and which to stop at a certain time  This includes surgery, medicine, IV fluid, and tube feedings  Durable power of  for healthcare [facility]): This is a written record that states who you want to make healthcare choices for you when you are unable to make them for yourself  This person, called a proxy, is usually a family member or a friend  You may choose more than 1 proxy  Do not resuscitate (DNR) order:  A DNR order is used in case your heart stops beating or you stop breathing  It is a request not to have certain forms of treatment, such as CPR  A DNR order may be included in other types of advance directives  Medical directive: This covers the care that you want if you are in a coma, near death, or unable to make decisions for yourself  You can list the treatments you want for each condition  Treatment may include pain medicine, surgery, blood transfusions, dialysis, IV or tube feedings, and a ventilator (breathing machine)  Values history: This document has questions about your views, beliefs, and how you feel and think about life  This information can help others choose the care that you would choose  Why are advance directives important? An advance directive helps you control your care  Although spoken wishes may be used, it is better to have your wishes written down  Spoken wishes can be misunderstood, or not followed  Treatments may be given even if you do not want them  An advance directive may make it easier for your family to make difficult choices about your care  Weight Management   Why it is important to manage your weight:  Being overweight increases your risk of health conditions such as heart disease, high blood pressure, type 2 diabetes, and certain types of cancer  It can also increase your risk for osteoarthritis, sleep apnea, and other respiratory problems  Aim for a slow, steady weight loss   Even a small amount of weight loss can lower your risk of health problems  How to lose weight safely:  A safe and healthy way to lose weight is to eat fewer calories and get regular exercise  You can lose up about 1 pound a week by decreasing the number of calories you eat by 500 calories each day  Healthy meal plan for weight management:  A healthy meal plan includes a variety of foods, contains fewer calories, and helps you stay healthy  A healthy meal plan includes the following:  Eat whole-grain foods more often  A healthy meal plan should contain fiber  Fiber is the part of grains, fruits, and vegetables that is not broken down by your body  Whole-grain foods are healthy and provide extra fiber in your diet  Some examples of whole-grain foods are whole-wheat breads and pastas, oatmeal, brown rice, and bulgur  Eat a variety of vegetables every day  Include dark, leafy greens such as spinach, kale, reina greens, and mustard greens  Eat yellow and orange vegetables such as carrots, sweet potatoes, and winter squash  Eat a variety of fruits every day  Choose fresh or canned fruit (canned in its own juice or light syrup) instead of juice  Fruit juice has very little or no fiber  Eat low-fat dairy foods  Drink fat-free (skim) milk or 1% milk  Eat fat-free yogurt and low-fat cottage cheese  Try low-fat cheeses such as mozzarella and other reduced-fat cheeses  Choose meat and other protein foods that are low in fat  Choose beans or other legumes such as split peas or lentils  Choose fish, skinless poultry (chicken or turkey), or lean cuts of red meat (beef or pork)  Before you cook meat or poultry, cut off any visible fat  Use less fat and oil  Try baking foods instead of frying them  Add less fat, such as margarine, sour cream, regular salad dressing and mayonnaise to foods  Eat fewer high-fat foods  Some examples of high-fat foods include french fries, doughnuts, ice cream, and cakes  Eat fewer sweets  Limit foods and drinks that are high in sugar  This includes candy, cookies, regular soda, and sweetened drinks  Exercise:  Exercise at least 30 minutes per day on most days of the week  Some examples of exercise include walking, biking, dancing, and swimming  You can also fit in more physical activity by taking the stairs instead of the elevator or parking farther away from stores  Ask your healthcare provider about the best exercise plan for you  © Copyright [employer] 2018 Information is for End User's use only and may not be sold, redistributed or otherwise used for commercial purposes  All illustrations and images included in CareNotes® are the copyrighted property of A D A M , Inc  or [employer] [facility] [employer]       Follow with Consultants as per their and our suggestion    Follow up in 2 months or as needed earlier    Follow all instructions as advised and discussed  Take your medications as prescribed  Call the office immediately if you experience any side effects  Ask questions if you do not understand  Keep your scheduled appointment as advised or come sooner if necessary or in doubt  Best time to call for non-urgent matter or questions on weekdays is between 9am and 12 noon  See physician for any new symptoms or worsening of current symptoms  Urgent or emergent situations call 911 and report to nearest emergency room  I spent  30+ minutes taking care of this patient including clinical care, conseling, collaboration, chart, lab and consultaion review as appropriate  Additional time were spent in annual wellness examinations    I reviewed with of her and offered her opportunity to ask any questions about annual wellness  Patient is to get labs 1 week(s) prior to next visit if advised

## 2022-05-12 NOTE — PROGRESS NOTES
OCCUPATIONAL THERAPY INITIAL EVALUATION    Today's Date: 2022  Patient Name: Tree Pinon  : 1940  MRN: [id number]  Referring Provider: Ady White  Dx: I63 [id number] Cerebrovascular accident    SKILLED ANALYSIS:  Pt demonstrating RUE [address] and proprioception deficits, decreased motor control, balance deficits, and vision impairment and seeking OT services  Pt requires A with functional mobility, IADLs, and homemaking tasks and would benefit from OT services  Pt's deficits noted based on finger-nose testing, MAS, MMT, CATA, functional dexterity test  Plan to complete MoCA blind next session d/t report of slight changes in cognitive function since CVA  Based on results of OT evaluation recommend OT services 2x/wk for 12 weeks and pt in agreement with POC  PLAN OF CARE START: 22  PLAN OF CARE END: 22  FREQUENCY: 2x/wk  NO AUTH REQUIRED  PRECAUTIONS: falls- walk to front, vision impairment d/t macular degeneration      Subjective   Occupational Profile **    Mechanism of Injury  Presented with speech difficulties, right-sided weakness, headache started 9:00 a m  On day of presentation (3/11/22), self resolved in 30 minutes  NIH stroke scale 0 on presentation  Patient again had a recurrence of symptoms around 3:30 p m  on 3/11 and again improved gradually  CT head and neck without any acute ischemia/hemorrhage  Evidence of old lacunar infarct  No evidence of vascular abnormality  Not a candidate for tPA due to improvement in symptoms and anticoagulation  MRI of the brain confirmed-left lacunar infarct involving left basal ganglia, extending into the periventricular white matter of the left parietal lobe, adjacent to the posterior aspect of the left lateral ventricle  Repeat CT scan of head 3/13 due to headache-reveals evolving  changes related to CVA  No evidence of hemorrhage    Occupational Profile  Pt lives alone in a 2 floor home with 2 steps to enter   She has railings on her stairs, grab bars in her tub and stands to bathe, rails around toilet (no RTS)  She has a neighbor that lives across the street who drives her to most of her appointments, and another neighbor that assists with things like taking the garbage out and getting her mail if needed  She raised 5 children, and 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  She enjoys using her iPad to play games and do paint by numbers, watching TV, going for walks with her friends (she doesn't go alone d/t vision deficits), and going to the store with her friends  Pt states that she uses her phone to take pictures of things like the stove and buttons on her washing machine and enlarges them in order to compensate for visual deficits  PATIENT GOAL: "My balance is kind of julianna  I know there are other things I can do [to improve my balance] "    HISTORY OF PRESENT ILLNESS:     Pt is a 80 y o  female who was referred to Occupational Therapy s/p CVA      PMH:   Past Medical History:   Diagnosis Date    Stroke ([facility] [address] ) 2022       Past Surgical Hx:   Past Surgical History:   Procedure Laterality Date    EYE SURGERY      HYSTERECTOMY      MITRAL VALVE REPLACEMENT          Pain Levels:      Restin    With Activity:  0      Objective  9 HOLE PEG TEST: not completed d/t vision deficits    Functional Dexterity Test for in-hand manipulation  R UE: 1:00  L UE: 43 6 seconds     Further Observations in UE Assessment    Subluxation: none    No scapular winging noted    No spasticity Noted     PROPRIOCEPTION: RUE mild-moderately impaired, LUE mildly impaired    SENSORY TESTING: Reports no changes in sensation      Impairments Section:  UE Strength:              CATA: RUE: 41 3/200 LUE: 40/200  The age norm is approximately 42 lbs for R and 37 lbs for L indicating  strength WNL                              Range of Motion:  AROM:   RUE- pt reports she was told that she needs a shoulder replacement but is not a candidate for surgery  - shoulder flexion ~120*  - shoulder adbuction ~90*  - IR WNL  - ER limited  - elbow flexion WNL  - elbow extension WNL  - wrist flexion WNL  - wrist extension WNL    LUE  - shoulder flexion WFL  - shoulder adbuction WFL  - IR WNL  - ER limited  - elbow flexion WNL  - elbow extension WNL  - wrist flexion WNL  - wrist extension WNL     MMT:  R UE:   -  shoulder flexion 3-/5  - elbow flexion 5/5  -  elbow extension 5/5  -  wrist flexion 5/5  -  wrist extension 5/5     L UE 5/5 in all pivots     Pt is left hand dominant         GOALS:   Short Term Goals:  Pt will increase rate of manipulation for the functional dexterity test by 4 seconds in order to improve [facility] for ADLs/IADLs in 4 weeks  Pt will increase proprioception of RUE hand to target to mild impairment for improved functional reach vision occluded with ADL tasks 4 weeks  Pt will demo with G carryover of Home Exercise Program to improve functional progression towards goals in Plan of care and for improved functional use of RUE 4 weeks    Long Term Goals: 8-12 weeks  Pt will increase rate of manipulation for the functional dexterity test by 8 seconds in order to improve [facility] for ADLs/IADLs  Pt will increase proprioception of RUE hand to target to mild impairment with vision occluded (accurate 4/5 trials) for improved functional reach during ADLs/IADLs    Pt will demo with 90% carryover of Home Exercise Program to improve functional progression towards goals in Plan of care and for improved functional use of RUE 4 weeks          OTHER PLANNED THERAPY INTERVENTIONS:   Supine, seated, and in stance neuro re-ed  Tricep AG  NMES/FES  FMC/prehension  Timed Trials  Manual tx  Hand to target  Sensory re-ed  Seated functional reach: crossing midline  Supine place and hold  WBearing strategies   Closed chain activities  Open chain activities

## 2022-05-18 ENCOUNTER — EVALUATION (OUTPATIENT)
Dept: PHYSICAL THERAPY | Facility: CLINIC | Age: 82
End: 2022-05-18
Payer: MEDICARE

## 2022-05-18 ENCOUNTER — OFFICE VISIT (OUTPATIENT)
Dept: OCCUPATIONAL THERAPY | Facility: CLINIC | Age: 82
End: 2022-05-18
Payer: MEDICARE

## 2022-05-18 DIAGNOSIS — I63.9 CEREBROVASCULAR ACCIDENT (CVA), UNSPECIFIED MECHANISM (HCC): Primary | ICD-10-CM

## 2022-05-18 DIAGNOSIS — R26.89 BALANCE DISORDER: ICD-10-CM

## 2022-05-18 DIAGNOSIS — R26.89 OTHER ABNORMALITIES OF GAIT AND MOBILITY: ICD-10-CM

## 2022-05-18 PROCEDURE — 97112 NEUROMUSCULAR REEDUCATION: CPT

## 2022-05-18 PROCEDURE — 97163 PT EVAL HIGH COMPLEX 45 MIN: CPT

## 2022-05-18 NOTE — PROGRESS NOTES
PT Evaluation          Insurance:  AMA/CMS Eval/ Re-eval POC expires Gin Figueroa #/ Referral # Total   Visits  Start date  Expiration date Extension  Visit limitation? PT only or  PT+OT? Co-Insurance   Medicare  CMS 5-18-22 8-10-22 8-10 N/A N/A N/A N/A N/A No PT/OT Yes and $0 Co-pay                                                                    Today's date: 2022  Patient name: Lynne Bass  : 1940  MRN: [id number]  Referring provider: Madeline Stewart  Dx:   Encounter Diagnosis     ICD-10-CM    1  Cerebrovascular accident (CVA), unspecified mechanism ([facility] [address] )  I63 9    2  Other abnormalities of gait and mobility  R26 89    3  Balance disorder  R26 89          Assessment  Assessment details: Patient is a 80 y o  Female who presents to skilled outpatient PT with deficits LE strength, gait, balance, and endurance following a L CVA she experienced 2 months ago impacting her ability to perform ADLs and ambulation safely  LE strength WFL, however asymmetrical B/L with L > R strength  Coordination and sensation screens intact and unremarkable  No spasticity noted throughout LE via Modified Laina  She demonstrated HIGH risk for falls per results of 5xSTS, TUG, TUG cognitive, 10 meter, FGA, DGI, and miniBEST with cut off scores taken from APTA and Rehab Measures  Decreased functional cardio capacity per results of 6MWT  She ambulated without an AD today and demonstrated the following gait abnormalities: decreased gary, diminished heel strike/push off B/L (R > L), decreased hip/knee flexion throughout gait cycle, increased lateral trunk sway, decreased reciprocal arm swing, and veering  These gait abnormalities further perpetuate her HIGH risk for falls   She will benefit from skilled outpatient PT in order to maximize her function following CVA and promote highest possible level of independence within her home and the community as the patient lives alone  Impairments: Abnormal coordination, Abnormal gait, Abnormal muscle tone, Abnormal or restricted ROM, Activity intolerance, Impaired balance, Impaired physical strength, Lacks appropriate HEP, Poor posture, Poor body mechanics, Safety issue, Weight-bearing intolerance, Abnormal movement and Abnormal muscle firing  Understanding of Dx/Px/POC: Good  Prognosis: Good      Patient verbalized understanding of POC  Please contact me if you have any questions or recommendations  Thank you for the referral and the opportunity to share in [address] care          Plan  Plan details: HIGT, high level balance exercises  Patient would benefit from: PT Eval and Skilled PT  Planned modality interventions: Biofeedback, Cryotherapy, TENS and Thermotherapy: Hydrocollator Packs  Planned therapy interventions: Abdominal trunk stabilization, ADL training, Balance, Balance/WB training, Breathing training, Body mechanics training, Coordination, Functional ROM exercises, Gait training, HEP, Joint mobilization, Manual therapy, Pandey taping, Motor coordination training, Neuromuscular re-education, Patient education, Postural training, Strengthening, Stretching, Therapeutic activities, Therapeutic exercises, Therapeutic training and Activity modification  Frequency: 2x/wk  Duration in weeks: 12  Plan of Care beginning date: 5-18-22  Plan of Care expiration date: 12 weeks - 8-10-22  Treatment plan discussed with: Patient         Goals  Short Term Goals (4 weeks):    - Patient will improve time on TUG by 2 9 seconds to facilitate improved safety in all ambulation  - Patient will be independent in basic HEP 2-3 weeks  - Patient will improve 5xSTS score by 2 3 seconds to promote improved LE functional strength needed for ADLs  - Patient will complete components of CB&M to promote agility necessary for sports related tasks    Long Term Goals (12 weeks):  - Patient will be independent in a comprehensive home exercise program  - Patient will improve gait speed by 0 18 m/s to improve safety with community ambulation  - Patient will improve time on TUG cognitive to be < 10% difference between TUG in order to reduce her risk for falls  - Patient will improve scoring on FGA by 4 points to progress safety with dynamic tasks  - Patient will be able to demonstrate HT in gait without veering  - Patient will improve 6 Minute Walk Test score by 190 feet to promote improved cardiovascular endurance  - Patient will report 50% reduction in near falls in order to improve safety with functional tasks and reduce his risk for falls  - Patient will report going on walks at least 3 days per week to promote independence and improved cardiovascular endurance  - Patient will be able to ascend/descend stairs reciprocally without UE assist to promote independence and safety with ADLs  - Patient will report 50% reduction in near falls when ambulating on uneven terrain      Cut off score    All date taken from APTA Neuro Section or Rehab Measures      Monsalve:  Miguel et al , 2018  [employer] [address]: 2 7 pts    Emil , 2011  Cut-off score: 45/56    Chronic CVA  < 44/56 high risk for falls (Miguel et al , 2018)  < 47 5/56 slow walker status (Cayetano andrade al , 2011) 5xSTS: Gustavo 2010  [address] [address]: 2 3 sec  Age Norms:  60-69: 11 1 sec  70-79: 12 6 sec  80-89: 14 8 sec    Farrah 2010, Chronic Stroke  Chronic CVA: 12 sec   TUG  Gina , 2005  MDC: 2 9 sec    Cut off score:  >13 5 sec community dwelling adults  >32 2 frail elderly  <20 I for basic transfers  >30 dependent on transfers 10 Meter Walk Test:   Beth nice , 2006  Small meaningful change: 0 06 m/s  Substantial meaningful change: 0 14 m/s  MCID: 0 16 m/s    < 0 4 m/s household ambulators  0 4 - 0 8 m/s limited community ambulators  > 0 8 m/s community ambulators   FGA: Navi nice , 2010  MCID: 4 2 pts  Geriatrics/community < 22/30 fall risk  Geriatrics/community < 20/30 unexplained falls DGI  MDC: vestibular - 4 pts  MDC: geriatric/community - 3 pts  Falls risk <19/24   6 Minute Walk Test  Irene nice , 2006  MDC: 60 98 m (200 01 ft)    Azul Araujo, & Cut off, 2012  MCID: 34 4 m    Age Norms  60-69: M - 1876 ft   F - 1765 ft  70-79: M - 1729 ft   F - 1545 ft  80-89: M - 1368 ft   F - 1286 ft Modified Laina  0: No increase in tone  1: Catch and release or min resistance at end range  1+: Catch f/b min resistance throughout remainder (< half ROM)  2: Easily moved, but more marked tone throughout most ROM  3: Significant tone, PROM difficult  4: Rigid   MiniBest: Alla et al , 2013  CVA < 17 5 fall risk Pass (Acute CVA)  MDC: 1 8 points (acute), 3 2 points (chronic)         Subjective    History of Present Illness  Mechanism of injury: Patient is an 79 y/o female who had a 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 that has mostly recovered since  She was at [facility] for 3 days and then was transferred to [facility] in [place] for 2 weeks    Primary AD: Rollator  Assist level at home: Independent  WC usage: No  PLOF: Independent    Pain  Current pain ratin/10  At best pain ratin/10  At worst pain ratin/10  Location: N/A  Aggravating factors: N/A    Social Support  Steps to enter house: Yes, HR  Stairs in house: Yes, 2 HR   Lives in: Multi-level home  Lives with: Alone    Employment status: Retired  Hand dominance: R    Treatments  Previous treatment: PT/OT  Current treatment: PT/OT  Diagnostic Testing: CT, MRI      Objective     Vitals  - HR: 98 bpm  - BP: 120/70 mmHg  - SPO2: 97%    HR Max  - 208 - (0 7x82)  = 150 6 bpm    LE MMT  - R Hip Flexion: 4-/5  - L Hip Flexion: 4/5  - R Hip Extension: 4-/5  - L Hip Extension: 4/5  - R Hip Abduction: 4-/5  - L Hip abduction: 4/5  - R Hip adduction: 4-/5  - L Hip adduction: 4+/5  - R Knee Extension: 5/5  - L Knee Extension: 5/5  - R Knee Flexion: 4/5  - L Knee Flexion: 4+/5  - R Ankle DF: 45  - L Ankle DF: 45  - R Ankle PF: 4/5  - L Ankle PF: 4/5    Sensation  - Light touch:  Intact  - Deep pressure: Intact    Coordination  - Dysmetria: Intact  - Dysdiadochokinesia: Intact    Modified Alina  - R knee extension: 0 - no increase in tone  - L knee extension: 0 - no increase in tone  - R knee flexion: 0 - no increase in tone   - L knee flexion: 0 - no increase in tone  - R ankle DF: 0 - no increase in tone   - L ankle DF: 0 - no increase in tone      Outcome Measures Initial Eval  5-18-22        5xSTS 16 58 sec,   0 UE        TUG  - Regular  - Cognitive   15 63 sec  16 81 sec (stopped counting)        10 meter 0 92 m/s        FGA 16/30        DGI 15/24        MiniBEST 14/28        PASS NT        6MWT 900 ft        CB&M NT                      Precautions: L CVA  Past Medical History:   Diagnosis Date    [facility]/11/20[facility]

## 2022-05-18 NOTE — PROGRESS NOTES
Daily Note     Today's date: 2022  Patient name: Parminder Herrera  : 1940  MRN: [id number]  Referring provider: Stephon Swift  Dx:   Encounter Diagnosis   Name Primary?  Cerebrovascular accident (CVA), unspecified mechanism [facility] Yes                  PLAN OF CARE END: 22  FREQUENCY: 2x/wk  NO AUTH REQUIRED  PRECAUTIONS: falls- walk to front, vision impairment d/t macular degeneration    Subjective: "I take a picture of the temperature on my oven and zoom in to see if I have the right temp"      Objective: See treatment below  PT SEEN FOR DOLORES COGNITIVE ASSESSMENT  MODIFIED BY PRESENTING ANIMALS AND FIGURE ENLARGED ON COMPUTER SCREEN AND PROVIDED WITH WRITTEN TRAIL MAKING ENLARGED AND BOLD  SCORED  25/30 (including 1 additional point for education level) INDICATING MILD COGNITIVE IMPAIRMENT FOR AGE/EDUCATION  SCORES ARE AS FOLLOWS:    VISUOSPATIAL/EXECUTIVE FUNCTION: 5/  Pt was able to complete trail  Pt was able to copy cube  Pt was able to draw a clock, accurately place the numbers, and properly place the hands at ten past eleven  NAMIN/3  Pt able to name 2/3 animals  Able to see and describe other animal but not name    ATTENTION: 4/6  Pt was able to repeat sequence of numbers forwards and backwards  Pt able to attend to sequence of letters  Pt was able to correctly subtract 7 from 100 1/5 times  LANGUAGE: 2/3  Pt was able to repeat sentences back to therapist  Pt was able to produce 9 of words starting with the letter F in 1 minute  ABSTRACTION: 1/2  Pt was able to identify the similarity of two items 1/2 trials  DELAYED RECALL: 3/5  Pt recalled 3/5 words without cues  Pt recalled 2/5 words with multiple choice options  ORIENTATION:   Pt is oriented to date, month, year, day, place and city      -Squig removal from vertical surface in stance and placement into container in a variety of planes   2lb wrist weight worn for proprioceptive input and strengthening  Focus on dynamic standing balance reaching outside LUCIANO, functional grasp/release R UE, R UE motor planning and coordination, and dynamic reaching R UE    -Double spot activity with 2lb wrist weight for proprioceptive input  Focus on pincer grasp, dynamic reaching and sustained attention  Upgraded to include picking up 3 pieces at a time and use of palm to finger translation to achieve pincer grasp and place into board  Drop 1 item, knocks down board 2x    -Mosaic marble board with use of 2lb wrist weight, yellow resistive pinch pin  Upgraded to red resistive pinch pin and asked pt to place marbles on the correct colors as able (limited by vision)  2 items dropped    Assessment: Tolerated treatment well  Pt demonstrating mild cognitive deficits on MoCA  Demonstrates difficulty with in hand manipulation R UE, [facility]  Pt would benefit from continued OT services to address NMRE R UE and cognitive deficits  Plan: Continued skilled OT per POC  GOALS TO BE ADDED 5/18:  -Pt will demonstrate improved functional cognition by scoring WNL on MoCA (modified for vision deficits)  -Pt will demonstrate improved delayed recall by scoring at at least 14/15 on MIS portion of MoCA

## 2022-05-19 ENCOUNTER — NURSE TRIAGE (OUTPATIENT)
Dept: OTHER | Facility: OTHER | Age: 82
End: 2022-05-19

## 2022-05-19 NOTE — TELEPHONE ENCOUNTER
Regarding: covid care advise  ----- Message from Kyle Muhammad sent at 5/19/2022  5:34 PM EDT -----  "I have a cough, headache, and a runny nose    My symptoms started two days ago, and I tested positive on a home test   I am not vaccinated "

## 2022-05-19 NOTE — TELEPHONE ENCOUNTER
Reason for Disposition   [1] COVID-19 diagnosed by doctor (or NP/PA) AND [2] mild symptoms (e g , cough, fever, others) AND [7] no complications or SOB    Answer Assessment - Initial Assessment Questions  1  COVID-19 DIAGNOSIS: "Who made your COVID-19 diagnosis?" "Was it confirmed by a positive lab test or self-test?" If not diagnosed by a doctor (or NP/PA), ask "Are there lots of cases (community spread) where you live?" Note: See public health department website, if unsure  Home test positive today  2  COVID-19 EXPOSURE: "Was there any known exposure to COVID before the symptoms began?" CDC Definition of close contact: within 6 feet (2 meters) for a total of 15 minutes or more over a 24-hour period  Spencer on Sunday and he tested positive on Monday am  3  ONSET: "When did the COVID-19 symptoms start?"       Tuesday evening thought it was allergies  4  WORST SYMPTOM: "What is your worst symptom?" (e g , cough, fever, shortness of breath, muscle aches)      Runny nose  5  COUGH: "Do you have a cough?" If Yes, ask: "How bad is the cough?"        Yes, made a home remedy with garlic, lemon and honey  6  FEVER: "Do you have a fever?" If Yes, ask: "What is your temperature, how was it measured, and when did it start?"      denies  7  RESPIRATORY STATUS: "Describe your breathing?" (e g , shortness of breath, wheezing, unable to speak)       denies  8  BETTER-SAME-WORSE: "Are you getting better, staying the same or getting worse compared to yesterday?"  If getting worse, ask, "In what way?"      worse  9  HIGH RISK DISEASE: "Do you have any chronic medical problems?" (e g , asthma, heart or lung disease, weak immune system, obesity, etc )      yes  10  VACCINE: "Have you had the COVID-19 vaccine?" If Yes, ask: "Which one, how many shots, when did you get it?"        Did not  11  BOOSTER: "Have you received your COVID-19 booster?" If Yes, ask: "Which one and when did you get it?"        Did not  12  PREGNANCY: "Is there any chance you are pregnant?" "When was your last menstrual period?"        n/a  13  OTHER SYMPTOMS: "Do you have any other symptoms?"  (e g , chills, fatigue, headache, loss of smell or taste, muscle pain, sore throat)        denies  14   O2 SATURATION MONITOR:  "Do you use an oxygen saturation monitor (pulse oximeter) at home?" If Yes, ask "What is your reading (oxygen level) today?" "What is your usual oxygen saturation reading?" (e g , 95%)        n/a    Protocols used: CORONAVIRUS (COVID-19) DIAGNOSED OR SUSPECTED-ADULT-

## 2022-05-20 ENCOUNTER — APPOINTMENT (OUTPATIENT)
Dept: OCCUPATIONAL THERAPY | Facility: CLINIC | Age: 82
End: 2022-05-20
Payer: MEDICARE

## 2022-05-20 ENCOUNTER — OFFICE VISIT (OUTPATIENT)
Dept: INTERNAL MEDICINE CLINIC | Facility: CLINIC | Age: 82
End: 2022-05-20
Payer: MEDICARE

## 2022-05-20 ENCOUNTER — APPOINTMENT (OUTPATIENT)
Dept: PHYSICAL THERAPY | Facility: CLINIC | Age: 82
End: 2022-05-20
Payer: MEDICARE

## 2022-05-20 VITALS — OXYGEN SATURATION: 95 % | HEART RATE: 65 BPM

## 2022-05-20 DIAGNOSIS — I10 ESSENTIAL HYPERTENSION: ICD-10-CM

## 2022-05-20 DIAGNOSIS — I63.9 CEREBROVASCULAR ACCIDENT (CVA), UNSPECIFIED MECHANISM (HCC): ICD-10-CM

## 2022-05-20 DIAGNOSIS — N18.30 ANEMIA DUE TO STAGE 3 CHRONIC KIDNEY DISEASE, UNSPECIFIED WHETHER STAGE 3A OR 3B CKD (HCC): ICD-10-CM

## 2022-05-20 DIAGNOSIS — E03.8 OTHER SPECIFIED HYPOTHYROIDISM: ICD-10-CM

## 2022-05-20 DIAGNOSIS — E11.9 TYPE 2 DIABETES MELLITUS WITHOUT COMPLICATION, WITHOUT LONG-TERM CURRENT USE OF INSULIN (HCC): ICD-10-CM

## 2022-05-20 DIAGNOSIS — I48.20 CHRONIC ATRIAL FIBRILLATION (HCC): ICD-10-CM

## 2022-05-20 DIAGNOSIS — U07.1 COVID-19: Primary | ICD-10-CM

## 2022-05-20 DIAGNOSIS — Z95.2 H/O MITRAL VALVE REPLACEMENT WITH MECHANICAL VALVE: ICD-10-CM

## 2022-05-20 DIAGNOSIS — D63.1 ANEMIA DUE TO STAGE 3 CHRONIC KIDNEY DISEASE, UNSPECIFIED WHETHER STAGE 3A OR 3B CKD (HCC): ICD-10-CM

## 2022-05-20 PROCEDURE — 99441 PR PHYS/QHP TELEPHONE EVALUATION 5-10 MIN: CPT | Performed by: INTERNAL MEDICINE

## 2022-05-20 RX ORDER — ONDANSETRON 2 MG/ML
4 INJECTION INTRAMUSCULAR; INTRAVENOUS ONCE AS NEEDED
Status: CANCELLED | OUTPATIENT
Start: 2022-05-23

## 2022-05-20 RX ORDER — BEBTELOVIMAB 87.5 MG/ML
175 INJECTION, SOLUTION INTRAVENOUS ONCE
Status: CANCELLED | OUTPATIENT
Start: 2022-05-20

## 2022-05-20 RX ORDER — ALBUTEROL SULFATE 90 UG/1
3 AEROSOL, METERED RESPIRATORY (INHALATION) ONCE AS NEEDED
Status: CANCELLED | OUTPATIENT
Start: 2022-05-23

## 2022-05-20 RX ORDER — SODIUM CHLORIDE 9 MG/ML
20 INJECTION, SOLUTION INTRAVENOUS ONCE
Status: CANCELLED | OUTPATIENT
Start: 2022-05-23

## 2022-05-20 RX ORDER — ACETAMINOPHEN 325 MG/1
650 TABLET ORAL ONCE AS NEEDED
Status: CANCELLED | OUTPATIENT
Start: 2022-05-23

## 2022-05-20 NOTE — PATIENT INSTRUCTIONS
1  Bebtelovimab -we will make arrangement at [place] [id number]  2  Please read information about Bebtelovimab        3  Check your pulse pulse ox and temperature and blood pressure 2 to 3 times a day  4  If your breathing gets worse if you feel worse coffee fever shortness of breath weakness dizziness go to the emergency room  5  Take Mucinex DM 1 tablet twice a day for cough over-the-counter for next 5 days    6  Take Claritin 10 mg daily for nasal congestion and sneezing as needed  7  Drink enough fluid, may take vitamin-C and vitamin-D a as discussed  8  kunal home and quarantine yourself for 5 days  9  We will talk to you back again on Monday or Tuesday  Vertis Bun is an investigational medicine used to treat mild-to-moderate symptoms of COVID-19 in adults and children (15years of age and older weighing at least 80 pounds [40 kg]) with positive results of direct SARS-CoV-2 viral testing, and who are at high risk of progression to severe COVID-19, including hospitalization or death, and for whom other COVID-19 treatment options approved or authorized by FDA are not available or clinically appropriate  Bebtelovimab is investigational because it is still being studied  There is limited information about the safety and effectiveness of using bebtelovimab to treat people with mild-to-moderate COVID-19  The FDA has authorized the emergency use of bebtelovimab for the treatment of COVID-19 under an Emergency Use Authorization (EUA)       Vertis Bun is not authorized for use in people who:  are likely to be infected with a SARS-CoV-2 variant that is not able to be treated by bebtelovimab based on the circulating variants in your area (ask your health care provider about FDA and CDCs latest information on circulating variants by geographic area), or  are hospitalized due to COVID-19, or  require oxygen therapy and/or respiratory support due to COVID-19, or  require an increase in baseline oxygen flow rate and/or respiratory support due to COVID19 and are on chronic oxygen therapy and/or respiratory support due to underlying nonCOVID-19 related comorbidity  How will I receive Bebtelovimab? Mireya Pals will be given as an injection through a vein (intravenously or IV) over at least 30 seconds  You will be observed by your healthcare provider for at least 1 hour after you receive bebtelovimab  Possible side effects of Bebtelovimab: Allergic reactions can happen during and after infusion with bebtelovimab  Possible reactions include: fever, chills, nausea, headache, shortness of breath, low or high blood pressure, rapid or slow heart rate, chest discomfort or pain, weakness, confusion, feeling tired, wheezing, swelling of your lips, face, or throat, rash including hives, itching, muscle aches, dizziness, and sweating  These reactions may be severe or life threatening  Worsening symptoms after treatment: You may experience new or worsening symptoms after infusion, including fever, difficulty breathing, rapid or slow heart rate, tiredness, weakness or confusion  If these occur, contact your healthcare provider or seek immediate medical attention as some of these events have required hospitalization  It is unknown if these events are related to treatment or are due to the progression of COVID19  The side effects of getting any medicine by vein may include brief pain, bleeding, bruising of the skin, soreness, swelling, and possible infection at the infusion site  These are not all the possible side effects of bebtelovimab  Not a lot of people have been given bebtelovimab  Serious and unexpected side effects may happen  Bebtelovimab are still being studied so it is possible that all of the risks are not known at this time  It is possible that bebtelovimab could interfere with your body's own ability to fight off a future infection of SARS-CoV-2   Similarly, bebtelovimab may reduce your bodys immune response to a vaccine for SARS-CoV-2  Specific studies have not been conducted to address these possible risks  Talk to your healthcare provider if you have any questions  Emergency Use Authorization:    The [facility] FDA has made bebtelovimab available under an emergency access mechanism called an EUA  The EUA is supported by a [place] of Health and Human Service ([facility]) declaration that circumstances exist to justify the emergency use of drugs and biological products during the COVID-19 pandemic  Bebtelovimab have not undergone the same type of review as an FDA-approved or cleared product  The FDA may issue an EUA when certain criteria are met, which includes that there are no adequate, approved, and available alternatives  In addition, the FDA decision is based on the totality of scientific evidence available showing that it is reasonable to believe that the product meets certain criteria for safety, performance, and labeling and may be effective in treatment of patients during the COVID-19 pandemic  All of these criteria must be met to allow for the product to be used in the treatment of patients during the COVID-19 pandemic  The EUA for bebtelovimab together is in effect for the duration of the COVID-19 declaration justifying emergency use of these products, unless terminated or revoked (after which the product may no longer be used)  What if I am pregnant or breastfeeding? There is no experience treating pregnant women or breastfeeding mothers with bebtelovimab  For a mother and unborn baby, the benefit of receiving bebtelovimab may be greater than the risk from the treatment  If you are pregnant or breastfeeding, discuss your options and specific situation with your healthcare provider  How do I report side effects with Bebtelovimab? Contact your healthcare provider if you have any side effects that bother you or do not go away      Report side effects to FDA MedWatch at www fda gov/medwatch, or call 2-975-IRT-8971 or to [address]  as shown below  Email: Natalia@[employer]  com   Fax number: 0-402.709.9180   Telephone number: 1-201-SEXMOJ68 ([phone])    Full fact sheet document for patients can be found at: http://[employer]/    The patient consents to proceed with bebtelovimab infusion  Follow with Consultants as per their and our suggestion    Follow up in approximately 3-4 daysor as needed earlier    Follow all instructions as advised and discussed  Take your medications as prescribed  Call the office immediately if you experience any side effects  Ask questions if you do not understand  Keep your scheduled appointment as advised or come sooner if necessary or in doubt  Best time to call for non-urgent matter or questions on weekdays is between 9am and 12 noon  See physician for any new symptoms or worsening of current symptoms  Urgent or emergent situations call 911 and report to nearest emergency room      I spent  30+ minutes taking care of this patient including clinical care, conseling, collaboration, chart, lab and consultaion review as appropriate    Patient is to get labs 1 week(s) prior to next visit if advised

## 2022-05-20 NOTE — ASSESSMENT & PLAN NOTE
Treatment options were reviewed  Patient is not a candidate for Paxlovid due to on warfarin, atorvastatin, digoxin    Extensive discussions regarding monoclonal antibody  Patient is willing to go for it  The of we will place and formal order for Bebtelovidmab      James Shanell is an investigational medicine used to treat mild-to-moderate symptoms of COVID-19 in adults and children (15years of age and older weighing at least 80 pounds [40 kg]) with positive results of direct SARS-CoV-2 viral testing, and who are at high risk of progression to severe COVID-19, including hospitalization or death, and for whom other COVID-19 treatment options approved or authorized by FDA are not available or clinically appropriate  Bebtelovimab is investigational because it is still being studied  There is limited information about the safety and effectiveness of using bebtelovimab to treat people with mild-to-moderate COVID-19  The FDA has authorized the emergency use of bebtelovimab for the treatment of COVID-19 under an Emergency Use Authorization (EUA)  James Shanell is not authorized for use in people who:   are likely to be infected with a SARS-CoV-2 variant that is not able to be treated by bebtelovimab based on the circulating variants in your area (ask your health care provider about FDA and CDCs latest information on circulating variants by geographic area), or   are hospitalized due to COVID-19, or   require oxygen therapy and/or respiratory support due to COVID-19, or   require an increase in baseline oxygen flow rate and/or respiratory support due to [address] and are on chronic oxygen therapy and/or respiratory support due to underlying nonCOVID-19 related comorbidity  How will I receive Bebtelovimab? James Shanell will be given as an injection through a vein (intravenously or IV) over at least 30 seconds   You will be observed by your healthcare provider for at least 1 hour after you receive bebtelovimab  Possible side effects of Bebtelovimab: Allergic reactions can happen during and after infusion with bebtelovimab  Possible reactions include: fever, chills, nausea, headache, shortness of breath, low or high blood pressure, rapid or slow heart rate, chest discomfort or pain, weakness, confusion, feeling tired, wheezing, swelling of your lips, face, or throat, rash including hives, itching, muscle aches, dizziness, and sweating  These reactions may be severe or life threatening  Worsening symptoms after treatment: You may experience new or worsening symptoms after infusion, including fever, difficulty breathing, rapid or slow heart rate, tiredness, weakness or confusion  If these occur, contact your healthcare provider or seek immediate medical attention as some of these events have required hospitalization  It is unknown if these events are related to treatment or are due to the progression of COVID19  The side effects of getting any medicine by vein may include brief pain, bleeding, bruising of the skin, soreness, swelling, and possible infection at the infusion site  These are not all the possible side effects of bebtelovimab  Not a lot of people have been given bebtelovimab  Serious and unexpected side effects may happen  Bebtelovimab are still being studied so it is possible that all of the risks are not known at this time  It is possible that bebtelovimab could interfere with your body's own ability to fight off a future infection of SARS-CoV-2  Similarly, bebtelovimab may reduce your bodys immune response to a vaccine for SARS-CoV-2  Specific studies have not been conducted to address these possible risks  Talk to your healthcare provider if you have any questions  Emergency Use Authorization:    The [facility] FDA has made bebtelovimab available under an emergency access mechanism called an EUA   The EUA is supported by a [place] of Health and Human Service (HHS) declaration that circumstances exist to justify the emergency use of drugs and biological products during the COVID-19 pandemic  Bebtelovimab have not undergone the same type of review as an FDA-approved or cleared product  The FDA may issue an EUA when certain criteria are met, which includes that there are no adequate, approved, and available alternatives  In addition, the FDA decision is based on the totality of scientific evidence available showing that it is reasonable to believe that the product meets certain criteria for safety, performance, and labeling and may be effective in treatment of patients during the COVID-19 pandemic  All of these criteria must be met to allow for the product to be used in the treatment of patients during the COVID-19 pandemic  The EUA for bebtelovimab together is in effect for the duration of the COVID-19 declaration justifying emergency use of these products, unless terminated or revoked (after which the product may no longer be used)  What if I am pregnant or breastfeeding? There is no experience treating pregnant women or breastfeeding mothers with bebtelovimab  For a mother and unborn baby, the benefit of receiving bebtelovimab may be greater than the risk from the treatment  If you are pregnant or breastfeeding, discuss your options and specific situation with your healthcare provider  How do I report side effects with Bebtelovimab? Contact your healthcare provider if you have any side effects that bother you or do not go away  Report side effects to FDA MedWatch at www fda gov/medwatch, or call 9-234-KBO-0891 or to [address] [place] [address]  as shown below  Email: [email or web address]   Fax number: [phone]   Telephone number: [phone] ([phone])    Full fact sheet document for patients can be found at: [employer]    The patient consents to proceed with bebtelovimab infusion  Cornelius Saldivar 1  Bebtelovimab -we will make arrangement at [place] [address]  2  Please read information about Bebtelovimab        3  Check your pulse pulse ox and temperature and blood pressure 2 to 3 times a day  4  If your breathing gets worse if you feel worse coffee fever shortness of breath weakness dizziness go to the emergency room  5  Take Mucinex DM 1 tablet twice a day for cough over-the-counter for next 5 days    6  Take Claritin 10 mg daily for nasal congestion and sneezing as needed  7  Drink enough fluid, may take vitamin-C and vitamin-D a as discussed  8  kunal home and quarantine yourself for 5 days  9  We will talk to you back again on Monday or Tuesday

## 2022-05-20 NOTE — PROGRESS NOTES
Virtual Brief Visit    Patient is located in the following state in which I hold an active license [place]      Assessment/Plan:    Problem List Items Addressed This Visit        Endocrine    Type 2 diabetes mellitus without complication, without long-term current use of insulin ([facility] [address] )    Other specified hypothyroidism       Cardiovascular and Mediastinum    Chronic atrial fibrillation ([facility] [address] )    Essential hypertension    CVA (cerebral vascular accident) ([facility] [address] )       Other    H/O mitral valve replacement with mechanical valve    Anemia due to chronic kidney disease    COVID-19 - Primary     Treatment options were reviewed  Patient is not a candidate for Paxlovid due to on warfarin, atorvastatin, digoxin    Extensive discussions regarding monoclonal antibody  Patient is willing to go for it  The of we will place and formal order for Bebtelovidmab      Guillermina Cano is an investigational medicine used to treat mild-to-moderate symptoms of COVID-19 in adults and children (15years of age and older weighing at least 80 pounds [40 kg]) with positive results of direct SARS-CoV-2 viral testing, and who are at high risk of progression to severe COVID-19, including hospitalization or death, and for whom other COVID-19 treatment options approved or authorized by FDA are not available or clinically appropriate  Bebtelovimab is investigational because it is still being studied  There is limited information about the safety and effectiveness of using bebtelovimab to treat people with mild-to-moderate COVID-19  The FDA has authorized the emergency use of bebtelovimab for the treatment of COVID-19 under an Emergency Use Authorization (EUA)       Guillermina Cano is not authorized for use in people who:   are likely to be infected with a SARS-CoV-2 variant that is not able to be treated by bebtelovimab based on the circulating variants in your area (ask your health care provider about FDA and CDCs latest information on circulating variants by geographic area), or   are hospitalized due to COVID-19, or   require oxygen therapy and/or respiratory support due to COVID-19, or   require an increase in baseline oxygen flow rate and/or respiratory support due to [address] and are on chronic oxygen therapy and/or respiratory support due to underlying nonCOVID-19 related comorbidity  How will I receive Bebtelovimab? Zoya Dk will be given as an injection through a vein (intravenously or IV) over at least 30 seconds  You will be observed by your healthcare provider for at least 1 hour after you receive bebtelovimab  Possible side effects of Bebtelovimab: Allergic reactions can happen during and after infusion with bebtelovimab  Possible reactions include: fever, chills, nausea, headache, shortness of breath, low or high blood pressure, rapid or slow heart rate, chest discomfort or pain, weakness, confusion, feeling tired, wheezing, swelling of your lips, face, or throat, rash including hives, itching, muscle aches, dizziness, and sweating  These reactions may be severe or life threatening  Worsening symptoms after treatment: You may experience new or worsening symptoms after infusion, including fever, difficulty breathing, rapid or slow heart rate, tiredness, weakness or confusion  If these occur, contact your healthcare provider or seek immediate medical attention as some of these events have required hospitalization  It is unknown if these events are related to treatment or are due to the progression of COVID19  The side effects of getting any medicine by vein may include brief pain, bleeding, bruising of the skin, soreness, swelling, and possible infection at the infusion site  These are not all the possible side effects of bebtelovimab  Not a lot of people have been given bebtelovimab  Serious and unexpected side effects may happen   Bebtelovimab are still being studied so it is possible that all of the risks are not known at this time  It is possible that bebtelovimab could interfere with your body's own ability to fight off a future infection of SARS-CoV-2  Similarly, bebtelovimab may reduce your bodys immune response to a vaccine for SARS-CoV-2  Specific studies have not been conducted to address these possible risks  Talk to your healthcare provider if you have any questions  Emergency Use Authorization:    The [facility] FDA has made bebtelovimab available under an emergency access mechanism called an EUA  The EUA is supported by a  of Health and Human Service ([facility]) declaration that circumstances exist to justify the emergency use of drugs and biological products during the COVID-19 pandemic  Bebtelovimab have not undergone the same type of review as an FDA-approved or cleared product  The FDA may issue an EUA when certain criteria are met, which includes that there are no adequate, approved, and available alternatives  In addition, the FDA decision is based on the totality of scientific evidence available showing that it is reasonable to believe that the product meets certain criteria for safety, performance, and labeling and may be effective in treatment of patients during the COVID-19 pandemic  All of these criteria must be met to allow for the product to be used in the treatment of patients during the COVID-19 pandemic  The EUA for bebtelovimab together is in effect for the duration of the COVID-19 declaration justifying emergency use of these products, unless terminated or revoked (after which the product may no longer be used)  What if I am pregnant or breastfeeding? There is no experience treating pregnant women or breastfeeding mothers with bebtelovimab  For a mother and unborn baby, the benefit of receiving bebtelovimab may be greater than the risk from the treatment   If you are pregnant or breastfeeding, discuss your options and specific situation with your healthcare provider  How do I report side effects with Bebtelovimab? Contact your healthcare provider if you have any side effects that bother you or do not go away  Report side effects to FDA MedWatch at www fda gov/medwatch, or call 4-937-FDA-7327 or to [address]  as shown below  Email: [email or web address]@[employer]   Fax number: [phone]   Telephone number: 4-850-SBJBOM01 ([phone])    Full fact sheet document for patients can be found at: [employer]    The patient consents to proceed with bebtelovimab infusion   1  Bebtelovimab -we will make arrangement at [place] [address]  2  Please read information about Bebtelovimab        3  Check your pulse pulse ox and temperature and blood pressure 2 to 3 times a day  4  If your breathing gets worse if you feel worse coffee fever shortness of breath weakness dizziness go to the emergency room  5  Take Mucinex DM 1 tablet twice a day for cough over-the-counter for next 5 days    6  Take Claritin 10 mg daily for nasal congestion and sneezing as needed  7  Drink enough fluid, may take vitamin-C and vitamin-D a as discussed  8  kunal home and quarantine yourself for 5 days  9  We will talk to you back again on Monday or Tuesday  Recent Visits  No visits were found meeting these conditions  Showing recent visits within past 7 days and meeting all other requirements  Today's Visits  Date Type Provider Dept   05/20/22 Office Visit Sam Bruce MD [facility]   Showing today's visits and meeting all other requirements  Future Appointments  No visits were found meeting these conditions    Showing future appointments within next 150 days and meeting all other requirements       Chief Complain:  Cough with tickle      First day of symptom:  Tuesday 05/17/2022    First day of contact to our office:  05/19/2022    Exposure History:  [place] to [place] or tested positive on Monday  Date of exposure:  Sunday  Relationship:  Grandson  Confirmed or suspected:  Confirmed  UnKnown:  Travel Hx    HPI:  Sunday was at family gathering, grandson became positive Monday Tuesday pt had tickle cough, Wednesday little bit more, thrusday it me, feels like sinus cold, daughter in law tested her and became positive        Home oxygen 95 pulse 65    Home test positive on 05/19/2022      Associated Symptoms:  Cough:dry tickle like mild  Shortness of Breath:no repeat pain from coughing  Chest tightness:  None  Phlegm:  Small phlegm today  Nasal congestion:  Sneezing  Sore throat:  None    Smell Problem:  None  Taste Problem:  None    Abdominal Pain/Nausea/Vomiting/Diaarhea:  None    Fever/Chills:  Low-grade fever yesterday 98  Fatigue:  Mild  Headache:  mild  Bodyache:  None    Treatment tried:  Cough drops  Risk Factors:      Refer to PMH:    Immunization Hx for COVID vaccine:        Extensive discussion with the patient's regarding treatment and treatment option  Risk benefit of both oral medicine as well as monoclonal were reviewed at length  Patient prefers to proceed with the monoclonal antibody    Unable to use Paxlovid due to drug interaction as patient is on Coumadin, digoxin as well as atorvastatin  due to wall replacement I will not be able to stop home Coumadin  And due to atrial fibrillation unable to stop digoxin  Other option is that of a using monoclonal antibody- Bebtelovimab   Extensive discussion with discussion with the patient's and Education about monoclonal antibody patient is willing to take  Call the more infusion center  The early as they can get her will be on Monday mom morning  Of home orders will be placed  We will put appropriate information in wrap up the home  Will    Meanwhile recommend      1  Bebtelovimab -we will make arrangement at [place] [address]      2  Please read information about Bebtelovimab        3  Check your pulse pulse ox and temperature and blood pressure 2 to 3 times a day  4  If your breathing gets worse if you feel worse coffee fever shortness of breath weakness dizziness go to the emergency room  5  Take Mucinex DM 1 tablet twice a day for cough over-the-counter for next 5 days    6  Take Claritin 10 mg daily for nasal congestion and sneezing as needed  7  Drink enough fluid, may take vitamin-C and vitamin-D a as discussed  8  kunal home and quarantine yourself for 5 days  9  We will talk to you back again on Monday or Tuesday  Informed consent were obtained  Naz Horseman is an investigational medicine used to treat mild-to-moderate symptoms of COVID-19 in adults and children (15years of age and older weighing at least 80 pounds [40 kg]) with positive results of direct SARS-CoV-2 viral testing, and who are at high risk of progression to severe COVID-19, including hospitalization or death, and for whom other COVID-19 treatment options approved or authorized by FDA are not available or clinically appropriate  Bebtelovimab is investigational because it is still being studied  There is limited information about the safety and effectiveness of using bebtelovimab to treat people with mild-to-moderate COVID-19  The FDA has authorized the emergency use of bebtelovimab for the treatment of COVID-19 under an Emergency Use Authorization (EUA)       Naz Horseman is not authorized for use in people who:   are likely to be infected with a SARS-CoV-2 variant that is not able to be treated by bebtelovimab based on the circulating variants in your area (ask your health care provider about FDA and CDCs latest information on circulating variants by geographic area), or   are hospitalized due to COVID-19, or   require oxygen therapy and/or respiratory support due to COVID-19, or   require an increase in baseline oxygen flow rate and/or respiratory support due to COVID19 and are on chronic oxygen therapy and/or respiratory support due to underlying nonCOVID-19 related comorbidity  How will I receive Bebtelovimab? Holli Dominguez will be given as an injection through a vein (intravenously or IV) over at least 30 seconds  You will be observed by your healthcare provider for at least 1 hour after you receive bebtelovimab  Possible side effects of Bebtelovimab: Allergic reactions can happen during and after infusion with bebtelovimab  Possible reactions include: fever, chills, nausea, headache, shortness of breath, low or high blood pressure, rapid or slow heart rate, chest discomfort or pain, weakness, confusion, feeling tired, wheezing, swelling of your lips, face, or throat, rash including hives, itching, muscle aches, dizziness, and sweating  These reactions may be severe or life threatening  Worsening symptoms after treatment: You may experience new or worsening symptoms after infusion, including fever, difficulty breathing, rapid or slow heart rate, tiredness, weakness or confusion  If these occur, contact your healthcare provider or seek immediate medical attention as some of these events have required hospitalization  It is unknown if these events are related to treatment or are due to the progression of COVID19  The side effects of getting any medicine by vein may include brief pain, bleeding, bruising of the skin, soreness, swelling, and possible infection at the infusion site  These are not all the possible side effects of bebtelovimab  Not a lot of people have been given bebtelovimab  Serious and unexpected side effects may happen  Bebtelovimab are still being studied so it is possible that all of the risks are not known at this time  It is possible that bebtelovimab could interfere with your body's own ability to fight off a future infection of SARS-CoV-2  Similarly, bebtelovimab may reduce your bodys immune response to a vaccine for SARS-CoV-2  Specific studies have not been conducted to address these possible risks  Talk to your healthcare provider if you have any questions  Emergency Use Authorization:    The [facility] FDA has made bebtelovimab available under an emergency access mechanism called an EUA  The EUA is supported by a [place] of Health and Human Service ([facility]) declaration that circumstances exist to justify the emergency use of drugs and biological products during the COVID-19 pandemic  Bebtelovimab have not undergone the same type of review as an FDA-approved or cleared product  The FDA may issue an EUA when certain criteria are met, which includes that there are no adequate, approved, and available alternatives  In addition, the FDA decision is based on the totality of scientific evidence available showing that it is reasonable to believe that the product meets certain criteria for safety, performance, and labeling and may be effective in treatment of patients during the COVID-19 pandemic  All of these criteria must be met to allow for the product to be used in the treatment of patients during the COVID-19 pandemic  The EUA for bebtelovimab together is in effect for the duration of the COVID-19 declaration justifying emergency use of these products, unless terminated or revoked (after which the product may no longer be used)  What if I am pregnant or breastfeeding? There is no experience treating pregnant women or breastfeeding mothers with bebtelovimab  For a mother and unborn baby, the benefit of receiving bebtelovimab may be greater than the risk from the treatment  If you are pregnant or breastfeeding, discuss your options and specific situation with your healthcare provider  How do I report side effects with Bebtelovimab? Contact your healthcare provider if you have any side effects that bother you or do not go away      Report side effects to FDA MedWatch at www fda gov/medwatch, or call 5-408-CUX-2146 or to [address]  as shown below  Email: [email or web address]   Fax number: [phone]   Telephone number: [phone]-[phone] ([phone])    Full fact sheet document for patients can be found at: [employer] yi    The patient consents to proceed with bebtelovimab infusion        I spent 30 minutes directly with the patient during this visit

## 2022-05-23 ENCOUNTER — TELEMEDICINE (OUTPATIENT)
Dept: INTERNAL MEDICINE CLINIC | Facility: CLINIC | Age: 82
End: 2022-05-23
Payer: MEDICARE

## 2022-05-23 ENCOUNTER — TELEPHONE (OUTPATIENT)
Dept: INFUSION CENTER | Facility: HOSPITAL | Age: 82
End: 2022-05-23

## 2022-05-23 VITALS — TEMPERATURE: 97.6 F | WEIGHT: 156 LBS | HEIGHT: 66 IN | BODY MASS INDEX: 25.07 KG/M2 | HEART RATE: 64 BPM

## 2022-05-23 DIAGNOSIS — U07.1 COVID-19: Primary | ICD-10-CM

## 2022-05-23 PROCEDURE — 99441 PR PHYS/QHP TELEPHONE EVALUATION 5-10 MIN: CPT | Performed by: INTERNAL MEDICINE

## 2022-05-23 NOTE — ASSESSMENT & PLAN NOTE
Patient has significantly improved  Patient did not go for the monoclonal antibody as advised  How joint generally much better  Paragraphs sees past 5 days  First day of symptom was 517 [id number]  Paragraphs he may go out with the use of mask  Recommend to use mask while at home with other family members  May continue over-the-counter symptomatic treatment though is he is pretty much much better and will not require many medications    If there is any problems he will call me

## 2022-05-23 NOTE — PROGRESS NOTES
Virtual Brief Visit    Patient is located in the following state in which I hold an active license [place]      Assessment/Plan:    Problem List Items Addressed This Visit        Other    COVID-19 - Primary     Patient has significantly improved  Patient did not go for the monoclonal antibody as advised  How joint generally much better  Paragraphs sees past 5 days  First day of symptom was [phone]  Paragraphs he may go out with the use of mask  Recommend to use mask while at home with other family members  May continue over-the-counter symptomatic treatment though is he is pretty much much better and will not require many medications  If there is any problems he will call me                 Recent Visits  Date Type Provider Dept   05/20/22 Office Visit Joselin Mccoy MD [facility] Internal Med   Showing recent visits within past 7 days and meeting all other requirements  Today's Visits  Date Type Provider Dept   05/23/22 Telemedicine Joselin Mccoy MD [address] [facility] Internal Med   Showing today's visits and meeting all other requirements  Future Appointments  No visits were found meeting these conditions  Showing future appointments within next 150 days and meeting all other requirements     This is a follow-up call of COVID-19  Home patient is is feeling much better  Paragraphs he denies any fever chills  Cough is mild but improved  Denies any is significant headache body ache  Denies any problem with smell or taste  Denies any abdominal pain nausea vomiting diarrhea  Overall feeling lot better  Energy level is good  Breathing is fairly good  Denies any chest pain  Nasal symptoms are better  Patient was supposed to get monoclonal antibody patient did not show up this morning as he was feeling better  She is very happy that she made good progress       First day of symptom:  Tuesday 05/17/2022     First day of contact to our office:  05/19/2022     Exposure History:  Sunday to the [facility] or tested positive on Monday  Date of exposure:  Sunday  Relationship:  Grandson  Confirmed or suspected:  Confirmed  UnKnown:  Travel Hx        Treatment tried:  Cough drops  Risk Factors:       I spent 10 minutes directly with the patient during this visit

## 2022-05-23 NOTE — TELEPHONE ENCOUNTER
Pt called to cancel MAB appt   States that she is "feeling 100% better " Dr Alireza Hubbard made aware via TT

## 2022-05-25 ENCOUNTER — APPOINTMENT (OUTPATIENT)
Dept: OCCUPATIONAL THERAPY | Facility: CLINIC | Age: 82
End: 2022-05-25
Payer: MEDICARE

## 2022-05-25 ENCOUNTER — APPOINTMENT (OUTPATIENT)
Dept: PHYSICAL THERAPY | Facility: CLINIC | Age: 82
End: 2022-05-25
Payer: MEDICARE

## 2022-05-27 ENCOUNTER — OFFICE VISIT (OUTPATIENT)
Dept: OCCUPATIONAL THERAPY | Facility: CLINIC | Age: 82
End: 2022-05-27
Payer: MEDICARE

## 2022-05-27 ENCOUNTER — APPOINTMENT (OUTPATIENT)
Dept: LAB | Facility: CLINIC | Age: 82
End: 2022-05-27
Payer: MEDICARE

## 2022-05-27 DIAGNOSIS — I63.9 CEREBROVASCULAR ACCIDENT (CVA), UNSPECIFIED MECHANISM (HCC): Primary | ICD-10-CM

## 2022-05-27 PROCEDURE — 97112 NEUROMUSCULAR REEDUCATION: CPT | Performed by: OCCUPATIONAL THERAPIST

## 2022-05-27 NOTE — PROGRESS NOTES
Daily Note     Today's date: 2022  Patient name: Francy Doherty  : 1940  MRN: [id number]  Referring provider: Salome Saenz  Dx:   Encounter Diagnosis   Name Primary?  Cerebrovascular accident (CVA), unspecified mechanism ([facility] [address] ) Yes       Start Time: 1146  Stop Time: 1230  Total time in clinic (min): 44 minutes    PLAN OF CARE END: 22  FREQUENCY: 2x/wk  NO AUTH REQUIRED  PRECAUTIONS: falls- walk to front, vision impairment d/t macular degeneration    Subjective: "It's hard to turn my hand over"      Objective: See treatment below   -Juxtaciser x6 reps with RUE focusing on FA pronation/supination  -WB on dynadisk with vibration x3 minutes for sensory and proprioceptive input to promote improved proprioception for motor control   -[place] manual dexterity disk flipping activity focusing on in-hand manipulation and coordination for ADLs/IADLs  All disks completed in 2:50   -Pairs in pears tiles used for palm-finger translation and placing through slot in container top focusing on in-hand manipulation and dexterity    -Braiding and unbraiding pipe  completed 1x focusing on bimanual and fine motor coordination  Assessment: Tolerated treatment well  Demonstrates difficulty with in hand manipulation R UE, [address]  Pt would benefit from continued OT services to address NMRE R UE and cognitive deficits  Plan: Continued skilled OT per POC  GOALS TO BE ADDED :  -Pt will demonstrate improved functional cognition by scoring WNL on MoCA (modified for vision deficits)  -Pt will demonstrate improved delayed recall by scoring at at least 14/15 on MIS portion of MoCA

## 2022-06-01 ENCOUNTER — APPOINTMENT (OUTPATIENT)
Dept: OCCUPATIONAL THERAPY | Facility: CLINIC | Age: 82
End: 2022-06-01
Payer: MEDICARE

## 2022-06-01 ENCOUNTER — OFFICE VISIT (OUTPATIENT)
Dept: PHYSICAL THERAPY | Facility: CLINIC | Age: 82
End: 2022-06-01
Payer: MEDICARE

## 2022-06-01 DIAGNOSIS — R26.89 OTHER ABNORMALITIES OF GAIT AND MOBILITY: ICD-10-CM

## 2022-06-01 DIAGNOSIS — R26.89 BALANCE DISORDER: ICD-10-CM

## 2022-06-01 DIAGNOSIS — I63.9 CEREBROVASCULAR ACCIDENT (CVA), UNSPECIFIED MECHANISM (HCC): Primary | ICD-10-CM

## 2022-06-01 PROCEDURE — 97112 NEUROMUSCULAR REEDUCATION: CPT

## 2022-06-01 NOTE — PROGRESS NOTES
Daily Note     Insurance:  AMA/CMS Eval/ Re-eval POC expires Zak Jacob #/ Referral # Total   Visits  Start date  Expiration date Extension  Visit limitation? PT only or  PT+OT? Co-Insurance   Medicare  CMS 5-18-22 8-10-22 8-10 N/A N/A N/A N/A N/A No PT/OT Yes and $0 Co-pay                                                               Today's date: 2022  Patient name: Parminder Herrera  : 1940  MRN: [id number]  Referring provider: Stephon Swift  Dx:   Encounter Diagnosis     ICD-10-CM    1  Cerebrovascular accident (CVA), unspecified mechanism ([facility] [address] )  I63 9    2  Other abnormalities of gait and mobility  R26 89    3  Balance disorder  R26 89                   Subjective: Patient reports no new changes, complaints, or falls  Objective: See treatment diary below    Vitals  - HR: 64 bpm  - SpO2: 98%  - BP: 140/52 mmHg      NMR:  - STS to fatigue: 15 reps, 0 UE, RPE: 9/20  - Fwd step ups (10"): 2 sets, 1 min, 0 UE, 4# ankle wts, RPE: 20  - Treadmill: 5 sets, 3 5 min, 1 8 mph, 5% incline, 4# ankle wts, RPE: 15/20      Assessment: Patient able to tolerate treatment session well today with initiation of exercise program  Trialed HIGT today and required use of GILBERT scale due to patient on Beta Blockers  Most difficulty with treadmill training today and resultant decreased step length and height as she fatigued  She demonstrated posterior LoB with step up activity and required up to modA to maintain balance indicating slowed reactive balance and use of stepping strategies  She will continue to benefit from skilled outpatient PT in order to maximize her function and reduce her risk for falls following CVA  Plan: Continue per plan of care         Precautions: Patient on Beta Blockers    Outcome Measures Initial Eval  22        5xSTS 16 58 sec,   0 UE        TUG  - Regular  - Cognitive   15 63 sec  16 81 sec (stopped counting)        10 meter 0 92 m/s        FGA         DGI 15/24 MiniBEST 14/28        PASS NT        6MWT 900 ft        CB&M NT

## 2022-06-03 ENCOUNTER — OFFICE VISIT (OUTPATIENT)
Dept: OCCUPATIONAL THERAPY | Facility: CLINIC | Age: 82
End: 2022-06-03
Payer: MEDICARE

## 2022-06-03 ENCOUNTER — OFFICE VISIT (OUTPATIENT)
Dept: PHYSICAL THERAPY | Facility: CLINIC | Age: 82
End: 2022-06-03
Payer: MEDICARE

## 2022-06-03 DIAGNOSIS — I63.9 CEREBROVASCULAR ACCIDENT (CVA), UNSPECIFIED MECHANISM (HCC): Primary | ICD-10-CM

## 2022-06-03 DIAGNOSIS — R26.89 BALANCE DISORDER: ICD-10-CM

## 2022-06-03 DIAGNOSIS — R26.89 OTHER ABNORMALITIES OF GAIT AND MOBILITY: ICD-10-CM

## 2022-06-03 PROCEDURE — 97112 NEUROMUSCULAR REEDUCATION: CPT

## 2022-06-03 PROCEDURE — 97110 THERAPEUTIC EXERCISES: CPT

## 2022-06-03 NOTE — PROGRESS NOTES
Daily Note     Insurance:  AMA/CMS Eval/ Re-eval POC expires Balbir Santana #/ Referral # Total   Visits  Start date  Expiration date Extension  Visit limitation? PT only or  PT+OT? Co-Insurance   Medicare  CMS 5-18-22 8-10-22 8-10 N/A N/A N/A N/A N/A No PT/OT Yes and $0 Co-pay                                                               Today's date: 6/3/2022  Patient name: Sandra Patel  : 1940  MRN: [id number]  Referring provider: Aarti Mejía  Dx:   Encounter Diagnosis     ICD-10-CM    1  Cerebrovascular accident (CVA), unspecified mechanism ([facility] [address] )  I63 9    2  Other abnormalities of gait and mobility  R26 89    3  Balance disorder  R26 89                   Subjective: Patient reports no new changes, complaints, or falls  Objective: See treatment diary below    Vitals  - HR: 64 bpm  - SpO2: 98%  - BP: 140/52 mmHg      NMR:  - STS to fatigue: 20 reps, 0 UE, RPE: 17/20  - Fwd step ups (10"): 1 set, 2 min, 0 UE, 4# ankle wts, RPE: 20  -   - Treadmill: 4# ankle wts B/L  Minutes MPH % Incline RPE (6-20)   5 1 8 5 13/20   2 1 5-1 8 10 16-   1 1 8 10    2 1 8 0                        Assessment: Patient able to tolerate treatment session well today with initiation of exercise program  Trialed HIGT today and required use of GILBERT scale due to patient on Beta Blockers  Most difficulty with treadmill training today and resultant decreased step length and height as she fatigued  She demonstrated posterior LoB with step up activity and required up to modA to maintain balance indicating slowed reactive balance and use of stepping strategies  She will continue to benefit from skilled outpatient PT in order to maximize her function and reduce her risk for falls following CVA  Plan: Continue per plan of care         Precautions: Patient on Beta Blockers    Outcome Measures Initial Eval  22        5xSTS 16 58 sec,   0 UE        TUG  - Regular  - Cognitive   15 63 sec  16 81 sec (stopped counting)        10 meter 0 92 m/s        FGA 16/30        DGI 15/24        MiniBEST 14/28        PASS NT        6MWT 900 ft        CB&M NT

## 2022-06-03 NOTE — PROGRESS NOTES
Daily Note     Today's date: 6/3/2022  Patient name: Soniya Evans  : 1940  MRN: [id number]  Referring provider: Sis Waldrop  Dx:   Encounter Diagnosis   Name Primary?  Cerebrovascular accident (CVA), unspecified mechanism ([facility] ) Yes       Start Time: 5539  Stop Time: 1230  Total time in clinic (min): 45 minutes    PLAN OF CARE END: 22  FREQUENCY: 2x/wk  NO AUTH REQUIRED  PRECAUTIONS: falls- walk to front, vision impairment d/t macular degeneration    Subjective: "It's getting better" Pt reports needing to find someone to clean her house due her macular degeneration      Objective: See treatment below  NMR  -lateral push ups on dynadisk with vibration x5 minutes for sensory and proprioceptive input to promote improved proprioception for motor control   -multimatrix with red resistive clip number - letter with naming foods to work on pincer strength, mental processing and working memory  Min VCs for memory to recall next letter  Provided assistance to locate numbers/ letters due to vision deficits    THERAPEUTIC ACTIVITY/ THERAPEUTIC EXERCISE  -marble design with in hand manipulation of 3 pieces to work on dexterity  completed without difficulty so transitioned to pincer strengthening with red resistive clip  Assessment: Tolerated treatment well  Demonstrates difficulty with in hand manipulation R UE, [facility]  Pt would benefit from continued OT services to address NMRE R UE and cognitive deficits  Plan: Continued skilled OT per POC  GOALS TO BE ADDED :  -Pt will demonstrate improved functional cognition by scoring WNL on MoCA (modified for vision deficits)  -Pt will demonstrate improved delayed recall by scoring at at least 14/15 on MIS portion of MoCA

## 2022-06-07 ENCOUNTER — OFFICE VISIT (OUTPATIENT)
Dept: INTERNAL MEDICINE CLINIC | Facility: CLINIC | Age: 82
End: 2022-06-07
Payer: MEDICARE

## 2022-06-07 VITALS
HEIGHT: 66 IN | SYSTOLIC BLOOD PRESSURE: 142 MMHG | WEIGHT: 156 LBS | OXYGEN SATURATION: 98 % | HEART RATE: 64 BPM | BODY MASS INDEX: 25.07 KG/M2 | TEMPERATURE: 97.9 F | DIASTOLIC BLOOD PRESSURE: 72 MMHG

## 2022-06-07 DIAGNOSIS — N30.00 ACUTE CYSTITIS WITHOUT HEMATURIA: Primary | ICD-10-CM

## 2022-06-07 PROCEDURE — 99213 OFFICE O/P EST LOW 20 MIN: CPT | Performed by: INTERNAL MEDICINE

## 2022-06-07 RX ORDER — CEPHALEXIN 500 MG/1
500 CAPSULE ORAL EVERY 12 HOURS SCHEDULED
Qty: 10 CAPSULE | Refills: 0 | Status: SHIPPED | OUTPATIENT
Start: 2022-06-07 | End: 2022-06-12

## 2022-06-07 NOTE — PATIENT INSTRUCTIONS
Keflex 500 mg 1 tablet twice a day for 5 days  Go for protime and INR this Thursday     With status next Monday see how you are doing  Follow-up with urologist at your convenience      Keep your scheduled appointment

## 2022-06-07 NOTE — PROGRESS NOTES
Richy Christiansen Office Visit Note  22     Jonathon Lax 80 y o  female MRN: [id number]  : 1940    Assessment:     1  Acute cystitis without hematuria  -     cephalexin (KEFLEX) 500 mg capsule; Take 1 capsule (500 mg total) by mouth every 12 (twelve) hours for 5 days          Discussion Summary and Plan: Today's care plan and medications were reviewed with patient in detail and all their questions answered to their satisfaction  Chief Complaint   Patient presents with    Urinary Tract Infection      Subjective:  Cc: Urinary frequency    HPI: started with feeling that she has UTI, hurts  When done going to bathroom and suprapubic discomfort,  No hematuria, Had temp 100 F this morning, Better,  No burn feeling, urine is clear,  She had had multiple UTI in the past,  Pt was seen by urologist 5 months ago  Pt is on Dmanos and use estrase cream for vaginal atrophy  Pt has been doing well for last one year or so          The following portions of the patient's history were reviewed and updated as appropriate: allergies, current medications, past family history, past medical history, past social history, past surgical history and problem list     Review of Systems   Constitutional: Negative for chills, fatigue, fever and unexpected weight change  HENT: Negative for ear pain, postnasal drip, sinus pain and sore throat  Eyes: Negative for pain and redness  Respiratory: Negative for cough and shortness of breath  Cardiovascular: Negative for chest pain, palpitations and leg swelling  Gastrointestinal: Negative for abdominal pain, diarrhea and nausea  Endocrine: Negative for cold intolerance, polydipsia and polyuria  Genitourinary: Positive for difficulty urinating  Negative for decreased urine volume, dysuria, flank pain, frequency, hematuria, urgency, vaginal bleeding, vaginal discharge and vaginal pain  Musculoskeletal: Negative for arthralgias, gait problem and myalgias     Skin: Negative for rash  Allergic/Immunologic: Negative  Neurological: Negative for dizziness and headaches  Hematological: Negative for adenopathy  Psychiatric/Behavioral: Negative for behavioral problems           Historical Information   Patient Active Problem List   Diagnosis    Obstructive sleep apnea    Chronic atrial fibrillation ([place] [address] )    H/O mitral valve replacement with mechanical valve    Long term (current) use of anticoagulants    Presence of prosthetic heart valve    Hypercholesteremia    Anemia due to chronic kidney disease    Allergic rhinitis    Type 2 diabetes mellitus without complication, without long-term current use of insulin (HCC)    Iron deficiency anemia    Other specified hypothyroidism    Vitamin B12 deficiency    Other microscopic hematuria    Celiac disease    Abdominal aortic aneurysm (AAA) ([place] [address] )    Ataxia    Pulmonary emphysema (HCC)    Acute cystitis without hematuria    History of renal calculi    History of cervical cancer    Essential hypertension    Other proteinuria    Hepatic steatosis    CVA (cerebral vascular accident) ([place] [address] )    Diabetes mellitus ([place] [address] )    COVID-19    Anemia due to stage 3 chronic kidney disease ([place] [address] )    Cerebrovascular accident (CVA) ([place] [address] )     Past Medical History:   Diagnosis Date    Stroke ([place] [address] ) 2022     Past Surgical History:   Procedure Laterality Date    EYE SURGERY      HYSTERECTOMY      MITRAL VALVE REPLACEMENT       Social History     Substance and Sexual Activity   Alcohol Use Yes    Comment: special occasional     Social History     Substance and Sexual Activity   Drug Use No     Social History     Tobacco Use   Smoking Status Former Smoker    Packs/day: 2 00    Years: 30 00    Pack years: 60 00    Quit date:     Years since quittin 4   Smokeless Tobacco Never Used     Family History   Problem Relation Age of Onset    Heart failure Mother     Heart attack Father     Sleep apnea Brother      [facility] Maintenance Due   Topic    COVID-19 Vaccine (1)    Pneumococcal Vaccine: 65+ Years (1 - PCV)    DTaP,Tdap,and Td Vaccines (1 - Tdap)    OT PLAN OF CARE     Influenza Vaccine (Season Ended)      Meds/Allergies       Current Outpatient Medications:     acetaminophen (TYLENOL) 325 mg tablet, Take 2 tablets (650 mg total) by mouth every 6 (six) hours as needed for mild pain or headaches, Disp: , Rfl: 0    aspirin (ECOTRIN LOW STRENGTH) 81 mg EC tablet, Take 1 tablet (81 mg total) by mouth daily, Disp: , Rfl: 0    atorvastatin (LIPITOR) 80 mg tablet, Take 1 tablet (80 mg total) by mouth daily, Disp: 90 tablet, Rfl: 1    cephalexin (KEFLEX) 500 mg capsule, Take 1 capsule (500 mg total) by mouth every 12 (twelve) hours for 5 days, Disp: 10 capsule, Rfl: 0    Cholecalciferol (VITAMIN D PO), Take by mouth, Disp: , Rfl:     digoxin (LANOXIN) 0 125 mg tablet, Take 125 mcg by mouth daily, Disp: , Rfl:     melatonin 3 mg, Take 1 tablet (3 mg total) by mouth daily at bedtime as needed (Insomnia), Disp: , Rfl: 0    metFORMIN (GLUCOPHAGE) 500 mg tablet, Take 2 tablets by mouth twice daily (Patient taking differently: 500 mg 2 (two) times a day with meals), Disp: 360 tablet, Rfl: 0    Multiple Vitamins-Minerals (PRESERVISION AREDS PO), Take 2 tablets by mouth daily  , Disp: , Rfl:     nadolol (CORGARD) 20 mg tablet, Take 0 5 tablets (10 mg total) by mouth daily, Disp: , Rfl: 0    warfarin (COUMADIN) 2 5 mg tablet, Take 1 tablet (2 5 mg total) by mouth 3 (three) times a week On Monday Wednesday and Friday, Disp: , Rfl: 0    warfarin (COUMADIN) 5 mg tablet, Take 1 tablet (5 mg total) by mouth 4 (four) times a week On Sunday, Tuesday, Thursday, Saturday, Disp: , Rfl: 0      Objective:    Vitals:   Pulse 64   Temp 97 9 °F (36 6 °C)   Ht 5' 5 5" (1 664 m)   Wt 70 8 kg (156 lb)   SpO2 98%   BMI 25 56 kg/m²   Body mass index is 25 56 kg/m²    Vitals:    06/07/22 1517   Weight: 70 8 kg (156 lb)       Physical Exam  Constitutional:       Appearance: She is well-developed  HENT:      Head: Normocephalic and atraumatic  Eyes:      Conjunctiva/sclera: Conjunctivae normal    Neck:      Thyroid: No thyromegaly  Vascular: No JVD  Cardiovascular:      Rate and Rhythm: Regular rhythm  Heart sounds: Normal heart sounds  Pulmonary:      Effort: No respiratory distress  Breath sounds: Normal breath sounds  No wheezing or rales  Abdominal:      General: There is no distension  Palpations: There is no mass  Tenderness: There is no abdominal tenderness  There is no right CVA tenderness, left CVA tenderness, guarding or rebound  Hernia: No hernia is present  Lymphadenopathy:      Cervical: No cervical adenopathy           Lab Review   Ancillary Orders on 05/27/2022   Component Date Value Ref Range Status    Protime 05/27/2022 26 2 (A) 11 6 - 14 5 seconds Final    INR 05/27/2022 2 56 (A) 0 84 - 1 19 Final   Transcribe Orders on 05/03/2022   Component Date Value Ref Range Status    Protime 05/03/2022 26 6 (A) 11 6 - 14 5 seconds Final    INR 05/03/2022 2 61 (A) 0 84 - 1 19 Final   Appointment on 04/26/2022   Component Date Value Ref Range Status    WBC 04/26/2022 5 64  4 31 - 10 16 Thousand/uL Final    RBC 04/26/2022 3 79 (A) 3 81 - 5 12 Million/uL Final    Hemoglobin 04/26/2022 10 5 (A) 11 5 - 15 4 g/dL Final    Hematocrit 04/26/2022 35 0  34 8 - 46 1 % Final    MCV 04/26/2022 92  82 - 98 fL Final    MCH 04/26/2022 27 7  26 8 - 34 3 pg Final    MCHC 04/26/2022 30 0 (A) 31 4 - 37 4 g/dL Final    RDW 04/26/2022 14 6  11 6 - 15 1 % Final    Platelets 26/59/2183 249  149 - 390 Thousands/uL Final    MPV 04/26/2022 10 7  8 9 - 12 7 fL Final    Sodium 04/26/2022 139  136 - 145 mmol/L Final    Potassium 04/26/2022 4 2  3 5 - 5 3 mmol/L Final    Chloride 04/26/2022 107  100 - 108 mmol/L Final    CO2 04/26/2022 28  21 - 32 mmol/L Final    ANION GAP 04/26/2022 4  4 - 13 mmol/L Final    BUN 04/26/2022 16  5 - 25 mg/dL Final    Creatinine 04/26/2022 0 69  0 60 - 1 30 mg/dL Final    Standardized to IDMS reference method    Glucose, Fasting 04/26/2022 148 (A) 65 - 99 mg/dL Final    Specimen collection should occur prior to Sulfasalazine administration due to the potential for falsely depressed results  Specimen collection should occur prior to Sulfapyridine administration due to the potential for falsely elevated results   Calcium 04/26/2022 9 3  8 3 - 10 1 mg/dL Final    AST 04/26/2022 30  5 - 45 U/L Final    Specimen collection should occur prior to Sulfasalazine administration due to the potential for falsely depressed results   ALT 04/26/2022 35  12 - 78 U/L Final    Specimen collection should occur prior to Sulfasalazine and/or Sulfapyridine administration due to the potential for falsely depressed results   Alkaline Phosphatase 04/26/2022 79  46 - 116 U/L Final    Total Protein 04/26/2022 7 7  6 4 - 8 2 g/dL Final    Albumin 04/26/2022 4 2  3 5 - 5 0 g/dL Final    Total Bilirubin 04/26/2022 0 69  0 20 - 1 00 mg/dL Final    Use of this assay is not recommended for patients undergoing treatment with eltrombopag due to the potential for falsely elevated results   eGFR 04/26/2022 81  ml/min/1 73sq m Final    Protime 04/26/2022 20 7 (A) 11 6 - 14 5 seconds Final    INR 04/26/2022 1 87 (A) 0 84 - 1 19 Final         Patient Instructions   Keflex 500 mg 1 tablet twice a day for 5 days  Go for protime and INR this Thursday     With status next Monday see how you are doing  Follow-up with urologist at your convenience  Keep your scheduled appointment       Dr Arjun Trujillo MD  [facility]       "This note has been constructed using a voice recognition system  Therefore there may be syntax, spelling, and/or grammatical errors   Please call if you have any questions  "

## 2022-06-08 ENCOUNTER — OFFICE VISIT (OUTPATIENT)
Dept: OCCUPATIONAL THERAPY | Facility: CLINIC | Age: 82
End: 2022-06-08
Payer: MEDICARE

## 2022-06-08 ENCOUNTER — OFFICE VISIT (OUTPATIENT)
Dept: PHYSICAL THERAPY | Facility: CLINIC | Age: 82
End: 2022-06-08
Payer: MEDICARE

## 2022-06-08 DIAGNOSIS — R26.89 BALANCE DISORDER: ICD-10-CM

## 2022-06-08 DIAGNOSIS — I63.9 CEREBROVASCULAR ACCIDENT (CVA), UNSPECIFIED MECHANISM (HCC): Primary | ICD-10-CM

## 2022-06-08 DIAGNOSIS — R26.89 OTHER ABNORMALITIES OF GAIT AND MOBILITY: Primary | ICD-10-CM

## 2022-06-08 DIAGNOSIS — I63.9 CEREBROVASCULAR ACCIDENT (CVA), UNSPECIFIED MECHANISM (HCC): ICD-10-CM

## 2022-06-08 PROCEDURE — 97112 NEUROMUSCULAR REEDUCATION: CPT

## 2022-06-08 PROCEDURE — 97112 NEUROMUSCULAR REEDUCATION: CPT | Performed by: PHYSICAL THERAPIST

## 2022-06-08 NOTE — PROGRESS NOTES
Daily Note     Today's date: 2022  Patient name: Francy Doherty  : 1940  MRN: [id number]  Referring provider: Salome Saenz  Dx:   Encounter Diagnosis   Name Primary?  Cerebrovascular accident (CVA), unspecified mechanism ([facility] [address] ) Yes                  PLAN OF CARE END: 22  FREQUENCY: 2x/wk  NO AUTH REQUIRED  PRECAUTIONS: falls- walk to front, vision impairment d/t macular degeneration    Subjective:   Objective: See treatment below  NMR  -WBing on vibrating dynadisk laterally x3 minutes for sensory and proprioceptive input to promote improved proprioception for R UE motor control  -Lateral pushups R UE 10x2 for WBing, proprioceptive input, and proximal strengthening for improved distal control  -Lawnmowers in stance with use of red theraband 15x2 with focus on reaching across midline, motor planning R UE, and proximal strengthening  -Scapular retraction with red theraband and dowel with focus on R UE proprioception and motor planning, bimanual coordination, subscapularis strengthening  -Turning various handles in yellow theraputty to simulate manipulating daily items such as a doorknob, key, or oven burden knob  10x each handle with short rest break between each  -Resistive rubber band threading onto cylindrical container with focus on finger extensor strengthening, coordination R hand   ~20 reps combo of yellow, red and green  -[place] mgmt with focus on pincer grasp, palm to finger translation, and separation of hand  Requires mildly increased time, 1 item dropped  -Dual tasking flipping bananagram tiles with R UE while therapist read series of 4 words  Pt asked to repeat them in order provided, then in alphabetical order  Focus on dual tasking, mental manipulation, recall with use of internal memory strategies  On average recalls ~75% of items with extra repetition from therapist       Assessment: Tolerated treatment well   Demonstrates difficulty with in hand manipulation R UE, [facility], working memory and mental manipulation  Pt would benefit from continued OT services to address NMRE R UE and cognitive deficits  Plan: Continued skilled OT per POC  GOALS TO BE ADDED 5/18:  -Pt will demonstrate improved functional cognition by scoring WNL on MoCA (modified for vision deficits)  -Pt will demonstrate improved delayed recall by scoring at at least 14/15 on MIS portion of MoCA

## 2022-06-08 NOTE — PROGRESS NOTES
Daily Note     Insurance:  AMA/CMS Eval/ Re-eval POC expires [place] #/ Referral # Total   Visits  Start date  Expiration date Extension  Visit limitation? PT only or  PT+OT? Co-Insurance   Medicare  CMS 5-18-22 8-10-22 8-10 N/A N/A N/A N/A N/A No PT/OT Yes and $0 Co-pay                                                               Today's date: 2022  Patient name: Parminder Herrera  : 1940  MRN: [id number]  Referring provider: Stephon Swift  Dx:   Encounter Diagnosis     ICD-10-CM    1  Other abnormalities of gait and mobility  R26 89    2  Cerebrovascular accident (CVA), unspecified mechanism ([address] )  I63 9    3  Balance disorder  R26 89                   Subjective: Patient reports no new changes, complaints, or falls  Objective: See treatment diary below    Vitals  - HR: 67 bpm  - SpO2: 98%  - BP: 144/52 mmHg      NMR:  - STS to fatigue: 20 reps, 0 UE, RPE: 17/20  - Fwd step ups (12"): 1 set, 2 min, 0 UE, 4# ankle wts, RPE: 16/20  - high knee march with knee taps: 1 set 2 min : 0 UE support, tapping required 50% of reps  - resisted band walking with med ball slam 10 lb, 2 set x 10 reps  RPE: 17/20   - lateral side stepping over 6 inch pamela with yellow resistance band leading R followed by L; 1 set 2 minutes each side  -   - Treadmill: 4# ankle wts B/L  Minutes MPH % Incline RPE (6-20)   8 1 8 5 16/20                                         Assessment: Patient able to tolerate treatment session well today with initiation of exercise program  Trialed HIGT today and required use of GILBERT scale due to patient on Beta Blockers  Able to achieve 16-17/20 which is target range for 75% of session  Challenge with resistance band and hurdles and walkouts secondary to fatigue  She will continue to benefit from skilled outpatient PT in order to maximize her function and reduce her risk for falls following CVA  Plan: Continue per plan of care         Precautions: Patient on Beta Blockers    Outcome Measures Initial Eval  5-18-22        5xSTS 16 58 sec,   0 UE        TUG  - Regular  - Cognitive   15 63 sec  16 81 sec (stopped counting)        10 meter 0 92 m/s        FGA 16/30        DGI 15/24        MiniBEST 14/28        PASS NT        6MWT 900 ft        CB&M NT

## 2022-06-10 ENCOUNTER — OFFICE VISIT (OUTPATIENT)
Dept: PHYSICAL THERAPY | Facility: CLINIC | Age: 82
End: 2022-06-10
Payer: MEDICARE

## 2022-06-10 ENCOUNTER — OFFICE VISIT (OUTPATIENT)
Dept: OCCUPATIONAL THERAPY | Facility: CLINIC | Age: 82
End: 2022-06-10
Payer: MEDICARE

## 2022-06-10 DIAGNOSIS — I63.9 CEREBROVASCULAR ACCIDENT (CVA), UNSPECIFIED MECHANISM (HCC): ICD-10-CM

## 2022-06-10 DIAGNOSIS — I63.9 CEREBROVASCULAR ACCIDENT (CVA), UNSPECIFIED MECHANISM (HCC): Primary | ICD-10-CM

## 2022-06-10 DIAGNOSIS — R26.89 OTHER ABNORMALITIES OF GAIT AND MOBILITY: Primary | ICD-10-CM

## 2022-06-10 DIAGNOSIS — R26.89 BALANCE DISORDER: ICD-10-CM

## 2022-06-10 PROCEDURE — 97112 NEUROMUSCULAR REEDUCATION: CPT | Performed by: OCCUPATIONAL THERAPIST

## 2022-06-10 PROCEDURE — 97112 NEUROMUSCULAR REEDUCATION: CPT

## 2022-06-10 NOTE — PROGRESS NOTES
Daily Note     Today's date: 6/10/2022  Patient name: Rosana Bradshaw  : 1940  MRN: [id number]  Referring provider: Ana Luisa Harrell  Dx:   Encounter Diagnosis   Name Primary?  Cerebrovascular accident (CVA), unspecified mechanism ([facility] [address] ) Yes       Start Time: 1028  Stop Time: 1100  Total time in clinic (min): 32 minutes    PLAN OF CARE END: 22  FREQUENCY: 2x/wk  NO AUTH REQUIRED  PRECAUTIONS: falls- walk to front, vision impairment d/t macular degeneration    Subjective: "It's hard for my brain to keep track of 2 things at once "  Pt reports that her R shoulder was very sore after OT and PT 2 days ago  WB on dynadisk and lateral push-ups were uncomfortable during OT session  Objective: See treatment below  NMRE  -In-hand manipulation/dexterity with Connect 4 pieces using R hand to improve ADL/IADL performance  [facility] with palm-finger translation using small pegs to place in board x40 reps  -Dexterity activity with 120 number board and pt picking up multiple tiles at a time to place on even numbers in board    -Dual tasking with placing rubber bands around can while naming a boy and girl name for each letter of the alphabet  Required 1 cue for direction following during naming task  Assessment: Tolerated treatment well  Demonstrates difficulty with in hand manipulation R UE, [address], working memory and mental manipulation  Pt would benefit from continued OT services to address NMRE R UE and cognitive deficits  Plan: Continued skilled OT per POC  GOALS TO BE ADDED :  -Pt will demonstrate improved functional cognition by scoring WNL on MoCA (modified for vision deficits)  -Pt will demonstrate improved delayed recall by scoring at at least 14/15 on MIS portion of MoCA

## 2022-06-10 NOTE — PROGRESS NOTES
Daily Note     Insurance:  AMA/CMS Eval/ Re-eval POC expires Ashlyn Chapman #/ Referral # Total   Visits  Start date  Expiration date Extension  Visit limitation? PT only or  PT+OT? Co-Insurance   Medicare  CMS 5-18-22 8-10-22 8-10 N/A N/A N/A N/A N/A No PT/OT Yes and $0 Co-pay                                                               Today's date: 6/10/2022  Patient name: Sonja Armenta  : 1940  MRN: [id number]  Referring provider: Delgado Durand  Dx:   Encounter Diagnosis     ICD-10-CM    1  Other abnormalities of gait and mobility  R26 89    2  Cerebrovascular accident (CVA), unspecified mechanism ([facility] [address] )  I63 9    3  Balance disorder  R26 89                   Subjective: Patient reports no new changes, complaints, or falls  Objective: See treatment diary below    Vitals  - HR: 67 bpm  - SpO2: 98%  - BP: 144/52 mmHg      NMR:  - STS to fatigue: 2 min, 0 UE, RPE: 15/20  - Fwd step ups+ High knee drive (10"): 1 set, 2 min, 0 UE, 4# ankle wts, RPE: 16/20  - Resisted FWD walking (red), 2 mins, 4# ankle wts, RPE: 17/20  - Posterior resisted (red)  high knee march with knee taps: 1 set 2 min : 0 UE support, unable to perform knee taps backwards  - Speed FWD/BCK resisted walking (red): 1 set, 2 min, 0 UE, 4# ankle wts, RPE: 15/20  - Speed stair negotiation: 1 set, 2 mins, 0 UE, 4# ankle wts, RPE 13-17 (Verbal encouragement for 0 UE and reciprocal, CGA due to fear)      - Treadmill: 4# ankle wts B/L  Minutes MPH % Incline RPE (6-20)   2 23  1 8-2 0 6 17/20    2 1 6                                    Assessment: Patient able to tolerate treatment session well today  Continued with HIGT today with patient able to achieve 15-17/20 within the target range for 75% of session  Patient is challenged by fear avoidance, evident with stair negotiation   However, responded well to verbal encouragement and able to complete with no UE assist  She will continue to benefit from skilled outpatient PT in order to maximize her function and reduce her risk for falls following CVA  Plan: Continue per plan of care         Precautions: Patient on Beta Blockers    Outcome Measures Initial Eval  5-18-22        5xSTS 16 58 sec,   0 UE        TUG  - Regular  - Cognitive   15 63 sec  16 81 sec (stopped counting)        10 meter 0 92 m/s        FGA 16/30        DGI 15/24        MiniBEST 14/28        PASS NT        6MWT 900 ft        CB&M NT

## 2022-06-14 ENCOUNTER — PATIENT OUTREACH (OUTPATIENT)
Dept: FAMILY MEDICINE CLINIC | Facility: CLINIC | Age: 82
End: 2022-06-14

## 2022-06-15 ENCOUNTER — OFFICE VISIT (OUTPATIENT)
Dept: PHYSICAL THERAPY | Facility: CLINIC | Age: 82
End: 2022-06-15
Payer: MEDICARE

## 2022-06-15 ENCOUNTER — OFFICE VISIT (OUTPATIENT)
Dept: OCCUPATIONAL THERAPY | Facility: CLINIC | Age: 82
End: 2022-06-15
Payer: MEDICARE

## 2022-06-15 DIAGNOSIS — I63.9 CEREBROVASCULAR ACCIDENT (CVA), UNSPECIFIED MECHANISM (HCC): Primary | ICD-10-CM

## 2022-06-15 DIAGNOSIS — I63.9 CEREBROVASCULAR ACCIDENT (CVA), UNSPECIFIED MECHANISM (HCC): ICD-10-CM

## 2022-06-15 DIAGNOSIS — R26.89 OTHER ABNORMALITIES OF GAIT AND MOBILITY: Primary | ICD-10-CM

## 2022-06-15 DIAGNOSIS — R26.89 BALANCE DISORDER: ICD-10-CM

## 2022-06-15 PROCEDURE — 97112 NEUROMUSCULAR REEDUCATION: CPT

## 2022-06-15 NOTE — PROGRESS NOTES
Daily Note     Insurance:  AMA/CMS Eval/ Re-eval POC expires Micheline Asencio #/ Referral # Total   Visits  Start date  Expiration date Extension  Visit limitation? PT only or  PT+OT? Co-Insurance   Medicare  CMS 5-18-22 8-10-22 8-10 N/A N/A N/A N/A N/A No PT/OT Yes and $0 Co-pay                                                               Today's date: 6/15/2022  Patient name: Janice   : 1940  MRN: [id number]  Referring provider: Chauncey Cummings  Dx:   Encounter Diagnosis     ICD-10-CM    1  Other abnormalities of gait and mobility  R26 89    2  Cerebrovascular accident (CVA), unspecified mechanism ([address] )  I63 9    3  Balance disorder  R26 89                   Subjective: Patient reports no new changes, complaints, or falls  She stated she is going [place] the beach for a week starting next week  Objective: See treatment diary below    Vitals  - HR: 62 bpm  - SpO2: 95%  - BP: 130/60 mmHg      NMR:  - STS to fatigue: 2 min, 0 UE, RPE: 15/20  - Fwd step ups+ High knee drive (10"): 1 set, 2 min, 0 UE, 4# ankle wts, RPE: 16/20  - Resisted FWD walking (red), 2 sets, 2 min, RPE: 17/20  - Posterior resisted (red)  high knee march with knee taps: 1 set 2 min : 0 UE support, unable to perform knee taps backwards  - Speed FWD/BCK resisted walking (red): 1 set, 2 min, 0 UE, 4# ankle wts, RPE: 15/20        Assessment: Patient able to tolerate treatment session fairly today  Significant difficulty with posteriorly pull to facilitate speed backwards ambulation with demonstrated hesitancy and use of UEs throughout  Most instability laterally with fair ability to self correct  She will continue to benefit from skilled outpatient PT in order to maximize her function and reduce her risk for falls following CVA  Plan: Continue per plan of care         Precautions: Patient on Beta Blockers    Outcome Measures Initial Eval  22        5xSTS 16 58 sec,   0 UE        TUG  - Regular  - Cognitive   15 63 sec  16 81 sec (stopped counting)        10 meter 0 92 m/s        FGA 16/30        DGI 15/24        MiniBEST 14/28        PASS NT        6MWT 900 ft        CB&M NT

## 2022-06-15 NOTE — PROGRESS NOTES
Daily Note     Today's date: 6/15/2022  Patient name: Kendall Leon  : 1940  MRN: [id number]  Referring provider: Ian Mitchell  Dx:   Encounter Diagnosis   Name Primary?  Cerebrovascular accident (CVA), unspecified mechanism ([facility] ) Yes                  PLAN OF CARE END: 22  FREQUENCY: 2x/wk  NO AUTH REQUIRED  PRECAUTIONS: falls- walk to front, vision impairment d/t macular degeneration    Subjective: "I can't get this thing to move"- in reference to chaparro    Objective: See treatment below  NMRE  -Concentric forearm pronation/supination with 5 second holds 10x in each direction with focus on controlled forearm rotation, maintaining effective grasp, and UE strengthening with appropriate movement patterns   -Juxtasizer 5x each UE with focus on UE coordinated movement patterns, forearm pronation/supination  Requires significantly inc time R UE >L UE  -120 number board with serial 4 subtraction and providing an item in each of the 4 designated categories designated by number  Focus on [facility], dexterity, sustained and divided attention, working memory  Initially requires cues to recall 1/3 of categories, but improves as activity progresses to require no cues but increased time for processing  Used colored tiles 2* decreased vision  ~7 errors in simple calculations    Assessment: Tolerated treatment well  Demonstrates difficulty with mental manipulation, simple calculations and working memory  Pt would benefit from continued OT services to address NMRE R UE and cognitive deficits  Plan: Continued skilled OT per POC  GOALS TO BE ADDED :  -Pt will demonstrate improved functional cognition by scoring WNL on MoCA (modified for vision deficits)  -Pt will demonstrate improved delayed recall by scoring at at least 14/15 on MIS portion of MoCA

## 2022-06-17 ENCOUNTER — APPOINTMENT (OUTPATIENT)
Dept: PHYSICAL THERAPY | Facility: CLINIC | Age: 82
End: 2022-06-17
Payer: MEDICARE

## 2022-06-17 ENCOUNTER — APPOINTMENT (OUTPATIENT)
Dept: OCCUPATIONAL THERAPY | Facility: CLINIC | Age: 82
End: 2022-06-17
Payer: MEDICARE

## 2022-06-22 ENCOUNTER — APPOINTMENT (OUTPATIENT)
Dept: PHYSICAL THERAPY | Facility: CLINIC | Age: 82
End: 2022-06-22
Payer: MEDICARE

## 2022-06-22 ENCOUNTER — APPOINTMENT (OUTPATIENT)
Dept: OCCUPATIONAL THERAPY | Facility: CLINIC | Age: 82
End: 2022-06-22
Payer: MEDICARE

## 2022-06-24 ENCOUNTER — APPOINTMENT (OUTPATIENT)
Dept: OCCUPATIONAL THERAPY | Facility: CLINIC | Age: 82
End: 2022-06-24
Payer: MEDICARE

## 2022-06-24 ENCOUNTER — APPOINTMENT (OUTPATIENT)
Dept: PHYSICAL THERAPY | Facility: CLINIC | Age: 82
End: 2022-06-24
Payer: MEDICARE

## 2022-06-29 ENCOUNTER — EVALUATION (OUTPATIENT)
Dept: PHYSICAL THERAPY | Facility: CLINIC | Age: 82
End: 2022-06-29
Payer: MEDICARE

## 2022-06-29 ENCOUNTER — TELEPHONE (OUTPATIENT)
Dept: PULMONOLOGY | Facility: CLINIC | Age: 82
End: 2022-06-29

## 2022-06-29 ENCOUNTER — OFFICE VISIT (OUTPATIENT)
Dept: OCCUPATIONAL THERAPY | Facility: CLINIC | Age: 82
End: 2022-06-29
Payer: MEDICARE

## 2022-06-29 DIAGNOSIS — R26.89 BALANCE DISORDER: ICD-10-CM

## 2022-06-29 DIAGNOSIS — I63.9 CEREBROVASCULAR ACCIDENT (CVA), UNSPECIFIED MECHANISM (HCC): ICD-10-CM

## 2022-06-29 DIAGNOSIS — I63.9 CEREBROVASCULAR ACCIDENT (CVA), UNSPECIFIED MECHANISM (HCC): Primary | ICD-10-CM

## 2022-06-29 DIAGNOSIS — R26.89 OTHER ABNORMALITIES OF GAIT AND MOBILITY: Primary | ICD-10-CM

## 2022-06-29 PROCEDURE — 97530 THERAPEUTIC ACTIVITIES: CPT

## 2022-06-29 PROCEDURE — 97530 THERAPEUTIC ACTIVITIES: CPT | Performed by: PHYSICAL THERAPIST

## 2022-06-29 NOTE — PROGRESS NOTES
PT Evaluation          Insurance:  AMA/CMS Eval/ Re-eval POC expires David Lee #/ Referral # Total   Visits  Start date  Expiration date Extension  Visit limitation? PT only or  PT+OT? Co-Insurance   Medicare  CMS 5-18-22 8-10-22 8-10 N/A N/A N/A N/A N/A No PT/OT Yes and $0 Co-pay                                                                    Today's date: 2022  Patient name: Ingrid Duncan  : 1940  MRN: [id number]  Referring provider: Charlotte Villagran  Dx:   Encounter Diagnosis     ICD-10-CM    1  Other abnormalities of gait and mobility  R26 89    2  Cerebrovascular accident (CVA), unspecified mechanism ([address] )  I63 9    3  Balance disorder  R26 89          Assessment  Assessment details: Patient is a 80 y o  Female who presents to skilled outpatient PT with deficits LE strength, gait, balance, and endurance following a L CVA she experienced 2 months ago impacting her ability to perform ADLs and ambulation safely  LE strength WFL, however asymmetrical B/L with L > R strength  Coordination and sensation screens intact and unremarkable  No spasticity noted throughout LE via Modified Laina  She demonstrated HIGH risk for falls per results of 5xSTS, TUG, TUG cognitive, 10 meter, FGA, DGI, and miniBEST with cut off scores taken from APTA and Rehab Measures  Decreased functional cardio capacity per results of 6MWT  She ambulated without an AD today and demonstrated the following gait abnormalities: decreased gary, diminished heel strike/push off B/L (R > L), decreased hip/knee flexion throughout gait cycle, increased lateral trunk sway, decreased reciprocal arm swing, and veering  These gait abnormalities further perpetuate her HIGH risk for falls   She will benefit from skilled outpatient PT in order to maximize her function following CVA and promote highest possible level of independence within her home and the community as the patient lives alone  Impairments: Abnormal coordination, Abnormal gait, Abnormal muscle tone, Abnormal or restricted ROM, Activity intolerance, Impaired balance, Impaired physical strength, Lacks appropriate HEP, Poor posture, Poor body mechanics, Safety issue, Weight-bearing intolerance, Abnormal movement and Abnormal muscle firing  Understanding of Dx/Px/POC: Good  Prognosis: Good      Patient verbalized understanding of POC  Please contact me if you have any questions or recommendations  Thank you for the referral and the opportunity to share in [address] care          Plan  Plan details: HIGT, high level balance exercises  Patient would benefit from: PT Eval and Skilled PT  Planned modality interventions: Biofeedback, Cryotherapy, TENS and Thermotherapy: Hydrocollator Packs  Planned therapy interventions: Abdominal trunk stabilization, ADL training, Balance, Balance/WB training, Breathing training, Body mechanics training, Coordination, Functional ROM exercises, Gait training, HEP, Joint mobilization, Manual therapy, Pandey taping, Motor coordination training, Neuromuscular re-education, Patient education, Postural training, Strengthening, Stretching, Therapeutic activities, Therapeutic exercises, Therapeutic training and Activity modification  Frequency: 2x/wk  Duration in weeks: 12  Plan of Care beginning date: 5-18-22  Plan of Care expiration date: 12 weeks - 8-10-22  Treatment plan discussed with: Patient         Goals  Short Term Goals (4 weeks):    - Patient will improve time on TUG by 2 9 seconds to facilitate improved safety in all ambulation  - Patient will be independent in basic HEP 2-3 weeks  - Patient will improve 5xSTS score by 2 3 seconds to promote improved LE functional strength needed for ADLs  - Patient will complete components of CB&M to promote agility necessary for sports related tasks    Long Term Goals (12 weeks):  - Patient will be independent in a comprehensive home exercise program  - Patient will improve gait speed by 0 18 m/s to improve safety with community ambulation  - Patient will improve time on TUG cognitive to be < 10% difference between TUG in order to reduce her risk for falls  - Patient will improve scoring on FGA by 4 points to progress safety with dynamic tasks  - Patient will be able to demonstrate HT in gait without veering  - Patient will improve 6 Minute Walk Test score by 190 feet to promote improved cardiovascular endurance  - Patient will report 50% reduction in near falls in order to improve safety with functional tasks and reduce his risk for falls  - Patient will report going on walks at least 3 days per week to promote independence and improved cardiovascular endurance  - Patient will be able to ascend/descend stairs reciprocally without UE assist to promote independence and safety with ADLs  - Patient will report 50% reduction in near falls when ambulating on uneven terrain      Cut off score    All date taken from APTA Neuro Section or Rehab Measures      Monsalve:  Miguel et al , 2018  [address]: 2 7 pts    Emil , 2011  Cut-off score: 45/56    Chronic CVA  < 44/56 high risk for falls (Miguel et al , 2018)  < 47 5/56 slow walker status (Cayetano andrade al , 2011) 5xSTS: Gustavo 2010  [address]: 2 3 sec  Age Norms:  60-69: 11 1 sec  70-79: 12 6 sec  80-89: 14 8 sec    Farrah 2010, Chronic Stroke  Chronic CVA: 12 sec   TUG  Gina , 2005  MDC: 2 9 sec    Cut off score:  >13 5 sec community dwelling adults  >32 2 frail elderly  <20 I for basic transfers  >30 dependent on transfers 10 Meter Walk Test:   Kelly nice , 2006  Small meaningful change: 0 06 m/s  Substantial meaningful change: 0 14 m/s  MCID: 0 16 m/s    < 0 4 m/s household ambulators  0 4 - 0 8 m/s limited community ambulators  > 0 8 m/s community ambulators   FGA: Rowan nice , 2010  MCID: 4 2 pts  Geriatrics/community < 22/30 fall risk  Geriatrics/community < 20/30 unexplained falls DGI  MDC: vestibular - 4 pts  MDC: geriatric/community - 3 pts  Falls risk <19/24   6 Minute Walk Test  Galindo nice , 2006  MDC: 60 98 m (200 01 ft)    Azul Neville, & Cut off, 2012  MCID: 34 4 m    Age Norms  60-69: M - 1876 ft   F - 1765 ft  70-79: M - 1729 ft   F - 1545 ft  80-89: M - 1368 ft   F - 1286 ft Modified Laina  0: No increase in tone  1: Catch and release or min resistance at end range  1+: Catch f/b min resistance throughout remainder (< half ROM)  2: Easily moved, but more marked tone throughout most ROM  3: Significant tone, PROM difficult  4: Rigid   MiniBest: Alla et al , 2013  CVA < 17 5 fall risk Pass (Acute CVA)  MDC: 1 8 points (acute), 3 2 points (chronic)         Subjective    History of Present Illness  Mechanism of injury: Patient is an 81 y/o female who had a 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 that has mostly recovered since  She was at [facility] for 3 days and then was transferred to [facility] in [place] for 2 weeks  Update (2022): Patient reports she feels like she has been improving a lot and is feeling really steady  She stated that she has only been using her Rollator at home when she gets up in the middle of the night      Primary AD: Rollator  Assist level at home: Independent  WC usage: No  PLOF: Independent    Pain  Current pain ratin/10  At best pain ratin/10  At worst pain ratin/10  Location: N/A  Aggravating factors: N/A    Social Support  Steps to enter house: Yes, HR  Stairs in house: Yes, 2 HR   Lives in: Multi-level home  Lives with: Alone    Employment status: Retired  Hand dominance: R    Treatments  Previous treatment: PT/OT  Current treatment: PT/OT  Diagnostic Testing: CT, MRI      Objective     Vitals  - HR: 98 bpm  - BP: 120/70 mmHg  - SPO2: 97%    HR Max  - 208 - (0 7x82)  = 150 6 bpm    LE MMT  - R Hip Flexion: 4-/5  - L Hip Flexion: 4/5  - R Hip Extension: 4-/5 - L Hip Extension: 4/5  - R Hip Abduction: 4-/5 - L Hip abduction: 4/5  - R Hip adduction: 4-/5 - L Hip adduction: 4+/5  - R Knee Extension: 5/5 - L Knee Extension: 5/5  - R Knee Flexion: 4/5  - L Knee Flexion: 4+/5  - R Ankle DF: 4/5  - L Ankle DF: 4/5  - R Ankle PF: 4/5  - L Ankle PF: 4/5    Sensation  - Light touch:  Intact  - Deep pressure: Intact    Coordination  - Dysmetria: Intact  - Dysdiadochokinesia: Intact    Modified Laina  - R knee extension: 0 - no increase in tone  - L knee extension: 0 - no increase in tone  - R knee flexion: 0 - no increase in tone  - L knee flexion: 0 - no increase in tone  - R ankle DF: 0 - no increase in tone   - L ankle DF: 0 - no increase in tone      Outcome Measures Initial Eval  5-18-22 PN  6-29-22       5xSTS 16 58 sec,   0 UE 17 20,  0 UE       TUG  - Regular  - Cognitive   15 63 sec  16 81 sec (stopped counting)   12 92 sec  15 62 sec (completed counting)       10 meter 0 92 m/s 0 99 m/s       FGA 16/30 18/30       DGI 15/24 17/24       MiniBEST 14/28 17/28       PASS NT NT       6MWT 900 ft 925 ft       CB&M NT NT                     Precautions: L CVA  Past Medical History:   Diagnosis Date    [facility] ([facility] ) 03/11/2022

## 2022-06-29 NOTE — PROGRESS NOTES
Daily Note     Today's date: 2022  Patient name: Soniya Evans  : 1940  MRN: [id number]  Referring provider: Sis Waldrop  Dx:   Encounter Diagnosis   Name Primary?  Cerebrovascular accident (CVA), unspecified mechanism ([facility] [address] ) Yes                  PLAN OF CARE END: 22  FREQUENCY: 2x/wk  NO AUTH REQUIRED  PRECAUTIONS: falls- walk to front, vision impairment d/t macular degeneration    Subjective: "I can't get this thing to move"- in reference to chaparro    Objective: See treatment below  NMRE  -Concentric forearm pronation/supination with 5 second holds 10x in each direction with focus on controlled forearm rotation, maintaining effective grasp, and UE strengthening with appropriate movement patterns  Red resistance  -Mass grasp 10x each hand with 25lb resistance with 10 second isometric holds with focus on appropriate grasp/release, isometric strengthening  -Card flipping activity with use of R UE and pt providing a word that begins with the second letter of previously stated word with focus on dual tasking, divided attention, motor planning of forearm rotation, dexterity  Cards with very large print and decreased visual distractions  Requires increased time but no assist  -120 number chart with pt completing simple calculations (large, printed) with decoding key, locating correlating number tile, and placing into board  Pt starts task by flipping over all tiles with use of R UE to reveal numbers  Focus on dexterity, simple calculations, sustained attention, and divided attention    Assessment: Tolerated treatment well  Demonstrates difficulty with mental manipulation, simple calculations and working memory  Pt would benefit from continued OT services to address NMRE R UE and cognitive deficits  Plan: Continued skilled OT per POC      GOALS TO BE ADDED :  -Pt will demonstrate improved functional cognition by scoring WNL on MoCA (modified for vision deficits)  -Pt will demonstrate improved delayed recall by scoring at at least 14/15 on MIS portion of MoCA

## 2022-06-29 NOTE — TELEPHONE ENCOUNTER
[facility] is calling, they are faxing over a request for Cpap supplies  She is scheduled 8/10/22 for a follow up, since she hasn't been seen in over 2 years   Please advise

## 2022-07-01 ENCOUNTER — EVALUATION (OUTPATIENT)
Dept: OCCUPATIONAL THERAPY | Facility: CLINIC | Age: 82
End: 2022-07-01
Payer: MEDICARE

## 2022-07-01 ENCOUNTER — OFFICE VISIT (OUTPATIENT)
Dept: PHYSICAL THERAPY | Facility: CLINIC | Age: 82
End: 2022-07-01
Payer: MEDICARE

## 2022-07-01 DIAGNOSIS — I63.9 CEREBROVASCULAR ACCIDENT (CVA), UNSPECIFIED MECHANISM (HCC): ICD-10-CM

## 2022-07-01 DIAGNOSIS — R26.89 BALANCE DISORDER: ICD-10-CM

## 2022-07-01 DIAGNOSIS — I63.9 CEREBROVASCULAR ACCIDENT (CVA), UNSPECIFIED MECHANISM (HCC): Primary | ICD-10-CM

## 2022-07-01 DIAGNOSIS — R26.89 OTHER ABNORMALITIES OF GAIT AND MOBILITY: Primary | ICD-10-CM

## 2022-07-01 PROCEDURE — 97112 NEUROMUSCULAR REEDUCATION: CPT

## 2022-07-01 PROCEDURE — 97530 THERAPEUTIC ACTIVITIES: CPT | Performed by: OCCUPATIONAL THERAPIST

## 2022-07-01 PROCEDURE — 97112 NEUROMUSCULAR REEDUCATION: CPT | Performed by: PHYSICAL THERAPIST

## 2022-07-01 NOTE — LETTER
[facility] HEMATOLOGY & ONCOLOGY  [address]  [facility] [address] [phone]    Patient Name: Ricardo Hugo  Encounter Date: 07/01/2022  YOB: 1948  Patient Number: [id number]    Hematology / Oncology Progress Note      HPI / REASON FOR VISIT: Ricardo Hugo is a 74 y.o. male who is followed by this office for CLL, Iron Deficiency Anemia, Chronic Kidney Disease.  He sees Dr. Rueda for nephrology.      Staging form: Chronic Lymphocytic Leukemia / Small Lymphocytic Lymphoma, AJCC 8th Edition  - Clinical stage from 7/20/2021: Lymphocytosis: Absent, Adenopathy: Absent, Organomegaly: Absent, Anemia: Present, Thrombocytopenia: Absent .    He has received Injectafer for WALE and Epogen for anemia in CKD in the past.    He presents to clinic today for continued follow-up.  He has no acute complaints.  Patient had labs drawn and they were reviewed with him today.  Bun 47, creatinine 2.57, GFR 25.4, alk phos 156.  Anemia profile unremarkable.  CBC with WBC 6.54, hemoglobin 9.7, hematocrit 30.5, platelet count 112.      LABS    Lab Results - Last 18 Months   Lab Units 07/08/22  0736 07/01/22  0812 05/27/22  1308 04/29/22  1214 04/01/22  0753 03/15/22  1341 01/10/22  0717 12/29/21  0938 12/22/21  0948 12/06/21  0815 12/01/21  0906 11/24/21  0851 11/18/21  1019 10/29/21  1614 10/29/21  0502 10/28/21  1750   HEMOGLOBIN g/dL 10.7* 9.7* 9.8* 11.4* 10.1* 10.7*   < > 10.1* 8.9*   < > 9.7* 9.7* 9.7*   < > 7.5* 8.0*   HEMATOCRIT % 33.1* 30.5* 31.0* 35.0* 31.3* 33.0*   < > 31.4* 29.7*   < > 32.2* 32.4* 30.6*   < > 22.6* 25.5*   MCV fL 97.6* 97.1* 98.7* 95.4 95.7 94.0   < > 94.0 94.9   < > 96.4 98.5* 100.0*   < > 94.6 98.1*   WBC 10*3/mm3 5.92 6.54 4.73 14.98* 5.40 5.64   < > 5.95 6.75   < > 5.76 6.42 5.46   < > 4.64 16.97*   RDW % 13.7 13.6 13.9 14.4 13.6 14.3   < > 18.3* 16.5*   < > 14.5 15.1 15.4   < > 15.3 16.4*   MPV fL 9.9 10.4 10.1 10.3  March 18, 2018     [facility]  [email or web address]  [email or web address]    Patient: Richard Moncada   YOB: 1940   Date of Visit: 3/16/2018       Dear Dr Valorie Villarreal: Thank you for referring Gladys Knox to me for evaluation  Below are my notes for this consultation  If you have questions, please do not hesitate to call me  I look forward to following your patient along with you  Sincerely,        Lorenza Pierre MD        CC: No Recipients  Lorenza Pierre MD  3/18/2018  5:51 PM  Sign at close encounter  Assessment/Plan:       Problem List Items Addressed This Visit        Respiratory    Obstructive sleep apnea on CPAP     Continued compliance with CPAP is encouraged  Her machine appears to be malfunctioning and therefore she will bring this in to be evaluated by the therapist             Other    Shortness of breath - Primary     Patient's shortness of breath may be in relation to deconditioning  However she also has chest congestion at this time  She has postnasal drip and sinus pressure  She will use Flonase for her postnasal drip  She is given Arnuity for her bronchial congestion  Result of her spirometry are reviewed with her today  Will repeat spirometry at her next visit if her symptoms are improved  Relevant Orders    POCT spirometry (Completed)             Follow-up in 2 weeks  All questions are answered to the patient's satisfaction and understanding  She verbalizes understanding  She is encouraged to call with any further questions or concerns  Portions of the record may have been created with voice recognition software  Occasional wrong word or "sound a like" substitutions may have occurred due to the inherent limitations of voice recognition software  Read the chart carefully and recognize, using context, where substitutions have occurred        ______________________________________________________________________    Chief Complaint:   Chief 9.8 10.1   < > 10.0 9.7   < > 9.8 9.5 9.9   < > 9.7 9.4   PLATELETS 10*3/mm3 120* 112* 129* 137* 116* 117*   < > 154 155   < > 156 188 175   < > 151 348   IMM GRAN % %  --  0.5  --   --   --   --   --  0.8* 0.4  --  0.5 0.5 0.5   < >  --   --    NEUTROS ABS 10*3/mm3  --  4.14  --   --  3.76  --   --  3.54 4.56  --  3.63 3.72 3.13   < > 4.04 13.71*   LYMPHS ABS 10*3/mm3  --  1.51  --   --   --   --   --  1.44 1.35  --  1.18 1.54 1.34   < >  --   --    MONOS ABS 10*3/mm3  --  0.63  --   --   --   --   --  0.57 0.54  --  0.68 0.75 0.66   < >  --   --    EOS ABS 10*3/mm3  --  0.18  --   --  0.16  --   --  0.30 0.24  --  0.21 0.34 0.25   < >  --   --    BASOS ABS 10*3/mm3  --  0.05  --   --  0.22*  --   --  0.05 0.03  --  0.03 0.04 0.05   < >  --   --    IMMATURE GRANS (ABS) 10*3/mm3  --  0.03  --   --   --   --   --  0.05 0.03  --  0.03 0.03 0.03   < >  --   --    NRBC /100 WBC  --  0.0  --   --   --   --   --  0.0 0.0  --  0.0 0.0 0.0   < > 1.0*  --    NEUTROPHIL % %  --   --   --   --  69.7  --   --   --   --   --   --   --   --   --  74.0 56.6   MONOCYTES % %  --   --   --   --  11.1  --   --   --   --   --   --   --   --   --  7.0 6.1   BASOPHIL % %  --   --   --   --  4.0*  --   --   --   --   --   --   --   --   --   --   --    ATYP LYMPH % %  --   --   --   --  5.1*  --   --   --   --   --   --   --   --   --   --   --    ANISOCYTOSIS   --   --   --   --   --   --   --   --   --   --   --   --   --   --   --  Slight/1+    < > = values in this interval not displayed.       Lab Results - Last 18 Months   Lab Units 07/01/22  0812 05/27/22  1222 04/29/22  1214 04/01/22  0753 02/15/22  1447 01/10/22  0717 11/11/21  1010 11/05/21  1113 11/03/21  0537 11/02/21  0600   GLUCOSE mg/dL 121* 114* 107* 100* 105* 133* 128* 109* 101* 96   SODIUM mmol/L 139 141 138 142 138 139 141 140 139 142   POTASSIUM mmol/L 4.4 4.4 4.9 4.9 4.7 4.8 4.4 4.6 3.9 3.5   CO2 mmol/L 20.0* 21.0* 19.0* 22.0 17.0* 21.0* 17.0* 24.0 24.0 24.0   CHLORIDE  Complaint   Patient presents with    Shortness of Breath     Occasional; on exertion Pt would like to establish new pulmonologist    Cough     Occasional at night    Sleep Apnea     Uses CPAP for 15 + years        Patient ID: Gerald Quezada is a 66 y o  y o  female has a past medical history of Atrial fibrillation ([facility] [address] ); Cancer ([facility] [address] ); Cardiac disease; and Hyperlipidemia  3/16/2018  Patient presents for initial visit   She has a history of obstructive sleep apnea and uses a CPAP machine  Also most recently she has been having shortness of breath  Was away for 2 weeks in [place]-over there this is flat ground without any steps  Steps at home  And now has shortness of breath  It is improving  No history of lung problems  She has a history of Arrhythmia  She has not been driving is she has Macular degeneration  Occasionally wakes up at night with cough  Not even once per week  JAKE- originally diagnosed about 30 years ago  Constantly using the machine for about 20 years  Nasal pillows  Changes supplies once every 2 weeks  The tubing and filters change every 3 months  She gets about 7-8 hours of sleep per night  Does wake up refreshed  No drowsy driving  CPAP pressure is 7 cm water  Currently machine knob broken  She would like to have this checked  Currently feels like she has a cold  Head congestion- ?sinus difficulty  Little post nasal drip  No GERD  No prior lung function test      She has arthritis- knuckles are started to swell  Hip and knees are bothering her as well  Shortness of Breath   This is a new problem  The current episode started in the past 7 days  The problem occurs intermittently  The problem has been gradually improving  Pertinent negatives include no abdominal pain, chest pain, sore throat, sputum production or wheezing  The symptoms are aggravated by exercise  She has tried nothing for the symptoms     Cough   Associated symptoms include postnasal drip and shortness of breath  Pertinent negatives include no chest pain, sore throat or wheezing  There is no history of environmental allergies  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  Travel history: Recently travelled to [place]  Review of Systems   Constitutional: Negative for activity change and unexpected weight change  HENT: Positive for congestion, postnasal drip and sinus pressure  Negative for sore throat  Eyes: Negative for discharge  Respiratory: Positive for cough and shortness of breath  Negative for sputum production and wheezing  Cardiovascular: Negative for chest pain  Gastrointestinal: Negative for abdominal distention and abdominal pain  Endocrine: Negative for polyuria  Genitourinary: Negative for difficulty urinating  Musculoskeletal: Positive for arthralgias  Skin: Negative for color change and pallor  Allergic/Immunologic: Negative for environmental allergies  Neurological: Negative for dizziness and facial asymmetry  Hematological: Negative for adenopathy  Psychiatric/Behavioral: Negative for agitation and behavioral problems  Social history: She reports that she has quit smoking  She has a 60 00 pack-year smoking history  She has never used smokeless tobacco  She reports that she drinks alcohol  She reports that she does not use drugs  Past surgical history:   Past Surgical History:   Procedure Laterality Date    EYE SURGERY      HYSTERECTOMY      MITRAL VALVE REPLACEMENT  1994     Family history: No family history on file  no family history of lung disease known  There is no immunization history on file for this patient    Current Outpatient Prescriptions   Medication Sig Dispense Refill    Calcium Citrate-Vitamin D (CALCITRATE/VITAMIN D PO) Take by mouth 3 (three) times a week      Cholecalciferol (VITAMIN D PO) Take by mouth      digoxin (LANOXIN) 0 25 mg tablet Take 250 mcg by mouth daily      Lactobacillus mmol/L 107 105 107 107 105 108* 111* 109* 108* 108*   ANION GAP mmol/L 12.0 15.0 12.0 13.0 16.0* 10.0 13.0 7.0 7.0 10.0   CREATININE mg/dL 2.57* 2.52* 2.36* 2.68* 2.72* 2.58* 2.66* 2.58* 2.38* 2.46*   BUN mg/dL 47* 43* 68* 42* 56* 50* 43* 31* 21 27*   BUN / CREAT RATIO  18.3 17.1 28.8* 15.7 20.6 19.4 16.2 12.0 8.8 11.0   CALCIUM mg/dL 8.6 8.9 8.1* 9.1 8.8 8.8 8.5* 8.2* 8.0* 8.1*   EGFR IF NONAFRICN AM mL/min/1.73  --   --   --   --  23* 25* 24* 25* 27* 26*   ALK PHOS U/L 156* 168* 153* 238* 183* 191* 197* 189* 132* 130*   TOTAL PROTEIN g/dL 7.0 7.4 6.6 6.6 7.1 7.3 6.5 5.7* 4.9* 5.3*   ALT (SGPT) U/L 17 18 35 21 20 19 17 16 11 9   AST (SGOT) U/L 14 18 17 18 18 20 22 28 24 20   BILIRUBIN mg/dL 0.3 0.2 0.3 0.2 0.3 0.3 0.2 <0.2 0.2 0.2   ALBUMIN g/dL 4.10 4.30 3.70 4.00 4.20 4.30 3.80 3.30* 2.50* 3.00*   GLOBULIN gm/dL 2.9 3.1 2.9 2.6 2.9 3.0 2.7 2.4 2.4 2.3       Lab Results - Last 18 Months   Lab Units 10/29/21  1201 10/28/21  1750 07/27/21  1245 07/20/21  1029 06/25/21  1155 03/11/21  0951   M-SPIKE g/dL  --   --   --   --  Not Observed  --    URIC ACID mg/dL 9.2*  --  9.5* 9.2*  --  8.7*   LDH U/L  --  166  --   --  181  --        Lab Results - Last 18 Months   Lab Units 07/01/22  0812 05/27/22  1222 04/29/22  1214 04/01/22  0753 03/15/22  1315 03/01/22  1308 11/03/21  0537 10/29/21  0502 07/06/21  1320 06/25/21  1155 06/21/21  1206 05/05/21  1014 03/11/21  0951   IRON mcg/dL 94 94 128 80 88 85   < >  --    < > 25*  --   --   --    TIBC mcg/dL 288* 298 241* 262* 258* 274*   < >  --    < > 420  --   --   --    IRON SATURATION % 33 32 53* 31 34 31   < >  --    < > 6*  --   --   --    FERRITIN ng/mL 254.20 281.30 298.80 320.90 295.30 201.30   < >  --    < > 31.91  --   --   --    TSH uIU/mL  --   --   --   --   --   --   --  0.898  --  3.150 4.170 6.550* 8.010*   FOLATE ng/mL  --   --   --   --   --   --   --   --   --  13.00  --   --   --     < > = values in this interval not displayed.         PAST MEDICAL  (PROBIOTIC ACIDOPHILUS PO) Take by mouth      metFORMIN (GLUCOPHAGE) 500 mg tablet       Multiple Vitamins-Minerals (PRESERVISION AREDS PO) Take 2 tablets by mouth daily      NADOLOL PO Take 2 5 mg by mouth daily      warfarin (COUMADIN) 2 5 mg tablet Take 2 5 mg by mouth 3 (three) times a week      warfarin (COUMADIN) 5 mg tablet Take 5 mg by mouth 4 (four) times a week       No current facility-administered medications for this visit  Allergies: Sulfa antibiotics    Objective:  Vitals:    03/16/18 0845   BP: 138/80   BP Location: Right arm   Patient Position: Sitting   Cuff Size: Standard   Pulse: 87   Resp: 18   Temp: 98 4 °F (36 9 °C)   TempSrc: Oral   SpO2: 94%   Weight: 77 6 kg (171 lb)   Height: 5' 5 5" (1 664 m)   Oxygen Therapy  SpO2: 94 %    Wt Readings from Last 3 Encounters:   03/16/18 77 6 kg (171 lb)   10/09/17 73 5 kg (162 lb)     Body mass index is 28 02 kg/m²  Physical Exam   Constitutional: She is oriented to person, place, and time  She appears well-developed and well-nourished  No distress  HENT:   Head: Normocephalic and atraumatic  Mouth/Throat: Oropharynx is clear and moist  No oropharyngeal exudate  Erythema   Eyes: EOM are normal  Pupils are equal, round, and reactive to light  Neck: Normal range of motion  Neck supple  No tracheal deviation present  No thyromegaly present  Cardiovascular: Normal rate and regular rhythm  No murmur heard   +click   Pulmonary/Chest: Effort normal  No respiratory distress  She has no wheezes  She has no rales  She exhibits no tenderness  Patient has bilateral coarse breath sounds  Abdominal: Soft  Bowel sounds are normal  She exhibits no distension  There is no tenderness  Musculoskeletal: Normal range of motion  She exhibits no edema  Lymphadenopathy:     She has no cervical adenopathy  Neurological: She is alert and oriented to person, place, and time  No cranial nerve deficit  Skin: Skin is warm and dry   She is not HISTORY:  ALLERGIES:  Allergies   Allergen Reactions   • Ondansetron Anaphylaxis and GI Intolerance   • Zofran [Ondansetron Hcl] Anaphylaxis   • Lortab [Hydrocodone-Acetaminophen] Other (See Comments) and Hallucinations     CLOSTROPHOBIC   • Allopurinol Other (See Comments)     Pain on right side     CURRENT MEDICATIONS:  Outpatient Encounter Medications as of 7/1/2022   Medication Sig Dispense Refill   • albuterol sulfate  (90 Base) MCG/ACT inhaler USE 2 INHALATIONS FOUR TIMES A DAY AS NEEDED 20.1 g 3   • ALPRAZolam (XANAX) 0.25 MG tablet TAKE 1 TABLET BY MOUTH AT NIGHT AS NEEDED FOR ANXIETY 30 tablet 2   • amLODIPine (NORVASC) 10 MG tablet TAKE 1 TABLET DAILY 90 tablet 3   • aspirin (aspirin) 81 MG EC tablet Take  by mouth Daily.     • atorvastatin (LIPITOR) 10 MG tablet TAKE 1 TABLET BY MOUTH DAILY 90 tablet 3   • azelastine (ASTELIN) 0.1 % nasal spray USE 1 SPRAY IN EACH NOSTRIL TWICE A DAY AS NEEDED 90 mL 1   • cholestyramine (QUESTRAN) 4 g packet MIX AND DRINK 1 PACKET DAILY 90 packet 1   • cloNIDine (CATAPRES) 0.2 MG tablet TAKE 3 TABLETS DAILY 270 tablet 3   • clopidogrel (PLAVIX) 75 MG tablet Take 75 mg by mouth Daily.     • Copper Gluconate 2 MG tablet Take 2 mg by mouth Daily. 30 tablet 6   • diphenhydrAMINE (BENADRYL) 25 mg capsule Take 25 mg by mouth Every 6 (Six) Hours As Needed for Itching.     • fexofenadine (ALLEGRA) 180 MG tablet Take 180 mg by mouth Daily.     • fluticasone (FLONASE) 50 MCG/ACT nasal spray 2 sprays into the nostril(s) as directed by provider Daily As Needed for Rhinitis.     • hydrALAZINE (APRESOLINE) 25 MG tablet Take 1 tablet by mouth 3 (Three) Times a Day. 90 tablet 5   • levothyroxine (Synthroid) 75 MCG tablet Take 1 tablet by mouth Daily. 90 tablet 3   • magnesium chloride ER 64 MG DR tablet Take 143 mg by mouth Daily.     • Melatonin 10 MG capsule Take 2 capsules by mouth Every Night.     • mesalamine (APRISO) 0.375 g 24 hr capsule TAKE 4 CAPSULES DAILY 360 capsule 3    • metoprolol succinate XL (TOPROL-XL) 25 MG 24 hr tablet TAKE 1 TABLET DAILY 90 tablet 3   • Multiple Vitamins-Minerals (PRESERVISION/LUTEIN) capsule Take 1 capsule by mouth 2 (two) times a day.     • omeprazole (priLOSEC) 20 MG capsule TAKE 1 CAPSULE DAILY 90 capsule 1   • potassium chloride 10 MEQ CR tablet TAKE 1 TABLET TWICE A  tablet 3   • sodium bicarbonate 650 MG tablet Take 0.5 tablets by mouth 2 (Two) Times a Day. (Patient taking differently: Take 325 mg by mouth 3 (Three) Times a Day.) 180 tablet 3   • valsartan (DIOVAN) 160 MG tablet Take 160 mg by mouth Daily.     • vitamin D (ERGOCALCIFEROL) 1.25 MG (20565 UT) capsule capsule Take 1 capsule by mouth 1 (One) Time Per Week. 15 capsule 3   • [DISCONTINUED] furosemide (Lasix) 20 MG tablet Take 1 tablet by mouth Daily. 2 TABLETS PER  tablet 1   • [DISCONTINUED] methylPREDNISolone (MEDROL) 4 MG dose pack Take as directed on package instructions. 1 tablet 1     No facility-administered encounter medications on file as of 7/1/2022.     ADULT ILLNESSES:  Patient Active Problem List   Diagnosis Code   • Chronic rhinitis J31.0   • Acute renal failure superimposed on stage 4 chronic kidney disease (HCC) N17.9, N18.4   • Hyponatremia E87.1   • Acute cystitis N30.00   • Metabolic acidosis, increased anion gap E87.2   • Hypomagnesemia E83.42   • Hypokalemia E87.6   • Moderate malnutrition ([facility]) E44.0   • Hypertension, benign I10   • Sepsis ([facility]) A41.9   • Enterocolitis K52.9   • Chronic diarrhea K52.9   • UTI (urinary tract infection), bacterial N39.0, A49.9   • Crohn's disease of large intestine with other complication ([facility]) K50.118   • Asymmetrical hearing loss of left ear [id number]   • Symptomatic anemia D64.9   • CKD (chronic kidney disease) stage 4, GFR 15-29 ml/min ([facility]) N18.4   • Bruit of right carotid artery R09.89   • Wellness examination Z00.00   • Pre-op evaluation Z01.818   • PAD (peripheral artery disease) ([facility]) I73.9   • IHD (ischemic heart  diaphoretic  Psychiatric: She has a normal mood and affect  Her behavior is normal    Vitals reviewed  Lab Review:   Lab Results   Component Value Date     01/23/2018     11/09/2015    K 4 1 01/23/2018    K 4 1 11/09/2015     01/23/2018     11/09/2015    CO2 30 01/23/2018    CO2 29 11/09/2015    BUN 13 01/23/2018    BUN 13 11/09/2015    CREATININE 0 66 01/23/2018    CREATININE 0 7 11/09/2015    GLUCOSE 173 (H) 10/09/2017    GLUCOSE 147 (H) 11/09/2015    CALCIUM 9 0 01/23/2018    CALCIUM 8 6 11/09/2015     Lab Results   Component Value Date    WBC 5 40 10/09/2017    HGB 13 1 01/23/2018    HGB 13 3 11/09/2015    HCT 40 9 01/23/2018    HCT 40 2 11/09/2015    MCV 89 10/09/2017     10/09/2017       Diagnostics:  I have personally reviewed pertinent reports  and I have personally reviewed pertinent films in PACS  Chest x-ray performed October 2017 was without any infiltrates  Office Spirometry Results:  Spirometry performed in the office today shows decreased obstructive ratio with FEV1 of 89%  Compliance data obtained 2/14/2000  2018 showed adequate usage with minimal leak and average AHI of 1 4/hour on CPAP 7  Data obtained from her prior sleep studies is reviewed  2008 she had a diagnostic study which showed AHI of 22 1  Her weight at that time was 168 lb  Last CPAP titration was 2014  disease) I25.9   • Carotid stenosis I65.29   • Stenosis of right carotid artery I65.21   • Preop testing Z01.818   • Carotid stenosis, right I65.21   • Avascular necrosis of bone of hip (HCC) M87.059   • Diastolic dysfunction I51.89   • Shortness of breath R06.02   • CRD (chronic renal disease), stage IV (HCC) N18.4   • Thrombocytopenia (HCC) D69.6   • History of alcoholism (HCC) F10.21   • Anemia due to chronic kidney disease N18.9, D63.1   • Copper deficiency E61.0   • Low iron  E61.1   • CLL (chronic lymphocytic leukemia) (HCC) C91.10   • Pulmonary hypertension (HCC) I27.20   • GI bleed K92.2   • Hypomagnesemia E83.42   • Hypokalemia E87.6     SURGERIES:  Past Surgical History:   Procedure Laterality Date   • ARTERY SURGERY  2021    right side on neck   • CAROTID ENDARTERECTOMY Right 5/10/2021    Procedure: RIGHT CAROTID ENDARTERECTOMY WITH EEG;  Surgeon: Gil Pineda DO;  Location: [facility] OR ;  Service: Vascular;  Laterality: Right;   • COLONOSCOPY N/A 7/2/2020    Procedure: COLONOSCOPY WITH ANESTHESIA;  Surgeon: Adrien Brewster MD;  Location: [facility] ENDOSCOPY;  Service: Gastroenterology;  Laterality: N/A;  pre op: diarrhea  post op: polyps  PCP: Joe Velasco MD   • COLONOSCOPY N/A 10/13/2020    Procedure: COLONOSCOPY WITH ANESTHESIA;  Surgeon: Adrien Brewster MD;  Location: [facility] ENDOSCOPY;  Service: Gastroenterology;  Laterality: N/A;  Pre: Chronic Diarrhea, Crohn's  Post: AVM  Dr. Neftali Velasco  CO2 Inflation Used   • CORONARY ARTERY BYPASS GRAFT  2003    x3   • ENDOSCOPY N/A 11/2/2021    Procedure: ESOPHAGOGASTRODUODENOSCOPY WITH ANESTHESIA;  Surgeon: Bridger Bell MD;  Location: [facility] ENDOSCOPY;  Service: Gastroenterology;  Laterality: N/A;  pre anemia;gi bleed  post  gi bleed;schatski ring  Dr. ERIC Velasco   • EYE SURGERY Bilateral     catorac   • INCISION AND DRAINAGE PERIRECTAL ABSCESS N/A 3/3/2017    Procedure: INCISION AND DRAINAGE OF JEET ANAL ABSCESS;  Surgeon: Lynette ZARAGOZA  MD Luis;  Location: [facility] OR;  Service:    • MYRINGOTOMY W/ TUBES Left 04/17/2017    06/10/2016   • TONSILLECTOMY     • TOTAL HIP ARTHROPLASTY Right 2006     HEALTH MAINTENANCE ITEMS:  Health Maintenance Due   Topic Date Due   • Hepatitis B (1 of 3 - Risk 3-dose series) Never done   • TDAP/TD VACCINES (1 - Tdap) Never done   • Pneumococcal Vaccine 65+ (3 - PPSV23 or PCV20) 12/28/2015   • ANNUAL WELLNESS VISIT  03/17/2022       <no information>  Last Completed Colonoscopy          COLORECTAL CANCER SCREENING (COLONOSCOPY - Every 3 Years) Next due on 10/13/2023    10/28/2021  POC Occult Blood Stool    10/13/2020  Surgical Procedure: COLONOSCOPY    10/13/2020  COLONOSCOPY    07/02/2020  Surgical Procedure: COLONOSCOPY    07/02/2020  COLONOSCOPY    Only the first 5 history entries have been loaded, but more history exists.              Immunization History   Administered Date(s) Administered   • COVID-19 (MODERNA) 1st, 2nd, 3rd Dose Only 02/24/2021, 03/24/2021, 08/30/2021   • Flu Vaccine Split Quad 09/30/2019   • FluLaval/Fluarix/Fluzone >6 09/22/2020   • Fluzone High-Dose 65+yrs 10/12/2021   • Fluzone Split Quad (Multi-dose) 09/25/2018, 09/30/2019, 09/22/2020   • Influenza TIV (IM) 10/18/2017   • Influenza Whole 09/28/2015   • Pneumococcal Conjugate 13-Valent (PCV13) 12/28/2014   • Pneumococcal Polysaccharide (PPSV23) 03/17/2006   • Shingrix 12/04/2021, 02/02/2022   • Zostavax 12/28/2014     Last Completed Mammogram     This patient has no relevant Health Maintenance data.            FAMILY HISTORY:  Family History   Problem Relation Age of Onset   • No Known Problems Mother    • No Known Problems Father    • No Known Problems Daughter    • Colon cancer Neg Hx    • Colon polyps Neg Hx      SOCIAL HISTORY:  Social History     Socioeconomic History   • Marital status:    Tobacco Use   • Smoking status: Former Smoker     Packs/day: 0.50     Years: 25.00     Pack years: 12.50     Types: Cigarettes     Start  "date:      Quit date: 10/13/2013     Years since quittin.7   • Smokeless tobacco: Never Used   • Tobacco comment: quit 2013   Vaping Use   • Vaping Use: Never used   Substance and Sexual Activity   • Alcohol use: Not Currently   • Drug use: No   • Sexual activity: Defer       REVIEW OF SYSTEMS:  Review of Systems   Constitutional: Negative for activity change, appetite change, fatigue, fever, unexpected weight gain and unexpected weight loss.   HENT: Negative for dental problem, facial swelling, swollen glands and trouble swallowing.    Eyes: Negative for double vision and discharge.   Respiratory: Negative for cough, shortness of breath and wheezing.    Cardiovascular: Negative for chest pain, palpitations and leg swelling.   Gastrointestinal: Negative for abdominal pain, blood in stool, nausea and vomiting.   Endocrine: Negative.    Genitourinary: Negative for dysuria and hematuria.   Musculoskeletal: Negative for arthralgias and myalgias.   Skin: Negative for rash, skin lesions and wound.   Allergic/Immunologic: Negative for immunocompromised state.   Neurological: Negative for speech difficulty, light-headedness, headache, memory problem and confusion.   Hematological: Negative for adenopathy.   Psychiatric/Behavioral: Negative for self-injury, suicidal ideas and depressed mood. The patient is not nervous/anxious.        /68   Pulse 64   Temp 97.9 °F (36.6 °C) (Temporal)   Resp 16   Ht 182.9 cm (72\")   Wt 77.6 kg (171 lb)   SpO2 98%   BMI 23.19 kg/m²  Body surface area is 1.99 meters squared.    Pain Score    22 0832   PainSc: 0-No pain       Physical Exam:  Physical Exam  Constitutional:       Appearance: Normal appearance.   HENT:      Head: Normocephalic and atraumatic.   Cardiovascular:      Rate and Rhythm: Normal rate and regular rhythm.   Pulmonary:      Effort: Pulmonary effort is normal.      Breath sounds: Normal breath sounds.   Abdominal:      General: Bowel sounds are " normal.      Palpations: Abdomen is soft.   Musculoskeletal:      Right lower leg: No edema.      Left lower leg: No edema.   Skin:     General: Skin is warm and dry.   Neurological:      Mental Status: He is alert and oriented to person, place, and time.   Psychiatric:         Attention and Perception: Attention normal.         Mood and Affect: Mood normal.         Judgment: Judgment normal.         Ricardo Hugo reports a pain score of 0.  No intervention indicated         ASSESSMENT / PLAN    1. CLL (chronic lymphocytic leukemia) (HCC)    2. Anemia due to stage 4 chronic kidney disease (HCC)    3. Alcoholic cirrhosis, unspecified whether ascites present (HCC)         ASSESSMENT:      1.  CLL  - Clinical stage from 7/20/2021:  - Lymphocytosis: Absent, Adenopathy: Absent, Organomegaly: Absent,   Anemia: Present, Thrombocytopenia: Present  -Hgb 9.7, Plt count 112.  Likely secondary to CKD and Cirrhosis   PLAN:  Stable for observation    2.  Anemia in Chronic Kidney Disease   -WBC 6.54, hemoglobin 9.7, hematocrit 30.5, platelet count 112.   -BUN 47, creatinine 2.57, GFR 25.4,    -Serum iron 94, ferritin 254, saturation 33%, TIBC 288  PLAN: Retacrit  weekly for Hgb < 10 with goal of Hgb > 11   -Sees Dr. Francis, Nephrology    3.  Iron Deficiency Anemia   -EGD on 11/3/21 showed non-bleeding erosive gastropathy  --Serum iron 94, ferritin 254, saturation 33%, TIBC 288  -PLAN:  Continue to monitor.    4. Cirrhosis of Liver  -History of chronic alcoholism  - Cirrhotic morphology/appearance of the liver on 07/12/21 ultrasound  - Alk phos 156.  -PLAN:  Continue to monitor       PLAN:    -Weekly CBC and Procrit injection to keep hemoglobin greater than 11/hematocrit greater than 33  -Continue current medications, treatment plans and follow up with PCP and any other specialist  Return to office in 2 months with CBC, CMP prior to appointment  Care discussed with patient.  Understanding expressed.  Patient agreeable with  plan.      I spent 25 minutes caring for Ricardo on this date of service. This time includes time spent by me in the following activities: preparing for the visit, reviewing tests, performing a medically appropriate examination and/or evaluation, counseling and educating the patient/family/caregiver, ordering medications, tests, or procedures, documenting information in the medical record and care coordination.     Becky Camacho, APRN  07/01/2022

## 2022-07-01 NOTE — PROGRESS NOTES
OCCUPATIONAL THERAPY RE-EVALUATION    Today's Date: 2022  Patient Name: Richard Moncada  : 1940  MRN: [id number]  Referring Provider: Emily Finley  Dx: I63 9 Cerebrovascular accident    SKILLED ANALYSIS:  Pt seen for OT re-evaluation after 1 5 months of OP OT services s/p CVA focusing on RUE function and cognition  Pt achieved 4/8 goals in her POC and is motivated to continue with therapy to continue addressing RUE function and cognition  Her MoCA score declined by 2 points today  Based on results of OT re-evaluation recommend continued OP OT services 2x/wk for 8 weeks to maximize recovery s/p CVA and pt in agreement with POC  PLAN OF CARE START: 22  PLAN OF CARE END: 22  FREQUENCY: 2x/wk  NO AUTH REQUIRED  PRECAUTIONS: falls- walk to front, vision impairment d/t macular degeneration      Subjective "I think my hand is really back to where it was " Reports that her balance is better on some days than others, reports that when it's dark or very bright her balance is worse d/t vision deficits  Mechanism of Injury  Presented with speech difficulties, right-sided weakness, headache started 9:00 a m  On day of presentation (3/11/22), self resolved in 30 minutes  NIH stroke scale 0 on presentation  Patient again had a recurrence of symptoms around 3:30 p m  on 3/11 and again improved gradually  CT head and neck without any acute ischemia/hemorrhage  Evidence of old lacunar infarct  No evidence of vascular abnormality  Not a candidate for tPA due to improvement in symptoms and anticoagulation  MRI of the brain confirmed-left lacunar infarct involving left basal ganglia, extending into the periventricular white matter of the left parietal lobe, adjacent to the posterior aspect of the left lateral ventricle  Repeat CT scan of head 3/13 due to headache-reveals evolving  changes related to CVA    No evidence of hemorrhage    Occupational Profile  Pt lives alone in a 2 floor home with 2 steps to enter  She has railings on her stairs, grab bars in her tub and stands to bathe, rails around toilet (no RTS)  She has a neighbor that lives across the street who drives her to most of her appointments, and another neighbor that assists with things like taking the garbage out and getting her mail if needed  She raised 5 children, and 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  She enjoys using her iPad to play games and do paint by numbers, watching TV, going for walks with her friends (she doesn't go alone d/t vision deficits), and going to the store with her friends  Pt states that she uses her phone to take pictures of things like the stove and buttons on her washing machine and enlarges them in order to compensate for visual deficits  PATIENT GOAL: "My balance is kind of julianna  I know there are other things I can do [to improve my balance]  "  - Pt states that she would like to start working on her memory  HISTORY OF PRESENT ILLNESS:     Pt is a 80 y o  female who was referred to Occupational Therapy s/p CVA      PMH:   Past Medical History:   Diagnosis Date    Stroke ([facility] [address] ) 2022       Past Surgical Hx:   Past Surgical History:   Procedure Laterality Date    EYE SURGERY      HYSTERECTOMY      MITRAL VALVE REPLACEMENT          Pain Levels:      Restin    With Activity:  0      Objective  9 HOLE PEG TEST:   Attempted, however results were not a good indicator of [facility] d/t vision deficits impacting ability to locate holes in board    Functional Dexterity Test for in-hand manipulation  R UE: 47 9 seconds, 1 drop (previously 1:00)  L UE: 37 3 seconds (previously 43 6 seconds)     Further Observations in UE Assessment    Subluxation: none    No scapular winging noted    No spasticity Noted     PROPRIOCEPTION: RUE very mild impairment, LUE intact    SENSORY TESTING: Reports no changes in sensation      Impairments Section:  UE Strength:              CATA: RUE: 41 3/200 LUE: 40/200  The age norm is approximately 42 lbs for R and 37 lbs for L indicating  strength WNL                              Range of Motion:  AROM:   RUE- pt reports she was told that she needs a shoulder replacement but is not a candidate for surgery  - shoulder flexion ~120*  - shoulder adbuction ~90*  - IR WNL  - ER limited  - elbow flexion WNL  - elbow extension WNL  - wrist flexion WNL  - wrist extension WNL    LUE  - shoulder flexion WFL  - shoulder adbuction WFL  - IR WNL  - ER limited  - elbow flexion WNL  - elbow extension WNL  - wrist flexion WNL  - wrist extension WNL     MMT:  R UE:   -  shoulder flexion 3-/5  - elbow flexion 5/5  -  elbow extension 5/5  -  wrist flexion 5/5  -  wrist extension 5/5     L UE 5/5 in all pivots     Pt is left hand dominant    [place] Cognitive Assessment Version 8 2 (MoCA V8 2)  Visuospatial/executive functionin/  Naming:  3/3  Memory: 1st trial:  3/5, 2nd trial:    Attention/concentration: 2/2  List of letters:   Serial Seven Subtraction:  /3 w/ 3 errors  Language/sentence repetition:  1/  Language Fluency:    Abstract/Correlational Thinkin/2  Delayed Recall:  05  Orientation:     Memory Index Score: 9/15  MoCA V1 8 2 Raw Score:  22+1=23/30, MIS:  9/15, indicative of mild neurocognitive impairments  GOALS:   Short Term Goals:  Pt will increase rate of manipulation for the functional dexterity test by 4 seconds in order to improve [facility] for ADLs/IADLs in 4 weeks   ACHIEVED   Pt will increase proprioception of RUE hand to target to mild impairment for improved functional reach vision occluded with ADL tasks 4 weeks ACHIEVED   Pt will demo with G carryover of Home Exercise Program to improve functional progression towards goals in Plan of care and for improved functional use of RUE 4 weeks ACHIEVED     Long Term Goals: 8-12 weeks  Pt will increase rate of manipulation for the functional dexterity test by 8 seconds in order to improve [facility] for ADLs/IADLs  ACHIEVED 7/1  Pt will increase proprioception of RUE hand to target to mild impairment with vision occluded (accurate 4/5 trials) for improved functional reach during ADLs/IADLs  PROGRESSING  Pt will demo with 90% carryover of Home Exercise Program to improve functional progression towards goals in Plan of care and for improved functional use of RUE 4 weeks PROGRESSING  Pt will demonstrate improved functional cognition by scoring WNL on MoCA (modified for vision deficits) NOT ACHIEVED- continue  Pt will demonstrate improved delayed recall by scoring at at least 14/15 on MIS portion of MoCA   NOT ACHIEVED- continue          OTHER PLANNED THERAPY INTERVENTIONS:   Supine, seated, and in stance neuro re-ed  Tricep AG  NMES/FES  FMC/prehension  Timed Trials  Manual tx  Hand to target  Sensory re-ed  Seated functional reach: crossing midline  Supine place and hold  WBearing strategies   Closed chain activities  Open chain activities

## 2022-07-01 NOTE — PROGRESS NOTES
Daily Note     Insurance:  AMA/CMS Eval/ Re-eval POC expires Eleanor Said #/ Referral # Total   Visits  Start date  Expiration date Extension  Visit limitation? PT only or  PT+OT? Co-Insurance   Medicare  CMS 5-18-22 8-10-22 8-10 N/A N/A N/A N/A N/A No PT/OT Yes and $0 Co-pay    6-29-22 8-10-22                                                          Today's date: 2022  Patient name: Aguila Lund  : 1940  MRN: [id number]  Referring provider: Negrita Reyes  Dx:   Encounter Diagnosis     ICD-10-CM    1  Other abnormalities of gait and mobility  R26 89    2  Cerebrovascular accident (CVA), unspecified mechanism ([facility] [address] )  I63 9    3  Balance disorder  R26 89                   Subjective: Patient reports no new changes, complaints, or falls  Objective: See treatment diary below    Vitals  - BP: 140/60 mmHg      NMR:  - STS to fatigue: 20 reps, 0 UE, RPE: 15/20  - Fwd step ups+ High knee drive (8"): 2 sets, 2 min, 0 UE, 4# ankle wts, RPE: 13/20  - Speed ambulation w/ Tidal [employer]: 1 set, 300 ft, CS, 4# ankle wts  - Resisted FWD walking (yellow), 1 set, 300 ft, RPE: 17/20, 4# ankle wts  - Bwd ambulation w/ PT pull (yellow): 4 laps, 50 ft, 0 UE, 4# ankle wts, RPE: 15/20      Assessment: Patient able to tolerate treatment session well today  Improvements observed with backwards ambulation with ability to self correct 75% of the time and min-mod lateral trunk sway and staggering  She continued to require increased duration seated rest breaks with reduction in frequency indicating improvements in overall endurance  She will continue to benefit from skilled outpatient PT in order to maximize her function and reduce her risk for falls following CVA  Plan: Continue per plan of care         Precautions: Patient on Beta Blockers    Outcome Measures Initial Eval  22        5xSTS 16 58 sec,   0 UE        TUG  - Regular  - Cognitive   15 63 sec  16 81 sec (stopped counting)        10 meter 0 92 m/s FGA 16/30        DGI 15/24        MiniBEST 14/28        PASS NT        6MWT 900 ft        CB&M NT

## 2022-07-05 ENCOUNTER — OFFICE VISIT (OUTPATIENT)
Dept: URGENT CARE | Facility: CLINIC | Age: 82
End: 2022-07-05
Payer: MEDICARE

## 2022-07-05 ENCOUNTER — APPOINTMENT (EMERGENCY)
Dept: RADIOLOGY | Facility: HOSPITAL | Age: 82
End: 2022-07-05
Payer: MEDICARE

## 2022-07-05 ENCOUNTER — APPOINTMENT (OUTPATIENT)
Dept: PHYSICAL THERAPY | Facility: CLINIC | Age: 82
End: 2022-07-05
Payer: MEDICARE

## 2022-07-05 ENCOUNTER — OFFICE VISIT (OUTPATIENT)
Dept: OCCUPATIONAL THERAPY | Facility: CLINIC | Age: 82
End: 2022-07-05
Payer: MEDICARE

## 2022-07-05 ENCOUNTER — APPOINTMENT (EMERGENCY)
Dept: RADIOLOGY | Facility: CLINIC | Age: 82
End: 2022-07-05
Payer: MEDICARE

## 2022-07-05 ENCOUNTER — HOSPITAL ENCOUNTER (EMERGENCY)
Facility: HOSPITAL | Age: 82
Discharge: HOME | End: 2022-07-05
Attending: EMERGENCY MEDICINE | Admitting: EMERGENCY MEDICINE
Payer: MEDICARE

## 2022-07-05 VITALS
RESPIRATION RATE: 18 BRPM | OXYGEN SATURATION: 98 % | HEART RATE: 64 BPM | SYSTOLIC BLOOD PRESSURE: 110 MMHG | DIASTOLIC BLOOD PRESSURE: 70 MMHG | HEIGHT: 66 IN | BODY MASS INDEX: 24.36 KG/M2 | WEIGHT: 151.6 LBS | TEMPERATURE: 98.7 F

## 2022-07-05 VITALS
DIASTOLIC BLOOD PRESSURE: 67 MMHG | RESPIRATION RATE: 18 BRPM | TEMPERATURE: 97.9 F | WEIGHT: 152.2 LBS | SYSTOLIC BLOOD PRESSURE: 153 MMHG | OXYGEN SATURATION: 96 % | BODY MASS INDEX: 24.94 KG/M2 | HEART RATE: 57 BPM

## 2022-07-05 DIAGNOSIS — S39.012A STRAIN OF LUMBAR REGION, INITIAL ENCOUNTER: ICD-10-CM

## 2022-07-05 DIAGNOSIS — R07.81 RIB PAIN ON RIGHT SIDE: Primary | ICD-10-CM

## 2022-07-05 DIAGNOSIS — S20.211A RIB CONTUSION, RIGHT, INITIAL ENCOUNTER: Primary | ICD-10-CM

## 2022-07-05 DIAGNOSIS — I63.9 CEREBROVASCULAR ACCIDENT (CVA), UNSPECIFIED MECHANISM (HCC): Primary | ICD-10-CM

## 2022-07-05 DIAGNOSIS — M25.551 RIGHT HIP PAIN: ICD-10-CM

## 2022-07-05 LAB
APTT PPP: 34 SECONDS (ref 23–37)
INR PPP: 1.97 (ref 0.84–1.19)
PROTHROMBIN TIME: 21.8 SECONDS (ref 11.6–14.5)

## 2022-07-05 PROCEDURE — 97112 NEUROMUSCULAR REEDUCATION: CPT

## 2022-07-05 PROCEDURE — 99283 EMERGENCY DEPT VISIT LOW MDM: CPT

## 2022-07-05 PROCEDURE — 36415 COLL VENOUS BLD VENIPUNCTURE: CPT | Performed by: EMERGENCY MEDICINE

## 2022-07-05 PROCEDURE — 72100 X-RAY EXAM L-S SPINE 2/3 VWS: CPT

## 2022-07-05 PROCEDURE — 73502 X-RAY EXAM HIP UNI 2-3 VIEWS: CPT

## 2022-07-05 PROCEDURE — 99284 EMERGENCY DEPT VISIT MOD MDM: CPT | Performed by: EMERGENCY MEDICINE

## 2022-07-05 PROCEDURE — 85730 THROMBOPLASTIN TIME PARTIAL: CPT | Performed by: EMERGENCY MEDICINE

## 2022-07-05 PROCEDURE — 85610 PROTHROMBIN TIME: CPT | Performed by: EMERGENCY MEDICINE

## 2022-07-05 PROCEDURE — 71101 X-RAY EXAM UNILAT RIBS/CHEST: CPT

## 2022-07-05 PROCEDURE — 99213 OFFICE O/P EST LOW 20 MIN: CPT | Performed by: PHYSICIAN ASSISTANT

## 2022-07-05 NOTE — ED PROVIDER NOTES
History  Chief Complaint   Patient presents with   [place] Fall     States sent from [facility]  On 7/2 was leaning over tub putting soap in dish and fell onto tub edge on right side  Patient on Coumadin  Pain right ribs , right hip  No LOC  80-year-old female presents after a fall  Few days ago she tripped and fell onto her bathtub injuring her right ribs and lower back and hip area  She is on Coumadin  Denies hitting her head no loss of consciousness no other injuries or complaints  Went to physical therapy which she normally does and they wanted her to get checked out to make sure they can continue the therapy  History provided by:  Patient   used: No        Prior to Admission Medications   Prescriptions Last Dose Informant Patient Reported? Taking?    Cholecalciferol (VITAMIN D PO)  Self Yes No   Sig: Take by mouth   Multiple Vitamins-Minerals (PRESERVISION AREDS PO)  Self Yes No   Sig: Take 2 tablets by mouth daily     acetaminophen (TYLENOL) 325 mg tablet   No No   Sig: Take 2 tablets (650 mg total) by mouth every 6 (six) hours as needed for mild pain or headaches   aspirin (ECOTRIN LOW STRENGTH) 81 mg EC tablet   No No   Sig: Take 1 tablet (81 mg total) by mouth daily   atorvastatin (LIPITOR) 80 mg tablet   No No   Sig: Take 1 tablet (80 mg total) by mouth daily   digoxin (LANOXIN) 0 125 mg tablet   Yes No   Sig: Take 125 mcg by mouth daily   melatonin 3 mg   No No   Sig: Take 1 tablet (3 mg total) by mouth daily at bedtime as needed (Insomnia)   metFORMIN (GLUCOPHAGE) 500 mg tablet   No No   Sig: Take 2 tablets by mouth twice daily   Patient taking differently: 500 mg 2 (two) times a day with meals   nadolol (CORGARD) 20 mg tablet   No No   Sig: Take 0 5 tablets (10 mg total) by mouth daily   warfarin (COUMADIN) 2 5 mg tablet   No No   Sig: Take 1 tablet (2 5 mg total) by mouth 3 (three) times a week On Monday Wednesday and Friday   warfarin (COUMADIN) 5 mg tablet   No No   Sig: Take 1 tablet (5 mg total) by mouth 4 (four) times a week On , Tuesday, Thursday, Saturday      Facility-Administered Medications: None       Past Medical History:   Diagnosis Date    [facility] 2022       Past Surgical History:   Procedure Laterality Date    EYE SURGERY      HYSTERECTOMY      MITRAL VALVE REPLACEMENT         Family History   Problem Relation Age of Onset    Heart failure Mother     Heart attack Father     Sleep apnea Brother      I have reviewed and agree with the history as documented  E-Cigarette/Vaping    E-Cigarette Use Never User      E-Cigarette/Vaping Substances    Nicotine No     THC No     CBD No     Flavoring No     Other No     Unknown No      Social History     Tobacco Use    Smoking status: Former Smoker     Packs/day: 2 00     Years: 30 00     Pack years: 60 00     Quit date:      Years since quittin 5    Smokeless tobacco: Never Used   Vaping Use    Vaping Use: Never used   Substance Use Topics    Alcohol use: Yes     Comment: special occasional    Drug use: No       Review of Systems   Constitutional: Negative  HENT: Negative  Eyes: Negative  Respiratory: Negative  Cardiovascular: Negative  Gastrointestinal: Negative  Endocrine: Negative  Genitourinary: Negative  Musculoskeletal: Positive for arthralgias, back pain and myalgias  Skin: Negative  Allergic/Immunologic: Negative  Neurological: Negative  Hematological: Negative  Psychiatric/Behavioral: Negative  All other systems reviewed and are negative  Physical Exam  Physical Exam  Constitutional:       Appearance: Normal appearance  HENT:      Head: Normocephalic and atraumatic  Nose: Nose normal       Mouth/Throat:      Mouth: Mucous membranes are moist    Eyes:      Extraocular Movements: Extraocular movements intact  Pupils: Pupils are equal, round, and reactive to light     Cardiovascular:      Rate and Rhythm: Normal rate and regular rhythm  Pulmonary:      Effort: Pulmonary effort is normal       Breath sounds: Normal breath sounds  Abdominal:      General: Abdomen is flat  Bowel sounds are normal       Palpations: Abdomen is soft  Musculoskeletal:         General: Tenderness present  Normal range of motion  Cervical back: Normal range of motion and neck supple  Comments: Right lumbar paravertebral tenderness noted, no midline tenderness, right sided rib tenderness noted, and right hip tenderness noted  ROM intact  Skin:     General: Skin is warm  Capillary Refill: Capillary refill takes less than 2 seconds  Neurological:      General: No focal deficit present  Mental Status: She is alert and oriented to person, place, and time  Mental status is at baseline  Cranial Nerves: No cranial nerve deficit  Sensory: No sensory deficit  Motor: No weakness  Coordination: Coordination normal       Gait: Gait normal       Deep Tendon Reflexes: Reflexes normal    Psychiatric:         Mood and Affect: Mood normal          Thought Content:  Thought content normal          Vital Signs  ED Triage Vitals [07/05/22 1136]   Temperature Pulse Respirations Blood Pressure SpO2   97 9 °F (36 6 °C) (!) 50 18 162/67 99 %      Temp Source Heart Rate Source Patient Position - Orthostatic VS BP Location FiO2 (%)   Tympanic Monitor Sitting Left arm --      Pain Score       4           Vitals:    07/05/22 1136 07/05/22 1315   BP: 162/67 153/67   Pulse: (!) 50 57   Patient Position - Orthostatic VS: Sitting          Visual Acuity      ED Medications  Medications - No data to display    Diagnostic Studies  Results Reviewed     Procedure Component Value Units Date/Time    Protime-INR [[id number]]  (Abnormal) Collected: 07/05/22 1256    Lab Status: Final result Specimen: Blood from Arm, Left Updated: 07/05/22 1318     Protime 21 8 seconds      INR 1 97    APTT [[id number]]  (Normal) Collected: 07/05/22 1256    Lab Status: Final result Specimen: Blood from Arm, Left Updated: 07/05/22 1318     PTT 34 seconds                  XR spine lumbar 2 or 3 views injury   Final Result by Dany Henry MD (07/05 1426)      No acute osseous abnormality  Workstation performed: [id number]         XR ribs right w pa chest min 3 views   Final Result by Dany Henry MD (07/05 1424)      No active cardiopulmonary disease  No evidence of rib fractures  Workstation performed: [id number]         XR hip/pelv 2-3 vws right if performed   Final Result by Dany Henry MD (07/05 1427)      No acute osseous abnormality  Workstation performed: [id number]         XR hip/pelv 2-3 vws right if performed    (Results Pending)              Procedures  Procedures         ED Course                                             MDM  Number of Diagnoses or Management Options     Amount and/or Complexity of Data Reviewed  Clinical lab tests: ordered and reviewed  Tests in the radiology section of CPT®: reviewed and ordered  Tests in the medicine section of CPT®: ordered and reviewed    Patient Progress  Patient progress: stable      Disposition  Final diagnoses:   Rib contusion, right, initial encounter   Strain of lumbar region, initial encounter     Time reflects when diagnosis was documented in both MDM as applicable and the Disposition within this note     Time User Action Codes Description Comment    7/5/2022  2:46 PM AneJeanine johns Add [S20 211A] Rib contusion, right, initial encounter     7/5/2022  2:46 PM Susan Dowell Add [S39 012A] Strain of lumbar region, initial encounter       ED Disposition     ED Disposition   Discharge    Condition   Stable    Date/Time   Tue Jul 5, 2022  2:46 PM    Comment   Mohini Crespo discharge to home/self care                 Follow-up Information     Follow up With Specialties Details Why Contact Info Additional Information    Citlaly Espinosa MD Family Medicine Schedule an appointment as soon as possible for a visit   [address]       [address] [facility] Emergency Department Emergency Medicine  If symptoms worsen [address] [address] Emergency Department, [place], [place], [address]          Discharge Medication List as of 7/5/2022  2:47 PM      CONTINUE these medications which have NOT CHANGED    Details   acetaminophen (TYLENOL) 325 mg tablet Take 2 tablets (650 mg total) by mouth every 6 (six) hours as needed for mild pain or headaches, Starting Mon 3/14/2022, No Print      aspirin (ECOTRIN LOW STRENGTH) 81 mg EC tablet Take 1 tablet (81 mg total) by mouth daily, Starting Tue 3/15/2022, No Print      atorvastatin (LIPITOR) 80 mg tablet Take 1 tablet (80 mg total) by mouth daily, Starting Fri 4/1/2022, Normal      Cholecalciferol (VITAMIN D PO) Take by mouth, Historical Med      digoxin (LANOXIN) 0 125 mg tablet Take 125 mcg by mouth daily, Starting Fri 11/30/2018, Until Tue 6/7/2022, Historical Med      melatonin 3 mg Take 1 tablet (3 mg total) by mouth daily at bedtime as needed (Insomnia), Starting Mon 3/14/2022, No Print      metFORMIN (GLUCOPHAGE) 500 mg tablet Take 2 tablets by mouth twice daily, Normal      Multiple Vitamins-Minerals (PRESERVISION AREDS PO) Take 2 tablets by mouth daily  , Historical Med      nadolol (CORGARD) 20 mg tablet Take 0 5 tablets (10 mg total) by mouth daily, Starting Mon 3/14/2022, No Print      !! warfarin (COUMADIN) 2 5 mg tablet Take 1 tablet (2 5 mg total) by mouth 3 (three) times a week On Monday Wednesday and Friday, Starting Mon 3/14/2022, No Print      !! warfarin (COUMADIN) 5 mg tablet Take 1 tablet (5 mg total) by mouth 4 (four) times a week On Sunday, Tuesday, Thursday, Saturday, Starting Tue 3/15/2022, No Print       !! - Potential duplicate medications found   Please discuss with provider  No discharge procedures on file      PDMP Review     None          ED Provider  Electronically Signed by           Meng Padilla DO  07/05/22 2033

## 2022-07-05 NOTE — PROGRESS NOTES
[facility]        NAME: Ernestine Dos Santos is a 80 y o  female  : 1940    MRN: [id number]  DATE: 2022  TIME: 10:37 AM    Assessment and Plan   Rib pain on right side [R07 81]  1  Rib pain on right side  Transfer to other facility   2  Right hip pain  Transfer to other facility         Patient Instructions     Patient Instructions   Recommended patient be seen and evaluated in ED further for right side rib pain and right hip pain as patient is currently on anticoagulation therapy  Currently stable and vitals all within normal limits  Refusing transfer via ambulance and is having friend take her to [address] [place] [address] ED  Follow up with PCP in 3-5 days  Proceed to  ER if symptoms worsen  Chief Complaint     Chief Complaint   Patient presents with    Fall    Rib Pain     Slipped on bath rug on Saturday - fell onto R side on side of bath tub  Denies LOC  Has pain R ribs, shoulder and R hip  No bruising noted  PMH R CVA in 2022  History of Present Illness       Patient is a 80-year-old female presenting today with right rib and right hip pain times 4 days  Patient has a PMH significant for CVA, AFib, diabetes  Is currently on Coumadin, last INR from   Patient notes while at home on 2022 in her bathroom that she lost her balance slipping on a bath mat hitting her right hip and right side against the bathtub, denies head strike, states that she was able to get herself off of the floor quickly but notes she has been experiencing significant pain and discomfort of her right side, has been taking Tylenol but notes no relief  Denies any bruising or swelling of areas of injury but notes discomfort with ambulation and certain movements  Denies LOC, lightheadedness, dizziness, nausea, vomiting, syncope, numbness, tingling  Review of Systems   Review of Systems   Constitutional: Negative for chills and fever  HENT: Negative for ear pain and sore throat  Eyes: Negative for pain and visual disturbance  Respiratory: Negative for cough and shortness of breath  Cardiovascular: Negative for chest pain and palpitations  Gastrointestinal: Negative for abdominal pain and vomiting  Genitourinary: Negative for dysuria and hematuria  Musculoskeletal:        See HPI   Skin: Negative for color change and rash  Neurological: Negative for seizures, syncope and headaches  All other systems reviewed and are negative          Current Medications       Current Outpatient Medications:     acetaminophen (TYLENOL) 325 mg tablet, Take 2 tablets (650 mg total) by mouth every 6 (six) hours as needed for mild pain or headaches, Disp: , Rfl: 0    aspirin (ECOTRIN LOW STRENGTH) 81 mg EC tablet, Take 1 tablet (81 mg total) by mouth daily, Disp: , Rfl: 0    atorvastatin (LIPITOR) 80 mg tablet, Take 1 tablet (80 mg total) by mouth daily, Disp: 90 tablet, Rfl: 1    Cholecalciferol (VITAMIN D PO), Take by mouth, Disp: , Rfl:     melatonin 3 mg, Take 1 tablet (3 mg total) by mouth daily at bedtime as needed (Insomnia), Disp: , Rfl: 0    metFORMIN (GLUCOPHAGE) 500 mg tablet, Take 2 tablets by mouth twice daily (Patient taking differently: 500 mg 2 (two) times a day with meals), Disp: 360 tablet, Rfl: 0    Multiple Vitamins-Minerals (PRESERVISION AREDS PO), Take 2 tablets by mouth daily  , Disp: , Rfl:     nadolol (CORGARD) 20 mg tablet, Take 0 5 tablets (10 mg total) by mouth daily, Disp: , Rfl: 0    warfarin (COUMADIN) 2 5 mg tablet, Take 1 tablet (2 5 mg total) by mouth 3 (three) times a week On Monday Wednesday and Friday, Disp: , Rfl: 0    warfarin (COUMADIN) 5 mg tablet, Take 1 tablet (5 mg total) by mouth 4 (four) times a week On Sunday, Tuesday, Thursday, Saturday, Disp: , Rfl: 0    digoxin (LANOXIN) 0 125 mg tablet, Take 125 mcg by mouth daily, Disp: , Rfl:     Current Allergies     Allergies as of 07/05/2022 - Reviewed 07/05/2022   Allergen Reaction Noted    Sulfa antibiotics  10/09/2017    Sulfasalazine  10/09/2017            The following portions of the patient's history were reviewed and updated as appropriate: allergies, current medications, past family history, past medical history, past social history, past surgical history and problem list      Past Medical History:   Diagnosis Date    Stroke ([facility] [address] ) 03/11/2022       Past Surgical History:   Procedure Laterality Date    EYE SURGERY      HYSTERECTOMY      MITRAL VALVE REPLACEMENT  1994       Family History   Problem Relation Age of Onset    Heart failure Mother     Heart attack Father     Sleep apnea Brother          Medications have been verified  Objective   /70   Pulse 64   Temp 98 7 °F (37 1 °C)   Resp 18   Ht 5' 5 5" (1 664 m)   Wt 68 8 kg (151 lb 9 6 oz)   SpO2 98%   BMI 24 84 kg/m²        Physical Exam     Physical Exam  Vitals and nursing note reviewed  Constitutional:       General: She is not in acute distress  Appearance: Normal appearance  HENT:      Head: Normocephalic and atraumatic  Right Ear: Tympanic membrane, ear canal and external ear normal       Left Ear: Tympanic membrane, ear canal and external ear normal       Nose: Nose normal       Mouth/Throat:      Mouth: Mucous membranes are moist       Pharynx: Oropharynx is clear  Eyes:      Pupils: Pupils are equal, round, and reactive to light  Cardiovascular:      Rate and Rhythm: Normal rate and regular rhythm  Pulses: Normal pulses  Heart sounds: Normal heart sounds  Pulmonary:      Effort: Pulmonary effort is normal       Breath sounds: Normal breath sounds  Musculoskeletal:      Cervical back: Normal range of motion        Comments: Moderate TTP to right anterior ribs over ribs 8 and 9, no ecchymosis or palpable crepitus, pain with deep inspiration, moderate TTP of right hip over area of greater trochanter, no ecchymosis or swelling, full ROM lower extremities bilaterally with mild discomfort upon right hip flexion and abduction, gross sensation of lower extremities bilaterally, 2+ distal pulses  Skin:     General: Skin is warm  Capillary Refill: Capillary refill takes less than 2 seconds  Neurological:      General: No focal deficit present  Mental Status: She is alert and oriented to person, place, and time        Comments: GCS 15   Psychiatric:         Mood and Affect: Mood normal          Behavior: Behavior normal

## 2022-07-05 NOTE — PATIENT INSTRUCTIONS
Recommended patient be seen and evaluated in ED further for right side rib pain and right hip pain as patient is currently on anticoagulation therapy  Currently stable and vitals all within normal limits  Refusing transfer via ambulance and is having friend take her to [address]

## 2022-07-05 NOTE — DISCHARGE INSTRUCTIONS
Cardiologist about adjusting her Coumadin dose her INR level is 1 97  Apply ice on the affected areas followed by warm compresses

## 2022-07-05 NOTE — PROGRESS NOTES
Daily Note     Today's date: 2022  Patient name: Christine Campuzano  : 1940  MRN: [id number]  Referring provider: Yovani Baker  Dx:   Encounter Diagnosis   Name Primary?  Cerebrovascular accident (CVA), unspecified mechanism ([facility] [address] ) Yes       Start Time: 0800  Stop Time: 0845  Total time in clinic (min): 45 minutes    PLAN OF CARE END: 22  FREQUENCY: 2x/wk  NO AUTH REQUIRED  PRECAUTIONS: falls- walk to front, vision impairment d/t macular degeneration    Subjective: "I took a little tumble this weekend"  Pt reports she slipped on the bathmat outside of her shower  Struck the R side of her body and has 8/10 rib pain and mild R hip pain  Denies head strike  Advised pt to go to urgent care after OT session today, and deferring any WBing or reaching during today's session  Objective: See treatment below  NMRE  -Medium sized peg board R UE with focus on R UE [address] and dexterity, use of visual compensatory strategies  Requires significantly increased time and 3 cues 2* vision deficits  -Itrax design copying with use of R UE with focus on rotation of items within fingertips, dexterity  Requires significantly increased time for manipulation portion of activity  Therapeutic Activities:  -11 item recall with use of internal memory strategies from previous sessions  With 20 minute delay, pt able to recall     Assessment: Tolerated treatment well  Focus of session on recall with use of internal memory strategies, [address] and dexterity  Deferred any WBing or dynamic reaching activities 2* fall with R sided rib pain  Pt plans to go to urgent care after session  Pt would benefit from continued OT services to address NMRE R UE and cognitive deficits  Plan: Continued skilled OT per POC      GOALS TO BE ADDED :  -Pt will demonstrate improved functional cognition by scoring WNL on MoCA (modified for vision deficits)  -Pt will demonstrate improved delayed recall by scoring at at least 14/15 on MIS portion of MoCA

## 2022-07-06 ENCOUNTER — LAB (OUTPATIENT)
Dept: LAB | Facility: CLINIC | Age: 82
End: 2022-07-06
Payer: MEDICARE

## 2022-07-06 DIAGNOSIS — D50.0 IRON DEFICIENCY ANEMIA DUE TO CHRONIC BLOOD LOSS: ICD-10-CM

## 2022-07-06 DIAGNOSIS — E78.00 HYPERCHOLESTEREMIA: ICD-10-CM

## 2022-07-06 DIAGNOSIS — I10 ESSENTIAL HYPERTENSION: ICD-10-CM

## 2022-07-06 DIAGNOSIS — E11.9 TYPE 2 DIABETES MELLITUS WITHOUT COMPLICATION, WITHOUT LONG-TERM CURRENT USE OF INSULIN (HCC): ICD-10-CM

## 2022-07-06 DIAGNOSIS — Z95.2 H/O MITRAL VALVE REPLACEMENT WITH MECHANICAL VALVE: ICD-10-CM

## 2022-07-06 LAB
ALBUMIN SERPL BCP-MCNC: 4.1 G/DL (ref 3.5–5)
ALP SERPL-CCNC: 82 U/L (ref 46–116)
ALT SERPL W P-5'-P-CCNC: 31 U/L (ref 12–78)
ANION GAP SERPL CALCULATED.3IONS-SCNC: 7 MMOL/L (ref 4–13)
AST SERPL W P-5'-P-CCNC: 32 U/L (ref 5–45)
BILIRUB SERPL-MCNC: 1.1 MG/DL (ref 0.2–1)
BUN SERPL-MCNC: 21 MG/DL (ref 5–25)
CALCIUM SERPL-MCNC: 9.3 MG/DL (ref 8.3–10.1)
CHLORIDE SERPL-SCNC: 104 MMOL/L (ref 100–108)
CHOLEST SERPL-MCNC: 104 MG/DL
CO2 SERPL-SCNC: 27 MMOL/L (ref 21–32)
CREAT SERPL-MCNC: 0.73 MG/DL (ref 0.6–1.3)
CREAT UR-MCNC: 76.1 MG/DL
ERYTHROCYTE [DISTWIDTH] IN BLOOD BY AUTOMATED COUNT: 17.5 % (ref 11.6–15.1)
EST. AVERAGE GLUCOSE BLD GHB EST-MCNC: 140 MG/DL
GFR SERPL CREATININE-BSD FRML MDRD: 76 ML/MIN/1.73SQ M
GLUCOSE P FAST SERPL-MCNC: 146 MG/DL (ref 65–99)
HBA1C MFR BLD: 6.5 %
HCT VFR BLD AUTO: 33.8 % (ref 34.8–46.1)
HDLC SERPL-MCNC: 37 MG/DL
HGB BLD-MCNC: 10 G/DL (ref 11.5–15.4)
LDLC SERPL CALC-MCNC: 40 MG/DL (ref 0–100)
MCH RBC QN AUTO: 23.8 PG (ref 26.8–34.3)
MCHC RBC AUTO-ENTMCNC: 29.6 G/DL (ref 31.4–37.4)
MCV RBC AUTO: 81 FL (ref 82–98)
MICROALBUMIN UR-MCNC: 74.7 MG/L (ref 0–20)
MICROALBUMIN/CREAT 24H UR: 98 MG/G CREATININE (ref 0–30)
NONHDLC SERPL-MCNC: 67 MG/DL
PLATELET # BLD AUTO: 241 THOUSANDS/UL (ref 149–390)
PMV BLD AUTO: 10.4 FL (ref 8.9–12.7)
POTASSIUM SERPL-SCNC: 4 MMOL/L (ref 3.5–5.3)
PROT SERPL-MCNC: 7.9 G/DL (ref 6.4–8.2)
RBC # BLD AUTO: 4.2 MILLION/UL (ref 3.81–5.12)
SODIUM SERPL-SCNC: 138 MMOL/L (ref 136–145)
TRIGL SERPL-MCNC: 137 MG/DL
WBC # BLD AUTO: 4.44 THOUSAND/UL (ref 4.31–10.16)

## 2022-07-06 PROCEDURE — 82570 ASSAY OF URINE CREATININE: CPT | Performed by: INTERNAL MEDICINE

## 2022-07-06 PROCEDURE — 36415 COLL VENOUS BLD VENIPUNCTURE: CPT

## 2022-07-06 PROCEDURE — 82043 UR ALBUMIN QUANTITATIVE: CPT | Performed by: INTERNAL MEDICINE

## 2022-07-06 PROCEDURE — 85027 COMPLETE CBC AUTOMATED: CPT

## 2022-07-06 PROCEDURE — 80061 LIPID PANEL: CPT

## 2022-07-06 PROCEDURE — 80053 COMPREHEN METABOLIC PANEL: CPT

## 2022-07-06 PROCEDURE — 83036 HEMOGLOBIN GLYCOSYLATED A1C: CPT

## 2022-07-08 ENCOUNTER — APPOINTMENT (OUTPATIENT)
Dept: PHYSICAL THERAPY | Facility: CLINIC | Age: 82
End: 2022-07-08
Payer: MEDICARE

## 2022-07-11 ENCOUNTER — APPOINTMENT (OUTPATIENT)
Dept: LAB | Facility: CLINIC | Age: 82
End: 2022-07-11
Payer: MEDICARE

## 2022-07-12 ENCOUNTER — OFFICE VISIT (OUTPATIENT)
Dept: PHYSICAL THERAPY | Facility: CLINIC | Age: 82
End: 2022-07-12
Payer: MEDICARE

## 2022-07-12 ENCOUNTER — OFFICE VISIT (OUTPATIENT)
Dept: OCCUPATIONAL THERAPY | Facility: CLINIC | Age: 82
End: 2022-07-12
Payer: MEDICARE

## 2022-07-12 DIAGNOSIS — I63.9 CEREBROVASCULAR ACCIDENT (CVA), UNSPECIFIED MECHANISM (HCC): ICD-10-CM

## 2022-07-12 DIAGNOSIS — R26.89 OTHER ABNORMALITIES OF GAIT AND MOBILITY: Primary | ICD-10-CM

## 2022-07-12 DIAGNOSIS — I63.9 CEREBROVASCULAR ACCIDENT (CVA), UNSPECIFIED MECHANISM (HCC): Primary | ICD-10-CM

## 2022-07-12 DIAGNOSIS — R26.89 BALANCE DISORDER: ICD-10-CM

## 2022-07-12 PROCEDURE — 97112 NEUROMUSCULAR REEDUCATION: CPT

## 2022-07-12 NOTE — PROGRESS NOTES
Daily Note     Today's date: 2022  Patient name: Aguila Lund  : 1940  MRN: [id number]  Referring provider: Negrita Reyes  Dx:   Encounter Diagnosis   Name Primary?  Cerebrovascular accident (CVA), unspecified mechanism ([facility] [address] ) Yes                  PLAN OF CARE END: 22  FREQUENCY: 2x/wk  NO AUTH REQUIRED  PRECAUTIONS: falls- walk to front, vision impairment d/t macular degeneration    Subjective: "I just have a little bruise now"  Pt went to [facility] after last session s/p fall at home with rib pain  Imaging negative for acute traumatic injury  Objective: See treatment below  NMRE  -Bananagram tile flipping and threading into slot  Upgraded to 5 tiles in hand at a time with palm to finger translation  Focus on pincer grasp, in hand manipulation, separation of hand, [facility]  Requires inc time for manipulation portion    -Simulated screwboard activity with focus on [facility], pincer grasp and rotation of items within finger tips  Requires inc time for dexterity, and has difficulty with use of simulated screw  2* visual deficits  Assessment: Tolerated treatment well  Demonstrating significant improvements in functional use R UE  Pt would benefit from continued OT services to address NMRE R UE and cognitive deficits  Plan: Continued skilled OT per POC  GOALS TO BE ADDED :  -Pt will demonstrate improved functional cognition by scoring WNL on MoCA (modified for vision deficits)  -Pt will demonstrate improved delayed recall by scoring at at least 14/15 on MIS portion of MoCA

## 2022-07-12 NOTE — PROGRESS NOTES
Daily Note     Insurance:  AMA/CMS Eval/ Re-eval POC expires Mike Iglesias #/ Referral # Total   Visits  Start date  Expiration date Extension  Visit limitation? PT only or  PT+OT? Co-Insurance   Medicare  CMS 5-18-22 8-10-22 8-10 N/A N/A N/A N/A N/A No PT/OT Yes and $0 Co-pay    6-29-22 8-10-22                                                          Today's date: 2022  Patient name: Vivi Collins  : 1940  MRN: [id number]  Referring provider: Micheline Lugo  Dx:   Encounter Diagnosis     ICD-10-CM    1  Other abnormalities of gait and mobility  R26 89    2  Cerebrovascular accident (CVA), unspecified mechanism ([facility] [address] )  I63 9    3  Balance disorder  R26 89                   Subjective: Patient reports no new changes, complaints, or falls  Patient reports she started taking iron today, secondary to diagnosis of anemia  She reports her appetite is not what it normally is  Objective: See treatment diary below    Vitals  - BP: 140/58 mmHg      NMR:  - STS to fatigue: 15 reps, 0 UE, RPE: 15/20  - Fwd step ups+ High knee drive (8"): 2 sets, 2 min, 0 UE, 4# ankle wts, RPE: 13/20   - FWD/BCK cone weave resisted (red): 4 cycles up/down,  CS, 4# ankle wts   - Resisted FWD walking (red) + ramp, 1 set, 300 ft, RPE: 17/20, 4# ankle wts  - Bwd ambulation w/ PT pull (yellow): 4 laps, 50 ft, 0 UE, 4# ankle wts, RPE: 15/20      Assessment: Patient able to tolerate treatment session well today  Improvements observed with backwards ambulation with ability to self correct 75% of the time and min-mod lateral trunk sway and staggering  Patient did require a greater amount of rest breaks this session, secondary to baseline fatigue  She will continue to benefit from skilled outpatient PT in order to maximize her function and reduce her risk for falls following CVA  Plan: Continue per plan of care         Precautions: Patient on Beta Blockers      Outcome Measures Initial Eval  22 PN  22       5xSTS 16 58 sec,   0 UE 17 20,  0 UE       TUG  - Regular  - Cognitive   15 63 sec  16 81 sec (stopped counting)   12 92 sec  15 62 sec (completed counting)       10 meter 0 92 m/s 0 99 m/s       FGA 16/30 18/30       DGI 15/24 17/24       MiniBEST 14/28 17/28       PASS NT NT       6MWT 900 ft 925 ft       CB&M NT NT

## 2022-07-14 ENCOUNTER — OFFICE VISIT (OUTPATIENT)
Dept: INTERNAL MEDICINE CLINIC | Facility: CLINIC | Age: 82
End: 2022-07-14
Payer: MEDICARE

## 2022-07-14 VITALS
HEIGHT: 66 IN | BODY MASS INDEX: 24.43 KG/M2 | DIASTOLIC BLOOD PRESSURE: 70 MMHG | WEIGHT: 152 LBS | OXYGEN SATURATION: 98 % | HEART RATE: 60 BPM | SYSTOLIC BLOOD PRESSURE: 130 MMHG

## 2022-07-14 DIAGNOSIS — R31.29 OTHER MICROSCOPIC HEMATURIA: ICD-10-CM

## 2022-07-14 DIAGNOSIS — N18.30 ANEMIA DUE TO STAGE 3 CHRONIC KIDNEY DISEASE, UNSPECIFIED WHETHER STAGE 3A OR 3B CKD (HCC): ICD-10-CM

## 2022-07-14 DIAGNOSIS — J43.9 PULMONARY EMPHYSEMA, UNSPECIFIED EMPHYSEMA TYPE (HCC): ICD-10-CM

## 2022-07-14 DIAGNOSIS — D63.1 ANEMIA DUE TO STAGE 3 CHRONIC KIDNEY DISEASE, UNSPECIFIED WHETHER STAGE 3A OR 3B CKD (HCC): ICD-10-CM

## 2022-07-14 DIAGNOSIS — E78.00 HYPERCHOLESTEREMIA: ICD-10-CM

## 2022-07-14 DIAGNOSIS — I48.20 CHRONIC ATRIAL FIBRILLATION (HCC): ICD-10-CM

## 2022-07-14 DIAGNOSIS — I71.40 ABDOMINAL AORTIC ANEURYSM (AAA) WITHOUT RUPTURE: ICD-10-CM

## 2022-07-14 DIAGNOSIS — R80.8 OTHER PROTEINURIA: ICD-10-CM

## 2022-07-14 DIAGNOSIS — I10 ESSENTIAL HYPERTENSION: ICD-10-CM

## 2022-07-14 DIAGNOSIS — E11.9 TYPE 2 DIABETES MELLITUS WITHOUT COMPLICATION, WITHOUT LONG-TERM CURRENT USE OF INSULIN (HCC): Primary | ICD-10-CM

## 2022-07-14 DIAGNOSIS — Z79.01 LONG TERM (CURRENT) USE OF ANTICOAGULANTS: ICD-10-CM

## 2022-07-14 DIAGNOSIS — I63.9 CEREBROVASCULAR ACCIDENT (CVA), UNSPECIFIED MECHANISM (HCC): ICD-10-CM

## 2022-07-14 DIAGNOSIS — D50.0 IRON DEFICIENCY ANEMIA DUE TO CHRONIC BLOOD LOSS: ICD-10-CM

## 2022-07-14 DIAGNOSIS — E11.69 TYPE 2 DIABETES MELLITUS WITH OTHER SPECIFIED COMPLICATION, WITHOUT LONG-TERM CURRENT USE OF INSULIN (HCC): ICD-10-CM

## 2022-07-14 DIAGNOSIS — E03.8 OTHER SPECIFIED HYPOTHYROIDISM: ICD-10-CM

## 2022-07-14 DIAGNOSIS — Z95.2 H/O MITRAL VALVE REPLACEMENT WITH MECHANICAL VALVE: ICD-10-CM

## 2022-07-14 DIAGNOSIS — E53.8 VITAMIN B12 DEFICIENCY: ICD-10-CM
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
## 2022-07-14 PROCEDURE — 99215 OFFICE O/P EST HI 40 MIN: CPT | Performed by: INTERNAL MEDICINE

## 2022-07-14 NOTE — ASSESSMENT & PLAN NOTE
Mild protein in the urine  Recommend losartan  For patient's daughter will look it up  Now will stabilize hemoglobin and consider start taking next visit

## 2022-07-14 NOTE — ASSESSMENT & PLAN NOTE
Blood pressure 130/70  We talked about adding losartan  Of patient and family will think about it  Also remains on nadolol    Well-controlled

## 2022-07-14 NOTE — ASSESSMENT & PLAN NOTE
Recent fall  Getting physical therapy -the patient does not have any repeat pain  Rib x-rays were unremarkable  Chest x-ray was unremarkable    The patient denies also low back pain x-ray revealed DJD but no fracture

## 2022-07-14 NOTE — ASSESSMENT & PLAN NOTE
Multifactorial   Will workup again  Recommend to restart iron pill  Will check urinalysis stool for occult blood  Patient is up-to-date with colonoscopy  Will check celiac panel LDH the as patient had a wall replacement as well as slightly abnormal celiac panel before  Will also check ferritin    Also had micro hematuria at 1 time of probably secondary to pessary will recheck urinalysis will check stool for occult

## 2022-07-14 NOTE — ASSESSMENT & PLAN NOTE
A diabetes well controlled, mild protein in the urine present  Extensive discussion with patient and patient daughter  Recommend to consider losartan  We will stabilize hemoglobin  Meanwhile patient's daughter will look regarding the medications    Will do discuss next time  Lab Results   Component Value Date    HGBA1C 6 5 (H) 07/06/2022   Will look up the medication    Remains on metformin 500 mg 1 tablet twice a day

## 2022-07-14 NOTE — ASSESSMENT & PLAN NOTE
Incidental fall at home, ER visit, no major right rib pain or low back pain little sore spot x-rays were unremarkable    Fall precaution advised

## 2022-07-14 NOTE — ASSESSMENT & PLAN NOTE
Denies any hematuria  Will check urinalysis    Had remotely micro hematuria felt to be with pessary and she had by urologist in the past

## 2022-07-14 NOTE — ASSESSMENT & PLAN NOTE
06/17/2021:  CT scan had revealed incidental finding of abdominal aortic aneurysm 3 1 cm    Will recommend ultrasound for surveillance

## 2022-07-14 NOTE — ASSESSMENT & PLAN NOTE
Last echocardiogram March 12, 2022  Heart rate in the range of 55-63  Patient has appointment with cardiologist in August recommend to check with them  Remains on nadolol of      Remains on warfarin anticoagulation is being monitored by cardiologist recent INR was 2 5

## 2022-07-14 NOTE — PROGRESS NOTES
[facility] Office Visit Note  22     Richard Moncada 80 y o  female MRN: [id number]  : 1940    Assessment:     1  Type 2 diabetes mellitus without complication, without long-term current use of insulin (Formerly [facility])  Assessment & Plan:  A diabetes well controlled, mild protein in the urine present  Extensive discussion with patient and patient daughter  Recommend to consider losartan  We will stabilize hemoglobin  Meanwhile patient's daughter will look regarding the medications  Will do discuss next time  Lab Results   Component Value Date    HGBA1C 6 5 (H) 2022   Will look up the medication    Remains on metformin 500 mg 1 tablet twice a day    Orders:  -     CBC; Future; Expected date: 2022  -     Comprehensive metabolic panel; Future; Expected date: 2022    2  Other specified hypothyroidism  Assessment & Plan:  No symptoms of hypothyroidism  Will check TSH next visit    Orders:  -     CBC; Future; Expected date: 2022  -     TSH, 3rd generation; Future    3  Type 2 diabetes mellitus with other specified complication, without long-term current use of insulin (Formerly [facility])    4  Pulmonary emphysema, unspecified emphysema type ([address] )  Assessment & Plan:  Breathing is fair  Not on in inhaler inhaler  5  Chronic atrial fibrillation [facility])  Assessment & Plan:  Last echocardiogram 2022  Heart rate in the range of 55-63  Patient has appointment with cardiologist in August recommend to check with them  Remains on nadolol of  Remains on warfarin anticoagulation is being monitored by cardiologist recent INR was 2 5    Orders:  -     CBC; Future; Expected date: 2022  -     TSH, 3rd generation; Future    6  Abdominal aortic aneurysm (AAA) without rupture [facility])  Assessment & Plan:  2021:  CT scan had revealed incidental finding of abdominal aortic aneurysm 3 1 cm  Will recommend ultrasound for surveillance           7   Essential hypertension  Assessment & Plan:  Blood pressure 130/70  We talked about adding losartan  Of patient and family will think about it  Also remains on nadolol  Well-controlled    Orders:  -     CBC; Future; Expected date: 08/14/2022  -     Comprehensive metabolic panel; Future; Expected date: 08/14/2022    8  Cerebrovascular accident (CVA), unspecified mechanism ([facility] [address] )  Assessment & Plan:  Recent fall  Getting physical therapy -the patient does not have any repeat pain  Rib x-rays were unremarkable  Chest x-ray was unremarkable  The patient denies also low back pain x-ray revealed DJD but no fracture      9  Anemia due to stage 3 chronic kidney disease, unspecified whether stage 3a or 3b CKD [facility])  Assessment & Plan:  Lab Results   Component Value Date    EGFR 76 07/06/2022    EGFR 81 04/26/2022    EGFR 85 03/14/2022    CREATININE 0 73 07/06/2022    CREATININE 0 69 04/26/2022    CREATININE 0 60 03/14/2022   Recurrent anemia  03/04/2022:  Hemoglobin 12 5,  04/26/2022:  Hemoglobin 10 5,  07/06/2022:  Hemoglobin 1 bleeding  Does have some 0,  No obvious fatigue  Will restart iron pill 1 tablet twice a day over-the-counter she will call me with the name she does have some at home  Recommend of high-fiber diet prune as well as MiraLax 1 tablespoonful every other day  If she has constipation to let me know  We will also workup with LDH, ferritin and stool for occult blood as well as urinalysis  Will do surveillance CBC and CMP in 4 weeks  Will follow back in 1 month  Discussed with daughter differential diagnosis discussed  Patient also remains on Xarelto  In the past see had anemia fair to the defect radial secondary to being on blood thinner, possible occult blood loss, micro hematuria, and artificial wall will check also LDH and haptoglobin  Also had a slightly abnormal celiac panel in 2020  Will recheck does not have any GI symptoms at this time    Will check celiac panel haptoglobin stool for occult blood repeat CBC TSH LDH   And ferritin    Orders:  -     CBC; Future; Expected date: 08/14/2022  -     Ferritin; Future; Expected date: 08/14/2022  -     LD,Blood; Future  -     TSH, 3rd generation; Future  -     Comprehensive metabolic panel; Future; Expected date: 08/14/2022  -     Occult blood 1-3, stool; Future  -     Haptoglobin; Future; Expected date: 08/14/2022  -     Celiac Disease Panel; Future; Expected date: 08/14/2022    10  Hypercholesteremia    11  Other proteinuria  Assessment & Plan:  Mild protein in the urine  Recommend losartan  For patient's daughter will look it up  Now will stabilize hemoglobin and consider start taking next visit  12  Other microscopic hematuria  Assessment & Plan:  Denies any hematuria  Will check urinalysis  Had remotely micro hematuria felt to be with pessary and she had by urologist in the past      13  H/O mitral valve replacement with mechanical valve  Assessment & Plan:  Chronic anticoagulation  No obvious major visible blood      14  Long term (current) use of anticoagulants  -     CBC; Future; Expected date: 08/14/2022    15  Iron deficiency anemia due to chronic blood loss  Assessment & Plan:  Multifactorial   Will workup again  Recommend to restart iron pill  Will check urinalysis stool for occult blood  Patient is up-to-date with colonoscopy  Will check celiac panel LDH the as patient had a wall replacement as well as slightly abnormal celiac panel before  Will also check ferritin  Also had micro hematuria at 1 time of probably secondary to pessary will recheck urinalysis will check stool for occult    Orders:  -     CBC; Future; Expected date: 08/14/2022  -     Ferritin; Future; Expected date: 08/14/2022  -     UA (URINE) with reflex to Scope  -     LD,Blood; Future  -     TSH, 3rd generation; Future  -     Comprehensive metabolic panel; Future; Expected date: 08/14/2022  -     Occult blood 1-3, stool;  Future  -     Haptoglobin; Future; Expected date: 08/14/2022  - Celiac Disease Panel; Future; Expected date: 08/14/2022    16  Vitamin B12 deficiency  Assessment & Plan:  Patient is on vitamin B 12/B complex recommend to continue same          Discussion Summary and Plan: Today's care plan and medications were reviewed with patient in detail and all their questions answered to their satisfaction  In summary patient with incidental mechanical fall at home while in the bathroom tried to turn around of the   Did injure right lower rib x-ray is unremarkable  Hip x-ray were unremarkable lumbar spine unremarkable except DJD  Patient denies any significant pain in the hip area or lower back  Some tenderness is present in the right lower lip but pain is not bad  No internal bleeding suspected  Anemia hemoglobin drop is noted  Last hemoglobin was 10 5 this time 10  Previously it was 12  Patient had remote history of anemia the almost 2 years ago which were felt to be matthew multifactorial home and secondary due to probably subtle internal blood loss with mechanical wall as well as chronic anticoagulation as well as micro hematuria  Patient is on celiac panel was top-normal home  In any event with iron pill it came back unremarkable  Patient had a upper GI endoscopy and colonoscopy done in the past per patient  The patient denies any obvious symptoms of any GI blood loss or any external blood loss from anywhere  Some fatigue is reported  Will start iron pill twice a day will check were workup with home CBCs CMP haptoglobin ferritin stool for occult blood urine analysis  Patient will start iron pill twice a day recommend stool softener of MiraLax every other day as well as prune juice every day    Differential diagnosis discussed with patient and patient's daughter on telephone at length  Other family moved member is present  Diabetes is well controlled  Protein in the urine noted  Recommend losartan they will think about it    The heart rate is low normal  Atrial fibrillation  Chronic anticoagulation per cardiologist   They have appointment with cardiologist   Recommend to discuss with them patient remains on digoxin last digoxin level was unremarkable 0 5  Renal functions are stable  LFTs are unremarkable  Hyperlipidemia well controlled  CVA stable speech is back to normal   No focal neurological finding is noted  Of a extensive evaluation done were time spent is greater than 50 minute  Chief Complaint   Patient presents with    Follow-up     Fall, post er visit  Recent CVA few months ago    Hypertension    Atrial Fibrillation     History of mitral wall replacement, chronic anticoagulation    Hyperlipidemia    Diabetes     Protein in the urine    Anemia     Worsen in hemoglobin   Back Pain     Recent fall, rib pain,      Subjective:  Patient is here for follow-up of stroke as well as the multiple chronic disease management  S/p Fall_ ER visit: ER assessment and xray reports reviewed see has a little soreness in the right lower part of the rib  Patient denies any low back pain  Or hip pain  Ambulating without problem  CVA:  Patient was recently hospitalized with CVA with predominant deficit was that of a speech  Patient continues with physical therapy and speech therapist      Her gait is reasonable  Walks with a cane  Speech is pretty much back to normal   Denies any focal weakness  Patient was recently hospitalized on 03/11/2022 and discharged on 03/14/2022  Patient was not a candidate for tPA due to improvement  MRI of the brain confirmed left lacunar infarct involving left basal ganglia extending into the periventricular white matter of the left parietal lobe adjacent to posterior aspect of the left lateral ventricle  Diabetes: Denies any polydipsia polyuria hypoglycemic hyperglycemic his cast hyperglycemic symptoms  Remains on metformin 500 mg twice a day  Hyperlipidemia symptom-free , hemoglobin A1c 6 5    Urine for microalbumin/creatinine ratio mildly elevated  Recommend losartan or Ace  Daughter will look it up  Will consider next visit  Heart rate is a on the low-normal side  Patient has appointment with cardiologist   Recommend to check with them  Remains on digoxin as well as nadolol last digoxin level in March was 0 5  Patient does not have any symptoms related to atrial fibrillation  Chronic atrial fibrillation remains on anticoagulation INR was somewhat subtherapeutic adjusted by cardiologist   G    History of anemia :  Current hemoglobin 10  No symptoms of anemia  except some fatigue  Patient is not bleeding  Mild anemia was multifactorial   Patient had a remote history of anemia which were felt to be multifactorial in part secondary to micro hematuria being on anticoagulation see had a slight somewhat abnormal celiac panel  She patient is also on Xarelto and has a mitral wall replacement and may be subtle internal blood loss of were suspected in any event with iron pill in the past it hemoglobin improved  Hemoglobin is 10 g  Pre of previously it was 10 5 and 12 5  Status post MVR  Extensive discussion with the patient  Patient does not have any obvious GI blood loss  blood loss  Will do appropriate workup with stool for occult blood urinalysis celiac panel haptoglobin ferritin  Start iron tablet 1 tablet twice a day  Recommend stool softener with MiraLax every other day as well as fair at [facility] to be monitored  Also of prune juice every day  A if fatigue is worse the patient to let me know    History of cystitis and hematuria stable  Patient is out    History of MVR  remains on nadolol losartan with holding parameters  Renal functions are fair  Speech is back to normal walking fairly well  Atrial fibrillation controlled ventricular rate remains on nadolol anticoagulations digoxin patient will continue to follow with cardiologist     Gait is reasonable    She deconditioning better ambulation is better speech is better will keep patient will continue physical therapy per the plan speech therapy per plan and occupational therapy per plan  I will follow-up in 2 months  04/26/2022:  CBC normal except hemoglobin 10 5, blood sugar 148 INR 1 87 LFTs normal GFR 81  02/17/2022:  Creatinine 0 73 blood sugar 178 rest CMP unremarkable CBC unremarkable,  vitamin-D 90 9 TSH 1 64 hemoglobin A1c 6 8 digoxin level 1 0  INR:  03/01/2022 3 05 ,  01/25/2022:  2 84, 12/21/2021:  2 92, urine for microalbumin/creatinine ratio 50 normal  03/14/2022:  CBC normal, BMP normal except elevated blood sugar 146  03/12/2022:  Hemoglobin A1c 6 5 lipid lipid profile reveals , HDL 38, triglyceride 230    Lab on 07/06/2022  Sodium                    Value: 138(mmol/L)        Dt: 07/06/2022  Potassium                 Value: 4 0(mmol/L)        Dt: 07/06/2022  Chloride                  Value: 104(mmol/L)        Dt: 07/06/2022  CO2                       Value: 27(mmol/L)         Dt: 07/06/2022  ANION GAP                 Value:  7(mmol/L)          Dt: 07/06/2022  BUN                       Value: 21(mg/dL)          Dt: 07/06/2022  Creatinine                Value: 0 73(mg/dL)        Dt: 07/06/2022  Glucose, Fasting          Value: 146(mg/dL) (A)     Dt: 07/06/2022  Calcium                   Value: 9 3(mg/dL)         Dt: 07/06/2022  AST                       Value: 32(U/L)            Dt: 07/06/2022  ALT                       Value: 31(U/L)            Dt: 07/06/2022  Alkaline Phosphatase      Value: 82(U/L)            Dt: 07/06/2022  Total Protein             Value: 7 9(g/dL)          Dt: 07/06/2022  Albumin                   Value: 4 1(g/dL)          Dt: 07/06/2022  Total Bilirubin           Value: 1 10(mg/dL) (A)    Dt: 07/06/2022  eGFR                      Value: 76(ml/min/1 73sq m) Dt: 07/06/2022  Hemoglobin A1C            Value: 6 5(%) (A)         Dt: 07/06/2022  EAG                       Value: 140(mg/dl) Dt: 07/06/2022  Cholesterol               Value: 104(mg/dL)         Dt: 07/06/2022  Triglycerides             Value: 137(mg/dL)         Dt: 07/06/2022  HDL, Direct               Value: 37(mg/dL) (A)      Dt: 07/06/2022  LDL Calculated            Value: 40(mg/dL)          Dt: 07/06/2022  Non-HDL-Chol (CHOL-HDL)   Value: 67(mg/dl)          Dt: 07/06/2022  WBC                       Value: 4 44(Thousand/uL)  Dt: 07/06/2022  RBC                       Value: 4 20(Million/uL)   Dt: 07/06/2022  Hemoglobin                Value: 10 0(g/dL) (A)     Dt: 07/06/2022  Hematocrit                Value: 33 8(%) (A)        Dt: 07/06/2022  MCV                       Value: 81(fL) (A)         Dt: 07/06/2022  MCH                       Value: 23 8(pg) (A)       Dt: 07/06/2022  MCHC                      Value: 29 6(g/dL) (A)     Dt: 07/06/2022  RDW                       Value: 17 5(%) (A)        Dt: 07/06/2022  Platelets                 Value: 241(Thousands/uL)  Dt: 07/06/2022  MPV                       Value: 10 4(fL)           Dt: 07/06/2022  ------------  Admission on 07/05/2022, Discharged on 07/05/2022  Protime                   Value: 21 8(seconds) (A)  Dt: 07/05/2022  INR                       Value: 1 97 (A)           Dt: 07/05/2022  PTT                       Value: 34(seconds)        Dt: 07/05/2022  ------------ - 2 weeks  Procedure: XR ribs right w pa chest min 3 views    Result Date: 7/5/2022  Narrative: RIGHT RIBS AND CHEST - DUAL ENERGY INDICATION:   per protocole  COMPARISON:  None EXAM PERFORMED/VIEWS:  XR RIBS RIGHT W PA CHEST MIN 3 VIEWS FINDINGS: Cardiomediastinal silhouette appears unremarkable  Lungs are clear  No pleural effusions  There is no pneumothorax  Rib evaluation restricted by technique, position and osteopenia  No rib fractures are identified  Impression: No active cardiopulmonary disease  No evidence of rib fractures   Workstation performed: [id number]     Procedure: XR spine lumbar 2 or 3 views injury    Result Date: 7/5/2022  Narrative: LUMBAR SPINE INDICATION:   pain  COMPARISON:  None VIEWS:  XR SPINE LUMBAR 2 OR 3 VIEWS INJURY FINDINGS: There are 5 non rib bearing lumbar vertebral bodies  There is no evidence of acute fracture or destructive osseous lesion  Slight degenerative L5-S1 spondylolisthesis  Anatomic alignment otherwise  [place] L4-L5, L5-S1 mild arthrosis  L3-L4 through L5-S1 disc space narrowing  Osteopenia  The pedicles appear intact  Soft tissues are unremarkable  Impression: No acute osseous abnormality  Workstation performed: [id number]     Procedure: XR hip/pelv 2-3 vws right if performed    Result Date: 7/5/2022  Narrative: RIGHT HIP INDICATION:   fall  COMPARISON:  9/9/2020 VIEWS:  XR HIP/PELV 2-3 VWS RIGHT W PELVIS IF PERFORMED FINDINGS: There is no acute fracture or dislocation  Bilateral degenerative hip joints  No lytic or blastic osseous lesion  Soft tissues are unremarkable  Degenerative lower lumbar spine and pubic symphysis  Impression: No acute osseous abnormality  Workstation performed: [id number]              The following portions of the patient's history were reviewed and updated as appropriate: allergies, current medications, past family history, past medical history, past social history, past surgical history and problem list     Review of Systems   All other systems reviewed and are negative          Historical Information   Patient Active Problem List   Diagnosis    Obstructive sleep apnea    Chronic atrial fibrillation ([address] )    H/O mitral valve replacement with mechanical valve    Long term (current) use of anticoagulants    Presence of prosthetic heart valve    Hypercholesteremia    Anemia due to chronic kidney disease    Allergic rhinitis    Type 2 diabetes mellitus without complication, without long-term current use of insulin (HCC)    Iron deficiency anemia    Other specified hypothyroidism    Vitamin B12 deficiency    Other microscopic hematuria    Celiac disease    Abdominal aortic aneurysm (AAA) (HCC)    Ataxia    Pulmonary emphysema (HCC)    History of renal calculi    History of cervical cancer    Essential hypertension    Other proteinuria    Hepatic steatosis    CVA (cerebral vascular accident) ([place] [address] )    Diabetes mellitus ([place] [address] )    COVID-19    Anemia due to stage 3 chronic kidney disease ([place] [address] )    Cerebrovascular accident (CVA) ([place] [address] )    Fall     Past Medical History:   Diagnosis Date    Stroke ([place] [address] ) 2022     Past Surgical History:   Procedure Laterality Date    EYE SURGERY      HYSTERECTOMY      MITRAL VALVE REPLACEMENT       Social History     Substance and Sexual Activity   Alcohol Use Yes    Comment: special occasional     Social History     Substance and Sexual Activity   Drug Use No     Social History     Tobacco Use   Smoking Status Former Smoker    Packs/day: 2 00    Years: 30 00    Pack years: 60 00    Quit date:     Years since quittin 5   Smokeless Tobacco Never Used     Family History   Problem Relation Age of Onset    Heart failure Mother     Heart attack Father     Sleep apnea Brother      Health Maintenance Due   Topic    COVID-19 Vaccine (1)    Pneumococcal Vaccine: 65+ Years (1 - PCV)    PT PLAN OF CARE     Influenza Vaccine (1)      Meds/Allergies       Current Outpatient Medications:     acetaminophen (TYLENOL) 325 mg tablet, Take 2 tablets (650 mg total) by mouth every 6 (six) hours as needed for mild pain or headaches, Disp: , Rfl: 0    aspirin (ECOTRIN LOW STRENGTH) 81 mg EC tablet, Take 1 tablet (81 mg total) by mouth daily, Disp: , Rfl: 0    atorvastatin (LIPITOR) 80 mg tablet, Take 1 tablet (80 mg total) by mouth daily, Disp: 90 tablet, Rfl: 1    Cholecalciferol (VITAMIN D PO), Take by mouth, Disp: , Rfl:     digoxin (LANOXIN) 0 125 mg tablet, Take 125 mcg by mouth daily, Disp: , Rfl:     metFORMIN (GLUCOPHAGE) 500 mg tablet, Take 2 tablets by mouth twice daily (Patient taking differently: 500 mg 2 (two) times a day with meals), Disp: 360 tablet, Rfl: 0    Multiple Vitamins-Minerals (PRESERVISION AREDS PO), Take 2 tablets by mouth daily  , Disp: , Rfl:     nadolol (CORGARD) 20 mg tablet, Take 0 5 tablets (10 mg total) by mouth daily, Disp: , Rfl: 0    warfarin (COUMADIN) 2 5 mg tablet, Take 1 tablet (2 5 mg total) by mouth 3 (three) times a week On Monday Wednesday and Friday, Disp: , Rfl: 0    warfarin (COUMADIN) 5 mg tablet, Take 1 tablet (5 mg total) by mouth 4 (four) times a week On Sunday, Tuesday, Thursday, Saturday, Disp: , Rfl: 0      Objective:    Vitals:   /70   Pulse 60   Ht 5' 5 5" (1 664 m)   Wt 68 9 kg (152 lb)   SpO2 98%   BMI 24 91 kg/m²   Body mass index is 24 91 kg/m²  Vitals:    07/14/22 1152   Weight: 68 9 kg (152 lb)       Physical Exam  Vitals and nursing note reviewed  Constitutional:       General: She is not in acute distress  Appearance: Normal appearance  She is well-developed  She is not ill-appearing, toxic-appearing or diaphoretic  Comments: Short grey hair   HENT:      Head: Normocephalic and atraumatic  Right Ear: External ear normal       Left Ear: External ear normal    Eyes:      General: No scleral icterus  Right eye: No discharge  Left eye: No discharge  Conjunctiva/sclera: Conjunctivae normal    Neck:      Thyroid: No thyroid mass, thyromegaly or thyroid tenderness  Vascular: Normal carotid pulses  No carotid bruit or JVD  Cardiovascular:      Rate and Rhythm: Normal rate  Rhythm irregular  Pulses:           Dorsalis pedis pulses are 2+ on the right side and 2+ on the left side  Posterior tibial pulses are 1+ on the right side and 1+ on the left side  Heart sounds: S1 normal  Murmur heard  Systolic murmur is present with a grade of 2/6  Comments: S2 elevated  Pulmonary:      Effort: No respiratory distress  Breath sounds: Normal breath sounds   No stridor  No wheezing, rhonchi or rales  Chest:      Chest wall: Tenderness present  Comments: Right lateral lower rib area tenderness is present but mild point tenderness no ecchymosis  Abdominal:      General: Bowel sounds are normal  There is no distension  Palpations: There is no shifting dullness, fluid wave, hepatomegaly, mass or pulsatile mass  Tenderness: There is no abdominal tenderness  There is no rebound  Hernia: There is no hernia in the umbilical area, ventral area, left inguinal area, left femoral area or right inguinal area  Musculoskeletal:         General: Normal range of motion  Cervical back: Normal range of motion  Right lower leg: No edema  Left lower leg: No edema  Comments: Hip nontender a  Low back nontender  Gait reasonable  Feet:      Right foot:      Skin integrity: Callus present  No ulcer, skin breakdown, erythema, warmth or dry skin  Left foot:      Skin integrity: Callus present  No ulcer, skin breakdown, erythema, warmth or dry skin  Lymphadenopathy:      Cervical: No cervical adenopathy  Right cervical: No superficial cervical adenopathy  Skin:     General: Skin is warm  Findings: No rash  Neurological:      General: No focal deficit present  Mental Status: She is alert and oriented to person, place, and time  Mental status is at baseline  Cranial Nerves: Cranial nerves are intact  Motor: Motor function is intact  Coordination: Coordination normal       Gait: Tandem walk abnormal  Gait normal       Deep Tendon Reflexes: Reflexes normal       Comments: Speech is normal     Psychiatric:         Mood and Affect: Mood normal          Behavior: Behavior normal          Thought Content:  Thought content normal          Judgment: Judgment normal          Lab Review   Lab on 07/06/2022   Component Date Value Ref Range Status    Sodium 07/06/2022 138  136 - 145 mmol/L Final    Potassium 07/06/2022 4 0  3 5 - 5 3 mmol/L Final    Chloride 07/06/2022 104  100 - 108 mmol/L Final    CO2 07/06/2022 27  21 - 32 mmol/L Final    ANION GAP 07/06/2022 7  4 - 13 mmol/L Final    BUN 07/06/2022 21  5 - 25 mg/dL Final    Creatinine 07/06/2022 0 73  0 60 - 1 30 mg/dL Final    Standardized to IDMS reference method    Glucose, Fasting 07/06/2022 146 (A) 65 - 99 mg/dL Final    Specimen collection should occur prior to Sulfasalazine administration due to the potential for falsely depressed results  Specimen collection should occur prior to Sulfapyridine administration due to the potential for falsely elevated results   Calcium 07/06/2022 9 3  8 3 - 10 1 mg/dL Final    AST 07/06/2022 32  5 - 45 U/L Final    Specimen collection should occur prior to Sulfasalazine administration due to the potential for falsely depressed results   ALT 07/06/2022 31  12 - 78 U/L Final    Specimen collection should occur prior to Sulfasalazine and/or Sulfapyridine administration due to the potential for falsely depressed results   Alkaline Phosphatase 07/06/2022 82  46 - 116 U/L Final    Total Protein 07/06/2022 7 9  6 4 - 8 2 g/dL Final    Albumin 07/06/2022 4 1  3 5 - 5 0 g/dL Final    Total Bilirubin 07/06/2022 1 10 (A) 0 20 - 1 00 mg/dL Final    Use of this assay is not recommended for patients undergoing treatment with eltrombopag due to the potential for falsely elevated results   eGFR 07/06/2022 76  ml/min/1 73sq m Final    Hemoglobin A1C 07/06/2022 6 5 (A) Normal 3 8-5 6%; PreDiabetic 5 7-6 4%;  Diabetic >=6 5%; Glycemic control for adults with diabetes <7 0% % Final    EAG 07/06/2022 140  mg/dl Final    Cholesterol 07/06/2022 104  See Comment mg/dL Final    Cholesterol:         Pediatric <18 Years        Desirable          <170 mg/dL      Borderline High    170-199 mg/dL      High               >=200 mg/dL        Adult >=18 Years            Desirable         <200 mg/dL      Borderline High   200-239 mg/dL      High >239 mg/dL      Triglycerides 07/06/2022 137  See Comment mg/dL Final    Triglyceride:     0-9Y            <75mg/dL     10Y-17Y         <90 mg/dL       >=18Y     Normal          <150 mg/dL     Borderline High 150-199 mg/dL     High            200-499 mg/dL        Very High       >499 mg/dL    Specimen collection should occur prior to N-Acetylcysteine or Metamizole administration due to the potential for falsely depressed results   HDL, Direct 07/06/2022 37 (A) >=50 mg/dL Final    Specimen collection should occur prior to Metamizole administration due to the potential for falsley depressed results   LDL Calculated 07/06/2022 40  0 - 100 mg/dL Final    LDL Cholesterol:     Optimal           <100 mg/dl     Near Optimal      100-129 mg/dl     Above Optimal       Borderline High 130-159 mg/dl       High            160-189 mg/dl       Very High       >189 mg/dl         This screening LDL is a calculated result  It does not have the accuracy of the Direct Measured LDL in the monitoring of patients with hyperlipidemia and/or statin therapy  Direct Measure LDL (SRP917) must be ordered separately in these patients      Non-HDL-Chol (CHOL-HDL) 07/06/2022 67  mg/dl Final    WBC 07/06/2022 4 44  4 31 - 10 16 Thousand/uL Final    RBC 07/06/2022 4 20  3 81 - 5 12 Million/uL Final    Hemoglobin 07/06/2022 10 0 (A) 11 5 - 15 4 g/dL Final    Hematocrit 07/06/2022 33 8 (A) 34 8 - 46 1 % Final    MCV 07/06/2022 81 (A) 82 - 98 fL Final    MCH 07/06/2022 23 8 (A) 26 8 - 34 3 pg Final    MCHC 07/06/2022 29 6 (A) 31 4 - 37 4 g/dL Final    RDW 07/06/2022 17 5 (A) 11 6 - 15 1 % Final    Platelets 63/04/5859 241  149 - 390 Thousands/uL Final    MPV 07/06/2022 10 4  8 9 - 12 7 fL Final   Admission on 07/05/2022, Discharged on 07/05/2022   Component Date Value Ref Range Status    Protime 07/05/2022 21 8 (A) 11 6 - 14 5 seconds Final    INR 07/05/2022 1 97 (A) 0 84 - 1 19 Final    PTT 07/05/2022 34  23 - 37 seconds Final    Therapeutic Heparin Range =  60-90 seconds   Ancillary Orders on 06/24/2022   Component Date Value Ref Range Status    Protime 07/11/2022 25 8 (A) 11 6 - 14 5 seconds Final    INR 07/11/2022 2 50 (A) 0 84 - 1 19 Final   Ancillary Orders on 05/27/2022   Component Date Value Ref Range Status    Protime 05/27/2022 26 2 (A) 11 6 - 14 5 seconds Final    INR 05/27/2022 2 56 (A) 0 84 - 1 19 Final         Patient Instructions   Your rib shortness should get better if you continue to have pain in 1-2 weeks please let me know  Please do fall precaution  Do not do any garden work  The drink enough fluid  Of avoid sudden bending  Your hemoglobin is down to 10 g range  Recommend to restart iron pill 1 tablet twice a day  Take prunes or prune juice and MiraLax 17 g every other day for constipation  If there is a constipation let me know  We will do anemia workup with the next visit a for the close monitoring of hemoglobin as well as the to follow-up with CBC, celiac panel, CMP, TSH, urinalysis, stool for occult blood home and LDH as well as ferritin and haptoglobin  Most likely your anemia is multifactorial     The follow-up the your heart rate is slightly on the lower side around 60 check with your cardiologist about that  See if the need to decrease the nadolol or digoxin  Recommend to consider taking losartan for the protein in the urine  Please please ask your daughter to look it up      Follow with Consultants as per their and our suggestion    Follow up in one month or as needed earlier    Follow all instructions as advised and discussed  Take your medications as prescribed  Call the office immediately if you experience any side effects  Ask questions if you do not understand  Keep your scheduled appointment as advised or come sooner if necessary or in doubt  Best time to call for non-urgent matter or questions on weekdays is between 9am and 12 noon      See physician for any new symptoms or worsening of current symptoms  Urgent or emergent situations call 911 and report to nearest emergency room  I spent  50minutes taking care of this patient including clinical care, conseling, collaboration, chart, lab and consultaion review as appropriate    Patient is to get labs 1 week(s) prior to next visit if advised         Dr Tyesha Masters MD  [facility]       "This note has been constructed using a voice recognition system  Therefore there may be syntax, spelling, and/or grammatical errors   Please call if you have any questions  "

## 2022-07-14 NOTE — ASSESSMENT & PLAN NOTE
Lab Results   Component Value Date    EGFR 76 07/06/2022    EGFR 81 04/26/2022    EGFR 85 03/14/2022    CREATININE 0 73 07/06/2022    CREATININE 0 69 04/26/2022    CREATININE 0 60 03/14/2022   Recurrent anemia  03/04/2022:  Hemoglobin 12 5,  04/26/2022:  Hemoglobin 10 5,  07/06/2022:  Hemoglobin 1 bleeding  Does have some 0,  No obvious fatigue  Will restart iron pill 1 tablet twice a day over-the-counter she will call me with the name she does have some at home  Recommend of high-fiber diet prune as well as MiraLax 1 tablespoonful every other day  If she has constipation to let me know  We will also workup with LDH, ferritin and stool for occult blood as well as urinalysis  Will do surveillance CBC and CMP in 4 weeks  Will follow back in 1 month  Discussed with daughter differential diagnosis discussed  Patient also remains on Xarelto  In the past see had anemia fair to the defect radial secondary to being on blood thinner, possible occult blood loss, micro hematuria, and artificial wall will check also LDH and haptoglobin  Also had a slightly abnormal celiac panel in 2020  Will recheck does not have any GI symptoms at this time  Will check celiac panel haptoglobin stool for occult blood repeat CBC TSH LDH    And ferritin

## 2022-07-15 ENCOUNTER — OFFICE VISIT (OUTPATIENT)
Dept: PHYSICAL THERAPY | Facility: CLINIC | Age: 82
End: 2022-07-15
Payer: MEDICARE

## 2022-07-15 ENCOUNTER — OFFICE VISIT (OUTPATIENT)
Dept: OCCUPATIONAL THERAPY | Facility: CLINIC | Age: 82
End: 2022-07-15
Payer: MEDICARE

## 2022-07-15 DIAGNOSIS — I63.9 CEREBROVASCULAR ACCIDENT (CVA), UNSPECIFIED MECHANISM (HCC): ICD-10-CM

## 2022-07-15 DIAGNOSIS — R26.89 OTHER ABNORMALITIES OF GAIT AND MOBILITY: Primary | ICD-10-CM

## 2022-07-15 DIAGNOSIS — R26.89 BALANCE DISORDER: ICD-10-CM

## 2022-07-15 DIAGNOSIS — I63.9 CEREBROVASCULAR ACCIDENT (CVA), UNSPECIFIED MECHANISM (HCC): Primary | ICD-10-CM

## 2022-07-15 PROCEDURE — 97112 NEUROMUSCULAR REEDUCATION: CPT | Performed by: OCCUPATIONAL THERAPIST

## 2022-07-15 PROCEDURE — 97112 NEUROMUSCULAR REEDUCATION: CPT

## 2022-07-15 PROCEDURE — 97530 THERAPEUTIC ACTIVITIES: CPT | Performed by: OCCUPATIONAL THERAPIST

## 2022-07-15 NOTE — PROGRESS NOTES
Daily Note     Today's date: 7/15/2022  Patient name: Harsha Bass  : 1940  MRN: [id number]  Referring provider: Lisandro Crockett  Dx:   Encounter Diagnosis   Name Primary?  Cerebrovascular accident (CVA), unspecified mechanism ([facility] [address] ) Yes                  PLAN OF CARE END: 22  FREQUENCY: 2x/wk  NO AUTH REQUIRED  PRECAUTIONS: falls- walk to front, vision impairment d/t macular degeneration    Subjective: "My hand is so much better"    Objective: See treatment below  NMRE:  -Pom pom  and transfer to container using tripod tweezers focusing on R [facility]     -Dominican block puzzle with simple math for 3 colors only focusing on R in-hand manipulation/dexterity and divided attention  Required enlarged prompt d/t vision deficits  Therapeutic Activities:  -Card sorting with naming from 4 categories focusing on recall and divided attention  Key with 4 categories provided at start of task and 2/4 were removed toward end of task  Pt able to recall categories but was unsure which suit they were correlated with  Required Sherif/cues to name and noted with min repeating of answers       Assessment: Tolerated treatment well  Demonstrating significant improvements in functional use R UE, difficulty with LT recall  Pt would benefit from continued OT services to address NMRE R UE and cognitive deficits  Plan: Continued skilled OT per POC  GOALS TO BE ADDED :  -Pt will demonstrate improved functional cognition by scoring WNL on MoCA (modified for vision deficits)  -Pt will demonstrate improved delayed recall by scoring at at least 14/15 on MIS portion of MoCA

## 2022-07-15 NOTE — PROGRESS NOTES
Daily Note     Insurance:  AMA/CMS Eval/ Re-eval POC expires Enrrique Kwon #/ Referral # Total   Visits  Start date  Expiration date Extension  Visit limitation? PT only or  PT+OT? Co-Insurance   Medicare  CMS 5-18-22 8-10-22 8-10 N/A N/A N/A N/A N/A No PT/OT Yes and $0 Co-pay    6-29-22 8-10-22                                                          Today's date: 7/15/2022  Patient name: Harsha Bass  : 1940  MRN: [id number]  Referring provider: Lisandro Crockett  Dx:   Encounter Diagnosis     ICD-10-CM    1  Other abnormalities of gait and mobility  R26 89    2  Cerebrovascular accident (CVA), unspecified mechanism ([facility] [address] )  I63 9    3  Balance disorder  R26 89                   Subjective: Patient reports no new changes, complaints, or falls  States she has been feeling much better since going on iron pills  Objective: See treatment diary below    Vitals  - BP: 150/70 mmHg      NMR:  - STS to fatigue: 10 reps, 0 UE, RPE: 14/20  - Fwd step ups + High knee drive (8"): 2 sets, 2 min, 0 UE, 4# ankle wts, RPE: 13/20   - FWD/BCK cone weave resisted (red): 3 cycles up/down,  CS, 4# ankle wts   - Resisted FWD walking (red) + ramp and tidal tank carry, 1 set, 300 ft, RPE: 17/20, 4# ankle wts  - Bwd ambulation w/ PT pull (yellow): 4 laps, 50 ft, 0 UE, 4# ankle wts, RPE: 15/20      Assessment: Patient able to tolerate treatment session well today  Difficulty performing tidal tank carry with resisted ambulation secondary to weak and painful (R) shoulder due to past injury  Frequent stepping strategy utilized when navigating cones with posterior resistance  She will continue to benefit from skilled outpatient PT in order to maximize her function and reduce her risk for falls following CVA  Plan: Continue per plan of care         Precautions: Patient on Beta Blockers      Outcome Measures Initial Eval  22 PN  22       5xSTS 16 58 sec,   0 UE 17 20,  0 UE       TUG  - Regular  - Cognitive   15 63 sec  16 81 sec (stopped counting)   12 92 sec  15 62 sec (completed counting)       10 meter 0 92 m/s 0 99 m/s       FGA 16/30 18/30       DGI 15/24 17/24       MiniBEST 14/28 17/28       PASS NT NT       6MWT 900 ft 925 ft       CB&M NT NT

## 2022-07-19 ENCOUNTER — APPOINTMENT (OUTPATIENT)
Dept: PHYSICAL THERAPY | Facility: CLINIC | Age: 82
End: 2022-07-19
Payer: MEDICARE

## 2022-07-19 ENCOUNTER — APPOINTMENT (OUTPATIENT)
Dept: OCCUPATIONAL THERAPY | Facility: CLINIC | Age: 82
End: 2022-07-19
Payer: MEDICARE

## 2022-07-20 NOTE — TELEPHONE ENCOUNTER
If she has not seen for 2 years we need to see her and assess her compliance before ordering continued supplies    Try to make a follow-up appointment in the office as soon as possible

## 2022-07-22 ENCOUNTER — OFFICE VISIT (OUTPATIENT)
Dept: OCCUPATIONAL THERAPY | Facility: CLINIC | Age: 82
End: 2022-07-22
Payer: MEDICARE

## 2022-07-22 ENCOUNTER — OFFICE VISIT (OUTPATIENT)
Dept: PHYSICAL THERAPY | Facility: CLINIC | Age: 82
End: 2022-07-22
Payer: MEDICARE

## 2022-07-22 DIAGNOSIS — R26.89 OTHER ABNORMALITIES OF GAIT AND MOBILITY: Primary | ICD-10-CM

## 2022-07-22 DIAGNOSIS — R26.89 BALANCE DISORDER: ICD-10-CM

## 2022-07-22 DIAGNOSIS — I63.9 CEREBROVASCULAR ACCIDENT (CVA), UNSPECIFIED MECHANISM (HCC): ICD-10-CM

## 2022-07-22 DIAGNOSIS — I63.9 CEREBROVASCULAR ACCIDENT (CVA), UNSPECIFIED MECHANISM (HCC): Primary | ICD-10-CM

## 2022-07-22 PROCEDURE — 97112 NEUROMUSCULAR REEDUCATION: CPT | Performed by: PHYSICAL THERAPIST

## 2022-07-22 PROCEDURE — 97530 THERAPEUTIC ACTIVITIES: CPT

## 2022-07-22 PROCEDURE — 97112 NEUROMUSCULAR REEDUCATION: CPT

## 2022-07-22 NOTE — PROGRESS NOTES
Daily Note     Insurance:  AMA/CMS Eval/ Re-eval POC expires temi Chavezfolk #/ Referral # Total   Visits  Start date  Expiration date Extension  Visit limitation? PT only or  PT+OT? Co-Insurance   Medicare  CMS 5-18-22 8-10-22 8-10 N/A N/A N/A N/A N/A No PT/OT Yes and $0 Co-pay    6-29-22 8-10-22                                                          Today's date: 2022  Patient name: Malou Garcia  : 1940  MRN: [id number]  Referring provider: Lc Fernandez  Dx:   Encounter Diagnosis     ICD-10-CM    1  Other abnormalities of gait and mobility  R26 89    2  Cerebrovascular accident (CVA), unspecified mechanism ([facility] [address] )  I63 9    3  Balance disorder  R26 89                   Subjective: Patient reports no new changes, complaints, or falls  She noted that she might have some difficulty getting to her second appointment next week as her  her going on vacation  Objective: See treatment diary below    Vitals  - BP: 150/64 mmHg      NMR:  - STS to fatigue: 25 reps, 0 UE, RPE: 14/20  - Fwd step ups + High knee drive (8"): 2 sets, 2 min, light 1 UE, 4# ankle wts, RPE: 13/20  - Lat step over step (8"): 2 sets, 2 min, light 1 UE, 4# ankle wts, RPE: 16/20  - Bwd ambulation w/ PT pull (yellow): 4 laps, 50 ft, 0 UE, 4# ankle wts, RPE: 15/20      Assessment: Patient able to tolerate treatment session well today with continued focus on dynamic balance and endurance tasks  She remained with the most difficulty when performing dynamic tasks in backwards direction with resultant inconsistent foot placement and ultimately demonstrated decreased neuromuscular control  Light UE assist to focus on LE strengthening  She will continue to benefit from skilled outpatient PT in order to maximize her function and reduce her risk for falls following CVA  Plan: Continue per plan of care         Precautions: Patient on Beta Blockers      Outcome Measures Initial Eval  22 PN  22       5xSTS 16 58 sec, 0 UE 17 20,  0 UE       TUG  - Regular  - Cognitive   15 63 sec  16 81 sec (stopped counting)   12 92 sec  15 62 sec (completed counting)       10 meter 0 92 m/s 0 99 m/s       FGA 16/30 18/30       DGI 15/24 17/24       MiniBEST 14/28 17/28       PASS NT NT       6MWT 900 ft 925 ft       CB&M NT NT

## 2022-07-22 NOTE — PROGRESS NOTES
Daily Note     Today's date: 2022  Patient name: Susan Chance  : 1940  MRN: [id number]  Referring provider: Brian Rock  Dx:   Encounter Diagnosis   Name Primary?  C[facility]bro[facility] ([facility] Yes       Start Time: 930  Stop Time: 9559  Total time in clinic (min): 45 minutes    PLAN OF CARE END: 22  FREQUENCY: 2x/wk  NO AUTH REQUIRED  PRECAUTIONS: falls- walk to front, vision impairment d/t macular degeneration    Subjective: "My hand is so much better"    Objective: See treatment below  NMRE:  -Performed honey beet task using 2 lb wrist weight donned for proprioceptive feedback  focusing on R [facility]  Therapeutic Activities:  -Performed honey bee task with recall of commands to give canine companions facility dog while stating  Animals and cities for divided attention  of the alphabet focusing on memory, divied attention in semi modal environment stnading on foam for dual tasking required 2-3 VCS for recall of commands and 3-4 VCs for recall of divided attention, 3 VCs for problem solving    perfomred card organizing by suite with recall of 3 items focusing on divided attention and memory reuqi    Assessment: Tolerated treatment well  Demonstrating significant improvements in functional use R UE, difficulty with LT recall  Pt would benefit from continued OT services to address NMRE R UE and cognitive deficits  Plan: Continued skilled OT per POC  GOALS TO BE ADDED :  -Pt will demonstrate improved functional cognition by scoring WNL on MoCA (modified for vision deficits)  -Pt will demonstrate improved delayed recall by scoring at at least 14/15 on MIS portion of MoCA

## 2022-07-25 ENCOUNTER — HOSPITAL ENCOUNTER (OUTPATIENT)
Dept: RADIOLOGY | Facility: HOSPITAL | Age: 82
Discharge: HOME | End: 2022-07-25
Attending: INTERNAL MEDICINE
Payer: MEDICARE

## 2022-07-25 DIAGNOSIS — Z78.0 ASYMPTOMATIC MENOPAUSAL STATE: ICD-10-CM

## 2022-07-25 PROCEDURE — 77080 DXA BONE DENSITY AXIAL: CPT

## 2022-07-26 ENCOUNTER — OFFICE VISIT (OUTPATIENT)
Dept: OCCUPATIONAL THERAPY | Facility: CLINIC | Age: 82
End: 2022-07-26
Payer: MEDICARE

## 2022-07-26 ENCOUNTER — OFFICE VISIT (OUTPATIENT)
Dept: PHYSICAL THERAPY | Facility: CLINIC | Age: 82
End: 2022-07-26
Payer: MEDICARE

## 2022-07-26 DIAGNOSIS — I63.9 CEREBROVASCULAR ACCIDENT (CVA), UNSPECIFIED MECHANISM (HCC): ICD-10-CM

## 2022-07-26 DIAGNOSIS — R26.89 BALANCE DISORDER: ICD-10-CM

## 2022-07-26 DIAGNOSIS — R26.89 OTHER ABNORMALITIES OF GAIT AND MOBILITY: Primary | ICD-10-CM

## 2022-07-26 DIAGNOSIS — I63.9 CEREBROVASCULAR ACCIDENT (CVA), UNSPECIFIED MECHANISM (HCC): Primary | ICD-10-CM

## 2022-07-26 PROCEDURE — 97112 NEUROMUSCULAR REEDUCATION: CPT

## 2022-07-26 PROCEDURE — 97530 THERAPEUTIC ACTIVITIES: CPT

## 2022-07-26 PROCEDURE — 97112 NEUROMUSCULAR REEDUCATION: CPT | Performed by: PHYSICAL THERAPIST

## 2022-07-26 NOTE — PROGRESS NOTES
Daily Note     Today's date: 2022  Patient name: Evaristo Sheehan  : 1940  MRN: [id number]  Referring provider: Tami Christian  Dx:   Encounter Diagnosis   Name Primary?  Cerebrovascular accident (CVA), unspecified mechanism ([facility] [address] ) Yes                  PLAN OF CARE END: 22  FREQUENCY: 2x/wk  NO AUTH REQUIRED  PRECAUTIONS: falls- walk to front, vision impairment d/t macular degeneration    Subjective: "I've been forcing myself to squeeze harder when I hold things in my R hand because I've noticed I drop some things"    Objective: See treatment below  NMRE:  -Simulated screw board with use of R UE with focus on rotation of items within finger tips, [address], and pincer grasp  Completes with mildly increased time    Therapeutic Activities:  -11 item recall with use of internal memory strategies to increase recall  Immediate recall:    15 minute delayed recall:     -Dual tasking stringing clips and completing mental manipulation 4 item recall activity  Initiated with pt sequencing clips in 6 color pattern, however downgraded to any sequence 2* inability to dual task two activities together  Pt able to recall ~80% of items, however SIGNIFICANT difficulty with mental manipulation portion, with <25% accuracy  Also presents with significant difficulty with dual tasking, often stopping or slowing stringing of clips significantly    Assessment: Tolerated treatment well  Demonstrating significant difficulty with dual tasking, and mental manipulation of information  Pt would benefit from continued OT services to address NMRE R UE and cognitive deficits  Plan: Continued skilled OT per POC  GOALS TO BE ADDED :  -Pt will demonstrate improved functional cognition by scoring WNL on MoCA (modified for vision deficits)  -Pt will demonstrate improved delayed recall by scoring at at least 14/15 on MIS portion of MoCA

## 2022-07-26 NOTE — PROGRESS NOTES
Daily Note     Insurance:  AMA/CMS Eval/ Re-eval POC expires Curtis Morochoright #/ Referral # Total   Visits  Start date  Expiration date Extension  Visit limitation? PT only or  PT+OT? Co-Insurance   Medicare  CMS 5-18-22 8-10-22 8-10 N/A N/A N/A N/A N/A No PT/OT Yes and $0 Co-pay    6-29-22 8-10-22                                                          Today's date: 2022  Patient name: Carri Mc  : 1940  MRN: [id number]  Referring provider: Renata Reddy  Dx:   Encounter Diagnosis     ICD-10-CM    1  Other abnormalities of gait and mobility  R26 89    2  Cerebrovascular accident (CVA), unspecified mechanism ([facility] [address] )  I63 9    3  Balance disorder  R26 89                   Subjective: Patient reports no new changes, complaints, or falls since last session, arrives from OT      Objective: See treatment diary below    Vitals  - BP: 154/84 mmHg      NMR:  - STS to fatigue: 25 reps, 0 UE, RPE: 14/20  - Step up on foam w/ high knee drive: 08P  - Marching w/ opposite hand to knee: 2 laps x 50 ft w/ 4# ankle weights  - Resisted FWD ambulation: 50 ft x 5 w/ 4# ankle weights  - Resisted BWD ambulation: 30 ft  5 w/ 4# ankle weights          Assessment: Patient able to tolerate treatment session well today with continued focus on dynamic balance and endurance tasks  With addition of foam beam to high knee drive, patient displays increased use of posterior stepping reaction to recover from posterior LOB  During resisted ambulation, patient occasionally crosses midline when stepping resulting in lateral LOB that required PT assist  Frequent seated rest breaks required to prevent excess fatigue  She will continue to benefit from skilled outpatient PT in order to maximize her function and reduce her risk for falls following CVA  Plan: Continue per plan of care         Precautions: Patient on Beta Blockers      Outcome Measures Initial Eval  22 PN  22       5xSTS 16 58 sec,   0 UE 17 20,  0 UE TUG  - Regular  - Cognitive   15 63 sec  16 81 sec (stopped counting)   12 92 sec  15 62 sec (completed counting)       10 meter 0 92 m/s 0 99 m/s       FGA 16/30 18/30       DGI 15/24 17/24       MiniBEST 14/28 17/28       PASS NT NT       6MWT 900 ft 925 ft       CB&M NT NT

## 2022-07-29 ENCOUNTER — APPOINTMENT (OUTPATIENT)
Dept: PHYSICAL THERAPY | Facility: CLINIC | Age: 82
End: 2022-07-29
Payer: MEDICARE

## 2022-07-29 ENCOUNTER — APPOINTMENT (OUTPATIENT)
Dept: OCCUPATIONAL THERAPY | Facility: CLINIC | Age: 82
End: 2022-07-29
Payer: MEDICARE

## 2022-08-01 DIAGNOSIS — D50.0 IRON DEFICIENCY ANEMIA DUE TO CHRONIC BLOOD LOSS: ICD-10-CM

## 2022-08-01 RX ORDER — FERROUS SULFATE TAB EC 324 MG (65 MG FE EQUIVALENT) 324 (65 FE) MG
324 TABLET DELAYED RESPONSE ORAL
Qty: 180 TABLET | Refills: 1 | Status: ON HOLD | OUTPATIENT
Start: 2022-08-01 | End: 2022-08-14 | Stop reason: SDUPTHER

## 2022-08-02 ENCOUNTER — OFFICE VISIT (OUTPATIENT)
Dept: PHYSICAL THERAPY | Facility: CLINIC | Age: 82
End: 2022-08-02
Payer: MEDICARE

## 2022-08-02 ENCOUNTER — EVALUATION (OUTPATIENT)
Dept: OCCUPATIONAL THERAPY | Facility: CLINIC | Age: 82
End: 2022-08-02
Payer: MEDICARE

## 2022-08-02 DIAGNOSIS — R26.89 BALANCE DISORDER: ICD-10-CM

## 2022-08-02 DIAGNOSIS — I63.9 CEREBROVASCULAR ACCIDENT (CVA), UNSPECIFIED MECHANISM (HCC): ICD-10-CM

## 2022-08-02 DIAGNOSIS — R26.89 OTHER ABNORMALITIES OF GAIT AND MOBILITY: Primary | ICD-10-CM

## 2022-08-02 DIAGNOSIS — I63.9 CEREBROVASCULAR ACCIDENT (CVA), UNSPECIFIED MECHANISM (HCC): Primary | ICD-10-CM

## 2022-08-02 PROCEDURE — 97530 THERAPEUTIC ACTIVITIES: CPT | Performed by: OCCUPATIONAL THERAPIST

## 2022-08-02 PROCEDURE — 97112 NEUROMUSCULAR REEDUCATION: CPT

## 2022-08-02 NOTE — PROGRESS NOTES
OCCUPATIONAL THERAPY RE-EVALUATION    Today's Date: 2022  Patient Name: Deena Thompson  : 1940  MRN: [id number]  Referring Provider: Marlin Quevedo  Dx: I63 9 Cerebrovascular accident    SKILLED ANALYSIS:  Pt seen for OT re-evaluation after almost 3 months of OP OT services s/p CVA focusing on RUE function and cognition  Pt reports improved R hand function and cognitive function since [facility]  Today pt's MoCA score improved by 1 point compared to last re-eval, and remains in the mild impairment range  Her R hand function did not change significantly based on the Functional Dexterity Test  Based on results of re-evaluation plan for d/c next session as pt is going on vacation  Educated on option of transitioning to maintenance therapy for cognition to prevent decline  She will consider this and return if desired  Pt provided with list of apps and websites that she can use for cognitive activities  PLAN OF CARE START: 22  PLAN OF CARE END: 22  FREQUENCY: 2x/wk  NO AUTH REQUIRED  PRECAUTIONS: falls- walk to front, vision impairment d/t macular degeneration      Subjective "I think everything has improved since I started coming here "  Pt reports improved R hand function, however continues to have difficulty grading grasp when holding items and drops things at times  She reports that she feels her memory is at baseline  Mechanism of Injury  Presented with speech difficulties, right-sided weakness, headache started 9:00 a m  On day of presentation (3/11/22), self resolved in 30 minutes  NIH stroke scale 0 on presentation  Patient again had a recurrence of symptoms around 3:30 p m  on 3/11 and again improved gradually  CT head and neck without any acute ischemia/hemorrhage  Evidence of old lacunar infarct  No evidence of vascular abnormality    Not a candidate for tPA due to improvement in symptoms and anticoagulation  MRI of the brain confirmed-left lacunar infarct involving left basal ganglia, extending into the periventricular white matter of the left parietal lobe, adjacent to the posterior aspect of the left lateral ventricle  Repeat CT scan of head 3/13 due to headache-reveals evolving  changes related to CVA  No evidence of hemorrhage    Occupational Profile  Pt lives alone in a 2 floor home with 2 steps to enter  She has railings on her stairs, grab bars in her tub and stands to bathe, rails around toilet (no RTS)  She has a neighbor that lives across the street who drives her to most of her appointments, and another neighbor that assists with things like taking the garbage out and getting her mail if needed  She raised 5 children, and 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  She enjoys using her iPad to play games and do paint by numbers, watching TV, going for walks with her friends (she doesn't go alone d/t vision deficits), and going to the store with her friends  Pt states that she uses her phone to take pictures of things like the stove and buttons on her washing machine and enlarges them in order to compensate for visual deficits  PATIENT GOAL: "My balance is kind of julianna  I know there are other things I can do [to improve my balance]  "  - Pt states that she would like to start working on her memory  HISTORY OF PRESENT ILLNESS:     Pt is a 80 y o  female who was referred to Occupational Therapy s/p CVA      PMH:   Past Medical History:   Diagnosis Date    Stroke ([facility] [address] ) 2022       Past Surgical Hx:   Past Surgical History:   Procedure Laterality Date    EYE SURGERY      HYSTERECTOMY      MITRAL VALVE REPLACEMENT          Pain Levels:      Restin    With Activity:  0      Objective  9 HOLE PEG TEST:   Attempted, however results were not a good indicator of [facility] d/t vision deficits impacting ability to locate holes in board    Functional Dexterity Test for in-hand manipulation  R UE: 48 1 seconds and 1 use of board (previosuly 47 9 seconds, 1 drop, IE 1:00)  L UE: 38 5 seconds and 1 drop (previously 37 3 seconds)     Further Observations in UE Assessment    Subluxation: none    No scapular winging noted    No spasticity Noted     PROPRIOCEPTION: RUE very mild impairment, LUE intact    SENSORY TESTING: Reports no changes in sensation      Impairments Section:  UE Strength:              CATA: RUE: 41 3/200 LUE: 40/200  The age norm is approximately 42 lbs for R and 37 lbs for L indicating  strength WNL                              Range of Motion:  AROM:   RUE- pt reports she was told that she needs a shoulder replacement but is not a candidate for surgery  - shoulder flexion ~120*  - shoulder adbuction ~90*  - IR WNL  - ER limited  - elbow flexion WNL  - elbow extension WNL  - wrist flexion WNL  - wrist extension WNL    LUE  - shoulder flexion WFL  - shoulder adbuction WFL  - IR WNL  - ER limited  - elbow flexion WNL  - elbow extension WNL  - wrist flexion WNL  - wrist extension WNL     MMT:  R UE:   -  shoulder flexion 3-/5  - elbow flexion 5/5  -  elbow extension 5/5  -  wrist flexion 5/5  -  wrist extension 5/5     L UE 5/5 in all pivots     Pt is left hand dominant    Ryan Cognitive Assessment Version 8 2 (MoCA V8 2)  Visuospatial/executive functionin/5  Naming:  3/3  Memory: 1st trial:  3/5, 2nd trial:  5/5  Attention/concentration: 2/2  List of letters:   Serial Seven Subtraction:  2/3 w/ 3 errors  Language/sentence repetition:  1/2  Language Fluency:    Abstract/Correlational Thinkin/2  Delayed Recall:  0/5  Orientation:     Memory Index Score: 9/15  MoCA V1 8 2 Raw Score:  22+1=23/30, MIS:  9/15, indicative of mild neurocognitive impairments  GOALS:   Short Term Goals:  Pt will increase rate of manipulation for the functional dexterity test by 4 seconds in order to improve [address] for ADLs/IADLs in 4 weeks   ACHIEVED   Pt will increase proprioception of RUE hand to target to mild impairment for improved functional reach vision occluded with ADL tasks 4 weeks ACHIEVED 7/1  Pt will demo with G carryover of Home Exercise Program to improve functional progression towards goals in Plan of care and for improved functional use of RUE 4 weeks ACHIEVED 7/1    Long Term Goals: 8-12 weeks  Pt will increase rate of manipulation for the functional dexterity test by 8 seconds in order to improve [facility] for ADLs/IADLs  ACHIEVED 7/1  Pt will increase proprioception of RUE hand to target to mild impairment with vision occluded (accurate 4/5 trials) for improved functional reach during ADLs/IADLs  PROGRESSING- reassess  Pt will demo with 90% carryover of Home Exercise Program to improve functional progression towards goals in Plan of care and for improved functional use of RUE 4 weeks ACHIEVED  Pt will demonstrate improved functional cognition by scoring WNL on MoCA (modified for vision deficits) NOT ACHIEVED  Pt will demonstrate improved delayed recall by scoring at at least 14/15 on MIS portion of MoCA   NOT ACHIEVED

## 2022-08-02 NOTE — PROGRESS NOTES
Daily Note     Insurance:  AMA/CMS Eval/ Re-eval POC expires Kiko Ramirez #/ Referral # Total   Visits  Start date  Expiration date Extension  Visit limitation? PT only or  PT+OT? Co-Insurance   Medicare  CMS 5-18-22 8-10-22 8-10 N/A N/A N/A N/A N/A No PT/OT Yes and $0 Co-pay    6-29-22 8-10-22                                                          Today's date: 2022  Patient name: Mohini Crespo  : 1940  MRN: [id number]  Referring provider: Pretty Santoyo  Dx:   Encounter Diagnosis     ICD-10-CM    1  Other abnormalities of gait and mobility  R26 89    2  Cerebrovascular accident (CVA), unspecified mechanism ([facility] [address] )  I63 9    3  Balance disorder  R26         Start Time: 845  Stop Time: 930  Total time in clinic (min): 45 minutes    Subjective: Patient reports no new changes, complaints, or falls since last session, arrives from OT      Objective: See treatment diary below    Vitals  - BP: 140/70 mmHG seated L UE        NMR:  - STS to fatigue: 20 reps, 0 UE, RPE: 14/20 limited by L knee pain  - Step from foam to  " step w/ high knee drive: 20C  - Marching w/ opposite hand to knee: 2 laps x 50 ft w/ 4# ankle weights  - Resisted FWD ambulation: 50 ft x 5 w/ 4# ankle weights  - Resisted BWD ambulation: 30 ft  5 w/ 4# ankle weights      NV:  Assess turning in gait stability/reassess  Assessment: Patient able to tolerate treatment session well today with continued focus on dynamic balance and endurance tasks  Continued foam beam to high knee drive, patient displays increased use of posterior stepping reaction to recover from posterior LOB  During resisted ambulation, patient occasionally crosses midline when stepping resulting in lateral LOB that required PT assist  Frequent seated rest breaks required to prevent excess fatigue  She will continue to benefit from skilled outpatient PT in order to maximize her function and reduce her risk for falls following CVA        Plan: Continue per plan of care  reassess NV         Precautions: Patient on Beta Blockers      Outcome Measures Initial Eval  5-18-22 PN  6-29-22       5xSTS 16 58 sec,   0 UE 17 20,  0 UE       TUG  - Regular  - Cognitive   15 63 sec  16 81 sec (stopped counting)   12 92 sec  15 62 sec (completed counting)       10 meter 0 92 m/s 0 99 m/s       FGA 16/30 18/30       DGI 15/24 17/24       MiniBEST 14/28 17/28       PASS NT NT       6MWT 900 ft 925 ft       CB&M NT NT

## 2022-08-05 ENCOUNTER — OFFICE VISIT (OUTPATIENT)
Dept: OCCUPATIONAL THERAPY | Facility: CLINIC | Age: 82
End: 2022-08-05
Payer: MEDICARE

## 2022-08-05 ENCOUNTER — EVALUATION (OUTPATIENT)
Dept: PHYSICAL THERAPY | Facility: CLINIC | Age: 82
End: 2022-08-05
Payer: MEDICARE

## 2022-08-05 DIAGNOSIS — E11.9 TYPE 2 DIABETES MELLITUS WITHOUT COMPLICATION, WITHOUT LONG-TERM CURRENT USE OF INSULIN (HCC): ICD-10-CM

## 2022-08-05 DIAGNOSIS — R26.89 BALANCE DISORDER: ICD-10-CM

## 2022-08-05 DIAGNOSIS — R26.89 OTHER ABNORMALITIES OF GAIT AND MOBILITY: Primary | ICD-10-CM

## 2022-08-05 DIAGNOSIS — I63.9 CEREBROVASCULAR ACCIDENT (CVA), UNSPECIFIED MECHANISM (HCC): ICD-10-CM

## 2022-08-05 DIAGNOSIS — I63.9 CEREBROVASCULAR ACCIDENT (CVA), UNSPECIFIED MECHANISM (HCC): Primary | ICD-10-CM

## 2022-08-05 PROCEDURE — 97530 THERAPEUTIC ACTIVITIES: CPT | Performed by: PHYSICAL THERAPIST

## 2022-08-05 PROCEDURE — 97112 NEUROMUSCULAR REEDUCATION: CPT | Performed by: OCCUPATIONAL THERAPIST

## 2022-08-05 PROCEDURE — 97530 THERAPEUTIC ACTIVITIES: CPT | Performed by: OCCUPATIONAL THERAPIST

## 2022-08-05 PROCEDURE — 97530 THERAPEUTIC ACTIVITIES: CPT

## 2022-08-05 NOTE — PROGRESS NOTES
PT Re-Evaluation          Insurance:  AMA/CMS Eval/ Re-eval POC expires Willa Mahan #/ Referral # Total   Visits  Start date  Expiration date Extension  Visit limitation? PT only or  PT+OT? Co-Insurance   Medicare  CMS 5-18-22 8-10-22 8-10 N/A N/A N/A N/A N/A No PT/OT Yes and $0 Co-pay                                                                    Today's date: 2022  Patient name: Karthikeyan Rajput  : 1940  MRN: [id number]  Referring provider: Edith Ochoa  Dx:   Encounter Diagnosis     ICD-10-CM    1  Other abnormalities of gait and mobility  R26 89    2  Cerebrovascular accident (CVA), unspecified mechanism ([facility] [address] )  I63 9    3  Balance disorder  R26 89          Assessment  Assessment details: Patient is a 80 y o  Female who has been reporting to skilled outpatient PT for deficits in LE strength, gait, balance, and endurance following a L CVA she experienced 3 months ago impacting her ability to perform ADLs and ambulation safely  She has demonstrated improvements in these areas as indicated by scores associated with 5xSTS, TUG, TUG cognitive, miniBEST, FGA, DGI, 10 meter, and 6MWT with 3/7 safety measures indicating low risk for falls  Patient remains with significant difficulty with dynamic tasks, ambulation with head turns, and 180/360 degree turns resulting in lateral trunk sway ans staggering requiring frequent UE support or self correction  Patient attributes LoB to vision deficits in which she has been in an ongoing trial for 2 years  Patient to be placed on hold for 1 month as patient is going on vacation and to assess maintenance of scores with gap in therapy with plan to re-evaluate in 1 month           Impairments: Abnormal coordination, Abnormal gait, Abnormal muscle tone, Abnormal or restricted ROM, Activity intolerance, Impaired balance, Impaired physical strength, Lacks appropriate HEP, Poor posture, Poor body mechanics, Safety issue, Weight-bearing intolerance, Abnormal movement and Abnormal muscle firing  Understanding of Dx/Px/POC: Good  Prognosis: Good      Patient verbalized understanding of POC  Please contact me if you have any questions or recommendations  Thank you for the referral and the opportunity to share in [address] care          Plan  Plan details: HIGT, high level balance exercises  Patient would benefit from: PT Eval and Skilled PT  Planned modality interventions: Biofeedback, Cryotherapy, TENS and Thermotherapy: Hydrocollator Packs  Planned therapy interventions: Abdominal trunk stabilization, ADL training, Balance, Balance/WB training, Breathing training, Body mechanics training, Coordination, Functional ROM exercises, Gait training, HEP, Joint mobilization, Manual therapy, Pandey taping, Motor coordination training, Neuromuscular re-education, Patient education, Postural training, Strengthening, Stretching, Therapeutic activities, Therapeutic exercises, Therapeutic training and Activity modification  Frequency: 2x/wk  Duration in weeks: 12  Plan of Care beginning date: 5-18-22  Plan of Care expiration date: 12 weeks - 8-10-22  Treatment plan discussed with: Patient         Goals  Short Term Goals (4 weeks):    - Patient will improve time on TUG by 2 9 seconds to facilitate improved safety in all ambulation - MET  - Patient will be independent in basic HEP 2-3 weeks - MET  - Patient will improve 5xSTS score by 2 3 seconds to promote improved LE functional strength needed for ADLs - MET  - Patient will complete components of CB&M to promote agility necessary for sports related tasks - N/A    Long Term Goals (12 weeks):  - Patient will be independent in a comprehensive home exercise program - PARTIALLY MET  - Patient will improve gait speed by 0 18 m/s to improve safety with community ambulation - MET  - Patient will improve time on TUG cognitive to be < 10% difference between TUG in order to reduce her risk for falls - MET  - Patient will improve scoring on FGA by 4 points to progress safety with dynamic tasks - NOT MET  - Patient will be able to demonstrate HT in gait without veering - NOT MET  - Patient will improve 6 Minute Walk Test score by 190 feet to promote improved cardiovascular endurance - NOT MET (progressing well towards)  - Patient will report 50% reduction in near falls in order to improve safety with functional tasks and reduce his risk for falls - MET  - Patient will report going on walks at least 3 days per week to promote independence and improved cardiovascular endurance - MET  - Patient will be able to ascend/descend stairs reciprocally without UE assist to promote independence and safety with ADLs - MET  - Patient will report 50% reduction in near falls when ambulating on uneven terrain - MET      Cut off score    All date taken from APTA Neuro Section or Rehab Measures      Monsalve:  Miguel et al , 2018  Willis Ly [address]: 2 7 pts    Emil , 2011  Cut-off score: 45/56    Chronic CVA  < 44/56 high risk for falls (Miguel et al , 2018)  < 47 5/56 slow walker status (Cayetano andrade al , 2011) 5xSTS: Farrah andrade al 2010  Willis Herjoe [address]: 2 3 sec  Age Norms:  60-69: 11 1 sec  70-79: 12 6 sec  80-89: 14 8 sec    Farrah 2010, Chronic Stroke  Chronic CVA: 12 sec   TUG  Gina , 2005  MDC: 2 9 sec    Cut off score:  >13 5 sec community dwelling adults  >32 2 frail elderly  <20 I for basic transfers  >30 dependent on transfers 10 Meter Walk Test:   Ulisses Fraction al , 2006  Small meaningful change: 0 06 m/s  Substantial meaningful change: 0 14 m/s  MCID: 0 16 m/s    < 0 4 m/s household ambulators  0 4 - 0 8 m/s limited community ambulators  > 0 8 m/s community ambulators   FGA: Claudia et al , 2010  MCID: 4 2 pts  Geriatrics/community < 22/30 fall risk  Geriatrics/community < 20/30 unexplained falls DGI  MDC: vestibular - 4 pts  MDC: geriatric/community - 3 pts  Falls risk <19/24   6 Minute Walk Test  [place] et al , 2006  MDC: 60 98 m (200 01 ft)    Lazarus Otter, Eng, & Cut off, 2012  MCID: 34 4 m    Age Norms  60-69: M - 1876 ft   F - 1765 ft  70-79: M - 1729 ft   F - 1545 ft  80-89: M - 1368 ft   F - 1286 ft Modified Laina  0: No increase in tone  1: Catch and release or min resistance at end range  1+: Catch f/b min resistance throughout remainder (< half ROM)  2: Easily moved, but more marked tone throughout most ROM  3: Significant tone, PROM difficult  4: Rigid   MiniBest: Alla et al , 2013  CVA < 17 5 fall risk Pass (Acute CVA)  MDC: 1 8 points (acute), 3 2 points (chronic)         Subjective    History of Present Illness  Mechanism of injury: Patient is an 81 y/o female who had a 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 that has mostly recovered since  She was at [facility] for 3 days and then was transferred to [facility] in [place] for 2 weeks  Update (2022): Patient reports she feels like she has been improving a lot and is feeling really steady  She stated that she has only been using her Rollator at home when she gets up in the middle of the night  Update (2022): Patient reports that she feels back to baseline and overall has only had 1 LoB and controlled fall as she leaned on her Rollator which wasn't locked earlier this week  She stated overall she has been feeling really good without complications      Primary AD: Rollator  Assist level at home: Independent  WC usage: No  PLOF: Independent    Pain  Current pain ratin/10  At best pain ratin/10  At worst pain ratin/10  Location: N/A  Aggravating factors: N/A    Social Support  Steps to enter house: Yes, HR  Stairs in house: Yes, 2 HR   Lives in: Multi-level home  Lives with: Alone    Employment status: Retired  Hand dominance: R    Treatments  Previous treatment: PT/OT  Current treatment: PT/OT  Diagnostic Testing: CT, MRI      Objective     Vitals  - HR: 98 bpm  - BP: 120/70 mmHg  - SPO2: 97%    HR Max  - 208 - (0 7x82)  = 150 6 bpm    LE MMT  - R Hip Flexion: 4-/5  - L Hip Flexion: 4/5  - R Hip Extension: 4-/5 - L Hip Extension: 4/5  - R Hip Abduction: 4-/5 - L Hip abduction: 4/5  - R Hip adduction: 4-/5 - L Hip adduction: 4+/5  - R Knee Extension: 5/5 - L Knee Extension: 5/5  - R Knee Flexion: 4/5  - L Knee Flexion: 4+/5  - R Ankle DF: 4/5  - L Ankle DF: 4/5  - R Ankle PF: 4/5  - L Ankle PF: 4/5    Sensation  - Light touch:  Intact  - Deep pressure: Intact    Coordination  - Dysmetria: Intact  - Dysdiadochokinesia: Intact    Modified Laina  - R knee extension: 0 - no increase in tone  - L knee extension: 0 - no increase in tone  - R knee flexion: 0 - no increase in tone  - L knee flexion: 0 - no increase in tone  - R ankle DF: 0 - no increase in tone   - L ankle DF: 0 - no increase in tone      Outcome Measures Initial Eval  5-18-22 PN  6-29-22 RE  8-5-22      5xSTS 16 58 sec,   0 UE 17 20,  0 UE 13 91 sec,   0 UE      TUG  - Regular  - Cognitive   15 63 sec  16 81 sec (stopped counting)   12 92 sec  15 62 sec (completed counting)   12 20 sec  14 21 sec (completed counting)      10 meter 0 92 m/s 0 99 m/s 1 11 m/s      FGA 16/30 18/30 19/30      DGI 15/24 17/24 17/24      MiniBEST 14/28 17/28 18/28      PASS NT NT NT      6MWT 900 ft 925 ft 950 ft      CB&M NT NT NT                    Precautions: L CVA  Past Medical History:   Diagnosis Date    Stroke ([facility] [address] ) 03/11/2022

## 2022-08-05 NOTE — PROGRESS NOTES
Daily Note     Today's date: 2022  Patient name: Tha Mcgovern  : 1940  MRN: [id number]  Referring provider: Cori Duncan  Dx:   Encounter Diagnosis   Name Primary?  [facility] Yes                  PLAN OF CARE END: 22  FREQUENCY: 2x/wk  NO AUTH REQUIRED  PRECAUTIONS: falls- walk to front, vision impairment d/t macular degeneration    Subjective:     Objective: See treatment below  NMRE:  - Multimatrix matching shape cubes to shape card manipulating 3 cubes in hand  Pt also required to find letter on each cube and name alternating food/animal, then place shape  Pt focused on [address], in hand manipulation, dexterity, divided attention, and visual perceptual skills  Pt required assistance with identifying shapes on dark colored cubes d/t decreased contrast sensitivity  Orlean Curls theraputty removing beads focusing on [address] and use of tactile compensatory strategies d/t vision impairment  Pt demonstrated good [address] and was able to remove 16/20 beads   - Double spot with math focusing on [address], divided attention, and problem solving skills  Pt demonstrated difficulty keeping track of mental math after ~11 pieces  Therapeutic Activity  - Math pyramids with simple math x3 focusing on problem solving skills, sustained attention, and working memory  Pt required larger print of pattern card secondary to macular degeneration  Assessment: Tolerated treatment well  Demonstrating good dual tasking and [address] however had difficulty with mental math  Pt discharging from OT services at this time  Pt achieved 5 goals in her POC  She continues to demonstrate mild cognitive impairments  Discussed progress with POC and potential for returning for OP OT for cognition or decline in RUE function  GOALS:   Short Term Goals:  Pt will increase rate of manipulation for the functional dexterity test by 4 seconds in order to improve [address] for ADLs/IADLs in 4 weeks  ACHIEVED 7/1  Pt will increase proprioception of RUE hand to target to mild impairment for improved functional reach vision occluded with ADL tasks 4 weeks ACHIEVED 7/1  Pt will demo with G carryover of Home Exercise Program to improve functional progression towards goals in Plan of care and for improved functional use of RUE 4 weeks ACHIEVED 7/1    Long Term Goals: 8-12 weeks  Pt will increase rate of manipulation for the functional dexterity test by 8 seconds in order to improve [facility] for ADLs/IADLs  ACHIEVED 7/1  Pt will increase proprioception of RUE hand to target to mild impairment with vision occluded (accurate 4/5 trials) for improved functional reach during ADLs/IADLs  NOT ACHIEVED  Pt will demo with 90% carryover of Home Exercise Program to improve functional progression towards goals in Plan of care and for improved functional use of RUE 4 weeks ACHIEVED  Pt will demonstrate improved functional cognition by scoring WNL on MoCA (modified for vision deficits) NOT ACHIEVED  Pt will demonstrate improved delayed recall by scoring at at least 14/15 on MIS portion of MoCA  NOT ACHIEVED    Plan: Continued skilled OT per POC  GOALS TO BE ADDED 5/18:  -Pt will demonstrate improved functional cognition by scoring WNL on MoCA (modified for vision deficits)  -Pt will demonstrate improved delayed recall by scoring at at least 14/15 on MIS portion of MoCA

## 2022-08-08 ENCOUNTER — RA CDI HCC (OUTPATIENT)
Dept: OTHER | Facility: HOSPITAL | Age: 82
End: 2022-08-08

## 2022-08-08 ENCOUNTER — LAB (OUTPATIENT)
Dept: LAB | Facility: CLINIC | Age: 82
DRG: 690 | End: 2022-08-08
Payer: MEDICARE

## 2022-08-08 DIAGNOSIS — I10 ESSENTIAL HYPERTENSION: ICD-10-CM

## 2022-08-08 DIAGNOSIS — D63.1 ANEMIA DUE TO STAGE 3 CHRONIC KIDNEY DISEASE, UNSPECIFIED WHETHER STAGE 3A OR 3B CKD (HCC): ICD-10-CM

## 2022-08-08 DIAGNOSIS — E03.8 OTHER SPECIFIED HYPOTHYROIDISM: ICD-10-CM

## 2022-08-08 DIAGNOSIS — D50.0 IRON DEFICIENCY ANEMIA DUE TO CHRONIC BLOOD LOSS: ICD-10-CM

## 2022-08-08 DIAGNOSIS — N18.30 ANEMIA DUE TO STAGE 3 CHRONIC KIDNEY DISEASE, UNSPECIFIED WHETHER STAGE 3A OR 3B CKD (HCC): ICD-10-CM

## 2022-08-08 DIAGNOSIS — Z79.01 LONG TERM (CURRENT) USE OF ANTICOAGULANTS: ICD-10-CM

## 2022-08-08 DIAGNOSIS — I48.20 CHRONIC ATRIAL FIBRILLATION (HCC): ICD-10-CM

## 2022-08-08 DIAGNOSIS — E11.9 TYPE 2 DIABETES MELLITUS WITHOUT COMPLICATION, WITHOUT LONG-TERM CURRENT USE OF INSULIN (HCC): ICD-10-CM

## 2022-08-08 LAB
ALBUMIN SERPL BCP-MCNC: 4 G/DL (ref 3.5–5)
ALP SERPL-CCNC: 78 U/L (ref 46–116)
ALT SERPL W P-5'-P-CCNC: 32 U/L (ref 12–78)
ANION GAP SERPL CALCULATED.3IONS-SCNC: 8 MMOL/L (ref 4–13)
AST SERPL W P-5'-P-CCNC: 32 U/L (ref 5–45)
BILIRUB SERPL-MCNC: 0.59 MG/DL (ref 0.2–1)
BUN SERPL-MCNC: 17 MG/DL (ref 5–25)
CALCIUM SERPL-MCNC: 9.3 MG/DL (ref 8.3–10.1)
CHLORIDE SERPL-SCNC: 109 MMOL/L (ref 96–108)
CO2 SERPL-SCNC: 25 MMOL/L (ref 21–32)
CREAT SERPL-MCNC: 0.65 MG/DL (ref 0.6–1.3)
ERYTHROCYTE [DISTWIDTH] IN BLOOD BY AUTOMATED COUNT: 20.6 % (ref 11.6–15.1)
FERRITIN SERPL-MCNC: 8 NG/ML (ref 8–388)
GFR SERPL CREATININE-BSD FRML MDRD: 82 ML/MIN/1.73SQ M
GLUCOSE P FAST SERPL-MCNC: 126 MG/DL (ref 65–99)
HCT VFR BLD AUTO: 33.5 % (ref 34.8–46.1)
HGB BLD-MCNC: 9.5 G/DL (ref 11.5–15.4)
LDH SERPL-CCNC: 299 U/L (ref 81–234)
MCH RBC QN AUTO: 23.5 PG (ref 26.8–34.3)
MCHC RBC AUTO-ENTMCNC: 28.4 G/DL (ref 31.4–37.4)
MCV RBC AUTO: 83 FL (ref 82–98)
PLATELET # BLD AUTO: 231 THOUSANDS/UL (ref 149–390)
PMV BLD AUTO: 10.9 FL (ref 8.9–12.7)
POTASSIUM SERPL-SCNC: 4.2 MMOL/L (ref 3.5–5.3)
PROT SERPL-MCNC: 7.5 G/DL (ref 6.4–8.4)
RBC # BLD AUTO: 4.04 MILLION/UL (ref 3.81–5.12)
SODIUM SERPL-SCNC: 142 MMOL/L (ref 135–147)
TSH SERPL DL<=0.05 MIU/L-ACNC: 1.49 UIU/ML (ref 0.45–4.5)
WBC # BLD AUTO: 4.99 THOUSAND/UL (ref 4.31–10.16)

## 2022-08-08 PROCEDURE — 84443 ASSAY THYROID STIM HORMONE: CPT

## 2022-08-08 PROCEDURE — 85027 COMPLETE CBC AUTOMATED: CPT

## 2022-08-08 PROCEDURE — 83615 LACTATE (LD) (LDH) ENZYME: CPT

## 2022-08-08 PROCEDURE — 80053 COMPREHEN METABOLIC PANEL: CPT

## 2022-08-08 PROCEDURE — 83010 ASSAY OF HAPTOGLOBIN QUANT: CPT

## 2022-08-08 PROCEDURE — 82728 ASSAY OF FERRITIN: CPT

## 2022-08-08 NOTE — PROGRESS NOTES
[facility] [address]  coding opportunities     E11 22     Chart Reviewed number of suggestions sent to Provider: 1     Patients Insurance     Medicare Insurance: [employer]

## 2022-08-09 ENCOUNTER — APPOINTMENT (OUTPATIENT)
Dept: OCCUPATIONAL THERAPY | Facility: CLINIC | Age: 82
End: 2022-08-09
Payer: MEDICARE

## 2022-08-09 ENCOUNTER — APPOINTMENT (OUTPATIENT)
Dept: PHYSICAL THERAPY | Facility: CLINIC | Age: 82
End: 2022-08-09
Payer: MEDICARE

## 2022-08-09 LAB — HAPTOGLOB SERPL-MCNC: <10 MG/DL (ref 41–333)

## 2022-08-11 ENCOUNTER — TELEPHONE (OUTPATIENT)
Dept: HEMATOLOGY ONCOLOGY | Facility: CLINIC | Age: 82
End: 2022-08-11

## 2022-08-11 ENCOUNTER — APPOINTMENT (EMERGENCY)
Dept: RADIOLOGY | Facility: HOSPITAL | Age: 82
DRG: 690 | End: 2022-08-11
Payer: MEDICARE

## 2022-08-11 ENCOUNTER — HOSPITAL ENCOUNTER (INPATIENT)
Facility: HOSPITAL | Age: 82
LOS: 3 days | Discharge: HOME | DRG: 690 | End: 2022-08-14
Attending: EMERGENCY MEDICINE | Admitting: INTERNAL MEDICINE
Payer: MEDICARE

## 2022-08-11 ENCOUNTER — TELEPHONE (OUTPATIENT)
Dept: INTERNAL MEDICINE CLINIC | Facility: CLINIC | Age: 82
End: 2022-08-11

## 2022-08-11 DIAGNOSIS — K59.00 CONSTIPATION: ICD-10-CM

## 2022-08-11 DIAGNOSIS — R00.1 BRADYCARDIA: ICD-10-CM

## 2022-08-11 DIAGNOSIS — D64.9 ANEMIA, UNSPECIFIED TYPE: ICD-10-CM

## 2022-08-11 DIAGNOSIS — N39.0 UTI (URINARY TRACT INFECTION): Primary | ICD-10-CM

## 2022-08-11 DIAGNOSIS — R53.1 GENERALIZED WEAKNESS: ICD-10-CM

## 2022-08-11 DIAGNOSIS — E11.9 TYPE 2 DIABETES MELLITUS WITHOUT COMPLICATION, WITHOUT LONG-TERM CURRENT USE OF INSULIN (HCC): ICD-10-CM

## 2022-08-11 DIAGNOSIS — K21.9 GERD (GASTROESOPHAGEAL REFLUX DISEASE): ICD-10-CM

## 2022-08-11 DIAGNOSIS — D50.0 IRON DEFICIENCY ANEMIA DUE TO CHRONIC BLOOD LOSS: ICD-10-CM

## 2022-08-11 DIAGNOSIS — Z95.2 H/O MITRAL VALVE REPLACEMENT WITH MECHANICAL VALVE: ICD-10-CM

## 2022-08-11 LAB
2HR DELTA HS TROPONIN: -1 NG/L
ABO GROUP BLD: NORMAL
ALBUMIN SERPL BCP-MCNC: 4.1 G/DL (ref 3.5–5)
ALP SERPL-CCNC: 76 U/L (ref 46–116)
ALT SERPL W P-5'-P-CCNC: 33 U/L (ref 12–78)
ANION GAP SERPL CALCULATED.3IONS-SCNC: 9 MMOL/L (ref 4–13)
APTT PPP: 36 SECONDS (ref 23–37)
AST SERPL W P-5'-P-CCNC: 28 U/L (ref 5–45)
BACTERIA UR QL AUTO: ABNORMAL /HPF
BASOPHILS # BLD AUTO: 0.04 THOUSANDS/ΜL (ref 0–0.1)
BASOPHILS NFR BLD AUTO: 1 % (ref 0–1)
BILIRUB SERPL-MCNC: 0.54 MG/DL (ref 0.2–1)
BILIRUB UR QL STRIP: NEGATIVE
BLD GP AB SCN SERPL QL: NEGATIVE
BUN SERPL-MCNC: 19 MG/DL (ref 5–25)
CALCIUM SERPL-MCNC: 8.9 MG/DL (ref 8.3–10.1)
CARDIAC TROPONIN I PNL SERPL HS: 4 NG/L
CARDIAC TROPONIN I PNL SERPL HS: 5 NG/L
CHLORIDE SERPL-SCNC: 104 MMOL/L (ref 96–108)
CLARITY UR: ABNORMAL
CO2 SERPL-SCNC: 28 MMOL/L (ref 21–32)
COLOR UR: YELLOW
CREAT SERPL-MCNC: 0.67 MG/DL (ref 0.6–1.3)
DIGOXIN SERPL-MCNC: 0.8 NG/ML (ref 0.8–2)
EOSINOPHIL # BLD AUTO: 0.15 THOUSAND/ΜL (ref 0–0.61)
EOSINOPHIL NFR BLD AUTO: 3 % (ref 0–6)
ERYTHROCYTE [DISTWIDTH] IN BLOOD BY AUTOMATED COUNT: 20.8 % (ref 11.6–15.1)
GFR SERPL CREATININE-BSD FRML MDRD: 82 ML/MIN/1.73SQ M
GLUCOSE SERPL-MCNC: 122 MG/DL (ref 65–140)
GLUCOSE SERPL-MCNC: 172 MG/DL (ref 65–140)
GLUCOSE SERPL-MCNC: 86 MG/DL (ref 65–140)
GLUCOSE UR STRIP-MCNC: NEGATIVE MG/DL
HCT VFR BLD AUTO: 34.6 % (ref 34.8–46.1)
HGB BLD-MCNC: 10.1 G/DL (ref 11.5–15.4)
HGB UR QL STRIP.AUTO: ABNORMAL
IMM GRANULOCYTES # BLD AUTO: 0.02 THOUSAND/UL (ref 0–0.2)
IMM GRANULOCYTES NFR BLD AUTO: 0 % (ref 0–2)
INR PPP: 2.55 (ref 0.84–1.19)
KETONES UR STRIP-MCNC: NEGATIVE MG/DL
LEUKOCYTE ESTERASE UR QL STRIP: ABNORMAL
LIPASE SERPL-CCNC: 212 U/L (ref 73–393)
LYMPHOCYTES # BLD AUTO: 1.32 THOUSANDS/ΜL (ref 0.6–4.47)
LYMPHOCYTES NFR BLD AUTO: 25 % (ref 14–44)
MAGNESIUM SERPL-MCNC: 1.8 MG/DL (ref 1.6–2.6)
MCH RBC QN AUTO: 24.2 PG (ref 26.8–34.3)
MCHC RBC AUTO-ENTMCNC: 29.2 G/DL (ref 31.4–37.4)
MCV RBC AUTO: 83 FL (ref 82–98)
MONOCYTES # BLD AUTO: 0.5 THOUSAND/ΜL (ref 0.17–1.22)
MONOCYTES NFR BLD AUTO: 10 % (ref 4–12)
NEUTROPHILS # BLD AUTO: 3.26 THOUSANDS/ΜL (ref 1.85–7.62)
NEUTS SEG NFR BLD AUTO: 61 % (ref 43–75)
NITRITE UR QL STRIP: POSITIVE
NON-SQ EPI CELLS URNS QL MICRO: ABNORMAL /HPF
NRBC BLD AUTO-RTO: 0 /100 WBCS
NT-PROBNP SERPL-MCNC: 650 PG/ML
PH UR STRIP.AUTO: 6 [PH]
PLATELET # BLD AUTO: 242 THOUSANDS/UL (ref 149–390)
PMV BLD AUTO: 10.5 FL (ref 8.9–12.7)
POTASSIUM SERPL-SCNC: 3.8 MMOL/L (ref 3.5–5.3)
PROT SERPL-MCNC: 7.9 G/DL (ref 6.4–8.4)
PROT UR STRIP-MCNC: NEGATIVE MG/DL
PROTHROMBIN TIME: 27.5 SECONDS (ref 11.6–14.5)
RBC # BLD AUTO: 4.18 MILLION/UL (ref 3.81–5.12)
RBC #/AREA URNS AUTO: ABNORMAL /HPF
RH BLD: POSITIVE
SARS-COV-2 RNA RESP QL NAA+PROBE: NEGATIVE
SODIUM SERPL-SCNC: 141 MMOL/L (ref 135–147)
SP GR UR STRIP.AUTO: 1.01 (ref 1–1.03)
SPECIMEN EXPIRATION DATE: NORMAL
UROBILINOGEN UR QL STRIP.AUTO: 0.2 E.U./DL
WBC # BLD AUTO: 5.29 THOUSAND/UL (ref 4.31–10.16)
WBC #/AREA URNS AUTO: ABNORMAL /HPF

## 2022-08-11 PROCEDURE — 81001 URINALYSIS AUTO W/SCOPE: CPT | Performed by: EMERGENCY MEDICINE

## 2022-08-11 PROCEDURE — 71045 X-RAY EXAM CHEST 1 VIEW: CPT

## 2022-08-11 PROCEDURE — 93005 ELECTROCARDIOGRAM TRACING: CPT

## 2022-08-11 PROCEDURE — 36415 COLL VENOUS BLD VENIPUNCTURE: CPT | Performed by: EMERGENCY MEDICINE

## 2022-08-11 PROCEDURE — 87086 URINE CULTURE/COLONY COUNT: CPT | Performed by: EMERGENCY MEDICINE

## 2022-08-11 PROCEDURE — 85730 THROMBOPLASTIN TIME PARTIAL: CPT | Performed by: EMERGENCY MEDICINE

## 2022-08-11 PROCEDURE — 86900 BLOOD TYPING SEROLOGIC ABO: CPT | Performed by: EMERGENCY MEDICINE

## 2022-08-11 PROCEDURE — 80162 ASSAY OF DIGOXIN TOTAL: CPT | Performed by: EMERGENCY MEDICINE

## 2022-08-11 PROCEDURE — 99285 EMERGENCY DEPT VISIT HI MDM: CPT | Performed by: EMERGENCY MEDICINE

## 2022-08-11 PROCEDURE — 87186 SC STD MICRODIL/AGAR DIL: CPT | Performed by: EMERGENCY MEDICINE

## 2022-08-11 PROCEDURE — 84484 ASSAY OF TROPONIN QUANT: CPT | Performed by: EMERGENCY MEDICINE

## 2022-08-11 PROCEDURE — 82728 ASSAY OF FERRITIN: CPT | Performed by: INTERNAL MEDICINE

## 2022-08-11 PROCEDURE — U0003 INFECTIOUS AGENT DETECTION BY NUCLEIC ACID (DNA OR RNA); SEVERE ACUTE RESPIRATORY SYNDROME CORONAVIRUS 2 (SARS-COV-2) (CORONAVIRUS DISEASE [COVID-19]), AMPLIFIED PROBE TECHNIQUE, MAKING USE OF HIGH THROUGHPUT TECHNOLOGIES AS DESCRIBED BY CMS-2020-01-R: HCPCS | Performed by: EMERGENCY MEDICINE

## 2022-08-11 PROCEDURE — 80053 COMPREHEN METABOLIC PANEL: CPT | Performed by: EMERGENCY MEDICINE

## 2022-08-11 PROCEDURE — 85610 PROTHROMBIN TIME: CPT | Performed by: EMERGENCY MEDICINE

## 2022-08-11 PROCEDURE — 83880 ASSAY OF NATRIURETIC PEPTIDE: CPT | Performed by: EMERGENCY MEDICINE

## 2022-08-11 PROCEDURE — 82746 ASSAY OF FOLIC ACID SERUM: CPT | Performed by: INTERNAL MEDICINE

## 2022-08-11 PROCEDURE — 70450 CT HEAD/BRAIN W/O DYE: CPT

## 2022-08-11 PROCEDURE — 86901 BLOOD TYPING SEROLOGIC RH(D): CPT | Performed by: EMERGENCY MEDICINE

## 2022-08-11 PROCEDURE — U0005 INFEC AGEN DETEC AMPLI PROBE: HCPCS | Performed by: EMERGENCY MEDICINE

## 2022-08-11 PROCEDURE — 83735 ASSAY OF MAGNESIUM: CPT | Performed by: EMERGENCY MEDICINE

## 2022-08-11 PROCEDURE — 82948 REAGENT STRIP/BLOOD GLUCOSE: CPT

## 2022-08-11 PROCEDURE — 86850 RBC ANTIBODY SCREEN: CPT | Performed by: EMERGENCY MEDICINE

## 2022-08-11 PROCEDURE — 87077 CULTURE AEROBIC IDENTIFY: CPT | Performed by: EMERGENCY MEDICINE

## 2022-08-11 PROCEDURE — 83550 IRON BINDING TEST: CPT | Performed by: INTERNAL MEDICINE

## 2022-08-11 PROCEDURE — 96374 THER/PROPH/DIAG INJ IV PUSH: CPT

## 2022-08-11 PROCEDURE — 99223 1ST HOSP IP/OBS HIGH 75: CPT | Performed by: INTERNAL MEDICINE

## 2022-08-11 PROCEDURE — 83540 ASSAY OF IRON: CPT | Performed by: INTERNAL MEDICINE

## 2022-08-11 PROCEDURE — 85025 COMPLETE CBC W/AUTO DIFF WBC: CPT | Performed by: EMERGENCY MEDICINE

## 2022-08-11 PROCEDURE — 83690 ASSAY OF LIPASE: CPT | Performed by: EMERGENCY MEDICINE

## 2022-08-11 PROCEDURE — 99285 EMERGENCY DEPT VISIT HI MDM: CPT

## 2022-08-11 RX ORDER — CEFTRIAXONE 1 G/50ML
1000 INJECTION, SOLUTION INTRAVENOUS EVERY 24 HOURS
Status: DISCONTINUED | OUTPATIENT
Start: 2022-08-11 | End: 2022-08-14

## 2022-08-11 RX ORDER — FAMOTIDINE 20 MG/1
20 TABLET, FILM COATED ORAL 2 TIMES DAILY
Status: DISCONTINUED | OUTPATIENT
Start: 2022-08-11 | End: 2022-08-14 | Stop reason: HOSPADM

## 2022-08-11 RX ORDER — DOCUSATE SODIUM 100 MG/1
100 CAPSULE, LIQUID FILLED ORAL 2 TIMES DAILY
Status: DISCONTINUED | OUTPATIENT
Start: 2022-08-11 | End: 2022-08-14 | Stop reason: HOSPADM

## 2022-08-11 RX ORDER — ACETAMINOPHEN 325 MG/1
650 TABLET ORAL EVERY 6 HOURS PRN
Status: DISCONTINUED | OUTPATIENT
Start: 2022-08-11 | End: 2022-08-14 | Stop reason: HOSPADM

## 2022-08-11 RX ORDER — INSULIN LISPRO 100 [IU]/ML
1-5 INJECTION, SOLUTION INTRAVENOUS; SUBCUTANEOUS
Status: DISCONTINUED | OUTPATIENT
Start: 2022-08-11 | End: 2022-08-14 | Stop reason: HOSPADM

## 2022-08-11 RX ORDER — CEFTRIAXONE 1 G/50ML
1000 INJECTION, SOLUTION INTRAVENOUS ONCE
Status: COMPLETED | OUTPATIENT
Start: 2022-08-11 | End: 2022-08-11

## 2022-08-11 RX ORDER — ATORVASTATIN CALCIUM 80 MG/1
80 TABLET, FILM COATED ORAL
Status: DISCONTINUED | OUTPATIENT
Start: 2022-08-12 | End: 2022-08-14 | Stop reason: HOSPADM

## 2022-08-11 RX ORDER — FERROUS SULFATE 325(65) MG
325 TABLET ORAL
Refills: 1 | Status: DISCONTINUED | OUTPATIENT
Start: 2022-08-12 | End: 2022-08-14 | Stop reason: HOSPADM

## 2022-08-11 RX ORDER — ASPIRIN 81 MG/1
81 TABLET ORAL DAILY
Status: DISCONTINUED | OUTPATIENT
Start: 2022-08-12 | End: 2022-08-14 | Stop reason: HOSPADM

## 2022-08-11 RX ORDER — WARFARIN SODIUM 5 MG/1
5 TABLET ORAL
Status: DISCONTINUED | OUTPATIENT
Start: 2022-08-11 | End: 2022-08-14 | Stop reason: HOSPADM

## 2022-08-11 RX ORDER — POLYETHYLENE GLYCOL 3350 17 G/17G
17 POWDER, FOR SOLUTION ORAL DAILY PRN
Status: DISCONTINUED | OUTPATIENT
Start: 2022-08-11 | End: 2022-08-14 | Stop reason: HOSPADM

## 2022-08-11 RX ORDER — WARFARIN SODIUM 2.5 MG/1
2.5 TABLET ORAL 3 TIMES WEEKLY
Status: DISCONTINUED | OUTPATIENT
Start: 2022-08-12 | End: 2022-08-14 | Stop reason: HOSPADM

## 2022-08-11 RX ADMIN — DOCUSATE SODIUM 100 MG: 100 CAPSULE, LIQUID FILLED ORAL at 18:41

## 2022-08-11 RX ADMIN — FAMOTIDINE 20 MG: 20 TABLET ORAL at 18:41

## 2022-08-11 RX ADMIN — ACETAMINOPHEN 650 MG: 325 TABLET, FILM COATED ORAL at 15:21

## 2022-08-11 RX ADMIN — WARFARIN SODIUM 5 MG: 5 TABLET ORAL at 17:40

## 2022-08-11 RX ADMIN — INSULIN LISPRO 1 UNITS: 100 INJECTION, SOLUTION INTRAVENOUS; SUBCUTANEOUS at 16:09

## 2022-08-11 RX ADMIN — CEFTRIAXONE 1000 MG: 1 INJECTION, SOLUTION INTRAVENOUS at 13:44

## 2022-08-11 NOTE — TELEPHONE ENCOUNTER
Patient's daughter called for an appointment today  For fatigue and lethargy and weakness  Daughter was advised to take patient to the ER and be evaluated due to lethargy and weakness as well as recent drop in hemoglobin ,she agreed    Patient has a scheduled appointment for tomorrow

## 2022-08-11 NOTE — H&P
History and Physical - [facility] Internal Medicine    Patient Information: Sagrario Huff 80 y o  female MRN: [id number]  Unit/Bed#: [facility] Encounter: [id number]  Admitting Physician: Ana Laura Ng MD  PCP: Mode Diaz MD  Date of Admission:  08/11/22        Hospital Problem List:     Principal Problem:    Generalized weakness  Active Problems:    Chronic atrial fibrillation ([facility] )    H/O mitral valve replacement with mechanical valve    Type 2 diabetes mellitus without complication, without long-term current use of insulin ([facility] )    Essential hypertension    CVA (cerebral vascular accident) ([facility] )    Acute cystitis without hematuria    Anemia    Obstructive sleep apnea    Hypercholesteremia    Other specified hypothyroidism    Vitamin B12 deficiency      Assessment/Plan:    * Generalized weakness  Assessment & Plan  Presented with generalized weakness, fatigue, lightheadedness over last few weeks   Noted to be afebrile, bradycardic stable blood pressure  CT head unremarkable  Neuro exam nonfocal at baseline  Mild anemia and possible UTI but unlikely to be only etiology of generalized weakness but possibly contributing  · Telemetry  · Hold nadolol, digoxin  · Monitor blood pressure, orthostasis  · TSH, B12  · Trend CBC  · Continue treatment for UTI  · Cardiology evaluation  · PT/OT      Anemia  Assessment & Plan  Hemoglobin appears stable since April around 9-10 grams/deciliter , hemoglobin was normal prior to that  Normocytic  Denies any overt bleeding  · Iron studies, B12, folate  · Stool occult  · Check hemolysis smear, LDH given mechanical while  · Trend hemoglobin    Acute cystitis without hematuria  Assessment & Plan  Mild symptoms of intermittent urgency    Denies fever chills or dysuria  UA concerning for UTI  · IV ceftriaxone  · Follow-up urine culture  · Bladder scan    CVA (cerebral vascular accident) [facility])  Assessment & Plan   on aspirin, Lipitor  INR is therapeutic on Coumadin    Essential hypertension  Assessment & Plan  Blood pressure stable  Check orthostasis    Type 2 diabetes mellitus without complication, without long-term current use of insulin [facility])  Assessment & Plan  Lab Results   Component Value Date    HGBA1C 6 5 (H) 07/06/2022       Recent Labs     08/11/22  1608 08/11/22 2120   POCGLU 172* 86       Blood Sugar Average: Last 72 hrs:  (P) 129     Reports good control without any episodes of hypoglycemia   hold metformin at present  Sliding scale    H/O mitral valve replacement with mechanical valve  Assessment & Plan  On Coumadin, goal PT/INR 2 5-3 5  INR therapeutic  Echocardiogram done on 08/11, EF 62%, basal and inferolateral wall hypokinesis  Mechanical mitral valve      Chronic atrial fibrillation (HCC)  Assessment & Plan  Noted with slow ventricular rate, possibly contributing to patient's symptoms  Patient took nadolol today, also on digoxin  Digoxin level 0 8  · Telemetry  · Hold nadolol, digoxin  · Monitor heart rate/blood pressure  · Cardiology evaluation    Vitamin B12 deficiency  Assessment & Plan  Recheck B12 level    Other specified hypothyroidism  Assessment & Plan  Check thyroid profile    Hypercholesteremia  Assessment & Plan  On statin    Obstructive sleep apnea  Assessment & Plan  On CPAP, reports compliance          VTE Prophylaxis: Warfarin (Coumadin)  / sequential compression device   Code Status: Level 1 - Full Code    Anticipated Length of Stay:  Patient will be admitted on an Inpatient basis with an anticipated length of stay of  > 2 midnights  Justification for Hospital Stay:  Generalized weakness    Total Time for Visit, including Counseling / Coordination of Care: 45 minutes  Greater than 50% of this total time spent on direct patient counseling and coordination of care  Chief Complaint:     Weakness - Generalized (Co of increase weakness for about a week, co of headache, denies any ill exposure   Recently dx with CVA, decrease hgb)    History of Present Illness:    Miquel Méndez is a 80 y o  female with history of diabetes mellitus type 2, chronic AFib on anticoagulation, hypertension, hypothyroidism, emphysema, history of MVR with mechanical valve, CVA, JAEK on CPAP dyslipidemia, anemia, history of AAA who presents with generalized weakness and fatigue over last few weeks  Patient also reported lightheadedness  Due to her symptoms patient was scheduled to have echocardiogram by her her cardiologist which was done yesterday and patient was awaiting follow-up with the cardiologist   Due to ongoing symptoms she presented to ED for further evaluation    In ED, patient was afebrile bradycardic blood pressure was stable saturating adequately on room air  Lab revealed mild normocytic anemia, mildly elevated proBNP  INR was therapeutic  UA was consistent with UTI  Patient received IV ceftriaxone and was subsequently admitted  Hemoglobin was 10 5 mg/dL and appears stable since then  Hemoglobin was normal in March  Patient reported urinary urgency but denied any dysuria, fever, chills  Patient also reported mild frontal headache which improved with Tylenol, denied any URI symptoms, chest pain, shortness of breath, cough, nausea, abdominal pain, any overt bleeding  She reported compliance with medication without any recent change  Review of Systems:    Review of Systems   Constitutional: Positive for activity change and fatigue  Negative for appetite change, chills and fever  HENT: Negative for congestion and sore throat  Respiratory: Negative for cough and shortness of breath  Cardiovascular: Negative for chest pain, palpitations and leg swelling  Gastrointestinal: Positive for constipation  Negative for abdominal pain, diarrhea, nausea and vomiting  Genitourinary: Positive for urgency  Negative for difficulty urinating and dysuria  Neurological: Positive for light-headedness and headaches  Negative for syncope and speech difficulty  Psychiatric/Behavioral: Negative for behavioral problems  Past Medical and Surgical History:     Past Medical History:   Diagnosis Date    [facility] )     [facility] 03/11/2022       Past Surgical History:   Procedure Laterality Date    EYE SURGERY      HYSTERECTOMY      MITRAL VALVE REPLACEMENT  1994       Meds/Allergies:    PTA meds:   Prior to Admission Medications   Prescriptions Last Dose Informant Patient Reported? Taking?    Cholecalciferol (VITAMIN D PO) 8/11/2022 at Unknown time Self Yes Yes   Sig: Take by mouth   Multiple Vitamins-Minerals (PRESERVISION AREDS PO) 8/11/2022 at Unknown time Self Yes Yes   Sig: Take 2 tablets by mouth daily     acetaminophen (TYLENOL) 325 mg tablet 8/11/2022 at Unknown time  No Yes   Sig: Take 2 tablets (650 mg total) by mouth every 6 (six) hours as needed for mild pain or headaches   aspirin (ECOTRIN LOW STRENGTH) 81 mg EC tablet 8/11/2022 at Unknown time  No Yes   Sig: Take 1 tablet (81 mg total) by mouth daily   atorvastatin (LIPITOR) 80 mg tablet 8/10/2022 at Unknown time  No Yes   Sig: Take 1 tablet (80 mg total) by mouth daily   digoxin (LANOXIN) 0 125 mg tablet   Yes No   Sig: Take 125 mcg by mouth daily   ferrous sulfate 324 (65 Fe) mg 8/11/2022 at Unknown time  No Yes   Sig: Take 1 tablet (324 mg total) by mouth 2 (two) times a day before meals   metFORMIN (GLUCOPHAGE) 500 mg tablet 8/11/2022 at Unknown time  No Yes   Sig: Take 1 tablet (500 mg total) by mouth 2 (two) times a day with meals   nadolol (CORGARD) 20 mg tablet 8/11/2022 at Unknown time  No Yes   Sig: Take 0 5 tablets (10 mg total) by mouth daily   warfarin (COUMADIN) 2 5 mg tablet 8/10/2022 at Unknown time  No Yes   Sig: Take 1 tablet (2 5 mg total) by mouth 3 (three) times a week On Monday Wednesday and Friday   warfarin (COUMADIN) 5 mg tablet Past Week at Unknown time  No Yes   Sig: Take 1 tablet (5 mg total) by mouth 4 (four) times a week On Sunday, Tuesday, Thursday, Saturday      Facility-Administered Medications: None       Allergies: Allergies   Allergen Reactions    Sulfa Antibiotics     Sulfasalazine      History:     Marital Status:      Substance Use History:   Social History     Substance and Sexual Activity   Alcohol Use Yes    Comment: special occasional     Social History     Tobacco Use   Smoking Status Former Smoker    Packs/day: 2 00    Years: 30 00    Pack years: 60 00    Quit date:     Years since quittin 6   Smokeless Tobacco Never Used     Social History     Substance and Sexual Activity   Drug Use No       Family History:    Family History   Problem Relation Age of Onset    Heart failure Mother     Heart attack Father     Sleep apnea Brother        Physical Exam:     Vitals:   Blood Pressure: 170/69 (22 1430)  Pulse: (!) 54 (22 1430)  Temperature: (!) 96 8 °F (36 °C) (22 1430)  Temp Source: Tympanic (22 143)  Respirations: 20 (22 1430)  Weight - Scale: 70 9 kg (156 lb 4 9 oz) (22 1151)  SpO2: 97 % (22 1430)    Physical Exam  Constitutional:       General: She is not in acute distress  Appearance: She is not ill-appearing  HENT:      Head: Normocephalic and atraumatic  Eyes:      Pupils: Pupils are equal, round, and reactive to light  Cardiovascular:      Rate and Rhythm: Bradycardia present  Heart sounds: Murmur heard  Pulmonary:      Effort: Pulmonary effort is normal  No respiratory distress  Breath sounds: Normal breath sounds  No wheezing or rales  Abdominal:      General: Bowel sounds are normal  There is no distension  Palpations: Abdomen is soft  Tenderness: There is no abdominal tenderness  There is no guarding or rebound  Genitourinary:     Comments: Bladder fullness  Musculoskeletal:      Right lower leg: No edema  Left lower leg: No edema  Skin:     General: Skin is warm and dry  Findings: No rash     Neurological:      General: No focal deficit present  Mental Status: She is alert and oriented to person, place, and time  Mental status is at baseline  Psychiatric:         Mood and Affect: Mood normal                  Lab Results: I have personally reviewed pertinent reports  Results from last 7 days   Lab Units 08/11/22  1215   WBC Thousand/uL 5 29   HEMOGLOBIN g/dL 10 1*   HEMATOCRIT % 34 6*   PLATELETS Thousands/uL 242   NEUTROS PCT % 61   LYMPHS PCT % 25   MONOS PCT % 10   EOS PCT % 3     Results from last 7 days   Lab Units 08/11/22  1215   POTASSIUM mmol/L 3 8   CHLORIDE mmol/L 104   CO2 mmol/L 28   BUN mg/dL 19   CREATININE mg/dL 0 67   CALCIUM mg/dL 8 9   ALK PHOS U/L 76   ALT U/L 33   AST U/L 28     Results from last 7 days   Lab Units 08/11/22  1215   INR  2 55*       Imaging: I have personally reviewed pertinent reports  XR chest 1 view portable    Result Date: 8/11/2022  Narrative: CHEST INDICATION:   weakness  COMPARISON:  Chest radiograph from 7/5/2022 and chest CT from 9/9/2020  EXAM PERFORMED/VIEWS:  XR CHEST PORTABLE FINDINGS: Normal heart size, median sternotomy  The lungs are clear  No pneumothorax or pleural effusion  Moderate bilateral glenohumeral degenerative disease  Impression: No acute cardiopulmonary disease  Workstation performed: [id number]     CT head without contrast    Result Date: 8/11/2022  Narrative: CT BRAIN - WITHOUT CONTRAST INDICATION:   headache/blood thinners  Patient has suspected COVID-19  COMPARISON:  3/13/2022  TECHNIQUE:  CT examination of the brain was performed  In addition to axial images, sagittal and coronal 2D reformatted images were created and submitted for interpretation  Radiation dose length product (DLP) for this visit:  933 38 mGy-cm     This examination, like all CT scans performed in the [facility], was performed utilizing techniques to minimize radiation dose exposure, including the use of iterative  reconstruction and automated exposure control  IMAGE QUALITY:  Diagnostic  FINDINGS: PARENCHYMA: Decreased attenuation is noted in periventricular and subcortical white matter demonstrating an appearance that is statistically most likely to represent mild microangiopathic change; this appearance is similar when compared to most recent prior examination  Left basal ganglia chronic infarction  No CT signs of acute infarction  No intracranial mass, mass effect or midline shift  No acute parenchymal hemorrhage  VENTRICLES AND EXTRA-AXIAL SPACES:  Ventricles and extra-axial CSF spaces are prominent commensurate with the degree of volume loss  No hydrocephalus  No acute extra-axial hemorrhage  VISUALIZED ORBITS AND PARANASAL SINUSES:  Unremarkable  CALVARIUM AND EXTRACRANIAL SOFT TISSUES:  Normal      Impression: No acute intracranial abnormality  Chronic microangiopathic changes  Workstation performed: [id number]             XR chest 1 view portable   Final Result      No acute cardiopulmonary disease  Workstation performed: [id number]         CT head without contrast   Final Result      No acute intracranial abnormality  Chronic microangiopathic changes  Workstation performed: [id number]             EKG, Pathology, and Other Studies Reviewed on Admission:   EKG-AFib with slow ventricular rate at 48 beats per minute  Allscripts/EPIC Records Reviewed: Yes     ** Please Note: "This note has been constructed using a voice recognition system  Therefore there may be syntax, spelling, and/or grammatical errors   Please call if you have any questions  "**

## 2022-08-11 NOTE — ED PROVIDER NOTES
History  Chief Complaint   Patient presents with    Weakness - Generalized     Co of increase weakness for about a week, co of headache, denies any ill exposure  Recently dx with CVA, decrease hgb     Patient complains of increasing generalized weakness and fatigue for last week  Patient also having headache as well  Denies any fall or trauma  States she was recently diagnosed with CVA  Also told by her primary either hemoglobin had decreased  No recent fever or chills  History provided by:  Patient   used: No        Prior to Admission Medications   Prescriptions Last Dose Informant Patient Reported? Taking?    Cholecalciferol (VITAMIN D PO) 8/11/2022 at Unknown time Self Yes Yes   Sig: Take by mouth   Multiple Vitamins-Minerals (PRESERVISION AREDS PO) 8/11/2022 at Unknown time Self Yes Yes   Sig: Take 2 tablets by mouth daily     acetaminophen (TYLENOL) 325 mg tablet 8/11/2022 at Unknown time  No Yes   Sig: Take 2 tablets (650 mg total) by mouth every 6 (six) hours as needed for mild pain or headaches   aspirin (ECOTRIN LOW STRENGTH) 81 mg EC tablet 8/11/2022 at Unknown time  No Yes   Sig: Take 1 tablet (81 mg total) by mouth daily   atorvastatin (LIPITOR) 80 mg tablet 8/10/2022 at Unknown time  No Yes   Sig: Take 1 tablet (80 mg total) by mouth daily   digoxin (LANOXIN) 0 125 mg tablet   Yes No   Sig: Take 125 mcg by mouth daily   ferrous sulfate 324 (65 Fe) mg 8/11/2022 at Unknown time  No Yes   Sig: Take 1 tablet (324 mg total) by mouth 2 (two) times a day before meals   metFORMIN (GLUCOPHAGE) 500 mg tablet 8/11/2022 at Unknown time  No Yes   Sig: Take 1 tablet (500 mg total) by mouth 2 (two) times a day with meals   nadolol (CORGARD) 20 mg tablet 8/11/2022 at Unknown time  No Yes   Sig: Take 0 5 tablets (10 mg total) by mouth daily   warfarin (COUMADIN) 2 5 mg tablet 8/10/2022 at Unknown time  No Yes   Sig: Take 1 tablet (2 5 mg total) by mouth 3 (three) times a week On Monday Wednesday and Friday   warfarin (COUMADIN) 5 mg tablet Past Week at Unknown time  No Yes   Sig: Take 1 tablet (5 mg total) by mouth 4 (four) times a week On , Tuesday, Thursday, Saturday      Facility-Administered Medications: None       Past Medical History:   Diagnosis Date    Diabetes mellitus ([facility] [address] )     Stroke ([facility] ) 2022       Past Surgical History:   Procedure Laterality Date    EYE SURGERY      HYSTERECTOMY      MITRAL VALVE REPLACEMENT         Family History   Problem Relation Age of Onset    Heart failure Mother     Heart attack Father     Sleep apnea Brother      I have reviewed and agree with the history as documented  E-Cigarette/Vaping    E-Cigarette Use Never User      E-Cigarette/Vaping Substances    Nicotine No     THC No     CBD No     Flavoring No     Other No     Unknown No      Social History     Tobacco Use    Smoking status: Former Smoker     Packs/day: 2 00     Years: 30 00     Pack years: 60 00     Quit date:      Years since quittin 6    Smokeless tobacco: Never Used   Vaping Use    Vaping Use: Never used   Substance Use Topics    Alcohol use: Yes     Comment: special occasional    Drug use: No       Review of Systems   Constitutional: Positive for fatigue  Negative for chills and fever  HENT: Negative for ear pain and sore throat  Eyes: Negative for pain and visual disturbance  Respiratory: Negative for cough and shortness of breath  Cardiovascular: Negative for chest pain and palpitations  Gastrointestinal: Negative for abdominal pain, blood in stool, diarrhea, nausea and vomiting  Genitourinary: Negative for dysuria and hematuria  Musculoskeletal: Negative for arthralgias and back pain  Skin: Negative for color change and rash  Neurological: Positive for headaches  Negative for seizures, syncope, weakness and numbness  All other systems reviewed and are negative        Physical Exam  Physical Exam  Vitals and nursing note reviewed  Constitutional:       General: She is not in acute distress  HENT:      Head: Atraumatic  Right Ear: External ear normal       Left Ear: External ear normal       Nose: Nose normal       Mouth/Throat:      Mouth: Mucous membranes are moist       Pharynx: Oropharynx is clear  Eyes:      General: No scleral icterus  Extraocular Movements: Extraocular movements intact  Conjunctiva/sclera: Conjunctivae normal       Pupils: Pupils are equal, round, and reactive to light  Cardiovascular:      Rate and Rhythm: Bradycardia present  Rhythm irregular  Pulses: Normal pulses  Pulmonary:      Effort: Pulmonary effort is normal  No respiratory distress  Breath sounds: Normal breath sounds  Abdominal:      General: Abdomen is flat  Bowel sounds are normal  There is no distension  Palpations: Abdomen is soft  Tenderness: There is no abdominal tenderness  There is no guarding or rebound  Musculoskeletal:         General: No deformity  Normal range of motion  Right lower leg: No edema  Left lower leg: No edema  Skin:     Capillary Refill: Capillary refill takes less than 2 seconds  Neurological:      General: No focal deficit present  Mental Status: She is alert and oriented to person, place, and time  Cranial Nerves: No cranial nerve deficit  Sensory: No sensory deficit  Motor: No weakness           Vital Signs  ED Triage Vitals [08/11/22 1151]   Temperature Pulse Respirations Blood Pressure SpO2   97 7 °F (36 5 °C) (!) 42 20 149/67 98 %      Temp Source Heart Rate Source Patient Position - Orthostatic VS BP Location FiO2 (%)   Oral Monitor Lying Left arm --      Pain Score       7           Vitals:    08/11/22 1330 08/11/22 1401 08/11/22 1430 08/11/22 1501   BP: 138/63 150/67 170/69 146/76   Pulse: (!) 48 55 (!) 54 60   Patient Position - Orthostatic VS:    Lying         Visual Acuity      ED Medications  Medications   acetaminophen (TYLENOL) tablet 650 mg (650 mg Oral Given 8/11/22 1521)   insulin lispro (HumaLOG) 100 units/mL subcutaneous injection 1-5 Units (has no administration in time range)   insulin lispro (HumaLOG) 100 units/mL subcutaneous injection 1-5 Units (has no administration in time range)   cefTRIAXone (ROCEPHIN) IVPB (premix in dextrose) 1,000 mg 50 mL (0 mg Intravenous Stopped 8/11/22 1420)       Diagnostic Studies  Results Reviewed     Procedure Component Value Units Date/Time    HS Troponin I 2hr [[id number]]  (Normal) Collected: 08/11/22 1420    Lab Status: Final result Specimen: Blood from Arm, Right Updated: 08/11/22 1448     hs TnI 2hr 4 ng/L      Delta 2hr hsTnI -1 ng/L     HS Troponin I 4hr [[id number]]     Lab Status: No result Specimen: Blood     Urine culture [[id number]] Collected: 08/11/22 1308    Lab Status:  In process Specimen: Urine, Clean Catch Updated: 08/11/22 1357    Urine Microscopic [[id number]]  (Abnormal) Collected: 08/11/22 1308    Lab Status: Final result Specimen: Urine, Clean Catch Updated: 08/11/22 1324     RBC, UA 0-1 /hpf      WBC, UA Innumerable /hpf      Epithelial Cells Occasional /hpf      Bacteria, UA Innumerable /hpf     UA (URINE) with reflex to Scope [[id number]]  (Abnormal) Collected: 08/11/22 1308    Lab Status: Final result Specimen: Urine, Clean Catch Updated: 08/11/22 1317     Color, UA Yellow     Clarity, UA Cloudy     Specific [place], UA 1 010     pH, UA 6 0     Leukocytes, UA Large     Nitrite, UA Positive     Protein, UA Negative mg/dl      Glucose, UA Negative mg/dl      Ketones, UA Negative mg/dl      Urobilinogen, UA 0 2 E U /dl      Bilirubin, UA Negative     Occult Blood, UA Trace-Intact    COVID only [[id number]]  (Normal) Collected: 08/11/22 1215    Lab Status: Final result Specimen: Nares from Nasopharyngeal Swab Updated: 08/11/22 1317     SARS-CoV-2 Negative    Narrative:      FOR PEDIATRIC PATIENTS - copy/paste COVID Guidelines URL to browser: [email or web address]    SARS-CoV-2 assay is a Nucleic Acid Amplification assay intended for the  qualitative detection of nucleic acid from SARS-CoV-2 in nasopharyngeal  swabs  Results are for the presumptive identification of SARS-CoV-2 RNA  Positive results are indicative of infection with SARS-CoV-2, the virus  causing COVID-19, but do not rule out bacterial infection or co-infection  with other viruses  Laboratories within the [place] and its  territories are required to report all positive results to the appropriate  public health authorities  Negative results do not preclude SARS-CoV-2  infection and should not be used as the sole basis for treatment or other  patient management decisions  Negative results must be combined with  clinical observations, patient history, and epidemiological information  This test has not been FDA cleared or approved  This test has been authorized by FDA under an Emergency Use Authorization  (EUA)  This test is only authorized for the duration of time the  declaration that circumstances exist justifying the authorization of the  emergency use of an in vitro diagnostic tests for detection of SARS-CoV-2  virus and/or diagnosis of COVID-19 infection under section 564(b)(1) of  the Act, 21 U  S C  537SSA-5(K)(3), unless the authorization is terminated  or revoked sooner  The test has been validated but independent review by FDA  and CLIA is pending  Test performed using [employer] GeneXpert: This RT-PCR assay targets N2,  a region unique to SARS-CoV-2  A conserved region in the E-gene was chosen  for pan-Sarbecovirus detection which includes SARS-CoV-2      Protime-INR [[id number]]  (Abnormal) Collected: 08/11/22 1215    Lab Status: Final result Specimen: Blood from Arm, Right Updated: 08/11/22 1257     Protime 27 5 seconds      INR 2 55    APTT [[id number]]  (Normal) Collected: 08/11/22 1215    Lab Status: Final result Specimen: Blood from Arm, Right Updated: 08/11/22 1257     PTT 36 seconds     HS Troponin 0hr (reflex protocol) [[id number]]  (Normal) Collected: 08/11/22 1215    Lab Status: Final result Specimen: Blood from Arm, Right Updated: 08/11/22 1254     hs TnI 0hr 5 ng/L     Magnesium [[id number]]  (Normal) Collected: 08/11/22 1215    Lab Status: Final result Specimen: Blood from Arm, Right Updated: 08/11/22 1249     Magnesium 1 8 mg/dL     Lipase [[id number]]  (Normal) Collected: 08/11/22 1215    Lab Status: Final result Specimen: Blood from Arm, Right Updated: 08/11/22 1249     Lipase 212 u/L     NT-BNP PRO [[id number]]  (Abnormal) Collected: 08/11/22 1215    Lab Status: Final result Specimen: Blood from Arm, Right Updated: 08/11/22 1249     NT-proBNP 650 pg/mL     Digoxin level [[id number]]  (Normal) Collected: 08/11/22 1215    Lab Status: Final result Specimen: Blood from Arm, Right Updated: 08/11/22 1249     Digoxin Lvl 0 8 ng/mL     Comprehensive metabolic panel [[id number]] Collected: 08/11/22 1215    Lab Status: Final result Specimen: Blood from Arm, Right Updated: 08/11/22 1242     Sodium 141 mmol/L      Potassium 3 8 mmol/L      Chloride 104 mmol/L      CO2 28 mmol/L      ANION GAP 9 mmol/L      BUN 19 mg/dL      Creatinine 0 67 mg/dL      Glucose 122 mg/dL      Calcium 8 9 mg/dL      AST 28 U/L      ALT 33 U/L      Alkaline Phosphatase 76 U/L      Total Protein 7 9 g/dL      Albumin 4 1 g/dL      Total Bilirubin 0 54 mg/dL      eGFR 82 ml/min/1 73sq m     Narrative:      [place] guidelines for Chronic Kidney Disease (CKD):     Stage 1 with normal or high GFR (GFR > 90 mL/min/1 73 square meters)    Stage 2 Mild CKD (GFR = 60-89 mL/min/1 73 square meters)    Stage 3A Moderate CKD (GFR = 45-59 mL/min/1 73 square meters)    Stage 3B Moderate CKD (GFR = 30-44 mL/min/1 73 square meters)    Stage 4 Severe CKD (GFR = 15-29 mL/min/1 73 square meters)    Stage 5 End Stage CKD (GFR <15 mL/min/1 73 square meters)  Note: GFR calculation is accurate only with a steady state creatinine    CBC and differential [[id number]]  (Abnormal) Collected: 08/11/22 1215    Lab Status: Final result Specimen: Blood from Arm, Right Updated: 08/11/22 1236     WBC 5 29 Thousand/uL      RBC 4 18 Million/uL      Hemoglobin 10 1 g/dL      Hematocrit 34 6 %      MCV 83 fL      MCH 24 2 pg      MCHC 29 2 g/dL      RDW 20 8 %      MPV 10 5 fL      Platelets 326 Thousands/uL      nRBC 0 /100 WBCs      Neutrophils Relative 61 %      Immat GRANS % 0 %      Lymphocytes Relative 25 %      Monocytes Relative 10 %      Eosinophils Relative 3 %      Basophils Relative 1 %      Neutrophils Absolute 3 26 Thousands/µL      Immature Grans Absolute 0 02 Thousand/uL      Lymphocytes Absolute 1 32 Thousands/µL      Monocytes Absolute 0 50 Thousand/µL      Eosinophils Absolute 0 15 Thousand/µL      Basophils Absolute 0 04 Thousands/µL                  XR chest 1 view portable   Final Result by Ruiz Mathew MD (08/11 1413)      No acute cardiopulmonary disease  Workstation performed: [id number]         CT head without contrast   Final Result by Gaston Carey MD (08/11 1235)      No acute intracranial abnormality  Chronic microangiopathic changes  Workstation performed: [id number]                    Procedures  Procedures         ED Course                               SBIRT 22yo+    Flowsheet Row Most Recent Value   SBIRT (25 yo +)    In order to provide better care to our patients, we are screening all of our patients for alcohol and drug use  Would it be okay to ask you these screening questions? Yes Filed at: 08/11/2022 1244   Initial Alcohol Screen: US AUDIT-C     1  How often do you have a drink containing alcohol? 0 Filed at: 08/11/2022 1244   2  How many drinks containing alcohol do you have on a typical day you are drinking? 0 Filed at: 08/11/2022 1244   3a  Male UNDER 65:  How often do you have five or more drinks on one occasion? 0 Filed at: 08/11/2022 1244   3b  FEMALE Any Age, or MALE 65+: How often do you have 4 or more drinks on one occassion? 0 Filed at: 08/11/2022 1244   Audit-C Score 0 Filed at: 08/11/2022 1244   BO: How many times in the past year have you    Used an illegal drug or used a prescription medication for non-medical reasons? Never Filed at: 08/11/2022 1244                    MDM  Number of Diagnoses or Management Options  Bradycardia  Generalized weakness  UTI (urinary tract infection)  Diagnosis management comments: Pulse ox 98% on room air indicating adequate oxygenation  CXR: NAD as read by me      Lab work and CT scan results discussed with patient and family at bedside  UA was consistent with a UTI which could be contributing to her symptoms  However patient was also bradycardic in the ER with heart rate in the 40s that were also dipped down the 30s  And look with a slow AFib  Blood pressure stable  Dig level within normal range         Amount and/or Complexity of Data Reviewed  Clinical lab tests: ordered and reviewed  Tests in the radiology section of CPT®: ordered and reviewed  Decide to obtain previous medical records or to obtain history from someone other than the patient: yes  Review and summarize past medical records: yes  Discuss the patient with other providers: yes  Independent visualization of images, tracings, or specimens: yes    Patient Progress  Patient progress: stable      Disposition  Final diagnoses:   UTI (urinary tract infection)   Generalized weakness   Bradycardia     Time reflects when diagnosis was documented in both MDM as applicable and the Disposition within this note     Time User Action Codes Description Comment    8/11/2022  1:43 PM Anju Brandon Add [N39 0] UTI (urinary tract infection)     8/11/2022  1:43 PM Anju Brandon Add [R53 1] Generalized weakness     8/11/2022  1:43 PM Anju Brandon Add [R00 1] Bradycardia       ED Disposition     ED Disposition   Admit    Condition   Stable    Date/Time   Thu Aug 11, 2022  1:51 PM    Comment   Case was discussed with Dr Hannah Cowan and the patient's admission status was agreed to be Admission Status: inpatient status to the service of Dr Hannah Cowan             Follow-up Information    None         Current Discharge Medication List      CONTINUE these medications which have NOT CHANGED    Details   acetaminophen (TYLENOL) 325 mg tablet Take 2 tablets (650 mg total) by mouth every 6 (six) hours as needed for mild pain or headaches  Refills: 0    Associated Diagnoses: Cerebrovascular accident (CVA), unspecified mechanism (HCC)      aspirin (ECOTRIN LOW STRENGTH) 81 mg EC tablet Take 1 tablet (81 mg total) by mouth daily  Refills: 0    Associated Diagnoses: Cerebrovascular accident (CVA), unspecified mechanism (HCC)      atorvastatin (LIPITOR) 80 mg tablet Take 1 tablet (80 mg total) by mouth daily  Qty: 90 tablet, Refills: 1    Associated Diagnoses: Hypercholesteremia      Cholecalciferol (VITAMIN D PO) Take by mouth      ferrous sulfate 324 (65 Fe) mg Take 1 tablet (324 mg total) by mouth 2 (two) times a day before meals  Qty: 180 tablet, Refills: 1    Associated Diagnoses: Iron deficiency anemia due to chronic blood loss      metFORMIN (GLUCOPHAGE) 500 mg tablet Take 1 tablet (500 mg total) by mouth 2 (two) times a day with meals  Qty: 180 tablet, Refills: 1    Associated Diagnoses: Type 2 diabetes mellitus without complication, without long-term current use of insulin (HCC)      Multiple Vitamins-Minerals (PRESERVISION AREDS PO) Take 2 tablets by mouth daily        nadolol (CORGARD) 20 mg tablet Take 0 5 tablets (10 mg total) by mouth daily  Refills: 0    Associated Diagnoses: Essential hypertension      !! warfarin (COUMADIN) 2 5 mg tablet Take 1 tablet (2 5 mg total) by mouth 3 (three) times a week On Monday Wednesday and Friday  Refills: 0    Associated Diagnoses: Chronic atrial fibrillation [facility]); H/O mitral valve replacement with mechanical valve      !! warfarin (COUMADIN) 5 mg tablet Take 1 tablet (5 mg total) by mouth 4 (four) times a week On Sunday, Tuesday, Thursday, Saturday  Refills: 0    Associated Diagnoses: Chronic atrial fibrillation ([facility] [place] [address] ); H/O mitral valve replacement with mechanical valve      digoxin (LANOXIN) 0 125 mg tablet Take 125 mcg by mouth daily       ! ! - Potential duplicate medications found  Please discuss with provider  No discharge procedures on file      PDMP Review     None          ED Provider  Electronically Signed by           Abhijeet King,   08/11/22 Lori Stein, DO  08/11/22 4550

## 2022-08-11 NOTE — ED PROCEDURE NOTE
PROCEDURE  ECG 12 Lead Documentation Only    Date/Time: 8/11/2022 12:00 PM  Performed by: Yanna Ramírez DO  Authorized by: Yanna Ramírez DO     ECG reviewed by me, the ED Provider: yes    Patient location:  ED  Interpretation:     Interpretation: abnormal    Rate:     ECG rate:  48    ECG rate assessment: bradycardic    Rhythm:     Rhythm: atrial fibrillation    Ectopy:     Ectopy: none    ST segments:     ST segments:  Non-specific  T waves:     T waves: normal           Yanna Ramírez DO  08/11/22 1201

## 2022-08-11 NOTE — Clinical Note
Case was discussed with Dr Amna Adkins and the patient's admission status was agreed to be Admission Status: observation status to the service of Dr Amna Adkins

## 2022-08-11 NOTE — ED NOTES
Pt requesting something to eat  Dr Debra Orourke approved and boxed lunch and water brought to bedside       Randy Timmons RN  08/11/22 3594

## 2022-08-11 NOTE — PLAN OF CARE
Problem: PAIN - ADULT  Goal: Verbalizes/displays adequate comfort level or baseline comfort level  Description: Interventions:  - Encourage patient to monitor pain and request assistance  - Assess pain using appropriate pain scale  - Administer analgesics based on type and severity of pain and evaluate response  - Implement non-pharmacological measures as appropriate and evaluate response  - Consider cultural and social influences on pain and pain management  - Notify physician/advanced practitioner if interventions unsuccessful or patient reports new pain  Outcome: Progressing     Problem: INFECTION - ADULT  Goal: Absence or prevention of progression during hospitalization  Description: INTERVENTIONS:  - Assess and monitor for signs and symptoms of infection  - Monitor lab/diagnostic results  - Monitor all insertion sites, i e  indwelling lines, tubes, and drains  - Monitor endotracheal if appropriate and nasal secretions for changes in amount and color  - [place] appropriate cooling/warming therapies per order  - Administer medications as ordered  - Instruct and encourage patient and family to use good hand hygiene technique  - Identify and instruct in appropriate isolation precautions for identified infection/condition  Outcome: Progressing     Problem: SAFETY ADULT  Goal: Patient will remain free of falls  Description: INTERVENTIONS:  - Educate patient/family on patient safety including physical limitations  - Instruct patient to call for assistance with activity   - Consult OT/PT to assist with strengthening/mobility   - Keep Call bell within reach  - Keep bed low and locked with side rails adjusted as appropriate  - Keep care items and personal belongings within reach  - Initiate and maintain comfort rounds  - Make Fall Risk Sign visible to staff  - Offer Toileting every  Hours, in advance of need  - Initiate/Maintain alarm  - Obtain necessary fall risk management equipment:   - Apply yellow socks and bracelet for high fall risk patients  - Consider moving patient to room near nurses station  Outcome: Progressing  Goal: Maintain or return to baseline ADL function  Description: INTERVENTIONS:  -  Assess patient's ability to carry out ADLs; assess patient's baseline for ADL function and identify physical deficits which impact ability to perform ADLs (bathing, care of mouth/teeth, toileting, grooming, dressing, etc )  - Assess/evaluate cause of self-care deficits   - Assess range of motion  - Assess patient's mobility; develop plan if impaired  - Assess patient's need for assistive devices and provide as appropriate  - Encourage maximum independence but intervene and supervise when necessary  - Involve family in performance of ADLs  - Assess for home care needs following discharge   - Consider OT consult to assist with ADL evaluation and planning for discharge  - Provide patient education as appropriate  Outcome: Progressing  Goal: Maintains/Returns to pre admission functional level  Description: INTERVENTIONS:  - Perform BMAT or MOVE assessment daily    - Set and communicate daily mobility goal to care team and patient/family/caregiver  - Collaborate with rehabilitation services on mobility goals if consulted  - Perform Range of Motion  times a day  - Reposition patient every  hours    - Dangle patient  times a day  - Stand patient  times a day  - Ambulate patient  times a day  - Out of bed to chair  times a day   - Out of bed for meals  times a day  - Out of bed for toileting  - Record patient progress and toleration of activity level   Outcome: Progressing     Problem: CARDIOVASCULAR - ADULT  Goal: Maintains optimal cardiac output and hemodynamic stability  Description: INTERVENTIONS:  - Monitor I/O, vital signs and rhythm  - Monitor for S/S and trends of decreased cardiac output  - Administer and titrate ordered vasoactive medications to optimize hemodynamic stability  - Assess quality of pulses, skin color and temperature  - Assess for signs of decreased coronary artery perfusion  - Instruct patient to report change in severity of symptoms  Outcome: Progressing  Goal: Absence of cardiac dysrhythmias or at baseline rhythm  Description: INTERVENTIONS:  - Continuous cardiac monitoring, vital signs, obtain 12 lead EKG if ordered  - Administer antiarrhythmic and heart rate control medications as ordered  - Monitor electrolytes and administer replacement therapy as ordered  Outcome: Progressing

## 2022-08-12 ENCOUNTER — APPOINTMENT (OUTPATIENT)
Dept: OCCUPATIONAL THERAPY | Facility: CLINIC | Age: 82
End: 2022-08-12
Payer: MEDICARE

## 2022-08-12 ENCOUNTER — APPOINTMENT (OUTPATIENT)
Dept: PHYSICAL THERAPY | Facility: CLINIC | Age: 82
End: 2022-08-12
Payer: MEDICARE

## 2022-08-12 PROBLEM — N30.00 ACUTE CYSTITIS WITHOUT HEMATURIA: Status: ACTIVE | Noted: 2022-08-12

## 2022-08-12 PROBLEM — R53.1 GENERALIZED WEAKNESS: Status: ACTIVE | Noted: 2022-08-12

## 2022-08-12 PROBLEM — D64.9 ANEMIA: Status: ACTIVE | Noted: 2022-08-12

## 2022-08-12 LAB
ANION GAP SERPL CALCULATED.3IONS-SCNC: 9 MMOL/L (ref 4–13)
ATRIAL RATE: 53 BPM
BLD SMEAR INTERP: NORMAL
BUN SERPL-MCNC: 18 MG/DL (ref 5–25)
CALCIUM SERPL-MCNC: 8.7 MG/DL (ref 8.3–10.1)
CHLORIDE SERPL-SCNC: 106 MMOL/L (ref 96–108)
CO2 SERPL-SCNC: 28 MMOL/L (ref 21–32)
CREAT SERPL-MCNC: 0.71 MG/DL (ref 0.6–1.3)
ERYTHROCYTE [DISTWIDTH] IN BLOOD BY AUTOMATED COUNT: 20.6 % (ref 11.6–15.1)
FERRITIN SERPL-MCNC: 8 NG/ML (ref 8–388)
FOLATE SERPL-MCNC: >20 NG/ML (ref 3.1–17.5)
GFR SERPL CREATININE-BSD FRML MDRD: 79 ML/MIN/1.73SQ M
GLUCOSE SERPL-MCNC: 105 MG/DL (ref 65–140)
GLUCOSE SERPL-MCNC: 107 MG/DL (ref 65–140)
GLUCOSE SERPL-MCNC: 116 MG/DL (ref 65–140)
GLUCOSE SERPL-MCNC: 118 MG/DL (ref 65–140)
GLUCOSE SERPL-MCNC: 96 MG/DL (ref 65–140)
HCT VFR BLD AUTO: 31.9 % (ref 34.8–46.1)
HEMOCCULT STL QL: NEGATIVE
HEMOCCULT STL QL: NORMAL
HEMOCCULT STL QL: NORMAL
HGB BLD-MCNC: 9.4 G/DL (ref 11.5–15.4)
INR PPP: 2.71 (ref 0.84–1.19)
IRON SATN MFR SERPL: 10 % (ref 15–50)
IRON SERPL-MCNC: 46 UG/DL (ref 50–170)
LDH SERPL-CCNC: 254 U/L (ref 81–234)
MCH RBC QN AUTO: 23.9 PG (ref 26.8–34.3)
MCHC RBC AUTO-ENTMCNC: 29.5 G/DL (ref 31.4–37.4)
MCV RBC AUTO: 81 FL (ref 82–98)
PLATELET # BLD AUTO: 207 THOUSANDS/UL (ref 149–390)
PMV BLD AUTO: 10.4 FL (ref 8.9–12.7)
POTASSIUM SERPL-SCNC: 3.9 MMOL/L (ref 3.5–5.3)
PROTHROMBIN TIME: 28.8 SECONDS (ref 11.6–14.5)
QRS AXIS: 24 DEGREES
QRSD INTERVAL: 88 MS
QT INTERVAL: 428 MS
QTC INTERVAL: 382 MS
RBC # BLD AUTO: 3.94 MILLION/UL (ref 3.81–5.12)
RETICS # AUTO: NORMAL 10*3/UL (ref 14097–95744)
RETICS # CALC: 1.83 % (ref 0.37–1.87)
SODIUM SERPL-SCNC: 143 MMOL/L (ref 135–147)
T WAVE AXIS: 18 DEGREES
TIBC SERPL-MCNC: 471 UG/DL (ref 250–450)
TSH SERPL DL<=0.05 MIU/L-ACNC: 1.83 UIU/ML (ref 0.45–4.5)
VENTRICULAR RATE: 48 BPM
VIT B12 SERPL-MCNC: 624 PG/ML (ref 100–900)
WBC # BLD AUTO: 5.09 THOUSAND/UL (ref 4.31–10.16)

## 2022-08-12 PROCEDURE — 82948 REAGENT STRIP/BLOOD GLUCOSE: CPT

## 2022-08-12 PROCEDURE — 84443 ASSAY THYROID STIM HORMONE: CPT | Performed by: INTERNAL MEDICINE

## 2022-08-12 PROCEDURE — 85045 AUTOMATED RETICULOCYTE COUNT: CPT | Performed by: INTERNAL MEDICINE

## 2022-08-12 PROCEDURE — 83615 LACTATE (LD) (LDH) ENZYME: CPT | Performed by: INTERNAL MEDICINE

## 2022-08-12 PROCEDURE — 82607 VITAMIN B-12: CPT | Performed by: INTERNAL MEDICINE

## 2022-08-12 PROCEDURE — 99232 SBSQ HOSP IP/OBS MODERATE 35: CPT | Performed by: INTERNAL MEDICINE

## 2022-08-12 PROCEDURE — 97163 PT EVAL HIGH COMPLEX 45 MIN: CPT

## 2022-08-12 PROCEDURE — 80048 BASIC METABOLIC PNL TOTAL CA: CPT | Performed by: INTERNAL MEDICINE

## 2022-08-12 PROCEDURE — 85610 PROTHROMBIN TIME: CPT | Performed by: INTERNAL MEDICINE

## 2022-08-12 PROCEDURE — 93010 ELECTROCARDIOGRAM REPORT: CPT | Performed by: INTERNAL MEDICINE

## 2022-08-12 PROCEDURE — 82272 OCCULT BLD FECES 1-3 TESTS: CPT | Performed by: INTERNAL MEDICINE

## 2022-08-12 PROCEDURE — 97116 GAIT TRAINING THERAPY: CPT

## 2022-08-12 PROCEDURE — 99222 1ST HOSP IP/OBS MODERATE 55: CPT | Performed by: INTERNAL MEDICINE

## 2022-08-12 PROCEDURE — 85027 COMPLETE CBC AUTOMATED: CPT | Performed by: INTERNAL MEDICINE

## 2022-08-12 RX ORDER — LANOLIN ALCOHOL/MO/W.PET/CERES
3 CREAM (GRAM) TOPICAL
Status: DISCONTINUED | OUTPATIENT
Start: 2022-08-12 | End: 2022-08-14 | Stop reason: HOSPADM

## 2022-08-12 RX ADMIN — Medication 1 TABLET: at 08:20

## 2022-08-12 RX ADMIN — Medication 3 MG: at 21:39

## 2022-08-12 RX ADMIN — ACETAMINOPHEN 650 MG: 325 TABLET, FILM COATED ORAL at 07:05

## 2022-08-12 RX ADMIN — DOCUSATE SODIUM 100 MG: 100 CAPSULE, LIQUID FILLED ORAL at 17:00

## 2022-08-12 RX ADMIN — CEFTRIAXONE 1000 MG: 1 INJECTION, SOLUTION INTRAVENOUS at 12:40

## 2022-08-12 RX ADMIN — FERROUS SULFATE TAB 325 MG (65 MG ELEMENTAL FE) 325 MG: 325 (65 FE) TAB at 08:20

## 2022-08-12 RX ADMIN — ASPIRIN 81 MG: 81 TABLET, COATED ORAL at 08:19

## 2022-08-12 RX ADMIN — ATORVASTATIN CALCIUM 80 MG: 80 TABLET, FILM COATED ORAL at 17:00

## 2022-08-12 RX ADMIN — WARFARIN SODIUM 2.5 MG: 2.5 TABLET ORAL at 17:01

## 2022-08-12 RX ADMIN — FAMOTIDINE 20 MG: 20 TABLET ORAL at 08:19

## 2022-08-12 RX ADMIN — DOCUSATE SODIUM 100 MG: 100 CAPSULE, LIQUID FILLED ORAL at 08:20

## 2022-08-12 RX ADMIN — FAMOTIDINE 20 MG: 20 TABLET ORAL at 17:00

## 2022-08-12 NOTE — CASE MANAGEMENT
Case Management Assessment & Discharge Planning Note    Patient name Zeenat Marie  [facility] MRN [id number]  : 1940 Date 2022       Current Admission Date: 2022  Current Admission Diagnosis:Generalized weakness   Patient Active Problem List    Diagnosis Date Noted    Generalized weakness 2022    Acute cystitis without hematuria 2022    Anemia 2022    Fall 2022    COVID-19 2022    Anemia due to stage 3 chronic kidney disease ([facility] ) 2022    [facility]vascular accident ([facility]) [facility] ) 2022    Diabetes mellitus ([facility] ) 2022    CVA (cerebral vascular accident) ([facility] ) 2022    Hepatic steatosis 2022    History of cervical cancer 2021    Essential hypertension 2021    Other proteinuria 2021    History of renal calculi 06/15/2021    Pulmonary emphysema ([facility] ) 2021    Ataxia 2021    Abdominal aortic aneurysm (AAA) ([facility] ) 2021    Celiac disease 2020    Other microscopic hematuria 2020    Iron deficiency anemia 2020    Other specified hypothyroidism 2020    Vitamin B12 deficiency 2020    Anemia due to chronic kidney disease 02/10/2020    Allergic rhinitis 02/10/2020    Type 2 diabetes mellitus without complication, without long-term current use of insulin ([facility] ) 02/10/2020    Hypercholesteremia 2020    Chronic atrial fibrillation ([facility] ) 2018    H/O mitral valve replacement with mechanical valve 2018    Long term (current) use of anticoagulants 2018    Presence of prosthetic heart valve 2018    Obstructive sleep apnea 2018      LOS (days): 1  Geometric Mean LOS (GMLOS) (days): 2 90  Days to GMLOS:1 8     OBJECTIVE:    Risk of Unplanned Readmission Score: 18 35     Current admission status: Inpatient    Preferred Pharmacy:   [address] [place] [address], [address] [place] [address] [address] [id number]  Phone: [phone] Fax: [phone] - [facility], [facility] ([facility])  [address] [address] [place] [address] [id number])  [place] [address]  Phone: [phone] Fax: [phone]    Primary Care Provider: Gianfranco Roy MD    Primary Insurance: MEDICARE  Secondary Insurance: COMMERCIAL MISCELLANEOUS    ASSESSMENT:  [address] [place] [address], [address] - Daughter   Primary Phone: [phone] (Mobile)               Advance Directives  Does patient have a [address] [facility] [address]?: Yes  Does patient have Advance Directives?: Yes  Advance Directives: Living will, Power of  for health care  Primary Contact: Asa Ortega    Readmission Root Cause  30 Day Readmission: No    Patient Information  Admitted from[de-identified] Home  Mental Status: Alert  During Assessment patient was accompanied by: Other-Comment (daughter-in-law)  Assessment information provided by[de-identified] Patient (daughter-in-law)  Primary Caregiver: Self  Support Systems: Daughter, Family members  [place] of Residence: [address] [place] [address] do you live in?: [place] [address] entry access options   Select all that apply : Stairs  Number of steps to enter home : 2  Do the steps have railings?: Yes  Type of Current Residence: [address] home  Upon entering residence, is there a bedroom on the main floor (no further steps)?: No  A bedroom is located on the following floor levels of residence (select all that apply):: 2nd Floor  Upon entering residence, is there a bathroom on the main floor (no further steps)?: Yes  Number of steps to 2nd floor from main floor: One Flight  In the last 12 months, was there a time when you were not able to pay the mortgage or rent on time?: No  In the last 12 months, how many places have you lived?: 1  In the last 12 months, was there a time when you did not have a steady place to sleep or slept in a shelter (including now)?: No  Homeless/housing insecurity resource given?: N/A  Living Arrangements: Lives Alone    Activities of Daily Living Prior to Admission  Functional Status: Independent  Completes ADLs independently?: Yes  Ambulates independently?: Yes  Does patient use assisted devices?: Yes  Assisted Devices (DME) used: Rollator, Straight Cane, Walker  Does patient currently own DME?: Yes  What DME does the patient currently own?: Latrell Hooks  Does patient have a history of Outpatient Therapy (PT/OT)?: Yes  Does the patient have a history of Short-Term Rehab?: Yes ([facility]-acute rehab following CVA)  Does patient have a history of HHC?: Yes  Does patient currently have [address]?: No    Patient Information Continued  Income Source: Pension/residential  Does patient have prescription coverage?: Yes ([employer])  Within the past 12 months, you worried that your food would run out before you got the money to buy more : Never true  Within the past 12 months, the food you bought just didn't last and you didn't have money to get more : Never true  Food insecurity resource given?: N/A  Does patient receive dialysis treatments?: No  Does patient have a history of substance abuse?: No  Does patient have a history of Mental Health Diagnosis?: No    Means of Transportation  Means of Transport to Appts[de-identified] Family transport  In the past 12 months, has lack of transportation kept you from medical appointments or from getting medications?: No  In the past 12 months, has lack of transportation kept you from meetings, work, or from getting things needed for daily living?: No  Was application for public transport provided?: N/A      DISCHARGE DETAILS:    Discharge planning discussed with[de-identified] Patient, daughter-in-law and daughter  Freedom of Choice: Yes  Comments - Freedom of Choice: SW met with pt and daughter-in-law to assess needs and discuss plans  Outpatient therapy is being recommended by PT    Per pt she has been going to outpatient therapy up until about a week ago   Pt is open to resuming therapy as recommended  Daughter-in-law inquired about lifeline for pt  SW provided information on lifeline and programs through [facility] that may assist with paying for service  [facility] brochure given to daughter-in-law  SW also discussed ability to apply for services online through website, [employer]  During visit pt's daughter called pt on phone  SW spoke with daughter over phone to review plan and above information  CM contacted family/caregiver?: Yes  Were Treatment Team discharge recommendations reviewed with patient/caregiver?: Yes  Did patient/caregiver verbalize understanding of patient care needs?: Yes  Were patient/caregiver advised of the risks associated with not following Treatment Team discharge recommendations?: Yes    Contacts  Patient Contacts: Magnus Atkinson/[place]  Relationship to Patient[de-identified] Family (daughter-in-law/daughter)  Contact Method: Phone, In Person  Reason/Outcome: Discharge [address]         Is the patient interested in [employer] at discharge?: No    DME Referral Provided  Referral made for DME?: No    Other Referral/Resources/Interventions Provided:  Interventions: Lifeline/ Emergency Response System  Referral Comments: Contact information for [facility] given to pt/family to [place] and other services pt may be eligible for    Treatment Team Recommendation: Home (with outpatient therapy)  Discharge Destination Plan[de-identified] Home (with outpatient therapy)  Transport at Discharge : Family    IMM Given (Date):: 08/12/22  IMM Given to[de-identified] Patient (IMM reviewed with pt and daughter-in-law  Pt signed IMM and copy given    Copy also placed in scan bin for chart )

## 2022-08-12 NOTE — ASSESSMENT & PLAN NOTE
On Coumadin, goal PT/INR 2 5-3 5  INR therapeutic  Echocardiogram done on 08/11, EF 62%, basal and inferolateral wall hypokinesis    Mechanical mitral valve

## 2022-08-12 NOTE — ASSESSMENT & PLAN NOTE
Noted with slow ventricular rate, likely etiology of patient's symptoms  Patient took nadolol on the day of admission, also on digoxin  Digoxin level 0 8  Heart rate appears to be improving, currently in 60 -70 s  · Continued Hold nadolol, digoxin  · Monitor heart rate/blood pressure-acceptable  · Cardiology following, input appreciated, recommended to continue to hold nadolol and digoxin  Outpatient extended monitor  · Continue Coumadin with INR 2 5-3 5  · Advised to follow-up with primary cardiologist after discharge

## 2022-08-12 NOTE — ASSESSMENT & PLAN NOTE
Presented with generalized weakness, fatigue, lightheadedness over last few weeks   Noted to be afebrile, bradycardic stable blood pressure  CT head unremarkable  Neuro exam nonfocal at baseline  Likely secondary to bradycardia  Mild anemia and possible UTI but unlikely to be only etiology of generalized weakness but possibly contributing  Improved after resolution of bradycardia  Reports resolution of presenting symptoms  · Telemetry-heart rate is improving    · Continue to hold Hold nadolol, digoxin on discharge  · Monitor blood pressure, orthostasis-negative  · TSH, B12-normal  · Trend CBC-stable  · Continue treatment for UTI  · Cardiology follow-up after discharge for extended monitor  · PT/OT

## 2022-08-12 NOTE — PHYSICAL THERAPY NOTE
PHYSICAL THERAPY EVALUATION/TREATMENT     08/12/22 1115   Note Type   Note type Evaluation   Pain Assessment   Pain Assessment Tool 0-10   Pain Score No Pain   Restrictions/Precautions   Other Precautions Fall Risk;Bed Alarm; Chair Alarm   Home Living   Type of [address] Two level;Bed/bath upstairs;1/2 bath on main level  (Two GLADYS with a grab bar)   Home Equipment Cane  (Two Rollators)   Prior Function   Level of [place] Independent with ADLs and functional mobility   Lives With Alone   Receives Help From Friend(s); Neighbor;Family   ADL Assistance Independent   Comments Pt ambulates with a Rollator prior to admission inside her home  Occasional short distance ambulation without an assistive device inside her home  Handhold assist outside her home   General   Additional Pertinent History Pt is admitted with generalized weakness, and headache  Pt had a recent CVA March 2022  Pt was receiving outpatient PT recently   Cognition   Overall Cognitive Status WFL   Arousal/Participation Cooperative   Orientation Level Oriented X4   Following Commands Follows all commands and directions without difficulty   Subjective   Subjective I feel okay   RLE Assessment   RLE Assessment WFL  (4-/5)   LLE Assessment   LLE Assessment WFL  (4-/5)   Vision-Basic Assessment   Patient Visual Report Other (Comment)  (Macular degeneration)   Bed Mobility   Supine to Sit 5  Supervision   Additional items Verbal cues; Increased time required   Transfers   Sit to Stand 4  Minimal assistance   Additional items Assist x 1;Verbal cues   Stand to Sit 4  Minimal assistance   Additional items Assist x 1;Verbal cues   Ambulation/Elevation   Gait pattern   (With standby assist moderately unsteady; with handhold assist minimally unsteady; balance improved with RW)   Gait Assistance 4  Minimal assist   Additional items Assist x 1;Verbal cues; Tactile cues   Assistive Device None  (Standby assist to handhold assist)   Distance 100 ft with change in direction   Balance   Static Sitting Fair +   Static Standing Fair   Dynamic Standing Fair -   Ambulatory Poor +   Activity Tolerance   Activity Tolerance Patient tolerated treatment well;Patient limited by fatigue  (Moderately unsteady without assistance)   Assessment   Problem List Decreased strength;Decreased range of motion;Decreased endurance; Impaired balance;Decreased mobility; Decreased coordination;Decreased safety awareness; Impaired vision   Assessment Patient seen for Physical Therapy evaluation  Patient admitted with Generalized weakness  Comorbidities affecting patient's physical performance include:  JAKE, chronic AFib, history of mitral valve replacement, dm 2, CVA, anemia, ataxia, pulmonary emphysema, COVID-19  Personal factors affecting patient at time of initial evaluation include: lives in two story house, ambulating with assistive device, stairs to enter home, inability to navigate community distances, inability to navigate level surfaces without external assistance, inability to perform dynamic tasks in community and limited home support  Prior to admission, patient was independent with functional mobility with rollator, independent with functional mobility without assistive device, independent with ADLS, living alone in two story home with 2 steps to enter, ambulating household distance and ambulating community distances  Please find objective findings from Physical Therapy assessment regarding body systems outlined above with impairments and limitations including weakness, decreased ROM, impaired balance, decreased endurance, impaired coordination, gait deviations, decreased activity tolerance, decreased functional mobility tolerance, decreased safety awareness, fall risk and impaired vision  The Barthel Index was used as a functional outcome tool presenting with a score of Barthel Index Score: 50 today indicating marked limitations of functional mobility and ADLS  Patient's clinical presentation is currently unstable/unpredictable as seen in patient's presentation of vital sign response, changing level of pain, increased fall risk, new onset of impairment of functional mobility, decreased endurance and new onset of weakness  Pt would benefit from continued Physical Therapy treatment to address deficits as defined above and maximize level of functional mobility  As demonstrated by objective findings, the assigned level of complexity for this evaluation is high  The patient's AM-PAC Basic Mobility Inpatient Short Form Raw Score is 18  A Raw score of greater than 16 suggests the patient may benefit from discharge to home  Please also refer to the recommendation of the Physical Therapist for safe discharge planning  Goals   Patient Goals To go home   STG Expiration Date 08/19/22   Short Term Goal #1 Bed mobility-I; transfers-S   Short Term Goal #2 Pt will ambulate with a RW functional household distances-S; increase balance with a RW to F/F+ for safe gait and mobility and to decrease fall risk; up/down 2 steps with a hand rail and S so pt can enter/exit her home in order to meet her goal of going home   LTG Expiration Date 08/26/22   Long Term Goal #1 Transfers-I; pt will return to I gait and functional mobility with use of her Rollator functional household distances and short distances without an assistive device as previous   Long Term Goal #2 Balance with her Rollator will be F+/good for safe gait and mobility and without an assistive device will be at least F; up/down 2 steps with a grab bar so pt can enter/exit her home-S/I   Plan   Treatment/Interventions ADL retraining;Functional transfer training;LE strengthening/ROM; Elevations; Therapeutic exercise; Endurance training;Patient/family training;Equipment eval/education; Bed mobility;Gait training; Compensatory technique education   PT Frequency Other (Comment)  (5x/wk)   Recommendation   PT Discharge Recommendation Home with outpatient rehabilitation   Equipment Recommended   (Pt has 2 Rollators and 2 canes)   AM-PAC Basic Mobility Inpatient   Turning in Bed Without Bedrails 3   Lying on Back to Sitting on Edge of Flat Bed 3   Moving Bed to Chair 3   Standing Up From Chair 3   Walk in Room 3   Climb 3-5 Stairs 3   Basic Mobility Inpatient Raw Score 18   Basic Mobility Standardized Score 41 05   Highest Level Of Mobility   JH-HLM Goal 6: Walk 10 steps or more   JH-HLM Achieved 7: Walk 25 feet or more   Barthel Index   Feeding 5   Bathing 0   Grooming Score 0   Dressing Score 5   Bladder Score 10   Bowels Score 10   Toilet Use Score 5   Transfers (Bed/Chair) Score 10   Mobility (Level Surface) Score 0   Stairs Score 5   Barthel Index Score 50   Additional Treatment Session   Start Time 1115   End Time 1125   Treatment Assessment S:  It feels good to get up and move O:  Pt transfers sit to stand with minimal assistance/close S and ambulates with a  ft with change in direction with close S to occasional minimal assistance  Pt transfers stand to sit with close S/minimal assist   A:  Pt with good tolerance to bed mobility, transfers and ambulation with and without the RW  Patient's balance and gait endurance is much improved with use of the RW versus no assistive device  Pt will continue to benefit from skilled physical therapy services to return pt to her prior I level of function and will benefit from continued use of a RW for gait at this time with progression as tolerated  Pt is safe for discharge home with return to outpatient physical therapy when medically stable for discharge  End of Consult   Patient Position at End of Consult Seated edge of bed; All needs within reach;Bed/Chair alarm activated   Licensure   [place] License Number  Bebeto Pires PT [id number]     Portions of the documentation may have been created using voice recognition software   Occasional wrong word or sound alike substitutions may have occurred due to the inherent limitation of the voice recognition software  Read the chart carefully and recognize, using context, where substitutions have occurred

## 2022-08-12 NOTE — ASSESSMENT & PLAN NOTE
Mild symptoms of intermittent urgency    Denies fever chills or dysuria  UA concerning for UTI, urine culture with growth of E coli, sensitive to cefazolin  No evidence of urinary retention,  cc  Symptoms improved  · Initially treated with IV ceftriaxone, now will transition to cefazolin to complete 7 day

## 2022-08-12 NOTE — PLAN OF CARE
Problem: PAIN - ADULT  Goal: Verbalizes/displays adequate comfort level or baseline comfort level  Description: Interventions:  - Encourage patient to monitor pain and request assistance  - Assess pain using appropriate pain scale  - Administer analgesics based on type and severity of pain and evaluate response  - Implement non-pharmacological measures as appropriate and evaluate response  - Consider cultural and social influences on pain and pain management  - Notify physician/advanced practitioner if interventions unsuccessful or patient reports new pain  Outcome: Progressing     Problem: INFECTION - ADULT  Goal: Absence or prevention of progression during hospitalization  Description: INTERVENTIONS:  - Assess and monitor for signs and symptoms of infection  - Monitor lab/diagnostic results  - Monitor all insertion sites, i e  indwelling lines, tubes, and drains  - Monitor endotracheal if appropriate and nasal secretions for changes in amount and color  - [place] appropriate cooling/warming therapies per order  - Administer medications as ordered  - Instruct and encourage patient and family to use good hand hygiene technique  - Identify and instruct in appropriate isolation precautions for identified infection/condition  Outcome: Progressing     Problem: SAFETY ADULT  Goal: Patient will remain free of falls  Description: INTERVENTIONS:  - Educate patient/family on patient safety including physical limitations  - Instruct patient to call for assistance with activity   - Consult OT/PT to assist with strengthening/mobility   - Keep Call bell within reach  - Keep bed low and locked with side rails adjusted as appropriate  - Keep care items and personal belongings within reach  - Initiate and maintain comfort rounds  - Make Fall Risk Sign visible to staff  - Offer Toileting every 2 Hours, in advance of need  - Initiate/Maintain bed alarm  - Obtain necessary fall risk management equipment: yes  - Apply yellow socks and bracelet for high fall risk patients  - Consider moving patient to room near nurses station  Outcome: Progressing  Goal: Maintain or return to baseline ADL function  Description: INTERVENTIONS:  -  Assess patient's ability to carry out ADLs; assess patient's baseline for ADL function and identify physical deficits which impact ability to perform ADLs (bathing, care of mouth/teeth, toileting, grooming, dressing, etc )  - Assess/evaluate cause of self-care deficits   - Assess range of motion  - Assess patient's mobility; develop plan if impaired  - Assess patient's need for assistive devices and provide as appropriate  - Encourage maximum independence but intervene and supervise when necessary  - Involve family in performance of ADLs  - Assess for home care needs following discharge   - Consider OT consult to assist with ADL evaluation and planning for discharge  - Provide patient education as appropriate  Outcome: Progressing  Goal: Maintains/Returns to pre admission functional level  Description: INTERVENTIONS:  - Perform BMAT or MOVE assessment daily    - Set and communicate daily mobility goal to care team and patient/family/caregiver  - Collaborate with rehabilitation services on mobility goals if consulted  - Perform Range of Motion 3 times a day  - Reposition patient every 2 hours    - Dangle patient 3 times a day  - Stand patient 3 times a day  - Ambulate patient 3 times a day  - Out of bed to chair 3 times a day   - Out of bed for meals 3 times a day  - Out of bed for toileting  - Record patient progress and toleration of activity level   Outcome: Progressing     Problem: CARDIOVASCULAR - ADULT  Goal: Maintains optimal cardiac output and hemodynamic stability  Description: INTERVENTIONS:  - Monitor I/O, vital signs and rhythm  - Monitor for S/S and trends of decreased cardiac output  - Administer and titrate ordered vasoactive medications to optimize hemodynamic stability  - Assess quality of pulses, skin color and temperature  - Assess for signs of decreased coronary artery perfusion  - Instruct patient to report change in severity of symptoms  Outcome: Progressing  Goal: Absence of cardiac dysrhythmias or at baseline rhythm  Description: INTERVENTIONS:  - Continuous cardiac monitoring, vital signs, obtain 12 lead EKG if ordered  - Administer antiarrhythmic and heart rate control medications as ordered  - Monitor electrolytes and administer replacement therapy as ordered  Outcome: Progressing     Problem: Potential for Falls  Goal: Patient will remain free of falls  Description: INTERVENTIONS:  - Educate patient/family on patient safety including physical limitations  - Instruct patient to call for assistance with activity   - Consult OT/PT to assist with strengthening/mobility   - Keep Call bell within reach  - Keep bed low and locked with side rails adjusted as appropriate  - Keep care items and personal belongings within reach  - Initiate and maintain comfort rounds  - Make Fall Risk Sign visible to staff  - Offer Toileting every 2 Hours, in advance of need  - Initiate/Maintain bed alarm  - Obtain necessary fall risk management equipment: yes  - Apply yellow socks and bracelet for high fall risk patients  - Consider moving patient to room near nurses station  Outcome: Progressing     Problem: MOBILITY - ADULT  Goal: Maintain or return to baseline ADL function  Description: INTERVENTIONS:  -  Assess patient's ability to carry out ADLs; assess patient's baseline for ADL function and identify physical deficits which impact ability to perform ADLs (bathing, care of mouth/teeth, toileting, grooming, dressing, etc )  - Assess/evaluate cause of self-care deficits   - Assess range of motion  - Assess patient's mobility; develop plan if impaired  - Assess patient's need for assistive devices and provide as appropriate  - Encourage maximum independence but intervene and supervise when necessary  - Involve family in performance of ADLs  - Assess for home care needs following discharge   - Consider OT consult to assist with ADL evaluation and planning for discharge  - Provide patient education as appropriate  Outcome: Progressing  Goal: Maintains/Returns to pre admission functional level  Description: INTERVENTIONS:  - Perform BMAT or MOVE assessment daily    - Set and communicate daily mobility goal to care team and patient/family/caregiver  - Collaborate with rehabilitation services on mobility goals if consulted  - Perform Range of Motion 3 times a day  - Reposition patient every 2 hours    - Dangle patient 3 times a day  - Stand patient 3 times a day  - Ambulate patient 3 times a day  - Out of bed to chair 3 times a day   - Out of bed for meals 3 times a day  - Out of bed for toileting  - Record patient progress and toleration of activity level   Outcome: Progressing     Problem: RESPIRATORY - ADULT  Goal: Achieves optimal ventilation and oxygenation  Description: INTERVENTIONS:  - Assess for changes in respiratory status  - Assess for changes in mentation and behavior  - Position to facilitate oxygenation and minimize respiratory effort  - Oxygen administered by appropriate delivery if ordered  - Initiate smoking cessation education as indicated  - Encourage broncho-pulmonary hygiene including cough, deep breathe, Incentive Spirometry  - Assess the need for suctioning and aspirate as needed  - Assess and instruct to report SOB or any respiratory difficulty  - Respiratory Therapy support as indicated  Outcome: Progressing

## 2022-08-12 NOTE — ASSESSMENT & PLAN NOTE
Hemoglobin appears stable since April around 9-10 grams/deciliter , hemoglobin was normal prior to that  Normocytic  Iron studies consistent with iron deficiency  B12 normal   Folate elevated   Stool occult negative  Hemolysis smear negative  LDH mildly elevated   haptoglobin low  Denies any overt bleeding    Hemoglobin stable  · Monitor H&H as outpatient  · Continue p o  iron supplement   · Consider IV iron as outpatient after discharge post treatment of UTI  · Hematology referral on discharge, regarding concerns of intravascular hemolysis and IV iron infusion

## 2022-08-12 NOTE — RESPIRATORY THERAPY NOTE
education- pt  UA to read due to macular degeneration, she wants her daughter to read the book to her and will ask questions if she has any

## 2022-08-12 NOTE — ASSESSMENT & PLAN NOTE
Lab Results   Component Value Date    HGBA1C 6 5 (H) 07/06/2022       Recent Labs     08/12/22  0654 08/12/22  1129 08/12/22  1614 08/12/22  2200   POCGLU 118 96 116 107       Blood Sugar Average: Last 72 hrs:  (P) 129     Reports good control without any episodes of hypoglycemia   hold metformin at present  Sliding scale

## 2022-08-12 NOTE — PLAN OF CARE
Problem: PAIN - ADULT  Goal: Verbalizes/displays adequate comfort level or baseline comfort level  Description: Interventions:  - Encourage patient to monitor pain and request assistance  - Assess pain using appropriate pain scale  - Administer analgesics based on type and severity of pain and evaluate response  - Implement non-pharmacological measures as appropriate and evaluate response  - Consider cultural and social influences on pain and pain management  - Notify physician/advanced practitioner if interventions unsuccessful or patient reports new pain  Outcome: Progressing     Problem: INFECTION - ADULT  Goal: Absence or prevention of progression during hospitalization  Description: INTERVENTIONS:  - Assess and monitor for signs and symptoms of infection  - Monitor lab/diagnostic results  - Monitor all insertion sites, i e  indwelling lines, tubes, and drains  - Monitor endotracheal if appropriate and nasal secretions for changes in amount and color  - [place] appropriate cooling/warming therapies per order  - Administer medications as ordered  - Instruct and encourage patient and family to use good hand hygiene technique  - Identify and instruct in appropriate isolation precautions for identified infection/condition  Outcome: Progressing     Problem: SAFETY ADULT  Goal: Patient will remain free of falls  Description: INTERVENTIONS:  - Educate patient/family on patient safety including physical limitations  - Instruct patient to call for assistance with activity   - Consult OT/PT to assist with strengthening/mobility   - Keep Call bell within reach  - Keep bed low and locked with side rails adjusted as appropriate  - Keep care items and personal belongings within reach  - Initiate and maintain comfort rounds  - Make Fall Risk Sign visible to staff  - Offer Toileting every 2 Hours, in advance of need  - Initiate/Maintain bed alarm  - Obtain necessary fall risk management equipment: yes  - Apply yellow socks and bracelet for high fall risk patients  - Consider moving patient to room near nurses station  Outcome: Progressing  Goal: Maintain or return to baseline ADL function  Description: INTERVENTIONS:  -  Assess patient's ability to carry out ADLs; assess patient's baseline for ADL function and identify physical deficits which impact ability to perform ADLs (bathing, care of mouth/teeth, toileting, grooming, dressing, etc )  - Assess/evaluate cause of self-care deficits   - Assess range of motion  - Assess patient's mobility; develop plan if impaired  - Assess patient's need for assistive devices and provide as appropriate  - Encourage maximum independence but intervene and supervise when necessary  - Involve family in performance of ADLs  - Assess for home care needs following discharge   - Consider OT consult to assist with ADL evaluation and planning for discharge  - Provide patient education as appropriate  Outcome: Progressing  Goal: Maintains/Returns to pre admission functional level  Description: INTERVENTIONS:  - Perform BMAT or MOVE assessment daily    - Set and communicate daily mobility goal to care team and patient/family/caregiver  - Collaborate with rehabilitation services on mobility goals if consulted  - Perform Range of Motion 3 times a day  - Reposition patient every 2 hours    - Dangle patient 3 times a day  - Stand patient 3 times a day  - Ambulate patient 3 times a day  - Out of bed to chair 3 times a day   - Out of bed for meals 3 times a day  - Out of bed for toileting  - Record patient progress and toleration of activity level   Outcome: Progressing     Problem: CARDIOVASCULAR - ADULT  Goal: Maintains optimal cardiac output and hemodynamic stability  Description: INTERVENTIONS:  - Monitor I/O, vital signs and rhythm  - Monitor for S/S and trends of decreased cardiac output  - Administer and titrate ordered vasoactive medications to optimize hemodynamic stability  - Assess quality of pulses, skin color and temperature  - Assess for signs of decreased coronary artery perfusion  - Instruct patient to report change in severity of symptoms  Outcome: Progressing  Goal: Absence of cardiac dysrhythmias or at baseline rhythm  Description: INTERVENTIONS:  - Continuous cardiac monitoring, vital signs, obtain 12 lead EKG if ordered  - Administer antiarrhythmic and heart rate control medications as ordered  - Monitor electrolytes and administer replacement therapy as ordered  Outcome: Progressing     Problem: Potential for Falls  Goal: Patient will remain free of falls  Description: INTERVENTIONS:  - Educate patient/family on patient safety including physical limitations  - Instruct patient to call for assistance with activity   - Consult OT/PT to assist with strengthening/mobility   - Keep Call bell within reach  - Keep bed low and locked with side rails adjusted as appropriate  - Keep care items and personal belongings within reach  - Initiate and maintain comfort rounds  - Make Fall Risk Sign visible to staff  - Offer Toileting every 2 Hours, in advance of need  - Initiate/Maintain bed alarm  - Obtain necessary fall risk management equipment: yes  - Apply yellow socks and bracelet for high fall risk patients  - Consider moving patient to room near nurses station  Outcome: Progressing     Problem: RESPIRATORY - ADULT  Goal: Achieves optimal ventilation and oxygenation  Description: INTERVENTIONS:  - Assess for changes in respiratory status  - Assess for changes in mentation and behavior  - Position to facilitate oxygenation and minimize respiratory effort  - Oxygen administered by appropriate delivery if ordered  - Initiate smoking cessation education as indicated  - Encourage broncho-pulmonary hygiene including cough, deep breathe, Incentive Spirometry  - Assess the need for suctioning and aspirate as needed  - Assess and instruct to report SOB or any respiratory difficulty  - Respiratory Therapy support as indicated  Outcome: Progressing     Problem: MOBILITY - ADULT  Goal: Maintain or return to baseline ADL function  Description: INTERVENTIONS:  -  Assess patient's ability to carry out ADLs; assess patient's baseline for ADL function and identify physical deficits which impact ability to perform ADLs (bathing, care of mouth/teeth, toileting, grooming, dressing, etc )  - Assess/evaluate cause of self-care deficits   - Assess range of motion  - Assess patient's mobility; develop plan if impaired  - Assess patient's need for assistive devices and provide as appropriate  - Encourage maximum independence but intervene and supervise when necessary  - Involve family in performance of ADLs  - Assess for home care needs following discharge   - Consider OT consult to assist with ADL evaluation and planning for discharge  - Provide patient education as appropriate  Outcome: Progressing  Goal: Maintains/Returns to pre admission functional level  Description: INTERVENTIONS:  - Perform BMAT or MOVE assessment daily    - Set and communicate daily mobility goal to care team and patient/family/caregiver  - Collaborate with rehabilitation services on mobility goals if consulted  - Perform Range of Motion 3 times a day  - Reposition patient every 2 hours    - Dangle patient 3 times a day  - Stand patient 3 times a day  - Ambulate patient 3 times a day  - Out of bed to chair 3 times a day   - Out of bed for meals 3 times a day  - Out of bed for toileting  - Record patient progress and toleration of activity level   Outcome: Progressing

## 2022-08-12 NOTE — CONSULTS
Consultation - Cardiology   Micah Manning 80 y o  female MRN: [id number]  Unit/Bed#: 18 [facility] 216 Encounter: [id number]    Assessment/Plan     Assessment:  1  Chronic atrial fibrillation with slow ventricular response  2  Anemia  3  Chronic Coumadin therapy  4  Mechanical mitral valve  5  Hypertension  6  Diabetes  7  Recent CVA  8  History macular degeneration      Plan:  Patient has been admitted to the hospitalist service  1  Continue monitor telemetry off her nadolol and digoxin for improvement in heart rate  2  Continue Coumadin at this time with goal INR to be 2 5-3 5    3  UTI and anemia workup per primary team    4  Diabetic management per primary team    5  Discussed with patient that differential diagnosis includes sick sinus syndrome and patient may need implantation of pacemaker if heart rates do not improve off medications  Patient verbalizes understanding  History of Present Illness   Physician Requesting Consult: Buffy Dillon MD  Reason for Consult / Principal Problem:  Chronic atrial fibrillation with slow ventricular response, complaints of fatigue and anemia      HPI: Micah Manning is a 80y o  year old female who presented to the emergency room for evaluation of increasing fatigue ongoing for about 2 weeks, generalized weakness intermittent headaches and anemia  In the emergency room patient was noted to be bradycardic with heart rates in the 40s  She normally does not check her vital signs at home due to poor vision from macular degeneration  Patient's new Corgard and digoxin were held on admission  Heart rates overnight remain in the 40s to low 50s atrial fibrillation  Patient has a history for mechanical mitral valve replacement which was done in [address] [place] [address]   She states shortly after her surgery she began to have atrial fibrillation  She had 2 or 3 failed cardioversions and then elected to do rate control strategy    She states since that time she has been doing very well with Coumadin and her current medications  Other history is for obstructive sleep apnea, dyslipidemia, diabetes, hypertension and recent CVA  Patient recently had 2D echocardiogram performed at her cardiologist in [facility] which noted normal EF, mechanical mitral valve functioning normal and presence of PFO  Digoxin level was 0 8, INR is 2 71  Patient's hemoglobin was noted to be 9 4  Other labs were unremarkable  Inpatient consult to Cardiology  Consult performed by: DUNCAN Garza  Consult ordered by: Rizwana Locke MD          Review of Systems   Constitutional: Positive for activity change and fatigue  Negative for appetite change and fever  HENT: Negative  Negative for congestion, ear discharge, mouth sores, postnasal drip, sore throat and trouble swallowing  Eyes: Negative  Negative for photophobia and visual disturbance  Respiratory: Positive for shortness of breath  Negative for cough and chest tightness  Cardiovascular: Negative for chest pain, palpitations and leg swelling  Gastrointestinal: Negative  Negative for abdominal distention, diarrhea, nausea and vomiting  Endocrine: Negative  Negative for polydipsia, polyphagia and polyuria  Genitourinary: Negative  Negative for difficulty urinating  Musculoskeletal: Positive for gait problem  Ambulates with walker   Skin: Negative  Neurological: Positive for weakness  Negative for dizziness, syncope, light-headedness and numbness  Hematological: Negative  Psychiatric/Behavioral: Negative          Historical Information   Past Medical History:   Diagnosis Date    Diabetes mellitus ([facility] )     Stroke ([facility] ) 03/11/2022     Past Surgical History:   Procedure Laterality Date    EYE SURGERY      HYSTERECTOMY      MITRAL VALVE REPLACEMENT  1994     Social History     Substance and Sexual Activity   Alcohol Use Yes    Comment: special occasional     Social History Substance and Sexual Activity   Drug Use No     E-Cigarette/Vaping    E-Cigarette Use Never User      E-Cigarette/Vaping Substances    Nicotine No     THC No     CBD No     Flavoring No     Other No     Unknown No      Social History     Tobacco Use   Smoking Status Former Smoker    Packs/day: 2 00    Years: 30 00    Pack years: 60 00    Quit date:     Years since quittin 6   Smokeless Tobacco Never Used     Family History:   Family History   Problem Relation Age of Onset    Heart failure Mother     Heart attack Father     Sleep apnea Brother        Meds/Allergies   all current active meds have been reviewed, current meds:   Current Facility-Administered Medications   Medication Dose Route Frequency    acetaminophen (TYLENOL) tablet 650 mg  650 mg Oral Q6H PRN    aspirin (ECOTRIN LOW STRENGTH) EC tablet 81 mg  81 mg Oral Daily    atorvastatin (LIPITOR) tablet 80 mg  80 mg Oral Daily With Dinner    cefTRIAXone (ROCEPHIN) IVPB (premix in dextrose) 1,000 mg 50 mL  1,000 mg Intravenous Q24H    docusate sodium (COLACE) capsule 100 mg  100 mg Oral BID    famotidine (PEPCID) tablet 20 mg  20 mg Oral BID    ferrous sulfate tablet 325 mg  325 mg Oral Daily With Breakfast    insulin lispro (HumaLOG) 100 units/mL subcutaneous injection 1-5 Units  1-5 Units Subcutaneous TID AC    insulin lispro (HumaLOG) 100 units/mL subcutaneous injection 1-5 Units  1-5 Units Subcutaneous HS    multivitamin-minerals (CENTRUM) tablet 1 tablet  1 tablet Oral Daily    polyethylene glycol (MIRALAX) packet 17 g  17 g Oral Daily PRN    warfarin (COUMADIN) tablet 2 5 mg  2 5 mg Oral Once per day on     warfarin (COUMADIN) tablet 5 mg  5 mg Oral Once per day on Sun Tue Thu Sat    and PTA meds:   Prior to Admission Medications   Prescriptions Last Dose Informant Patient Reported? Taking?    Cholecalciferol (VITAMIN D PO) 2022 at Unknown time Self Yes Yes   Sig: Take by mouth   Multiple Vitamins-Minerals (PRESERVISION AREDS PO) 8/11/2022 at Unknown time Self Yes Yes   Sig: Take 2 tablets by mouth daily     acetaminophen (TYLENOL) 325 mg tablet 8/11/2022 at Unknown time  No Yes   Sig: Take 2 tablets (650 mg total) by mouth every 6 (six) hours as needed for mild pain or headaches   aspirin (ECOTRIN LOW STRENGTH) 81 mg EC tablet 8/11/2022 at Unknown time  No Yes   Sig: Take 1 tablet (81 mg total) by mouth daily   atorvastatin (LIPITOR) 80 mg tablet 8/10/2022 at Unknown time  No Yes   Sig: Take 1 tablet (80 mg total) by mouth daily   digoxin (LANOXIN) 0 125 mg tablet   Yes No   Sig: Take 125 mcg by mouth daily   ferrous sulfate 324 (65 Fe) mg 8/11/2022 at Unknown time  No Yes   Sig: Take 1 tablet (324 mg total) by mouth 2 (two) times a day before meals   metFORMIN (GLUCOPHAGE) 500 mg tablet 8/11/2022 at Unknown time  No Yes   Sig: Take 1 tablet (500 mg total) by mouth 2 (two) times a day with meals   nadolol (CORGARD) 20 mg tablet 8/11/2022 at Unknown time  No Yes   Sig: Take 0 5 tablets (10 mg total) by mouth daily   warfarin (COUMADIN) 2 5 mg tablet 8/10/2022 at Unknown time  No Yes   Sig: Take 1 tablet (2 5 mg total) by mouth 3 (three) times a week On Monday Wednesday and Friday   warfarin (COUMADIN) 5 mg tablet Past Week at Unknown time  No Yes   Sig: Take 1 tablet (5 mg total) by mouth 4 (four) times a week On Sunday, Tuesday, Thursday, Saturday      Facility-Administered Medications: None     Allergies   Allergen Reactions    Sulfa Antibiotics     Sulfasalazine        Objective   Vitals: Blood pressure 149/75, pulse 66, temperature 98 5 °F (36 9 °C), resp  rate 16, height 5' 5 5" (1 664 m), weight 67 8 kg (149 lb 8 oz), SpO2 96 %    Orthostatic Blood Pressures    Flowsheet Row Most Recent Value   Blood Pressure 149/75 filed at 08/12/2022 0745   Patient Position - Orthostatic VS Standing - Orthostatic VS filed at 08/12/2022 0745            Intake/Output Summary (Last 24 hours) at 8/12/2022 5268  Last data filed at 8/12/2022 0501  Gross per 24 hour   Intake --   Output 800 ml   Net -800 ml       Invasive Devices  Report    Peripheral Intravenous Line  Duration           Peripheral IV 08/11/22 Right Antecubital <1 day                Physical Exam  Vitals and nursing note reviewed  Constitutional:       General: She is not in acute distress  Appearance: Normal appearance  She is normal weight  HENT:      Right Ear: External ear normal       Left Ear: External ear normal    Eyes:      General: No scleral icterus  Right eye: No discharge  Left eye: No discharge  Comments: Vision problem secondary to macular degeneration   Cardiovascular:      Rate and Rhythm: Regular rhythm  Bradycardia present  Pulses: Normal pulses  Comments: Valve click over mitral valve position  Pulmonary:      Effort: Pulmonary effort is normal  No respiratory distress  Breath sounds: Normal breath sounds  No wheezing, rhonchi or rales  Abdominal:      General: Bowel sounds are normal  There is no distension  Palpations: Abdomen is soft  Musculoskeletal:      Right lower leg: No edema  Left lower leg: No edema  Comments: Superficial varicosities   Skin:     General: Skin is warm and dry  Capillary Refill: Capillary refill takes less than 2 seconds  Neurological:      General: No focal deficit present  Mental Status: She is alert and oriented to person, place, and time  Mental status is at baseline  Psychiatric:         Mood and Affect: Mood normal          Lab Results:   I have personally reviewed pertinent lab results      CBC with diff:   Results from last 7 days   Lab Units 08/12/22  0538   WBC Thousand/uL 5 09   RBC Million/uL 3 94   HEMOGLOBIN g/dL 9 4*   HEMATOCRIT % 31 9*   MCV fL 81*   MCH pg 23 9*   MCHC g/dL 29 5*   RDW % 20 6*   MPV fL 10 4   PLATELETS Thousands/uL 207     CMP:   Results from last 7 days   Lab Units 08/12/22  0538 08/11/22  1215 SODIUM mmol/L 143 141   CHLORIDE mmol/L 106 104   CO2 mmol/L 28 28   BUN mg/dL 18 19   CREATININE mg/dL 0 71 0 67   CALCIUM mg/dL 8 7 8 9   AST U/L  --  28   ALT U/L  --  33   ALK PHOS U/L  --  76   EGFR ml/min/1 73sq m 79 82     HS Troponin:   0   Lab Value Date/Time    HSTNI0 5 08/11/2022 1215    HSTNI2 4 08/11/2022 1420     BNP:   Results from last 7 days   Lab Units 08/12/22  0538   POTASSIUM mmol/L 3 9   CHLORIDE mmol/L 106   CO2 mmol/L 28   BUN mg/dL 18   CREATININE mg/dL 0 71   CALCIUM mg/dL 8 7   EGFR ml/min/1 73sq m 79     Coags:   Results from last 7 days   Lab Units 08/12/22  0538 08/11/22  1215   PTT seconds  --  36   INR  2 71* 2 55*     TSH:   Results from last 7 days   Lab Units 08/12/22  0538   TSH 3RD GENERATON uIU/mL 1 832     Magnesium:   Results from last 7 days   Lab Units 08/11/22  1215   MAGNESIUM mg/dL 1 8     Lipid Profile:     Imaging: I have personally reviewed pertinent reports      EKG:  Atrial fibrillation with slow ventricular response of 48  VTE Prophylaxis: Sequential compression device (Venodyne)  and Warfarin (Coumadin)    Code Status: Level 1 - Full Code  Advance Directive and Living Will:      Power of :    POLST:      Jan Ramos, [facility]  Cardiology

## 2022-08-12 NOTE — PROGRESS NOTES
[facility] Internal Medicine Progress Note  Patient: Zeenat Marie 80 y o  female   MRN: [id number]  PCP: Gianfranco Roy MD  Unit/Bed#: 2 [place] [address] Encounter: [id number]  Date Of Visit: 08/12/22    Problem List:    Principal Problem:    Generalized weakness  Active Problems:    Chronic [facility] fibrillation ([facility] )    H/O mitral valve replacement with mechanical valve    Type 2 diabetes mellitus without complication, without long-term current use of insulin ([facility])    Essential hypertension    CVA (cerebral vascular accident) ([facility] )    Acute cystitis without hematuria    Anemia    Obstructive sleep apnea    Hypercholesteremia    Other specified hypothyroidism    Vitamin B12 deficiency      Assessment & Plan:    * Generalized weakness  Assessment & Plan  Presented with generalized weakness, fatigue, lightheadedness over last few weeks   Noted to be afebrile, bradycardic stable blood pressure  CT head unremarkable  Neuro exam nonfocal at baseline  Likely secondary to bradycardia  Mild anemia and possible UTI but unlikely to be only etiology of generalized weakness but possibly contributing  · Telemetry  · Hold nadolol, digoxin  · Monitor blood pressure, orthostasis  · TSH, [id number]  · Trend CBC  · Continue treatment for UTI  · Cardiology evaluation  · PT/OT      Anemia  Assessment & Plan  Hemoglobin appears stable since April around 9-10 grams/deciliter , hemoglobin was normal prior to that  Normocytic  Iron studies consistent with iron deficiency  B12 normal   Folate elevated   Stool occult negative  Hemolysis smear negative  LDH mildly elevated  Previously haptoglobin low    Denies any overt bleeding  · Monitor H&H   · Continue p o  iron supplement   · Consider IV iron as outpatient after discharge after treatment of UTI  · Hematology evaluation/referral regarding concerns of intravascular hemolysis and IV iron infusion    Acute cystitis without hematuria  Assessment & Plan  Mild symptoms of intermittent urgency  Denies fever chills or dysuria  UA concerning for UTI  Symptoms are improving  · Continue IV ceftriaxone  · Follow-up urine culture  · Bladder scan- CC    CVA (cerebral vascular accident) ([address] )  Assessment & Plan   on aspirin, Lipitor  INR is therapeutic on Coumadin    Essential hypertension  Assessment & Plan  Blood pressure stable  Orthostasis negative    Type 2 diabetes mellitus without complication, without long-term current use of insulin [facility])  Assessment & Plan  Lab Results   Component Value Date    HGBA1C 6 5 (H) 07/06/2022       Recent Labs     08/12/22  0654 08/12/22  1129 08/12/22  1614 08/12/22  2200   POCGLU 118 96 116 107       Blood Sugar Average: Last 72 hrs:  (P) 129     Reports good control without any episodes of hypoglycemia   hold metformin at present  Sliding scale    H/O mitral valve replacement with mechanical valve  Assessment & Plan  On Coumadin, goal PT/INR 2 5-3 5  INR therapeutic  Echocardiogram done on 08/11, EF 62%, basal and inferolateral wall hypokinesis  Mechanical mitral valve      Chronic atrial fibrillation (HCC)  Assessment & Plan  Noted with slow ventricular rate, likely contributing to patient's symptoms  Patient took nadolol on the day of admission, also on digoxin  Digoxin level 0 8  Heart rate appears to be improving, currently in 60s  · Telemetry  · Continued Hold nadolol, digoxin  · Monitor heart rate/blood pressure  · Cardiology evaluation appreciated, follow-up recommendations    Vitamin B12 deficiency  Assessment & Plan  B12 normal    Other specified hypothyroidism  Assessment & Plan  TSH normal    Hypercholesteremia  Assessment & Plan  On statin    Obstructive sleep apnea  Assessment & Plan  On CPAP, reports compliance          VTE Pharmacologic Prophylaxis: VTE Score: 4 Moderate Risk (Score 3-4) - Pharmacological DVT Prophylaxis Ordered: warfarin (Coumadin)      Patient Centered Rounds: I performed bedside rounds with nursing staff today   Discussions with Specialists or Other Care Team Provider:  Cardiology, PCP    Education and Discussions with Family / Patient: Yes  Time Spent for Care: 45 minutes  More than 50% of total time spent on counseling and coordination of care as described above  Current Length of Stay: 1 day(s)  Current Patient Status: Inpatient   Certification Statement: The patient will continue to require additional inpatient hospital stay due to UTI, bradycardia  Discharge Plan: Anticipate discharge in 24-48 hrs to home with home services  Code Status: Level 1 - Full Code    Subjective:   Reports less fatigue today  Denies any chest pain shortness of breath or dizziness  Denies any dysuria  Orthostasis negative  Mild headache earlier today resolved with Tylenol    Requesting melatonin    Objective:     Vitals:   Temp (24hrs), Av 9 °F (36 6 °C), Min:96 8 °F (36 °C), Max:98 5 °F (36 9 °C)    Temp:  [96 8 °F (36 °C)-98 5 °F (36 9 °C)] 98 5 °F (36 9 °C)  HR:  [42-66] 66  Resp:  [16-21] 18  BP: (138-170)/(57-76) 149/75  SpO2:  [94 %-98 %] 96 %  Body mass index is 24 5 kg/m²  Input and Output Summary (last 24 hours): Intake/Output Summary (Last 24 hours) at 2022 1030  Last data filed at 2022 0501  Gross per 24 hour   Intake --   Output 800 ml   Net -800 ml       Physical Exam:   Physical Exam  Constitutional:       General: She is not in acute distress  HENT:      Head: Normocephalic and atraumatic  Cardiovascular:      Rate and Rhythm: Bradycardia present  Heart sounds: Murmur heard  Pulmonary:      Effort: Pulmonary effort is normal  No respiratory distress  Breath sounds: Normal breath sounds  No wheezing or rales  Abdominal:      General: Bowel sounds are normal  There is no distension  Palpations: Abdomen is soft  Tenderness: There is no abdominal tenderness  There is no guarding or rebound  Musculoskeletal:      Right lower leg: No edema        Left lower leg: No edema    Skin:     General: Skin is warm and dry  Findings: No rash  Neurological:      General: No focal deficit present  Mental Status: She is alert and oriented to person, place, and time  Mental status is at baseline     Psychiatric:         Mood and Affect: Mood normal          Additional Data:     Labs:  Results from last 7 days   Lab Units 08/12/22  0538 08/11/22  1215   WBC Thousand/uL 5 09 5 29   HEMOGLOBIN g/dL 9 4* 10 1*   HEMATOCRIT % 31 9* 34 6*   PLATELETS Thousands/uL 207 242   NEUTROS PCT %  --  61   LYMPHS PCT %  --  25   MONOS PCT %  --  10   EOS PCT %  --  3     Results from last 7 days   Lab Units 08/12/22  0538 08/11/22  1215   SODIUM mmol/L 143 141   POTASSIUM mmol/L 3 9 3 8   CHLORIDE mmol/L 106 104   CO2 mmol/L 28 28   BUN mg/dL 18 19   CREATININE mg/dL 0 71 0 67   ANION GAP mmol/L 9 9   CALCIUM mg/dL 8 7 8 9   ALBUMIN g/dL  --  4 1   TOTAL BILIRUBIN mg/dL  --  0 54   ALK PHOS U/L  --  76   ALT U/L  --  33   AST U/L  --  28   GLUCOSE RANDOM mg/dL 105 122     Results from last 7 days   Lab Units 08/12/22  0538   INR  2 71*     Results from last 7 days   Lab Units 08/12/22  0654 08/11/22  2120 08/11/22  1608   POC GLUCOSE mg/dl 118 86 172*               Lines/Drains:  Invasive Devices  Report    Peripheral Intravenous Line  Duration           Peripheral IV 08/11/22 Right Antecubital <1 day                  Telemetry:  Telemetry Orders (From admission, onward)             48 Hour Telemetry Monitoring  (ED Bridging Orders Panel)  Continuous x 48 hours        References:    Telemetry Guidelines   Question:  Reason for 48 Hour Telemetry  Answer:  Arrhythmias Requiring Medical Therapy (eg  SVT, Vtach/fib, Bradycardia, Uncontrolled A-fib)                 Telemetry Reviewed: AFib with SVR  Indication for Continued Telemetry Use: Arrthymias requiring medical therapy             Imaging: Reviewed radiology reports from this admission including: chest xray and CT head    Recent Cultures (last 7 days):         Last 24 Hours Medication List:   Current Facility-Administered Medications   Medication Dose Route Frequency Provider Last Rate    acetaminophen  650 mg Oral Q6H PRN Tito Vela MD      aspirin  81 mg Oral Daily Tito Vela MD      atorvastatin  80 mg Oral Daily With Stefani Juarez MD      cefTRIAXone  1,000 mg Intravenous Q24H Tito Vela MD      docusate sodium  100 mg Oral BID Tito Vela MD      famotidine  20 mg Oral BID Tito Vela MD      ferrous sulfate  325 mg Oral Daily With Boby Whitman MD      insulin lispro  1-5 Units Subcutaneous TID AC Tito Vela MD      insulin lispro  1-5 Units Subcutaneous HS Tito Vela MD      melatonin  3 mg Oral HS Tito Vela MD      multivitamin-minerals  1 tablet Oral Daily Tito Vela MD      polyethylene glycol  17 g Oral Daily PRN Tito Vela MD      warfarin  2 5 mg Oral Once per day on Mon Wed Fri Tito Vela MD      warfarin  5 mg Oral Once per day on Sun Tue Thu Sat Tito Vela MD          Today, Patient Was Seen By: Tito Vela MD    ** Please Note: "This note has been constructed using a voice recognition system  Therefore there may be syntax, spelling, and/or grammatical errors   Please call if you have any questions  "**

## 2022-08-13 LAB
ANION GAP SERPL CALCULATED.3IONS-SCNC: 8 MMOL/L (ref 4–13)
BACTERIA UR CULT: ABNORMAL
BUN SERPL-MCNC: 19 MG/DL (ref 5–25)
CALCIUM SERPL-MCNC: 9 MG/DL (ref 8.3–10.1)
CHLORIDE SERPL-SCNC: 104 MMOL/L (ref 96–108)
CO2 SERPL-SCNC: 30 MMOL/L (ref 21–32)
CREAT SERPL-MCNC: 0.7 MG/DL (ref 0.6–1.3)
ERYTHROCYTE [DISTWIDTH] IN BLOOD BY AUTOMATED COUNT: 21 % (ref 11.6–15.1)
GFR SERPL CREATININE-BSD FRML MDRD: 80 ML/MIN/1.73SQ M
GLUCOSE SERPL-MCNC: 107 MG/DL (ref 65–140)
GLUCOSE SERPL-MCNC: 123 MG/DL (ref 65–140)
GLUCOSE SERPL-MCNC: 126 MG/DL (ref 65–140)
GLUCOSE SERPL-MCNC: 129 MG/DL (ref 65–140)
GLUCOSE SERPL-MCNC: 129 MG/DL (ref 65–140)
HCT VFR BLD AUTO: 35.2 % (ref 34.8–46.1)
HGB BLD-MCNC: 10.1 G/DL (ref 11.5–15.4)
MCH RBC QN AUTO: 23.5 PG (ref 26.8–34.3)
MCHC RBC AUTO-ENTMCNC: 28.7 G/DL (ref 31.4–37.4)
MCV RBC AUTO: 82 FL (ref 82–98)
PLATELET # BLD AUTO: 237 THOUSANDS/UL (ref 149–390)
PMV BLD AUTO: 10.4 FL (ref 8.9–12.7)
POTASSIUM SERPL-SCNC: 4 MMOL/L (ref 3.5–5.3)
RBC # BLD AUTO: 4.29 MILLION/UL (ref 3.81–5.12)
SODIUM SERPL-SCNC: 142 MMOL/L (ref 135–147)
WBC # BLD AUTO: 6.6 THOUSAND/UL (ref 4.31–10.16)

## 2022-08-13 PROCEDURE — 99232 SBSQ HOSP IP/OBS MODERATE 35: CPT | Performed by: INTERNAL MEDICINE

## 2022-08-13 PROCEDURE — 85027 COMPLETE CBC AUTOMATED: CPT | Performed by: INTERNAL MEDICINE

## 2022-08-13 PROCEDURE — 82948 REAGENT STRIP/BLOOD GLUCOSE: CPT

## 2022-08-13 PROCEDURE — 80048 BASIC METABOLIC PNL TOTAL CA: CPT | Performed by: INTERNAL MEDICINE

## 2022-08-13 RX ADMIN — FERROUS SULFATE TAB 325 MG (65 MG ELEMENTAL FE) 325 MG: 325 (65 FE) TAB at 07:46

## 2022-08-13 RX ADMIN — Medication 3 MG: at 21:19

## 2022-08-13 RX ADMIN — WARFARIN SODIUM 5 MG: 5 TABLET ORAL at 17:39

## 2022-08-13 RX ADMIN — DOCUSATE SODIUM 100 MG: 100 CAPSULE, LIQUID FILLED ORAL at 17:39

## 2022-08-13 RX ADMIN — ACETAMINOPHEN 650 MG: 325 TABLET, FILM COATED ORAL at 07:46

## 2022-08-13 RX ADMIN — ATORVASTATIN CALCIUM 80 MG: 80 TABLET, FILM COATED ORAL at 17:39

## 2022-08-13 RX ADMIN — CEFTRIAXONE 1000 MG: 1 INJECTION, SOLUTION INTRAVENOUS at 13:12

## 2022-08-13 RX ADMIN — Medication 1 TABLET: at 08:35

## 2022-08-13 RX ADMIN — ASPIRIN 81 MG: 81 TABLET, COATED ORAL at 08:35

## 2022-08-13 RX ADMIN — FAMOTIDINE 20 MG: 20 TABLET ORAL at 17:39

## 2022-08-13 RX ADMIN — FAMOTIDINE 20 MG: 20 TABLET ORAL at 08:35

## 2022-08-13 NOTE — PLAN OF CARE
Problem: PAIN - ADULT  Goal: Verbalizes/displays adequate comfort level or baseline comfort level  Description: Interventions:  - Encourage patient to monitor pain and request assistance  - Assess pain using appropriate pain scale  - Administer analgesics based on type and severity of pain and evaluate response  - Implement non-pharmacological measures as appropriate and evaluate response  - Consider cultural and social influences on pain and pain management  - Notify physician/advanced practitioner if interventions unsuccessful or patient reports new pain  Outcome: Progressing     Problem: INFECTION - ADULT  Goal: Absence or prevention of progression during hospitalization  Description: INTERVENTIONS:  - Assess and monitor for signs and symptoms of infection  - Monitor lab/diagnostic results  - Monitor all insertion sites, i e  indwelling lines, tubes, and drains  - Monitor endotracheal if appropriate and nasal secretions for changes in amount and color  - [place] appropriate cooling/warming therapies per order  - Administer medications as ordered  - Instruct and encourage patient and family to use good hand hygiene technique  - Identify and instruct in appropriate isolation precautions for identified infection/condition  Outcome: Progressing     Problem: SAFETY ADULT  Goal: Patient will remain free of falls  Description: INTERVENTIONS:  - Educate patient/family on patient safety including physical limitations  - Instruct patient to call for assistance with activity   - Consult OT/PT to assist with strengthening/mobility   - Keep Call bell within reach  - Keep bed low and locked with side rails adjusted as appropriate  - Keep care items and personal belongings within reach  - Initiate and maintain comfort rounds  - Make Fall Risk Sign visible to staff  - Offer Toileting every 2 Hours, in advance of need  - Initiate/Maintain bed alarm  - Obtain necessary fall risk management equipment: yes  - Apply yellow socks and bracelet for high fall risk patients  - Consider moving patient to room near nurses station  Outcome: Progressing  Goal: Maintain or return to baseline ADL function  Description: INTERVENTIONS:  -  Assess patient's ability to carry out ADLs; assess patient's baseline for ADL function and identify physical deficits which impact ability to perform ADLs (bathing, care of mouth/teeth, toileting, grooming, dressing, etc )  - Assess/evaluate cause of self-care deficits   - Assess range of motion  - Assess patient's mobility; develop plan if impaired  - Assess patient's need for assistive devices and provide as appropriate  - Encourage maximum independence but intervene and supervise when necessary  - Involve family in performance of ADLs  - Assess for home care needs following discharge   - Consider OT consult to assist with ADL evaluation and planning for discharge  - Provide patient education as appropriate  Outcome: Progressing  Goal: Maintains/Returns to pre admission functional level  Description: INTERVENTIONS:  - Perform BMAT or MOVE assessment daily    - Set and communicate daily mobility goal to care team and patient/family/caregiver  - Collaborate with rehabilitation services on mobility goals if consulted  - Perform Range of Motion 3 times a day  - Reposition patient every 2 hours    - Dangle patient 3 times a day  - Stand patient 3 times a day  - Ambulate patient 3 times a day  - Out of bed to chair 3 times a day   - Out of bed for meals 3 times a day  - Out of bed for toileting  - Record patient progress and toleration of activity level   Outcome: Progressing     Problem: CARDIOVASCULAR - ADULT  Goal: Maintains optimal cardiac output and hemodynamic stability  Description: INTERVENTIONS:  - Monitor I/O, vital signs and rhythm  - Monitor for S/S and trends of decreased cardiac output  - Administer and titrate ordered vasoactive medications to optimize hemodynamic stability  - Assess quality of pulses, skin color and temperature  - Assess for signs of decreased coronary artery perfusion  - Instruct patient to report change in severity of symptoms  Outcome: Progressing  Goal: Absence of cardiac dysrhythmias or at baseline rhythm  Description: INTERVENTIONS:  - Continuous cardiac monitoring, vital signs, obtain 12 lead EKG if ordered  - Administer antiarrhythmic and heart rate control medications as ordered  - Monitor electrolytes and administer replacement therapy as ordered  Outcome: Progressing     Problem: Potential for Falls  Goal: Patient will remain free of falls  Description: INTERVENTIONS:  - Educate patient/family on patient safety including physical limitations  - Instruct patient to call for assistance with activity   - Consult OT/PT to assist with strengthening/mobility   - Keep Call bell within reach  - Keep bed low and locked with side rails adjusted as appropriate  - Keep care items and personal belongings within reach  - Initiate and maintain comfort rounds  - Make Fall Risk Sign visible to staff  - Offer Toileting every 2 Hours, in advance of need  - Initiate/Maintain bed alarm  - Obtain necessary fall risk management equipment: yes  - Apply yellow socks and bracelet for high fall risk patients  - Consider moving patient to room near nurses station  Outcome: Progressing     Problem: MOBILITY - ADULT  Goal: Maintain or return to baseline ADL function  Description: INTERVENTIONS:  -  Assess patient's ability to carry out ADLs; assess patient's baseline for ADL function and identify physical deficits which impact ability to perform ADLs (bathing, care of mouth/teeth, toileting, grooming, dressing, etc )  - Assess/evaluate cause of self-care deficits   - Assess range of motion  - Assess patient's mobility; develop plan if impaired  - Assess patient's need for assistive devices and provide as appropriate  - Encourage maximum independence but intervene and supervise when necessary  - Involve family in performance of ADLs  - Assess for home care needs following discharge   - Consider OT consult to assist with ADL evaluation and planning for discharge  - Provide patient education as appropriate  Outcome: Progressing  Goal: Maintains/Returns to pre admission functional level  Description: INTERVENTIONS:  - Perform BMAT or MOVE assessment daily    - Set and communicate daily mobility goal to care team and patient/family/caregiver  - Collaborate with rehabilitation services on mobility goals if consulted  - Perform Range of Motion 3 times a day  - Reposition patient every 2 hours    - Dangle patient 3 times a day  - Stand patient 3 times a day  - Ambulate patient 3 times a day  - Out of bed to chair 3 times a day   - Out of bed for meals 3 times a day  - Out of bed for toileting  - Record patient progress and toleration of activity level   Outcome: Progressing     Problem: RESPIRATORY - ADULT  Goal: Achieves optimal ventilation and oxygenation  Description: INTERVENTIONS:  - Assess for changes in respiratory status  - Assess for changes in mentation and behavior  - Position to facilitate oxygenation and minimize respiratory effort  - Oxygen administered by appropriate delivery if ordered  - Initiate smoking cessation education as indicated  - Encourage broncho-pulmonary hygiene including cough, deep breathe, Incentive Spirometry  - Assess the need for suctioning and aspirate as needed  - Assess and instruct to report SOB or any respiratory difficulty  - Respiratory Therapy support as indicated  Outcome: Progressing

## 2022-08-13 NOTE — PLAN OF CARE
Problem: PAIN - ADULT  Goal: Verbalizes/displays adequate comfort level or baseline comfort level  Description: Interventions:  - Encourage patient to monitor pain and request assistance  - Assess pain using appropriate pain scale  - Administer analgesics based on type and severity of pain and evaluate response  - Implement non-pharmacological measures as appropriate and evaluate response  - Consider cultural and social influences on pain and pain management  - Notify physician/advanced practitioner if interventions unsuccessful or patient reports new pain  Outcome: Progressing     Problem: INFECTION - ADULT  Goal: Absence or prevention of progression during hospitalization  Description: INTERVENTIONS:  - Assess and monitor for signs and symptoms of infection  - Monitor lab/diagnostic results  - Monitor all insertion sites, i e  indwelling lines, tubes, and drains  - Monitor endotracheal if appropriate and nasal secretions for changes in amount and color  - [place] appropriate cooling/warming therapies per order  - Administer medications as ordered  - Instruct and encourage patient and family to use good hand hygiene technique  - Identify and instruct in appropriate isolation precautions for identified infection/condition  Outcome: Progressing     Problem: SAFETY ADULT  Goal: Patient will remain free of falls  Description: INTERVENTIONS:  - Educate patient/family on patient safety including physical limitations  - Instruct patient to call for assistance with activity   - Consult OT/PT to assist with strengthening/mobility   - Keep Call bell within reach  - Keep bed low and locked with side rails adjusted as appropriate  - Keep care items and personal belongings within reach  - Initiate and maintain comfort rounds  - Make Fall Risk Sign visible to staff  - Offer Toileting every 2 Hours, in advance of need  - Initiate/Maintain bed alarm  - Obtain necessary fall risk management equipment: yes  - Apply yellow socks and bracelet for high fall risk patients  - Consider moving patient to room near nurses station  Outcome: Progressing  Goal: Maintain or return to baseline ADL function  Description: INTERVENTIONS:  -  Assess patient's ability to carry out ADLs; assess patient's baseline for ADL function and identify physical deficits which impact ability to perform ADLs (bathing, care of mouth/teeth, toileting, grooming, dressing, etc )  - Assess/evaluate cause of self-care deficits   - Assess range of motion  - Assess patient's mobility; develop plan if impaired  - Assess patient's need for assistive devices and provide as appropriate  - Encourage maximum independence but intervene and supervise when necessary  - Involve family in performance of ADLs  - Assess for home care needs following discharge   - Consider OT consult to assist with ADL evaluation and planning for discharge  - Provide patient education as appropriate  Outcome: Progressing  Goal: Maintains/Returns to pre admission functional level  Description: INTERVENTIONS:  - Perform BMAT or MOVE assessment daily    - Set and communicate daily mobility goal to care team and patient/family/caregiver  - Collaborate with rehabilitation services on mobility goals if consulted  - Perform Range of Motion 3 times a day  - Reposition patient every 2 hours    - Dangle patient 3 times a day  - Stand patient 3 times a day  - Ambulate patient 3 times a day  - Out of bed to chair 3 times a day   - Out of bed for meals 3 times a day  - Out of bed for toileting  - Record patient progress and toleration of activity level   Outcome: Progressing     Problem: CARDIOVASCULAR - ADULT  Goal: Maintains optimal cardiac output and hemodynamic stability  Description: INTERVENTIONS:  - Monitor I/O, vital signs and rhythm  - Monitor for S/S and trends of decreased cardiac output  - Administer and titrate ordered vasoactive medications to optimize hemodynamic stability  - Assess quality of pulses, skin color and temperature  - Assess for signs of decreased coronary artery perfusion  - Instruct patient to report change in severity of symptoms  Outcome: Progressing  Goal: Absence of cardiac dysrhythmias or at baseline rhythm  Description: INTERVENTIONS:  - Continuous cardiac monitoring, vital signs, obtain 12 lead EKG if ordered  - Administer antiarrhythmic and heart rate control medications as ordered  - Monitor electrolytes and administer replacement therapy as ordered  Outcome: Progressing     Problem: Potential for Falls  Goal: Patient will remain free of falls  Description: INTERVENTIONS:  - Educate patient/family on patient safety including physical limitations  - Instruct patient to call for assistance with activity   - Consult OT/PT to assist with strengthening/mobility   - Keep Call bell within reach  - Keep bed low and locked with side rails adjusted as appropriate  - Keep care items and personal belongings within reach  - Initiate and maintain comfort rounds  - Make Fall Risk Sign visible to staff  - Offer Toileting every 2 Hours, in advance of need  - Initiate/Maintain bed alarm  - Obtain necessary fall risk management equipment: yes  - Apply yellow socks and bracelet for high fall risk patients  - Consider moving patient to room near nurses station  Outcome: Progressing     Problem: MOBILITY - ADULT  Goal: Maintain or return to baseline ADL function  Description: INTERVENTIONS:  -  Assess patient's ability to carry out ADLs; assess patient's baseline for ADL function and identify physical deficits which impact ability to perform ADLs (bathing, care of mouth/teeth, toileting, grooming, dressing, etc )  - Assess/evaluate cause of self-care deficits   - Assess range of motion  - Assess patient's mobility; develop plan if impaired  - Assess patient's need for assistive devices and provide as appropriate  - Encourage maximum independence but intervene and supervise when necessary  - Involve family in performance of ADLs  - Assess for home care needs following discharge   - Consider OT consult to assist with ADL evaluation and planning for discharge  - Provide patient education as appropriate  Outcome: Progressing  Goal: Maintains/Returns to pre admission functional level  Description: INTERVENTIONS:  - Perform BMAT or MOVE assessment daily    - Set and communicate daily mobility goal to care team and patient/family/caregiver  - Collaborate with rehabilitation services on mobility goals if consulted  - Perform Range of Motion 3 times a day  - Reposition patient every 2 hours    - Dangle patient 3 times a day  - Stand patient 3 times a day  - Ambulate patient 3 times a day  - Out of bed to chair 3 times a day   - Out of bed for meals 3 times a day  - Out of bed for toileting  - Record patient progress and toleration of activity level   Outcome: Progressing     Problem: RESPIRATORY - ADULT  Goal: Achieves optimal ventilation and oxygenation  Description: INTERVENTIONS:  - Assess for changes in respiratory status  - Assess for changes in mentation and behavior  - Position to facilitate oxygenation and minimize respiratory effort  - Oxygen administered by appropriate delivery if ordered  - Initiate smoking cessation education as indicated  - Encourage broncho-pulmonary hygiene including cough, deep breathe, Incentive Spirometry  - Assess the need for suctioning and aspirate as needed  - Assess and instruct to report SOB or any respiratory difficulty  - Respiratory Therapy support as indicated  Outcome: Progressing

## 2022-08-13 NOTE — PROGRESS NOTES
[address] Internal Medicine Progress Note  Patient: Ingrid Duncan 80 y o  female   MRN: [id number]  PCP: Brayden Meneses MD  Unit/Bed#: [address] [place] [address] Encounter: [id number]  Date Of Visit: 08/13/22    Problem List:    Principal Problem:    Generalized weakness  Active Problems:    Chronic atrial fibrillation ([facility] )    H/O mitral valve replacement with mechanical valve    Type 2 diabetes mellitus without complication, without long-term current use of insulin ([facility])    Essential hypertension    CVA (cerebral vascular accident) ([facility] )    Acute cystitis without hematuria    Anemia    Obstructive sleep apnea    Hypercholesteremia    Other specified hypothyroidism    Vitamin B12 deficiency      Assessment & Plan:    * Generalized weakness  Assessment & Plan  Presented with generalized weakness, fatigue, lightheadedness over last few weeks   Noted to be afebrile, bradycardic stable blood pressure  CT head unremarkable  Neuro exam nonfocal at baseline  Likely secondary to bradycardia  Mild anemia and possible UTI but unlikely to be only etiology of generalized weakness but possibly contributing  Improved after resolution of bradycardia  · Telemetry-heart rate is improving  · Continue to hold Hold nadolol, digoxin  · Monitor blood pressure, orthostasis-negative  · TSH, B12-normal  · Trend CBC-stable  · Continue treatment for UTI  · Cardiology follow-up after discharge for extended monitor  · PT/OT      Anemia  Assessment & Plan  Hemoglobin appears stable since April around 9-10 grams/deciliter , hemoglobin was normal prior to that  Normocytic  Iron studies consistent with iron deficiency  B12 normal   Folate elevated   Stool occult negative  Hemolysis smear negative  LDH mildly elevated   haptoglobin low  Denies any overt bleeding    Hemoglobin stable  · Monitor H&H   · Continue p o  iron supplement   · Consider IV iron as outpatient after discharge after treatment of UTI  · Hematology evaluation/referral regarding concerns of intravascular hemolysis and IV iron infusion    Acute cystitis without hematuria  Assessment & Plan  Mild symptoms of intermittent urgency  Denies fever chills or dysuria  UA concerning for UTI, urine culture with growth of E coli  Symptoms are improving  · Continue IV ceftriaxone  · Follow-up urine culture sensitivity  · Bladder scan- CC    CVA (cerebral vascular accident) ([facility] )  Assessment & Plan   on aspirin, Lipitor  INR is therapeutic on Coumadin    Essential hypertension  Assessment & Plan  Blood pressure stable  Orthostasis negative    Type 2 diabetes mellitus without complication, without long-term current use of insulin [facility])  Assessment & Plan  Lab Results   Component Value Date    HGBA1C 6 5 (H) 07/06/2022       Recent Labs     08/12/22  0654 08/12/22  1129 08/12/22  1614 08/12/22  2200   POCGLU 118 96 116 107       Blood Sugar Average: Last 72 hrs:  (P) 129     Reports good control without any episodes of hypoglycemia   hold metformin at present  Sliding scale    H/O mitral valve replacement with mechanical valve  Assessment & Plan  On Coumadin, goal PT/INR 2 5-3 5  INR therapeutic  Echocardiogram done on 08/11, EF 62%, basal and inferolateral wall hypokinesis  Mechanical mitral valve      Chronic atrial fibrillation (HCC)  Assessment & Plan  Noted with slow ventricular rate, likely contributing to patient's symptoms  Patient took nadolol on the day of admission, also on digoxin  Digoxin level 0 8  Heart rate appears to be improving, currently in 60 -70 s  · Telemetry  · Continued Hold nadolol, digoxin  · Monitor heart rate/blood pressure  · Cardiology following, input appreciated, recommended to continue to hold nadolol and digoxin    Outpatient extended monitor  · Continue Coumadin with INR 2 5-3 5    Vitamin B12 deficiency  Assessment & Plan  B12 normal    Other specified hypothyroidism  Assessment & Plan  TSH normal    Hypercholesteremia  Assessment & Plan  On statin    Obstructive sleep apnea  Assessment & Plan  On CPAP, reports compliance          VTE Pharmacologic Prophylaxis: VTE Score: 4 Moderate Risk (Score 3-4) - Pharmacological DVT Prophylaxis Ordered: warfarin (Coumadin)  Patient Centered Rounds: I performed bedside rounds with nursing staff today  Discussions with Specialists or Other Care Team Provider:  Cardiology    Education and Discussions with Family / Patient: Updated  (daughter) via phone  Time Spent for Care: 45 minutes  More than 50% of total time spent on counseling and coordination of care as described above  Current Length of Stay: 2 day(s)  Current Patient Status: Inpatient   Certification Statement: The patient will continue to require additional inpatient hospital stay due to UTI, bradycardia  Discharge Plan: Anticipate discharge in 24-48 hrs to home with home services  Code Status: Level 1 - Full Code    Subjective:     Reports improvement in fatigue, ambulating in the room  Denies any chest pain shortness of breath or dizziness  Denies any dysuria    Objective:     Vitals:   Temp (24hrs), Av 2 °F (36 8 °C), Min:97 7 °F (36 5 °C), Max:98 6 °F (37 °C)    Temp:  [97 7 °F (36 5 °C)-98 6 °F (37 °C)] 98 1 °F (36 7 °C)  HR:  [49-68] 68  Resp:  [18-19] 18  BP: (101-162)/(56-85) 140/67  SpO2:  [92 %-97 %] 96 %  Body mass index is 24 07 kg/m²  Input and Output Summary (last 24 hours): Intake/Output Summary (Last 24 hours) at 2022 0940  Last data filed at 2022 0401  Gross per 24 hour   Intake 640 ml   Output 2350 ml   Net -1710 ml       Physical Exam:   Physical Exam  Constitutional:       General: She is not in acute distress  HENT:      Head: Normocephalic and atraumatic  Cardiovascular:      Rate and Rhythm: Bradycardia present  Pulmonary:      Effort: Pulmonary effort is normal  No respiratory distress  Breath sounds: Normal breath sounds  No wheezing or rales        Comments: Diminished but clear  Abdominal:      General: Bowel sounds are normal  There is no distension  Palpations: Abdomen is soft  Tenderness: There is no abdominal tenderness  There is no guarding or rebound  Musculoskeletal:      Right lower leg: No edema  Left lower leg: No edema  Skin:     General: Skin is warm and dry  Findings: No rash  Neurological:      General: No focal deficit present  Mental Status: She is alert and oriented to person, place, and time  Mental status is at baseline  Additional Data:     Labs:  Results from last 7 days   Lab Units 08/13/22  0435 08/12/22  0538 08/11/22  1215   WBC Thousand/uL 6 60   < > 5 29   HEMOGLOBIN g/dL 10 1*   < > 10 1*   HEMATOCRIT % 35 2   < > 34 6*   PLATELETS Thousands/uL 237   < > 242   NEUTROS PCT %  --   --  61   LYMPHS PCT %  --   --  25   MONOS PCT %  --   --  10   EOS PCT %  --   --  3    < > = values in this interval not displayed  Results from last 7 days   Lab Units 08/13/22  0435 08/12/22  0538 08/11/22  1215   SODIUM mmol/L 142   < > 141   POTASSIUM mmol/L 4 0   < > 3 8   CHLORIDE mmol/L 104   < > 104   CO2 mmol/L 30   < > 28   BUN mg/dL 19   < > 19   CREATININE mg/dL 0 70   < > 0 67   ANION GAP mmol/L 8   < > 9   CALCIUM mg/dL 9 0   < > 8 9   ALBUMIN g/dL  --   --  4 1   TOTAL BILIRUBIN mg/dL  --   --  0 54   ALK PHOS U/L  --   --  76   ALT U/L  --   --  33   AST U/L  --   --  28   GLUCOSE RANDOM mg/dL 129   < > 122    < > = values in this interval not displayed       Results from last 7 days   Lab Units 08/12/22  0538   INR  2 71*     Results from last 7 days   Lab Units 08/13/22  0714 08/12/22  2200 08/12/22  1614 08/12/22  1129 08/12/22  0654 08/11/22  2120 08/11/22  1608   POC GLUCOSE mg/dl 126 107 116 96 118 86 172*               Lines/Drains:  Invasive Devices  Report    Peripheral Intravenous Line  Duration           Peripheral IV 08/11/22 Right Antecubital 1 day                  Telemetry:  Telemetry Orders (From admission, onward)             48 Hour Telemetry Monitoring  (ED Bridging Orders Panel)  Continuous x 48 hours        References:    Telemetry Guidelines   Question:  Reason for 48 Hour Telemetry  Answer:  Arrhythmias Requiring Medical Therapy (eg  SVT, Vtach/fib, Bradycardia, Uncontrolled A-fib)                 Telemetry Reviewed: AFib with SVR  Indication for Continued Telemetry Use: Arrthymias requiring medical therapy             Imaging: Reviewed radiology reports from this admission including: chest xray and CT head    Recent Cultures (last 7 days):   Results from last 7 days   Lab Units 08/11/22  1308   URINE CULTURE  >100,000 cfu/ml Escherichia coli*       Last 24 Hours Medication List:   Current Facility-Administered Medications   Medication Dose Route Frequency Provider Last Rate    acetaminophen  650 mg Oral Q6H PRN Tesfaye Williamson MD      aspirin  81 mg Oral Daily Tesfaye Williamson MD      atorvastatin  80 mg Oral Daily With Al Gonzalez MD      cefTRIAXone  1,000 mg Intravenous Q24H Tesfaye Williamson MD Stopped (08/12/22 1312)    docusate sodium  100 mg Oral BID Tesfaye Williamson MD      famotidine  20 mg Oral BID Tesfaye Williamson MD      ferrous sulfate  325 mg Oral Daily With Breakfast Tesfaye Williamson MD      insulin lispro  1-5 Units Subcutaneous TID AC Tesfaye Williamson MD      insulin lispro  1-5 Units Subcutaneous HS Tesfaye Williamson MD      melatonin  3 mg Oral HS Tesfaye Williamson MD      multivitamin-minerals  1 tablet Oral Daily Tesfaye Williamson MD      polyethylene glycol  17 g Oral Daily PRN Tesfaye Williamson MD      warfarin  2 5 mg Oral Once per day on Mon Wed Fri Tesfaye Williamson MD      warfarin  5 mg Oral Once per day on Sun Tue Thu Sat Tesfaye Williamson MD          Today, Patient Was Seen By: Tesfaye Williamson MD    ** Please Note: "This note has been constructed using a voice recognition system  Therefore there may be syntax, spelling, and/or grammatical errors  Please call if you have any questions  "**

## 2022-08-13 NOTE — PLAN OF CARE
Problem: PAIN - ADULT  Goal: Verbalizes/displays adequate comfort level or baseline comfort level  Description: Interventions:  - Encourage patient to monitor pain and request assistance  - Assess pain using appropriate pain scale  - Administer analgesics based on type and severity of pain and evaluate response  - Implement non-pharmacological measures as appropriate and evaluate response  - Consider cultural and social influences on pain and pain management  - Notify physician/advanced practitioner if interventions unsuccessful or patient reports new pain  Outcome: Progressing     Problem: INFECTION - ADULT  Goal: Absence or prevention of progression during hospitalization  Description: INTERVENTIONS:  - Assess and monitor for signs and symptoms of infection  - Monitor lab/diagnostic results  - Monitor all insertion sites, i e  indwelling lines, tubes, and drains  - Monitor endotracheal if appropriate and nasal secretions for changes in amount and color  - [place] appropriate cooling/warming therapies per order  - Administer medications as ordered  - Instruct and encourage patient and family to use good hand hygiene technique  - Identify and instruct in appropriate isolation precautions for identified infection/condition  Outcome: Progressing     Problem: SAFETY ADULT  Goal: Patient will remain free of falls  Description: INTERVENTIONS:  - Educate patient/family on patient safety including physical limitations  - Instruct patient to call for assistance with activity   - Consult OT/PT to assist with strengthening/mobility   - Keep Call bell within reach  - Keep bed low and locked with side rails adjusted as appropriate  - Keep care items and personal belongings within reach  - Initiate and maintain comfort rounds  - Make Fall Risk Sign visible to staff  - Offer Toileting every 2 Hours, in advance of need  - Initiate/Maintain bed alarm  - Obtain necessary fall risk management equipment: yes  - Apply yellow socks and bracelet for high fall risk patients  - Consider moving patient to room near nurses station  Outcome: Progressing  Goal: Maintain or return to baseline ADL function  Description: INTERVENTIONS:  -  Assess patient's ability to carry out ADLs; assess patient's baseline for ADL function and identify physical deficits which impact ability to perform ADLs (bathing, care of mouth/teeth, toileting, grooming, dressing, etc )  - Assess/evaluate cause of self-care deficits   - Assess range of motion  - Assess patient's mobility; develop plan if impaired  - Assess patient's need for assistive devices and provide as appropriate  - Encourage maximum independence but intervene and supervise when necessary  - Involve family in performance of ADLs  - Assess for home care needs following discharge   - Consider OT consult to assist with ADL evaluation and planning for discharge  - Provide patient education as appropriate  Outcome: Progressing  Goal: Maintains/Returns to pre admission functional level  Description: INTERVENTIONS:  - Perform BMAT or MOVE assessment daily    - Set and communicate daily mobility goal to care team and patient/family/caregiver  - Collaborate with rehabilitation services on mobility goals if consulted  - Perform Range of Motion 3 times a day  - Reposition patient every 2 hours    - Dangle patient 3 times a day  - Stand patient 3 times a day  - Ambulate patient 3 times a day  - Out of bed to chair 3 times a day   - Out of bed for meals 3 times a day  - Out of bed for toileting  - Record patient progress and toleration of activity level   Outcome: Progressing     Problem: CARDIOVASCULAR - ADULT  Goal: Maintains optimal cardiac output and hemodynamic stability  Description: INTERVENTIONS:  - Monitor I/O, vital signs and rhythm  - Monitor for S/S and trends of decreased cardiac output  - Administer and titrate ordered vasoactive medications to optimize hemodynamic stability  - Assess quality of pulses, skin color and temperature  - Assess for signs of decreased coronary artery perfusion  - Instruct patient to report change in severity of symptoms  Outcome: Progressing  Goal: Absence of cardiac dysrhythmias or at baseline rhythm  Description: INTERVENTIONS:  - Continuous cardiac monitoring, vital signs, obtain 12 lead EKG if ordered  - Administer antiarrhythmic and heart rate control medications as ordered  - Monitor electrolytes and administer replacement therapy as ordered  Outcome: Progressing     Problem: Potential for Falls  Goal: Patient will remain free of falls  Description: INTERVENTIONS:  - Educate patient/family on patient safety including physical limitations  - Instruct patient to call for assistance with activity   - Consult OT/PT to assist with strengthening/mobility   - Keep Call bell within reach  - Keep bed low and locked with side rails adjusted as appropriate  - Keep care items and personal belongings within reach  - Initiate and maintain comfort rounds  - Make Fall Risk Sign visible to staff  - Offer Toileting every 2 Hours, in advance of need  - Initiate/Maintain bed alarm  - Obtain necessary fall risk management equipment: yes  - Apply yellow socks and bracelet for high fall risk patients  - Consider moving patient to room near nurses station  Outcome: Progressing     Problem: MOBILITY - ADULT  Goal: Maintain or return to baseline ADL function  Description: INTERVENTIONS:  -  Assess patient's ability to carry out ADLs; assess patient's baseline for ADL function and identify physical deficits which impact ability to perform ADLs (bathing, care of mouth/teeth, toileting, grooming, dressing, etc )  - Assess/evaluate cause of self-care deficits   - Assess range of motion  - Assess patient's mobility; develop plan if impaired  - Assess patient's need for assistive devices and provide as appropriate  - Encourage maximum independence but intervene and supervise when necessary  - Involve family in performance of ADLs  - Assess for home care needs following discharge   - Consider OT consult to assist with ADL evaluation and planning for discharge  - Provide patient education as appropriate  Outcome: Progressing  Goal: Maintains/Returns to pre admission functional level  Description: INTERVENTIONS:  - Perform BMAT or MOVE assessment daily    - Set and communicate daily mobility goal to care team and patient/family/caregiver  - Collaborate with rehabilitation services on mobility goals if consulted  - Perform Range of Motion 3 times a day  - Reposition patient every 2 hours    - Dangle patient 3 times a day  - Stand patient 3 times a day  - Ambulate patient 3 times a day  - Out of bed to chair 3 times a day   - Out of bed for meals 3 times a day  - Out of bed for toileting  - Record patient progress and toleration of activity level   Outcome: Progressing     Problem: RESPIRATORY - ADULT  Goal: Achieves optimal ventilation and oxygenation  Description: INTERVENTIONS:  - Assess for changes in respiratory status  - Assess for changes in mentation and behavior  - Position to facilitate oxygenation and minimize respiratory effort  - Oxygen administered by appropriate delivery if ordered  - Initiate smoking cessation education as indicated  - Encourage broncho-pulmonary hygiene including cough, deep breathe, Incentive Spirometry  - Assess the need for suctioning and aspirate as needed  - Assess and instruct to report SOB or any respiratory difficulty  - Respiratory Therapy support as indicated  Outcome: Progressing

## 2022-08-13 NOTE — PROGRESS NOTES
Progress Note - Cardiology   [facility]     Chas Rodriguez 80 y o  female MRN: [id number]  : 1940  Unit/Bed#: 2 [place] [address] Encounter: [id number]    ASSESSMENT:  Chronic atrial fibrillation with slow ventricular response  Improved after discontinuing nadolol and digoxin    S/p mechanical mitral valve replacement  On chronic Coumadin therapy  Goal INR is between 2 5 and 3 5    Hypertension  Diabetes mellitus  Recent CVA  History of macular degeneration  Anemia  UTI        RECOMMENDATIONS:  Continue aspirin, atorvastatin and Coumadin  Keep INR between 2 5 and 3 5  Management of UTI, diabetes mellitus and anemia as per primary service  Outpatient extended ambulatory monitor  Cardiac status is stable          Subjective / Objective:     Review of Systems   Patient is awake and alert and denies any new or acute cardiac symptoms  Vitals: Blood pressure 140/67, pulse 68, temperature 98 1 °F (36 7 °C), resp  rate 18, height 5' 5 5" (1 664 m), weight 66 6 kg (146 lb 14 4 oz), SpO2 96 %  Vitals:    22 0600 22 0536   Weight: 67 8 kg (149 lb 8 oz) 66 6 kg (146 lb 14 4 oz)     Body mass index is 24 07 kg/m²  BP Readings from Last 3 Encounters:   22 140/67   22 130/70   22 153/67     Orthostatic Blood Pressures    Flowsheet Row Most Recent Value   Blood Pressure 140/67 filed at 2022 0743   Patient Position - Orthostatic VS Standing - Orthostatic VS filed at 2022 1937        I/O        0701   0700  0701   0700  0701   0700    P  O   890     IV Piggyback  50     Total Intake(mL/kg)  940 (14 1)     Urine (mL/kg/hr) 800 2500 (1 6)     Total Output 800 2500     Net -800 -1560                Invasive Devices  Report    Peripheral Intravenous Line  Duration           Peripheral IV 22 Right Antecubital 1 day                  Intake/Output Summary (Last 24 hours) at 2022 0948  Last data filed at 2022 0401  Gross per 24 hour   Intake 640 ml   Output 2350 ml   Net -1710 ml         Physical Exam:     Neurologic:  Alert & oriented x 3,  no focal deficits noted   Constitutional:  Well developed, well nourished,  With no acute distress  Eyes:  PERRL, conjunctiva normal   HENT:  Atraumatic, external ears normal, nose normal,   NECK: Normal range of motion, no tenderness, neck is supple , No JVP  Respiratory:  Bilateral air entry, mostly clear to auscultation  Cardiovascular:  Irregularly irregular, at times mildly bradycardic F5-I5 metallic with a I/VI  systolic murmur  No pericardial rub or gallop audible  GI:  Soft, nondistended, audible bowel sounds, nontender, no hepatosplenomegaly appreciated  Musculoskeletal:  No tenderness, no deformities  Extremities:  No appreciable pitting edema   Distal pulses are present  Psychiatric:  Speech and behavior appropriate             Medications/ Allergies:     Current Facility-Administered Medications   Medication Dose Route Frequency Provider Last Rate    acetaminophen  650 mg Oral Q6H PRN Chidi Colvin MD      aspirin  81 mg Oral Daily Chidi Colvin MD      atorvastatin  80 mg Oral Daily With Lisa Reese MD      cefTRIAXone  1,000 mg Intravenous Q24H Chidi Colvin MD Stopped (08/12/22 1312)    docusate sodium  100 mg Oral BID Chidi Colvin MD      famotidine  20 mg Oral BID Chidi Colvin MD      ferrous sulfate  325 mg Oral Daily With Breakfast Chidi Colvin MD      insulin lispro  1-5 Units Subcutaneous TID AC Chidi Colvin MD      insulin lispro  1-5 Units Subcutaneous HS Chidi Colvin MD      melatonin  3 mg Oral HS Chidi Colvin MD      multivitamin-minerals  1 tablet Oral Daily Chidi Colvin MD      polyethylene glycol  17 g Oral Daily PRN Chidi Colvin MD      warfarin  2 5 mg Oral Once per day on Mon Wed Fri Chidi Colvin MD      warfarin  5 mg Oral Once per day on Sun Tue Thu Sat Chidi Colvin MD       acetaminophen, 650 mg, Q6H PRN  polyethylene glycol, 17 g, Daily PRN      Allergies   Allergen Reactions    Sulfa Antibiotics     Sulfasalazine            Labs:   Troponins:      CBC with diff:  Results from last 7 days   Lab Units 08/13/22 0435 08/12/22  0538 08/11/22  1215 08/08/22  0933   WBC Thousand/uL 6 60 5 09 5 29 4 99   HEMOGLOBIN g/dL 10 1* 9 4* 10 1* 9 5*   HEMATOCRIT % 35 2 31 9* 34 6* 33 5*   MCV fL 82 81* 83 83   PLATELETS Thousands/uL 237 207 242 231   MCH pg 23 5* 23 9* 24 2* 23 5*   MCHC g/dL 28 7* 29 5* 29 2* 28 4*   RDW % 21 0* 20 6* 20 8* 20 6*   MPV fL 10 4 10 4 10 5 10 9   NRBC AUTO /100 WBCs  --   --  0  --        CMP:  Results from last 7 days   Lab Units 08/13/22 0435 08/12/22  0538 08/11/22 1215 08/08/22  0933   SODIUM mmol/L 142 143 141 142   POTASSIUM mmol/L 4 0 3 9 3 8 4 2   CHLORIDE mmol/L 104 106 104 109*   CO2 mmol/L 30 28 28 25   ANION GAP mmol/L 8 9 9 8   BUN mg/dL 19 18 19 17   CREATININE mg/dL 0 70 0 71 0 67 0 65   GLUCOSE FASTING mg/dL  --   --   --  126*   CALCIUM mg/dL 9 0 8 7 8 9 9 3   AST U/L  --   --  28 32   ALT U/L  --   --  33 32   ALK PHOS U/L  --   --  76 78   TOTAL PROTEIN g/dL  --   --  7 9 7 5   ALBUMIN g/dL  --   --  4 1 4 0   TOTAL BILIRUBIN mg/dL  --   --  0 54 0 59   EGFR ml/min/1 73sq m 80 79 82 82       Magnesium:  Results from last 7 days   Lab Units 08/11/22  1215   MAGNESIUM mg/dL 1 8     Coags:  Results from last 7 days   Lab Units 08/12/22  0538 08/11/22  1215 08/08/22  0933   PTT seconds  --  36  --    INR  2 71* 2 55* 1 68*     TSH:  Results from last 7 days   Lab Units 08/12/22  0538 08/08/22  0933   TSH 3RD GENERATON uIU/mL 1 832 1 490     No components found for: TSH3  Lipid Profile:    Hgb A1c:    NT-proBNP:   Recent Labs     08/11/22  1215   NTBNP 650*        Imaging & Testing   I have personally reviewed pertinent reports  XR chest 1 view portable    Result Date: 8/11/2022  Narrative: CHEST INDICATION:   weakness   COMPARISON:  Chest radiograph from 7/5/2022 and chest CT from 9/9/2020  EXAM PERFORMED/VIEWS:  XR CHEST PORTABLE FINDINGS: Normal heart size, median sternotomy  The lungs are clear  No pneumothorax or pleural effusion  Moderate bilateral glenohumeral degenerative disease  Impression: No acute cardiopulmonary disease  Workstation performed: [id number]     CT head without contrast    Result Date: 8/11/2022  Narrative: CT BRAIN - WITHOUT CONTRAST INDICATION:   headache/blood thinners  Patient has suspected COVID-19  COMPARISON:  3/13/2022  TECHNIQUE:  CT examination of the brain was performed  In addition to axial images, sagittal and coronal 2D reformatted images were created and submitted for interpretation  Radiation dose length product (DLP) for this visit:  933 38 mGy-cm   This examination, like all CT scans performed in the [facility], was performed utilizing techniques to minimize radiation dose exposure, including the use of iterative  reconstruction and automated exposure control  IMAGE QUALITY:  Diagnostic  FINDINGS: PARENCHYMA: Decreased attenuation is noted in periventricular and subcortical white matter demonstrating an appearance that is statistically most likely to represent mild microangiopathic change; this appearance is similar when compared to most recent prior examination  Left basal ganglia chronic infarction  No CT signs of acute infarction  No intracranial mass, mass effect or midline shift  No acute parenchymal hemorrhage  VENTRICLES AND EXTRA-AXIAL SPACES:  Ventricles and extra-axial CSF spaces are prominent commensurate with the degree of volume loss  No hydrocephalus  No acute extra-axial hemorrhage  VISUALIZED ORBITS AND PARANASAL SINUSES:  Unremarkable  CALVARIUM AND EXTRACRANIAL SOFT TISSUES:  Normal      Impression: No acute intracranial abnormality  Chronic microangiopathic changes   Workstation performed: [id number]     DXA bone density spine hip and pelvis    Result Date: 7/25/2022  Narrative: CENTRAL  DXA SCAN CLINICAL HISTORY:  80-year-old postmenopausal female  OTHER RISK FACTORS:  Prior fracture as a result of minor injury, Parental history of hip fracture status post minor injury  PHARMACOLOGIC THERAPY FOR OSTEOPOROSIS:  None  TECHNIQUE: Bone densitometry was performed using a ArdelyxXA   bone densitometer  Regions of interest appear properly placed  COMPARISON: 6/27/2017  RESULTS: LUMBAR SPINE L1-L4 : BMD  1 172  gm/cm2 T-score -0 2 LEFT TOTAL HIP: BMD: 0 799  gm/cm2 T-score: -1 7 LEFT FEMORAL NECK: BMD: 0 85  gm/cm2 T score: -1 4 RIGHT TOTAL HIP: BMD:  0 906  gm/cm2 T-score: -0 8 RIGHT FEMORAL NECK: BMD:  0 943  gm/cm2  T score: -0 7     Impression: 1  Low bone mass (osteopenia)  2   Since a DXA study from 6/27/2017, there has been: A  STATISTICALLY SIGNIFICANT DECREASE in bone mineral density of  -0 045 g/cm2 (-5 0)% in the hips  3   The 10 year risk of hip fracture is 17 3% with the 10 year risk of major osteoporotic fracture being 31 3% as calculated by the [facility]/WHO fracture risk assessment tool (FRAX)  4   The current NOF guidelines recommend treating patients with a T-score of -2 5 or less in the lumbar spine or hips, or in post-menopausal women and men over the age of 48 with low bone mass (osteopenia) and a FRAX 10 year risk score of >3% for hip fracture and/or >20% for major osteoporotic fracture  5   The NOF recommends follow-up DXA in 1-2 years after initiating therapy for osteoporosis and every 2 years thereafter  More frequent evaluation is appropriate for patients with conditions associated with rapid bone loss, such as glucocorticoid therapy  The interval between DXA screenings may be longer for individuals without major risk factors and initial T-score in the normal or upper low bone mass range   The FRAX algorithm has certain limitations: -FRAX has not been validated in patients currently or previously treated with pharmacotherapy for osteoporosis  In such patients, clinical judgment must be exercised in interpreting FRAX scores  -Prior hip, vertebral and humeral fragility fractures appear to confer greater risk of subsequent fracture than fractures at other sites (this is especially true for individuals with severe vertebral fractures), but quantification of this incremental risk is not possible with FRAX  -FRAX underestimates fracture risk in patients with history of multiple fragility fractures  -FRAX may underestimate fracture risk in patients with history of frequent falls  -It is not appropriate to use FRAX to monitor treatment response  WHO CLASSIFICATION: Normal (a T-score of -1 0 or higher) Low bone mineral density (a T-score of less than -1 0 but higher than -2 5) Osteoporosis (a T-score of -2 5 or less) Severe osteoporosis (a T-score of -2 5 or less with a fragility fracture) LEAST SIGNIFICANT CHANGE AT 95% C I: Lumbar spine: 0 036 gm/cm2 (3 2%)  Total hip: 0 018 gm/cm2 (2 0%)  Forearm: 0 024 gm/cm2 (3 2%)  Workstation performed: [id number]        EKG / Monitor: Personally reviewed  Atrial fibrillation, ventricular rate ranges from 57 to 72 per minute        Dr Nicki Miles MD, [facility] [place]      "This note has been constructed using a voice recognition system  Therefore there may be syntax, spelling, and/or grammatical errors   Please call if you have any questions  "

## 2022-08-14 VITALS
SYSTOLIC BLOOD PRESSURE: 149 MMHG | DIASTOLIC BLOOD PRESSURE: 72 MMHG | RESPIRATION RATE: 18 BRPM | BODY MASS INDEX: 23.38 KG/M2 | HEIGHT: 66 IN | HEART RATE: 69 BPM | OXYGEN SATURATION: 93 % | TEMPERATURE: 98.4 F | WEIGHT: 145.5 LBS

## 2022-08-14 PROBLEM — R53.1 GENERALIZED WEAKNESS: Status: RESOLVED | Noted: 2022-08-12 | Resolved: 2022-08-14

## 2022-08-14 PROBLEM — N30.00 ACUTE CYSTITIS WITHOUT HEMATURIA: Status: RESOLVED | Noted: 2022-08-12 | Resolved: 2022-08-14

## 2022-08-14 LAB
ANION GAP SERPL CALCULATED.3IONS-SCNC: 7 MMOL/L (ref 4–13)
BUN SERPL-MCNC: 21 MG/DL (ref 5–25)
CALCIUM SERPL-MCNC: 8.6 MG/DL (ref 8.3–10.1)
CHLORIDE SERPL-SCNC: 106 MMOL/L (ref 96–108)
CO2 SERPL-SCNC: 28 MMOL/L (ref 21–32)
CREAT SERPL-MCNC: 0.6 MG/DL (ref 0.6–1.3)
ERYTHROCYTE [DISTWIDTH] IN BLOOD BY AUTOMATED COUNT: 20.9 % (ref 11.6–15.1)
GFR SERPL CREATININE-BSD FRML MDRD: 85 ML/MIN/1.73SQ M
GLUCOSE SERPL-MCNC: 116 MG/DL (ref 65–140)
GLUCOSE SERPL-MCNC: 140 MG/DL (ref 65–140)
GLUCOSE SERPL-MCNC: 144 MG/DL (ref 65–140)
HCT VFR BLD AUTO: 33.8 % (ref 34.8–46.1)
HGB BLD-MCNC: 9.7 G/DL (ref 11.5–15.4)
INR PPP: 2.41 (ref 0.84–1.19)
MCH RBC QN AUTO: 23.5 PG (ref 26.8–34.3)
MCHC RBC AUTO-ENTMCNC: 28.7 G/DL (ref 31.4–37.4)
MCV RBC AUTO: 82 FL (ref 82–98)
PLATELET # BLD AUTO: 220 THOUSANDS/UL (ref 149–390)
PMV BLD AUTO: 10.1 FL (ref 8.9–12.7)
POTASSIUM SERPL-SCNC: 3.9 MMOL/L (ref 3.5–5.3)
PROTHROMBIN TIME: 26.3 SECONDS (ref 11.6–14.5)
RBC # BLD AUTO: 4.13 MILLION/UL (ref 3.81–5.12)
SODIUM SERPL-SCNC: 141 MMOL/L (ref 135–147)
WBC # BLD AUTO: 5.18 THOUSAND/UL (ref 4.31–10.16)

## 2022-08-14 PROCEDURE — 80048 BASIC METABOLIC PNL TOTAL CA: CPT | Performed by: INTERNAL MEDICINE

## 2022-08-14 PROCEDURE — 99232 SBSQ HOSP IP/OBS MODERATE 35: CPT | Performed by: INTERNAL MEDICINE

## 2022-08-14 PROCEDURE — 82948 REAGENT STRIP/BLOOD GLUCOSE: CPT

## 2022-08-14 PROCEDURE — 99239 HOSP IP/OBS DSCHRG MGMT >30: CPT | Performed by: INTERNAL MEDICINE

## 2022-08-14 PROCEDURE — 85027 COMPLETE CBC AUTOMATED: CPT | Performed by: INTERNAL MEDICINE

## 2022-08-14 PROCEDURE — 85610 PROTHROMBIN TIME: CPT | Performed by: INTERNAL MEDICINE

## 2022-08-14 RX ORDER — FERROUS SULFATE TAB EC 324 MG (65 MG FE EQUIVALENT) 324 (65 FE) MG
324 TABLET DELAYED RESPONSE ORAL
Qty: 180 TABLET | Refills: 0
Start: 2022-08-14

## 2022-08-14 RX ORDER — CEPHALEXIN 500 MG/1
500 CAPSULE ORAL EVERY 12 HOURS SCHEDULED
Status: DISCONTINUED | OUTPATIENT
Start: 2022-08-14 | End: 2022-08-14 | Stop reason: HOSPADM

## 2022-08-14 RX ORDER — CEPHALEXIN 500 MG/1
500 CAPSULE ORAL EVERY 12 HOURS SCHEDULED
Qty: 8 CAPSULE | Refills: 0 | Status: SHIPPED | OUTPATIENT
Start: 2022-08-14 | End: 2022-08-18

## 2022-08-14 RX ORDER — DOCUSATE SODIUM 100 MG/1
100 CAPSULE, LIQUID FILLED ORAL 2 TIMES DAILY
Refills: 0
Start: 2022-08-14 | End: 2022-08-25

## 2022-08-14 RX ORDER — FAMOTIDINE 20 MG/1
20 TABLET, FILM COATED ORAL DAILY
Qty: 60 TABLET | Refills: 0 | Status: SHIPPED | OUTPATIENT
Start: 2022-08-14 | End: 2022-10-06 | Stop reason: ALTCHOICE

## 2022-08-14 RX ORDER — POLYETHYLENE GLYCOL 3350 17 G/17G
17 POWDER, FOR SOLUTION ORAL DAILY PRN
Refills: 0
Start: 2022-08-14

## 2022-08-14 RX ADMIN — DOCUSATE SODIUM 100 MG: 100 CAPSULE, LIQUID FILLED ORAL at 08:43

## 2022-08-14 RX ADMIN — FAMOTIDINE 20 MG: 20 TABLET ORAL at 08:43

## 2022-08-14 RX ADMIN — CEPHALEXIN 500 MG: 500 CAPSULE ORAL at 13:19

## 2022-08-14 RX ADMIN — ASPIRIN 81 MG: 81 TABLET, COATED ORAL at 08:43

## 2022-08-14 RX ADMIN — Medication 1 TABLET: at 08:43

## 2022-08-14 RX ADMIN — FERROUS SULFATE TAB 325 MG (65 MG ELEMENTAL FE) 325 MG: 325 (65 FE) TAB at 07:39

## 2022-08-14 RX ADMIN — ACETAMINOPHEN 650 MG: 325 TABLET, FILM COATED ORAL at 07:39

## 2022-08-14 NOTE — DISCHARGE SUMMARY
Discharge Summary - [address] [facility]    Patient Information: Mohini Crespo 80 y o  female MRN: [id number]  Unit/Bed#: 18 [facility] Encounter: [id number]    Discharging Physician / Practitioner: Rickey Jason MD  PCP: Citlaly Espinosa MD  Admission Date: 8/11/2022  Discharge Date: 08/14/22    Reason for Admission: Weakness - Generalized (Co of increase weakness for about a week, co of headache, denies any ill exposure  Recently dx with CVA, decrease hgb)      Discharge Diagnoses:     Principal Problem (Resolved):    Generalized weakness  Active Problems:    Chronic atrial fibrillation (HCC)    H/O mitral valve replacement with mechanical valve    Type 2 diabetes mellitus without complication, without long-term current use of insulin ([facility])    Essential hypertension    CVA (cere[facility] vascular accident) ([facility] )    Anemia    Obstructive sleep apnea    Hypercholesteremia    Other specified hypothyroidism    Vitamin B12 deficiency  Resolved Problems:    Acute cystitis without hematuria        * Generalized weakness-resolved as of 8/14/2022  Assessment & Plan  Presented with generalized weakness, fatigue, lightheadedness over last few weeks   Noted to be afebrile, bradycardic stable blood pressure  CT head unremarkable  Neuro exam nonfocal at baseline  Likely secondary to bradycardia  Mild anemia and possible UTI but unlikely to be only etiology of generalized weakness but possibly contributing  Improved after resolution of bradycardia  Reports resolution of presenting symptoms  · Telemetry-heart rate is improving    · Continue to hold Hold nadolol, digoxin on discharge  · Monitor blood pressure, orthostasis-negative  · TSH, B12-normal  · Trend CBC-stable  · Continue treatment for UTI  · Cardiology follow-up after discharge for extended monitor  · PT/OT      Anemia  Assessment & Plan  Hemoglobin appears stable since April around 9-10 grams/deciliter , hemoglobin was normal prior to that  Normocytic  Iron studies consistent with iron deficiency  B12 normal   Folate elevated   Stool occult negative  Hemolysis smear negative  LDH mildly elevated   haptoglobin low  Denies any overt bleeding  Hemoglobin stable  · Monitor H&H as outpatient  · Continue p o  iron supplement   · Consider IV iron as outpatient after discharge post treatment of UTI  · Hematology referral on discharge, regarding concerns of intravascular hemolysis and IV iron infusion    CVA (cerebral vascular accident) ([address] )  Assessment & Plan   on aspirin, Lipitor  INR is therapeutic on Coumadin    Essential hypertension  Assessment & Plan  Blood pressure stable  Orthostasis negative    Type 2 diabetes mellitus without complication, without long-term current use of insulin [facility])  Assessment & Plan  Lab Results   Component Value Date    HGBA1C 6 5 (H) 07/06/2022       Recent Labs     08/12/22  0654 08/12/22  1129 08/12/22  1614 08/12/22  2200   POCGLU 118 96 116 107       Blood Sugar Average: Last 72 hrs:  (P) 129     Reports good control without any episodes of hypoglycemia   hold metformin at present  Sliding scale    H/O mitral valve replacement with mechanical valve  Assessment & Plan  On Coumadin, goal PT/INR 2 5-3 5  INR therapeutic  Echocardiogram done on 08/11, EF 62%, basal and inferolateral wall hypokinesis  Mechanical mitral valve      Chronic atrial fibrillation (HCC)  Assessment & Plan  Noted with slow ventricular rate, likely etiology of patient's symptoms  Patient took nadolol on the day of admission, also on digoxin  Digoxin level 0 8  Heart rate appears to be improving, currently in 60 -70 s  · Continued Hold nadolol, digoxin  · Monitor heart rate/blood pressure-acceptable  · Cardiology following, input appreciated, recommended to continue to hold nadolol and digoxin  Outpatient extended monitor  · Continue Coumadin with INR 2 5-3 5  · Advised to follow-up with primary cardiologist after discharge      Acute cystitis without hematuria-resolved as of 8/14/2022  Assessment & Plan  Mild symptoms of intermittent urgency  Denies fever chills or dysuria  UA concerning for UTI, urine culture with growth of E coli, sensitive to cefazolin  No evidence of urinary retention,  cc  Symptoms improved  · Initially treated with IV ceftriaxone, now will transition to cefazolin to complete 7 day    Vitamin B12 deficiency  Assessment & Plan  B12 normal    Other specified hypothyroidism  Assessment & Plan  TSH normal    Hypercholesteremia  Assessment & Plan  On statin    Obstructive sleep apnea  Assessment & Plan  On CPAP, reports compliance      Consultations During Hospital Stay:  IP CONSULT TO CARDIOLOGY    Procedures Performed:     · Telemetry monitoring    Significant Findings:     · Refer to hospital course and above listed diagnosis related plan for details    Imaging while in hospital:    XR chest 1 view portable    Result Date: 8/11/2022  Narrative: CHEST INDICATION:   weakness  COMPARISON:  Chest radiograph from 7/5/2022 and chest CT from 9/9/2020  EXAM PERFORMED/VIEWS:  XR CHEST PORTABLE FINDINGS: Normal heart size, median sternotomy  The lungs are clear  No pneumothorax or pleural effusion  Moderate bilateral glenohumeral degenerative disease  Impression: No acute cardiopulmonary disease  Workstation performed: [id number]     CT head without contrast    Result Date: 8/11/2022  Narrative: CT BRAIN - WITHOUT CONTRAST INDICATION:   headache/blood thinners  Patient has suspected COVID-19  COMPARISON:  3/13/2022  TECHNIQUE:  CT examination of the brain was performed  In addition to axial images, sagittal and coronal 2D reformatted images were created and submitted for interpretation  Radiation dose length product (DLP) for this visit:  933 38 mGy-cm     This examination, like all CT scans performed in the [facility], was performed utilizing techniques to minimize radiation dose exposure, including the use of iterative  reconstruction and automated exposure control  IMAGE QUALITY:  Diagnostic  FINDINGS: PARENCHYMA: Decreased attenuation is noted in periventricular and subcortical white matter demonstrating an appearance that is statistically most likely to represent mild microangiopathic change; this appearance is similar when compared to most recent prior examination  Left basal ganglia chronic infarction  No CT signs of acute infarction  No intracranial mass, mass effect or midline shift  No acute parenchymal hemorrhage  VENTRICLES AND EXTRA-AXIAL SPACES:  Ventricles and extra-axial CSF spaces are prominent commensurate with the degree of volume loss  No hydrocephalus  No acute extra-axial hemorrhage  VISUALIZED ORBITS AND PARANASAL SINUSES:  Unremarkable  CALVARIUM AND EXTRACRANIAL SOFT TISSUES:  Normal      Impression: No acute intracranial abnormality  Chronic microangiopathic changes  Workstation performed: [id number]         Incidental Findings:   · None    Test Results Pending at Discharge (will require follow up):   · As per After Visit Summary     Outpatient Tests Requested:  · PT/INR in 2- 3 days with Cardiology    Complications:  Refer to hospital course and above listed diagnosis related plan, if any    Hospital Course: As per HPI  Carri Mc is a 80 y o  female patient with history of diabetes mellitus type 2, chronic AFib on anticoagulation, hypertension, hypothyroidism, emphysema, history of MVR with mechanical valve, CVA, JAKE on CPAP, dyslipidemia, anemia, history of AAA who originally presented to the hospital on 8/11/2022 due to generalized weakness and fatigue over last few weeks  Patient also reported lightheadedness    Due to her symptoms patient was scheduled to have echocardiogram by her cardiologist which was done yesterday and patient was awaiting follow-up with the cardiologist   Due to ongoing symptoms she presented to ED for further evaluation     In ED, patient was afebrile bradycardic blood pressure was stable saturating adequately on room air  Lab revealed mild normocytic anemia, mildly elevated proBNP  INR was therapeutic  UA was consistent with UTI  Patient received IV ceftriaxone and was subsequently admitted  Hemoglobin was 10 5 mg/dL in April and appears stable since then  Hemoglobin was normal in March      Patient reported urinary urgency but denied any dysuria, fever, chills  Patient also reported mild frontal headache which improved with Tylenol, denied any URI symptoms, chest pain, shortness of breath, cough, nausea, abdominal pain, any overt bleeding  She reported compliance with medication without any recent change  Patient was admitted to telemetry  Nadolol and digoxin was discontinued  He UTI was treated with IV antibiotics  Patient's hemoglobin was monitored  Patient's symptoms deemed to be secondary to bradycardia  Heart rate improved after discontinuing nadolol and digoxin  Blood pressure remains stable  Patient's symptoms improved  Patient was also continue treatment on UTI  Anemia workup was done which revealed iron deficiency without any GI loss  Workup also revealed mildly elevated LDH and low haptoglobin concerning for hemolysis  Retic count was elevated  Hemoglobin remains stable during hospitalization  Patient was continued on p o  Iron supplement  Patient's presenting symptoms resolved  Patient was discharged home, recommended to follow-up with Cardiology for monitoring of the heart rate and blood pressure  Outpatient extended ambulatory heart monitor was recommended  Patient will require antibiotics for 4 more days for UTI  Patient was advised to follow-up with Hematology, referral was provided for evaluation for anemia and consideration of iron infusion as outpatient  Patient was advised to check INR in few days after discharge as she is on oral antibiotics    Patient's blood glucose levels remain reasonably controlled during hospitalization despite being on metformin, did not require any sliding scale corrective insulin during hospitalization  Metformin was decreased to once a day on discharge and was advised to monitor blood glucose at home and follow-up with PCP for further diabetic management  Please see above list of diagnoses and related plan for additional information  Condition at Discharge: stable     Discharge Day Visit / Exam:     Subjective:  Feels much better  Generalized weakness and fatigue has improved  Denies any chest pain shortness of breath or dizziness  Denies any urinary symptoms at present  Ambulating in room independently    Heart rate remains within 60-70 beats per minute  Blood pressure remains stable  Vitals: Blood Pressure: 149/72 (08/14/22 0737)  Pulse: 69 (08/14/22 0737)  Temperature: 98 4 °F (36 9 °C) (08/14/22 0737)  Temp Source: Oral (08/12/22 1937)  Respirations: 18 (08/14/22 0556)  Height: 5' 5 5" (166 4 cm) (08/11/22 1501)  Weight - Scale: 66 kg (145 lb 8 1 oz) (08/14/22 0600)  SpO2: 93 % (08/14/22 0737)  Exam:   Physical Exam  Constitutional:       General: She is not in acute distress  HENT:      Head: Normocephalic and atraumatic  Cardiovascular:      Rate and Rhythm: Normal rate  Heart sounds: Murmur heard  Pulmonary:      Effort: Pulmonary effort is normal  No respiratory distress  Breath sounds: Normal breath sounds  No wheezing or rales  Abdominal:      General: Bowel sounds are normal  There is no distension  Palpations: Abdomen is soft  Tenderness: There is no abdominal tenderness  There is no guarding or rebound  Musculoskeletal:      Right lower leg: No edema  Left lower leg: No edema  Skin:     General: Skin is warm and dry  Findings: No rash  Neurological:      General: No focal deficit present  Mental Status: She is alert and oriented to person, place, and time  Mental status is at baseline     Psychiatric: Mood and Affect: Mood normal          Discharge instructions/Information to patient and family:(Discharge Medications and Follow up):   See after visit summary for information provided to patient and family  Provisions for Follow-Up Care:  See after visit summary for information related to follow-up care and any pertinent home health orders  Disposition: Home    Planned Readmission:  No     Discharge Statement:  I spent 45 minutes discharging the patient  This time was spent on the day of discharge  I had direct contact with the patient on the day of discharge  Greater than 50% of the total time was spent examining patient, answering all patient questions, arranging and discussing plan of care with patient as well as directly providing post-discharge instructions  Additional time then spent on discharge activities  Discussed with patient at length regarding discharge and follow-up recommendations  Discharge Medications:  See after visit summary for reconciled discharge medications provided to patient and family  ** Please Note: "This note has been constructed using a voice recognition system  Therefore there may be syntax, spelling, and/or grammatical errors   Please call if you have any questions  "**

## 2022-08-14 NOTE — DISCHARGE INSTRUCTIONS
Continue antibiotics as prescribed  Follow-up with Cardiology regarding outpatient extended ambulatory heart monitor  Have PT/INR checked in 2 -3 days

## 2022-08-14 NOTE — PROGRESS NOTES
Progress Note - Cardiology   [facility]     Lennie Height 80 y o  female MRN: [id number]  : 1940  Unit/Bed#: 2 [place] [address] Encounter: [id number]    ASSESSMENT:  Chronic atrial fibrillation with slow ventricular response  Improved after discontinuing nadolol and digoxin  Heart rate today is 62 per minute     S/p mechanical mitral valve replacement  On chronic Coumadin therapy  Goal INR is between 2 5 and 3 5     Hypertension  Diabetes mellitus  Recent CVA  History of macular degeneration  Anemia  UTI           RECOMMENDATIONS:  Continue aspirin, atorvastatin and Coumadin  Keep INR between 2 5 and 3 5  Management of UTI, diabetes mellitus and anemia as per primary service  Outpatient extended ambulatory monitor  Cardiac status is stable                Subjective / Objective:     Review of Systems   Awake and alert, feels well  Denies any chest pain, dyspnea, palpitations or syncope  Vitals: Blood pressure 149/72, pulse 69, temperature 98 4 °F (36 9 °C), resp  rate 18, height 5' 5 5" (1 664 m), weight 66 kg (145 lb 8 1 oz), SpO2 93 %  Vitals:    22 0536 22 0600   Weight: 66 6 kg (146 lb 14 4 oz) 66 kg (145 lb 8 1 oz)     Body mass index is 23 84 kg/m²  BP Readings from Last 3 Encounters:   22 149/72   22 130/70   22 153/67     Orthostatic Blood Pressures    Flowsheet Row Most Recent Value   Blood Pressure 149/72 filed at 2022 0737   Patient Position - Orthostatic VS Standing - Orthostatic VS filed at 2022 1937        I/O        07 0700  0701   0700  0701  08/15 0700    P  O  890 250     I V  (mL/kg)  10 (0 2)     IV Piggyback 50      Total Intake(mL/kg) 940 (14 1) 260 (3 9)     Urine (mL/kg/hr) 2500 (1 6) 1000 (0 6) 400 (1 5)    Total Output 2500 1000 400    Net -1560 -740 -400               Invasive Devices  Report    Peripheral Intravenous Line  Duration           Peripheral IV 22 Right Antecubital 2 days Intake/Output Summary (Last 24 hours) at 8/14/2022 1104  Last data filed at 8/14/2022 0701  Gross per 24 hour   Intake 260 ml   Output 1400 ml   Net -1140 ml         Physical Exam:   Physical Exam    Neurologic:  Alert & oriented x 3,  no focal deficits noted   Constitutional:  Well developed, well nourished,  With no acute distress  Eyes:  PERRL, conjunctiva normal   HENT:  Atraumatic, external ears normal, nose normal,   NECK: Normal range of motion, no tenderness, neck is supple , No JVP  Respiratory:  Bilateral air entry, mostly clear to auscultation  Cardiovascular:  Irregularly irregular, at times mildly bradycardic Q3-B5 metallic with a I/VI  systolic murmur  GI:  Soft, nondistended, audible bowel sounds, nontender, no hepatosplenomegaly appreciated  Musculoskeletal:  No tenderness, no deformities  Extremities:  No appreciable pitting edema   Distal pulses are present  Psychiatric:  Speech and behavior appropriate           Medications/ Allergies:     Current Facility-Administered Medications   Medication Dose Route Frequency Provider Last Rate    acetaminophen  650 mg Oral Q6H PRN Tito Vela MD      aspirin  81 mg Oral Daily Tito Vela MD      atorvastatin  80 mg Oral Daily With Stefani Juarez MD      cephalexin  500 mg Oral Q12H Prasad Tuttle MD      docusate sodium  100 mg Oral BID Tito Vela MD      famotidine  20 mg Oral BID Tito Vela MD      ferrous sulfate  325 mg Oral Daily With Breakfast Tito Vela MD      insulin lispro  1-5 Units Subcutaneous TID AC Tito Vela MD      insulin lispro  1-5 Units Subcutaneous HS Tito Vela MD      melatonin  3 mg Oral HS Tito Vela MD      multivitamin-minerals  1 tablet Oral Daily Tito Vela MD      polyethylene glycol  17 g Oral Daily PRN Tito Vela MD      warfarin  2 5 mg Oral Once per day on Mon Wed Fri Tito Vela MD      warfarin  5 mg Oral Once per day on Sun Tue Thu Sat Vibha Ramirez MD       acetaminophen, 650 mg, Q6H PRN  polyethylene glycol, 17 g, Daily PRN      Allergies   Allergen Reactions    Sulfa Antibiotics     Sulfasalazine            Labs:   Troponins:      CBC with diff:  Results from last 7 days   Lab Units 08/14/22  0620 08/13/22  0435 08/12/22  0538 08/11/22  1215 08/08/22  0933   WBC Thousand/uL 5 18 6 60 5 09 5 29 4 99   HEMOGLOBIN g/dL 9 7* 10 1* 9 4* 10 1* 9 5*   HEMATOCRIT % 33 8* 35 2 31 9* 34 6* 33 5*   MCV fL 82 82 81* 83 83   PLATELETS Thousands/uL 220 237 207 242 231   MCH pg 23 5* 23 5* 23 9* 24 2* 23 5*   MCHC g/dL 28 7* 28 7* 29 5* 29 2* 28 4*   RDW % 20 9* 21 0* 20 6* 20 8* 20 6*   MPV fL 10 1 10 4 10 4 10 5 10 9   NRBC AUTO /100 WBCs  --   --   --  0  --        CMP:  Results from last 7 days   Lab Units 08/14/22  0620 08/13/22  0435 08/12/22  0538 08/11/22  1215 08/08/22  0933   SODIUM mmol/L 141 142 143 141 142   POTASSIUM mmol/L 3 9 4 0 3 9 3 8 4 2   CHLORIDE mmol/L 106 104 106 104 109*   CO2 mmol/L 28 30 28 28 25   ANION GAP mmol/L 7 8 9 9 8   BUN mg/dL 21 19 18 19 17   CREATININE mg/dL 0 60 0 70 0 71 0 67 0 65   GLUCOSE FASTING mg/dL  --   --   --   --  126*   CALCIUM mg/dL 8 6 9 0 8 7 8 9 9 3   AST U/L  --   --   --  28 32   ALT U/L  --   --   --  33 32   ALK PHOS U/L  --   --   --  76 78   TOTAL PROTEIN g/dL  --   --   --  7 9 7 5   ALBUMIN g/dL  --   --   --  4 1 4 0   TOTAL BILIRUBIN mg/dL  --   --   --  0 54 0 59   EGFR ml/min/1 73sq m 85 80 79 82 82       Magnesium:  Results from last 7 days   Lab Units 08/11/22  1215   MAGNESIUM mg/dL 1 8     Coags:  Results from last 7 days   Lab Units 08/14/22  0620 08/12/22  0538 08/11/22  1215 08/08/22  0933   PTT seconds  --   --  36  --    INR  2 41* 2 71* 2 55* 1 68*     TSH:  Results from last 7 days   Lab Units 08/12/22  0538 08/08/22  0933   TSH 3RD GENERATON uIU/mL 1 832 1 490     No components found for: TSH3  Lipid Profile:    Hgb A1c:    NT-proBNP:   Recent Labs 08/11/22  1215   NTBNP 650*        Imaging & Testing   I have personally reviewed pertinent reports  XR chest 1 view portable    Result Date: 8/11/2022  Narrative: CHEST INDICATION:   weakness  COMPARISON:  Chest radiograph from 7/5/2022 and chest CT from 9/9/2020  EXAM PERFORMED/VIEWS:  XR CHEST PORTABLE FINDINGS: Normal heart size, median sternotomy  The lungs are clear  No pneumothorax or pleural effusion  Moderate bilateral glenohumeral degenerative disease  Impression: No acute cardiopulmonary disease  Workstation performed: [id number]     CT head without contrast    Result Date: 8/11/2022  Narrative: CT BRAIN - WITHOUT CONTRAST INDICATION:   headache/blood thinners  Patient has suspected COVID-19  COMPARISON:  3/13/2022  TECHNIQUE:  CT examination of the brain was performed  In addition to axial images, sagittal and coronal 2D reformatted images were created and submitted for interpretation  Radiation dose length product (DLP) for this visit:  933 38 mGy-cm   This examination, like all CT scans performed in the [place], was performed utilizing techniques to minimize radiation dose exposure, including the use of iterative  reconstruction and automated exposure control  IMAGE QUALITY:  Diagnostic  FINDINGS: PARENCHYMA: Decreased attenuation is noted in periventricular and subcortical white matter demonstrating an appearance that is statistically most likely to represent mild microangiopathic change; this appearance is similar when compared to most recent prior examination  Left basal ganglia chronic infarction  No CT signs of acute infarction  No intracranial mass, mass effect or midline shift  No acute parenchymal hemorrhage  VENTRICLES AND EXTRA-AXIAL SPACES:  Ventricles and extra-axial CSF spaces are prominent commensurate with the degree of volume loss  No hydrocephalus  No acute extra-axial hemorrhage  VISUALIZED ORBITS AND PARANASAL SINUSES:  Unremarkable   CALVARIUM AND EXTRACRANIAL SOFT TISSUES:  Normal      Impression: No acute intracranial abnormality  Chronic microangiopathic changes  Workstation performed: [id number]     DXA bone density spine hip and pelvis    Result Date: 7/25/2022  Narrative: CENTRAL  DXA SCAN CLINICAL HISTORY:  20-year-old postmenopausal female  OTHER RISK FACTORS:  Prior fracture as a result of minor injury, Parental history of hip fracture status post minor injury  PHARMACOLOGIC THERAPY FOR OSTEOPOROSIS:  None  TECHNIQUE: Bone densitometry was performed using a [employer]   bone densitometer  Regions of interest appear properly placed  COMPARISON: 6/27/2017  RESULTS: LUMBAR SPINE L1-L4 : BMD  1 172  gm/cm2 T-score -0 2 LEFT TOTAL HIP: BMD: 0 799  gm/cm2 T-score: -1 7 LEFT FEMORAL NECK: BMD: 0 85  gm/cm2 T score: -1 4 RIGHT TOTAL HIP: BMD:  0 906  gm/cm2 T-score: -0 8 RIGHT FEMORAL NECK: BMD:  0 943  gm/cm2  T score: -0 7     Impression: 1  Low bone mass (osteopenia)  2   Since a DXA study from 6/27/2017, there has been: A  STATISTICALLY SIGNIFICANT DECREASE in bone mineral density of  -0 045 g/cm2 (-5 0)% in the hips  3   The 10 year risk of hip fracture is 17 3% with the 10 year risk of major osteoporotic fracture being 31 3% as calculated by the [place]/WHO fracture risk assessment tool (FRAX)  4   The current NOF guidelines recommend treating patients with a T-score of -2 5 or less in the lumbar spine or hips, or in post-menopausal women and men over the age of 48 with low bone mass (osteopenia) and a FRAX 10 year risk score of >3% for hip fracture and/or >20% for major osteoporotic fracture  5   The NOF recommends follow-up DXA in 1-2 years after initiating therapy for osteoporosis and every 2 years thereafter  More frequent evaluation is appropriate for patients with conditions associated with rapid bone loss, such as glucocorticoid therapy   The interval between DXA screenings may be longer for individuals without major risk factors and initial T-score in the normal or upper low bone mass range  The FRAX algorithm has certain limitations: -FRAX has not been validated in patients currently or previously treated with pharmacotherapy for osteoporosis  In such patients, clinical judgment must be exercised in interpreting FRAX scores  -Prior hip, vertebral and humeral fragility fractures appear to confer greater risk of subsequent fracture than fractures at other sites (this is especially true for individuals with severe vertebral fractures), but quantification of this incremental risk is not possible with FRAX  -FRAX underestimates fracture risk in patients with history of multiple fragility fractures  -FRAX may underestimate fracture risk in patients with history of frequent falls  -It is not appropriate to use FRAX to monitor treatment response  WHO CLASSIFICATION: Normal (a T-score of -1 0 or higher) Low bone mineral density (a T-score of less than -1 0 but higher than -2 5) Osteoporosis (a T-score of -2 5 or less) Severe osteoporosis (a T-score of -2 5 or less with a fragility fracture) LEAST SIGNIFICANT CHANGE AT 95% C I: Lumbar spine: 0 036 gm/cm2 (3 2%)  Total hip: 0 018 gm/cm2 (2 0%)  Forearm: 0 024 gm/cm2 (3 2%)  Workstation performed: [id number]        EKG / Monitor: Personally reviewed  Atrial fibrillation, heart rate 62 per minute        Dr Jon Odell MD, [facility]      "This note has been constructed using a voice recognition system  Therefore there may be syntax, spelling, and/or grammatical errors   Please call if you have any questions  "

## 2022-08-14 NOTE — PLAN OF CARE
Problem: PAIN - ADULT  Goal: Verbalizes/displays adequate comfort level or baseline comfort level  Description: Interventions:  - Encourage patient to monitor pain and request assistance  - Assess pain using appropriate pain scale  - Administer analgesics based on type and severity of pain and evaluate response  - Implement non-pharmacological measures as appropriate and evaluate response  - Consider cultural and social influences on pain and pain management  - Notify physician/advanced practitioner if interventions unsuccessful or patient reports new pain  Outcome: Progressing     Problem: INFECTION - ADULT  Goal: Absence or prevention of progression during hospitalization  Description: INTERVENTIONS:  - Assess and monitor for signs and symptoms of infection  - Monitor lab/diagnostic results  - Monitor all insertion sites, i e  indwelling lines, tubes, and drains  - Monitor endotracheal if appropriate and nasal secretions for changes in amount and color  - [place] appropriate cooling/warming therapies per order  - Administer medications as ordered  - Instruct and encourage patient and family to use good hand hygiene technique  - Identify and instruct in appropriate isolation precautions for identified infection/condition  Outcome: Progressing     Problem: SAFETY ADULT  Goal: Patient will remain free of falls  Description: INTERVENTIONS:  - Educate patient/family on patient safety including physical limitations  - Instruct patient to call for assistance with activity   - Consult OT/PT to assist with strengthening/mobility   - Keep Call bell within reach  - Keep bed low and locked with side rails adjusted as appropriate  - Keep care items and personal belongings within reach  - Initiate and maintain comfort rounds  - Make Fall Risk Sign visible to staff  - Offer Toileting every 2 Hours, in advance of need  - Initiate/Maintain bed alarm  - Obtain necessary fall risk management equipment: yes  - Apply yellow socks and bracelet for high fall risk patients  - Consider moving patient to room near nurses station  Outcome: Progressing  Goal: Maintain or return to baseline ADL function  Description: INTERVENTIONS:  -  Assess patient's ability to carry out ADLs; assess patient's baseline for ADL function and identify physical deficits which impact ability to perform ADLs (bathing, care of mouth/teeth, toileting, grooming, dressing, etc )  - Assess/evaluate cause of self-care deficits   - Assess range of motion  - Assess patient's mobility; develop plan if impaired  - Assess patient's need for assistive devices and provide as appropriate  - Encourage maximum independence but intervene and supervise when necessary  - Involve family in performance of ADLs  - Assess for home care needs following discharge   - Consider OT consult to assist with ADL evaluation and planning for discharge  - Provide patient education as appropriate  Outcome: Progressing  Goal: Maintains/Returns to pre admission functional level  Description: INTERVENTIONS:  - Perform BMAT or MOVE assessment daily    - Set and communicate daily mobility goal to care team and patient/family/caregiver  - Collaborate with rehabilitation services on mobility goals if consulted  - Perform Range of Motion 3 times a day  - Reposition patient every 2 hours    - Dangle patient 3 times a day  - Stand patient 3 times a day  - Ambulate patient 3 times a day  - Out of bed to chair 3 times a day   - Out of bed for meals 3 times a day  - Out of bed for toileting  - Record patient progress and toleration of activity level   Outcome: Progressing     Problem: CARDIOVASCULAR - ADULT  Goal: Maintains optimal cardiac output and hemodynamic stability  Description: INTERVENTIONS:  - Monitor I/O, vital signs and rhythm  - Monitor for S/S and trends of decreased cardiac output  - Administer and titrate ordered vasoactive medications to optimize hemodynamic stability  - Assess quality of pulses, skin color and temperature  - Assess for signs of decreased coronary artery perfusion  - Instruct patient to report change in severity of symptoms  Outcome: Progressing  Goal: Absence of cardiac dysrhythmias or at baseline rhythm  Description: INTERVENTIONS:  - Continuous cardiac monitoring, vital signs, obtain 12 lead EKG if ordered  - Administer antiarrhythmic and heart rate control medications as ordered  - Monitor electrolytes and administer replacement therapy as ordered  Outcome: Progressing     Problem: Potential for Falls  Goal: Patient will remain free of falls  Description: INTERVENTIONS:  - Educate patient/family on patient safety including physical limitations  - Instruct patient to call for assistance with activity   - Consult OT/PT to assist with strengthening/mobility   - Keep Call bell within reach  - Keep bed low and locked with side rails adjusted as appropriate  - Keep care items and personal belongings within reach  - Initiate and maintain comfort rounds  - Make Fall Risk Sign visible to staff  - Offer Toileting every 2 Hours, in advance of need  - Initiate/Maintain bed alarm  - Obtain necessary fall risk management equipment: yes  - Apply yellow socks and bracelet for high fall risk patients  - Consider moving patient to room near nurses station  Outcome: Progressing     Problem: MOBILITY - ADULT  Goal: Maintain or return to baseline ADL function  Description: INTERVENTIONS:  -  Assess patient's ability to carry out ADLs; assess patient's baseline for ADL function and identify physical deficits which impact ability to perform ADLs (bathing, care of mouth/teeth, toileting, grooming, dressing, etc )  - Assess/evaluate cause of self-care deficits   - Assess range of motion  - Assess patient's mobility; develop plan if impaired  - Assess patient's need for assistive devices and provide as appropriate  - Encourage maximum independence but intervene and supervise when necessary  - Involve family in performance of ADLs  - Assess for home care needs following discharge   - Consider OT consult to assist with ADL evaluation and planning for discharge  - Provide patient education as appropriate  Outcome: Progressing  Goal: Maintains/Returns to pre admission functional level  Description: INTERVENTIONS:  - Perform BMAT or MOVE assessment daily    - Set and communicate daily mobility goal to care team and patient/family/caregiver  - Collaborate with rehabilitation services on mobility goals if consulted  - Perform Range of Motion 3 times a day  - Reposition patient every 2 hours    - Dangle patient 3 times a day  - Stand patient 3 times a day  - Ambulate patient 3 times a day  - Out of bed to chair 3 times a day   - Out of bed for meals 3 times a day  - Out of bed for toileting  - Record patient progress and toleration of activity level   Outcome: Progressing     Problem: RESPIRATORY - ADULT  Goal: Achieves optimal ventilation and oxygenation  Description: INTERVENTIONS:  - Assess for changes in respiratory status  - Assess for changes in mentation and behavior  - Position to facilitate oxygenation and minimize respiratory effort  - Oxygen administered by appropriate delivery if ordered  - Initiate smoking cessation education as indicated  - Encourage broncho-pulmonary hygiene including cough, deep breathe, Incentive Spirometry  - Assess the need for suctioning and aspirate as needed  - Assess and instruct to report SOB or any respiratory difficulty  - Respiratory Therapy support as indicated  Outcome: Progressing

## 2022-08-15 ENCOUNTER — TRANSITIONAL CARE MANAGEMENT (OUTPATIENT)
Dept: INTERNAL MEDICINE CLINIC | Facility: CLINIC | Age: 82
End: 2022-08-15

## 2022-08-16 ENCOUNTER — APPOINTMENT (OUTPATIENT)
Dept: OCCUPATIONAL THERAPY | Facility: CLINIC | Age: 82
End: 2022-08-16
Payer: MEDICARE

## 2022-08-16 ENCOUNTER — APPOINTMENT (OUTPATIENT)
Dept: PHYSICAL THERAPY | Facility: CLINIC | Age: 82
End: 2022-08-16
Payer: MEDICARE

## 2022-08-19 ENCOUNTER — APPOINTMENT (OUTPATIENT)
Dept: PHYSICAL THERAPY | Facility: CLINIC | Age: 82
End: 2022-08-19
Payer: MEDICARE

## 2022-08-19 ENCOUNTER — APPOINTMENT (OUTPATIENT)
Dept: OCCUPATIONAL THERAPY | Facility: CLINIC | Age: 82
End: 2022-08-19
Payer: MEDICARE

## 2022-08-22 ENCOUNTER — APPOINTMENT (OUTPATIENT)
Dept: LAB | Facility: CLINIC | Age: 82
End: 2022-08-22
Payer: MEDICARE

## 2022-08-23 ENCOUNTER — APPOINTMENT (OUTPATIENT)
Dept: PHYSICAL THERAPY | Facility: CLINIC | Age: 82
End: 2022-08-23
Payer: MEDICARE

## 2022-08-23 ENCOUNTER — APPOINTMENT (OUTPATIENT)
Dept: OCCUPATIONAL THERAPY | Facility: CLINIC | Age: 82
End: 2022-08-23
Payer: MEDICARE

## 2022-08-25 ENCOUNTER — OFFICE VISIT (OUTPATIENT)
Dept: INTERNAL MEDICINE CLINIC | Facility: CLINIC | Age: 82
End: 2022-08-25
Payer: MEDICARE

## 2022-08-25 VITALS
HEART RATE: 95 BPM | SYSTOLIC BLOOD PRESSURE: 130 MMHG | WEIGHT: 145 LBS | BODY MASS INDEX: 23.3 KG/M2 | DIASTOLIC BLOOD PRESSURE: 80 MMHG | HEIGHT: 66 IN | OXYGEN SATURATION: 94 %

## 2022-08-25 DIAGNOSIS — Z79.01 LONG TERM (CURRENT) USE OF ANTICOAGULANTS: ICD-10-CM

## 2022-08-25 DIAGNOSIS — D50.0 IRON DEFICIENCY ANEMIA DUE TO CHRONIC BLOOD LOSS: ICD-10-CM

## 2022-08-25 DIAGNOSIS — N18.30 ANEMIA DUE TO STAGE 3 CHRONIC KIDNEY DISEASE, UNSPECIFIED WHETHER STAGE 3A OR 3B CKD (HCC): ICD-10-CM

## 2022-08-25 DIAGNOSIS — I48.20 CHRONIC ATRIAL FIBRILLATION (HCC): Primary | ICD-10-CM

## 2022-08-25 DIAGNOSIS — E03.8 OTHER SPECIFIED HYPOTHYROIDISM: ICD-10-CM

## 2022-08-25 DIAGNOSIS — E78.00 HYPERCHOLESTEREMIA: ICD-10-CM

## 2022-08-25 DIAGNOSIS — E53.8 VITAMIN B12 DEFICIENCY: ICD-10-CM

## 2022-08-25 DIAGNOSIS — D63.1 ANEMIA DUE TO STAGE 3 CHRONIC KIDNEY DISEASE, UNSPECIFIED WHETHER STAGE 3A OR 3B CKD (HCC): ICD-10-CM

## 2022-08-25 DIAGNOSIS — I63.9 CEREBROVASCULAR ACCIDENT (CVA), UNSPECIFIED MECHANISM (HCC): ICD-10-CM

## 2022-08-25 DIAGNOSIS — G47.33 OBSTRUCTIVE SLEEP APNEA: ICD-10-CM

## 2022-08-25 DIAGNOSIS — Z95.2 H/O MITRAL VALVE REPLACEMENT WITH MECHANICAL VALVE: ICD-10-CM

## 2022-08-25 PROBLEM — U07.1 COVID-19: Status: RESOLVED | Noted: 2022-05-20 | Resolved: 2022-08-25

## 2022-08-25 PROCEDURE — 99495 TRANSJ CARE MGMT MOD F2F 14D: CPT | Performed by: INTERNAL MEDICINE

## 2022-08-25 RX ORDER — ENOXAPARIN SODIUM 100 MG/ML
INJECTION SUBCUTANEOUS
COMMUNITY
Start: 2022-08-23 | End: 2022-09-22

## 2022-08-25 RX ORDER — ATORVASTATIN CALCIUM 80 MG/1
80 TABLET, FILM COATED ORAL DAILY
Qty: 90 TABLET | Refills: 1 | Status: SHIPPED | OUTPATIENT
Start: 2022-08-25

## 2022-08-25 NOTE — ASSESSMENT & PLAN NOTE
Recent minor CVA on 03/11/2022 discharged on 03/14/2022  Speech is improved  No other focal neurological issue  Will continue anticoagulation carefully    INR being monitored by cardiologist

## 2022-08-25 NOTE — ASSESSMENT & PLAN NOTE
Iron deficiency anemia on iron supplement  Also component of hemolysis noted  Stool for occult blood were negative  Hep patient had opted no further colonoscopy and there is no per GI dyspepsia  We will monitor the trend  Hematologist input awaited    If he has recurrent drop in hemoglobin is we may have to pursue that

## 2022-08-25 NOTE — ASSESSMENT & PLAN NOTE
Lab Results   Component Value Date    [id number] 1 832 08/12/2022    Hypothyroidism clinically and chemically stable    Remains thyroxine on no thyroid medicine

## 2022-08-25 NOTE — ASSESSMENT & PLAN NOTE
Hemoglobin A1c stable  Will continue to monitor is is diet controlled diabetes at this point  Lab Results   Component Value Date    HGBA1C 6 5 (H) 07/06/2022   Remains on metformin 500 mg twice a day    No hypoglycemia

## 2022-08-25 NOTE — ASSESSMENT & PLAN NOTE
Recent hospitalization for Richard Mutter arrhythmia  Known case of atrial fibrillation  Now off nadolol as well as the of digoxin  Will monitor blood pressure as well as heart rate trend  To be seen by cardiologist tomorrow  The the ejection fraction was normal   The will workup for bradycardia was normal including TSH  Home clinically symptom-free cardiac status

## 2022-08-25 NOTE — PATIENT INSTRUCTIONS
1  Follow-up with your cardiologist tomorrow as planned  2  Follow-up with hemato oncologist as planned  3  Continue your iron medications  4  You will need CBC in 1 week and 3 weeks  5  Monitor heart rate at home with the assistance of friends and family we like to keep heart rate in the range of 60-90     6  Will monitor blood pressure trend if blood pressure goes up we will start alternate blood pressure medications  Differential diagnosis of anemia were reviewed with the patient and family  Differential diagnosis of bradycardia were reviewed with the patient and family  Will I will see her back in a month  Follow with Consultants as per their and our suggestion    Follow up in one month or as needed earlier    Follow all instructions as advised and discussed  Take your medications as prescribed  Call the office immediately if you experience any side effects  Ask questions if you do not understand  Keep your scheduled appointment as advised or come sooner if necessary or in doubt  Best time to call for non-urgent matter or questions on weekdays is between 9am and 12 noon  See physician for any new symptoms or worsening of current symptoms  Urgent or emergent situations call 911 and report to nearest emergency room      I spent  30 -40 minutes taking care of this patient including clinical care, conseling, collaboration, chart, lab and consultaion review as appropriate    Patient is to get labs 1 week(s) prior to next visit if advised

## 2022-08-25 NOTE — PROGRESS NOTES
Rony Sevilla Office Visit Note  22     Chas Rodriguez 80 y o  female MRN: [id number]  : 1940    Assessment:     1  Chronic atrial fibrillation [facility])  Assessment & Plan:  Recent hospitalization for Elmarie Curb arrhythmia  Known case of atrial fibrillation  Now off nadolol as well as the of digoxin  Will monitor blood pressure as well as heart rate trend  To be seen by cardiologist tomorrow  The the ejection fraction was normal   The will workup for bradycardia was normal including TSH  Home clinically symptom-free cardiac status  2  Hypercholesteremia  -     atorvastatin (LIPITOR) 80 mg tablet; Take 1 tablet (80 mg total) by mouth daily    3  Other specified hypothyroidism  Assessment & Plan:  Lab Results   Component Value Date    [id number] 1 832 2022    Hypothyroidism clinically and chemically stable  Remains thyroxine on no thyroid medicine        4  Obstructive sleep apnea  Assessment & Plan:  He remains on CPAP      5  Cerebrovascular accident (CVA), unspecified mechanism ([facility] )  Assessment & Plan:  Recent minor CVA on 2022 discharged on 2022  Speech is improved  No other focal neurological issue  Will continue anticoagulation carefully  INR being monitored by cardiologist       6  Anemia due to stage 3 chronic kidney disease, unspecified whether stage 3a or 3b CKD ([facility] )  -     CBC; Future; Expected date: 2022  -     CBC; Future; Expected date: 2022    7  Long term (current) use of anticoagulants  -     CBC; Future; Expected date: 2022    8  H/O mitral valve replacement with mechanical valve  Assessment & Plan:  INR being monitored by cardiologist     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 wonder if mitral wall replacement is and contributing to that  She does have a history of mitral wall replacement and has metallic while in will require anticoagulation  This is a complex challenging situation    Both cardiologist and hematologist will follow through this patient  Orders:  -     CBC; Future; Expected date: 09/19/2022    9  Vitamin B12 deficiency  Assessment & Plan:  Remains on vitamin B12 will continue      10  Iron deficiency anemia due to chronic blood loss  Assessment & Plan:  Iron deficiency anemia on iron supplement  Also component of hemolysis noted  Stool for occult blood were negative  Hep patient had opted no further colonoscopy and there is no per GI dyspepsia  We will monitor the trend  Hematologist input awaited  If he has recurrent drop in hemoglobin is we may have to pursue that            Discussion Summary and Plan: Today's care plan and medications were reviewed with patient in detail and all their questions answered to their satisfaction  Chief Complaint   Patient presents with    Transition of Care Management     Bradycardia, history of atrial fibrillation, history of mitral wall prolapse, patient prior to hospital admission was on digoxin and nadolol which has been stop, also complicated by anemia with progressive worsening blood count  Discharge hemoglobin 9 7  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  And 1 of the celiac panel is somewhat borderline    Coronary Artery Disease    Congestive Heart Failure    Atrial Fibrillation    Abnormal Lab    Follow-up     Treated UTI  Known case of hypertension but off nadolol right now      Subjective:  TCM Call     Date and time call was made  8/23/2022 10:35 AM    Hospital care reviewed  Records reviewed    Patient was hospitialized at  [address] [place] [address]    Date of Admission  08/11/22    Date of discharge  08/14/22    Diagnosis  weakness, UTI    Disposition  Home    Were the patients medications reviewed and updated  Yes    Current Symptoms  None  She said she feels great      TCM Call     Post hospital issues  None    Should patient be enrolled in anticoag monitoring? Yes  She is taking   coumadin    Scheduled for follow up?   Yes Patients specialists  Cardiologist    Cardiologist name  Dr Elian Stephens    Cardiologist contact #  seeing on Friday    Did you obtain your prescribed medications  Yes    Do you need help managing your prescriptions or medications  No    Is transportation to your appointment needed  Yes    Specify why  Does not drive  She has someone drive her  She is blind  I have advised the patient to call PCP with any new or worsening symptoms    Gabriella Trimble, [address] 1938  Family; Friends    The type of support provided  Emotional; Physical    Do you have social support  Yes, as much as I need    Are you recieving any outpatient services  No    Are you recieving home care services  No    Are you using any community resources  No    Current waiver services  No    Have you fallen in the last 12 months  No    Interperter language line needed  No    Counseling  Patient    Counseling topics  Activities of daily living; Importance of RX   compliance        This is a care transitioned  Amina Padron was hospitalized because of the generalized weakness, fatigue, lightheadedness for last few weeks  Patient was afebrile  Patient was bradycardic with stable blood pressure  CT scan of the brain was unremarkable  Neurological examination was unremarkable on admission  Workup during hospitalization revealed normal TSH normal B12  Patient has been recently mildly anemic  Her anemia has been multiple factors  In the past see had similar situation how with the anemia which was bed complex  It see had a microscopic hematuria, his see has been on Coumadin, see had artificial wall which could be contributing to anemia, also she had a 1 border abnormal celiac panel  As well as she used to have pessary and micro hematuria at that point  In any event her anemia had resolved with iron in the past     Recently she has noticed to have down trend with anemia    I did outpatient workup of which was also consistent with some subtle degree of hemolysis with elevated LDH low haptoglobin of which probably is related to mitral Valve   Patient does need anticoagulation because of the wall replacement purpose  I would encourage her to follow with hemato oncologist as well as cardiologist   Meanwhile we have started her on iron tablet at this time  Iron studies consistent with iron deficiency  B12 normal   Folate elevated   Stool occult negative  Hemolysis smear negative  LDH mildly elevated   haptoglobin low  May require IV a iron will leave it up to them        Chronic atrial fibrillation [facility])    H/O mitral valve replacement with mechanical valve    Type 2 diabetes mellitus without complication, without long-term current use of insulin ([facility] ), 07/06/2022 hemoglobin A1c was 6 5, blood sugar in the hospital was in the range of   Essential hypertension:  Remains off nadolol  Will monitor blood pressure and make further decision as per the trend  Atrial fibrillation by history patient had bradycardia of taken off digoxin as well as nadolol which she remains off heart rate improved digoxin level was 0 8 on admission  Patient will follow with her cardiologist tomorrow  CVA (cerebral vascular accident) ([facility] ) :  Nonfocal     Anemia    Obstructive sleep apnea    Hypercholesteremia    Other specified hypothyroidism    Vitamin B12 deficiency :  Vitamin B12 in hospital was normal  :  Hypothyroidism clinically and chemically stable TSH was normal  Hyperlipidemia patient is on statin  Obstructive sleep apnea see remains on CPAP  Acute cystitis without hematuria:  Patient had a mild intermittent urinary symptoms  Urine culture grew E coli sensitive to cefazolin  There were no urinary retention  PVR was 111   Patient was given IV antibiotic in hospital and subsequently transitioned to oral for antibiotic finishing        Echocardiogram done on 08/11, EF 62%, basal and inferolateral wall hypokinesis  Mechanical mitral valve     08/11/2022:  Chest x-ray:  No active disease    08/11/2022:  CT scan of the brain no acute intracranial abnormality micro angiopathy changes       Hospital Course:  As outlined below  As per HPI  Elle Quintanilla is a 80 y o  female patient with history of diabetes mellitus type 2, chronic AFib on anticoagulation, hypertension, hypothyroidism, emphysema, history of MVR with mechanical valve, CVA, JAKE on CPAP, dyslipidemia, anemia, history of AAA who originally presented to the hospital on 8/11/2022 due to generalized weakness and fatigue over last few weeks  Patient also reported lightheadedness  Due to her symptoms patient was scheduled to have echocardiogram by her cardiologist which was done yesterday and patient was awaiting follow-up with the cardiologist   Due to ongoing symptoms she presented to ED for further evaluation     In ED, patient was afebrile bradycardic blood pressure was stable saturating adequately on room air  Lab revealed mild normocytic anemia, mildly elevated proBNP  INR was therapeutic  UA was consistent with UTI  Patient received IV ceftriaxone and was subsequently admitted  Hemoglobin was 10 5 mg/dL in April and appears stable since then  Hemoglobin was normal in March      Patient reported urinary urgency but denied any dysuria, fever, chills  Patient also reported mild frontal headache which improved with Tylenol, denied any URI symptoms, chest pain, shortness of breath, cough, nausea, abdominal pain, any overt bleeding  She reported compliance with medication without any recent change      Patient was admitted to telemetry  Nadolol and digoxin was discontinued  He UTI was treated with IV antibiotics  Patient's hemoglobin was monitored  Patient's symptoms deemed to be secondary to bradycardia  Heart rate improved after discontinuing nadolol and digoxin  Blood pressure remains stable  Patient's symptoms improved    Patient was also continue treatment on UTI  Anemia workup was done which revealed iron deficiency without any GI loss  Workup also revealed mildly elevated LDH and low haptoglobin concerning for hemolysis  Retic count was elevated  Hemoglobin remains stable during hospitalization  Patient was continued on p o  Iron supplement  Patient's presenting symptoms resolved  Patient was discharged home, recommended to follow-up with Cardiology for monitoring of the heart rate and blood pressure  Outpatient extended ambulatory heart monitor was recommended  Patient will require antibiotics for 4 more days for UTI  Patient was advised to follow-up with Hematology, referral was provided for evaluation for anemia and consideration of iron infusion as outpatient  Patient was advised to check INR in few days after discharge as she is on oral antibiotics  Patient's blood glucose levels remain reasonably controlled during hospitalization despite being on metformin, did not require any sliding scale corrective insulin during hospitalization  Metformin was decreased to once a day on discharge and was advised to monitor blood glucose at home and follow-up with PCP for further diabetic management            She looks good today    No complains       She looks great  Offers no specific cardiac pulmonary GI  CNS or CVS neurological urological complaint  Patient's daughter is bedside  We had extensive discussion about all these issues  Ancillary Orders on 08/19/2022  Protime                   Value: 17  4(seconds) (A)  Dt: 08/22/2022  INR                       Value: 1 40 (A)           Dt: 08/22/2022  ------------  Admission on 08/11/2022, Discharged on 08/14/2022  Ventricular Rate          Value: 48(BPM)            Dt: 08/11/2022  Atrial Rate               Value: 53(BPM)            Dt: 08/11/2022  QRSD Interval             Value: 88(ms)             Dt: 08/11/2022  QT Interval               Value: 428(ms)            Dt: 08/11/2022  QTC Interval              Value: 382(ms)            Dt: 08/11/2022  QRS Axis                  Value: 24(degrees)        Dt: 08/11/2022  T Wave Axis               Value: 18(degrees)        Dt: 08/11/2022  WBC                       Value: 5 29(Thousand/uL)  Dt: 08/11/2022  RBC                       Value: 4 18(Million/uL)   Dt: 08/11/2022  Hemoglobin                Value: 10 1(g/dL) (A)     Dt: 08/11/2022  Hematocrit                Value: 34 6(%) (A)        Dt: 08/11/2022  MCV                       Value: 83(fL)             Dt: 08/11/2022  MCH                       Value: 24 2(pg) (A)       Dt: 08/11/2022  MCHC                      Value: 29 2(g/dL) (A)     Dt: 08/11/2022  RDW                       Value: 20 8(%) (A)        Dt: 08/11/2022  MPV                       Value: 10 5(fL)           Dt: 08/11/2022  Platelets                 Value: 242(Thousands/uL)  Dt: 08/11/2022  nRBC                      Value: 0(/100 WBCs)       Dt: 08/11/2022  Neutrophils Relative      Value: 61(%)              Dt: 08/11/2022  Immat GRANS %             Value: 0(%)               Dt: 08/11/2022  Lymphocytes Relative      Value: 25(%)              Dt: 08/11/2022  Monocytes Relative        Value: 10(%)              Dt: 08/11/2022  Eosinophils Relative      Value: 3(%)               Dt: 08/11/2022  Basophils Relative        Value: 1(%)               Dt: 08/11/2022  Neutrophils Absolute      Value: 3 26(Thousands/µL) Dt: 08/11/2022  Immature Grans Absolute   Value: 0 02(Thousand/uL)  Dt: 08/11/2022  Lymphocytes Absolute      Value: 1 32(Thousands/µL) Dt: 08/11/2022  Monocytes Absolute        Value: 0 50(Thousand/µL)  Dt: 08/11/2022  Eosinophils Absolute      Value: 0 15(Thousand/µL)  Dt: 08/11/2022  Basophils Absolute        Value: 0 04(Thousands/µL) Dt: 08/11/2022  Sodium                    Value: 141(mmol/L)        Dt: 08/11/2022  Potassium                 Value: 3 8(mmol/L)        Dt: 08/11/2022  Chloride                  Value: 104(mmol/L)        Dt: 08/11/2022  CO2                       Value: 28(mmol/L)         Dt: 08/11/2022  ANION GAP                 Value:  9(mmol/L)          Dt: 08/11/2022  BUN                       Value: 19(mg/dL)          Dt: 08/11/2022  Creatinine                Value: 0 67(mg/dL)        Dt: 08/11/2022  Glucose                   Value: 122(mg/dL)         Dt: 08/11/2022  Calcium                   Value: 8 9(mg/dL)         Dt: 08/11/2022  AST                       Value: 28(U/L)            Dt: 08/11/2022  ALT                       Value: 33(U/L)            Dt: 08/11/2022  Alkaline Phosphatase      Value: 76(U/L)            Dt: 08/11/2022  Total Protein             Value: 7 9(g/dL)          Dt: 08/11/2022  Albumin                   Value: 4 1(g/dL)          Dt: 08/11/2022  Total Bilirubin           Value: 0 54(mg/dL)        Dt: 08/11/2022  eGFR                      Value: 82(ml/min/1 73sq m) Dt: 08/11/2022  Magnesium                 Value: 1 8(mg/dL)         Dt: 08/11/2022  Lipase                    Value: 212(u/L)           Dt: 08/11/2022  Color, UA                 Value: Yellow             Dt: 08/11/2022  Clarity, UA               Value: Cloudy             Dt: 08/11/2022  Specific Gravity, UA      Value: 1 010              Dt: 08/11/2022  pH, UA                    Value: 6 0                Dt: 08/11/2022  Leukocytes, UA            Value: Large (A)          Dt: 08/11/2022  Nitrite, UA               Value: Positive (A)       Dt: 08/11/2022  Protein, UA               Value: Negative(mg/dl)    Dt: 08/11/2022  Glucose, UA               Value: Negative(mg/dl)    Dt: 08/11/2022  Ketones, UA               Value: Negative(mg/dl)    Dt: 08/11/2022  Urobilinogen, UA          Value: 0 2(E U /dl)       Dt: 08/11/2022  Bilirubin, UA             Value: Negative           Dt: 08/11/2022  Occult Blood, UA                                    Dt: 08/11/2022                  Value: Trace-Intact   hs TnI 0hr                Value: 5(ng/L)            Dt: 08/11/2022  NT-proBNP                 Value: 650(pg/mL) (A)     Dt: 08/11/2022  Protime                   Value: 27  5(seconds) (A)  Dt: 08/11/2022  INR                       Value: 2 55 (A)           Dt: 08/11/2022  PTT                       Value: 36(seconds)        Dt: 08/11/2022  ABO Grouping              Value: O                  Dt: 08/11/2022  Rh Factor                 Value: Positive           Dt: 08/11/2022  Antibody Screen           Value: Negative           Dt: 08/11/2022  Specimen Expiration Date  Value: [id number]           Dt: 08/11/2022  SARS-CoV-2                Value: Negative           Dt: 08/11/2022  Digoxin Lvl               Value: 0 8(ng/mL)         Dt: 08/11/2022  hs TnI 2hr                Value: 4(ng/L)            Dt: 08/11/2022  Delta 2hr hsTnI           Value: -1(ng/L)           Dt: 08/11/2022  RBC, UA                   Value: 0-1(/hpf)          Dt: 08/11/2022  WBC, UA                   Value: Innumerable(/hpf) (A)Dt: 08/11/2022  Epithelial Cells          Value: Occasional(/hpf)   Dt: 08/11/2022  Bacteria, UA              Value: Innumerable(/hpf) (A)Dt: 08/11/2022  Urine Culture                                       Dt: 08/11/2022                  Value: >100,000 cfu/ml Escherichia coli   POC Glucose               Value: 172(mg/dl) (A)     Dt: 08/11/2022  Folate                    Value: >20 0(ng/mL) (A)   Dt: 08/11/2022  Fecal Occult Blood Diagn* Value: Negative           Dt: 08/12/2022  Fecal Occult Blood Diagn*                           Dt: 08/12/2022                  Value: Test not performed   Fecal Occult Blood Diagn*                           Dt: 08/12/2022                  Value: Test not performed   Iron Saturation           Value: 10(%) (A)          Dt: 08/11/2022  TIBC                      Value: 471(ug/dL) (A)     Dt: 08/11/2022  Iron                      Value: 46(ug/dL) (A)      Dt: 08/11/2022  Ferritin                  Value: 8(ng/mL)           Dt: 08/11/2022  POC Glucose               Value: 86(mg/dl)          Dt: 08/11/2022  Sodium                    Value: 143(mmol/L)        Dt: 08/12/2022  Potassium                 Value: 3 9(mmol/L)        Dt: 08/12/2022  Chloride                  Value: 106(mmol/L)        Dt: 08/12/2022  CO2                       Value: 28(mmol/L)         Dt: 08/12/2022  ANION GAP                 Value: 9(mmol/L)          Dt: 08/12/2022  BUN                       Value: 18(mg/dL)          Dt: 08/12/2022  Creatinine                Value: 0 71(mg/dL)        Dt: 08/12/2022  Glucose                   Value: 105(mg/dL)         Dt: 08/12/2022  Calcium                   Value: 8 7(mg/dL)         Dt: 08/12/2022  eGFR                      Value: 79(ml/min/1 73sq m) Dt: 08/12/2022  WBC                       Value: 5 09(Thousand/uL)  Dt: 08/12/2022  RBC                       Value: 3 94(Million/uL)   Dt: 08/12/2022  Hemoglobin                Value: 9 4(g/dL) (A)      Dt: 08/12/2022  Hematocrit                Value: 31 9(%) (A)        Dt: 08/12/2022  MCV                       Value: 81(fL) (A)         Dt: 08/12/2022  MCH                       Value: 23 9(pg) (A)       Dt: 08/12/2022  MCHC                      Value: 29 5(g/dL) (A)     Dt: 08/12/2022  RDW                       Value: 20 6(%) (A)        Dt: 08/12/2022  Platelets                 Value: 207(Thousands/uL)  Dt: 08/12/2022  MPV                       Value: 10 4(fL)           Dt: 08/12/2022  Protime                   Value: 28  8(seconds) (A)  Dt: 08/12/2022  INR                       Value: 2 71 (A)           Dt: 08/12/2022  TSH 3RD GENERATON         Value: 1 832(uIU/mL)      Dt: 08/12/2022  Vitamin B-12              Value: 624(pg/mL)         Dt: 08/12/2022  Hemolysis Smear                                     Dt: 08/12/2022                  Value: No Schistocytes or Helmet Cells noted   LD                        Value: 254(U/L) (A)       Dt: 08/12/2022  Retic Ct Abs              Value: 72,100             Dt: 08/12/2022  Retic Ct Pct              Value: 1 83(%)            Dt: 08/12/2022  POC Glucose               Value: 118(mg/dl)         Dt: 08/12/2022  POC Glucose               Value: 96(mg/dl)          Dt: 08/12/2022  POC Glucose               Value: 116(mg/dl)         Dt: 08/12/2022  POC Glucose               Value: 107(mg/dl)         Dt: 08/12/2022  WBC                       Value: 6 60(Thousand/uL)  Dt: 08/13/2022  RBC                       Value: 4 29(Million/uL)   Dt: 08/13/2022  Hemoglobin                Value: 10 1(g/dL) (A)     Dt: 08/13/2022  Hematocrit                Value: 35 2(%)            Dt: 08/13/2022  MCV                       Value: 82(fL)             Dt: 08/13/2022  MCH                       Value: 23 5(pg) (A)       Dt: 08/13/2022  MCHC                      Value: 28 7(g/dL) (A)     Dt: 08/13/2022  RDW                       Value: 21 0(%) (A)        Dt: 08/13/2022  Platelets                 Value: 237(Thousands/uL)  Dt: 08/13/2022  MPV                       Value: 10 4(fL)           Dt: 08/13/2022  Sodium                    Value: 142(mmol/L)        Dt: 08/13/2022  Potassium                 Value: 4 0(mmol/L)        Dt: 08/13/2022  Chloride                  Value: 104(mmol/L)        Dt: 08/13/2022  CO2                       Value: 30(mmol/L)         Dt: 08/13/2022  ANION GAP                 Value:  8(mmol/L)          Dt: 08/13/2022  BUN                       Value: 19(mg/dL)          Dt: 08/13/2022  Creatinine                Value: 0 70(mg/dL)        Dt: 08/13/2022  Glucose                   Value: 129(mg/dL)         Dt: 08/13/2022  Calcium                   Value: 9 0(mg/dL)         Dt: 08/13/2022  eGFR                      Value: 80(ml/min/1 73sq m) Dt: 08/13/2022  POC Glucose               Value: 126(mg/dl)         Dt: 08/13/2022  POC Glucose               Value: 107(mg/dl)         Dt: 08/13/2022  POC Glucose               Value: 129(mg/dl)         Dt: 08/13/2022  POC Glucose               Value: 123(mg/dl)         Dt: 08/13/2022  Protime                   Value: 26  3(seconds) (A)  Dt: 08/14/2022  INR                       Value: 2 41 (A)           Dt: 08/14/2022  WBC                       Value: 5 18(Thousand/uL)  Dt: 08/14/2022  RBC                       Value: 4 13(Million/uL)   Dt: 08/14/2022  Hemoglobin                Value: 9 7(g/dL) (A)      Dt: 08/14/2022  Hematocrit                Value: 33 8(%) (A)        Dt: 08/14/2022  MCV                       Value: 82(fL)             Dt: 08/14/2022  MCH                       Value: 23 5(pg) (A)       Dt: 08/14/2022  MCHC                      Value: 28 7(g/dL) (A)     Dt: 08/14/2022  RDW                       Value: 20 9(%) (A)        Dt: 08/14/2022  Platelets                 Value: 220(Thousands/uL)  Dt: 08/14/2022  MPV                       Value: 10 1(fL)           Dt: 08/14/2022  Sodium                    Value: 141(mmol/L)        Dt: 08/14/2022  Potassium                 Value: 3 9(mmol/L)        Dt: 08/14/2022  Chloride                  Value: 106(mmol/L)        Dt: 08/14/2022  CO2                       Value: 28(mmol/L)         Dt: 08/14/2022  ANION GAP                 Value:  7(mmol/L)          Dt: 08/14/2022  BUN                       Value: 21(mg/dL)          Dt: 08/14/2022  Creatinine                Value: 0 60(mg/dL)        Dt: 08/14/2022  Glucose                   Value: 140(mg/dL)         Dt: 08/14/2022  Calcium                   Value: 8 6(mg/dL)         Dt: 08/14/2022  eGFR                      Value: 85(ml/min/1 73sq m) Dt: 08/14/2022  POC Glucose               Value: 144(mg/dl) (A)     Dt: 08/14/2022  POC Glucose               Value: 116(mg/dl)         Dt: 08/14/2022  ------------ - 2 weeks        The following portions of the patient's history were reviewed and updated as appropriate: allergies, current medications, past family history, past medical history, past social history, past surgical history and problem list     Review of Systems   All other systems reviewed and are negative          Historical Information   Patient Active Problem List   Diagnosis    Obstructive sleep apnea    Chronic atrial fibrillation ([facility] )    H/O mitral valve replacement with mechanical valve    Long term (current) use of anticoagulants    Presence of prosthetic heart valve    Hypercholesteremia    Anemia due to chronic kidney disease    Allergic rhinitis    Type 2 diabetes mellitus without complication, without long-term current use of insulin (HCC)    Iron deficiency anemia    Other specified hypothyroidism    Vitamin B12 deficiency    Other microscopic hematuria    Celiac disease    Abdominal aortic aneurysm (AAA) ([facility] )    Ataxia    Pulmonary emphysema (HCC)    History of renal calculi    History of cervical cancer    Essential hypertension    Other proteinuria    Hepatic steatosis    CVA (cerebral vascular accident) ([facility] )    Diabetes mellitus ([place] [address] )    Anemia due to stage 3 chronic kidney disease ([place] [address] )    Cerebrovascular accident (CVA) ([place] [address] )    Fall    Anemia     Past Medical History:   Diagnosis Date    Diabetes mellitus ([place] [address] )     Stroke ([place] [address] ) 2022     Past Surgical History:   Procedure Laterality Date    EYE SURGERY      HYSTERECTOMY      MITRAL VALVE REPLACEMENT       Social History     Substance and Sexual Activity   Alcohol Use Yes    Comment: special occasional     Social History     Substance and Sexual Activity   Drug Use No     Social History     Tobacco Use   Smoking Status Former Smoker    Packs/day: 2 00    Years: 30 00    Pack years: 60 00    Quit date:     Years since quittin 6   Smokeless Tobacco Never Used     Family History   Problem Relation Age of Onset    Heart failure Mother     Heart attack Father     Sleep apnea Brother      Health Maintenance Due   Topic    COVID-19 Vaccine (1)    Pneumococcal Vaccine: 65+ Years (1 - PCV)    PT PLAN OF CARE     Influenza Vaccine (1)      Meds/Allergies       Current Outpatient Medications:     acetaminophen (TYLENOL) 325 mg tablet, Take 2 tablets (650 mg total) by mouth every 6 (six) hours as needed for mild pain or headaches, Disp: , Rfl: 0    aspirin (ECOTRIN LOW STRENGTH) 81 mg EC tablet, Take 1 tablet (81 mg total) by mouth daily, Disp: , Rfl: 0    atorvastatin (LIPITOR) 80 mg tablet, Take 1 tablet (80 mg total) by mouth daily, Disp: 90 tablet, Rfl: 1    Cholecalciferol (VITAMIN D PO), Take by mouth, Disp: , Rfl:     enoxaparin (LOVENOX) 60 mg/0 6 mL, , Disp: , Rfl:     ferrous sulfate 324 (65 Fe) mg, Take 1 tablet (324 mg total) by mouth daily before breakfast, Disp: 180 tablet, Rfl: 0    metFORMIN (GLUCOPHAGE) 500 mg tablet, Take 1 tablet (500 mg total) by mouth daily with breakfast, Disp: 180 tablet, Rfl: 0    Multiple Vitamins-Minerals (PRESERVISION AREDS PO), Take 2 tablets by mouth daily  , Disp: , Rfl:     warfarin (COUMADIN) 2 5 mg tablet, Take 1 tablet (2 5 mg total) by mouth 3 (three) times a week On Monday Wednesday and Friday, Disp: , Rfl: 0    warfarin (COUMADIN) 5 mg tablet, Take 1 tablet (5 mg total) by mouth 4 (four) times a week On Sunday, Tuesday, Thursday, Saturday, Disp: , Rfl: 0    famotidine (PEPCID) 20 mg tablet, Take 1 tablet (20 mg total) by mouth daily (Patient not taking: Reported on 8/25/2022), Disp: 60 tablet, Rfl: 0    polyethylene glycol (MIRALAX) 17 g packet, Take 17 g by mouth daily as needed (Constipation) (Patient not taking: Reported on 8/25/2022), Disp: , Rfl: 0      Objective:    Vitals:   /80   Pulse 95   Ht 5' 5 5" (1 664 m)   Wt 65 8 kg (145 lb)   SpO2 94%   BMI 23 76 kg/m²   Body mass index is 23 76 kg/m²  Vitals:    08/25/22 1624   Weight: 65 8 kg (145 lb)       Physical Exam  Vitals and nursing note reviewed  Constitutional:       General: She is not in acute distress  Appearance: Normal appearance  She is well-developed   She is not ill-appearing, toxic-appearing or diaphoretic  Comments: Short grey hair   HENT:      Head: Normocephalic and atraumatic  Right Ear: External ear normal       Left Ear: External ear normal    Eyes:      General: No scleral icterus  Right eye: No discharge  Left eye: No discharge  Conjunctiva/sclera: Conjunctivae normal    Neck:      Thyroid: No thyroid mass, thyromegaly or thyroid tenderness  Vascular: Normal carotid pulses  No carotid bruit or JVD  Cardiovascular:      Rate and Rhythm: Normal rate  Rhythm irregular  Pulses:           Dorsalis pedis pulses are 2+ on the right side and 2+ on the left side  Posterior tibial pulses are 1+ on the right side and 1+ on the left side  Heart sounds: S1 normal  Murmur heard  Systolic murmur is present with a grade of 2/6  Comments: S2 elevated  Pulmonary:      Effort: No respiratory distress  Breath sounds: Normal breath sounds  No stridor  No wheezing, rhonchi or rales  Chest:      Chest wall: Tenderness present  Comments: Right lateral lower rib area tenderness is present but mild point tenderness no ecchymosis  Abdominal:      General: Bowel sounds are normal  There is no distension  Palpations: There is no shifting dullness, fluid wave, hepatomegaly, mass or pulsatile mass  Tenderness: There is no abdominal tenderness  There is no rebound  Hernia: There is no hernia in the umbilical area, ventral area, left inguinal area, left femoral area or right inguinal area  Musculoskeletal:         General: Normal range of motion  Cervical back: Normal range of motion  Right lower leg: No edema  Left lower leg: No edema  Comments: Hip nontender a  Low back nontender  Gait reasonable  Feet:      Right foot:      Skin integrity: Callus present  No ulcer, skin breakdown, erythema, warmth or dry skin  Left foot:      Skin integrity: Callus present   No ulcer, skin breakdown, erythema, warmth or dry skin  Lymphadenopathy:      Cervical: No cervical adenopathy  Right cervical: No superficial cervical adenopathy  Skin:     General: Skin is warm  Findings: No rash  Neurological:      General: No focal deficit present  Mental Status: She is alert and oriented to person, place, and time  Mental status is at baseline  Cranial Nerves: Cranial nerves are intact  Motor: Motor function is intact  Coordination: Coordination normal       Gait: Tandem walk abnormal  Gait normal       Deep Tendon Reflexes: Reflexes normal       Comments: Speech is normal     Psychiatric:         Mood and Affect: Mood normal          Behavior: Behavior normal          Thought Content:  Thought content normal          Judgment: Judgment normal          Lab Review   Ancillary Orders on 08/19/2022   Component Date Value Ref Range Status    Protime 08/22/2022 17 4 (A) 11 6 - 14 5 seconds Final    INR 08/22/2022 1 40 (A) 0 84 - 1 19 Final   Admission on 08/11/2022, Discharged on 08/14/2022   Component Date Value Ref Range Status    Ventricular Rate 08/11/2022 48  BPM Final    Atrial Rate 08/11/2022 53  BPM Final    QRSD Interval 08/11/2022 88  ms Final    QT Interval 08/11/2022 428  ms Final    QTC Interval 08/11/2022 382  ms Final    QRS Axis 08/11/2022 24  degrees Final    T Wave [place] 08/11/2022 18  degrees Final    WBC 08/11/2022 5 29  4 31 - 10 16 Thousand/uL Final    RBC 08/11/2022 4 18  3 81 - 5 12 Million/uL Final    Hemoglobin 08/11/2022 10 1 (A) 11 5 - 15 4 g/dL Final    Hematocrit 08/11/2022 34 6 (A) 34 8 - 46 1 % Final    MCV 08/11/2022 83  82 - 98 fL Final    MCH 08/11/2022 24 2 (A) 26 8 - 34 3 pg Final    MCHC 08/11/2022 29 2 (A) 31 4 - 37 4 g/dL Final    RDW 08/11/2022 20 8 (A) 11 6 - 15 1 % Final    MPV 08/11/2022 10 5  8 9 - 12 7 fL Final    Platelets 58/90/6483 242  149 - 390 Thousands/uL Final    nRBC 08/11/2022 0  /100 WBCs Final    Neutrophils Relative 08/11/2022 61  43 - 75 % Final    Immat GRANS % 08/11/2022 0  0 - 2 % Final    Lymphocytes Relative 08/11/2022 25  14 - 44 % Final    Monocytes Relative 08/11/2022 10  4 - 12 % Final    Eosinophils Relative 08/11/2022 3  0 - 6 % Final    Basophils Relative 08/11/2022 1  0 - 1 % Final    Neutrophils Absolute 08/11/2022 3 26  1 85 - 7 62 Thousands/µL Final    Immature Grans Absolute 08/11/2022 0 02  0 00 - 0 20 Thousand/uL Final    Lymphocytes Absolute 08/11/2022 1 32  0 60 - 4 47 Thousands/µL Final    Monocytes Absolute 08/11/2022 0 50  0 17 - 1 22 Thousand/µL Final    Eosinophils Absolute 08/11/2022 0 15  0 00 - 0 61 Thousand/µL Final    Basophils Absolute 08/11/2022 0 04  0 00 - 0 10 Thousands/µL Final    Sodium 08/11/2022 141  135 - 147 mmol/L Final    Potassium 08/11/2022 3 8  3 5 - 5 3 mmol/L Final    Chloride 08/11/2022 104  96 - 108 mmol/L Final    CO2 08/11/2022 28  21 - 32 mmol/L Final    ANION GAP 08/11/2022 9  4 - 13 mmol/L Final    BUN 08/11/2022 19  5 - 25 mg/dL Final    Creatinine 08/11/2022 0 67  0 60 - 1 30 mg/dL Final    Standardized to IDMS reference method    Glucose 08/11/2022 122  65 - 140 mg/dL Final    If the patient is fasting, the ADA then defines impaired fasting glucose as > 100 mg/dL and diabetes as > or equal to 123 mg/dL  Specimen collection should occur prior to Sulfasalazine administration due to the potential for falsely depressed results  Specimen collection should occur prior to Sulfapyridine administration due to the potential for falsely elevated results   Calcium 08/11/2022 8 9  8 3 - 10 1 mg/dL Final    AST 08/11/2022 28  5 - 45 U/L Final    Specimen collection should occur prior to Sulfasalazine administration due to the potential for falsely depressed results   ALT 08/11/2022 33  12 - 78 U/L Final    Specimen collection should occur prior to Sulfasalazine administration due to the potential for falsely depressed results       Alkaline Phosphatase 08/11/2022 76  46 - 116 U/L Final    Total Protein 08/11/2022 7 9  6 4 - 8 4 g/dL Final    Albumin 08/11/2022 4 1  3 5 - 5 0 g/dL Final    Total Bilirubin 08/11/2022 0 54  0 20 - 1 00 mg/dL Final    Use of this assay is not recommended for patients undergoing treatment with eltrombopag due to the potential for falsely elevated results   eGFR 08/11/2022 82  ml/min/1 73sq m Final    Magnesium 08/11/2022 1 8  1 6 - 2 6 mg/dL Final    Lipase 08/11/2022 212  73 - 393 u/L Final    Color, UA 08/11/2022 Yellow   Final    Clarity, UA 08/11/2022 Cloudy   Final    Specific [place], UA 08/11/2022 1 010  1 000 - 1 030 Final    pH, UA 08/11/2022 6 0  5 0, 5 5, 6 0, 6 5, 7 0, 7 5, 8 0, 8 5, 9 0 Final    Leukocytes, UA 08/11/2022 Large (A) Negative Final    Nitrite, UA 08/11/2022 Positive (A) Negative Final    Protein, UA 08/11/2022 Negative  Negative mg/dl Final    Glucose, UA 08/11/2022 Negative  Negative mg/dl Final    Ketones, UA 08/11/2022 Negative  Negative mg/dl Final    Urobilinogen, UA 08/11/2022 0 2  0 2, 1 0 E U /dl E U /dl Final    Bilirubin, UA 08/11/2022 Negative  Negative Final    Occult Blood, UA 08/11/2022 Trace-Intact (A) Negative Final    hs TnI 0hr 08/11/2022 5  "Refer to ACS Flowchart"- see link ng/L Final    Comment:                                              Initial (time 0) result  If >=50 ng/L, Myocardial injury suggested ;  Type of myocardial injury and treatment strategy  to be determined  If 5-49 ng/L, a delta result at 2 hours and or 4 hours will be needed to further evaluate  If <4 ng/L, and chest pain has been >3 hours since onset, patient may qualify for discharge based on the HEART score in the ED  If <5 ng/L and <3hours since onset of chest pain, a delta result at 2 hours will be needed to further evaluate  HS Troponin 99th Percentile URL of a Health Population=12 ng/L with a 95% Confidence Interval of 8-18 ng/L      Second Troponin (time 2 hours)  If calculated delta >= 20 ng/L,  Myocardial injury suggested ; Type of myocardial injury and treatment strategy to be determined  If 5-49 ng/L and the calculated delta is 5-19 ng/L, consult medical service for evaluation  Continue evaluation for ischemia on ecg and other possible etiology and repeat hs troponin at 4 hours  If delta                            is <5 ng/L at 2 hours, consider discharge based on risk stratification via the HEART score (if in ED), or MILLER risk score in IP/Observation  HS Troponin 99th Percentile URL of a Health Population=12 ng/L with a 95% Confidence Interval of 8-18 ng/L     NT-proBNP 08/11/2022 650 (A) <450 pg/mL Final    Protime 08/11/2022 27 5 (A) 11 6 - 14 5 seconds Final    INR 08/11/2022 2 55 (A) 0 84 - 1 19 Final    PTT 08/11/2022 36  23 - 37 seconds Final    Therapeutic Heparin Range =  60-90 seconds    ABO Grouping 08/11/2022 O   Final    Rh Factor 08/11/2022 Positive   Final    Antibody Screen 08/11/2022 Negative   Final    Specimen Expiration Date 08/11/2022 [id number]   Final    SARS-CoV-2 08/11/2022 Negative  Negative Final    Digoxin Lvl 08/11/2022 0 8  0 8 - 2 0 ng/mL Final    hs TnI 2hr 08/11/2022 4  "Refer to ACS Flowchart"- see link ng/L Final    Comment:                                              Initial (time 0) result  If >=50 ng/L, Myocardial injury suggested ;  Type of myocardial injury and treatment strategy  to be determined  If 5-49 ng/L, a delta result at 2 hours and or 4 hours will be needed to further evaluate  If <4 ng/L, and chest pain has been >3 hours since onset, patient may qualify for discharge based on the HEART score in the ED  If <5 ng/L and <3hours since onset of chest pain, a delta result at 2 hours will be needed to further evaluate  HS Troponin 99th Percentile URL of a Health Population=12 ng/L with a 95% Confidence Interval of 8-18 ng/L      Second Troponin (time 2 hours)  If calculated delta >= 20 ng/L,  Myocardial injury suggested ; Type of myocardial injury and treatment strategy to be determined  If 5-49 ng/L and the calculated delta is 5-19 ng/L, consult medical service for evaluation  Continue evaluation for ischemia on ecg and other possible etiology and repeat hs troponin at 4 hours  If delta                            is <5 ng/L at 2 hours, consider discharge based on risk stratification via the HEART score (if in ED), or MILLER risk score in IP/Observation  HS Troponin 99th Percentile URL of a Health Population=12 ng/L with a 95% Confidence Interval of 8-18 ng/L   Delta 2hr hsTnI 08/11/2022 -1  <20 ng/L Final    RBC, UA 08/11/2022 0-1  None Seen, 0-1, 1-2, 2-4, 0-5 /hpf Final    WBC, UA 08/11/2022 Innumerable (A) None Seen, 0-1, 1-2, 0-5, 2-4 /hpf Final    Epithelial Cells 08/11/2022 Occasional  None Seen, Occasional /hpf Final    Bacteria, UA 08/11/2022 Innumerable (A) None Seen, Occasional /hpf Final    Urine Culture 08/11/2022 >100,000 cfu/ml Escherichia coli (A)  Final    This organism has been edited  The previous result was Gram Negative Marty Enteric Like on 8/12/2022 at 1802 EDT   POC Glucose 08/11/2022 172 (A) 65 - 140 mg/dl Final    Folate 08/11/2022 >20 0 (A) 3 1 - 17 5 ng/mL Final    E521    Fecal Occult Blood Diagnostic 08/12/2022 Negative  Negative Final    Fecal Occult Blood Diagnostic 2 08/12/2022 Test not performed  Negative Final    Fecal Occult Blood Diagnostic 3 08/12/2022 Test not performed  Negative Final    Iron Saturation 08/11/2022 10 (A) 15 - 50 % Final    TIBC 08/11/2022 471 (A) 250 - 450 ug/dL Final    Iron 08/11/2022 46 (A) 50 - 170 ug/dL Final    Patients treated with metal-binding drugs (ie  Deferoxamine) may have depressed iron values      Ferritin 08/11/2022 8  8 - 388 ng/mL Final    POC Glucose 08/11/2022 86  65 - 140 mg/dl Final    Sodium 08/12/2022 143  135 - 147 mmol/L Final    Potassium 08/12/2022 3 9  3 5 - 5 3 mmol/L Final    Chloride 08/12/2022 106  96 - 108 mmol/L Final    CO2 08/12/2022 28  21 - 32 mmol/L Final    ANION GAP 08/12/2022 9  4 - 13 mmol/L Final    BUN 08/12/2022 18  5 - 25 mg/dL Final    Creatinine 08/12/2022 0 71  0 60 - 1 30 mg/dL Final    Standardized to IDMS reference method    Glucose 08/12/2022 105  65 - 140 mg/dL Final    If the patient is fasting, the ADA then defines impaired fasting glucose as > 100 mg/dL and diabetes as > or equal to 123 mg/dL  Specimen collection should occur prior to Sulfasalazine administration due to the potential for falsely depressed results  Specimen collection should occur prior to Sulfapyridine administration due to the potential for falsely elevated results   Calcium 08/12/2022 8 7  8 3 - 10 1 mg/dL Final    eGFR 08/12/2022 79  ml/min/1 73sq m Final    WBC 08/12/2022 5 09  4 31 - 10 16 Thousand/uL Final    RBC 08/12/2022 3 94  3 81 - 5 12 Million/uL Final    Hemoglobin 08/12/2022 9 4 (A) 11 5 - 15 4 g/dL Final    Hematocrit 08/12/2022 31 9 (A) 34 8 - 46 1 % Final    MCV 08/12/2022 81 (A) 82 - 98 fL Final    MCH 08/12/2022 23 9 (A) 26 8 - 34 3 pg Final    MCHC 08/12/2022 29 5 (A) 31 4 - 37 4 g/dL Final    RDW 08/12/2022 20 6 (A) 11 6 - 15 1 % Final    Platelets 20/98/1444 207  149 - 390 Thousands/uL Final    MPV 08/12/2022 10 4  8 9 - 12 7 fL Final    Protime 08/12/2022 28 8 (A) 11 6 - 14 5 seconds Final    INR 08/12/2022 2 71 (A) 0 84 - 1 19 Final    TSH 3RD GENERATON 08/12/2022 1 832  0 450 - 4 500 uIU/mL Final    The recommended reference ranges for TSH during pregnancy are as follows:   First trimester 0 1 to 2 5 uIU/mL   Second trimester  0 2 to 3 0 uIU/mL   Third trimester 0 3 to 3 0 uIU/m    Note: Normal ranges may not apply to patients who are transgender, non-binary, or whose legal sex, sex at birth, and gender identity differ  Adult TSH (3rd generation) reference range follows the recommended guidelines of the American Thyroid Association, January, 2020      Vitamin B-12 08/12/2022 624  100 - 900 pg/mL Final    Hemolysis Smear 08/12/2022 No Schistocytes or Helmet Cells noted   Final    LD 08/12/2022 254 (A) 81 - 234 U/L Final    Retic Ct Abs 08/12/2022 72,100  14,097 - 95,744 Final    Retic Ct Pct 08/12/2022 1 83  0 37 - 1 87 % Final    POC Glucose 08/12/2022 118  65 - 140 mg/dl Final    POC Glucose 08/12/2022 96  65 - 140 mg/dl Final    POC Glucose 08/12/2022 116  65 - 140 mg/dl Final    POC Glucose 08/12/2022 107  65 - 140 mg/dl Final    WBC 08/13/2022 6 60  4 31 - 10 16 Thousand/uL Final    RBC 08/13/2022 4 29  3 81 - 5 12 Million/uL Final    Hemoglobin 08/13/2022 10 1 (A) 11 5 - 15 4 g/dL Final    Hematocrit 08/13/2022 35 2  34 8 - 46 1 % Final    MCV 08/13/2022 82  82 - 98 fL Final    MCH 08/13/2022 23 5 (A) 26 8 - 34 3 pg Final    MCHC 08/13/2022 28 7 (A) 31 4 - 37 4 g/dL Final    RDW 08/13/2022 21 0 (A) 11 6 - 15 1 % Final    Platelets 23/47/0921 237  149 - 390 Thousands/uL Final    MPV 08/13/2022 10 4  8 9 - 12 7 fL Final    Sodium 08/13/2022 142  135 - 147 mmol/L Final    Potassium 08/13/2022 4 0  3 5 - 5 3 mmol/L Final    Chloride 08/13/2022 104  96 - 108 mmol/L Final    CO2 08/13/2022 30  21 - 32 mmol/L Final    ANION GAP 08/13/2022 8  4 - 13 mmol/L Final    BUN 08/13/2022 19  5 - 25 mg/dL Final    Creatinine 08/13/2022 0 70  0 60 - 1 30 mg/dL Final    Standardized to IDMS reference method    Glucose 08/13/2022 129  65 - 140 mg/dL Final    If the patient is fasting, the ADA then defines impaired fasting glucose as > 100 mg/dL and diabetes as > or equal to 123 mg/dL  Specimen collection should occur prior to Sulfasalazine administration due to the potential for falsely depressed results  Specimen collection should occur prior to Sulfapyridine administration due to the potential for falsely elevated results      Calcium 08/13/2022 9 0  8 3 - 10 1 mg/dL Final    eGFR 08/13/2022 80  ml/min/1 73sq m Final    POC Glucose 08/13/2022 126  65 - 140 mg/dl Final    POC Glucose 08/13/2022 107  65 - 140 mg/dl Final    POC Glucose 08/13/2022 129  65 - 140 mg/dl Final    POC Glucose 08/13/2022 123  65 - 140 mg/dl Final    Protime 08/14/2022 26 3 (A) 11 6 - 14 5 seconds Final    INR 08/14/2022 2 41 (A) 0 84 - 1 19 Final    WBC 08/14/2022 5 18  4 31 - 10 16 Thousand/uL Final    RBC 08/14/2022 4 13  3 81 - 5 12 Million/uL Final    Hemoglobin 08/14/2022 9 7 (A) 11 5 - 15 4 g/dL Final    Hematocrit 08/14/2022 33 8 (A) 34 8 - 46 1 % Final    MCV 08/14/2022 82  82 - 98 fL Final    MCH 08/14/2022 23 5 (A) 26 8 - 34 3 pg Final    MCHC 08/14/2022 28 7 (A) 31 4 - 37 4 g/dL Final    RDW 08/14/2022 20 9 (A) 11 6 - 15 1 % Final    Platelets 49/08/1151 220  149 - 390 Thousands/uL Final    MPV 08/14/2022 10 1  8 9 - 12 7 fL Final    Sodium 08/14/2022 141  135 - 147 mmol/L Final    Potassium 08/14/2022 3 9  3 5 - 5 3 mmol/L Final    Chloride 08/14/2022 106  96 - 108 mmol/L Final    CO2 08/14/2022 28  21 - 32 mmol/L Final    ANION GAP 08/14/2022 7  4 - 13 mmol/L Final    BUN 08/14/2022 21  5 - 25 mg/dL Final    Creatinine 08/14/2022 0 60  0 60 - 1 30 mg/dL Final    Standardized to IDMS reference method    Glucose 08/14/2022 140  65 - 140 mg/dL Final    If the patient is fasting, the ADA then defines impaired fasting glucose as > 100 mg/dL and diabetes as > or equal to 123 mg/dL  Specimen collection should occur prior to Sulfasalazine administration due to the potential for falsely depressed results  Specimen collection should occur prior to Sulfapyridine administration due to the potential for falsely elevated results      Calcium 08/14/2022 8 6  8 3 - 10 1 mg/dL Final    eGFR 08/14/2022 85  ml/min/1 73sq m Final    POC Glucose 08/14/2022 144 (A) 65 - 140 mg/dl Final    POC Glucose 08/14/2022 116  65 - 140 mg/dl Final   Transcribe Orders on 08/08/2022   Component Date Value Ref Range Status    IgA 08/08/2022 197  64 - 422 mg/dL Final    Gliadin IgA 08/08/2022 15  0 - 19 units Final Negative                   0 - 19                     Weak Positive             20 - 30                     Moderate to Strong Positive   >30    Gliadin IgG 08/08/2022 32 (A) 0 - 19 units Final                       Negative                   0 - 19                     Weak Positive             20 - 30                     Moderate to Strong Positive   >30    Tissue Transglut Ab IGG 08/08/2022 <2  0 - 5 U/mL Final                                  Negative        0 - 5                                Weak Positive   6 - 9                                Positive           >9    TISSUE TRANSGLUTAMINASE IGA 08/08/2022 3  0 - 3 U/mL Final                                  Negative        0 -  3                                Weak Positive   4 - 10                                Positive           >10   Tissue Transglutaminase (tTG) has been identified   as the endomysial antigen  Studies have demonstr-   ated that endomysial IgA antibodies have over 99%   specificity for gluten sensitive enteropathy      Endomysial IgA 08/08/2022 Negative  Negative Final   Lab on 08/08/2022   Component Date Value Ref Range Status    WBC 08/08/2022 4 99  4 31 - 10 16 Thousand/uL Final    RBC 08/08/2022 4 04  3 81 - 5 12 Million/uL Final    Hemoglobin 08/08/2022 9 5 (A) 11 5 - 15 4 g/dL Final    Hematocrit 08/08/2022 33 5 (A) 34 8 - 46 1 % Final    MCV 08/08/2022 83  82 - 98 fL Final    MCH 08/08/2022 23 5 (A) 26 8 - 34 3 pg Final    MCHC 08/08/2022 28 4 (A) 31 4 - 37 4 g/dL Final    RDW 08/08/2022 20 6 (A) 11 6 - 15 1 % Final    Platelets 77/46/4272 231  149 - 390 Thousands/uL Final    MPV 08/08/2022 10 9  8 9 - 12 7 fL Final    Ferritin 08/08/2022 8  8 - 388 ng/mL Final    LD 08/08/2022 299 (A) 81 - 234 U/L Final    TSH 3RD GENERATON 08/08/2022 1 490  0 450 - 4 500 uIU/mL Final    The recommended reference ranges for TSH during pregnancy are as follows:   First trimester 0 1 to 2 5 uIU/mL   Second trimester  0 2 to 3 0 uIU/mL   Third trimester 0 3 to 3 0 uIU/m    Note: Normal ranges may not apply to patients who are transgender, non-binary, or whose legal sex, sex at birth, and gender identity differ  Adult TSH (3rd generation) reference range follows the recommended guidelines of the American Thyroid Association, January, 2020   Sodium 08/08/2022 142  135 - 147 mmol/L Final    Potassium 08/08/2022 4 2  3 5 - 5 3 mmol/L Final    Chloride 08/08/2022 109 (A) 96 - 108 mmol/L Final    CO2 08/08/2022 25  21 - 32 mmol/L Final    ANION GAP 08/08/2022 8  4 - 13 mmol/L Final    BUN 08/08/2022 17  5 - 25 mg/dL Final    Creatinine 08/08/2022 0 65  0 60 - 1 30 mg/dL Final    Standardized to IDMS reference method    Glucose, Fasting 08/08/2022 126 (A) 65 - 99 mg/dL Final    Specimen collection should occur prior to Sulfasalazine administration due to the potential for falsely depressed results  Specimen collection should occur prior to Sulfapyridine administration due to the potential for falsely elevated results   Calcium 08/08/2022 9 3  8 3 - 10 1 mg/dL Final    AST 08/08/2022 32  5 - 45 U/L Final    Specimen collection should occur prior to Sulfasalazine administration due to the potential for falsely depressed results   ALT 08/08/2022 32  12 - 78 U/L Final    Specimen collection should occur prior to Sulfasalazine and/or Sulfapyridine administration due to the potential for falsely depressed results   Alkaline Phosphatase 08/08/2022 78  46 - 116 U/L Final    Total Protein 08/08/2022 7 5  6 4 - 8 4 g/dL Final    Albumin 08/08/2022 4 0  3 5 - 5 0 g/dL Final    Total Bilirubin 08/08/2022 0 59  0 20 - 1 00 mg/dL Final    Use of this assay is not recommended for patients undergoing treatment with eltrombopag due to the potential for falsely elevated results      eGFR 08/08/2022 82  ml/min/1 73sq m Final    Haptoglobin 08/08/2022 <10 (A) 41 - 333 mg/dL Final   Ancillary Orders on 07/22/2022 Component Date Value Ref Range Status    Protime 08/08/2022 20 0 (A) 11 6 - 14 5 seconds Final    INR 08/08/2022 1 68 (A) 0 84 - 1 19 Final   Lab on 07/06/2022   Component Date Value Ref Range Status    Sodium 07/06/2022 138  136 - 145 mmol/L Final    Potassium 07/06/2022 4 0  3 5 - 5 3 mmol/L Final    Chloride 07/06/2022 104  100 - 108 mmol/L Final    CO2 07/06/2022 27  21 - 32 mmol/L Final    ANION GAP 07/06/2022 7  4 - 13 mmol/L Final    BUN 07/06/2022 21  5 - 25 mg/dL Final    Creatinine 07/06/2022 0 73  0 60 - 1 30 mg/dL Final    Standardized to IDMS reference method    Glucose, Fasting 07/06/2022 146 (A) 65 - 99 mg/dL Final    Specimen collection should occur prior to Sulfasalazine administration due to the potential for falsely depressed results  Specimen collection should occur prior to Sulfapyridine administration due to the potential for falsely elevated results   Calcium 07/06/2022 9 3  8 3 - 10 1 mg/dL Final    AST 07/06/2022 32  5 - 45 U/L Final    Specimen collection should occur prior to Sulfasalazine administration due to the potential for falsely depressed results   ALT 07/06/2022 31  12 - 78 U/L Final    Specimen collection should occur prior to Sulfasalazine and/or Sulfapyridine administration due to the potential for falsely depressed results   Alkaline Phosphatase 07/06/2022 82  46 - 116 U/L Final    Total Protein 07/06/2022 7 9  6 4 - 8 2 g/dL Final    Albumin 07/06/2022 4 1  3 5 - 5 0 g/dL Final    Total Bilirubin 07/06/2022 1 10 (A) 0 20 - 1 00 mg/dL Final    Use of this assay is not recommended for patients undergoing treatment with eltrombopag due to the potential for falsely elevated results   eGFR 07/06/2022 76  ml/min/1 73sq m Final    Hemoglobin A1C 07/06/2022 6 5 (A) Normal 3 8-5 6%; PreDiabetic 5 7-6 4%;  Diabetic >=6 5%; Glycemic control for adults with diabetes <7 0% % Final    EAG 07/06/2022 140  mg/dl Final    Cholesterol 07/06/2022 104  See Comment mg/dL Final    Cholesterol:         Pediatric <18 Years        Desirable          <170 mg/dL      Borderline High    170-199 mg/dL      High               >=200 mg/dL        Adult >=18 Years            Desirable         <200 mg/dL      Borderline High   200-239 mg/dL      High              >239 mg/dL      Triglycerides 07/06/2022 137  See Comment mg/dL Final    Triglyceride:     0-9Y            <75mg/dL     10Y-17Y         <90 mg/dL       >=18Y     Normal          <150 mg/dL     Borderline High 150-199 mg/dL     High            200-499 mg/dL        Very High       >499 mg/dL    Specimen collection should occur prior to N-Acetylcysteine or Metamizole administration due to the potential for falsely depressed results   HDL, Direct 07/06/2022 37 (A) >=50 mg/dL Final    Specimen collection should occur prior to Metamizole administration due to the potential for falsley depressed results   LDL Calculated 07/06/2022 40  0 - 100 mg/dL Final    LDL Cholesterol:     Optimal           <100 mg/dl     Near Optimal      100-129 mg/dl     Above Optimal       Borderline High 130-159 mg/dl       High            160-189 mg/dl       Very High       >189 mg/dl         This screening LDL is a calculated result  It does not have the accuracy of the Direct Measured LDL in the monitoring of patients with hyperlipidemia and/or statin therapy  Direct Measure LDL (EDO682) must be ordered separately in these patients      Non-HDL-Chol (CHOL-HDL) 07/06/2022 67  mg/dl Final    WBC 07/06/2022 4 44  4 31 - 10 16 Thousand/uL Final    RBC 07/06/2022 4 20  3 81 - 5 12 Million/uL Final    Hemoglobin 07/06/2022 10 0 (A) 11 5 - 15 4 g/dL Final    Hematocrit 07/06/2022 33 8 (A) 34 8 - 46 1 % Final    MCV 07/06/2022 81 (A) 82 - 98 fL Final    MCH 07/06/2022 23 8 (A) 26 8 - 34 3 pg Final    MCHC 07/06/2022 29 6 (A) 31 4 - 37 4 g/dL Final    RDW 07/06/2022 17 5 (A) 11 6 - 15 1 % Final    Platelets 72/85/4761 241  149 - 390 Thousands/uL Final    MPV 07/06/2022 10 4  8 9 - 12 7 fL Final   Admission on 07/05/2022, Discharged on 07/05/2022   Component Date Value Ref Range Status    Protime 07/05/2022 21 8 (A) 11 6 - 14 5 seconds Final    INR 07/05/2022 1 97 (A) 0 84 - 1 19 Final    PTT 07/05/2022 34  23 - 37 seconds Final    Therapeutic Heparin Range =  60-90 seconds         Patient Instructions   1  Follow-up with your cardiologist tomorrow as planned  2  Follow-up with hemato oncologist as planned  3  Continue your iron medications  4  You will need CBC in 1 week and 3 weeks  5  Monitor heart rate at home with the assistance of friends and family we like to keep heart rate in the range of 60-90     6  Will monitor blood pressure trend if blood pressure goes up we will start alternate blood pressure medications  Differential diagnosis of anemia were reviewed with the patient and family  Differential diagnosis of bradycardia were reviewed with the patient and family  Will I will see her back in a month  Follow with Consultants as per their and our suggestion    Follow up in one month or as needed earlier    Follow all instructions as advised and discussed  Take your medications as prescribed  Call the office immediately if you experience any side effects  Ask questions if you do not understand  Keep your scheduled appointment as advised or come sooner if necessary or in doubt  Best time to call for non-urgent matter or questions on weekdays is between 9am and 12 noon  See physician for any new symptoms or worsening of current symptoms  Urgent or emergent situations call 911 and report to nearest emergency room      I spent  30 -40 minutes taking care of this patient including clinical care, conseling, collaboration, chart, lab and consultaion review as appropriate    Patient is to get labs 1 week(s) prior to next visit if advised          Dr Andie Mosqueda MD  [facility]       "This note has been constructed using a voice recognition system  Therefore there may be syntax, spelling, and/or grammatical errors   Please call if you have any questions  "

## 2022-08-25 NOTE — ASSESSMENT & PLAN NOTE
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

## 2022-08-26 ENCOUNTER — APPOINTMENT (OUTPATIENT)
Dept: PHYSICAL THERAPY | Facility: CLINIC | Age: 82
End: 2022-08-26
Payer: MEDICARE

## 2022-08-26 ENCOUNTER — APPOINTMENT (OUTPATIENT)
Dept: OCCUPATIONAL THERAPY | Facility: CLINIC | Age: 82
End: 2022-08-26
Payer: MEDICARE

## 2022-08-26 ENCOUNTER — TELEPHONE (OUTPATIENT)
Dept: NEUROLOGY | Facility: CLINIC | Age: 82
End: 2022-08-26

## 2022-08-30 ENCOUNTER — APPOINTMENT (OUTPATIENT)
Dept: OCCUPATIONAL THERAPY | Facility: CLINIC | Age: 82
End: 2022-08-30
Payer: MEDICARE

## 2022-08-30 ENCOUNTER — APPOINTMENT (OUTPATIENT)
Dept: PHYSICAL THERAPY | Facility: CLINIC | Age: 82
End: 2022-08-30
Payer: MEDICARE

## 2022-09-01 ENCOUNTER — APPOINTMENT (OUTPATIENT)
Dept: LAB | Facility: CLINIC | Age: 82
End: 2022-09-01
Payer: MEDICARE

## 2022-09-01 DIAGNOSIS — N18.30 ANEMIA DUE TO STAGE 3 CHRONIC KIDNEY DISEASE, UNSPECIFIED WHETHER STAGE 3A OR 3B CKD (HCC): ICD-10-CM

## 2022-09-01 DIAGNOSIS — Z79.01 LONG TERM (CURRENT) USE OF ANTICOAGULANTS: ICD-10-CM

## 2022-09-01 DIAGNOSIS — Z95.2 HEART VALVE REPLACED: ICD-10-CM

## 2022-09-01 DIAGNOSIS — D63.1 ANEMIA DUE TO STAGE 3 CHRONIC KIDNEY DISEASE, UNSPECIFIED WHETHER STAGE 3A OR 3B CKD (HCC): ICD-10-CM

## 2022-09-01 LAB
ERYTHROCYTE [DISTWIDTH] IN BLOOD BY AUTOMATED COUNT: 22.2 % (ref 11.6–15.1)
HCT VFR BLD AUTO: 35.8 % (ref 34.8–46.1)
HGB BLD-MCNC: 10.3 G/DL (ref 11.5–15.4)
INR PPP: 2.11 (ref 0.84–1.19)
MCH RBC QN AUTO: 24.6 PG (ref 26.8–34.3)
MCHC RBC AUTO-ENTMCNC: 28.8 G/DL (ref 31.4–37.4)
MCV RBC AUTO: 86 FL (ref 82–98)
PLATELET # BLD AUTO: 246 THOUSANDS/UL (ref 149–390)
PMV BLD AUTO: 11 FL (ref 8.9–12.7)
PROTHROMBIN TIME: 23.9 SECONDS (ref 11.6–14.5)
RBC # BLD AUTO: 4.18 MILLION/UL (ref 3.81–5.12)
WBC # BLD AUTO: 4.82 THOUSAND/UL (ref 4.31–10.16)

## 2022-09-01 PROCEDURE — 36415 COLL VENOUS BLD VENIPUNCTURE: CPT

## 2022-09-01 PROCEDURE — 85027 COMPLETE CBC AUTOMATED: CPT

## 2022-09-01 PROCEDURE — 85610 PROTHROMBIN TIME: CPT

## 2022-09-02 ENCOUNTER — EVALUATION (OUTPATIENT)
Dept: PHYSICAL THERAPY | Facility: CLINIC | Age: 82
End: 2022-09-02
Payer: MEDICARE

## 2022-09-02 ENCOUNTER — OFFICE VISIT (OUTPATIENT)
Dept: NEUROLOGY | Facility: CLINIC | Age: 82
End: 2022-09-02
Payer: MEDICARE

## 2022-09-02 ENCOUNTER — EVALUATION (OUTPATIENT)
Dept: OCCUPATIONAL THERAPY | Facility: CLINIC | Age: 82
End: 2022-09-02
Payer: MEDICARE

## 2022-09-02 VITALS
SYSTOLIC BLOOD PRESSURE: 120 MMHG | HEART RATE: 101 BPM | BODY MASS INDEX: 23.95 KG/M2 | WEIGHT: 149 LBS | DIASTOLIC BLOOD PRESSURE: 70 MMHG | HEIGHT: 66 IN

## 2022-09-02 DIAGNOSIS — R26.89 BALANCE DISORDER: ICD-10-CM

## 2022-09-02 DIAGNOSIS — I63.9 CEREBROVASCULAR ACCIDENT (CVA), UNSPECIFIED MECHANISM (HCC): ICD-10-CM

## 2022-09-02 DIAGNOSIS — E11.9 TYPE 2 DIABETES MELLITUS WITHOUT COMPLICATION, WITHOUT LONG-TERM CURRENT USE OF INSULIN (HCC): ICD-10-CM

## 2022-09-02 DIAGNOSIS — I63.9 CEREBROVASCULAR ACCIDENT (CVA), UNSPECIFIED MECHANISM (HCC): Primary | ICD-10-CM

## 2022-09-02 DIAGNOSIS — R26.89 OTHER ABNORMALITIES OF GAIT AND MOBILITY: Primary | ICD-10-CM

## 2022-09-02 DIAGNOSIS — E78.00 HYPERCHOLESTEREMIA: Primary | ICD-10-CM

## 2022-09-02 DIAGNOSIS — Z95.2 H/O MITRAL VALVE REPLACEMENT WITH MECHANICAL VALVE: ICD-10-CM

## 2022-09-02 PROCEDURE — 99214 OFFICE O/P EST MOD 30 MIN: CPT | Performed by: NURSE PRACTITIONER

## 2022-09-02 PROCEDURE — 97166 OT EVAL MOD COMPLEX 45 MIN: CPT | Performed by: OCCUPATIONAL THERAPIST

## 2022-09-02 PROCEDURE — 97530 THERAPEUTIC ACTIVITIES: CPT

## 2022-09-02 PROCEDURE — 97530 THERAPEUTIC ACTIVITIES: CPT | Performed by: PHYSICAL THERAPIST

## 2022-09-02 NOTE — PATIENT INSTRUCTIONS
Continue Baby Aspirin 81mg daily  Continue with coumadin therapy per Cardiology   Continue with atorvastatin 80mg daily  Repeat Lipid panel in January, if LDL remains in goal range we can decrease the atorvastin  Continue with PT/OT   Continue with home exercise while on vacation  Continue working with PT/OT, PCP and Cardiologist re: vascular risk reduction  Follow up with Neurology office in 6 months or sooner if needed

## 2022-09-02 NOTE — PROGRESS NOTES
PT Re-Evaluation          Insurance:  AMA/CMS Eval/ Re-eval POC expires Mike Gracene #/ Referral # Total   Visits  Start date  Expiration date Extension  Visit limitation? PT only or  PT+OT? Co-Insurance   Medicare  CMS 5-18-22 8-10-22 8-10 N/A N/A N/A N/A N/A No PT/OT Yes and $0 Co-pay    22                                                               Today's date: 2022  Patient name: Sarah Rodrigues  : 1940  MRN: [id number]  Referring provider: Nahed Reynoso  Dx:   Encounter Diagnosis     ICD-10-CM    1  Other abnormalities of gait and mobility  R26 89    2  Cerebrovascular accident (CVA), unspecified mechanism ([facility] [address] )  I63 9    3  Balance disorder  R26 89          Assessment  Assessment details: Patient is a 80 y o  Female who was reporting to skilled outpatient PT for deficits in LE strength, gait, balance, and endurance following a L CVA she experienced 4 months ago impacting her ability to perform ADLs and ambulation safely  Recent 1 month gap in therapy due to 4 day hospital stay and further health complications in which are still being followed by her PCP and is planning to see a hematologist next week  She has demonstrated slight regression in LE strength/power and dynamic balance with scores associated with 5xSTS, DGI, FGA, and MiniBEST indicating HIGH risk for falls with cut off scores taken from APTA and Rehab Measures  Further HIGH risk for falls depicted by dual tasking deficits per > 10% difference between TUG and TUG cognitive  Due to observed regression with 1 month hiatus and remaining safety risk, she will benefit from skilled outpatient PT in order to maximize her function and safety within the home and the community           Impairments: Abnormal coordination, Abnormal gait, Abnormal muscle tone, Abnormal or restricted ROM, Activity intolerance, Impaired balance, Impaired physical strength, Lacks appropriate HEP, Poor posture, Poor body mechanics, Safety issue, Weight-bearing intolerance, Abnormal movement and Abnormal muscle firing  Understanding of Dx/Px/POC: Good  Prognosis: Good      Patient verbalized understanding of POC  Please contact me if you have any questions or recommendations  Thank you for the referral and the opportunity to share in [address] care          Plan  Plan details: HIGT, high level balance exercises  Patient would benefit from: PT Eval and Skilled PT  Planned modality interventions: Biofeedback, Cryotherapy, TENS and Thermotherapy: Hydrocollator Packs  Planned therapy interventions: Abdominal trunk stabilization, ADL training, Balance, Balance/WB training, Breathing training, Body mechanics training, Coordination, Functional ROM exercises, Gait training, HEP, Joint mobilization, Manual therapy, Pandey taping, Motor coordination training, Neuromuscular re-education, Patient education, Postural training, Strengthening, Stretching, Therapeutic activities, Therapeutic exercises, Therapeutic training and Activity modification  Frequency: 2x/wk  Duration in weeks: 12  Plan of Care beginning date: 8-10-22  Plan of Care expiration date: 12 weeks - 11-2-22  Treatment plan discussed with: Patient         Goals  Short Term Goals (4 weeks):    - Patient will improve time on TUG by 2 9 seconds to facilitate improved safety in all ambulation - MET  - Patient will be independent in basic HEP 2-3 weeks - MET  - Patient will improve 5xSTS score by 2 3 seconds to promote improved LE functional strength needed for ADLs - MET  - Patient will complete components of CB&M to promote agility necessary for sports related tasks - N/A    Long Term Goals (12 weeks):  - Patient will be independent in a comprehensive home exercise program - PARTIALLY MET  - Patient will improve gait speed by 0 18 m/s to improve safety with community ambulation - MET  - Patient will improve time on TUG cognitive to be < 10% difference between TUG in order to reduce her risk for falls - MET  - Patient will improve scoring on FGA by 4 points to progress safety with dynamic tasks - NOT MET  - Patient will be able to demonstrate HT in gait without veering - NOT MET  - Patient will improve 6 Minute Walk Test score by 190 feet to promote improved cardiovascular endurance - NOT MET (progressing well towards)  - Patient will report 50% reduction in near falls in order to improve safety with functional tasks and reduce his risk for falls - MET  - Patient will report going on walks at least 3 days per week to promote independence and improved cardiovascular endurance - MET  - Patient will be able to ascend/descend stairs reciprocally without UE assist to promote independence and safety with ADLs - MET  - Patient will report 50% reduction in near falls when ambulating on uneven terrain - MET      Cut off score    All date taken from APTA Neuro Section or Rehab Measures      Monsalve:  Miguel et al , 2018  Willis [address]: 2 7 pts    Nury et al , 2011  Cut-off score: 45/56    Chronic CVA  < 44/56 high risk for falls (Miguel et al , 2018)  < 47 5/56 slow walker status (Cayetano andrade al , 2011) 5xSTS: Gustavo 2010  MDC: 2 3 sec  Age Norms:  60-69: 11 1 sec  70-79: 12 6 sec  80-89: 14 8 sec    Farrah 2010, Chronic Stroke  Chronic CVA: 12 sec   TUG  Gina , 2005  MDC: 2 9 sec    Cut off score:  >13 5 sec community dwelling adults  >32 2 frail elderly  <20 I for basic transfers  >30 dependent on transfers 10 Meter Walk Test:   Guerda Brandons al , 2006  Small meaningful change: 0 06 m/s  Substantial meaningful change: 0 14 m/s  MCID: 0 16 m/s    < 0 4 m/s household ambulators  0 4 - 0 8 m/s limited community ambulators  > 0 8 m/s community ambulators   FGA: Claudia andrade al , 2010  MCID: 4 2 pts  Geriatrics/community < 22/30 fall risk  Geriatrics/community < 20/30 unexplained falls DGI  MDC: vestibular - 4 pts  MDC: geriatric/community - 3 pts  Falls risk <19/24   6 Minute Walk Test  Dora Homans al , 2006  MDC: 60 98 m (200 01 ft)    Azul Skaggs, & Henok Currie, 2012  MCID: 34 4 m    Age Norms  60-69: M - 1876 ft   F - 1765 ft  70-79: M - 1729 ft   F - 1545 ft  80-89: M - 1368 ft   F - 1286 ft Modified Laina  0: No increase in tone  1: Catch and release or min resistance at end range  1+: Catch f/b min resistance throughout remainder (< half ROM)  2: Easily moved, but more marked tone throughout most ROM  3: Significant tone, PROM difficult  4: Rigid   MiniBest: Alla et al , 2013  CVA < 17 5 fall risk Pass (Acute CVA)  MDC: 1 8 points (acute), 3 2 points (chronic)         Subjective    History of Present Illness  Mechanism of injury: Patient is an 79 y/o female who had a 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 that has mostly recovered since  She was at [facility] for 3 days and then was transferred to [facility] in [place] for 2 weeks  Update (2022): Patient reports she feels like she has been improving a lot and is feeling really steady  She stated that she has only been using her Rollator at home when she gets up in the middle of the night  Update (2022): Patient reports that she feels back to baseline and overall has only had 1 LoB and controlled fall as she leaned on her Rollator which wasn't locked earlier this week  She stated overall she has been feeling really good without complications  Update (2022): Patient reports that she was having a really difficult time functioning and was tired all of the time so she went to the hospital 3 weeks ago in which she was admitted for 4 days  She stated she has anemia, however they are unsure why and she noted no bleeds  She was taken off 2 medications and has been feeling better as a result (improved HA)      Primary AD: Rollator  Assist level at home: Independent  WC usage: No  PLOF: Independent    Pain  Current pain ratin/10  At best pain ratin/10  At worst pain ratin/10  Location: N/A  Aggravating factors: N/A    Social Support  Steps to enter house: Yes, HR  Stairs in house: Yes, 2 HR   Lives in: Multi-level home  Lives with: Alone    Employment status: Retired  Hand dominance: R    Treatments  Previous treatment: PT/OT  Current treatment: PT/OT  Diagnostic Testing: CT, MRI      Objective     Vitals  - HR: 80 bpm  - BP: 142/70 mmHg  - SPO2: 98%    HR Max  - 208 - (0 7x82)  = 150 6 bpm    LE MMT  - R Hip Flexion: 4-/5  - L Hip Flexion: 4/5  - R Hip Extension: 4-/5 - L Hip Extension: 4/5  - R Hip Abduction: 4-/5 - L Hip abduction: 4/5  - R Hip adduction: 4-/5 - L Hip adduction: 4+/5  - R Knee Extension: 5/5 - L Knee Extension: 5/5  - R Knee Flexion: 4/5  - L Knee Flexion: 4+/5  - R Ankle DF: 4/5  - L Ankle DF: 4/5  - R Ankle PF: 4/5  - L Ankle PF: 4/5    Sensation  - Light touch:  Intact  - Deep pressure: Intact    Coordination  - Dysmetria: Intact  - Dysdiadochokinesia: Intact    Modified Laina  - R knee extension: 0 - no increase in tone  - L knee extension: 0 - no increase in tone  - R knee flexion: 0 - no increase in tone  - L knee flexion: 0 - no increase in tone  - R ankle DF: 0 - no increase in tone   - L ankle DF: 0 - no increase in tone      Outcome Measures Initial Eval  22 PN  22 RE  8 RE  22     5xSTS 16 58 sec,   0 UE 17 20,  0 UE 13 91 sec,   0 UE 15 10 sec,   0 UE     TUG  - Regular  - Cognitive   15 63 sec  16 81 sec (stopped counting)   12 92 sec  15 62 sec (completed counting)   12 20 sec  14 21 sec (completed counting)   10 91 sec  16 27 sec (from 100 by 2s)     10 meter 0 92 m/s 0 99 m/s 1 11 m/s 1 14 m/s     FGA      DGI 15/24 17/24 17/24 16/24     MiniBEST      6MWT 900 ft 925 ft 950 ft 950 ft                   Precautions: L CVA  Past Medical History:   Diagnosis Date    Diabetes mellitus ([facility] )     Stroke ([facility] ) 2022

## 2022-09-02 NOTE — PROGRESS NOTES
Patient ID: Riccardo Rajan is a 80 y o  female  Assessment/Plan:       Diagnoses and all orders for this visit:    Hypercholesteremia  -     Lipid panel; Future    Cerebrovascular accident (CVA), unspecified mechanism ([address] )    H/O mitral valve replacement with mechanical valve    Type 2 diabetes mellitus without complication, without long-term current use of insulin (HCC)       Continue Baby Aspirin 81mg daily  Continue with coumadin therapy per Cardiology   Continue with atorvastatin 80mg daily  Repeat Lipid panel in January, if LDL remains in goal range we can decrease the atorvastin  Continue with PT/OT   Continue with home exercise while on vacation  Continue working with PT/OT, PCP and Cardiologist re: vascular risk reduction  Follow up with Neurology office in 6 months or sooner if needed  Subjective/HPI:  Delma Brewer  Marialuisa Ames is an 79yo female with PMH of DM and CVA who follows with OP Neurology for stroke follow-up  Patient was seen in hospital 05/03/2022 after having right-sided weakness and dysarthria  CTA of the head and neck showed large evolving basal ganglion infarct with mild stenosis of the cervical ICA bilaterally and bilateral distal vertebral artery  Patient had been on Coumadin for mechanical valve in the outpatient setting, she had not been on any AP therapy  Patient remained on Coumadin upon her discharge from the hospital, she continued baby aspirin and high-dose statin Lipitor 80 mg  Patient went to skilled nursing facility after stroke for stroke rehab and is currently doing outpatient physical therapy  Echocardiogram with severely dilated left atrium, mildly dilated right atrium with the presence of PFO  Per cardiology's very small shunt needing no intervention  MRI the brain confirm large left basal ganglion infarct extending to the parietal lobe  Patient has had residual right-sided weakness with right facial asymmetry and mild slurring of her speech    She had mild ataxia with a slight right pronator drift due to the weakness  Reports the Neurology office today, reports doing very well  She has had much improvements in her strength in her ambulation  She has also noted improvements in her speech  She does have occasions where she is having difficulties with recall and memory  She has continued to work with OT on this  She also continues to work with physical therapy  Patient is ambulatory without any assistive devices  She does have macular degeneration which does cause some imbalances when she moves too quickly otherwise she has not suffered any falls or had any issues  Patient will be putting PT OT on hold for a brief period of time she goes on vacation  She will do HEP during that time  Patient continues on her Coumadin which is managed by her cardiologist   She is concerned her last INR was 2 1  Discussed any changes, patient has recently increase the oral intake go green vegetables  Courage patient to update her cardiologist with regards to this dietary change if she is going to continue with this diet  Patient's last LDL was in July of 2022, level was 40, she remains on high-dose statin  Patient will have this rechecked in January if she remains below vascular risk goals we will decrease her atorvastatin to of 40 mg  Patient will continue follow-up with her PCP and cardiologist   She will continue PT OT  She was doing well will follow-up in the outpatient neurology office in 6 months or sooner if needed        The following portions of the patient's history were reviewed and updated as appropriate: allergies, current medications, past family history, past medical history, past social history, past surgical history and problem list         Past Medical History:   Diagnosis Date    Diabetes mellitus ([facility] )     Stroke ([facility] [address] ) 03/11/2022       Past Surgical History:   Procedure Laterality Date    EYE SURGERY      HYSTERECTOMY      MITRAL VALVE REPLACEMENT        Social History     Socioeconomic History    Marital status:      Spouse name: None    Number of children: None    Years of education: None    Highest education level: None   Occupational History    None   Tobacco Use    Smoking status: Former Smoker     Packs/day: 2 00     Years: 30 00     Pack years: 60 00     Quit date:      Years since quittin 6    Smokeless tobacco: Never Used   Vaping Use    Vaping Use: Never used   Substance and Sexual Activity    Alcohol use: Yes     Comment: special occasional    Drug use: No    Sexual activity: None   Other Topics Concern    None   Social History Narrative    None     Social Determinants of Health     Financial Resource Strain: Not on file   Food Insecurity: No Food Insecurity    Worried About Running Out of Food in the Last Year: Never true    Ivan of Food in the Last Year: Never true   Transportation Needs: No Transportation Needs    Lack of Transportation (Medical): No    Lack of Transportation (Non-Medical):  No   Physical Activity: Not on file   Stress: Not on file   Social Connections: Not on file   Intimate Partner Violence: Not on file   Housing Stability: Low Risk     Unable to Pay for Housing in the Last Year: No    Number of Places Lived in the Last Year: 1    Unstable Housing in the Last Year: No       Family History   Problem Relation Age of Onset    Heart failure Mother     Heart attack Father     Sleep apnea Brother          Current Outpatient Medications:     acetaminophen (TYLENOL) 325 mg tablet, Take 2 tablets (650 mg total) by mouth every 6 (six) hours as needed for mild pain or headaches, Disp: , Rfl: 0    aspirin (ECOTRIN LOW STRENGTH) 81 mg EC tablet, Take 1 tablet (81 mg total) by mouth daily, Disp: , Rfl: 0    atorvastatin (LIPITOR) 80 mg tablet, Take 1 tablet (80 mg total) by mouth daily, Disp: 90 tablet, Rfl: 1    Cholecalciferol (VITAMIN D PO), Take by mouth, Disp: , Rfl:     famotidine (PEPCID) 20 mg tablet, Take 1 tablet (20 mg total) by mouth daily, Disp: 60 tablet, Rfl: 0    ferrous sulfate 324 (65 Fe) mg, Take 1 tablet (324 mg total) by mouth daily before breakfast, Disp: 180 tablet, Rfl: 0    metFORMIN (GLUCOPHAGE) 500 mg tablet, Take 1 tablet (500 mg total) by mouth daily with breakfast, Disp: 180 tablet, Rfl: 0    Multiple Vitamins-Minerals (PRESERVISION AREDS PO), Take 2 tablets by mouth daily  , Disp: , Rfl:     polyethylene glycol (MIRALAX) 17 g packet, Take 17 g by mouth daily as needed (Constipation), Disp: , Rfl: 0    warfarin (COUMADIN) 2 5 mg tablet, Take 1 tablet (2 5 mg total) by mouth 3 (three) times a week On Monday Wednesday and Friday, Disp: , Rfl: 0    warfarin (COUMADIN) 5 mg tablet, Take 1 tablet (5 mg total) by mouth 4 (four) times a week On Sunday, Tuesday, Thursday, Saturday, Disp: , Rfl: 0    enoxaparin (LOVENOX) 60 mg/0 6 mL, , Disp: , Rfl:     Allergies   Allergen Reactions    Sulfa Antibiotics     Sulfasalazine         Blood pressure 120/70, pulse 101, height 5' 5 5" (1 664 m), weight 67 6 kg (149 lb)  Objective:    Blood pressure 120/70, pulse 101, height 5' 5 5" (1 664 m), weight 67 6 kg (149 lb)  Physical Exam  Vitals reviewed  Constitutional:       Appearance: Normal appearance  She is well-developed  HENT:      Head: Normocephalic  Nose: Nose normal       Mouth/Throat:      Mouth: Mucous membranes are moist    Eyes:      General: Lids are normal       Extraocular Movements: Extraocular movements intact  Pupils: Pupils are equal, round, and reactive to light  Cardiovascular:      Rate and Rhythm: Normal rate  Pulmonary:      Effort: Pulmonary effort is normal    Abdominal:      Palpations: Abdomen is soft  Musculoskeletal:      Cervical back: Normal range of motion  Skin:     General: Skin is warm and dry  Neurological:      Mental Status: She is alert  Coordination: Romberg sign negative        Deep Tendon Reflexes: Reflexes are normal and symmetric  Psychiatric:         Attention and Perception: Attention and perception normal          Mood and Affect: Mood and affect normal          Speech: Speech normal          Behavior: Behavior normal  Behavior is cooperative  Thought Content: Thought content normal          Cognition and Memory: Cognition and memory normal          Judgment: Judgment normal          Neurological Exam  Mental Status  Alert  Oriented to person, place, time and situation  Memory is normal  Recent and remote memory are intact  Speech is normal  Language is fluent with no aphasia  Attention and concentration are normal  Fund of knowledge is appropriate for level of education  Cranial Nerves  CN II: Visual acuity is normal  Visual fields full to confrontation  CN III, IV, VI: Extraocular movements intact bilaterally  Normal lids and orbits bilaterally  Pupils equal round and reactive to light bilaterally  CN V: Facial sensation is normal   CN VII: Full and symmetric facial movement  CN VIII: Hearing is normal   CN IX, X: Palate elevates symmetrically  Normal gag reflex  CN XI: Shoulder shrug strength is normal   CN XII: Tongue midline without atrophy or fasciculations  Motor  Normal muscle bulk throughout  Normal muscle tone  No abnormal involuntary movements  Strength is 5/5 in all four extremities except as noted  No pronator drift                                               Right                     Left  Deltoid                                  5-                            Biceps                                  5-                           Brachioradialis                     5-                            Triceps                                 5-                            Iliopsoas                              5-                            Quadriceps                          5-                            Hamstring                            5- Sensory  Light touch is normal in upper and lower extremities  Temperature is normal in upper and lower extremities  Vibration is normal in upper and lower extremities  Proprioception is normal in upper and lower extremities  Reflexes  Deep tendon reflexes are 2+ and symmetric in all four extremities  Right pathological reflexes: Josi's absent  Left pathological reflexes: Josi's absent  Coordination  Right: Finger-to-nose normal  Rapid alternating movement normal  Heel-to-shin normal Left: Finger-to-nose normal  Rapid alternating movement normal  Heel-to-shin normal     Gait  Casual gait is normal including stance, stride, and arm swing  Normal toe walking  Normal heel walking  Normal tandem gait  Romberg is absent  Able to rise from chair without using arms  ROS:    Review of Systems   Constitutional: Negative  Negative for appetite change and fever  HENT: Negative  Negative for hearing loss, tinnitus, trouble swallowing and voice change  Eyes: Negative  Negative for photophobia and pain  Respiratory: Negative  Negative for shortness of breath  Cardiovascular: Negative  Negative for palpitations  Gastrointestinal: Negative  Negative for nausea and vomiting  Endocrine: Negative  Negative for cold intolerance  Genitourinary: Negative  Negative for dysuria, frequency and urgency  Musculoskeletal: Negative  Negative for myalgias and neck pain  Skin: Negative  Negative for rash  Neurological: Negative  Negative for dizziness, tremors, seizures, syncope, facial asymmetry, speech difficulty, weakness, light-headedness, numbness and headaches  Hematological: Negative  Does not bruise/bleed easily  Psychiatric/Behavioral: Negative  Negative for confusion, hallucinations and sleep disturbance           ROS reviewed and discussed with patient

## 2022-09-02 NOTE — PROGRESS NOTES
OCCUPATIONAL THERAPY INITIAL EVALUATION    Today's Date: 2022  Patient Name: Vimal Ashby  : 1940  MRN: [id number]  Referring Provider: Watson Ruiz  Dx: Cerebrovascular accident (CVA), unspecified mechanism ([facility] [address] ) [I63 9]    SKILLED ANALYSIS:  Pt is a left hand dominant 80year old female presenting to OP OT s/p CVA 3/2022 and PMH of macular degeneration  Pt currently requires assistance with driving, IADLs  Pt is demonstrating deficits in the following domains: executive functioning, immediate and delayed recall, sustained attention, language skills  Deficits are noted based on the following assessments: MoCA, [place] Making Part A and B  Recommend OP OT 1-2x/wk for 8-12 weeks with focus on aforementioned deficits to maximize functional recovery s/p CVA and improve QOL  Findings and recommendations discussed with pt, and she is in agreement  PLAN OF CARE START:22  PLAN OF CARE END: 22  FREQUENCY: 1-2x/wk  PRECAUTIONS macular degeneration, fall risk    Subjective   "I kind of think it's a little bit better- some days are good, some days aren't good " (regarding memory)  Reports some difficulty walking at the end of the day, uses rollator in her home when it's dark  No recent falls  Reports vision in OS seems slightly worse, OD seems the same since last tx session  Mechanism of Injury  left lacunar infarct involving left basal ganglia, extending into the periventricular white matter of the left parietal lobe, adjacent to the posterior aspect of the left lateral ventricle    Occupational Profile  Pt lives alone in a 2 floor home with 2 steps to enter  She has railings on her stairs, grab bars in her tub and stands to bathe, rails around toilet (no RTS)  She has a neighbor that lives across the street who drives her to most of her appointments, and another neighbor that assists with things like taking the garbage out and getting her mail if needed   She raised 5 children, and 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  She enjoys using her iPad to play games and do paint by numbers, watching TV, going for walks with her friends (she doesn't go alone d/t vision deficits), and going to the store with her friends  Pt states that she uses her phone to take pictures of things like the stove and buttons on her washing machine and enlarges them in order to compensate for visual deficits  PATIENT GOAL: "to push my memory- I don't have any trouble at home "    HISTORY OF PRESENT ILLNESS:     Pt is a 80 y o  female who was referred to Occupational Therapy s/p  Cerebrovascular accident (CVA), unspecified mechanism ([facility] ) [I63 9]  PMH:   Past Medical History:   Diagnosis Date    Diabetes mellitus ([facility] )     Stroke ([facility] ) 2022       Past Surgical Hx:   Past Surgical History:   Procedure Laterality Date    EYE SURGERY      HYSTERECTOMY      MITRAL VALVE REPLACEMENT          Pain:  Location:     Restin/10    With Activity:  0/10    Objective    Upper Extremities:  Pt is left hand dominant    Functional Cognition:  Highest level of education: high school    [place] Cognitive Assessment Version 8 1 (MoCA V8 1)  Visuospatial/executive functioning:  3/5  Naming:  3/3  Memory: 1st trial:  5, 2nd trial:  5/5  Attention/concentration: 2/2  List of letters: 1/  Serial Seven Subtraction:  3/3 w/ 1 errors  Language/sentence repetition:  1/2  Language Fluency:  0/1  Abstract/Correlational Thinkin/2  Delayed Recall:  2/5  Orientation:  6/6   Memory Index Score: 9/15  MoCA V1 8 1 Raw Score:  23+1=24/30, MIS:  9/15, indicative of mild neurocognitive impairments      MoCA Scoring        Normal: 26+         Mild Cognitive Impairment: 18-25          Moderate Cognitive Impairment: 10-17         Severe Cognitive Impairment: <10    [place] making Part A and Part B:   Part A:  Part B:                                            VISION SCREEN             Macular degeneration, Reading glasses            GOALS:   Short Term Goals:  Pt will increase delayed recall and recall 11/5 items on MOCA MIS to complete IADLs  Pt will follow 2 step verbal directions with 70% accuracy in multimodal environment for improved role performance and engagement in salient tasks  Pt will demo G recall of 50% of Verbal information utilizing memory strategy of choice for improved STM/delayed memory   Pt will demo G carryover of use of internal/external memory strategy aides for improved recall of daily events, improved executive functioning with 50% accuracy   Pt will score 25/30 on the MoCA in order to improve cognitive function for life roles  Long Term Goals: 8-12 weeks  Pt will increase delayed recall and recall 3/5 items on MOCA to complete IADLs  Pt will follow 2 step verbal directions with 90% accuracy in multimodal environment for improved role performance and engagement in salient tasks  Pt will demo G recall of 70% of Verbal information utilizing memory strategy of choice for improved STM/delayed memory   Pt will demo G carryover of use of internal/external memory strategy aides for improved recall of daily events, improved executive functioning with 70% accuracy   Pt will score 26/30 on the MoCA in order to improve cognitive function for life roles

## 2022-09-06 ENCOUNTER — CONSULT (OUTPATIENT)
Dept: HEMATOLOGY ONCOLOGY | Facility: MEDICAL CENTER | Age: 82
End: 2022-09-06
Payer: MEDICARE

## 2022-09-06 ENCOUNTER — TRANSCRIBE ORDERS (OUTPATIENT)
Dept: LAB | Facility: CLINIC | Age: 82
End: 2022-09-06

## 2022-09-06 ENCOUNTER — APPOINTMENT (OUTPATIENT)
Dept: LAB | Facility: CLINIC | Age: 82
End: 2022-09-06
Payer: MEDICARE

## 2022-09-06 VITALS
HEIGHT: 66 IN | TEMPERATURE: 97.2 F | HEART RATE: 72 BPM | RESPIRATION RATE: 18 BRPM | DIASTOLIC BLOOD PRESSURE: 84 MMHG | OXYGEN SATURATION: 97 % | SYSTOLIC BLOOD PRESSURE: 138 MMHG | BODY MASS INDEX: 23.95 KG/M2 | WEIGHT: 149 LBS

## 2022-09-06 DIAGNOSIS — D50.9 MICROCYTIC ANEMIA: Primary | ICD-10-CM

## 2022-09-06 DIAGNOSIS — Z79.01 ON WARFARIN THERAPY: ICD-10-CM

## 2022-09-06 DIAGNOSIS — Z95.2 HEART VALVE REPLACED BY TRANSPLANT: Primary | ICD-10-CM

## 2022-09-06 DIAGNOSIS — D64.9 ANEMIA, UNSPECIFIED TYPE: ICD-10-CM

## 2022-09-06 DIAGNOSIS — R77.8 LOW SERUM HAPTOGLOBIN: ICD-10-CM

## 2022-09-06 DIAGNOSIS — Z79.01 LONG TERM (CURRENT) USE OF ANTICOAGULANTS: ICD-10-CM

## 2022-09-06 LAB
INR PPP: 2.19 (ref 0.84–1.19)
PROTHROMBIN TIME: 24.6 SECONDS (ref 11.6–14.5)

## 2022-09-06 PROCEDURE — 36415 COLL VENOUS BLD VENIPUNCTURE: CPT

## 2022-09-06 PROCEDURE — 99204 OFFICE O/P NEW MOD 45 MIN: CPT | Performed by: PHYSICIAN ASSISTANT

## 2022-09-06 PROCEDURE — 85610 PROTHROMBIN TIME: CPT

## 2022-09-06 NOTE — PROGRESS NOTES
[address] HEMATOLOGY ONCOLOGY [place] [id number] [facility] [id number]  Hematology Ambulatory Consult  Evaristo Sheehan, 1940, [id number]  9/6/2022      Assessment and Plan   1  Microcytic anemia; 2  Anemia, unspecified type  This is an 66-year-old female with history of low ferritin levels dating back to 2020  Patient had a colonoscopy within three years which was negative for cancer abnormalities  Given the patient's history of recurring low ferritin levels, it is likely that the patient has an AVM  Patient is not interested in repeat colonoscopy and refuses at this time  We briefly touched upon a capsule endoscopy  Will discuss with this patient at a later date  Patient has been tolerating oral iron well without significant side effects  I have advised the patient to undergo repeat iron testing in late September  Patient will have her daughter sent me a message when she goes for blood work again  I will review this with the patient over the phone  If patient's blood work demonstrates significant improvement, patient will continue on oral supplement  However, if patient's absorption is compromised, patient will undergo IV iron treatments  Patient likely has a malabsorption condition patient does suffer from celiac disease  Also polypharmacy may play a role  Patient does take calcium  Appropriate information such as how to take iron pills were provided the patient today  Patient understands that she develops any side effects of oral iron, we would also investigate IV iron treatments  Patient is in agreement with the plan as above  As of right now, patient has a follow-up appointment in three months however this may change depending upon blood tests that will be completed in a couple weeks  Patient had discussed potential side effects of IV iron  If necessary patient consents to this treatment  Patient favors Feraheme as agent      - Iron Panel (Includes Ferritin, Iron Sat%, Iron, and TIBC); Future  - CBC and differential; Future  - Comprehensive metabolic panel; Future    3  Low serum haptoglobin  This has been a chronic finding  Is likely secondary to poor liver function from fatty liver  Patient does not provide any other true historical features of hemolysis including a normal reticulocyte count, bilirubin  LDH is mildly elevated in the 200 range was haptoglobin is significantly less  I discussed with the patient and her friend today this finding  She understands that additional workup will be completed  However if this is negative again, I do not believe that haptoglobin is truly reflective of an underlying hemolytic process  - Haptoglobin; Future  - CBC and differential; Future  - LD,Blood; Future  - Hemolysis Smear; Future  - Reticulocytes; Future    4  On warfarin therapy  Patient is presently on warfarin therapy as suggested by Cardiology  Patient obviously has some concerns for balancing dietary iron with vitamin K  We discussed this at length  Patient will be in touch with cardiology to make them aware of her new dietary requirements  Patient will be under close observation  The patient is scheduled for follow-up in approximately 3 months  Patient voiced agreement and understanding to the above  Patient knows to call the Hematology/Oncology office with any questions and concerns regarding the above  Barrier(s) to care: None  The patient is able to self care  Gabriella Franz PA-C  Medical Oncology/Hematology  [address] [facility], [place]    Subjective     Chief Complaint   Patient presents with    Consult     Anemia     Referring provider    Ana Laura Ng MD  [address] [facility] 6    History of present illness:    This is an 80-year-old female with past medical history of pulmonary emphysema CVA, diabetes mellitus, chronic atrial fibrillation on warfarin therapy, hepatic steatosis, celiac disease, history of mitral valve replacement with mechanical valve who presents to the [facility] Hematology office secondary to recent hospitalization and finding of anemia  10/9/17 hemoglobin = 13 0, MCV 89, RDW 15 6, platelet count 909  2/88/42 hemoglobin = 11 6  6/4/19 hemoglobin = 12 2  7/5/19 ferritin = 23  6/9/20 ferritin = 6, LDH = 270, hapto = <10  8/17/20 hemoglobin = 11 8  6/18/21 hemoglobin = 12 7, MCV 96, RDW 14 5, platelet count 904, ferritin = 18  3/11/22 hemoglobin = 12 6, MCV 93, RDW 14 9, platelet count 914  1/10/39 hemoglobin = 10 5, MCV 92, RDW 14 6, platelet count 462  2/8/26 hemoglobin = 10 0, MCV 81, RDW 17 5, platelet count 779  3/2/72 hemoglobin 9 5, MCV 83, RDW 20 6, platelet count 112, ferritin = eight, hapto <10  8/11/22 iron panel = ferritin = eight, iron saturation 10%, TIBC 471,   8/12/22 hemolysis smear negative, LDH = 254, reticulocyte count within normal limits  9/1/22 hemoglobin 10 3, MCV 86, RDW 22 2    09/06/22:  Patient has been taking oral iron supplements twice a day with minimal side effects  Patient notes that prior to going to the hospital she was feeling very tired and fatigued  We reviewed blood work  Patient's hemoglobin has been slowly dropping since April 2022 with the most appreciable drop in MCV in July of 2022  Review of Systems   Constitutional: Positive for activity change and fatigue  Negative for unexpected weight change  Eyes: Positive for visual disturbance (Macular degeneration)  Gastrointestinal: Negative for blood in stool  Genitourinary: Negative for vaginal bleeding  Neurological: Positive for weakness ( generalized)         Past Medical History:   Diagnosis Date    Diabetes mellitus ([facility] [address] )     Stroke ([facility] [address] [facility] ) 03/11/2022     Past Surgical History:   Procedure Laterality Date    EYE SURGERY      HYSTERECTOMY      MITRAL VALVE REPLACEMENT  1994     Family History   Problem Relation Age of Onset    Heart failure Mother  Heart attack Father     Sleep apnea Brother      Social History     Socioeconomic History    Marital status:      Spouse name: None    Number of children: None    Years of education: None    Highest education level: None   Occupational History    None   Tobacco Use    Smoking status: Former Smoker     Packs/day: 2 00     Years: 30 00     Pack years: 60 00     Quit date:      Years since quittin 7    Smokeless tobacco: Never Used   Vaping Use    Vaping Use: Never used   Substance and Sexual Activity    Alcohol use: Yes     Comment: special occasional    Drug use: No    Sexual activity: None   Other Topics Concern    None   Social History Narrative    None     Social Determinants of Health     Financial Resource Strain: Not on file   Food Insecurity: No Food Insecurity    Worried About Running Out of Food in the Last Year: Never true    Ivan of Food in the Last Year: Never true   Transportation Needs: No Transportation Needs    Lack of Transportation (Medical): No    Lack of Transportation (Non-Medical):  No   Physical Activity: Not on file   Stress: Not on file   Social Connections: Not on file   Intimate Partner Violence: Not on file   Housing Stability: Low Risk     Unable to Pay for Housing in the Last Year: No    Number of Places Lived in the Last Year: 1    Unstable Housing in the Last Year: No         Current Outpatient Medications:     acetaminophen (TYLENOL) 325 mg tablet, Take 2 tablets (650 mg total) by mouth every 6 (six) hours as needed for mild pain or headaches, Disp: , Rfl: 0    aspirin (ECOTRIN LOW STRENGTH) 81 mg EC tablet, Take 1 tablet (81 mg total) by mouth daily, Disp: , Rfl: 0    atorvastatin (LIPITOR) 80 mg tablet, Take 1 tablet (80 mg total) by mouth daily, Disp: 90 tablet, Rfl: 1    Cholecalciferol (VITAMIN D PO), Take by mouth, Disp: , Rfl:     ferrous sulfate 324 (65 Fe) mg, Take 1 tablet (324 mg total) by mouth daily before breakfast, Disp: 180 tablet, Rfl: 0    metFORMIN (GLUCOPHAGE) 500 mg tablet, Take 1 tablet (500 mg total) by mouth daily with breakfast, Disp: 180 tablet, Rfl: 0    Multiple Vitamins-Minerals (PRESERVISION AREDS PO), Take 2 tablets by mouth daily  , Disp: , Rfl:     polyethylene glycol (MIRALAX) 17 g packet, Take 17 g by mouth daily as needed (Constipation), Disp: , Rfl: 0    warfarin (COUMADIN) 2 5 mg tablet, Take 1 tablet (2 5 mg total) by mouth 3 (three) times a week On Monday Wednesday and Friday, Disp: , Rfl: 0    warfarin (COUMADIN) 5 mg tablet, Take 1 tablet (5 mg total) by mouth 4 (four) times a week On Sunday, Tuesday, Thursday, Saturday, Disp: , Rfl: 0    enoxaparin (LOVENOX) 60 mg/0 6 mL, , Disp: , Rfl:     famotidine (PEPCID) 20 mg tablet, Take 1 tablet (20 mg total) by mouth daily (Patient not taking: Reported on 9/6/2022), Disp: 60 tablet, Rfl: 0  Allergies   Allergen Reactions    Sulfa Antibiotics     Sulfasalazine        Objective   /84 (BP Location: Left arm, Patient Position: Sitting, Cuff Size: Adult)   Pulse 72   Temp (!) 97 2 °F (36 2 °C) (Tympanic)   Resp 18   Ht 5' 5 5" (1 664 m)   Wt 67 6 kg (149 lb)   SpO2 97%   BMI 24 42 kg/m²   Physical Exam  Constitutional:       General: She is not in acute distress  Appearance: She is well-developed  HENT:      Head: Normocephalic and atraumatic  Eyes:      General: No scleral icterus  Pupils: Pupils are equal, round, and reactive to light  Cardiovascular:      Rate and Rhythm: Normal rate and regular rhythm  Heart sounds: Murmur heard  Pulmonary:      Effort: Pulmonary effort is normal  No respiratory distress  Skin:     General: Skin is warm  Coloration: Skin is not pale  Findings: No rash  Neurological:      Mental Status: She is alert and oriented to person, place, and time  Psychiatric:         Thought Content:  Thought content normal          Result Review  Transcribe Orders on 09/06/2022   Component Date Value Ref Range Status    Protime 09/06/2022 24 6 (A) 11 6 - 14 5 seconds Final    INR 09/06/2022 2 19 (A) 0 84 - 1 19 Final   Appointment on 09/01/2022   Component Date Value Ref Range Status    Protime 09/01/2022 23 9 (A) 11 6 - 14 5 seconds Final    INR 09/01/2022 2 11 (A) 0 84 - 1 19 Final    WBC 09/01/2022 4 82  4 31 - 10 16 Thousand/uL Final    RBC 09/01/2022 4 18  3 81 - 5 12 Million/uL Final    Hemoglobin 09/01/2022 10 3 (A) 11 5 - 15 4 g/dL Final    Hematocrit 09/01/2022 35 8  34 8 - 46 1 % Final    MCV 09/01/2022 86  82 - 98 fL Final    MCH 09/01/2022 24 6 (A) 26 8 - 34 3 pg Final    MCHC 09/01/2022 28 8 (A) 31 4 - 37 4 g/dL Final    RDW 09/01/2022 22 2 (A) 11 6 - 15 1 % Final    Platelets 17/06/3662 246  149 - 390 Thousands/uL Final    MPV 09/01/2022 11 0  8 9 - 12 7 fL Final   Ancillary Orders on 08/19/2022   Component Date Value Ref Range Status    Protime 08/22/2022 17 4 (A) 11 6 - 14 5 seconds Final    INR 08/22/2022 1 40 (A) 0 84 - 1 19 Final         Please note: This report has been generated by a voice recognition software system  Therefore there may be syntax, spelling, and/or grammatical errors  Please call if you have any questions

## 2022-09-17 ENCOUNTER — NURSE TRIAGE (OUTPATIENT)
Dept: OTHER | Facility: OTHER | Age: 82
End: 2022-09-17

## 2022-09-17 NOTE — TELEPHONE ENCOUNTER
Regarding: possible UTI - would like abx called in  ----- Message from Jose Stubbs sent at 9/17/2022  4:42 PM EDT -----  "I have a UTI, pain and pressure when I go to the bathroom  I am out of town   [address] [place] [address],[address] , [address] [place] [address] in [place] "

## 2022-09-20 ENCOUNTER — TELEPHONE (OUTPATIENT)
Dept: HEMATOLOGY ONCOLOGY | Facility: CLINIC | Age: 82
End: 2022-09-20

## 2022-09-20 NOTE — TELEPHONE ENCOUNTER
Per PA note:  1  Microcytic anemia; 2  Anemia, unspecified type  This is an 71-year-old female with history of low ferritin levels dating back to 2020  Patient had a colonoscopy within three years which was negative for cancer abnormalities  Given the patient's history of recurring low ferritin levels, it is likely that the patient has an AVM  Patient is not interested in repeat colonoscopy and refuses at this time  We briefly touched upon a capsule endoscopy  Will discuss with this patient at a later date      Patient has been tolerating oral iron well without significant side effects  I have advised the patient to undergo repeat iron testing in late September  Patient will have her daughter sent me a message when she goes for blood work again  I will review this with the patient over the phone  If patient's blood work demonstrates significant improvement, patient will continue on oral supplement  However, if patient's absorption is compromised, patient will undergo IV iron treatments        Patient called and is having lab work done tomorrow    FYI to [place]

## 2022-09-22 ENCOUNTER — OFFICE VISIT (OUTPATIENT)
Dept: INTERNAL MEDICINE CLINIC | Facility: CLINIC | Age: 82
End: 2022-09-22
Payer: MEDICARE

## 2022-09-22 ENCOUNTER — APPOINTMENT (OUTPATIENT)
Dept: LAB | Facility: CLINIC | Age: 82
End: 2022-09-22
Payer: MEDICARE

## 2022-09-22 VITALS
DIASTOLIC BLOOD PRESSURE: 70 MMHG | SYSTOLIC BLOOD PRESSURE: 126 MMHG | OXYGEN SATURATION: 98 % | HEIGHT: 63 IN | WEIGHT: 154 LBS | BODY MASS INDEX: 27.29 KG/M2 | HEART RATE: 85 BPM

## 2022-09-22 DIAGNOSIS — I63.9 CEREBROVASCULAR ACCIDENT (CVA), UNSPECIFIED MECHANISM (HCC): ICD-10-CM

## 2022-09-22 DIAGNOSIS — R05.9 COUGH: ICD-10-CM

## 2022-09-22 DIAGNOSIS — K76.0 HEPATIC STEATOSIS: ICD-10-CM

## 2022-09-22 DIAGNOSIS — D50.9 MICROCYTIC ANEMIA: ICD-10-CM

## 2022-09-22 DIAGNOSIS — E11.69 TYPE 2 DIABETES MELLITUS WITH OTHER SPECIFIED COMPLICATION, WITHOUT LONG-TERM CURRENT USE OF INSULIN (HCC): Primary | ICD-10-CM

## 2022-09-22 DIAGNOSIS — Z95.2 H/O MITRAL VALVE REPLACEMENT WITH MECHANICAL VALVE: ICD-10-CM

## 2022-09-22 DIAGNOSIS — D63.1 ANEMIA DUE TO STAGE 3 CHRONIC KIDNEY DISEASE, UNSPECIFIED WHETHER STAGE 3A OR 3B CKD (HCC): ICD-10-CM

## 2022-09-22 DIAGNOSIS — R77.8 LOW SERUM HAPTOGLOBIN: ICD-10-CM

## 2022-09-22 DIAGNOSIS — J43.9 PULMONARY EMPHYSEMA, UNSPECIFIED EMPHYSEMA TYPE (HCC): ICD-10-CM

## 2022-09-22 DIAGNOSIS — I10 ESSENTIAL HYPERTENSION: ICD-10-CM

## 2022-09-22 DIAGNOSIS — D64.9 ANEMIA, UNSPECIFIED TYPE: ICD-10-CM

## 2022-09-22 DIAGNOSIS — U07.1 COVID-19: Primary | ICD-10-CM

## 2022-09-22 DIAGNOSIS — I48.20 CHRONIC ATRIAL FIBRILLATION (HCC): ICD-10-CM

## 2022-09-22 DIAGNOSIS — N18.30 ANEMIA DUE TO STAGE 3 CHRONIC KIDNEY DISEASE, UNSPECIFIED WHETHER STAGE 3A OR 3B CKD (HCC): ICD-10-CM

## 2022-09-22 DIAGNOSIS — E78.00 HYPERCHOLESTEREMIA: ICD-10-CM

## 2022-09-22 DIAGNOSIS — Z79.01 LONG TERM (CURRENT) USE OF ANTICOAGULANTS: ICD-10-CM

## 2022-09-22 LAB
ALBUMIN SERPL BCP-MCNC: 3.8 G/DL (ref 3.5–5)
ALP SERPL-CCNC: 79 U/L (ref 46–116)
ALT SERPL W P-5'-P-CCNC: 40 U/L (ref 12–78)
ANION GAP SERPL CALCULATED.3IONS-SCNC: 5 MMOL/L (ref 4–13)
AST SERPL W P-5'-P-CCNC: 41 U/L (ref 5–45)
BASOPHILS # BLD AUTO: 0.04 THOUSANDS/ΜL (ref 0–0.1)
BASOPHILS NFR BLD AUTO: 1 % (ref 0–1)
BILIRUB SERPL-MCNC: 0.5 MG/DL (ref 0.2–1)
BLD SMEAR INTERP: NORMAL
BUN SERPL-MCNC: 13 MG/DL (ref 5–25)
CALCIUM SERPL-MCNC: 9 MG/DL (ref 8.3–10.1)
CHLORIDE SERPL-SCNC: 104 MMOL/L (ref 96–108)
CO2 SERPL-SCNC: 27 MMOL/L (ref 21–32)
CREAT SERPL-MCNC: 0.7 MG/DL (ref 0.6–1.3)
EOSINOPHIL # BLD AUTO: 0.12 THOUSAND/ΜL (ref 0–0.61)
EOSINOPHIL NFR BLD AUTO: 2 % (ref 0–6)
ERYTHROCYTE [DISTWIDTH] IN BLOOD BY AUTOMATED COUNT: 21 % (ref 11.6–15.1)
FERRITIN SERPL-MCNC: 10 NG/ML (ref 8–388)
GFR SERPL CREATININE-BSD FRML MDRD: 80 ML/MIN/1.73SQ M
GLUCOSE SERPL-MCNC: 143 MG/DL (ref 65–140)
HCT VFR BLD AUTO: 36.7 % (ref 34.8–46.1)
HGB BLD-MCNC: 10.5 G/DL (ref 11.5–15.4)
IMM GRANULOCYTES # BLD AUTO: 0.02 THOUSAND/UL (ref 0–0.2)
IMM GRANULOCYTES NFR BLD AUTO: 0 % (ref 0–2)
IRON SATN MFR SERPL: 9 % (ref 15–50)
IRON SERPL-MCNC: 43 UG/DL (ref 50–170)
LDH SERPL-CCNC: 410 U/L (ref 81–234)
LYMPHOCYTES # BLD AUTO: 1.01 THOUSANDS/ΜL (ref 0.6–4.47)
LYMPHOCYTES NFR BLD AUTO: 20 % (ref 14–44)
MCH RBC QN AUTO: 24.9 PG (ref 26.8–34.3)
MCHC RBC AUTO-ENTMCNC: 28.6 G/DL (ref 31.4–37.4)
MCV RBC AUTO: 87 FL (ref 82–98)
MONOCYTES # BLD AUTO: 0.44 THOUSAND/ΜL (ref 0.17–1.22)
MONOCYTES NFR BLD AUTO: 9 % (ref 4–12)
NEUTROPHILS # BLD AUTO: 3.34 THOUSANDS/ΜL (ref 1.85–7.62)
NEUTS SEG NFR BLD AUTO: 68 % (ref 43–75)
NRBC BLD AUTO-RTO: 0 /100 WBCS
PLATELET # BLD AUTO: 227 THOUSANDS/UL (ref 149–390)
PMV BLD AUTO: 11.1 FL (ref 8.9–12.7)
POTASSIUM SERPL-SCNC: 3.9 MMOL/L (ref 3.5–5.3)
PROT SERPL-MCNC: 7.9 G/DL (ref 6.4–8.4)
RBC # BLD AUTO: 4.22 MILLION/UL (ref 3.81–5.12)
RETICS # AUTO: NORMAL 10*3/UL (ref 14097–95744)
RETICS # CALC: 1.85 % (ref 0.37–1.87)
SARS-COV-2 AG UPPER RESP QL IA: NEGATIVE
SODIUM SERPL-SCNC: 136 MMOL/L (ref 135–147)
TIBC SERPL-MCNC: 485 UG/DL (ref 250–450)
VALID CONTROL: NORMAL
WBC # BLD AUTO: 4.97 THOUSAND/UL (ref 4.31–10.16)

## 2022-09-22 PROCEDURE — 83550 IRON BINDING TEST: CPT

## 2022-09-22 PROCEDURE — 85025 COMPLETE CBC W/AUTO DIFF WBC: CPT

## 2022-09-22 PROCEDURE — 83540 ASSAY OF IRON: CPT

## 2022-09-22 PROCEDURE — 87811 SARS-COV-2 COVID19 W/OPTIC: CPT | Performed by: INTERNAL MEDICINE

## 2022-09-22 PROCEDURE — 80053 COMPREHEN METABOLIC PANEL: CPT

## 2022-09-22 PROCEDURE — 82728 ASSAY OF FERRITIN: CPT

## 2022-09-22 PROCEDURE — 83010 ASSAY OF HAPTOGLOBIN QUANT: CPT

## 2022-09-22 PROCEDURE — 85045 AUTOMATED RETICULOCYTE COUNT: CPT

## 2022-09-22 PROCEDURE — 83615 LACTATE (LD) (LDH) ENZYME: CPT

## 2022-09-22 PROCEDURE — 99214 OFFICE O/P EST MOD 30 MIN: CPT | Performed by: INTERNAL MEDICINE

## 2022-09-22 RX ORDER — CEFUROXIME AXETIL 500 MG/1
500 TABLET ORAL 2 TIMES DAILY
COMMUNITY
Start: 2022-09-17 | End: 2022-09-27

## 2022-09-22 RX ORDER — CEFUROXIME AXETIL 500 MG/1
500 TABLET ORAL 2 TIMES DAILY
COMMUNITY
Start: 2022-09-17 | End: 2022-10-06 | Stop reason: ALTCHOICE

## 2022-09-22 NOTE — PATIENT INSTRUCTIONS
Mucinex DM 1 tablet twice a day for 1 week as needed  Finish up your antibiotic that will help your cough  We will do your COVID testing in the office today  If you have any fever chills shortness of breath or if you get worse over next few days please do not hesitate to call any time     Follow with Consultants as per their and our suggestion    Follow up in one month or as needed earlier    Follow all instructions as advised and discussed  Take your medications as prescribed  Call the office immediately if you experience any side effects  Ask questions if you do not understand  Keep your scheduled appointment as advised or come sooner if necessary or in doubt  Best time to call for non-urgent matter or questions on weekdays is between 9am and 12 noon  See physician for any new symptoms or worsening of current symptoms  Urgent or emergent situations call 911 and report to nearest emergency room      I spent  30 -40 minutes taking care of this patient including clinical care, conseling, collaboration, chart, lab and consultaion review as appropriate    Patient is to get labs 1 week(s) prior to next visit if advised

## 2022-09-22 NOTE — PROGRESS NOTES
Name: Deena Thompson      : 1940      MRN: [id number]  Encounter Provider: Deven Baez MD  Encounter Date: 2022   Encounter department: [address]     1  Cough  Assessment & Plan:  Mucinex DM 1 tablet twice a day for 1 week as needed  Finish up your antibiotic that will help your cough  We will do your COVID testing in the office today  If you have any fever chills shortness of breath or if you get worse over next few days please do not hesitate to call any time P      2  Anemia, unspecified type  Assessment & Plan:  Lab Results   Component Value Date    WBC 4 82 2022    HGB 10 3 (L) 2022    HCT 35 8 2022    MCV 86 2022     2022     Repeat anemia workup done by [facility] oncologist this morning lab awaited  Paragraphs multifactorial   The patient is on Coumadin the patient has mitral wall replacement subtle hemolysis is probably suspected  LDH was slightly high him haptoglobin was slightly low also had borderline celiac panel  The    Now on iron supplement  Previous iron indices noted  Subjectively feeling better  No active bleeding  Is being appears pink      3  Hepatic steatosis  Assessment & Plan:  LFT awaited      4  Pulmonary emphysema, unspecified emphysema type ([address] )  Assessment & Plan:  Remote history of emphysema  Nonsmoker  Some cough  Not using any inhaler  Currently on antibiotic for UTI  5  Chronic atrial fibrillation (HCC)  Assessment & Plan:  Chronic atrial fibrillation rate controlled remains on anticoagulation      6  Essential hypertension  Assessment & Plan:  Hypertension excellent control  Will continue to monitor  7  Cerebrovascular accident (CVA), unspecified mechanism [facility])  Assessment & Plan:  Hospitalized on 2022 2 up to 2022 for stroke  Subsequently underwent rehab  For 2 weeks doing very well  No problem with speech        8  Anemia due to stage 3 chronic kidney disease, unspecified whether stage 3a or 3b CKD [facility])  Assessment & Plan:  Lab Results   Component Value Date    EGFR 85 08/14/2022    EGFR 80 08/13/2022    EGFR 79 08/12/2022    CREATININE 0 60 08/14/2022    CREATININE 0 70 08/13/2022    CREATININE 0 71 08/12/2022   Anemia stable last hemoglobin 10 3 of on 09/01/2022  Seen by [facility] oncologist underwent extensive blood test today  Also had in the past elevated LDH, low haptoglobin globin, on chronic anticoagulation has artificial wall has a borderline celiac panel before subjectively feeling lot better      9  H/O mitral valve replacement with mechanical valve           Subjective         Patient is here for chronic disease management    However patient has a new complaint of cough for last 1 day  Little scratchy throat denies any significant stuffy nose runny nose shortness of breath pleuritic pain see may have pulled her muscle yesterday while bowling  Of chronic disease  Anemia patient was seen by [facility] oncologist   Multifactorial anemia  Last hemoglobin work was 10 3  Subjectively feeling lot better  No active bleeding  Underwent extensive blood testing of for anemia this morning by hemato oncologist   Report of which is awaited  I had done workup for the anemia home which had reveal sleep low haptoglobin elevated LDH iron deficiency state  Now on iron supplement  Subjectively lot better  Remains on chronic anticoagulation had borderline t  At borderline obstruction suspected  Celiac was equivocal   No hematuria  Of chronic atrial fibrillation symptom-free rate control remains on anticoagulation    Hypertension symptom-free a rate control the being followed by cardiologist continue to monitor         e history of CVA in March stable no problem with speech no new focal neurological symptoms  Paragraphs    Diabetes:  Symptom-free last hemoglobin A1c was excellent    No polydipsia polyuria hypoglycemic hyperglycemic symptoms continue diet as well as check hemoglobin been A1c periodically  Sleep apnea stable  Hyperlipidemia stable  Paragraphs vitamin B12 was normal in the hospital previously slightly low    Hyperlipidemia continue statin  Recent UTI seen by urologist home PA now on Ceftin  IA INR done today      Status post MVR stable  History of a stable  Home COPD fair  The         Echocardiogram done on 08/11, EF 62%, basal and inferolateral wall hypokinesis  Mechanical mitral valve     08/11/2022:  Chest x-ray:  No active disease    08/11/2022:  CT scan of the brain no acute intracranial abnormality micro angiopathy changes                She looks good today    No complains       She looks great  Offers no specific cardiac pulmonary GI  CNS or CVS neurological urological complaint  Patient's daughter is bedside  Ancillary Orders on 08/19/2022  Protime                   Value: 17  4(seconds) (A)  Dt: 08/22/2022  INR                       Value: 1 40 (A)           Dt: 08/22/2022  ------------  Admission on 08/11/2022, Discharged on 08/14/2022  Ventricular Rate          Value: 48(BPM)            Dt: 08/11/2022  Atrial Rate               Value: 53(BPM)            Dt: 08/11/2022  QRSD Interval             Value: 88(ms)             Dt: 08/11/2022  QT Interval               Value: 428(ms)            Dt: 08/11/2022  QTC Interval              Value: 382(ms)            Dt: 08/11/2022  QRS Axis                  Value: 24(degrees)        Dt: 08/11/2022  T Wave Axis               Value: 18(degrees)        Dt: 08/11/2022  WBC                       Value: 5 29(Thousand/uL)  Dt: 08/11/2022  RBC                       Value: 4 18(Million/uL)   Dt: 08/11/2022  Hemoglobin                Value: 10 1(g/dL) (A)     Dt: 08/11/2022  Hematocrit                Value: 34 6(%) (A)        Dt: 08/11/2022  MCV                       Value: 83(fL)             Dt: 08/11/2022  MCH                       Value: 24 2(pg) (A) Dt: 08/11/2022  MCHC                      Value: 29 2(g/dL) (A)     Dt: 08/11/2022  RDW                       Value: 20 8(%) (A)        Dt: 08/11/2022  MPV                       Value: 10 5(fL)           Dt: 08/11/2022  Platelets                 Value: 242(Thousands/uL)  Dt: 08/11/2022  nRBC                      Value: 0(/100 WBCs)       Dt: 08/11/2022  Neutrophils Relative      Value: 61(%)              Dt: 08/11/2022  Immat GRANS %             Value: 0(%)               Dt: 08/11/2022  Lymphocytes Relative      Value: 25(%)              Dt: 08/11/2022  Monocytes Relative        Value: 10(%)              Dt: 08/11/2022  Eosinophils Relative      Value: 3(%)               Dt: 08/11/2022  Basophils Relative        Value: 1(%)               Dt: 08/11/2022  Neutrophils Absolute      Value: 3 26(Thousands/µL) Dt: 08/11/2022  Immature Grans Absolute   Value: 0 02(Thousand/uL)  Dt: 08/11/2022  Lymphocytes Absolute      Value: 1 32(Thousands/µL) Dt: 08/11/2022  Monocytes Absolute        Value: 0 50(Thousand/µL)  Dt: 08/11/2022  Eosinophils Absolute      Value: 0 15(Thousand/µL)  Dt: 08/11/2022  Basophils Absolute        Value: 0 04(Thousands/µL) Dt: 08/11/2022  Sodium                    Value: 141(mmol/L)        Dt: 08/11/2022  Potassium                 Value: 3 8(mmol/L)        Dt: 08/11/2022  Chloride                  Value: 104(mmol/L)        Dt: 08/11/2022  CO2                       Value: 28(mmol/L)         Dt: 08/11/2022  ANION GAP                 Value:  9(mmol/L)          Dt: 08/11/2022  BUN                       Value: 19(mg/dL)          Dt: 08/11/2022  Creatinine                Value: 0 67(mg/dL)        Dt: 08/11/2022  Glucose                   Value: 122(mg/dL)         Dt: 08/11/2022  Calcium                   Value: 8 9(mg/dL)         Dt: 08/11/2022  AST                       Value: 28(U/L)            Dt: 08/11/2022  ALT                       Value: 33(U/L)            Dt: 08/11/2022  Alkaline Phosphatase Value: 76(U/L)            Dt: 08/11/2022  Total Protein             Value: 7 9(g/dL)          Dt: 08/11/2022  Albumin                   Value: 4 1(g/dL)          Dt: 08/11/2022  Total Bilirubin           Value: 0 54(mg/dL)        Dt: 08/11/2022  eGFR                      Value: 82(ml/min/1 73sq m) Dt: 08/11/2022  Magnesium                 Value: 1 8(mg/dL)         Dt: 08/11/2022  Lipase                    Value: 212(u/L)           Dt: 08/11/2022  Color, UA                 Value: Yellow             Dt: 08/11/2022  Clarity, UA               Value: Cloudy             Dt: 08/11/2022  Specific Gravity, UA      Value: 1 010              Dt: 08/11/2022  pH, UA                    Value: 6 0                Dt: 08/11/2022  Leukocytes, UA            Value: Large (A)          Dt: 08/11/2022  Nitrite, UA               Value: Positive (A)       Dt: 08/11/2022  Protein, UA               Value: Negative(mg/dl)    Dt: 08/11/2022  Glucose, UA               Value: Negative(mg/dl)    Dt: 08/11/2022  Ketones, UA               Value: Negative(mg/dl)    Dt: 08/11/2022  Urobilinogen, UA          Value: 0 2(E U /dl)       Dt: 08/11/2022  Bilirubin, UA             Value: Negative           Dt: 08/11/2022  Occult Blood, UA                                    Dt: 08/11/2022                  Value: Trace-Intact   hs TnI 0hr                Value: 5(ng/L)            Dt: 08/11/2022  NT-proBNP                 Value: 650(pg/mL) (A)     Dt: 08/11/2022  Protime                   Value: 27  5(seconds) (A)  Dt: 08/11/2022  INR                       Value: 2 55 (A)           Dt: 08/11/2022  PTT                       Value: 36(seconds)        Dt: 08/11/2022  ABO Grouping              Value: O                  Dt: 08/11/2022  Rh Factor                 Value: Positive           Dt: 08/11/2022  Antibody Screen           Value: Negative           Dt: 08/11/2022  Specimen Expiration Date  Value: [id number]           Dt: 08/11/2022  SARS-CoV-2 Value: Negative           Dt: 08/11/2022  Digoxin Lvl               Value: 0 8(ng/mL)         Dt: 08/11/2022  hs TnI 2hr                Value: 4(ng/L)            Dt: 08/11/2022  Delta 2hr hsTnI           Value: -1(ng/L)           Dt: 08/11/2022  RBC, UA                   Value: 0-1(/hpf)          Dt: 08/11/2022  WBC, UA                   Value: Innumerable(/hpf) (A)Dt: 08/11/2022  Epithelial Cells          Value: Occasional(/hpf)   Dt: 08/11/2022  Bacteria, UA              Value: Innumerable(/hpf) (A)Dt: 08/11/2022  Urine Culture                                       Dt: 08/11/2022                  Value: >100,000 cfu/ml Escherichia coli   POC Glucose               Value: 172(mg/dl) (A)     Dt: 08/11/2022  Folate                    Value: >20 0(ng/mL) (A)   Dt: 08/11/2022  Fecal Occult Blood Diagn* Value: Negative           Dt: 08/12/2022  Fecal Occult Blood Diagn*                           Dt: 08/12/2022                  Value: Test not performed   Fecal Occult Blood Diagn*                           Dt: 08/12/2022                  Value: Test not performed   Iron Saturation           Value: 10(%) (A)          Dt: 08/11/2022  TIBC                      Value: 471(ug/dL) (A)     Dt: 08/11/2022  Iron                      Value: 46(ug/dL) (A)      Dt: 08/11/2022  Ferritin                  Value: 8(ng/mL)           Dt: 08/11/2022  POC Glucose               Value: 86(mg/dl)          Dt: 08/11/2022  Sodium                    Value: 143(mmol/L)        Dt: 08/12/2022  Potassium                 Value: 3 9(mmol/L)        Dt: 08/12/2022  Chloride                  Value: 106(mmol/L)        Dt: 08/12/2022  CO2                       Value: 28(mmol/L)         Dt: 08/12/2022  ANION GAP                 Value:  9(mmol/L)          Dt: 08/12/2022  BUN                       Value: 18(mg/dL)          Dt: 08/12/2022  Creatinine                Value: 0 71(mg/dL)        Dt: 08/12/2022  Glucose                   Value: 105(mg/dL)         Dt: 08/12/2022  Calcium                   Value: 8 7(mg/dL)         Dt: 08/12/2022  eGFR                      Value: 79(ml/min/1 73sq m) Dt: 08/12/2022  WBC                       Value: 5 09(Thousand/uL)  Dt: 08/12/2022  RBC                       Value: 3 94(Million/uL)   Dt: 08/12/2022  Hemoglobin                Value: 9 4(g/dL) (A)      Dt: 08/12/2022  Hematocrit                Value: 31 9(%) (A)        Dt: 08/12/2022  MCV                       Value: 81(fL) (A)         Dt: 08/12/2022  MCH                       Value: 23 9(pg) (A)       Dt: 08/12/2022  MCHC                      Value: 29 5(g/dL) (A)     Dt: 08/12/2022  RDW                       Value: 20 6(%) (A)        Dt: 08/12/2022  Platelets                 Value: 207(Thousands/uL)  Dt: 08/12/2022  MPV                       Value: 10 4(fL)           Dt: 08/12/2022  Protime                   Value: 28  8(seconds) (A)  Dt: 08/12/2022  INR                       Value: 2 71 (A)           Dt: 08/12/2022  TSH 3RD GENERATON         Value: 1 832(uIU/mL)      Dt: 08/12/2022  Vitamin B-12              Value: 624(pg/mL)         Dt: 08/12/2022  Hemolysis Smear                                     Dt: 08/12/2022                  Value: No Schistocytes or Helmet Cells noted   LD                        Value: 254(U/L) (A)       Dt: 08/12/2022  Retic Ct Abs              Value: 72,100             Dt: 08/12/2022  Retic Ct Pct              Value: 1 83(%)            Dt: 08/12/2022  POC Glucose               Value: 118(mg/dl)         Dt: 08/12/2022  POC Glucose               Value: 96(mg/dl)          Dt: 08/12/2022  POC Glucose               Value: 116(mg/dl)         Dt: 08/12/2022  POC Glucose               Value: 107(mg/dl)         Dt: 08/12/2022  WBC                       Value: 6 60(Thousand/uL)  Dt: 08/13/2022  RBC                       Value: 4 29(Million/uL)   Dt: 08/13/2022  Hemoglobin                Value: 10 1(g/dL) (A)     Dt: 08/13/2022  Hematocrit                Value: 35 2(%)            Dt: 08/13/2022  MCV                       Value: 82(fL)             Dt: 08/13/2022  MCH                       Value: 23 5(pg) (A)       Dt: 08/13/2022  MCHC                      Value: 28 7(g/dL) (A)     Dt: 08/13/2022  RDW                       Value: 21 0(%) (A)        Dt: 08/13/2022  Platelets                 Value: 237(Thousands/uL)  Dt: 08/13/2022  MPV                       Value: 10 4(fL)           Dt: 08/13/2022  Sodium                    Value: 142(mmol/L)        Dt: 08/13/2022  Potassium                 Value: 4 0(mmol/L)        Dt: 08/13/2022  Chloride                  Value: 104(mmol/L)        Dt: 08/13/2022  CO2                       Value: 30(mmol/L)         Dt: 08/13/2022  ANION GAP                 Value: 8(mmol/L)          Dt: 08/13/2022  BUN                       Value: 19(mg/dL)          Dt: 08/13/2022  Creatinine                Value: 0 70(mg/dL)        Dt: 08/13/2022  Glucose                   Value: 129(mg/dL)         Dt: 08/13/2022  Calcium                   Value: 9 0(mg/dL)         Dt: 08/13/2022  eGFR                      Value: 80(ml/min/1 73sq m) Dt: 08/13/2022  POC Glucose               Value: 126(mg/dl)         Dt: 08/13/2022  POC Glucose               Value: 107(mg/dl)         Dt: 08/13/2022  POC Glucose               Value: 129(mg/dl)         Dt: 08/13/2022  POC Glucose               Value: 123(mg/dl)         Dt: 08/13/2022  Protime                   Value: 26  3(seconds) (A)  Dt: 08/14/2022  INR                       Value: 2 41 (A)           Dt: 08/14/2022  WBC                       Value: 5 18(Thousand/uL)  Dt: 08/14/2022  RBC                       Value: 4 13(Million/uL)   Dt: 08/14/2022  Hemoglobin                Value: 9 7(g/dL) (A)      Dt: 08/14/2022  Hematocrit                Value: 33 8(%) (A)        Dt: 08/14/2022  MCV                       Value: 82(fL)             Dt: 08/14/2022  MCH                       Value: 23 5(pg) (A)       Dt: 08/14/2022  [facility] Value: 28 7(g/dL) (A)     Dt: 08/14/2022  RDW                       Value: 20 9(%) (A)        Dt: 08/14/2022  Platelets                 Value: 220(Thousands/uL)  Dt: 08/14/2022  MPV                       Value: 10 1(fL)           Dt: 08/14/2022  Sodium                    Value: 141(mmol/L)        Dt: 08/14/2022  Potassium                 Value: 3 9(mmol/L)        Dt: 08/14/2022  Chloride                  Value: 106(mmol/L)        Dt: 08/14/2022  CO2                       Value: 28(mmol/L)         Dt: 08/14/2022  ANION GAP                 Value: 7(mmol/L)          Dt: 08/14/2022  BUN                       Value: 21(mg/dL)          Dt: 08/14/2022  Creatinine                Value: 0 60(mg/dL)        Dt: 08/14/2022  Glucose                   Value: 140(mg/dL)         Dt: 08/14/2022  Calcium                   Value: 8 6(mg/dL)         Dt: 08/14/2022  eGFR                      Value: 85(ml/min/1 73sq m) Dt: 08/14/2022  POC Glucose               Value: 144(mg/dl) (A)     Dt: 08/14/2022  POC Glucose               Value: 116(mg/dl)         Dt: 08/14/2022  ------------ - 2 weeks        Hypertension( High Blood Pressure ):    Continue Home BP check daily and bring log, if you are not checking consider checking daily    Take your Blood Pressure medicine as advised    Do not take your Blood pressure medicine if systolic Blood Pressure (top number) is less than 100 or heart rate less than 60  Notify you physician  Cholesterol    Eat low cholesterol diet    Continue taking your cholesterol medicine as advised    Call if any side effects    Lipid Profile and LFT prior to next visit or as advised  ( You should Get periodically to monitor liver side effects)    Know you LDL ( Bad Cholesterol ) , HDL ( Good Cholesterol ) and Triglyceride goal    Diabetes    Check your fingerstick Accu-Chek once a day  Please check your fingerstick Accu-Chek different time of the day either at 7:00 a m  or 11:00 a m  for for p m  4 9:00 p m  [place] Follow Diabetic diet for diabetes as advised  If you would sugar less than 80 or more than 300 to please call us on next visit is day  If the sugar is less than 80 follow-up hypoglycemia instructions as advised  Take your diabetic medicine as advised  Check your fingerstick Accu-Chek three times a day and any time you feel like having a low sugar, Document and bring record with you every visit    Please check your fingerstick Accu-Chek different time of the day either at 7:00 a m ,11:00 a m  4 p m , 9:00 p m ( Check three different times out these four times)    Diabetic diet for diabetes as advised  and Snacks at night time    If you would sugar less than 80 or more than 300 to please call us on next visit is day  If the sugar is less than 80 follow-up hypoglycemia instructions as advised  You and your family members should know and be prepared how to handle low sugar symptoms and/or low sugar number  See your eye doctor yearly or per them    Take your diabetic medicine as advised  Know your goal of HBA1c and urine for protein  Review of Systems   All other systems reviewed and are negative        Past Medical History:   Diagnosis Date    Diabetes mellitus ([facility] )     Stroke ([facility] ) 2022     Past Surgical History:   Procedure Laterality Date    EYE SURGERY      HYSTERECTOMY      MITRAL VALVE REPLACEMENT       Family History   Problem Relation Age of Onset    Heart failure Mother     Heart attack Father     Sleep apnea Brother      Social History     Socioeconomic History    Marital status:      Spouse name: None    Number of children: None    Years of education: None    Highest education level: None   Occupational History    None   Tobacco Use    Smoking status: Former Smoker     Packs/day: 2 00     Years: 30 00     Pack years: 60 00     Quit date:      Years since quittin 7    Smokeless tobacco: Never Used   Vaping Use  Vaping Use: Never used   Substance and Sexual Activity    Alcohol use: Yes     Comment: special occasional    Drug use: No    Sexual activity: None   Other Topics Concern    None   Social History Narrative    None     Social Determinants of Health     Financial Resource Strain: Not on file   Food Insecurity: No Food Insecurity    Worried About Running Out of Food in the Last Year: Never true    Ivan of Food in the Last Year: Never true   Transportation Needs: No Transportation Needs    Lack of Transportation (Medical): No    Lack of Transportation (Non-Medical):  No   Physical Activity: Not on file   Stress: Not on file   Social Connections: Not on file   Intimate Partner Violence: Not on file   Housing Stability: Low Risk     Unable to Pay for Housing in the Last Year: No    Number of Places Lived in the Last Year: 1    Unstable Housing in the Last Year: No     Current Outpatient Medications on File Prior to Visit   Medication Sig    acetaminophen (TYLENOL) 325 mg tablet Take 2 tablets (650 mg total) by mouth every 6 (six) hours as needed for mild pain or headaches    aspirin (ECOTRIN LOW STRENGTH) 81 mg EC tablet Take 1 tablet (81 mg total) by mouth daily    atorvastatin (LIPITOR) 80 mg tablet Take 1 tablet (80 mg total) by mouth daily    cefuroxime (CEFTIN) 500 mg tablet Take 500 mg by mouth 2 (two) times a day    cefuroxime (CEFTIN) 500 mg tablet Take 500 mg by mouth 2 (two) times a day    Cholecalciferol (VITAMIN D PO) Take by mouth    famotidine (PEPCID) 20 mg tablet Take 1 tablet (20 mg total) by mouth daily    ferrous sulfate 324 (65 Fe) mg Take 1 tablet (324 mg total) by mouth daily before breakfast    metFORMIN (GLUCOPHAGE) 500 mg tablet Take 1 tablet (500 mg total) by mouth daily with breakfast    Multiple Vitamins-Minerals (PRESERVISION AREDS PO) Take 2 tablets by mouth daily      polyethylene glycol (MIRALAX) 17 g packet Take 17 g by mouth daily as needed (Constipation)    warfarin (COUMADIN) 2 5 mg tablet Take 1 tablet (2 5 mg total) by mouth 3 (three) times a week On Monday Wednesday and Friday    warfarin (COUMADIN) 5 mg tablet Take 1 tablet (5 mg total) by mouth 4 (four) times a week On Sunday, Tuesday, Thursday, Saturday    [DISCONTINUED] enoxaparin (LOVENOX) 60 mg/0 6 mL      Allergies   Allergen Reactions    Sulfa Antibiotics     Sulfasalazine        There is no immunization history on file for this patient  Objective     /70   Pulse 85   Ht 5' 3" (1 6 m)   Wt 69 9 kg (154 lb)   SpO2 98%   BMI 27 28 kg/m²     Physical Exam  Vitals and nursing note reviewed  Constitutional:       General: She is not in acute distress  Appearance: Normal appearance  She is well-developed  She is not ill-appearing, toxic-appearing or diaphoretic  Comments: Short grey hair   HENT:      Head: Normocephalic and atraumatic  Right Ear: External ear normal       Left Ear: Tympanic membrane, ear canal and external ear normal       Ears:      Comments: Hearing aid present in the right ear part only partially visualized but visualized portion of the year is normal     Nose: No congestion or rhinorrhea  Mouth/Throat:      Pharynx: Oropharynx is clear  No oropharyngeal exudate or posterior oropharyngeal erythema  Eyes:      General: No scleral icterus  Right eye: No discharge  Left eye: No discharge  Conjunctiva/sclera: Conjunctivae normal    Neck:      Thyroid: No thyroid mass, thyromegaly or thyroid tenderness  Vascular: Normal carotid pulses  No carotid bruit or JVD  Cardiovascular:      Rate and Rhythm: Normal rate  Rhythm irregular  Pulses:           Dorsalis pedis pulses are 2+ on the right side and 2+ on the left side  Posterior tibial pulses are 1+ on the right side and 1+ on the left side  Heart sounds: S1 normal  Murmur heard  Systolic murmur is present with a grade of 2/6       Comments: S2 elevated  Pulmonary: Effort: No respiratory distress  Breath sounds: Normal breath sounds  No stridor  No wheezing, rhonchi or rales  Chest:      Chest wall: Tenderness present  Comments: Right lateral lower rib area tenderness is present but mild point tenderness no ecchymosis  Abdominal:      General: Bowel sounds are normal  There is no distension  Palpations: There is no shifting dullness, fluid wave, hepatomegaly, mass or pulsatile mass  Tenderness: There is no abdominal tenderness  There is no rebound  Hernia: There is no hernia in the umbilical area, ventral area, left inguinal area, left femoral area or right inguinal area  Musculoskeletal:         General: Normal range of motion  Cervical back: Normal range of motion  Right lower leg: No edema  Left lower leg: No edema  Comments: Hip nontender a  Low back nontender  Gait reasonable  Feet:      Right foot:      Skin integrity: Callus present  No ulcer, skin breakdown, erythema, warmth or dry skin  Left foot:      Skin integrity: Callus present  No ulcer, skin breakdown, erythema, warmth or dry skin  Lymphadenopathy:      Cervical: No cervical adenopathy  Right cervical: No superficial cervical adenopathy  Skin:     General: Skin is warm  Findings: No rash  Neurological:      General: No focal deficit present  Mental Status: She is alert and oriented to person, place, and time  Mental status is at baseline  Cranial Nerves: Cranial nerves are intact  Motor: Motor function is intact  Coordination: Coordination normal       Gait: Tandem walk abnormal  Gait normal       Deep Tendon Reflexes: Reflexes normal       Comments: Speech is normal     Psychiatric:         Mood and Affect: Mood normal          Behavior: Behavior normal          Thought Content:  Thought content normal          Judgment: Judgment normal        Venus Frank MD

## 2022-09-22 NOTE — ASSESSMENT & PLAN NOTE
Chronic atrial fibrillation rate controlled remains on anticoagulation
Hospitalized on March 11, 2022 2 up to March 14, 2022 for stroke  Subsequently underwent rehab  For 2 weeks doing very well  No problem with speech 
Hypertension excellent control  Will continue to monitor 
LFT awaited
Lab Results   Component Value Date    EGFR 85 08/14/2022    EGFR 80 08/13/2022    EGFR 79 08/12/2022    CREATININE 0 60 08/14/2022    CREATININE 0 70 08/13/2022    CREATININE 0 71 08/12/2022   Anemia stable last hemoglobin 10 3 of on 09/01/2022  Seen by [facility] oncologist underwent extensive blood test today    Also had in the past elevated LDH, low haptoglobin globin, on chronic anticoagulation has artificial wall has a borderline celiac panel before subjectively feeling lot better
Lab Results   Component Value Date    HGBA1C 6 5 (H) 07/06/2022   Diabetes well control last hemoglobin A1c was 6 5 currently on metformin tolerating without side effect no hypoglycemia
Lab Results   Component Value Date    [id number] 1 832 08/12/2022     Hypothyroidism clinically and chemically stable 
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
Last hemoglobin 10 3  Hemoglobin drawn today    Not seen by [facility] oncologist nausea workup in awaited  The felt to be multifactorial   Probably some absorption, was subtle hemolysis cannot be ruled out:  Chronic anticoagulation  Will continue to monitor    Careful anticoagulation
Mucinex DM 1 tablet twice a day for 1 week as needed  Finish up your antibiotic that will help your cough  We will do your COVID testing in the office today      If you have any fever chills shortness of breath or if you get worse over next few days please do not hesitate to call any time P
Remote history of emphysema  Nonsmoker  Some cough  Not using any inhaler  Currently on antibiotic for UTI 
1.85

## 2022-09-23 ENCOUNTER — TELEPHONE (OUTPATIENT)
Dept: HEMATOLOGY ONCOLOGY | Facility: CLINIC | Age: 82
End: 2022-09-23

## 2022-09-23 DIAGNOSIS — D50.8 OTHER IRON DEFICIENCY ANEMIA: Primary | ICD-10-CM

## 2022-09-23 LAB — HAPTOGLOB SERPL-MCNC: <10 MG/DL (ref 41–333)

## 2022-09-23 RX ORDER — SODIUM CHLORIDE 9 MG/ML
20 INJECTION, SOLUTION INTRAVENOUS ONCE
Status: CANCELLED | OUTPATIENT
Start: 2022-09-30

## 2022-09-23 NOTE — TELEPHONE ENCOUNTER
Spoke with patient to make her aware of Gabriella's message  She verbalized understanding and agreed to plan  While we try to accommodate patient requests, our priority is to schedule treatment according to Doctor's orders and site availability  1  Does the Provider use the intake sheet or checkout note? N/A  2  What would be a preferred day of the week that would work best for your infusion appointment? Any day BUT Wednesday  3  Do you prefer mornings or afternoons for your appointments? Morning, but not too early  4  Are there any days or dates that do not work for your schedule, including any upcoming vacations? Wednesdays do not work  5  We are going to try our best to schedule you at the infusion center closest to your home  In the event that we are unable to what would be your next preferred infusion site or sites? 1  [facility]    6  Do you have transportation to take you to all of your appointments? A neighbor, but she hasn't been feeling how  Can we get her set up with [facility]? 7   Would you like the infusion center to draw labs from your port? (disregard if patient doesn't have a port or need labs for infusion appointment) n/a

## 2022-09-23 NOTE — TELEPHONE ENCOUNTER
For [facility], have one of the check out girls send her the paperwork to be filled out via snail mail or e-mail, or she can stop in the office and fill it out

## 2022-09-23 NOTE — TELEPHONE ENCOUNTER
Spoke with patient to confirm dates, she apologizes, she will be out of town 10/15-11/1  Please reschedule last 2 appointment to 11/4 & 11/11  Thank you!

## 2022-09-23 NOTE — TELEPHONE ENCOUNTER
----- Message from Boy Trejo PA-C sent at 9/23/2022  9:00 AM EDT -----  Helene Ambrose - please call the pt and let her know that we will be scheduling her for IRON - I'll put I the plan- blood work will be drawn during iron treatments to monitor the hemoglobin  Hemolytic work up returned very much the same as before  I'd like to treat with iron first and if anemia does not improve discuss treatment fo the hemolytic anemia

## 2022-09-26 ENCOUNTER — TELEPHONE (OUTPATIENT)
Dept: HEMATOLOGY ONCOLOGY | Facility: MEDICAL CENTER | Age: 82
End: 2022-09-26

## 2022-09-26 NOTE — TELEPHONE ENCOUNTER
Wolf Abraham stopped by the [facility] office to fill out a [facility] contract  Forms were emailed to [facility], they accept the pt  She will start this fri 9/30/22 1030am [facility] infusion  Pt was notified  Forms sent to [facility]

## 2022-09-29 ENCOUNTER — APPOINTMENT (OUTPATIENT)
Dept: LAB | Facility: CLINIC | Age: 82
End: 2022-09-29
Payer: MEDICARE

## 2022-09-30 ENCOUNTER — HOSPITAL ENCOUNTER (OUTPATIENT)
Dept: INFUSION CENTER | Facility: HOSPITAL | Age: 82
End: 2022-09-30
Attending: INTERNAL MEDICINE
Payer: MEDICARE

## 2022-09-30 VITALS
HEART RATE: 86 BPM | TEMPERATURE: 97.2 F | RESPIRATION RATE: 18 BRPM | OXYGEN SATURATION: 99 % | SYSTOLIC BLOOD PRESSURE: 162 MMHG | DIASTOLIC BLOOD PRESSURE: 75 MMHG

## 2022-09-30 DIAGNOSIS — D50.8 OTHER IRON DEFICIENCY ANEMIA: Primary | ICD-10-CM

## 2022-09-30 LAB
ERYTHROCYTE [DISTWIDTH] IN BLOOD BY AUTOMATED COUNT: 20.5 % (ref 11.6–15.1)
HCT VFR BLD AUTO: 36.2 % (ref 34.8–46.1)
HGB BLD-MCNC: 10.8 G/DL (ref 11.5–15.4)
MCH RBC QN AUTO: 25.6 PG (ref 26.8–34.3)
MCHC RBC AUTO-ENTMCNC: 29.8 G/DL (ref 31.4–37.4)
MCV RBC AUTO: 86 FL (ref 82–98)
PLATELET # BLD AUTO: 233 THOUSANDS/UL (ref 149–390)
PMV BLD AUTO: 10.2 FL (ref 8.9–12.7)
RBC # BLD AUTO: 4.22 MILLION/UL (ref 3.81–5.12)
WBC # BLD AUTO: 6.35 THOUSAND/UL (ref 4.31–10.16)

## 2022-09-30 PROCEDURE — 85027 COMPLETE CBC AUTOMATED: CPT | Performed by: INTERNAL MEDICINE

## 2022-09-30 PROCEDURE — 96365 THER/PROPH/DIAG IV INF INIT: CPT

## 2022-09-30 RX ORDER — SODIUM CHLORIDE 9 MG/ML
20 INJECTION, SOLUTION INTRAVENOUS ONCE
Status: CANCELLED | OUTPATIENT
Start: 2022-10-06

## 2022-09-30 RX ORDER — SODIUM CHLORIDE 9 MG/ML
20 INJECTION, SOLUTION INTRAVENOUS ONCE
Status: COMPLETED | OUTPATIENT
Start: 2022-09-30 | End: 2022-09-30

## 2022-09-30 RX ADMIN — IRON SUCROSE 200 MG: 20 INJECTION, SOLUTION INTRAVENOUS at 11:05

## 2022-09-30 RX ADMIN — SODIUM CHLORIDE 20 ML/HR: 0.9 INJECTION, SOLUTION INTRAVENOUS at 11:03

## 2022-10-06 ENCOUNTER — HOSPITAL ENCOUNTER (OUTPATIENT)
Dept: INFUSION CENTER | Facility: HOSPITAL | Age: 82
Discharge: HOME | End: 2022-10-06
Attending: INTERNAL MEDICINE
Payer: MEDICARE

## 2022-10-06 VITALS
TEMPERATURE: 97.5 F | SYSTOLIC BLOOD PRESSURE: 147 MMHG | OXYGEN SATURATION: 99 % | HEART RATE: 91 BPM | RESPIRATION RATE: 18 BRPM | DIASTOLIC BLOOD PRESSURE: 67 MMHG

## 2022-10-06 DIAGNOSIS — D50.8 OTHER IRON DEFICIENCY ANEMIA: Primary | ICD-10-CM

## 2022-10-06 LAB
ERYTHROCYTE [DISTWIDTH] IN BLOOD BY AUTOMATED COUNT: 21.2 % (ref 11.6–15.1)
HCT VFR BLD AUTO: 36.3 % (ref 34.8–46.1)
HGB BLD-MCNC: 10.8 G/DL (ref 11.5–15.4)
MCH RBC QN AUTO: 25.7 PG (ref 26.8–34.3)
MCHC RBC AUTO-ENTMCNC: 29.8 G/DL (ref 31.4–37.4)
MCV RBC AUTO: 86 FL (ref 82–98)
PLATELET # BLD AUTO: 238 THOUSANDS/UL (ref 149–390)
PMV BLD AUTO: 10.3 FL (ref 8.9–12.7)
RBC # BLD AUTO: 4.2 MILLION/UL (ref 3.81–5.12)
WBC # BLD AUTO: 5.64 THOUSAND/UL (ref 4.31–10.16)

## 2022-10-06 PROCEDURE — 96365 THER/PROPH/DIAG IV INF INIT: CPT

## 2022-10-06 PROCEDURE — 85027 COMPLETE CBC AUTOMATED: CPT | Performed by: INTERNAL MEDICINE

## 2022-10-06 RX ORDER — SODIUM CHLORIDE 9 MG/ML
20 INJECTION, SOLUTION INTRAVENOUS ONCE
Status: COMPLETED | OUTPATIENT
Start: 2022-10-06 | End: 2022-10-06

## 2022-10-06 RX ORDER — SODIUM CHLORIDE 9 MG/ML
20 INJECTION, SOLUTION INTRAVENOUS ONCE
Status: CANCELLED | OUTPATIENT
Start: 2022-10-13

## 2022-10-06 RX ADMIN — SODIUM CHLORIDE 20 ML/HR: 0.9 INJECTION, SOLUTION INTRAVENOUS at 13:08

## 2022-10-06 RX ADMIN — IRON SUCROSE 200 MG: 20 INJECTION, SOLUTION INTRAVENOUS at 13:09

## 2022-10-07 ENCOUNTER — OFFICE VISIT (OUTPATIENT)
Dept: OBGYN CLINIC | Facility: CLINIC | Age: 82
End: 2022-10-07
Payer: MEDICARE

## 2022-10-07 VITALS
WEIGHT: 151 LBS | HEART RATE: 81 BPM | SYSTOLIC BLOOD PRESSURE: 137 MMHG | HEIGHT: 63 IN | BODY MASS INDEX: 26.75 KG/M2 | DIASTOLIC BLOOD PRESSURE: 68 MMHG

## 2022-10-07 DIAGNOSIS — M19.011 OSTEOARTHRITIS OF GLENOHUMERAL JOINT, RIGHT: Primary | ICD-10-CM

## 2022-10-07 PROCEDURE — 99214 OFFICE O/P EST MOD 30 MIN: CPT | Performed by: ORTHOPAEDIC SURGERY

## 2022-10-07 PROCEDURE — 20610 DRAIN/INJ JOINT/BURSA W/O US: CPT | Performed by: ORTHOPAEDIC SURGERY

## 2022-10-07 RX ORDER — BUPIVACAINE HYDROCHLORIDE 2.5 MG/ML
4 INJECTION, SOLUTION INFILTRATION; PERINEURAL
Status: COMPLETED | OUTPATIENT
Start: 2022-10-07 | End: 2022-10-07

## 2022-10-07 RX ORDER — METHYLPREDNISOLONE ACETATE 40 MG/ML
1 INJECTION, SUSPENSION INTRA-ARTICULAR; INTRALESIONAL; INTRAMUSCULAR; SOFT TISSUE
Status: COMPLETED | OUTPATIENT
Start: 2022-10-07 | End: 2022-10-07

## 2022-10-07 RX ADMIN — BUPIVACAINE HYDROCHLORIDE 4 ML: 2.5 INJECTION, SOLUTION INFILTRATION; PERINEURAL at 15:10

## 2022-10-07 RX ADMIN — METHYLPREDNISOLONE ACETATE 1 ML: 40 INJECTION, SUSPENSION INTRA-ARTICULAR; INTRALESIONAL; INTRAMUSCULAR; SOFT TISSUE at 15:10

## 2022-10-07 NOTE — PROGRESS NOTES
Assessment/Plan:  1  Osteoarthritis of glenohumeral joint, right  Large joint arthrocentesis: R glenohumeral     Scribe Attestation    I,:  Urbano Niño am acting as a scribe while in the presence of the attending physician :       I,:  Christian Pearce MD personally performed the services described in this documentation    as scribed in my presence :         Large joint arthrocentesis: R glenohumeral  [place] Protocol:  Consent: Verbal consent obtained  Risks and benefits: risks, benefits and alternatives were discussed  Consent given by: patient  Timeout called at: 10/7/2022 3:08 PM   Patient understanding: patient states understanding of the procedure being performed  Patient identity confirmed: verbally with patient    Supporting Documentation  Indications: pain and diagnostic evaluation   Procedure Details  Location: shoulder - R glenohumeral  Needle size: 22 G  Ultrasound guidance: no  Medications administered: 4 mL bupivacaine 0 25 %; 1 mL methylPREDNISolone acetate 40 mg/mL            Patient has known primary osteoarthritis of her right glenohumeral joint  She is here today for worsening symptoms  I do feel she would benefit from a steroid injection at this time  We did talk about total shoulder arthroplasty however she is not interested in that  Procedure was tolerated well, post injection protocol advised  Guerita Sanchez should monitor her blood glucose levels over the next 24-72 hours as the steroid can cause her to become hyperglycemic  I did discuss this with the patient  Follow up in 3-4 months for repeat injections if necessary at that time  Subjective:   Azael Bradford is a 80 y o  female who presents to the office today for repeat evaluation of her right shoulder and possibly repeat corticosteroid injection  Patient has known primary osteoarthritis of her glenohumeral joint  She has gotten symptomatic relief from steroid injections in the past   Her last injection was 05/03/2022  She states she had relief for 4 months  She states over the pain past month her pain has returned and increased in severity  She describes as a dull and constant ache that increases with overhead range of motion  She complains of painful crepitus  Patient is diabetic and it is well controlled  And history of a stroke suffered on 2022  Review of Systems   Constitutional: Negative for chills and fever  HENT: Negative for ear pain and sore throat  Eyes: Negative for pain and visual disturbance  Respiratory: Negative for cough and shortness of breath  Cardiovascular: Negative for chest pain and palpitations  Gastrointestinal: Negative for abdominal pain and vomiting  Genitourinary: Negative for dysuria and hematuria  Musculoskeletal: Negative for arthralgias and back pain  Skin: Negative for color change and rash  Neurological: Negative for seizures and syncope  All other systems reviewed and are negative          Past Medical History:   Diagnosis Date    Diabetes mellitus ([facility] )     Stroke ([facility] ) 2022       Past Surgical History:   Procedure Laterality Date    EYE SURGERY      HYSTERECTOMY      MITRAL VALVE REPLACEMENT         Family History   Problem Relation Age of Onset    Heart failure Mother     Heart attack Father     Sleep apnea Brother        Social History     Occupational History    Not on file   Tobacco Use    Smoking status: Former Smoker     Packs/day: 2 00     Years: 30 00     Pack years: 60 00     Quit date:      Years since quittin 7    Smokeless tobacco: Never Used   Vaping Use    Vaping Use: Never used   Substance and Sexual Activity    Alcohol use: Yes     Comment: special occasional    Drug use: No    Sexual activity: Not on file         Current Outpatient Medications:     acetaminophen (TYLENOL) 325 mg tablet, Take 2 tablets (650 mg total) by mouth every 6 (six) hours as needed for mild pain or headaches, Disp: , Rfl: 0   aspirin (ECOTRIN LOW STRENGTH) 81 mg EC tablet, Take 1 tablet (81 mg total) by mouth daily, Disp: , Rfl: 0    atorvastatin (LIPITOR) 80 mg tablet, Take 1 tablet (80 mg total) by mouth daily, Disp: 90 tablet, Rfl: 1    Cholecalciferol (VITAMIN D PO), Take by mouth, Disp: , Rfl:     ferrous sulfate 324 (65 Fe) mg, Take 1 tablet (324 mg total) by mouth daily before breakfast, Disp: 180 tablet, Rfl: 0    glucose blood test strip, Use 1 each in the morning Use one daily, Disp: 100 strip, Rfl: 2    metFORMIN (GLUCOPHAGE) 500 mg tablet, Take 1 tablet (500 mg total) by mouth daily with breakfast, Disp: 180 tablet, Rfl: 0    Multiple Vitamins-Minerals (PRESERVISION AREDS PO), Take 2 tablets by mouth daily  , Disp: , Rfl:     polyethylene glycol (MIRALAX) 17 g packet, Take 17 g by mouth daily as needed (Constipation), Disp: , Rfl: 0    warfarin (COUMADIN) 2 5 mg tablet, Take 1 tablet (2 5 mg total) by mouth 3 (three) times a week On Monday Wednesday and Friday, Disp: , Rfl: 0    warfarin (COUMADIN) 5 mg tablet, Take 1 tablet (5 mg total) by mouth 4 (four) times a week On Sunday, Tuesday, Thursday, Saturday, Disp: , Rfl: 0    Allergies   Allergen Reactions    Sulfa Antibiotics     Sulfasalazine        Objective:  Vitals:    10/07/22 1439   BP: 137/68   Pulse: 81       Right Shoulder Exam     Tenderness   The patient is experiencing tenderness in the acromioclavicular joint  Range of Motion   Active abduction: 110   External rotation: 40   Forward flexion: 110   Internal rotation 0 degrees: L5     Muscle Strength   Abduction: 4/5   Internal rotation: 5/5   External rotation: 5/5     Tests   Impingement: negative    Other   Erythema: absent  Sensation: normal  Pulse: present            Physical Exam  HENT:      Head: Normocephalic  Eyes:      Pupils: Pupils are equal, round, and reactive to light  Cardiovascular:      Rate and Rhythm: Normal rate     Pulmonary:      Effort: Pulmonary effort is normal  Musculoskeletal:         General: Normal range of motion  Cervical back: Normal range of motion  Skin:     General: Skin is warm and dry  Neurological:      General: No focal deficit present  Mental Status: She is alert  Psychiatric:         Behavior: Behavior normal            I have personally reviewed pertinent films in PACS and my interpretation is as follows: No new images obtained today      This document was created using speech voice recognition software  Grammatical errors, random word insertions, pronoun errors, and incomplete sentences are an occasional consequence of this system due to software limitations, ambient noise, and hardware issues  Any formal questions or concerns about content, text, or information contained within the body of this dictation should be directly addressed to the provider for clarification

## 2022-10-13 ENCOUNTER — HOSPITAL ENCOUNTER (OUTPATIENT)
Dept: INFUSION CENTER | Facility: HOSPITAL | Age: 82
Discharge: HOME | End: 2022-10-13
Attending: INTERNAL MEDICINE
Payer: MEDICARE

## 2022-10-13 VITALS
SYSTOLIC BLOOD PRESSURE: 144 MMHG | HEART RATE: 80 BPM | DIASTOLIC BLOOD PRESSURE: 71 MMHG | RESPIRATION RATE: 18 BRPM | OXYGEN SATURATION: 98 % | TEMPERATURE: 98.2 F

## 2022-10-13 DIAGNOSIS — Z95.2 HEART VALVE REPLACED BY TRANSPLANT: ICD-10-CM

## 2022-10-13 DIAGNOSIS — Z79.01 LONG TERM (CURRENT) USE OF ANTICOAGULANTS: ICD-10-CM

## 2022-10-13 DIAGNOSIS — D50.8 OTHER IRON DEFICIENCY ANEMIA: Primary | ICD-10-CM

## 2022-10-13 LAB
ERYTHROCYTE [DISTWIDTH] IN BLOOD BY AUTOMATED COUNT: 21.5 % (ref 11.6–15.1)
HCT VFR BLD AUTO: 33.8 % (ref 34.8–46.1)
HGB BLD-MCNC: 10.3 G/DL (ref 11.5–15.4)
INR PPP: 3.29 (ref 0.84–1.19)
MCH RBC QN AUTO: 26.8 PG (ref 26.8–34.3)
MCHC RBC AUTO-ENTMCNC: 30.5 G/DL (ref 31.4–37.4)
MCV RBC AUTO: 88 FL (ref 82–98)
PLATELET # BLD AUTO: 205 THOUSANDS/UL (ref 149–390)
PMV BLD AUTO: 10.3 FL (ref 8.9–12.7)
PROTHROMBIN TIME: 33.5 SECONDS (ref 11.6–14.5)
RBC # BLD AUTO: 3.84 MILLION/UL (ref 3.81–5.12)
WBC # BLD AUTO: 5.77 THOUSAND/UL (ref 4.31–10.16)

## 2022-10-13 PROCEDURE — 96365 THER/PROPH/DIAG IV INF INIT: CPT

## 2022-10-13 PROCEDURE — 85610 PROTHROMBIN TIME: CPT

## 2022-10-13 PROCEDURE — 85027 COMPLETE CBC AUTOMATED: CPT | Performed by: INTERNAL MEDICINE

## 2022-10-13 RX ORDER — SODIUM CHLORIDE 9 MG/ML
20 INJECTION, SOLUTION INTRAVENOUS ONCE
Status: COMPLETED | OUTPATIENT
Start: 2022-10-13 | End: 2022-10-13

## 2022-10-13 RX ORDER — SODIUM CHLORIDE 9 MG/ML
20 INJECTION, SOLUTION INTRAVENOUS ONCE
OUTPATIENT
Start: 2022-10-20

## 2022-10-13 RX ADMIN — SODIUM CHLORIDE 20 ML/HR: 9 INJECTION, SOLUTION INTRAVENOUS at 11:43

## 2022-10-13 RX ADMIN — IRON SUCROSE 200 MG: 20 INJECTION, SOLUTION INTRAVENOUS at 11:43

## 2022-10-13 NOTE — PROGRESS NOTES
Pt received venofer infusion w/out any adverse effects  Labs also obtained this visit   Offers no complaints, AVS given

## 2022-10-28 ENCOUNTER — RA CDI HCC (OUTPATIENT)
Dept: OTHER | Facility: HOSPITAL | Age: 82
End: 2022-10-28

## 2022-11-02 DIAGNOSIS — D50.8 OTHER IRON DEFICIENCY ANEMIA: Primary | ICD-10-CM

## 2022-11-02 RX ORDER — SODIUM CHLORIDE 9 MG/ML
20 INJECTION, SOLUTION INTRAVENOUS ONCE
Status: CANCELLED | OUTPATIENT
Start: 2022-11-04

## 2022-11-04 ENCOUNTER — HOSPITAL ENCOUNTER (OUTPATIENT)
Dept: INFUSION CENTER | Facility: HOSPITAL | Age: 82
End: 2022-11-04
Attending: INTERNAL MEDICINE

## 2022-11-04 VITALS
DIASTOLIC BLOOD PRESSURE: 74 MMHG | HEART RATE: 90 BPM | TEMPERATURE: 98.2 F | SYSTOLIC BLOOD PRESSURE: 141 MMHG | OXYGEN SATURATION: 97 % | RESPIRATION RATE: 18 BRPM

## 2022-11-04 DIAGNOSIS — Z95.2 HEART VALVE REPLACED BY TRANSPLANT: ICD-10-CM

## 2022-11-04 DIAGNOSIS — Z79.01 LONG TERM (CURRENT) USE OF ANTICOAGULANTS: ICD-10-CM

## 2022-11-04 DIAGNOSIS — D50.8 OTHER IRON DEFICIENCY ANEMIA: Primary | ICD-10-CM

## 2022-11-04 LAB
ERYTHROCYTE [DISTWIDTH] IN BLOOD BY AUTOMATED COUNT: 20.6 % (ref 11.6–15.1)
HCT VFR BLD AUTO: 36.5 % (ref 34.8–46.1)
HGB BLD-MCNC: 11.1 G/DL (ref 11.5–15.4)
INR PPP: 5.29 (ref 0.84–1.19)
MCH RBC QN AUTO: 27.8 PG (ref 26.8–34.3)
MCHC RBC AUTO-ENTMCNC: 30.4 G/DL (ref 31.4–37.4)
MCV RBC AUTO: 91 FL (ref 82–98)
PLATELET # BLD AUTO: 240 THOUSANDS/UL (ref 149–390)
PMV BLD AUTO: 9.7 FL (ref 8.9–12.7)
PROTHROMBIN TIME: 48.4 SECONDS (ref 11.6–14.5)
RBC # BLD AUTO: 4 MILLION/UL (ref 3.81–5.12)
WBC # BLD AUTO: 4.73 THOUSAND/UL (ref 4.31–10.16)

## 2022-11-04 RX ORDER — SODIUM CHLORIDE 9 MG/ML
20 INJECTION, SOLUTION INTRAVENOUS ONCE
Status: CANCELLED | OUTPATIENT
Start: 2022-11-10

## 2022-11-04 RX ORDER — SODIUM CHLORIDE 9 MG/ML
20 INJECTION, SOLUTION INTRAVENOUS ONCE
Status: COMPLETED | OUTPATIENT
Start: 2022-11-04 | End: 2022-11-04

## 2022-11-04 RX ADMIN — SODIUM CHLORIDE 20 ML/HR: 9 INJECTION, SOLUTION INTRAVENOUS at 11:42

## 2022-11-04 RX ADMIN — IRON SUCROSE 200 MG: 20 INJECTION, SOLUTION INTRAVENOUS at 11:42

## 2022-11-04 NOTE — PROGRESS NOTES
INR 5 29 and PT 48 4  I called Dr Saad Asher office and gave critical results to Scarlett Solis who said she will message him and call the patient with further instructions  Pt verbalized understanding

## 2022-11-07 ENCOUNTER — APPOINTMENT (OUTPATIENT)
Dept: LAB | Facility: CLINIC | Age: 82
End: 2022-11-07

## 2022-11-08 ENCOUNTER — OFFICE VISIT (OUTPATIENT)
Dept: INTERNAL MEDICINE CLINIC | Facility: CLINIC | Age: 82
End: 2022-11-08

## 2022-11-08 VITALS
HEART RATE: 84 BPM | DIASTOLIC BLOOD PRESSURE: 80 MMHG | SYSTOLIC BLOOD PRESSURE: 130 MMHG | HEIGHT: 66 IN | BODY MASS INDEX: 25.07 KG/M2 | OXYGEN SATURATION: 100 % | WEIGHT: 156 LBS

## 2022-11-08 DIAGNOSIS — Z95.2 H/O MITRAL VALVE REPLACEMENT WITH MECHANICAL VALVE: ICD-10-CM

## 2022-11-08 DIAGNOSIS — J43.9 PULMONARY EMPHYSEMA, UNSPECIFIED EMPHYSEMA TYPE (HCC): ICD-10-CM

## 2022-11-08 DIAGNOSIS — J30.1 ALLERGIC RHINITIS DUE TO POLLEN, UNSPECIFIED SEASONALITY: ICD-10-CM

## 2022-11-08 DIAGNOSIS — E03.8 OTHER SPECIFIED HYPOTHYROIDISM: ICD-10-CM

## 2022-11-08 DIAGNOSIS — E11.69 TYPE 2 DIABETES MELLITUS WITH OTHER SPECIFIED COMPLICATION, WITHOUT LONG-TERM CURRENT USE OF INSULIN (HCC): ICD-10-CM

## 2022-11-08 DIAGNOSIS — R80.8 OTHER PROTEINURIA: ICD-10-CM

## 2022-11-08 DIAGNOSIS — I48.20 CHRONIC ATRIAL FIBRILLATION (HCC): ICD-10-CM

## 2022-11-08 DIAGNOSIS — E11.9 TYPE 2 DIABETES MELLITUS WITHOUT COMPLICATION, WITHOUT LONG-TERM CURRENT USE OF INSULIN (HCC): Primary | ICD-10-CM

## 2022-11-08 DIAGNOSIS — K90.0 CELIAC DISEASE: ICD-10-CM

## 2022-11-08 DIAGNOSIS — E78.00 HYPERCHOLESTEREMIA: ICD-10-CM

## 2022-11-08 DIAGNOSIS — I63.9 CEREBROVASCULAR ACCIDENT (CVA), UNSPECIFIED MECHANISM (HCC): ICD-10-CM

## 2022-11-08 DIAGNOSIS — E53.8 VITAMIN B12 DEFICIENCY: ICD-10-CM

## 2022-11-08 DIAGNOSIS — D50.8 OTHER IRON DEFICIENCY ANEMIA: ICD-10-CM

## 2022-11-08 DIAGNOSIS — I71.40 ABDOMINAL AORTIC ANEURYSM (AAA) WITHOUT RUPTURE, UNSPECIFIED PART: ICD-10-CM

## 2022-11-08 DIAGNOSIS — G47.33 OBSTRUCTIVE SLEEP APNEA: ICD-10-CM

## 2022-11-08 DIAGNOSIS — I10 ESSENTIAL HYPERTENSION: ICD-10-CM

## 2022-11-08 DIAGNOSIS — Z95.2 PRESENCE OF PROSTHETIC HEART VALVE: ICD-10-CM

## 2022-11-08 PROBLEM — R05.9 COUGH: Status: RESOLVED | Noted: 2022-09-22 | Resolved: 2022-11-08

## 2022-11-08 PROBLEM — N18.2 CKD (CHRONIC KIDNEY DISEASE) STAGE 2, GFR 60-89 ML/MIN: Status: ACTIVE | Noted: 2022-05-20

## 2022-11-08 NOTE — ASSESSMENT & PLAN NOTE
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

## 2022-11-08 NOTE — ASSESSMENT & PLAN NOTE
Lab Results   Component Value Date    EGFR 80 09/22/2022    EGFR 85 08/14/2022    EGFR 80 08/13/2022    CREATININE 0 70 09/22/2022    CREATININE 0 60 08/14/2022    CREATININE 0 70 08/13/2022     Lab Results   Component Value Date    WBC 4 73 11/04/2022    HGB 11 1 (L) 11/04/2022    HCT 36 5 11/04/2022    MCV 91 11/04/2022     11/04/2022     Patient is under care of hemato oncologist     Patient is going for 1 last iron infusion

## 2022-11-08 NOTE — ASSESSMENT & PLAN NOTE
Abdominal aneurysm is stable  Last study were done on 08/03/2021    Recommend ultrasound at her convenience    Ultrasound of aorta  recommened

## 2022-11-08 NOTE — ASSESSMENT & PLAN NOTE
Lab Results   Component Value Date    [id number] 1 832 08/12/2022   Hypothyroidism clinically stable      Patient is not requiring any thyroid medications a TSH is normal

## 2022-11-08 NOTE — ASSESSMENT & PLAN NOTE
Lab Results   Component Value Date    ALT 40 09/22/2022    AST 41 09/22/2022    ALKPHOS 79 09/22/2022    BILITOT 0 3 11/09/2015   Liver blood test on September 22 were normal

## 2022-11-08 NOTE — ASSESSMENT & PLAN NOTE
Seen by [facility] oncologist   Anemia seems to be multifactorial   Oral iron was not working  Getting intravenous iron  The hemoglobin 11 1 will monitor    Remains on chronic anticoagulation

## 2022-11-08 NOTE — ASSESSMENT & PLAN NOTE
Lab Results   Component Value Date    LDLCALC 40 07/06/2022     Lab Results   Component Value Date    ALT 40 09/22/2022    ALT 34 11/09/2015     Lab Results   Component Value Date    CHOLESTEROL 104 07/06/2022    CHOLESTEROL 200 03/12/2022    CHOLESTEROL 228 (H) 02/17/2022     Lab Results   Component Value Date    HDL 37 (L) 07/06/2022    HDL 38 (L) 03/12/2022    HDL 39 (L) 02/17/2022     Lab Results   Component Value Date    TRIG 137 07/06/2022    TRIG 230 (H) 03/12/2022    TRIG 263 (H) 02/17/2022     Lab Results   Component Value Date    NONHDLC 67 07/06/2022    [place] 189 02/17/2022    [place] 186 10/29/2021     Continue atorvastatin to 80 mg daily

## 2022-11-08 NOTE — ASSESSMENT & PLAN NOTE
The atrial fibrillation controlled  Remains on warfarin    Anticoagulation being managed by cardiologist    Lab Results   Component Value Date    INR 2 73 (H) 11/07/2022    INR 5 29 (HH) 11/04/2022    INR 3 29 (H) 10/13/2022    PROTIME 29 2 (H) 11/07/2022    PROTIME 48 4 (H) 11/04/2022    PROTIME 33 5 (H) 10/13/2022   Recent INR was 5 29 came down to 2 73 being managed by cardiologist no active bleeding

## 2022-11-08 NOTE — ASSESSMENT & PLAN NOTE
Diabetes is excellent control  Currently see has been on metformin 500 mg 1 tablet twice a day for long time  Of it is inadvertently reported or return as a 1 a day  No hypoglycemia  Due for hemoglobin A1c with next blood test   Up-to-date with eye examination  Will check urine for microalbumin with next visit  The symptom-free  No hypoglycemia    Lab Results   Component Value Date    HGBA1C 6 5 (H) 07/06/2022

## 2022-11-08 NOTE — PROGRESS NOTES
Name: Tree Pinon      : 1940      MRN: [id number]  Encounter Provider: Alisa Mayberry MD  Encounter Date: 2022   Encounter department: [address]     1  Type 2 diabetes mellitus without complication, without long-term current use of insulin (HCC)  Assessment & Plan:    Lab Results   Component Value Date    HGBA1C 6 5 (H) 2022       Orders:  -     metFORMIN (GLUCOPHAGE) 500 mg tablet; Take 1 tablet (500 mg total) by mouth 2 (two) times a day with meals  -     Comprehensive metabolic panel; Future; Expected date: 2023  -     Hemoglobin A1C; Future  -     Microalbumin / creatinine urine ratio    2  Other specified hypothyroidism  Assessment & Plan:  Lab Results   Component Value Date    [id number] 1 832 2022   Hypothyroidism clinically stable  Patient is not requiring any thyroid medications a TSH is normal      Orders:  -     Comprehensive metabolic panel; Future; Expected date: 2023    3  Celiac disease  Assessment & Plan:  No abdominal pain nausea vomiting cramps or gas pain  Her celiac is under control    Orders:  -     Comprehensive metabolic panel; Future; Expected date: 2023    4  Type 2 diabetes mellitus with other specified complication, without long-term current use of insulin (HCC)  Assessment & Plan:  Diabetes is excellent control  Currently see has been on metformin 500 mg 1 tablet twice a day for long time  Of it is inadvertently reported or return as a 1 a day  No hypoglycemia  Due for hemoglobin A1c with next blood test   Up-to-date with eye examination  Will check urine for microalbumin with next visit  The symptom-free  No hypoglycemia  Lab Results   Component Value Date    HGBA1C 6 5 (H) 2022       Orders:  -     Comprehensive metabolic panel;  Future; Expected date: 2023  -     Hemoglobin A1C; Future  -     Microalbumin / creatinine urine ratio  -     Lipid panel; Future  -     CBC; Future; Expected date: 02/08/2023    5  Allergic rhinitis due to pollen, unspecified seasonality  Assessment & Plan:  Allergic rhinitis is well controlled  Not requiring any medications      6  Obstructive sleep apnea  Assessment & Plan:  Sleep apnea stable  She remains on CPAP at home  No daytime fatigue      7  Pulmonary emphysema, unspecified emphysema type ([address] )  Assessment & Plan:  COPD is fair  Breathing is fairly stable  Not requiring any inhaler      8  Abdominal aortic aneurysm (AAA) without rupture, unspecified part  Assessment & Plan:  Abdominal aneurysm is stable  Last study were done on 08/03/2021  Recommend ultrasound at her convenience    Ultrasound of aorta  recommened    Orders:  -     US abdominal aorta; Future; Expected date: 11/08/2022    9  Cerebrovascular accident (CVA), unspecified mechanism [facility])  Assessment & Plan:  03/12/2022:  MRI of the brain:1  Acute lacunar infarction left basal ganglia, extending into the periventricular white matter of the left parietal lobe, adjacent to the posterior aspect of the left lateral ventricle      2   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  Continue risk factor management with aspirin, warfarin, atorvastatin, diabetes control, blood pressure control, no new focal neurological deficit    Orders:  -     Comprehensive metabolic panel; Future; Expected date: 02/08/2023    10  Chronic atrial fibrillation [facility])  Assessment & Plan:  The atrial fibrillation controlled  Remains on warfarin  Anticoagulation being managed by cardiologist    Lab Results   Component Value Date    INR 2 73 (H) 11/07/2022    INR 5 29 (HH) 11/04/2022    INR 3 29 (H) 10/13/2022    PROTIME 29 2 (H) 11/07/2022    PROTIME 48 4 (H) 11/04/2022    PROTIME 33 5 (H) 10/13/2022   Recent INR was 5 29 came down to 2 73 being managed by cardiologist no active bleeding      Orders:  -     Comprehensive metabolic panel;  Future; Expected date: 02/08/2023    11  Essential hypertension  Assessment & Plan:  Hypertension well controlled  She is currently not on any blood pressure medications  Blood pressure is well controlled  Will monitor    Orders:  -     Comprehensive metabolic panel; Future; Expected date: 02/08/2023  -     Microalbumin / creatinine urine ratio    12  H/O mitral valve replacement with mechanical valve    13  Hypercholesteremia  Assessment & Plan:  Lab Results   Component Value Date    LDLCALC 40 07/06/2022     Lab Results   Component Value Date    ALT 40 09/22/2022    ALT 34 11/09/2015     Lab Results   Component Value Date    CHOLESTEROL 104 07/06/2022    CHOLESTEROL 200 03/12/2022    CHOLESTEROL 228 (H) 02/17/2022     Lab Results   Component Value Date    HDL 37 (L) 07/06/2022    HDL 38 (L) 03/12/2022    HDL 39 (L) 02/17/2022     Lab Results   Component Value Date    TRIG 137 07/06/2022    TRIG 230 (H) 03/12/2022    TRIG 263 (H) 02/17/2022     Lab Results   Component Value Date    NONHDLC 67 07/06/2022    [place] 189 02/17/2022    [place] 186 10/29/2021     Continue atorvastatin to 80 mg daily    Orders:  -     Comprehensive metabolic panel; Future; Expected date: 02/08/2023  -     Lipid panel; Future    14  Other iron deficiency anemia  Assessment & Plan:  Seen by hemato oncologist   Anemia seems to be multifactorial   Oral iron was not working  Getting intravenous iron  The hemoglobin 11 1 will monitor  Remains on chronic anticoagulation    Orders:  -     CBC; Future; Expected date: 02/08/2023    15  Other proteinuria  -     CBC; Future; Expected date: 02/08/2023    16  Presence of prosthetic heart valve    17  Vitamin B12 deficiency  Assessment & Plan:  Recommend to continue vitamin B12 OTC             Subjective         Patient is here for chronic disease management    Offers no complaint  Anemia hemoglobin 11 1 feeling stronger no obvious bleeding is felt to be multifactorial getting how iron infusion last time  Will do CBC prior to next visit  Celiac disease symptom-free  Hypertension symptom-free tolerating blood pressure without no medications  Diabetes hemoglobin A1c 6 7 in July due for next time  Remains on metformin 500 mg twice a day the we have a record as a once a day but patient has been taking twice a day without hypoglycemia up-to-date with eye examinations will be due for labs prior to next visit  Chronic atrial fibrillation symptom-free rate control home now remains on anticoagulation being monitored by cardiologist   Recent INR was 2 67  Hyperlipidemia remains on statin tolerating without side effect due for lipid profile prior to next visit  History of CVA in the March no residual deficit continue risk factor management  Sleep apnea stable  Allergic rhinitis fair  Now a history of bioprosthetic mild mitral wall of clinically is compensated  Sleep sleep apnea fair no daytime fatigue remains on CPAP  COPD fair symptom-free  History of UTI stable  Echocardiogram done on 08/11, EF 62%, basal and inferolateral wall hypokinesis  Mechanical mitral valve     08/11/2022:  Chest x-ray:  No active disease    08/11/2022:  CT scan of the brain no acute intracranial abnormality micro angiopathy changes                     Review of Systems   Constitutional: Negative for chills, fatigue, fever and unexpected weight change  HENT: Negative for ear pain, postnasal drip, sinus pain and sore throat  Eyes: Negative for pain and redness  Respiratory: Negative for cough and shortness of breath  Cardiovascular: Negative for chest pain, palpitations and leg swelling  Gastrointestinal: Negative for abdominal pain, diarrhea and nausea  Endocrine: Negative for cold intolerance, polydipsia and polyuria  Genitourinary: Negative for dysuria, frequency and urgency  Musculoskeletal: Negative for arthralgias, gait problem and myalgias  Skin: Negative for rash  Allergic/Immunologic: Negative  Neurological: Negative for dizziness and headaches  Hematological: Negative for adenopathy  Psychiatric/Behavioral: Negative for behavioral problems  Past Medical History:   Diagnosis Date   • Diabetes mellitus ([facility] )    • Stroke ([facility] ) 2022     Past Surgical History:   Procedure Laterality Date   • EYE SURGERY     • HYSTERECTOMY     • MITRAL VALVE REPLACEMENT       Family History   Problem Relation Age of Onset   • Heart failure Mother    • Heart attack Father    • Sleep apnea Brother      Social History     Socioeconomic History   • Marital status:      Spouse name: None   • Number of children: None   • Years of education: None   • Highest education level: None   Occupational History   • None   Tobacco Use   • Smoking status: Former Smoker     Packs/day: 2 00     Years: 30 00     Pack years: 60 00     Quit date:      Years since quittin 8   • Smokeless tobacco: Never Used   Vaping Use   • Vaping Use: Never used   Substance and Sexual Activity   • Alcohol use: Yes     Comment: special occasional   • Drug use: No   • Sexual activity: None   Other Topics Concern   • None   Social History Narrative   • None     Social Determinants of Health     Financial Resource Strain: Not on file   Food Insecurity: No Food Insecurity   • Worried About Running Out of Food in the Last Year: Never true   • Ran Out of Food in the Last Year: Never true   Transportation Needs: No Transportation Needs   • Lack of Transportation (Medical): No   • Lack of Transportation (Non-Medical):  No   Physical Activity: Not on file   Stress: Not on file   Social Connections: Not on file   Intimate Partner Violence: Not on file   Housing Stability: Low Risk    • Unable to Pay for Housing in the Last Year: No   • Number of Places Lived in the Last Year: 1   • Unstable Housing in the Last Year: No     Current Outpatient Medications on File Prior to Visit   Medication Sig   • acetaminophen (TYLENOL) 325 mg tablet Take 2 tablets (650 mg total) by mouth every 6 (six) hours as needed for mild pain or headaches   • aspirin (ECOTRIN LOW STRENGTH) 81 mg EC tablet Take 1 tablet (81 mg total) by mouth daily   • atorvastatin (LIPITOR) 80 mg tablet Take 1 tablet (80 mg total) by mouth daily   • Cholecalciferol (VITAMIN D PO) Take by mouth   • ferrous sulfate 324 (65 Fe) mg Take 1 tablet (324 mg total) by mouth daily before breakfast   • glucose blood test strip Use 1 each in the morning Use one daily   • Multiple Vitamins-Minerals (PRESERVISION AREDS PO) Take 2 tablets by mouth daily     • polyethylene glycol (MIRALAX) 17 g packet Take 17 g by mouth daily as needed (Constipation)   • warfarin (COUMADIN) 2 5 mg tablet Take 1 tablet (2 5 mg total) by mouth 3 (three) times a week On Monday Wednesday and Friday   • warfarin (COUMADIN) 5 mg tablet Take 1 tablet (5 mg total) by mouth 4 (four) times a week On Sunday, Tuesday, Thursday, Saturday   • [DISCONTINUED] metFORMIN (GLUCOPHAGE) 500 mg tablet Take 1 tablet (500 mg total) by mouth daily with breakfast     Allergies   Allergen Reactions   • Sulfa Antibiotics Other (See Comments)   • Sulfasalazine        There is no immunization history on file for this patient  Objective     /80   Pulse 84   Ht 5' 6" (1 676 m)   Wt 70 8 kg (156 lb)   SpO2 100%   BMI 25 18 kg/m²     Physical Exam  Vitals and nursing note reviewed  Constitutional:       General: She is not in acute distress  Appearance: Normal appearance  She is well-developed  She is not ill-appearing, toxic-appearing or diaphoretic  Comments: Short grey hair   HENT:      Head: Normocephalic and atraumatic  Mouth/Throat:      Pharynx: Oropharynx is clear  No oropharyngeal exudate or posterior oropharyngeal erythema  Eyes:      General: No scleral icterus  Right eye: No discharge  Left eye: No discharge        Conjunctiva/sclera: Conjunctivae normal    Neck:      Thyroid: No thyroid mass, thyromegaly or thyroid tenderness  Vascular: Normal carotid pulses  No carotid bruit or JVD  Cardiovascular:      Rate and Rhythm: Normal rate  Rhythm irregular  Heart sounds: S1 normal  Murmur heard  Systolic murmur is present with a grade of 2/6  Comments: S2 elevated  Pulmonary:      Effort: No respiratory distress  Breath sounds: Normal breath sounds  No stridor  No wheezing, rhonchi or rales  Chest:      Chest wall: No tenderness  Abdominal:      General: Bowel sounds are normal  There is no distension  Palpations: There is no shifting dullness, fluid wave, hepatomegaly, mass or pulsatile mass  Tenderness: There is no abdominal tenderness  There is no rebound  Hernia: There is no hernia in the umbilical area, ventral area, left inguinal area, left femoral area or right inguinal area  Musculoskeletal:         General: Normal range of motion  Cervical back: Normal range of motion  Right lower leg: No edema  Left lower leg: No edema  Lymphadenopathy:      Cervical: No cervical adenopathy  Right cervical: No superficial cervical adenopathy  Skin:     General: Skin is warm  Findings: No rash  Neurological:      General: No focal deficit present  Mental Status: She is alert and oriented to person, place, and time  Mental status is at baseline  Deep Tendon Reflexes: Reflexes normal    Psychiatric:         Mood and Affect: Mood normal          Behavior: Behavior normal          Thought Content:  Thought content normal          Judgment: Judgment normal        Radha Vallejo MD

## 2022-11-08 NOTE — ASSESSMENT & PLAN NOTE
Hypertension well controlled  She is currently not on any blood pressure medications  Blood pressure is well controlled    Will monitor

## 2022-11-08 NOTE — ASSESSMENT & PLAN NOTE
Lab Results   Component Value Date    WBC 4 73 11/04/2022    HGB 11 1 (L) 11/04/2022    HCT 36 5 11/04/2022    MCV 91 11/04/2022     11/04/2022   Patient is getting peripheral IV iron infusion going for 1 more time  Being followed by [facility] oncologist   Their notes were reviewed

## 2022-11-10 ENCOUNTER — HOSPITAL ENCOUNTER (OUTPATIENT)
Dept: INFUSION CENTER | Facility: HOSPITAL | Age: 82
Discharge: HOME | End: 2022-11-10
Attending: INTERNAL MEDICINE

## 2022-11-10 VITALS
RESPIRATION RATE: 20 BRPM | OXYGEN SATURATION: 100 % | HEART RATE: 85 BPM | TEMPERATURE: 97 F | SYSTOLIC BLOOD PRESSURE: 152 MMHG | DIASTOLIC BLOOD PRESSURE: 71 MMHG

## 2022-11-10 DIAGNOSIS — D50.8 OTHER IRON DEFICIENCY ANEMIA: Primary | ICD-10-CM

## 2022-11-10 LAB
ERYTHROCYTE [DISTWIDTH] IN BLOOD BY AUTOMATED COUNT: 20.6 % (ref 11.6–15.1)
HCT VFR BLD AUTO: 37 % (ref 34.8–46.1)
HGB BLD-MCNC: 11.4 G/DL (ref 11.5–15.4)
MCH RBC QN AUTO: 28.2 PG (ref 26.8–34.3)
MCHC RBC AUTO-ENTMCNC: 30.8 G/DL (ref 31.4–37.4)
MCV RBC AUTO: 92 FL (ref 82–98)
PLATELET # BLD AUTO: 232 THOUSANDS/UL (ref 149–390)
PMV BLD AUTO: 10.1 FL (ref 8.9–12.7)
RBC # BLD AUTO: 4.04 MILLION/UL (ref 3.81–5.12)
WBC # BLD AUTO: 5.34 THOUSAND/UL (ref 4.31–10.16)

## 2022-11-10 RX ORDER — SODIUM CHLORIDE 9 MG/ML
20 INJECTION, SOLUTION INTRAVENOUS ONCE
Status: COMPLETED | OUTPATIENT
Start: 2022-11-10 | End: 2022-11-10

## 2022-11-10 RX ORDER — SODIUM CHLORIDE 9 MG/ML
20 INJECTION, SOLUTION INTRAVENOUS ONCE
Status: CANCELLED | OUTPATIENT
Start: 2022-11-11

## 2022-11-10 RX ADMIN — SODIUM CHLORIDE 20 ML/HR: 900 INJECTION, SOLUTION INTRAVENOUS at 12:34

## 2022-11-10 RX ADMIN — IRON SUCROSE 200 MG: 20 INJECTION, SOLUTION INTRAVENOUS at 12:35

## 2022-11-14 ENCOUNTER — APPOINTMENT (OUTPATIENT)
Dept: LAB | Facility: CLINIC | Age: 82
End: 2022-11-14

## 2022-11-17 ENCOUNTER — HOSPITAL ENCOUNTER (OUTPATIENT)
Dept: RADIOLOGY | Facility: HOSPITAL | Age: 82
Discharge: HOME | End: 2022-11-17
Attending: INTERNAL MEDICINE

## 2022-11-17 DIAGNOSIS — I71.40 ABDOMINAL AORTIC ANEURYSM (AAA) WITHOUT RUPTURE, UNSPECIFIED PART: ICD-10-CM

## 2022-11-18 ENCOUNTER — HOSPITAL ENCOUNTER (OUTPATIENT)
Dept: INFUSION CENTER | Facility: HOSPITAL | Age: 82
Discharge: HOME | End: 2022-11-18
Attending: INTERNAL MEDICINE

## 2022-12-07 ENCOUNTER — APPOINTMENT (OUTPATIENT)
Dept: LAB | Facility: CLINIC | Age: 82
End: 2022-12-07

## 2022-12-08 ENCOUNTER — OFFICE VISIT (OUTPATIENT)
Dept: HEMATOLOGY ONCOLOGY | Facility: MEDICAL CENTER | Age: 82
End: 2022-12-08

## 2022-12-08 VITALS
RESPIRATION RATE: 18 BRPM | TEMPERATURE: 97 F | HEIGHT: 66 IN | HEART RATE: 86 BPM | BODY MASS INDEX: 25.71 KG/M2 | DIASTOLIC BLOOD PRESSURE: 80 MMHG | SYSTOLIC BLOOD PRESSURE: 130 MMHG | OXYGEN SATURATION: 99 % | WEIGHT: 160 LBS

## 2022-12-08 DIAGNOSIS — R77.8 LOW SERUM HAPTOGLOBIN: ICD-10-CM

## 2022-12-08 DIAGNOSIS — D50.8 OTHER IRON DEFICIENCY ANEMIA: Primary | ICD-10-CM

## 2022-12-08 NOTE — PROGRESS NOTES
[address] [address]  Hematology Ambulatory [address], 1940, [id number]  12/8/2022      Assessment and Plan   1  Other iron deficiency anemia  Patient has noticed that she does not need as many naps  Otherwise, patient states that she is feeling about the same as she did before  No specific issues with IV iron treatments  Iron and CBC analysis has been requested for 3 months from now  - CBC and differential; Future  - Iron Panel (Includes Ferritin, Iron Sat%, Iron, and TIBC); Future    2  Low serum haptoglobin  In the past patient has had low serum haptoglobin however no other signs or symptoms of hemolytic anemia  I have requested the following blood test for further evaluation  This will be reviewed when the patient returns to the office for follow-up  Patient does have fatty liver which could explain the low serum haptoglobin  - Comprehensive metabolic panel; Future  - Leukemia/Lymphoma flow cytometry; Future  - Direct antiglobulin test; Future  - Protein electrophoresis, serum; Future  - Immunoglobulin free LT chains blood; Future  - IgG, IgA, IgM; Future  - Cryofibrinogen; Future      The patient is scheduled for follow-up in approximately 3 months with Dr Cici Khalil  Patient voiced agreement and understanding to the above  Patient advised to call the Hematology/Oncology office with any questions and concerns regarding the above  Barrier(s) to care: None  The patient is able to self care  Gabriella Franz PA-C  Medical Oncology/Hematology  [address]    Subjective     Chief Complaint   Patient presents with   • Follow-up       History of present illness:    This is an 75-year-old female with past medical history of pulmonary emphysema CVA, diabetes mellitus, chronic atrial fibrillation on warfarin therapy, hepatic steatosis, celiac disease, history of mitral valve replacement with mechanical valve who presents to the [facility] Hematology office secondary to recent hospitalization and finding of anemia      10/9/17 hemoglobin = 13 0, MCV 89, RDW 15 6, platelet count 087  7/18/45 hemoglobin = 11 6  6/4/19 hemoglobin = 12 2  7/5/19 ferritin = 23  6/9/20 ferritin = 6, LDH = 270, hapto = <10  8/17/20 hemoglobin = 11 8  6/18/21 hemoglobin = 12 7, MCV 96, RDW 14 5, platelet count 623, ferritin = 18  3/11/22 hemoglobin = 12 6, MCV 93, RDW 14 9, platelet count 496  8/92/39 hemoglobin = 10 5, MCV 92, RDW 14 6, platelet count 547  6/2/51 hemoglobin = 10 0, MCV 81, RDW 17 5, platelet count 752  7/6/68 hemoglobin 9 5, MCV 83, RDW 20 6, platelet count 748, ferritin = eight, hapto <10  8/11/22 iron panel = ferritin = eight, iron saturation 10%, TIBC 471,   8/12/22 hemolysis smear negative, LDH = 254, reticulocyte count within normal limits  9/1/22 hemoglobin 10 3, MCV 86, RDW 22 2     09/06/22:  Patient has been taking oral iron supplements twice a day with minimal side effects  Patient notes that prior to going to the hospital she was feeling very tired and fatigued  We reviewed blood work  Patient's hemoglobin has been slowly dropping since April 2022 with the most appreciable drop in MCV in July of 2022 9/30-11/10: 5 doses of venofer 200 mg IV     11/10/2022 hemoglobin 11 4, MCV 92, platelet count 505, white blood cell count 534    Lab Results   Component Value Date    WBC 5 34 11/10/2022    HGB 11 4 (L) 11/10/2022    HCT 37 0 11/10/2022    MCV 92 11/10/2022     11/10/2022     Interval history: Patient notes that she has been sleeping less, no nap needed after bowling yesterday  Overall doing okay  Patient's daughter is in the hospital, this concerns her as her daughter is the power of , helps her with her medical issues  Vicente Christian tells me that she is planned to make a complete recovery      Review of Systems   Constitutional: Positive for fatigue  Negative for appetite change, fever and unexpected weight change  HENT: Negative for nosebleeds  Respiratory: Negative for cough, choking and shortness of breath  Negative hemoptysis  Cardiovascular: Negative for chest pain, palpitations and leg swelling  Gastrointestinal: Negative  Negative for abdominal distention, abdominal pain, anal bleeding, blood in stool, constipation, diarrhea, nausea and vomiting  Endocrine: Negative  Negative for cold intolerance  Genitourinary: Negative  Negative for hematuria, menstrual problem, vaginal bleeding, vaginal discharge and vaginal pain  Musculoskeletal: Negative  Negative for arthralgias, myalgias, neck pain and neck stiffness  Skin: Negative  Negative for color change, pallor and rash  Allergic/Immunologic: Negative  Negative for immunocompromised state  Neurological: Negative  Negative for weakness and headaches  Hematological: Negative for adenopathy  Does not bruise/bleed easily  All other systems reviewed and are negative        Patient Active Problem List   Diagnosis   • Obstructive sleep apnea   • Chronic atrial fibrillation (HCC)   • H/O mitral valve replacement with mechanical valve   • Long term (current) use of anticoagulants   • Presence of prosthetic heart valve   • Hypercholesteremia   • Anemia due to chronic kidney disease   • Allergic rhinitis   • Type 2 diabetes mellitus without complication, without long-term current use of insulin (HCC)   • Iron deficiency anemia   • Other specified hypothyroidism   • Vitamin B12 deficiency   • Other microscopic hematuria   • Celiac disease   • Abdominal aortic aneurysm (AAA)   • Ataxia   • Pulmonary emphysema (HCC)   • History of renal calculi   • History of cervical cancer   • Essential hypertension   • Other proteinuria   • Hepatic steatosis   • CVA (cerebral vascular accident) ([facility] [address] )   • Diabetes mellitus ([facility] )   • CKD (chronic kidney disease) stage 2, GFR 60-89 ml/min   • Cerebrovascular accident (CVA) [facility])   • Fall   • Anemia     Past Medical History:   Diagnosis Date   • Diabetes mellitus ([facility] )    • Stroke ([facility] ) 2022     Past Surgical History:   Procedure Laterality Date   • EYE SURGERY     • HYSTERECTOMY     • MITRAL VALVE REPLACEMENT       Family History   Problem Relation Age of Onset   • Heart failure Mother    • Heart attack Father    • Sleep apnea Brother      Social History     Socioeconomic History   • Marital status:      Spouse name: None   • Number of children: None   • Years of education: None   • Highest education level: None   Occupational History   • None   Tobacco Use   • Smoking status: Former     Packs/day: 2 00     Years: 30 00     Pack years: 60 00     Types: Cigarettes     Quit date:      Years since quittin 9   • Smokeless tobacco: Never   Vaping Use   • Vaping Use: Never used   Substance and Sexual Activity   • Alcohol use: Yes     Comment: special occasional   • Drug use: No   • Sexual activity: None   Other Topics Concern   • None   Social History Narrative   • None     Social Determinants of Health     Financial Resource Strain: Not on file   Food Insecurity: No Food Insecurity   • Worried About Running Out of Food in the Last Year: Never true   • Ran Out of Food in the Last Year: Never true   Transportation Needs: No Transportation Needs   • Lack of Transportation (Medical): No   • Lack of Transportation (Non-Medical):  No   Physical Activity: Not on file   Stress: Not on file   Social Connections: Not on file   Intimate Partner Violence: Not on file   Housing Stability: Low Risk    • Unable to Pay for Housing in the Last Year: No   • Number of Places Lived in the Last Year: 1   • Unstable Housing in the Last Year: No       Current Outpatient Medications:   •  acetaminophen (TYLENOL) 325 mg tablet, Take 2 tablets (650 mg total) by mouth every 6 (six) hours as needed for mild pain or headaches, Disp: , Rfl: 0  •  aspirin (Elizabeth James LOW STRENGTH) 81 mg EC tablet, Take 1 tablet (81 mg total) by mouth daily, Disp: , Rfl: 0  •  atorvastatin (LIPITOR) 80 mg tablet, Take 1 tablet (80 mg total) by mouth daily, Disp: 90 tablet, Rfl: 1  •  Cholecalciferol (VITAMIN D PO), Take by mouth, Disp: , Rfl:   •  glucose blood test strip, Use 1 each in the morning Use one daily, Disp: 100 strip, Rfl: 2  •  metFORMIN (GLUCOPHAGE) 500 mg tablet, Take 1 tablet (500 mg total) by mouth 2 (two) times a day with meals, Disp: 180 tablet, Rfl: 0  •  Multiple Vitamins-Minerals (PRESERVISION AREDS PO), Take 2 tablets by mouth daily  , Disp: , Rfl:   •  warfarin (COUMADIN) 5 mg tablet, Take 1 tablet (5 mg total) by mouth 4 (four) times a week On Sunday, Tuesday, Thursday, Saturday, Disp: , Rfl: 0  •  ferrous sulfate 324 (65 Fe) mg, Take 1 tablet (324 mg total) by mouth daily before breakfast (Patient not taking: Reported on 12/8/2022), Disp: 180 tablet, Rfl: 0  •  polyethylene glycol (MIRALAX) 17 g packet, Take 17 g by mouth daily as needed (Constipation) (Patient not taking: Reported on 12/8/2022), Disp: , Rfl: 0  •  warfarin (COUMADIN) 2 5 mg tablet, Take 1 tablet (2 5 mg total) by mouth 3 (three) times a week On Monday Wednesday and Friday, Disp: , Rfl: 0  Allergies   Allergen Reactions   • Sulfa Antibiotics Other (See Comments)   • Sulfasalazine        Objective   /80 (BP Location: Left arm, Patient Position: Sitting, Cuff Size: Adult)   Pulse 86   Temp (!) 97 °F (36 1 °C)   Resp 18   Ht 5' 6" (1 676 m)   Wt 72 6 kg (160 lb)   SpO2 99%   BMI 25 82 kg/m²    Physical Exam  Constitutional:       General: She is not in acute distress  Appearance: She is well-developed  HENT:      Head: Normocephalic and atraumatic  Eyes:      General: No scleral icterus  Pupils: Pupils are equal, round, and reactive to light  Cardiovascular:      Rate and Rhythm: Normal rate and regular rhythm  Heart sounds: No murmur heard    Pulmonary:      Effort: Pulmonary effort is normal  No respiratory distress  Skin:     General: Skin is warm  Coloration: Skin is not pale  Findings: No rash  Neurological:      Mental Status: She is alert and oriented to person, place, and time  Psychiatric:         Thought Content: Thought content normal          Result Review  Labs:  Lab Results   Component Value Date    WBC 5 34 11/10/2022    HGB 11 4 (L) 11/10/2022    HCT 37 0 11/10/2022    MCV 92 11/10/2022     11/10/2022         Please note: This report has been generated by a voice recognition software system  Therefore there may be syntax, spelling, and/or grammatical errors  Please call if you have any questions

## 2023-01-04 ENCOUNTER — APPOINTMENT (OUTPATIENT)
Dept: LAB | Facility: CLINIC | Age: 83
End: 2023-01-04

## 2023-01-10 ENCOUNTER — HOSPITAL ENCOUNTER (INPATIENT)
Facility: HOSPITAL | Age: 83
LOS: 7 days | Discharge: HOME | End: 2023-01-17
Attending: EMERGENCY MEDICINE | Admitting: FAMILY MEDICINE

## 2023-01-10 ENCOUNTER — APPOINTMENT (EMERGENCY)
Dept: RADIOLOGY | Facility: HOSPITAL | Age: 83
End: 2023-01-10

## 2023-01-10 DIAGNOSIS — D64.9 ANEMIA: Primary | ICD-10-CM

## 2023-01-10 DIAGNOSIS — D50.0 IRON DEFICIENCY ANEMIA DUE TO CHRONIC BLOOD LOSS: ICD-10-CM

## 2023-01-10 DIAGNOSIS — D68.9 COAGULOPATHY (HCC): ICD-10-CM

## 2023-01-10 DIAGNOSIS — R19.5 OCCULT BLOOD POSITIVE STOOL: ICD-10-CM

## 2023-01-10 DIAGNOSIS — I50.9 CHF (CONGESTIVE HEART FAILURE) (HCC): ICD-10-CM

## 2023-01-10 DIAGNOSIS — I48.91 ATRIAL FIBRILLATION (HCC): ICD-10-CM

## 2023-01-10 DIAGNOSIS — K92.2 GI BLEED: ICD-10-CM

## 2023-01-10 LAB
2HR DELTA HS TROPONIN: 2 NG/L
4HR DELTA HS TROPONIN: 2 NG/L
ABO GROUP BLD: NORMAL
ALBUMIN SERPL BCP-MCNC: 3.5 G/DL (ref 3.5–5)
ALP SERPL-CCNC: 93 U/L (ref 46–116)
ALT SERPL W P-5'-P-CCNC: 37 U/L (ref 12–78)
ANION GAP SERPL CALCULATED.3IONS-SCNC: 8 MMOL/L (ref 4–13)
APTT PPP: 39 SECONDS (ref 23–37)
AST SERPL W P-5'-P-CCNC: 39 U/L (ref 5–45)
ATRIAL RATE: 108 BPM
BASOPHILS # BLD AUTO: 0.02 THOUSANDS/ÂΜL (ref 0–0.1)
BASOPHILS NFR BLD AUTO: 0 % (ref 0–1)
BILIRUB SERPL-MCNC: 0.46 MG/DL (ref 0.2–1)
BLD GP AB SCN SERPL QL: NEGATIVE
BUN SERPL-MCNC: 19 MG/DL (ref 5–25)
CALCIUM SERPL-MCNC: 9 MG/DL (ref 8.3–10.1)
CARDIAC TROPONIN I PNL SERPL HS: 5 NG/L
CARDIAC TROPONIN I PNL SERPL HS: 7 NG/L
CARDIAC TROPONIN I PNL SERPL HS: 7 NG/L
CHLORIDE SERPL-SCNC: 103 MMOL/L (ref 96–108)
CO2 SERPL-SCNC: 26 MMOL/L (ref 21–32)
CREAT SERPL-MCNC: 0.56 MG/DL (ref 0.6–1.3)
EOSINOPHIL # BLD AUTO: 0.11 THOUSAND/ÂΜL (ref 0–0.61)
EOSINOPHIL NFR BLD AUTO: 2 % (ref 0–6)
ERYTHROCYTE [DISTWIDTH] IN BLOOD BY AUTOMATED COUNT: 17.2 % (ref 11.6–15.1)
FLUAV RNA RESP QL NAA+PROBE: NEGATIVE
FLUBV RNA RESP QL NAA+PROBE: NEGATIVE
GFR SERPL CREATININE-BSD FRML MDRD: 86 ML/MIN/1.73SQ M
GLUCOSE SERPL-MCNC: 117 MG/DL (ref 65–140)
GLUCOSE SERPL-MCNC: 120 MG/DL (ref 65–140)
GLUCOSE SERPL-MCNC: 157 MG/DL (ref 65–140)
HCT VFR BLD AUTO: 24 % (ref 34.8–46.1)
HGB BLD-MCNC: 7.1 G/DL (ref 11.5–15.4)
HGB BLD-MCNC: 7.7 G/DL (ref 11.5–15.4)
IMM GRANULOCYTES # BLD AUTO: 0.02 THOUSAND/UL (ref 0–0.2)
IMM GRANULOCYTES NFR BLD AUTO: 0 % (ref 0–2)
INR PPP: 4 (ref 0.84–1.19)
LYMPHOCYTES # BLD AUTO: 0.74 THOUSANDS/ÂΜL (ref 0.6–4.47)
LYMPHOCYTES NFR BLD AUTO: 15 % (ref 14–44)
MCH RBC QN AUTO: 27.5 PG (ref 26.8–34.3)
MCHC RBC AUTO-ENTMCNC: 29.6 G/DL (ref 31.4–37.4)
MCV RBC AUTO: 93 FL (ref 82–98)
MONOCYTES # BLD AUTO: 0.41 THOUSAND/ÂΜL (ref 0.17–1.22)
MONOCYTES NFR BLD AUTO: 8 % (ref 4–12)
NEUTROPHILS # BLD AUTO: 3.8 THOUSANDS/ÂΜL (ref 1.85–7.62)
NEUTS SEG NFR BLD AUTO: 75 % (ref 43–75)
NRBC BLD AUTO-RTO: 0 /100 WBCS
NT-PROBNP SERPL-MCNC: 487 PG/ML
PLATELET # BLD AUTO: 169 THOUSANDS/UL (ref 149–390)
PMV BLD AUTO: 9.6 FL (ref 8.9–12.7)
POTASSIUM SERPL-SCNC: 3.7 MMOL/L (ref 3.5–5.3)
PROT SERPL-MCNC: 6.5 G/DL (ref 6.4–8.4)
PROTHROMBIN TIME: 39 SECONDS (ref 11.6–14.5)
QRS AXIS: 42 DEGREES
QRSD INTERVAL: 90 MS
QT INTERVAL: 358 MS
QTC INTERVAL: 445 MS
RBC # BLD AUTO: 2.58 MILLION/UL (ref 3.81–5.12)
RH BLD: POSITIVE
RSV RNA RESP QL NAA+PROBE: NEGATIVE
SARS-COV-2 RNA RESP QL NAA+PROBE: NEGATIVE
SODIUM SERPL-SCNC: 137 MMOL/L (ref 135–147)
SPECIMEN EXPIRATION DATE: NORMAL
T WAVE AXIS: 33 DEGREES
VENTRICULAR RATE: 93 BPM
WBC # BLD AUTO: 5.1 THOUSAND/UL (ref 4.31–10.16)

## 2023-01-10 RX ORDER — POLYETHYLENE GLYCOL 3350 17 G/17G
17 POWDER, FOR SOLUTION ORAL DAILY PRN
Status: DISCONTINUED | OUTPATIENT
Start: 2023-01-10 | End: 2023-01-17 | Stop reason: HOSPADM

## 2023-01-10 RX ORDER — INSULIN LISPRO 100 [IU]/ML
1-6 INJECTION, SOLUTION INTRAVENOUS; SUBCUTANEOUS
Status: DISCONTINUED | OUTPATIENT
Start: 2023-01-10 | End: 2023-01-17 | Stop reason: HOSPADM

## 2023-01-10 RX ORDER — FUROSEMIDE 10 MG/ML
20 INJECTION INTRAMUSCULAR; INTRAVENOUS ONCE
Status: COMPLETED | OUTPATIENT
Start: 2023-01-10 | End: 2023-01-10

## 2023-01-10 RX ORDER — ATORVASTATIN CALCIUM 80 MG/1
80 TABLET, FILM COATED ORAL
Status: DISCONTINUED | OUTPATIENT
Start: 2023-01-11 | End: 2023-01-17 | Stop reason: HOSPADM

## 2023-01-10 RX ORDER — ANTIOX #8/OM3/DHA/EPA/LUT/ZEAX 250-2.5 MG
1 CAPSULE ORAL 2 TIMES DAILY
Status: DISCONTINUED | OUTPATIENT
Start: 2023-01-10 | End: 2023-01-17 | Stop reason: HOSPADM

## 2023-01-10 RX ORDER — PANTOPRAZOLE SODIUM 40 MG/10ML
40 INJECTION, POWDER, LYOPHILIZED, FOR SOLUTION INTRAVENOUS EVERY 12 HOURS SCHEDULED
Status: DISCONTINUED | OUTPATIENT
Start: 2023-01-10 | End: 2023-01-17

## 2023-01-10 RX ORDER — INSULIN LISPRO 100 [IU]/ML
1-5 INJECTION, SOLUTION INTRAVENOUS; SUBCUTANEOUS
Status: DISCONTINUED | OUTPATIENT
Start: 2023-01-10 | End: 2023-01-17 | Stop reason: HOSPADM

## 2023-01-10 RX ORDER — ACETAMINOPHEN 325 MG/1
650 TABLET ORAL ONCE
Status: COMPLETED | OUTPATIENT
Start: 2023-01-10 | End: 2023-01-10

## 2023-01-10 RX ORDER — PANTOPRAZOLE SODIUM 40 MG/10ML
40 INJECTION, POWDER, LYOPHILIZED, FOR SOLUTION INTRAVENOUS
Status: DISCONTINUED | OUTPATIENT
Start: 2023-01-11 | End: 2023-01-10

## 2023-01-10 RX ORDER — ACETAMINOPHEN 325 MG/1
650 TABLET ORAL EVERY 6 HOURS PRN
Status: DISCONTINUED | OUTPATIENT
Start: 2023-01-10 | End: 2023-01-17 | Stop reason: HOSPADM

## 2023-01-10 RX ORDER — FERROUS SULFATE 325(65) MG
325 TABLET ORAL 3 TIMES WEEKLY
Status: DISCONTINUED | OUTPATIENT
Start: 2023-01-11 | End: 2023-01-13

## 2023-01-10 RX ORDER — PANTOPRAZOLE SODIUM 40 MG/10ML
40 INJECTION, POWDER, LYOPHILIZED, FOR SOLUTION INTRAVENOUS ONCE
Status: COMPLETED | OUTPATIENT
Start: 2023-01-10 | End: 2023-01-10

## 2023-01-10 RX ADMIN — ACETAMINOPHEN 650 MG: 325 TABLET, FILM COATED ORAL at 12:37

## 2023-01-10 RX ADMIN — FUROSEMIDE 20 MG: 10 INJECTION, SOLUTION INTRAMUSCULAR; INTRAVENOUS at 21:30

## 2023-01-10 RX ADMIN — PANTOPRAZOLE SODIUM 40 MG: 40 INJECTION, POWDER, FOR SOLUTION INTRAVENOUS at 21:31

## 2023-01-10 RX ADMIN — PANTOPRAZOLE SODIUM 40 MG: 40 INJECTION, POWDER, FOR SOLUTION INTRAVENOUS at 13:02

## 2023-01-10 NOTE — ASSESSMENT & PLAN NOTE
Patient presented with exertional shortness of breath, orthopnea, headache and weakness  She was found to have hemoglobin 7 1  She is receiving 1 unit of PRBC, repeat hemoglobin this evening  Positive heme stool in ER noted  Does have a history of iron deficiency anemia which she has been treating with IV iron infusions this past fall and taking iron 3 days a week  [facility] outpatient hematology  Will consult GI, discussed with GI, will see in morning, do not make n p o  at this time  Last colonoscopy that patient had was when she was 76years old, reports that she did a Cologuard test in between which was negative  Will hold patient's Coumadin at this time

## 2023-01-10 NOTE — ED PROVIDER NOTES
History  Chief Complaint   Patient presents with   • Headache     Co of intermittent headache, SOB started yesterday, Denies any ill exposure and non other symptoms  Co of Brushing on L arm  On thinner  Patient has multiple medical problems including mitral valve replacement with chronic atrial fibrillation  Patient is on long-term Coumadin  Patient states she has been having symptoms of worsening shortness of breath with exertion, associated with orthopnea  She has been feeling weak and has noticed increased bruisability  Her Coumadin levels have needed to be adjusted several times recently  She had no fever  She has had intermittent headaches which is unusual for her  No vomiting  No chest pain  Prior to Admission Medications   Prescriptions Last Dose Informant Patient Reported? Taking?    Cholecalciferol (VITAMIN D PO)   Yes No   Sig: Take by mouth   Multiple Vitamins-Minerals (PRESERVISION AREDS PO)   Yes No   Sig: Take 2 tablets by mouth daily     acetaminophen (TYLENOL) 325 mg tablet   No No   Sig: Take 2 tablets (650 mg total) by mouth every 6 (six) hours as needed for mild pain or headaches   aspirin (ECOTRIN LOW STRENGTH) 81 mg EC tablet   No No   Sig: Take 1 tablet (81 mg total) by mouth daily   atorvastatin (LIPITOR) 80 mg tablet   No No   Sig: Take 1 tablet (80 mg total) by mouth daily   ferrous sulfate 324 (65 Fe) mg   No No   Sig: Take 1 tablet (324 mg total) by mouth daily before breakfast   Patient taking differently: Take 324 mg by mouth 3 (three) times a week M-W-F   glucose blood test strip   No No   Sig: Use 1 each in the morning Use one daily   metFORMIN (GLUCOPHAGE) 500 mg tablet   No No   Sig: Take 1 tablet (500 mg total) by mouth 2 (two) times a day with meals   polyethylene glycol (MIRALAX) 17 g packet   No No   Sig: Take 17 g by mouth daily as needed (Constipation)   Patient not taking: Reported on 12/8/2022   warfarin (COUMADIN) 2 5 mg tablet   No No   Sig: Take 1 tablet (2 5 mg total) by mouth 3 (three) times a week On  and Friday   Patient taking differently: Take 2 5 mg by mouth 2 (two) times a week On Tuesday and Friday for total 7 5 mg   warfarin (COUMADIN) 5 mg tablet   No No   Sig: Take 1 tablet (5 mg total) by mouth 4 (four) times a week On , Tuesday, Thursday, Saturday   Patient taking differently: Take 5 mg by mouth in the morning      Facility-Administered Medications: None       Past Medical History:   Diagnosis Date   • Diabetes mellitus ([facility] )    • Stroke ([facility] ) 2022       Past Surgical History:   Procedure Laterality Date   • EYE SURGERY     • HYSTERECTOMY     • MITRAL VALVE REPLACEMENT         Family History   Problem Relation Age of Onset   • Heart failure Mother    • Heart attack Father    • Sleep apnea Brother      I have reviewed and agree with the history as documented  E-Cigarette/Vaping   • E-Cigarette Use Never User      E-Cigarette/Vaping Substances   • Nicotine No    • THC No    • CBD No    • Flavoring No    • Other No    • Unknown No      Social History     Tobacco Use   • Smoking status: Former     Packs/day: 2 00     Years: 30 00     Pack years: 60 00     Types: Cigarettes     Quit date:      Years since quittin 0   • Smokeless tobacco: Never   Vaping Use   • Vaping Use: Never used   Substance Use Topics   • Alcohol use: Yes     Comment: special occasional   • Drug use: No       Review of Systems   Constitutional: Negative for chills and fever  HENT: Negative for congestion, rhinorrhea and sore throat  Eyes: Negative for visual disturbance  Respiratory: Positive for shortness of breath  Cardiovascular: Positive for leg swelling  Gastrointestinal: Positive for constipation  Negative for abdominal pain, diarrhea and vomiting  Genitourinary: Negative for dysuria  Musculoskeletal: Negative for arthralgias and back pain  Skin: Negative for rash  Neurological: Positive for headaches  Hematological: Bruises/bleeds easily  Psychiatric/Behavioral: Negative for confusion  All other systems reviewed and are negative  Physical Exam  Physical Exam  Vitals and nursing note reviewed  Constitutional:       Appearance: Normal appearance  HENT:      Head: Normocephalic  Right Ear: External ear normal       Left Ear: External ear normal       Nose: Nose normal       Mouth/Throat:      Pharynx: Oropharynx is clear  Eyes:      Conjunctiva/sclera: Conjunctivae normal    Cardiovascular:      Rate and Rhythm: Normal rate and regular rhythm  Pulses: Normal pulses  Heart sounds: Murmur heard  Pulmonary:      Effort: Pulmonary effort is normal       Breath sounds: Normal breath sounds  No rales  Abdominal:      Palpations: Abdomen is soft  Tenderness: There is no abdominal tenderness  Musculoskeletal:         General: Normal range of motion  Cervical back: Normal range of motion  Right lower leg: Edema present  Left lower leg: Edema present  Comments: Mild to 1+ edema   Skin:     General: Skin is warm and dry  Capillary Refill: Capillary refill takes less than 2 seconds  Findings: Bruising present  Neurological:      General: No focal deficit present  Mental Status: She is alert and oriented to person, place, and time     Psychiatric:         Mood and Affect: Mood normal          Behavior: Behavior normal          Vital Signs  ED Triage Vitals [01/10/23 1125]   Temperature Pulse Respirations Blood Pressure SpO2   98 2 °F (36 8 °C) 97 22 159/70 100 %      Temp Source Heart Rate Source Patient Position - Orthostatic VS BP Location FiO2 (%)   Oral Monitor Sitting Right arm --      Pain Score       8           Vitals:    01/10/23 1125 01/10/23 1230 01/10/23 1300 01/10/23 1330   BP: 159/70 136/62 158/65 164/83   Pulse: 97 90 94 90   Patient Position - Orthostatic VS: Sitting Lying Lying Lying         Visual Acuity      ED Medications  Medications   acetaminophen (TYLENOL) tablet 650 mg (650 mg Oral Given 1/10/23 1237)   pantoprazole (PROTONIX) injection 40 mg (40 mg Intravenous Given 1/10/23 1302)       Diagnostic Studies  Results Reviewed     Procedure Component Value Units Date/Time    HS Troponin I 4hr [[id number]]     Lab Status: No result Specimen: Blood     FLU/RSV/COVID - if FLU/RSV clinically relevant [[id number]]  (Normal) Collected: 01/10/23 1209    Lab Status: Final result Specimen: Nares from Nose Updated: 01/10/23 1327     SARS-CoV-2 Negative     INFLUENZA A PCR Negative     INFLUENZA B PCR Negative     RSV PCR Negative    Narrative:      FOR PEDIATRIC PATIENTS - copy/paste COVID Guidelines URL to browser: [email or web address]  LogFire    SARS-CoV-2 assay is a Nucleic Acid Amplification assay intended for the  qualitative detection of nucleic acid from SARS-CoV-2 in nasopharyngeal  swabs  Results are for the presumptive identification of SARS-CoV-2 RNA  Positive results are indicative of infection with SARS-CoV-2, the virus  causing COVID-19, but do not rule out bacterial infection or co-infection  with other viruses  Laboratories within the [place] and its  territories are required to report all positive results to the appropriate  public health authorities  Negative results do not preclude SARS-CoV-2  infection and should not be used as the sole basis for treatment or other  patient management decisions  Negative results must be combined with  clinical observations, patient history, and epidemiological information  This test has not been FDA cleared or approved  This test has been authorized by FDA under an Emergency Use Authorization  (EUA)   This test is only authorized for the duration of time the  declaration that circumstances exist justifying the authorization of the  emergency use of an in vitro diagnostic tests for detection of SARS-CoV-2  virus and/or diagnosis of COVID-19 infection under section 564(b)(1) of  the Act, 21 U  S C  764HGS-5(J)(7), unless the authorization is terminated  or revoked sooner  The test has been validated but independent review by FDA  and CLIA is pending  Test performed using [employer] GeneXpert: This RT-PCR assay targets N2,  a region unique to SARS-CoV-2  A conserved region in the E-gene was chosen  for pan-Sarbecovirus detection which includes SARS-CoV-2  According to CMS-2020-01-R, this platform meets the definition of high-throughput technology      Comprehensive metabolic panel [[id number]]  (Abnormal) Collected: 01/10/23 1209    Lab Status: Final result Specimen: Blood from Arm, Left Updated: 01/10/23 1249     Sodium 137 mmol/L      Potassium 3 7 mmol/L      Chloride 103 mmol/L      CO2 26 mmol/L      ANION GAP 8 mmol/L      BUN 19 mg/dL      Creatinine 0 56 mg/dL      Glucose 120 mg/dL      Calcium 9 0 mg/dL      AST 39 U/L      ALT 37 U/L      Alkaline Phosphatase 93 U/L      Total Protein 6 5 g/dL      Albumin 3 5 g/dL      Total Bilirubin 0 46 mg/dL      eGFR 86 ml/min/1 73sq m     Narrative:      [place] guidelines for Chronic Kidney Disease (CKD):   •  Stage 1 with normal or high GFR (GFR > 90 mL/min/1 73 square meters)  •  Stage 2 Mild CKD (GFR = 60-89 mL/min/1 73 square meters)  •  Stage 3A Moderate CKD (GFR = 45-59 mL/min/1 73 square meters)  •  Stage 3B Moderate CKD (GFR = 30-44 mL/min/1 73 square meters)  •  Stage 4 Severe CKD (GFR = 15-29 mL/min/1 73 square meters)  •  Stage 5 End Stage CKD (GFR <15 mL/min/1 73 square meters)  Note: GFR calculation is accurate only with a steady state creatinine    NT-BNP PRO [[id number]]  (Abnormal) Collected: 01/10/23 1209    Lab Status: Final result Specimen: Blood from Arm, Left Updated: 01/10/23 1249     NT-proBNP 487 pg/mL     HS Troponin 0hr (reflex protocol) [[id number]]  (Normal) Collected: 01/10/23 1209    Lab Status: Final result Specimen: Blood from Arm, Left Updated: 01/10/23 1248     hs TnI 0hr 5 ng/L     HS Troponin I 2hr [[id number]]     Lab Status: No result Specimen: Blood     Protime-INR [[id number]]  (Abnormal) Collected: 01/10/23 1209    Lab Status: Final result Specimen: Blood from Arm, Left Updated: 01/10/23 1237     Protime 39 0 seconds      INR 4 00    APTT [[id number]]  (Abnormal) Collected: 01/10/23 1209    Lab Status: Final result Specimen: Blood from Arm, Left Updated: 01/10/23 1237     PTT 39 seconds     CBC and differential [[id number]]  (Abnormal) Collected: 01/10/23 1209    Lab Status: Final result Specimen: Blood from Arm, Left Updated: 01/10/23 1223     WBC 5 10 Thousand/uL      RBC 2 58 Million/uL      Hemoglobin 7 1 g/dL      Hematocrit 24 0 %      MCV 93 fL      MCH 27 5 pg      MCHC 29 6 g/dL      RDW 17 2 %      MPV 9 6 fL      Platelets 867 Thousands/uL      nRBC 0 /100 WBCs      Neutrophils Relative 75 %      Immat GRANS % 0 %      Lymphocytes Relative 15 %      Monocytes Relative 8 %      Eosinophils Relative 2 %      Basophils Relative 0 %      Neutrophils Absolute 3 80 Thousands/µL      Immature Grans Absolute 0 02 Thousand/uL      Lymphocytes Absolute 0 74 Thousands/µL      Monocytes Absolute 0 41 Thousand/µL      Eosinophils Absolute 0 11 Thousand/µL      Basophils Absolute 0 02 Thousands/µL                  XR chest 1 view portable   Final Result by Kermit Mclaughlin MD (01/10 1247)      Borderline cardiomegaly      Mild vascular congestion                  Workstation performed: [id number]                    Procedures  ECG 12 Lead Documentation Only    Date/Time: 1/10/2023 12:14 PM  Performed by: Mukesh Damico MD  Authorized by: Mukesh Damico MD     Indications / Diagnosis:  SOB  ECG reviewed by me, the ED Provider: yes    Patient location:  ED  Interpretation:     Interpretation: abnormal    Rate:     ECG rate:  93    ECG rate assessment: normal    Rhythm:     Rhythm: atrial fibrillation    Ectopy:     Ectopy: none QRS:     QRS axis:  Normal    QRS intervals:  Normal  Conduction:     Conduction: normal    ST segments:     ST segments:  Normal  T waves:     T waves: normal               ED Course                               SBIRT 20yo+    Flowsheet Row Most Recent Value   SBIRT (23 yo +)    In order to provide better care to our patients, we are screening all of our patients for alcohol and drug use  Would it be okay to ask you these screening questions? No Filed at: 01/10/2023 1215                    Medical Decision Making  Patient is unvaccinated and at risk for COVID  We will check viral testing  Also his cardiac history possible CHF  Patient is on long-term iron and has not noted a difference in stools which are usually dark or black  Hemoccult test was positive  Protonix started, blood transfusion ordered  Patient is anticoagulated, not reversed at this point  Will admit    Amount and/or Complexity of Data Reviewed  External Data Reviewed: labs  Labs: ordered  Radiology: ordered  Risk  OTC drugs  Prescription drug management            Disposition  Final diagnoses:   Anemia   GI bleed   CHF (congestive heart failure) ([facility] )   Atrial fibrillation ([facility] )   Coagulopathy ([facility] )     Time reflects when diagnosis was documented in both MDM as applicable and the Disposition within this note     Time User Action Codes Description Comment    1/10/2023  2:09 PM Hanna Matias Add [D64 9] Anemia     1/10/2023  2:09 PM Georganna Velazquez A Add [K92 2] GI bleed     1/10/2023  2:09 PM Georganna Velazquez A Add [I50 9] CHF (congestive heart failure) ([facility] )     1/10/2023  2:09 PM Hanna Matias Add [I48 91] Atrial fibrillation ([facility] )     1/10/2023  2:09 PM Georganna Velazquez A Add [D68 9] Coagulopathy [facility])       ED Disposition     ED Disposition   Admit    Condition   Stable    Date/Time   Tue Vinicius 10, 2023  2:09 PM    Comment   Case was discussed with hospitalist and the patient's admission status was agreed to be Admission Status: inpatient status to the service of Dr Luisa Washburn   Follow-up Information    None         Patient's Medications   Discharge Prescriptions    No medications on file       No discharge procedures on file      PDMP Review     None          ED Provider  Electronically Signed by           Mukesh Damico MD  01/10/23 6892

## 2023-01-10 NOTE — ASSESSMENT & PLAN NOTE
Patient has a history of mitral valve replacement with mechanical valve 30 years ago with Dr Carolynn Flaherty  Has been on chronic anticoagulation with Coumadin  As noted above INR 4 0  Repeat INR in AM   If patient will be requiring intervention with GI, may need to consider placing patient on heparin drip to bridge her

## 2023-01-10 NOTE — ASSESSMENT & PLAN NOTE
As noted above patient has history of mitral valve replacement, has been on Coumadin for this  If patient will be requiring intervention with GI will need to consider placing patient on heparin drip to bridge her safely

## 2023-01-10 NOTE — H&P
[address]  H&P- Isaías Swift 1940, 80 y o  female MRN: [id number]  Unit/Bed#: ED 01 Encounter: [id number]  Primary Care Provider: Johana Diego MD   Date and time admitted to hospital: 1/10/2023 11:24 AM    * Anemia  Assessment & Plan  Patient presented with exertional shortness of breath, orthopnea, headache and weakness  She was found to have hemoglobin 7 1  She is receiving 1 unit of PRBC, repeat hemoglobin this evening  Positive heme stool in ER noted  Does have a history of iron deficiency anemia which she has been treating with IV iron infusions this past fall and taking iron 3 days a week  [facility] outpatient hematology  Will consult GI, discussed with GI, will see in morning, do not make n p o  at this time  Last colonoscopy that patient had was when she was 76years old, reports that she did a Cologuard test in between which was negative  Will hold patient's Coumadin at this time  Chronic atrial fibrillation [facility])  Assessment & Plan  Patient has a history of chronic atrial fibrillation, controlled rate currently in the 80s  Is on Coumadin 5 mg 5 days a week, with 7 5 mg on Tuesdays and Fridays  Has a history of a titanium steel mechanical valve replaced 30 years ago  INR currently 4 0  Will hold Coumadin at this time  Repeat PT/INR in a m  H/O mitral valve replacement with mechanical valve  Assessment & Plan  Patient has a history of mitral valve replacement with mechanical valve 30 years ago with Dr Beti Herr  Has been on chronic anticoagulation with Coumadin  As noted above INR 4 0  Repeat INR in AM   If patient will be requiring intervention with GI, may need to consider placing patient on heparin drip to bridge her  Iron deficiency anemia  Assessment & Plan  Patient has a history of iron deficiency anemia  Follows with Dr Jenni Bourgeois hematology outpatient  Received 3 iron transfusions this past fall    Has been taking iron 324 mg p o  every Monday, Wednesday and Friday  We will get repeat iron levels in a m  Long term (current) use of anticoagulants  Assessment & Plan  As noted above patient has history of mitral valve replacement, has been on Coumadin for this  If patient will be requiring intervention with GI will need to consider placing patient on heparin drip to bridge her safely  Hypercholesteremia  Assessment & Plan  Continue patient's home medication of atorvastatin 80 mg p o  daily  Heart healthy diet    Type 2 diabetes mellitus without complication, without long-term current use of insulin (HCC)  Assessment & Plan  Lab Results   Component Value Date    HGBA1C 6 5 (H) 07/06/2022       No results for input(s): POCGLU in the last 72 hours  Blood Sugar Average: Last 72 hrs:     Patient normally takes metformin 500 mg p o  twice daily with meals  Will hold during hospitalization  Will place on sliding scale coverage before meals and at bedtime algorithm 3 with meals and algorithm to at bedtime  Diabetic diet  Monitor    VTE Pharmacologic Prophylaxis: VTE Score: 5 High Risk (Score >/= 5) - Pharmacological DVT Prophylaxis Contraindicated  Sequential Compression Devices Ordered  Code Status: Full code discussed with patient and family at bedside   Discussion with family: Updated  (son) at bedside  Anticipated Length of Stay: Patient will be admitted on an inpatient basis with an anticipated length of stay of greater than 2 midnights secondary to Anemia, blood transfusion, repeat labs, GI consultation  Total Time for Visit, including Counseling / Coordination of Care: 70 minutes Greater than 50% of this total time spent on direct patient counseling and coordination of care  Chief Complaint: Exertional shortness of breath, headache and weakness      History of Present Illness:  Orin Villarreal is a 80 y o  female with a PMH of iron deficiency anemia, atrial fibrillation on anticoagulation, mitral valve replacement with titanium steel valve, diabetes mellitus, hyperlipidemia and CVA who presents with exertional shortness of breath, headache and weakness  Patient reports for the past week and a half she has noticed that she has been getting short of breath when climbing up stairs  Has been getting progressively worse, complained of a headache that was 8/10 and weakness on morning of admission  She presented to the emergency department and was found to have hemoglobin 7 1  , INR 4 0  Chest x-ray was performed which showed borderline cardiomegaly with mild vascular congestion  EKG was performed which showed controlled atrial fibrillation rate 93  COVID/flu/RSV swab negative  Patient is receiving 1 unit of PRBC, will recheck hemoglobin status posttransfusion  She is being admitted for further evaluation and treatment  GI is consulted, did discuss case with them, they indicated do not make n p o  after midnight, they will evaluate patient in a m and are asking for a repeat INR in the a m  This was discussed with the patient and her son at the bedside, they verbalized understanding and are agreeable to the plan  CODE STATUS was also discussed with the patient and she is to be a full code       Review of Systems:  Review of Systems   Constitutional: Positive for activity change  Negative for chills and fever  HENT: Negative  Eyes:        Reports poor vision secondary to macular degeneration   Respiratory: Positive for shortness of breath  Cardiovascular: Positive for palpitations  Negative for chest pain and leg swelling  Gastrointestinal: Negative for diarrhea, nausea, rectal pain and vomiting  Reports occasional hard stool   Endocrine: Negative  Genitourinary: Negative  Musculoskeletal: Negative  Skin: Negative  Allergic/Immunologic: Negative  Neurological: Positive for headaches  Hematological: Bruises/bleeds easily  Psychiatric/Behavioral: Negative          Past Medical and Surgical History:   Past Medical History:   Diagnosis Date   • Diabetes mellitus ([facility] [address] )    • [facility] ) 03/11/2022       Past Surgical History:   Procedure Laterality Date   • EYE SURGERY     • HYSTERECTOMY     • MITRAL VALVE REPLACEMENT  1994       Meds/Allergies:  Prior to Admission medications    Medication Sig Start Date End Date Taking?  Authorizing Provider   acetaminophen (TYLENOL) 325 mg tablet Take 2 tablets (650 mg total) by mouth every 6 (six) hours as needed for mild pain or headaches 3/14/22  Yes Hugo Estrada MD   aspirin (ECOTRIN LOW STRENGTH) 81 mg EC tablet Take 1 tablet (81 mg total) by mouth daily 3/15/22  Yes Hugo Estrada MD   atorvastatin (LIPITOR) 80 mg tablet Take 1 tablet (80 mg total) by mouth daily 8/25/22  Yes Mk Woodward MD   ferrous sulfate 324 (65 Fe) mg Take 1 tablet (324 mg total) by mouth daily before breakfast  Patient taking differently: Take 324 mg by mouth 3 (three) times a week M-W-F 8/14/22  Yes Hugo Estrada MD   metFORMIN (GLUCOPHAGE) 500 mg tablet Take 1 tablet (500 mg total) by mouth 2 (two) times a day with meals 11/8/22  Yes Mk Woodward MD   Multiple Vitamins-Minerals (PRESERVISION AREDS PO) Take 2 tablets by mouth daily     Yes Historical Provider, MD   warfarin (COUMADIN) 5 mg tablet Take 1 tablet (5 mg total) by mouth 4 (four) times a week On Sunday, Tuesday, Thursday, Saturday  Patient taking differently: Take 5 mg by mouth in the morning 3/15/22  Yes Hugo Estrada MD   Cholecalciferol (VITAMIN D PO) Take by mouth    Historical Provider, MD   glucose blood test strip Use 1 each in the morning Use one daily 9/22/22   Mk Woodward MD   polyethylene glycol (MIRALAX) 17 g packet Take 17 g by mouth daily as needed (Constipation)  Patient not taking: Reported on 12/8/2022 8/14/22   Hugo Estrada MD   warfarin (COUMADIN) 2 5 mg tablet Take 1 tablet (2 5 mg total) by mouth 3 (three) times a week On Monday Wednesday and Friday  Patient taking differently: Take 2 5 mg by mouth 2 (two) times a week On Tuesday and Friday for total 7 5 mg 3/14/22   Kimberly Ayers MD     I have reviewed home medications with patient personally  Allergies: Allergies   Allergen Reactions   • Sulfa Antibiotics Other (See Comments)   • Sulfasalazine        Social History:  Marital Status:    Occupation: Retired  Patient Pre-hospital Living Situation: Home  Patient Pre-hospital Level of Mobility: Reports that she uses a Rollator at home but most of the time walks without aid  Patient Pre-hospital Diet Restrictions: Diabetic  Substance Use History:   Social History     Substance and Sexual Activity   Alcohol Use Yes    Comment: special occasional     Social History     Tobacco Use   Smoking Status Former   • Packs/day: 2 00   • Years: 30 00   • Pack years: 60 00   • Types: Cigarettes   • Quit date:    • Years since quittin 0   Smokeless Tobacco Never     Social History     Substance and Sexual Activity   Drug Use No       Family History:  Family History   Problem Relation Age of Onset   • Heart failure Mother    • Heart attack Father    • Sleep apnea Brother        Physical Exam:     Vitals:   Blood Pressure: 145/68 (01/10/23 1615)  Pulse: 96 (01/10/23 1615)  Temperature: (!) 97 3 °F (36 3 °C) (01/10/23 1505)  Temp Source: Tympanic (01/10/23 1505)  Respirations: 18 (01/10/23 1615)  Weight - Scale: 74 kg (163 lb 2 3 oz) (01/10/23 1125)  SpO2: 98 % (01/10/23 1615)    Physical Exam  Vitals and nursing note reviewed  Constitutional:       General: She is not in acute distress  Appearance: Normal appearance  HENT:      Head: Normocephalic  Nose: Nose normal       Mouth/Throat:      Mouth: Mucous membranes are moist    Eyes:      Extraocular Movements: Extraocular movements intact  Conjunctiva/sclera: Conjunctivae normal       Pupils: Pupils are equal, round, and reactive to light        Comments: Reports poor vision secondary to macular degeneration   Cardiovascular:      Rate and Rhythm: Normal rate  Rhythm irregular  Pulses: Normal pulses  Heart sounds: Murmur heard  Pulmonary:      Effort: Pulmonary effort is normal       Breath sounds: Normal breath sounds  Abdominal:      General: Bowel sounds are normal  There is no distension  Palpations: Abdomen is soft  Tenderness: There is no abdominal tenderness  Genitourinary:     Comments: Voiding spontaneously  Musculoskeletal:         General: Normal range of motion  Cervical back: Normal range of motion  Right lower leg: No edema  Left lower leg: No edema  Skin:     General: Skin is warm and dry  Capillary Refill: Capillary refill takes less than 2 seconds  Comments: Few small bruise noted on left forearm   Neurological:      General: No focal deficit present  Mental Status: She is alert and oriented to person, place, and time  Psychiatric:         Mood and Affect: Mood normal          Behavior: Behavior normal          Thought Content:  Thought content normal          Judgment: Judgment normal          Additional Data:     Lab Results:  Results from last 7 days   Lab Units 01/10/23  1209   WBC Thousand/uL 5 10   HEMOGLOBIN g/dL 7 1*   HEMATOCRIT % 24 0*   PLATELETS Thousands/uL 169   NEUTROS PCT % 75   LYMPHS PCT % 15   MONOS PCT % 8   EOS PCT % 2     Results from last 7 days   Lab Units 01/10/23  1209   SODIUM mmol/L 137   POTASSIUM mmol/L 3 7   CHLORIDE mmol/L 103   CO2 mmol/L 26   BUN mg/dL 19   CREATININE mg/dL 0 56*   ANION GAP mmol/L 8   CALCIUM mg/dL 9 0   ALBUMIN g/dL 3 5   TOTAL BILIRUBIN mg/dL 0 46   ALK PHOS U/L 93   ALT U/L 37   AST U/L 39   GLUCOSE RANDOM mg/dL 120     Results from last 7 days   Lab Units 01/10/23  1209   INR  4 00*                   Lines/Drains:  Invasive Devices     Peripheral Intravenous Line  Duration           Peripheral IV 01/10/23 Left Antecubital <1 day    Peripheral IV 01/10/23 Right Antecubital <1 day                    Imaging: Reviewed radiology reports from this admission including: chest xray  XR chest 1 view portable   Final Result by Santos Rinaldi MD (01/10 1247)      Borderline cardiomegaly      Mild vascular congestion                  Workstation performed: [id number]             EKG and Other Studies Reviewed on Admission:   · EKG: Atrial fibrillation  HR 83     ** Please Note: This note has been constructed using a voice recognition system   **

## 2023-01-10 NOTE — ASSESSMENT & PLAN NOTE
Lab Results   Component Value Date    HGBA1C 6 5 (H) 07/06/2022       No results for input(s): POCGLU in the last 72 hours  Blood Sugar Average: Last 72 hrs:     Patient normally takes metformin 500 mg p o  twice daily with meals  Will hold during hospitalization  Will place on sliding scale coverage before meals and at bedtime algorithm 3 with meals and algorithm to at bedtime    Diabetic diet  Monitor

## 2023-01-10 NOTE — ASSESSMENT & PLAN NOTE
Patient has a history of chronic atrial fibrillation, controlled rate currently in the 80s  Is on Coumadin 5 mg 5 days a week, with 7 5 mg on Tuesdays and Fridays  Has a history of a titanium steel mechanical valve replaced 30 years ago  INR currently 4 0  Will hold Coumadin at this time  Repeat PT/INR in a m

## 2023-01-10 NOTE — ASSESSMENT & PLAN NOTE
Patient has a history of iron deficiency anemia  Follows with Dr Cari Bhat hematology outpatient  Received 3 iron transfusions this past fall  Has been taking iron 324 mg p o  every  Monday, Wednesday and Friday  We will get repeat iron levels in a m

## 2023-01-11 LAB
ABO GROUP BLD BPU: NORMAL
ANION GAP SERPL CALCULATED.3IONS-SCNC: 7 MMOL/L (ref 4–13)
APTT PPP: 127 SECONDS (ref 23–37)
APTT PPP: 37 SECONDS (ref 23–37)
BPU ID: NORMAL
BUN SERPL-MCNC: 21 MG/DL (ref 5–25)
CALCIUM SERPL-MCNC: 7.7 MG/DL (ref 8.3–10.1)
CHLORIDE SERPL-SCNC: 105 MMOL/L (ref 96–108)
CO2 SERPL-SCNC: 26 MMOL/L (ref 21–32)
CREAT SERPL-MCNC: 0.58 MG/DL (ref 0.6–1.3)
CROSSMATCH: NORMAL
ERYTHROCYTE [DISTWIDTH] IN BLOOD BY AUTOMATED COUNT: 16.6 % (ref 11.6–15.1)
ERYTHROCYTE [DISTWIDTH] IN BLOOD BY AUTOMATED COUNT: 16.8 % (ref 11.6–15.1)
FERRITIN SERPL-MCNC: 8 NG/ML (ref 8–388)
GFR SERPL CREATININE-BSD FRML MDRD: 85 ML/MIN/1.73SQ M
GLUCOSE SERPL-MCNC: 103 MG/DL (ref 65–140)
GLUCOSE SERPL-MCNC: 105 MG/DL (ref 65–140)
GLUCOSE SERPL-MCNC: 87 MG/DL (ref 65–140)
GLUCOSE SERPL-MCNC: 94 MG/DL (ref 65–140)
GLUCOSE SERPL-MCNC: 97 MG/DL (ref 65–140)
HCT VFR BLD AUTO: 25.3 % (ref 34.8–46.1)
HCT VFR BLD AUTO: 25.3 % (ref 34.8–46.1)
HGB BLD-MCNC: 7.7 G/DL (ref 11.5–15.4)
HGB BLD-MCNC: 7.8 G/DL (ref 11.5–15.4)
INR PPP: 3.06 (ref 0.84–1.19)
INR PPP: 3.51 (ref 0.84–1.19)
IRON SATN MFR SERPL: 7 % (ref 15–50)
IRON SERPL-MCNC: 27 UG/DL (ref 50–170)
MAGNESIUM SERPL-MCNC: 1.8 MG/DL (ref 1.6–2.6)
MCH RBC QN AUTO: 27.6 PG (ref 26.8–34.3)
MCH RBC QN AUTO: 28.2 PG (ref 26.8–34.3)
MCHC RBC AUTO-ENTMCNC: 30.4 G/DL (ref 31.4–37.4)
MCHC RBC AUTO-ENTMCNC: 30.8 G/DL (ref 31.4–37.4)
MCV RBC AUTO: 91 FL (ref 82–98)
MCV RBC AUTO: 91 FL (ref 82–98)
PLATELET # BLD AUTO: 146 THOUSANDS/UL (ref 149–390)
PLATELET # BLD AUTO: 148 THOUSANDS/UL (ref 149–390)
PMV BLD AUTO: 9.5 FL (ref 8.9–12.7)
PMV BLD AUTO: 9.6 FL (ref 8.9–12.7)
POTASSIUM SERPL-SCNC: 3.8 MMOL/L (ref 3.5–5.3)
PROTHROMBIN TIME: 31.6 SECONDS (ref 11.6–14.5)
PROTHROMBIN TIME: 35.2 SECONDS (ref 11.6–14.5)
RBC # BLD AUTO: 2.77 MILLION/UL (ref 3.81–5.12)
RBC # BLD AUTO: 2.79 MILLION/UL (ref 3.81–5.12)
SODIUM SERPL-SCNC: 138 MMOL/L (ref 135–147)
TIBC SERPL-MCNC: 391 UG/DL (ref 250–450)
UNIT DISPENSE STATUS: NORMAL
UNIT PRODUCT CODE: NORMAL
UNIT PRODUCT VOLUME: 350 ML
UNIT RH: NORMAL
WBC # BLD AUTO: 4.78 THOUSAND/UL (ref 4.31–10.16)
WBC # BLD AUTO: 4.82 THOUSAND/UL (ref 4.31–10.16)

## 2023-01-11 RX ORDER — PHYTONADIONE 5 MG/1
2.5 TABLET ORAL ONCE
Status: COMPLETED | OUTPATIENT
Start: 2023-01-11 | End: 2023-01-11

## 2023-01-11 RX ORDER — HEPARIN SODIUM 10000 [USP'U]/100ML
3-20 INJECTION, SOLUTION INTRAVENOUS
Status: DISPENSED | OUTPATIENT
Start: 2023-01-11 | End: 2023-01-13

## 2023-01-11 RX ORDER — LANOLIN ALCOHOL/MO/W.PET/CERES
3 CREAM (GRAM) TOPICAL
Status: DISCONTINUED | OUTPATIENT
Start: 2023-01-11 | End: 2023-01-15

## 2023-01-11 RX ADMIN — PANTOPRAZOLE SODIUM 40 MG: 40 INJECTION, POWDER, FOR SOLUTION INTRAVENOUS at 21:14

## 2023-01-11 RX ADMIN — Medication 3 MG: at 21:14

## 2023-01-11 RX ADMIN — HEPARIN SODIUM 12 UNITS/KG/HR: 10000 INJECTION, SOLUTION INTRAVENOUS at 15:20

## 2023-01-11 RX ADMIN — Medication 1 TABLET: at 09:52

## 2023-01-11 RX ADMIN — Medication 1 CAPSULE: at 09:53

## 2023-01-11 RX ADMIN — ACETAMINOPHEN 650 MG: 325 TABLET, FILM COATED ORAL at 02:05

## 2023-01-11 RX ADMIN — PHYTONADIONE 2.5 MG: 5 TABLET ORAL at 13:39

## 2023-01-11 RX ADMIN — ACETAMINOPHEN 650 MG: 325 TABLET, FILM COATED ORAL at 13:40

## 2023-01-11 RX ADMIN — PANTOPRAZOLE SODIUM 40 MG: 40 INJECTION, POWDER, FOR SOLUTION INTRAVENOUS at 09:52

## 2023-01-11 RX ADMIN — FERROUS SULFATE TAB 325 MG (65 MG ELEMENTAL FE) 325 MG: 325 (65 FE) TAB at 09:52

## 2023-01-11 RX ADMIN — Medication 1 CAPSULE: at 17:34

## 2023-01-11 RX ADMIN — ATORVASTATIN CALCIUM 80 MG: 80 TABLET, FILM COATED ORAL at 16:42

## 2023-01-11 NOTE — ASSESSMENT & PLAN NOTE
· Patient has a history of mitral valve replacement with mechanical valve 30 years ago with Dr Constantin Wetzel  · Has been on chronic anticoagulation with Coumadin    · INR elevated on admission  · Repeat INR in AM   · Patient will be started on heparin gtt later today

## 2023-01-11 NOTE — ASSESSMENT & PLAN NOTE
· Patient has a history of iron deficiency anemia  · Follows with Dr Luci Shaffer hematology outpatient  · Received 3 iron transfusions this past fall  · Has been taking iron 324 mg p o  every  Monday, Wednesday and Friday    · Iron levels pending

## 2023-01-11 NOTE — ASSESSMENT & PLAN NOTE
Lab Results   Component Value Date    HGBA1C 6 5 (H) 07/06/2022       Recent Labs     01/10/23  1927 01/10/23  2051 01/11/23  0708 01/11/23  1142   POCGLU 157* 117 105 103       Blood Sugar Average: Last 72 hrs:  (P) 120 5   · Patient normally takes metformin 500 mg p o  twice daily with meals  · hold during hospitalization    · Will place on sliding scale coverage before meals and at bedtime algorithm 3 with meals and algorithm to at bedtime  · Diabetic diet  · Monitor

## 2023-01-11 NOTE — ASSESSMENT & PLAN NOTE
Patient presented with exertional shortness of breath, orthopnea, headache and weakness    She was found to have hemoglobin 7 1  · S/p 1 unit PRBC, hemoglobin increased to 7 7, remains stable  · Positive heme stool in ER noted  · Does have a history of iron deficiency anemia which she has been treating with IV iron infusions this past fall and taking iron 3 days a week  · Follows outpatient hematology  · GI consulted  · Tentative plan for EGD/colonoscopy on Friday if INR < 2 5  · Given vitamin K today and bridged to heparin gtt  · Continue to monitor INR  · Regular diet today, possible clear liquid and bowel prep tomorrow for Friday  · Last colonoscopy that patient had was when she was 76years old, reports that she did a Cologuard test in between which was negative  · Continue to hold coumadin  · Continue to monitor hemoglobin

## 2023-01-11 NOTE — ASSESSMENT & PLAN NOTE
· Patient has a history of chronic atrial fibrillation, controlled rate currently in the 80s  · Is on Coumadin 5 mg 5 days a week, with 7 5 mg on Tuesdays and Fridays  · Has a history of a titanium steel mechanical valve replaced 30 years ago  · INR 4 0 on admission, now 3 51  · Continue to hold coumadin  · Repeat PT/INR in a m    · Gave 2 5 vitamin K  · Start heparin gtt later today

## 2023-01-11 NOTE — CONSULTS
Physician Requesting Consult: Paul Hope DO    Reason for Consult / Principal Problem: Anemia, GI bleed    Assessment/Plan:  41-year-old female with a past medical history of iron deficient anemia, atrial fibrillation, mitral valve replacement with titanium steel valves on anticoagulation, diabetes mellitus, hyperlipidemia, and CVA who presented to [address] [place] [address] on 1/10 with report of shortness of breath, headache, and weakness  1   Anemia  Anemia most likely multifactoral from underlying  coagulopathy, and iron deficiency but need to rule out GI bleed as contributing factor  INR on admission 4 0  INR 3 51 this AM   Patient is on anticoagulation due to atrial fib and mechanical mitral valve replacement  Patient was previously worked up by GI in 2020 due to heme positive stool but EGD and colonoscopy were unrevealing for any GI bleed  Patient's baseline hemoglobin 10-11  Hemoglobin 7 1 on admission  Hemoglobin this a m  7 8  Patient denies any signs of GI bleed  Patient does report dull ache in upper abdomen but denies any abdominal tenderness on examination  -GI plan of care discussed with patient and daughter who was on phone during consultation   -Monitor serial hemoglobin  -Transfuse blood products as needed to keep hemoglobin greater than 7   -Monitor INR  -Iron supplements per attending team  -Patient will need EGD and colonoscopy for further evaluation of anemia and heme positive stool this can be done when INR is less than 2 5   -Continue to hold Coumadin  Spoke to attending team consider bridging to heparin infusion and recommend reversing coagulopathy  Continue supportive care  -Tentative EGD and colonoscopy Friday pending INR level  -Diet as tolerated    Will follow  Thank you for the consult  Case discussed with Dr Marisol Barahona  HPI: Adriel Hawthorne is a 80 y o  female  Anemia    This is an 41-year-old female with a past medical history of iron deficient anemia, atrial fibrillation, mitral valve replacement with titanium steel valves on anticoagulation, diabetes mellitus, hyperlipidemia, and CVA who presented to [address] [place] [address] on 1/10 with report of shortness of breath, headache, and weakness  Patient reports for the past week and a half she noticed that she has been getting short of breath when climbing stairs  Patient reports that shortness of breath was progressively getting worse and she reported a headache 8/10 and weakness on morning of admission  Hemoglobin 7 1 on admission  Baseline hemoglobin 10-11  Chest x-ray showed borderline cardiomegaly with mild vascular congestion  COVID/flu/RSV swab negative  Patient received 1 unit packed red blood cells  Patient denies any signs of GI bleed  Patient denies nausea, vomiting, acid reflux, heartburn, epigastric or abdominal pain  Patient does have macular degeneration and so she is unsure if there has been any signs of blood in stool or if stool has changed color  Stool was positive for occult blood in the emergency room  Patient denies any diarrhea  Patient reports she eats prunes daily in order to move her bowels  Patient does report a dull ache in her upper abdomen  On examination no tenderness to palpation  Patient had previous EGD and colonoscopy in 6/29/2020 due to anemia and heme positive stool  EGD showed mild erythema in distal esophagus and gastric antrum  Colonoscopy  showed 6 polyps which were removed and sigmoid diverticulosis  Small internal hemorrhoid  Recommendations were for capsule endoscopy, but this was not completed  Patient is a former smoker she quit in [facility]  Drinks alcohol on very rare occasions  No family history of gastric or colon cancer  Patient does not use NSAIDs  Allergies:    Allergies   Allergen Reactions   • Sulfa Antibiotics Other (See Comments)   • Sulfasalazine        Medications:  Current Facility-Administered Medications:   •  acetaminophen (TYLENOL) tablet 650 mg, 650 mg, Oral, Q6H PRN, DUNCAN Molina, 650 mg at 23 0205  •  atorvastatin (LIPITOR) tablet 80 mg, 80 mg, Oral, Daily With Dinner, DUNCAN Molina  •  ferrous sulfate tablet 325 mg, 325 mg, Oral, Once per day on , DUNCAN Molina, 325 mg at 23 7628  •  insulin lispro (HumaLOG) 100 units/mL subcutaneous injection 1-5 Units, 1-5 Units, Subcutaneous, HS, DUNCAN Molina  •  insulin lispro (HumaLOG) 100 units/mL subcutaneous injection 1-6 Units, 1-6 Units, Subcutaneous, TID AC **AND** Fingerstick Glucose (POCT), , , TID AC, DUNCAN Molina  •  multivitamin-minerals (CENTRUM) tablet 1 tablet, 1 tablet, Oral, Daily, DUNCAN Molina, 1 tablet at 23 5522  •  pantoprazole (PROTONIX) injection 40 mg, 40 mg, Intravenous, Q12H [facility], 40 mg at 23 3296  •  patient supplied medication, , Oral, BID, Tera Dale PA-C, Given at 23  •  polyethylene glycol (MIRALAX) packet 17 g, 17 g, Oral, Daily PRN, DUNCAN Molina  •  PreserVision AREDS 2 CAPS 1 capsule, 1 capsule, Oral, BID, Vivian Combs PA-C, 1 capsule at 23 9246    Past Medical history:  Past Medical History:   Diagnosis Date   • Diabetes mellitus ([facility] [address] )    • Stroke ([facility] [address] ) 2022       Past Surgical History:   Past Surgical History:   Procedure Laterality Date   • EYE SURGERY     • HYSTERECTOMY     • MITRAL VALVE REPLACEMENT         Social history:   Social History     Tobacco Use   • Smoking status: Former     Packs/day: 2 00     Years: 30 00     Pack years: 60 00     Types: Cigarettes     Quit date:      Years since quittin 0   • Smokeless tobacco: Never   Vaping Use   • Vaping Use: Never used   Substance Use Topics   • Alcohol use: Yes     Comment: special occasional   • Drug use: No       Family history:   Family History   Problem Relation Age of Onset   • Heart failure Mother    • Heart attack Father    • Sleep apnea Brother         Review of Systems: Review of Systems   Respiratory: Positive for shortness of breath  Neurological: Positive for weakness and headaches  Physical Exam: Physical Exam  Vitals and nursing note reviewed  Constitutional:       General: She is not in acute distress  HENT:      Head: Normocephalic and atraumatic  Cardiovascular:      Rate and Rhythm: Normal rate  Rhythm irregular  Pulses: Normal pulses  Heart sounds: Normal heart sounds  Pulmonary:      Effort: Pulmonary effort is normal  No respiratory distress  Breath sounds: Normal breath sounds  No stridor  No wheezing, rhonchi or rales  Abdominal:      General: Bowel sounds are normal  There is no distension  Palpations: Abdomen is soft  There is no mass  Tenderness: There is no abdominal tenderness  There is no guarding or rebound  Hernia: No hernia is present  Musculoskeletal:      Cervical back: Normal range of motion and neck supple  Right lower leg: No edema  Left lower leg: No edema  Skin:     General: Skin is warm and dry  Capillary Refill: Capillary refill takes less than 2 seconds  Coloration: Skin is not jaundiced or pale  Neurological:      Mental Status: She is alert and oriented to person, place, and time     Psychiatric:         Mood and Affect: Mood normal            Lab Results:   Recent Results (from the past 24 hour(s))   Type and screen    Collection Time: 01/10/23  1:01 PM   Result Value Ref Range    ABO Grouping O     Rh Factor Positive     Antibody Screen Negative     Specimen Expiration Date 20230113    HS Troponin I 2hr    Collection Time: 01/10/23  2:11 PM   Result Value Ref Range    hs TnI 2hr 7 "Refer to ACS Flowchart"- see link ng/L    Delta 2hr hsTnI 2 <20 ng/L   HS Troponin I 4hr    Collection Time: 01/10/23  4:59 PM   Result Value Ref Range    hs TnI 4hr 7 "Refer to ACS Flowchart"- see link ng/L    Delta 4hr hsTnI 2 <20 ng/L   Fingerstick Glucose (POCT)    Collection Time: 01/10/23  7:27 PM   Result Value Ref Range    POC Glucose 157 (H) 65 - 140 mg/dl   Fingerstick Glucose (POCT)    Collection Time: 01/10/23  8:51 PM   Result Value Ref Range    POC Glucose 117 65 - 140 mg/dl   Hemoglobin    Collection Time: 01/10/23  8:59 PM   Result Value Ref Range    Hemoglobin 7 7 (L) 11 5 - 15 4 g/dL   Basic metabolic panel    Collection Time: 01/11/23  4:37 AM   Result Value Ref Range    Sodium 138 135 - 147 mmol/L    Potassium 3 8 3 5 - 5 3 mmol/L    Chloride 105 96 - 108 mmol/L    CO2 26 21 - 32 mmol/L    ANION GAP 7 4 - 13 mmol/L    BUN 21 5 - 25 mg/dL    Creatinine 0 58 (L) 0 60 - 1 30 mg/dL    Glucose 97 65 - 140 mg/dL    Calcium 7 7 (L) 8 3 - 10 1 mg/dL    eGFR 85 ml/min/1 73sq m   Magnesium    Collection Time: 01/11/23  4:37 AM   Result Value Ref Range    Magnesium 1 8 1 6 - 2 6 mg/dL   CBC (With Platelets)    Collection Time: 01/11/23  4:37 AM   Result Value Ref Range    WBC 4 78 4 31 - 10 16 Thousand/uL    RBC 2 77 (L) 3 81 - 5 12 Million/uL    Hemoglobin 7 8 (L) 11 5 - 15 4 g/dL    Hematocrit 25 3 (L) 34 8 - 46 1 %    MCV 91 82 - 98 fL    MCH 28 2 26 8 - 34 3 pg    MCHC 30 8 (L) 31 4 - 37 4 g/dL    RDW 16 6 (H) 11 6 - 15 1 %    Platelets 049 (L) 478 - 390 Thousands/uL    MPV 9 5 8 9 - 12 7 fL   Protime-INR    Collection Time: 01/11/23  4:37 AM   Result Value Ref Range    Protime 35 2 (H) 11 6 - 14 5 seconds    INR 3 51 (H) 0 84 - 1 19   Prepare Leukoreduced RBC: 2 Units    Collection Time: 01/11/23  6:15 AM   Result Value Ref Range    Unit Product Code F0236N37     Unit Number [id number]     Unit ABO O     Unit [facility] POS     Crossmatch Compatible     Unit Dispense Status Presumed Trans     Unit Product Volume 350 mL   Fingerstick Glucose (POCT)    Collection Time: 01/11/23  7:08 AM   Result Value Ref Range    POC Glucose 105 65 - 140 mg/dl   Fingerstick Glucose (POCT)    Collection Time: 01/11/23 11:42 AM   Result Value Ref Range    POC Glucose 103 65 - 140 mg/dl           Imaging Studies: XR chest 1 view portable    Result Date: 1/10/2023  Narrative: CHEST INDICATION:   SOB  COMPARISON:  1122 EXAM PERFORMED/VIEWS:  XR CHEST PORTABLE FINDINGS: Cardiomediastinal silhouette appears borderline enlarged with mild vascular accentuation  Median sternotomy  No pneumothorax or pleural effusion  Bilateral glenohumeral osteoarthritis  Impression: Borderline cardiomegaly Mild vascular congestion Workstation performed: [id number]       Problem List:   Patient Active Problem List   Diagnosis   • Obstructive sleep apnea   • Chronic atrial fibrillation ([facility] [address] )   • H/O mitral valve replacement with mechanical valve   • Long term (current) use of anticoagulants   • Presence of prosthetic heart valve   • Hypercholesteremia   • Anemia due to chronic kidney disease   • Allergic rhinitis   • Type 2 diabetes mellitus without complication, without long-term current use of insulin (HCC)   • Iron deficiency anemia   • Other specified hypothyroidism   • Vitamin B12 deficiency   • Other microscopic hematuria   • Celiac disease   • Abdominal aortic aneurysm (AAA)   • Ataxia   • Pulmonary emphysema (HCC)   • History of renal calculi   • History of cervical cancer   • Essential hypertension   • Other proteinuria   • Hepatic steatosis   • CVA (cerebral vascular accident) ([facility] [address] )   • Diabetes mellitus ([facility] [address] )   • CKD (chronic kidney disease) stage 2, GFR 60-89 ml/min   • Cerebrovascular accident (CVA) ([facility] [address] )   • Fall   • Anemia         DUNCAN Harrison      Please Note: "This note has been constructed using a voice recognition system  Therefore there may be syntax, spelling, and/or grammatical errors   Please call if you have any questions  "**

## 2023-01-11 NOTE — PLAN OF CARE
Problem: Potential for Falls  Goal: Patient will remain free of falls  Description: INTERVENTIONS:  - Educate patient/family on patient safety including physical limitations  - Instruct patient to call for assistance with activity   - Consult OT/PT to assist with strengthening/mobility   - Keep Call bell within reach  - Keep bed low and locked with side rails adjusted as appropriate  - Keep care items and personal belongings within reach  - Initiate and maintain comfort rounds  - Make Fall Risk Sign visible to staff  - Offer Toileting every 2 Hours, in advance of need  - Initiate/Maintain bed/chair alarm  - Obtain necessary fall risk management equipment: bed/chair alarm  - Apply yellow socks and bracelet for high fall risk patients  - Consider moving patient to room near nurses station  Outcome: Progressing     Problem: PAIN - ADULT  Goal: Verbalizes/displays adequate comfort level or baseline comfort level  Description: Interventions:  - Encourage patient to monitor pain and request assistance  - Assess pain using appropriate pain scale  - Administer analgesics based on type and severity of pain and evaluate response  - Implement non-pharmacological measures as appropriate and evaluate response  - Consider cultural and social influences on pain and pain management  - Notify physician/advanced practitioner if interventions unsuccessful or patient reports new pain  Outcome: Progressing     Problem: INFECTION - ADULT  Goal: Absence or prevention of progression during hospitalization  Description: INTERVENTIONS:  - Assess and monitor for signs and symptoms of infection  - Monitor lab/diagnostic results  - Monitor all insertion sites, i e  indwelling lines, tubes, and drains  - Monitor endotracheal if appropriate and nasal secretions for changes in amount and color  - [place] appropriate cooling/warming therapies per order  - Administer medications as ordered  - Instruct and encourage patient and family to use good hand hygiene technique  - Identify and instruct in appropriate isolation precautions for identified infection/condition  Outcome: Progressing     Problem: SAFETY ADULT  Goal: Patient will remain free of falls  Description: INTERVENTIONS:  - Educate patient/family on patient safety including physical limitations  - Instruct patient to call for assistance with activity   - Consult OT/PT to assist with strengthening/mobility   - Keep Call bell within reach  - Keep bed low and locked with side rails adjusted as appropriate  - Keep care items and personal belongings within reach  - Initiate and maintain comfort rounds  - Make Fall Risk Sign visible to staff  - Offer Toileting every 2 Hours, in advance of need  - Initiate/Maintain bed/chair alarm  - Obtain necessary fall risk management equipment: bed/chair alarm  - Apply yellow socks and bracelet for high fall risk patients  - Consider moving patient to room near nurses station  Outcome: Progressing  Goal: Maintain or return to baseline ADL function  Description: INTERVENTIONS:  - Educate patient/family on patient safety including physical limitations  - Instruct patient to call for assistance with activity   - Consult OT/PT to assist with strengthening/mobility   - Keep Call bell within reach  - Keep bed low and locked with side rails adjusted as appropriate  - Keep care items and personal belongings within reach  - Initiate and maintain comfort rounds  - Make Fall Risk Sign visible to staff  - Offer Toileting every 2 Hours, in advance of need  - Initiate/Maintain bed/chair alarm  - Obtain necessary fall risk management equipment: bed/chair alarm  - Apply yellow socks and bracelet for high fall risk patients  - Consider moving patient to room near nurses station  Outcome: Progressing  Goal: Maintains/Returns to pre admission functional level  Description: INTERVENTIONS:  - Perform BMAT or MOVE assessment daily    - Set and communicate daily mobility goal to care team and patient/family/caregiver  - Collaborate with rehabilitation services on mobility goals if consulted  - Perform Range of Motion  3 times a day  - Reposition patient every 3 hours    - Dangle patient 3 times a day  - Stand patient 3 times a day  - Ambulate patient 3 times a day  - Out of bed to chair 3 times a day   - Out of bed for meals 3 times a day  - Out of bed for toileting  - Record patient progress and toleration of activity level   Outcome: Progressing

## 2023-01-11 NOTE — ASSESSMENT & PLAN NOTE
· As noted above patient has history of mitral valve replacement, has been on Coumadin for this    · Patient requiring GI intervention and will be bridged with heparin gtt

## 2023-01-11 NOTE — PLAN OF CARE
Problem: Potential for Falls  Goal: Patient will remain free of falls  Description: INTERVENTIONS:  - Educate patient/family on patient safety including physical limitations  - Instruct patient to call for assistance with activity   - Consult OT/PT to assist with strengthening/mobility   - Keep Call bell within reach  - Keep bed low and locked with side rails adjusted as appropriate  - Keep care items and personal belongings within reach  - Initiate and maintain comfort rounds  - Make Fall Risk Sign visible to staff  - Offer Toileting every 2 Hours, in advance of need  - Initiate/Maintain bed alarm  - Obtain necessary fall risk management equipment: yellow socks  - Apply yellow socks and bracelet for high fall risk patients  - Consider moving patient to room near nurses station  Outcome: Progressing     Problem: PAIN - ADULT  Goal: Verbalizes/displays adequate comfort level or baseline comfort level  Description: Interventions:  - Encourage patient to monitor pain and request assistance  - Assess pain using appropriate pain scale  - Administer analgesics based on type and severity of pain and evaluate response  - Implement non-pharmacological measures as appropriate and evaluate response  - Consider cultural and social influences on pain and pain management  - Notify physician/advanced practitioner if interventions unsuccessful or patient reports new pain  Outcome: Progressing     Problem: INFECTION - ADULT  Goal: Absence or prevention of progression during hospitalization  Description: INTERVENTIONS:  - Assess and monitor for signs and symptoms of infection  - Monitor lab/diagnostic results  - Monitor all insertion sites, i e  indwelling lines, tubes, and drains  - Monitor endotracheal if appropriate and nasal secretions for changes in amount and color  - [place] appropriate cooling/warming therapies per order  - Administer medications as ordered  - Instruct and encourage patient and family to use good hand hygiene technique  - Identify and instruct in appropriate isolation precautions for identified infection/condition  Outcome: Progressing     Problem: SAFETY ADULT  Goal: Patient will remain free of falls  Description: INTERVENTIONS:  - Educate patient/family on patient safety including physical limitations  - Instruct patient to call for assistance with activity   - Consult OT/PT to assist with strengthening/mobility   - Keep Call bell within reach  - Keep bed low and locked with side rails adjusted as appropriate  - Keep care items and personal belongings within reach  - Initiate and maintain comfort rounds  - Make Fall Risk Sign visible to staff  - Offer Toileting every 2 Hours, in advance of need  - Initiate/Maintain bed alarm  - Obtain necessary fall risk management equipment: yellow socks  - Apply yellow socks and bracelet for high fall risk patients  - Consider moving patient to room near nurses station  Outcome: Progressing  Goal: Maintain or return to baseline ADL function  Description: INTERVENTIONS:  -  Assess patient's ability to carry out ADLs; assess patient's baseline for ADL function and identify physical deficits which impact ability to perform ADLs (bathing, care of mouth/teeth, toileting, grooming, dressing, etc )  - Assess/evaluate cause of self-care deficits   - Assess range of motion  - Assess patient's mobility; develop plan if impaired  - Assess patient's need for assistive devices and provide as appropriate  - Encourage maximum independence but intervene and supervise when necessary  - Involve family in performance of ADLs  - Assess for home care needs following discharge   - Consider OT consult to assist with ADL evaluation and planning for discharge  - Provide patient education as appropriate  Outcome: Progressing  Goal: Maintains/Returns to pre admission functional level  Description: INTERVENTIONS:  - Perform BMAT or MOVE assessment daily    - Set and communicate daily mobility goal to care team and patient/family/caregiver  - Collaborate with rehabilitation services on mobility goals if consulted  - Perform Range of Motion 4 times a day  - Reposition patient every 2 hours  - Dangle patient 3 times a day  - Stand patient 3 times a day  - Ambulate patient 3 times a day  - Out of bed to chair 3 times a day   - Out of bed for meals 3 times a day  - Out of bed for toileting  - Record patient progress and toleration of activity level   Outcome: Progressing     Problem: DISCHARGE PLANNING  Goal: Discharge to home or other facility with appropriate resources  Description: INTERVENTIONS:  - Identify barriers to discharge w/patient and caregiver  - Arrange for needed discharge resources and transportation as appropriate  - Identify discharge learning needs (meds, wound care, etc )  - Arrange for interpretive services to assist at discharge as needed  - Refer to Case Management Department for coordinating discharge planning if the patient needs post-hospital services based on physician/advanced practitioner order or complex needs related to functional status, cognitive ability, or social support system  Outcome: Progressing     Problem: Knowledge Deficit  Goal: Patient/family/caregiver demonstrates understanding of disease process, treatment plan, medications, and discharge instructions  Description: Complete learning assessment and assess knowledge base    Interventions:  - Provide teaching at level of understanding  - Provide teaching via preferred learning methods  Outcome: Progressing

## 2023-01-11 NOTE — CASE MANAGEMENT
Case Management Assessment & Discharge Planning Note    Patient name Kiko Hamm  Location [address] [facility] 548/1 RZPXV 731-* MRN [id number]  : 1940 Date 2023       Current Admission Date: 1/10/2023  Current Admission [facility]   Patient Active Problem List    Diagnosis Date Noted   • Anemia 2022   • Fall 2022   • CKD (chronic kidney disease) stage 2, GFR 60-89 ml/min 2022   • Cerebrovascular accident (CVA) ([address] ) 2022   • Diabetes mellitus ([address] ) 2022   • CVA (cerebral vascular accident) ([address] ) 2022   • Hepatic steatosis 2022   • History of cervical cancer 2021   • Essential hypertension 2021   • Other proteinuria 2021   • History of renal calculi 06/15/2021   • Pulmonary emphysema ([address] ) 2021   • Ataxia 2021   • Abdominal aortic aneurysm (AAA) 2021   • Celiac disease 2020   • Other microscopic hematuria 2020   • Iron deficiency anemia 2020   • Other specified hypothyroidism 2020   • Vitamin B12 deficiency 2020   • Anemia due to chronic kidney disease 02/10/2020   • Allergic rhinitis 02/10/2020   • Type 2 diabetes mellitus without complication, without long-term current use of insulin ([facility] ) 02/10/2020   • Hypercholesteremia 2020   • Chronic atrial fibrillation ([address] ) 2018   • H/O mitral valve replacement with mechanical valve 2018   • Long term (current) use of anticoagulants 2018   • Presence of prosthetic heart valve 2018   • Obstructive sleep apnea 2018      LOS (days): 1  Geometric Mean LOS (GMLOS) (days): 3 60  Days to GMLOS:2 6     OBJECTIVE:    Risk of Unplanned Readmission Score: 20 81       Current admission status: Inpatient  Referral Reason: Other (Discharge planning)    Preferred Pharmacy:   [facility] [address]  [phone] [address] 22  [phone]  Phone: [phone] Fax: [phone] - [place], [address] [facility] ([address])  [address] [facility] [phone])  [place] [address]  Phone: [phone] Fax: [phone]    Primary Care Provider: Christy Herman MD    Primary Insurance: MEDICARE  Secondary Insurance: COMMERCIAL MISCELLANEOUS    ASSESSMENT:  [id number] Lyndsay Russo - Daughter   Primary Phone: [phone] (Mobile)                Readmission Root Cause  30 Day Readmission: No    Patient Information  Admitted from[de-identified] Home  Mental Status: Alert  During Assessment patient was accompanied by: Son, Daughter (2 daughters and son at bedside)  Assessment information provided by[de-identified] Patient  Primary Caregiver: Self  Support Systems: Son, Daughter, Family members  [place] of Residence: [address] [place] [address] do you live in?: [place] [address] entry access options   Select all that apply : Stairs  Number of steps to enter home : 2  Type of Current Residence: [address] home  Upon entering residence, is there a bedroom on the main floor (no further steps)?: No  A bedroom is located on the following floor levels of residence (select all that apply):: 2nd Floor  Upon entering residence, is there a bathroom on the main floor (no further steps)?: Yes  Number of steps to 2nd floor from main floor: One Flight  In the last 12 months, was there a time when you were not able to pay the mortgage or rent on time?: No  In the last 12 months, how many places have you lived?: 1  In the last 12 months, was there a time when you did not have a steady place to sleep or slept in a shelter (including now)?: No  Homeless/housing insecurity resource given?: N/A  Living Arrangements: Lives Alone    Activities of Daily Living Prior to Admission  Functional Status: Independent  Completes ADLs independently?: Yes  Ambulates independently?: Yes  Does patient use assisted devices?: Yes  Assisted Devices (DME) used: Straight Cane, Rollator  Does patient currently own DME?: Yes  What DME does the patient currently own?: Latrell Padilla  Does patient have a history of Outpatient Therapy (PT/OT)?: Yes  Does the patient have a history of Short-Term Rehab?: Yes (Acute rehab at [facility] after CVA)  Does patient have a history of HHC?: Yes ([facility])  Does patient currently have [address]?: No    Patient Information Continued  Income Source: Pension/[facility]  Does patient have prescription coverage?: Yes  Within the past 12 months, you worried that your food would run out before you got the money to buy more : Never true  Within the past 12 months, the food you bought just didn't last and you didn't have money to get more : Never true  Food insecurity resource given?: N/A  Does patient receive dialysis treatments?: No  Does patient have a history of substance abuse?: No  Does patient have a history of Mental Health Diagnosis?: No    Means of Transportation  Means of Transport to Appts[de-identified] Family transport  In the past 12 months, has lack of transportation kept you from medical appointments or from getting medications?: No  In the past 12 months, has lack of transportation kept you from meetings, work, or from getting things needed for daily living?: No  Was application for public transport provided?: N/A      DISCHARGE DETAILS:    Discharge planning discussed with[de-identified] Patient and family - 2 daughters and son at bedside  Freedom of Choice: Yes     Comments - Freedom of Choice: FOC reviewed with patient, patient's preference is to return home at discharge  Patient lives alone and is independent with all needs  SW will continue to follow for any discharge needs  Contacts  Patient Contacts: Erika Atkinson/Chetan  Relationship to Patient[de-identified] Family  Contact Method:  In Person  Reason/Outcome: Emergency Contact, Discharge [address]         Is the patient interested in [address] at discharge?: No    DME Referral Provided  Referral made for DME?: No    Other Referral/Resources/Interventions Provided:  Interventions: None Indicated     Discharge Destination Plan[de-identified] Home  Transport at Discharge : Family

## 2023-01-11 NOTE — PROGRESS NOTES
[address]  Progress Note - Neha Arshad 1940, 80 y o  female MRN: [id number]  Unit/Bed#: [address] [place] [address] Encounter: [id number]  Primary Care Provider: Grover Roberts MD   Date and time admitted to hospital: 1/10/2023 11:24 AM    * Anemia  Assessment & Plan  Patient presented with exertional shortness of breath, orthopnea, headache and weakness  She was found to have hemoglobin 7 1  · S/p 1 unit PRBC, hemoglobin increased to 7 7, remains stable  · Positive heme stool in ER noted  · Does have a history of iron deficiency anemia which she has been treating with IV iron infusions this past fall and taking iron 3 days a week  · Follows outpatient hematology  · GI consulted  · Tentative plan for EGD/colonoscopy on Friday if INR < 2 5  · Given vitamin K today and bridged to heparin gtt  · Continue to monitor INR  · Regular diet today, possible clear liquid and bowel prep tomorrow for Friday  · Last colonoscopy that patient had was when she was 76years old, reports that she did a Cologuard test in between which was negative  · Continue to hold coumadin  · Continue to monitor hemoglobin    Chronic atrial fibrillation ([facility] )  Assessment & Plan  · Patient has a history of chronic atrial fibrillation, controlled rate currently in the 80s  · Is on Coumadin 5 mg 5 days a week, with 7 5 mg on Tuesdays and Fridays  · Has a history of a titanium steel mechanical valve replaced 30 years ago  · INR 4 0 on admission, now 3 51  · Continue to hold coumadin  · Repeat PT/INR in a m  · Gave 2 5 vitamin K  · Start heparin gtt later today    H/O mitral valve replacement with mechanical valve  Assessment & Plan  · Patient has a history of mitral valve replacement with mechanical valve 30 years ago with Dr Kamron Spencer  · Has been on chronic anticoagulation with Coumadin    · INR elevated on admission  · Repeat INR in AM   · Patient will be started on heparin gtt later today      Iron deficiency anemia  Assessment & Plan  · Patient has a history of iron deficiency anemia  · Follows with Dr Lima Hughes hematology outpatient  · Received 3 iron transfusions this past fall  · Has been taking iron 324 mg p o  every  Monday, Wednesday and Friday  · Iron levels pending    Type 2 diabetes mellitus without complication, without long-term current use of insulin [facility])  Assessment & Plan  Lab Results   Component Value Date    HGBA1C 6 5 (H) 07/06/2022       Recent Labs     01/10/23  1927 01/10/23  2051 01/11/23  0708 01/11/23  1142   POCGLU 157* 117 105 103       Blood Sugar Average: Last 72 hrs:  (P) 120 5   · Patient normally takes metformin 500 mg p o  twice daily with meals  · hold during hospitalization  · Will place on sliding scale coverage before meals and at bedtime algorithm 3 with meals and algorithm to at bedtime  · Diabetic diet  · Monitor    Hypercholesteremia  Assessment & Plan  · Continue patient's home medication of atorvastatin 80 mg p o  daily  · Heart healthy diet    Long term (current) use of anticoagulants  Assessment & Plan  · As noted above patient has history of mitral valve replacement, has been on Coumadin for this  · Patient requiring GI intervention and will be bridged with heparin gtt    VTE Pharmacologic Prophylaxis: VTE Score: 5 High Risk (Score >/= 5) - Pharmacological DVT Prophylaxis Ordered: heparin drip  Sequential Compression Devices Ordered  Patient Centered Rounds: I performed bedside rounds with nursing staff today  Discussions with Specialists or Other Care Team Provider: nursing, GI, case management    Education and Discussions with Family / Patient: Patient declined, daughter udpated by GI during consultation  Time Spent for Care: 30 minutes  More than 50% of total time spent on counseling and coordination of care as described above      Current Length of Stay: 1 day(s)  Current Patient Status: Inpatient   Certification Statement: The patient will continue to require additional inpatient hospital stay due to EGD/colonoscopy Friday, monitor Hgb  Discharge Plan: Anticipate discharge in >72 hrs to home  Code Status: Level 1 - Full Code    Subjective:   Patient seen and examined at bedside this morning  She reports feeling better than when she came in, but still weak and slightly short of breath  She also reports some epigastric tenderness that comes and goes for the past few weeks  She denies chest pain, dizziness, or headaches  Objective:     Vitals:   Temp (24hrs), Av 3 °F (36 8 °C), Min:97 3 °F (36 3 °C), Max:99 4 °F (37 4 °C)    Temp:  [97 3 °F (36 3 °C)-99 4 °F (37 4 °C)] 98 3 °F (36 8 °C)  HR:  [] 78  Resp:  [18-20] 19  BP: (111-152)/(55-81) 120/61  SpO2:  [96 %-98 %] 97 %  Body mass index is 26 33 kg/m²  Input and Output Summary (last 24 hours): Intake/Output Summary (Last 24 hours) at 2023 1332  Last data filed at 1/10/2023 1900  Gross per 24 hour   Intake 350 ml   Output --   Net 350 ml       Physical Exam:   Physical Exam  Vitals and nursing note reviewed  Constitutional:       General: She is not in acute distress  Appearance: She is well-developed  HENT:      Head: Normocephalic and atraumatic  Eyes:      Conjunctiva/sclera: Conjunctivae normal    Cardiovascular:      Rate and Rhythm: Normal rate  Rhythm irregular  Heart sounds: No murmur heard  Pulmonary:      Effort: Pulmonary effort is normal  No respiratory distress  Breath sounds: Normal breath sounds  Abdominal:      Palpations: Abdomen is soft  Tenderness: There is no abdominal tenderness  Musculoskeletal:         General: No swelling  Cervical back: Neck supple  Skin:     General: Skin is warm and dry  Capillary Refill: Capillary refill takes less than 2 seconds  Neurological:      General: No focal deficit present  Mental Status: She is alert and oriented to person, place, and time     Psychiatric:         Mood and Affect: Mood normal          Behavior: Behavior normal           Additional Data:     Labs:  Results from last 7 days   Lab Units 01/11/23  0437 01/10/23  2059 01/10/23  1209   WBC Thousand/uL 4 78  --  5 10   HEMOGLOBIN g/dL 7 8*   < > 7 1*   HEMATOCRIT % 25 3*  --  24 0*   PLATELETS Thousands/uL 148*  --  169   NEUTROS PCT %  --   --  75   LYMPHS PCT %  --   --  15   MONOS PCT %  --   --  8   EOS PCT %  --   --  2    < > = values in this interval not displayed  Results from last 7 days   Lab Units 01/11/23  0437 01/10/23  1209   SODIUM mmol/L 138 137   POTASSIUM mmol/L 3 8 3 7   CHLORIDE mmol/L 105 103   CO2 mmol/L 26 26   BUN mg/dL 21 19   CREATININE mg/dL 0 58* 0 56*   ANION GAP mmol/L 7 8   CALCIUM mg/dL 7 7* 9 0   ALBUMIN g/dL  --  3 5   TOTAL BILIRUBIN mg/dL  --  0 46   ALK PHOS U/L  --  93   ALT U/L  --  37   AST U/L  --  39   GLUCOSE RANDOM mg/dL 97 120     Results from last 7 days   Lab Units 01/11/23  0437   INR  3 51*     Results from last 7 days   Lab Units 01/11/23  1142 01/11/23  0708 01/10/23  2051 01/10/23  1927   POC GLUCOSE mg/dl 103 105 117 157*               Lines/Drains:  Invasive Devices     Peripheral Intravenous Line  Duration           Peripheral IV 01/10/23 Left Antecubital 1 day    Peripheral IV 01/10/23 Right Antecubital 1 day                  Telemetry:  Telemetry Orders (From admission, onward)             48 Hour Telemetry Monitoring  Continuous x 48 hours        References:    Telemetry Guidelines   Question:  Reason for 48 Hour Telemetry  Answer:  Acute Decompensated CHF (continuous diuretic infusion or total diuretic dose > 200 mg daily, associated electrolyte derangement, ionotropic drip, history of ventricular arrhythmia, or new EF <35%)                 Telemetry Reviewed: Atrial fibrillation   HR averaging 80  Indication for Continued Telemetry Use: Arrthymias requiring medical therapy             Imaging: Reviewed radiology reports from this admission including: chest xray    Recent Cultures (last 7 days):         Last 24 Hours Medication List:   Current Facility-Administered Medications   Medication Dose Route Frequency Provider Last Rate   • acetaminophen  650 mg Oral Q6H PRN Anh Purpura, CRNP     • atorvastatin  80 mg Oral Daily With [address], CRNP     • ferrous sulfate  325 mg Oral Once per day on Mon Wed Fri Anh Purpura, CRNP     • heparin (porcine)  3-20 Units/kg/hr (Order-Specific) Intravenous Titrated Namita Giles PA-C     • insulin lispro  1-5 Units Subcutaneous HS Anh Purpura, CRNP     • insulin lispro  1-6 Units Subcutaneous TID AC Anh Purpura, CRNP     • multivitamin-minerals  1 tablet Oral Daily Anh Purpura, CRNP     • pantoprazole  40 mg Intravenous Q12H [address] Gisella Gregg DO     • patient supplied medication   Oral BID Wilmar Villagran PA-C     • phytonadione  2 5 mg Oral Once Namita Giles PA-C     • polyethylene glycol  17 g Oral Daily PRN Anh Purpura, CRNP     • PreserVision AREDS 2  1 capsule Oral BID Wilmar Villagran PA-C          Today, Patient Was Seen By: Namita Giles PA-C    **Please Note: This note may have been constructed using a voice recognition system  **

## 2023-01-12 LAB
ANION GAP SERPL CALCULATED.3IONS-SCNC: 9 MMOL/L (ref 4–13)
APTT PPP: 61 SECONDS (ref 23–37)
APTT PPP: 83 SECONDS (ref 23–37)
BUN SERPL-MCNC: 16 MG/DL (ref 5–25)
CALCIUM SERPL-MCNC: 8.2 MG/DL (ref 8.3–10.1)
CHLORIDE SERPL-SCNC: 109 MMOL/L (ref 96–108)
CO2 SERPL-SCNC: 27 MMOL/L (ref 21–32)
CREAT SERPL-MCNC: 0.67 MG/DL (ref 0.6–1.3)
ERYTHROCYTE [DISTWIDTH] IN BLOOD BY AUTOMATED COUNT: 16.9 % (ref 11.6–15.1)
GFR SERPL CREATININE-BSD FRML MDRD: 81 ML/MIN/1.73SQ M
GLUCOSE SERPL-MCNC: 102 MG/DL (ref 65–140)
GLUCOSE SERPL-MCNC: 112 MG/DL (ref 65–140)
GLUCOSE SERPL-MCNC: 118 MG/DL (ref 65–140)
GLUCOSE SERPL-MCNC: 132 MG/DL (ref 65–140)
GLUCOSE SERPL-MCNC: 158 MG/DL (ref 65–140)
HCT VFR BLD AUTO: 24.3 % (ref 34.8–46.1)
HCT VFR BLD AUTO: 25.8 % (ref 34.8–46.1)
HGB BLD-MCNC: 7.3 G/DL (ref 11.5–15.4)
HGB BLD-MCNC: 7.6 G/DL (ref 11.5–15.4)
INR PPP: 2.69 (ref 0.84–1.19)
MCH RBC QN AUTO: 27.9 PG (ref 26.8–34.3)
MCHC RBC AUTO-ENTMCNC: 30 G/DL (ref 31.4–37.4)
MCV RBC AUTO: 93 FL (ref 82–98)
PLATELET # BLD AUTO: 144 THOUSANDS/UL (ref 149–390)
PMV BLD AUTO: 9.7 FL (ref 8.9–12.7)
POTASSIUM SERPL-SCNC: 4 MMOL/L (ref 3.5–5.3)
PROTHROMBIN TIME: 28.6 SECONDS (ref 11.6–14.5)
RBC # BLD AUTO: 2.62 MILLION/UL (ref 3.81–5.12)
SODIUM SERPL-SCNC: 145 MMOL/L (ref 135–147)
WBC # BLD AUTO: 4.7 THOUSAND/UL (ref 4.31–10.16)

## 2023-01-12 RX ORDER — PHYTONADIONE 5 MG/1
2.5 TABLET ORAL ONCE
Status: COMPLETED | OUTPATIENT
Start: 2023-01-12 | End: 2023-01-12

## 2023-01-12 RX ORDER — POLYETHYLENE GLYCOL 3350 17 G/17G
238 POWDER, FOR SOLUTION ORAL ONCE
Status: COMPLETED | OUTPATIENT
Start: 2023-01-12 | End: 2023-01-12

## 2023-01-12 RX ADMIN — BISACODYL 5 MG: 5 TABLET, COATED ORAL at 20:42

## 2023-01-12 RX ADMIN — ATORVASTATIN CALCIUM 80 MG: 80 TABLET, FILM COATED ORAL at 17:31

## 2023-01-12 RX ADMIN — Medication 1 CAPSULE: at 17:31

## 2023-01-12 RX ADMIN — ACETAMINOPHEN 650 MG: 325 TABLET, FILM COATED ORAL at 10:37

## 2023-01-12 RX ADMIN — Medication 1 CAPSULE: at 08:18

## 2023-01-12 RX ADMIN — Medication 3 MG: at 21:12

## 2023-01-12 RX ADMIN — HEPARIN SODIUM 9 UNITS/KG/HR: 10000 INJECTION, SOLUTION INTRAVENOUS at 23:52

## 2023-01-12 RX ADMIN — POLYETHYLENE GLYCOL 3350 238 G: 17 POWDER, FOR SOLUTION ORAL at 16:30

## 2023-01-12 RX ADMIN — PHYTONADIONE 2.5 MG: 5 TABLET ORAL at 10:37

## 2023-01-12 RX ADMIN — Medication 1 TABLET: at 08:17

## 2023-01-12 RX ADMIN — PANTOPRAZOLE SODIUM 40 MG: 40 INJECTION, POWDER, FOR SOLUTION INTRAVENOUS at 20:42

## 2023-01-12 RX ADMIN — PANTOPRAZOLE SODIUM 40 MG: 40 INJECTION, POWDER, FOR SOLUTION INTRAVENOUS at 08:17

## 2023-01-12 RX ADMIN — BISACODYL 5 MG: 5 TABLET, COATED ORAL at 14:45

## 2023-01-12 NOTE — ASSESSMENT & PLAN NOTE
· Patient has a history of mitral valve replacement with mechanical valve 30 years ago with Dr Marcy Roman  · Has been on chronic anticoagulation with Coumadin    · INR elevated on admission  · Repeat INR in AM   · Continue heparin gtt, hold at 6 am tomorrow for EGD/colonscopy

## 2023-01-12 NOTE — PROGRESS NOTES
Progress Note - [facility]  Kiko Hamm 80 y o  female MRN: [id number]    Unit/Bed#: [address] [place] [address] Encounter: [id number]      Assessment/Plan:  80-year-old female with a past medical history of iron deficient anemia, atrial fibrillation, mitral valve replacement with titanium steel valves on anticoagulation, diabetes mellitus, hyperlipidemia, and CVA who presented to [address] [place] [address] on 1/10 with report of shortness of breath, headache, and weakness      1  Anemia  Anemia most likely multifactoral from underlying coagulopathy, and iron deficiency but need to rule out GI bleed as contributing factor  INR on admission 4 0  INR 2 69  this AM   Patient is on anticoagulation due to atrial fib and mechanical mitral valve replacement  Patient was previously worked up by GI in 2020 due to heme positive stool but EGD and colonoscopy were unrevealing for any GI bleed  Patient's baseline hemoglobin 10-11  Hemoglobin 7 1 on admission  Hemoglobin this a m  7 3  Patient denies any signs of GI bleed  Patient does report dull ache in upper abdomen but denies any abdominal tenderness on examination      -GI plan of care discussed with patient and daughter who was on phone during consultation   -Monitor serial hemoglobin  -Transfuse blood products as needed to keep hemoglobin greater than 7   -Iron supplements per attending team  -Additional dose of vitamin K being given by attending team today  -Continue to hold Coumadin   -Hold heparin infusion at 6 AM tomorrow for EGD and colonoscopy   -Schedule for EGD and colonoscopy tomorrow prep and procedure explained to patient in detail  Further recommendations pending results of EGD and colonoscopy  -Continue supportive care  -Check INR in Am prior to procedure  Subjective:   Sitting in bed in no acute distress  Tolerating diet  Denies nausea or vomiting  Denies epigastric or abdominal pain    No report of blood in stool, blood from rectal area, or black tarry stool  Objective:     Vitals: Blood pressure 122/59, pulse 80, temperature 98 °F (36 7 °C), temperature source Oral, resp  rate 18, height 5' 6" (1 676 m), weight 74 kg (163 lb 2 3 oz), SpO2 98 %  ,Body mass index is 26 33 kg/m²  Intake/Output Summary (Last 24 hours) at 1/12/2023 1338  Last data filed at 1/11/2023 2118  Gross per 24 hour   Intake --   Output 600 ml   Net -600 ml       Physical Exam: Physical Exam  Vitals and nursing note reviewed  Constitutional:       General: She is not in acute distress  Cardiovascular:      Rate and Rhythm: Normal rate  Rhythm irregular  Pulses: Normal pulses  Heart sounds: Normal heart sounds  Pulmonary:      Effort: Pulmonary effort is normal  No respiratory distress  Breath sounds: Normal breath sounds  No stridor  No wheezing, rhonchi or rales  Abdominal:      General: Bowel sounds are normal  There is no distension  Palpations: Abdomen is soft  There is no mass  Tenderness: There is no abdominal tenderness  There is no guarding or rebound  Hernia: No hernia is present  Musculoskeletal:      Right lower leg: No edema  Left lower leg: No edema  Skin:     General: Skin is warm and dry  Capillary Refill: Capillary refill takes less than 2 seconds  Coloration: Skin is not jaundiced or pale  Neurological:      Mental Status: She is alert and oriented to person, place, and time     Psychiatric:         Mood and Affect: Mood normal          Invasive Devices     Peripheral Intravenous Line  Duration           Peripheral IV 01/10/23 Left Antecubital 2 days    Peripheral IV 01/10/23 Right Antecubital 2 days                Current Facility-Administered Medications:   •  acetaminophen (TYLENOL) tablet 650 mg, 650 mg, Oral, Q6H PRN, DUNCAN Bo, 650 mg at 01/12/23 1037  •  atorvastatin (LIPITOR) tablet 80 mg, 80 mg, Oral, Daily With DUNCAN Carr, 80 mg at 01/11/23 1642  • ferrous sulfate tablet 325 mg, 325 mg, Oral, Once per day on Mon Wed Fri, DUNCAN Lindsey, 325 mg at 01/11/23 5198  •  heparin (porcine) 25,000 units in 0 45% NaCl 250 mL infusion (premix), 3-20 Units/kg/hr (Order-Specific), Intravenous, Titrated, Kristofer Caballero, PA-C, Last Rate: 6 8 mL/hr at 01/11/23 2305, 9 Units/kg/hr at 01/11/23 2305  •  insulin lispro (HumaLOG) 100 units/mL subcutaneous injection 1-5 Units, 1-5 Units, Subcutaneous, HS, ROSIE LindseyNP  •  insulin lispro (HumaLOG) 100 units/mL subcutaneous injection 1-6 Units, 1-6 Units, Subcutaneous, TID AC **AND** Fingerstick Glucose (POCT), , , TID AC, DUNCAN Lindsey  •  melatonin tablet 3 mg, 3 mg, Oral, HS, Kristofer Caballero, PA-C, 3 mg at 01/11/23 2114  •  multivitamin-minerals (CENTRUM) tablet 1 tablet, 1 tablet, Oral, Daily, ROSIE LindseyNP, 1 tablet at 01/12/23 2442  •  pantoprazole (PROTONIX) injection 40 mg, 40 mg, Intravenous, Q12H [facility], [facility], 40 mg at 01/12/23 3197  •  patient supplied medication, , Oral, BID, Kevin Si, PA-C, Given at 01/12/23 0818  •  polyethylene glycol (MIRALAX) packet 17 g, 17 g, Oral, Daily PRN, DUNCAN Lindsey  •  PreserVision AREDS 2 CAPS 1 capsule, 1 capsule, Oral, BID, Vivian Vecellio, PA-C, 1 capsule at 01/12/23 0818    Lab Results:   Recent Results (from the past 24 hour(s))   CBC    Collection Time: 01/11/23  2:19 PM   Result Value Ref Range    WBC 4 82 4 31 - 10 16 Thousand/uL    RBC 2 79 (L) 3 81 - 5 12 Million/uL    Hemoglobin 7 7 (L) 11 5 - 15 4 g/dL    Hematocrit 25 3 (L) 34 8 - 46 1 %    MCV 91 82 - 98 fL    MCH 27 6 26 8 - 34 3 pg    MCHC 30 4 (L) 31 4 - 37 4 g/dL    RDW 16 8 (H) 11 6 - 15 1 %    Platelets 844 (L) 494 - 390 Thousands/uL    MPV 9 6 8 9 - 12 7 fL   APTT    Collection Time: 01/11/23  2:19 PM   Result Value Ref Range    PTT 37 23 - 37 seconds   Protime-INR    Collection Time: 01/11/23  2:19 PM   Result Value Ref Range    Protime 31 6 (H) 11 6 - 14 5 seconds    INR 3 06 (H) 0 84 - 1 19   Fingerstick Glucose (POCT)    Collection Time: 01/11/23  3:56 PM   Result Value Ref Range    POC Glucose 94 65 - 140 mg/dl   Fingerstick Glucose (POCT)    Collection Time: 01/11/23  9:01 PM   Result Value Ref Range    POC Glucose 87 65 - 140 mg/dl   APTT    Collection Time: 01/11/23  9:32 PM   Result Value Ref Range     (HH) 23 - 37 seconds   APTT    Collection Time: 01/12/23  4:57 AM   Result Value Ref Range    PTT 83 (H) 23 - 37 seconds   Protime-INR    Collection Time: 01/12/23  4:57 AM   Result Value Ref Range    Protime 28 6 (H) 11 6 - 14 5 seconds    INR 2 69 (H) 0 84 - 1 19   CBC and Platelet    Collection Time: 01/12/23  4:57 AM   Result Value Ref Range    WBC 4 70 4 31 - 10 16 Thousand/uL    RBC 2 62 (L) 3 81 - 5 12 Million/uL    Hemoglobin 7 3 (L) 11 5 - 15 4 g/dL    Hematocrit 24 3 (L) 34 8 - 46 1 %    MCV 93 82 - 98 fL    MCH 27 9 26 8 - 34 3 pg    MCHC 30 0 (L) 31 4 - 37 4 g/dL    RDW 16 9 (H) 11 6 - 15 1 %    Platelets 672 (L) 441 - 390 Thousands/uL    MPV 9 7 8 9 - 12 7 fL   Basic metabolic panel    Collection Time: 01/12/23  4:57 AM   Result Value Ref Range    Sodium 145 135 - 147 mmol/L    Potassium 4 0 3 5 - 5 3 mmol/L    Chloride 109 (H) 96 - 108 mmol/L    CO2 27 21 - 32 mmol/L    ANION GAP 9 4 - 13 mmol/L    BUN 16 5 - 25 mg/dL    Creatinine 0 67 0 60 - 1 30 mg/dL    Glucose 118 65 - 140 mg/dL    Calcium 8 2 (L) 8 3 - 10 1 mg/dL    eGFR 81 ml/min/1 73sq m   Fingerstick Glucose (POCT)    Collection Time: 01/12/23  7:23 AM   Result Value Ref Range    POC Glucose 112 65 - 140 mg/dl   APTT    Collection Time: 01/12/23 11:05 AM   Result Value Ref Range    PTT 61 (H) 23 - 37 seconds   Fingerstick Glucose (POCT)    Collection Time: 01/12/23 11:17 AM   Result Value Ref Range    POC Glucose 132 65 - 140 mg/dl             Imaging Studies: XR chest 1 view portable    Result Date: 1/10/2023  Narrative: CHEST INDICATION:   SOB  COMPARISON:  1122 EXAM PERFORMED/VIEWS:  XR CHEST PORTABLE FINDINGS: Cardiomediastinal silhouette appears borderline enlarged with mild vascular accentuation  Median sternotomy  No pneumothorax or pleural effusion  Bilateral glenohumeral osteoarthritis  Impression: Borderline cardiomegaly Mild vascular congestion Workstation performed: 10 DUNCAN Phillips      Please Note: "This note has been constructed using a voice recognition system  Therefore there may be syntax, spelling, and/or grammatical errors   Please call if you have any questions  "**

## 2023-01-12 NOTE — ASSESSMENT & PLAN NOTE
Patient presented with exertional shortness of breath, orthopnea, headache and weakness    She was found to have hemoglobin 7 1  · S/p 1 unit PRBC, hemoglobin increased to 7 7, remains stable  · 7 3 this morning, repeat later today and transfuse for Hgb < 7  · Positive heme stool in ER noted  · Does have a history of iron deficiency anemia which she has been treating with IV iron infusions this past fall and taking iron 3 days a week  · Follows outpatient hematology  · GI consulted  · plan for EGD/colonoscopy on Friday if INR < 2 5  · Vitamin K given 1/11 with improvement of INR to 2 69  · Vitamin K given again today  · Continue to monitor INR in AM  · Clear liquid diet today  · Last colonoscopy that patient had was when she was 76years old, reports that she did a Cologuard test in between which was negative  · Continue to hold coumadin  · Continue to monitor hemoglobin

## 2023-01-12 NOTE — PLAN OF CARE
Problem: Potential for Falls  Goal: Patient will remain free of falls  Description: INTERVENTIONS:  - Educate patient/family on patient safety including physical limitations  - Instruct patient to call for assistance with activity   - Consult OT/PT to assist with strengthening/mobility   - Keep Call bell within reach  - Keep bed low and locked with side rails adjusted as appropriate  - Keep care items and personal belongings within reach  - Initiate and maintain comfort rounds  - Make Fall Risk Sign visible to staff  - Offer Toileting every 2 Hours, in advance of need  - Initiate/Maintain bed/chair alarm  - Obtain necessary fall risk management equipment: bed/chair alarm  - Apply yellow socks and bracelet for high fall risk patients  - Consider moving patient to room near nurses station  Outcome: Progressing     Problem: PAIN - ADULT  Goal: Verbalizes/displays adequate comfort level or baseline comfort level  Description: Interventions:  - Encourage patient to monitor pain and request assistance  - Assess pain using appropriate pain scale  - Administer analgesics based on type and severity of pain and evaluate response  - Implement non-pharmacological measures as appropriate and evaluate response  - Consider cultural and social influences on pain and pain management  - Notify physician/advanced practitioner if interventions unsuccessful or patient reports new pain  Outcome: Progressing     Problem: INFECTION - ADULT  Goal: Absence or prevention of progression during hospitalization  Description: INTERVENTIONS:  - Assess and monitor for signs and symptoms of infection  - Monitor lab/diagnostic results  - Monitor all insertion sites, i e  indwelling lines, tubes, and drains  - Monitor endotracheal if appropriate and nasal secretions for changes in amount and color  - [place] appropriate cooling/warming therapies per order  - Administer medications as ordered  - Instruct and encourage patient and family to use good hand hygiene technique  - Identify and instruct in appropriate isolation precautions for identified infection/condition  Outcome: Progressing     Problem: SAFETY ADULT  Goal: Patient will remain free of falls  Description: INTERVENTIONS:  - Educate patient/family on patient safety including physical limitations  - Instruct patient to call for assistance with activity   - Consult OT/PT to assist with strengthening/mobility   - Keep Call bell within reach  - Keep bed low and locked with side rails adjusted as appropriate  - Keep care items and personal belongings within reach  - Initiate and maintain comfort rounds  - Make Fall Risk Sign visible to staff  - Offer Toileting every 2 Hours, in advance of need  - Initiate/Maintain bed/chair alarm  - Obtain necessary fall risk management equipment: bed/chair alarm  - Apply yellow socks and bracelet for high fall risk patients  - Consider moving patient to room near nurses station  Outcome: Progressing  Goal: Maintain or return to baseline ADL function  Description: INTERVENTIONS:  - Educate patient/family on patient safety including physical limitations  - Instruct patient to call for assistance with activity   - Consult OT/PT to assist with strengthening/mobility   - Keep Call bell within reach  - Keep bed low and locked with side rails adjusted as appropriate  - Keep care items and personal belongings within reach  - Initiate and maintain comfort rounds  - Make Fall Risk Sign visible to staff  - Offer Toileting every 2 Hours, in advance of need  - Initiate/Maintain bed/chair alarm  - Obtain necessary fall risk management equipment: bed/chair alarm  - Apply yellow socks and bracelet for high fall risk patients  - Consider moving patient to room near nurses station  Outcome: Progressing  Goal: Maintains/Returns to pre admission functional level  Description: INTERVENTIONS:  - Perform BMAT or MOVE assessment daily    - Set and communicate daily mobility goal to care team and patient/family/caregiver  - Collaborate with rehabilitation services on mobility goals if consulted  - Perform Range of Motion 4 times a day  - Reposition patient every 2 hours  - Dangle patient 3 times a day  - Stand patient 3 times a day  - Ambulate patient 3 times a day  - Out of bed to chair 3 times a day   - Out of bed for meals 3 times a day  - Out of bed for toileting  - Record patient progress and toleration of activity level   Outcome: Progressing     Problem: DISCHARGE PLANNING  Goal: Discharge to home or other facility with appropriate resources  Description: INTERVENTIONS:  - Identify barriers to discharge w/patient and caregiver  - Arrange for needed discharge resources and transportation as appropriate  - Identify discharge learning needs (meds, wound care, etc )  - Arrange for interpretive services to assist at discharge as needed  - Refer to Case Management Department for coordinating discharge planning if the patient needs post-hospital services based on physician/advanced practitioner order or complex needs related to functional status, cognitive ability, or social support system  Outcome: Progressing     Problem: Knowledge Deficit  Goal: Patient/family/caregiver demonstrates understanding of disease process, treatment plan, medications, and discharge instructions  Description: Complete learning assessment and assess knowledge base    Interventions:  - Provide teaching at level of understanding  - Provide teaching via preferred learning methods  Outcome: Progressing     Problem: MOBILITY - ADULT  Goal: Maintain or return to baseline ADL function  Description: INTERVENTIONS:  - Educate patient/family on patient safety including physical limitations  - Instruct patient to call for assistance with activity   - Consult OT/PT to assist with strengthening/mobility   - Keep Call bell within reach  - Keep bed low and locked with side rails adjusted as appropriate  - Keep care items and personal belongings within reach  - Initiate and maintain comfort rounds  - Make Fall Risk Sign visible to staff  - Offer Toileting every 2 Hours, in advance of need  - Initiate/Maintain bed/chair alarm  - Obtain necessary fall risk management equipment: bed/chair alarm  - Apply yellow socks and bracelet for high fall risk patients  - Consider moving patient to room near nurses station  Outcome: Progressing  Goal: Maintains/Returns to pre admission functional level  Description: INTERVENTIONS:  - Perform BMAT or MOVE assessment daily    - Set and communicate daily mobility goal to care team and patient/family/caregiver  - Collaborate with rehabilitation services on mobility goals if consulted  - Perform Range of Motion 4 times a day  - Reposition patient every 2 hours    - Dangle patient 3 times a day  - Stand patient 3 times a day  - Ambulate patient 3 times a day  - Out of bed to chair 3 times a day   - Out of bed for meals 3 times a day  - Out of bed for toileting  - Record patient progress and toleration of activity level   Outcome: Progressing

## 2023-01-12 NOTE — ASSESSMENT & PLAN NOTE
Lab Results   Component Value Date    HGBA1C 6 5 (H) 07/06/2022       Recent Labs     01/11/23  1556 01/11/23  2101 01/12/23  0723 01/12/23  1117   POCGLU 94 87 112 132       Blood Sugar Average: Last 72 hrs:  (P) 113 375   · Patient normally takes metformin 500 mg p o  twice daily with meals  · hold during hospitalization    · Will place on sliding scale coverage before meals and at bedtime algorithm 3 with meals and algorithm to at bedtime  · Diabetic diet  · Monitor

## 2023-01-12 NOTE — PROGRESS NOTES
[address]  Progress Note - Daniel Quach 1940, 80 y o  female MRN: [id number]  Unit/Bed#: [address] [address] Encounter: [id number]  Primary Care Provider: Ben Winkler MD   Date and time admitted to hospital: 1/10/2023 11:24 AM    * Anemia  Assessment & Plan  Patient presented with exertional shortness of breath, orthopnea, headache and weakness  She was found to have hemoglobin 7 1  · S/p 1 unit PRBC, hemoglobin increased to 7 7, remains stable  · 7 3 this morning, repeat later today and transfuse for Hgb < 7  · Positive heme stool in ER noted  · Does have a history of iron deficiency anemia which she has been treating with IV iron infusions this past fall and taking iron 3 days a week  · Follows outpatient hematology  · GI consulted  · plan for EGD/colonoscopy on Friday if INR < 2 5  · Vitamin K given 1/11 with improvement of INR to 2 69  · Vitamin K given again today  · Continue to monitor INR in AM  · Clear liquid diet today  · Last colonoscopy that patient had was when she was 76years old, reports that she did a Cologuard test in between which was negative  · Continue to hold coumadin  · Continue to monitor hemoglobin    Chronic atrial fibrillation ([facility] [address] )  Assessment & Plan  · Patient has a history of chronic atrial fibrillation, controlled rate currently in the 80s  · Is on Coumadin 5 mg 5 days a week, with 7 5 mg on Tuesdays and Fridays  · Has a history of a titanium steel mechanical valve replaced 30 years ago  · INR 4 0 on admission, decreasing  · Continue to hold coumadin  · Repeat PT/INR in a m  · Gave 2 5 vitamin K on 1/11 and 1/12  · Continue heparin until 6 am tomorrow for EGD/colonscopy    H/O mitral valve replacement with mechanical valve  Assessment & Plan  · Patient has a history of mitral valve replacement with mechanical valve 30 years ago with Dr Becka Cook  · Has been on chronic anticoagulation with Coumadin    · INR elevated on admission  · Repeat INR in AM   · Continue heparin gtt, hold at 6 am tomorrow for EGD/colonscopy      Iron deficiency anemia  Assessment & Plan  · Patient has a history of iron deficiency anemia  · Follows with Dr Cari Bhat hematology outpatient  · Received 3 iron transfusions this past fall  · Has been taking iron 324 mg p o  every  Monday, Wednesday and Friday  · Iron panel shows decreased iron and iron saturation  · Continue iron suplementation    Type 2 diabetes mellitus without complication, without long-term current use of insulin [facility])  Assessment & Plan  Lab Results   Component Value Date    HGBA1C 6 5 (H) 07/06/2022       Recent Labs     01/11/23  1556 01/11/23  2101 01/12/23  0723 01/12/23  1117   POCGLU 94 87 112 132       Blood Sugar Average: Last 72 hrs:  (P) 113 375   · Patient normally takes metformin 500 mg p o  twice daily with meals  · hold during hospitalization  · Will place on sliding scale coverage before meals and at bedtime algorithm 3 with meals and algorithm to at bedtime  · Diabetic diet  · Monitor    Hypercholesteremia  Assessment & Plan  · Continue patient's home medication of atorvastatin 80 mg p o  daily  · Heart healthy diet    Long term (current) use of anticoagulants  Assessment & Plan  · As noted above patient has history of mitral valve replacement, has been on Coumadin for this  · Patient requiring GI intervention and bridged with heparin gtt    VTE Pharmacologic Prophylaxis: VTE Score: 5 High Risk (Score >/= 5) - Pharmacological DVT Prophylaxis Ordered: heparin drip  Sequential Compression Devices Ordered  Patient Centered Rounds: I performed bedside rounds with nursing staff today  Discussions with Specialists or Other Care Team Provider: nursing, case management, GI    Education and Discussions with Family / Patient: Updated  (daughter) via phone  Time Spent for Care: 30 minutes   More than 50% of total time spent on counseling and coordination of care as described above     Current Length of Stay: 2 day(s)  Current Patient Status: Inpatient   Certification Statement: The patient will continue to require additional inpatient hospital stay due to EGD/colonscopy tomorrow  Discharge Plan: Anticipate discharge in 24-48 hrs to home  Code Status: Level 1 - Full Code    Subjective:   Pt seen and examined this morning at bedside  She continues to feel weak and tired  She has a headache that she took tylenol for  She denies chest pain, shortness of breath, abdominal pain, dizziness, lightheadedness, nausea, or vomiting  She does report that her stomach feels "weird" but she thinks it is likely due to the clear liquid diet and not walking around much like she normally does  Objective:     Vitals:   Temp (24hrs), Av 7 °F (37 1 °C), Min:98 °F (36 7 °C), Max:99 5 °F (37 5 °C)    Temp:  [98 °F (36 7 °C)-99 5 °F (37 5 °C)] 98 °F (36 7 °C)  HR:  [76-85] 80  Resp:  [15-18] 18  BP: (113-124)/(55-65) 122/59  SpO2:  [94 %-98 %] 98 %  Body mass index is 26 33 kg/m²  Input and Output Summary (last 24 hours): Intake/Output Summary (Last 24 hours) at 2023 1341  Last data filed at 2023 2118  Gross per 24 hour   Intake --   Output 600 ml   Net -600 ml       Physical Exam:   Physical Exam  Vitals and nursing note reviewed  Constitutional:       General: She is not in acute distress  Appearance: She is well-developed  HENT:      Head: Normocephalic and atraumatic  Eyes:      Conjunctiva/sclera: Conjunctivae normal    Cardiovascular:      Rate and Rhythm: Normal rate  Rhythm irregular  Heart sounds: Normal heart sounds  No murmur heard  Pulmonary:      Effort: Pulmonary effort is normal  No respiratory distress  Breath sounds: Normal breath sounds  Abdominal:      Palpations: Abdomen is soft  Tenderness: There is no abdominal tenderness  Musculoskeletal:         General: No swelling  Cervical back: Neck supple     Skin:     General: Skin is warm and dry  Capillary Refill: Capillary refill takes less than 2 seconds  Neurological:      General: No focal deficit present  Mental Status: She is alert and oriented to person, place, and time  Psychiatric:         Mood and Affect: Mood normal          Behavior: Behavior normal           Additional Data:     Labs:  Results from last 7 days   Lab Units 01/12/23  0457 01/10/23  2059 01/10/23  1209   WBC Thousand/uL 4 70   < > 5 10   HEMOGLOBIN g/dL 7 3*   < > 7 1*   HEMATOCRIT % 24 3*   < > 24 0*   PLATELETS Thousands/uL 144*   < > 169   NEUTROS PCT %  --   --  75   LYMPHS PCT %  --   --  15   MONOS PCT %  --   --  8   EOS PCT %  --   --  2    < > = values in this interval not displayed  Results from last 7 days   Lab Units 01/12/23 0457 01/11/23  0437 01/10/23  1209   SODIUM mmol/L 145   < > 137   POTASSIUM mmol/L 4 0   < > 3 7   CHLORIDE mmol/L 109*   < > 103   CO2 mmol/L 27   < > 26   BUN mg/dL 16   < > 19   CREATININE mg/dL 0 67   < > 0 56*   ANION GAP mmol/L 9   < > 8   CALCIUM mg/dL 8 2*   < > 9 0   ALBUMIN g/dL  --   --  3 5   TOTAL BILIRUBIN mg/dL  --   --  0 46   ALK PHOS U/L  --   --  93   ALT U/L  --   --  37   AST U/L  --   --  39   GLUCOSE RANDOM mg/dL 118   < > 120    < > = values in this interval not displayed       Results from last 7 days   Lab Units 01/12/23 0457   INR  2 69*     Results from last 7 days   Lab Units 01/12/23  1117 01/12/23  0723 01/11/23  2101 01/11/23  1556 01/11/23  1142 01/11/23  0708 01/10/23  2051 01/10/23  1927   POC GLUCOSE mg/dl 132 112 87 94 103 105 117 157*               Lines/Drains:  Invasive Devices     Peripheral Intravenous Line  Duration           Peripheral IV 01/10/23 Left Antecubital 2 days    Peripheral IV 01/10/23 Right Antecubital 2 days                  Telemetry:  Telemetry Orders (From admission, onward)             48 Hour Telemetry Monitoring  Continuous x 48 hours        References:    Telemetry Guidelines   Question:  Reason for 48 Hour Telemetry  Answer:  Acute CVA (<24 hrs old, hemispheric strokes, selected brainstem strokes, cardiac arrhythmias)                 Telemetry Reviewed: Atrial fibrillation  HR averaging 80  Indication for Continued Telemetry Use: Arrthymias requiring medical therapy             Imaging: No pertinent imaging reviewed  Recent Cultures (last 7 days):         Last 24 Hours Medication List:   Current Facility-Administered Medications   Medication Dose Route Frequency Provider Last Rate   • acetaminophen  650 mg Oral Q6H PRN DUNCAN Mccormick     • atorvastatin  80 mg Oral Daily With [address] Ira Bowman     • ferrous sulfate  325 mg Oral Once per day on Mon Wed Fri DUNCAN Mccormick     • heparin (porcine)  3-20 Units/kg/hr (Order-Specific) Intravenous Titrated Lonnie Arnold PA-C 9 Units/kg/hr (01/11/23 2305)   • insulin lispro  1-5 Units Subcutaneous HS DUNCAN Mccormick     • insulin lispro  1-6 Units Subcutaneous TID AC DUNCAN Mccormick     • melatonin  3 mg Oral HS Lonnie Arnold PA-C     • multivitamin-minerals  1 tablet Oral Daily DUNCAN Mccormick     • pantoprazole  40 mg Intravenous Q12H [address] Gisella Revzachar, DO     • patient supplied medication   Oral BID Dennis Delgado PA-C     • polyethylene glycol  17 g Oral Daily PRN DUNCAN Mccormick     • PreserVision AREDS 2  1 capsule Oral BID Dennis Delgado PA-C          Today, Patient Was Seen By: Lonnie Arnold PA-C    **Please Note: This note may have been constructed using a voice recognition system  **

## 2023-01-12 NOTE — ASSESSMENT & PLAN NOTE
· As noted above patient has history of mitral valve replacement, has been on Coumadin for this    · Patient requiring GI intervention and bridged with heparin gtt

## 2023-01-12 NOTE — ASSESSMENT & PLAN NOTE
· Patient has a history of chronic atrial fibrillation, controlled rate currently in the 80s  · Is on Coumadin 5 mg 5 days a week, with 7 5 mg on Tuesdays and Fridays  · Has a history of a titanium steel mechanical valve replaced 30 years ago  · INR 4 0 on admission, decreasing  · Continue to hold coumadin  · Repeat PT/INR in a m    · Gave 2 5 vitamin K on 1/11 and 1/12  · Continue heparin until 6 am tomorrow for EGD/colonscopy

## 2023-01-12 NOTE — ASSESSMENT & PLAN NOTE
· Patient has a history of iron deficiency anemia  · Follows with Dr Luci Shaffer hematology outpatient  · Received 3 iron transfusions this past fall    · Has been taking iron 324 mg p o  every  Monday, Wednesday and Friday  · Iron panel shows decreased iron and iron saturation  · Continue iron suplementation

## 2023-01-13 ENCOUNTER — ANESTHESIA (INPATIENT)
Dept: PERIOP | Facility: HOSPITAL | Age: 83
End: 2023-01-13

## 2023-01-13 ENCOUNTER — APPOINTMENT (OUTPATIENT)
Dept: PERIOP | Facility: HOSPITAL | Age: 83
End: 2023-01-13

## 2023-01-13 ENCOUNTER — ANESTHESIA EVENT (INPATIENT)
Dept: PERIOP | Facility: HOSPITAL | Age: 83
End: 2023-01-13

## 2023-01-13 LAB
ANION GAP SERPL CALCULATED.3IONS-SCNC: 9 MMOL/L (ref 4–13)
APTT PPP: 82 SECONDS (ref 23–37)
BUN SERPL-MCNC: 13 MG/DL (ref 5–25)
CALCIUM SERPL-MCNC: 8.4 MG/DL (ref 8.3–10.1)
CHLORIDE SERPL-SCNC: 108 MMOL/L (ref 96–108)
CO2 SERPL-SCNC: 27 MMOL/L (ref 21–32)
CREAT SERPL-MCNC: 0.61 MG/DL (ref 0.6–1.3)
ERYTHROCYTE [DISTWIDTH] IN BLOOD BY AUTOMATED COUNT: 16.9 % (ref 11.6–15.1)
GFR SERPL CREATININE-BSD FRML MDRD: 84 ML/MIN/1.73SQ M
GLUCOSE SERPL-MCNC: 105 MG/DL (ref 65–140)
GLUCOSE SERPL-MCNC: 115 MG/DL (ref 65–140)
GLUCOSE SERPL-MCNC: 115 MG/DL (ref 65–140)
GLUCOSE SERPL-MCNC: 117 MG/DL (ref 65–140)
GLUCOSE SERPL-MCNC: 177 MG/DL (ref 65–140)
HCT VFR BLD AUTO: 25.1 % (ref 34.8–46.1)
HGB BLD-MCNC: 7.6 G/DL (ref 11.5–15.4)
INR PPP: 1.95 (ref 0.84–1.19)
MCH RBC QN AUTO: 27.9 PG (ref 26.8–34.3)
MCHC RBC AUTO-ENTMCNC: 30.3 G/DL (ref 31.4–37.4)
MCV RBC AUTO: 92 FL (ref 82–98)
PLATELET # BLD AUTO: 135 THOUSANDS/UL (ref 149–390)
PMV BLD AUTO: 8.9 FL (ref 8.9–12.7)
POTASSIUM SERPL-SCNC: 4 MMOL/L (ref 3.5–5.3)
PROTHROMBIN TIME: 22.3 SECONDS (ref 11.6–14.5)
RBC # BLD AUTO: 2.72 MILLION/UL (ref 3.81–5.12)
SODIUM SERPL-SCNC: 144 MMOL/L (ref 135–147)
WBC # BLD AUTO: 4.66 THOUSAND/UL (ref 4.31–10.16)

## 2023-01-13 PROCEDURE — 0DBL8ZX EXCISION OF TRANSVERSE COLON, VIA NATURAL OR ARTIFICIAL OPENING ENDOSCOPIC, DIAGNOSTIC: ICD-10-PCS | Performed by: INTERNAL MEDICINE

## 2023-01-13 PROCEDURE — 0DB68ZX EXCISION OF STOMACH, VIA NATURAL OR ARTIFICIAL OPENING ENDOSCOPIC, DIAGNOSTIC: ICD-10-PCS | Performed by: INTERNAL MEDICINE

## 2023-01-13 PROCEDURE — 0DBC8ZX EXCISION OF ILEOCECAL VALVE, VIA NATURAL OR ARTIFICIAL OPENING ENDOSCOPIC, DIAGNOSTIC: ICD-10-PCS | Performed by: INTERNAL MEDICINE

## 2023-01-13 PROCEDURE — 0DB98ZX EXCISION OF DUODENUM, VIA NATURAL OR ARTIFICIAL OPENING ENDOSCOPIC, DIAGNOSTIC: ICD-10-PCS | Performed by: INTERNAL MEDICINE

## 2023-01-13 RX ORDER — PROPOFOL 10 MG/ML
INJECTION, EMULSION INTRAVENOUS CONTINUOUS PRN
Status: DISCONTINUED | OUTPATIENT
Start: 2023-01-13 | End: 2023-01-13

## 2023-01-13 RX ORDER — SODIUM CHLORIDE, SODIUM LACTATE, POTASSIUM CHLORIDE, CALCIUM CHLORIDE 600; 310; 30; 20 MG/100ML; MG/100ML; MG/100ML; MG/100ML
INJECTION, SOLUTION INTRAVENOUS CONTINUOUS PRN
Status: DISCONTINUED | OUTPATIENT
Start: 2023-01-13 | End: 2023-01-13

## 2023-01-13 RX ORDER — LIDOCAINE HYDROCHLORIDE 20 MG/ML
INJECTION, SOLUTION EPIDURAL; INFILTRATION; INTRACAUDAL; PERINEURAL AS NEEDED
Status: DISCONTINUED | OUTPATIENT
Start: 2023-01-13 | End: 2023-01-13

## 2023-01-13 RX ORDER — WARFARIN SODIUM 7.5 MG/1
7.5 TABLET ORAL
Status: COMPLETED | OUTPATIENT
Start: 2023-01-13 | End: 2023-01-13

## 2023-01-13 RX ORDER — FERROUS SULFATE 325(65) MG
325 TABLET ORAL
Status: DISCONTINUED | OUTPATIENT
Start: 2023-01-14 | End: 2023-01-17 | Stop reason: HOSPADM

## 2023-01-13 RX ORDER — PROPOFOL 10 MG/ML
INJECTION, EMULSION INTRAVENOUS AS NEEDED
Status: DISCONTINUED | OUTPATIENT
Start: 2023-01-13 | End: 2023-01-13

## 2023-01-13 RX ORDER — ONDANSETRON 2 MG/ML
4 INJECTION INTRAMUSCULAR; INTRAVENOUS ONCE AS NEEDED
Status: DISCONTINUED | OUTPATIENT
Start: 2023-01-13 | End: 2023-01-17 | Stop reason: HOSPADM

## 2023-01-13 RX ORDER — HEPARIN SODIUM 10000 [USP'U]/100ML
3-20 INJECTION, SOLUTION INTRAVENOUS
Status: DISCONTINUED | OUTPATIENT
Start: 2023-01-13 | End: 2023-01-17

## 2023-01-13 RX ADMIN — Medication 1 CAPSULE: at 17:47

## 2023-01-13 RX ADMIN — PROPOFOL 40 MG: 10 INJECTION, EMULSION INTRAVENOUS at 16:36

## 2023-01-13 RX ADMIN — PANTOPRAZOLE SODIUM 40 MG: 40 INJECTION, POWDER, FOR SOLUTION INTRAVENOUS at 08:21

## 2023-01-13 RX ADMIN — SODIUM CHLORIDE, SODIUM LACTATE, POTASSIUM CHLORIDE, AND CALCIUM CHLORIDE: .6; .31; .03; .02 INJECTION, SOLUTION INTRAVENOUS at 16:29

## 2023-01-13 RX ADMIN — PROPOFOL 40 MG: 10 INJECTION, EMULSION INTRAVENOUS at 16:40

## 2023-01-13 RX ADMIN — ACETAMINOPHEN 650 MG: 325 TABLET, FILM COATED ORAL at 20:28

## 2023-01-13 RX ADMIN — PANTOPRAZOLE SODIUM 40 MG: 40 INJECTION, POWDER, FOR SOLUTION INTRAVENOUS at 20:29

## 2023-01-13 RX ADMIN — INSULIN LISPRO 1 UNITS: 100 INJECTION, SOLUTION INTRAVENOUS; SUBCUTANEOUS at 21:46

## 2023-01-13 RX ADMIN — Medication 1 CAPSULE: at 08:15

## 2023-01-13 RX ADMIN — WARFARIN SODIUM 7.5 MG: 7.5 TABLET ORAL at 18:39

## 2023-01-13 RX ADMIN — ATORVASTATIN CALCIUM 80 MG: 80 TABLET, FILM COATED ORAL at 17:46

## 2023-01-13 RX ADMIN — PROPOFOL 100 MCG/KG/MIN: 10 INJECTION, EMULSION INTRAVENOUS at 16:42

## 2023-01-13 RX ADMIN — PROPOFOL 80 MG: 10 INJECTION, EMULSION INTRAVENOUS at 16:33

## 2023-01-13 RX ADMIN — LIDOCAINE HYDROCHLORIDE 70 MG: 20 INJECTION, SOLUTION EPIDURAL; INFILTRATION; INTRACAUDAL; PERINEURAL at 16:33

## 2023-01-13 RX ADMIN — HEPARIN SODIUM 12 UNITS/KG/HR: 10000 INJECTION, SOLUTION INTRAVENOUS at 17:59

## 2023-01-13 RX ADMIN — Medication 3 MG: at 21:45

## 2023-01-13 NOTE — ASSESSMENT & PLAN NOTE
Patient presented with exertional shortness of breath, orthopnea, headache and weakness  She was found to have hemoglobin 7 1  · S/p 1 unit PRBC, hemoglobin increased to 7 7, remains stable  · 7 6 this am   · Positive heme stool in ER noted  · Does have a history of iron deficiency anemia which she has been treating with IV iron infusions this past fall and taking iron 3 days a week  · Follows outpatient hematology  · GI consulted  · plan for EGD/colonoscopy on Friday if INR < 2 5  · Vitamin K given 1/11 with improvement of INR to 2 69  · Vitamin K given again today  · Continue to monitor INR in AM  · Patient underwent EGD and colonoscopy on 1/13, EGD showed gastritis, normal esophagus biopsy obtained to check for H  pylori  · Colonoscopy showed suboptimal prep with liquid stool noted, colon polyp removed, diverticulosis throughout colon and internal hemorrhoids as per GI report    · Last colonoscopy that patient had was when she was 76years old, reports that she did a Cologuard test in between which was negative  · Okay to resume anticoagulation as per GI  · Bridging with heparin; will restart coumadin  · Inr currently 1 95  · Repeat in am

## 2023-01-13 NOTE — ANESTHESIA PREPROCEDURE EVALUATION
Procedure:  COLONOSCOPY  EGD    Relevant Problems   ANESTHESIA (within normal limits)      CARDIO   (+) Abdominal aortic aneurysm (AAA)   (+) Chronic atrial fibrillation (HCC)   (+) Essential hypertension   (+) H/O mitral valve replacement with mechanical valve   (+) Hypercholesteremia      ENDO   (+) Other specified hypothyroidism   (+) Type 2 diabetes mellitus without complication, without long-term current use of insulin (HCC)      GI/HEPATIC   (+) Hepatic steatosis      /RENAL   (+) CKD (chronic kidney disease) stage 2, GFR 60-89 ml/min      HEMATOLOGY   (+) Anemia   (+) Anemia due to chronic kidney disease   (+) Iron deficiency anemia      NEURO/PSYCH   (+) CVA (cerebral vascular accident) ([facility] [address] )   (+) Cerebrovascular accident (CVA) ([facility] [address] )   (+) History of cervical cancer   (+) History of renal calculi      PULMONARY   (+) Obstructive sleep apnea   (+) Pulmonary emphysema (HCC)        Physical Exam    Airway    Mallampati score: II  TM Distance: >3 FB  Neck ROM: full     Dental   lower dentures and upper dentures,     Cardiovascular  Rhythm: regular,     Pulmonary      Other Findings        Anesthesia Plan  ASA Score- 3     Anesthesia Type- IV sedation with anesthesia with ASA Monitors  Additional Monitors:   Airway Plan:           Plan Factors-Exercise tolerance (METS): >4 METS  Chart reviewed  EKG reviewed  Existing labs reviewed  Patient summary reviewed  Patient is not a current smoker  Induction-     Postoperative Plan-     Informed Consent- Anesthetic plan and risks discussed with patient  I personally reviewed this patient with the CRNA  Discussed and agreed on the Anesthesia Plan with the ALFREDO Garza

## 2023-01-13 NOTE — PROGRESS NOTES
[address]  Progress Note - Ramakrishna Arizmendi 1940, 80 y o  female MRN: [id number]  Unit/Bed#: [address] [address] Encounter: [id number]  Primary Care Provider: Mani Booth MD   Date and time admitted to hospital: 1/10/2023 11:24 AM    * Anemia  Assessment & Plan  Patient presented with exertional shortness of breath, orthopnea, headache and weakness  She was found to have hemoglobin 7 1  · S/p 1 unit PRBC, hemoglobin increased to 7 7, remains stable  · 7 6 this am   · Positive heme stool in ER noted  · Does have a history of iron deficiency anemia which she has been treating with IV iron infusions this past fall and taking iron 3 days a week  · Follows outpatient hematology  · GI consulted  · plan for EGD/colonoscopy on Friday if INR < 2 5  · Vitamin K given 1/11 with improvement of INR to 2 69  · Vitamin K given again today  · Continue to monitor INR in AM  · Patient underwent EGD and colonoscopy on 1/13, EGD showed gastritis, normal esophagus biopsy obtained to check for H  pylori  · Colonoscopy showed suboptimal prep with liquid stool noted, colon polyp removed, diverticulosis throughout colon and internal hemorrhoids as per GI report  · Last colonoscopy that patient had was when she was 76years old, reports that she did a Cologuard test in between which was negative  · Okay to resume anticoagulation as per GI  · Bridging with heparin; will restart coumadin  · Inr currently 1 95  · Repeat in am     Chronic atrial fibrillation ([facility] [address] )  Assessment & Plan  · Patient has a history of chronic atrial fibrillation, controlled rate currently in the 80s  · Is on Coumadin 5 mg 5 days a week, with 7 5 mg on Tuesdays and Fridays  · Has a history of a titanium steel mechanical valve replaced 30 years ago  · INR 4 0 on admission, decreasing after administration of vitamin K on 1/11 and 1/12  · Coumadin had been held; GD and colonoscopy performed as noted above    · GI indicated okay to resume anticoagulation  · Bridge with heparin drip  · Will give Coumadin 7 5 mg tonight  · Repeat PT/INR in a m  H/O mitral valve replacement with mechanical valve  Assessment & Plan  · Patient has a history of mitral valve replacement with mechanical valve 30 years ago with Dr Vane De Leon  · Has been on chronic anticoagulation with Coumadin  · INR elevated on admission  · Patient received vitamin K and bridged with heparin drip  · INR dropped down to 1 95, underwent EGD and colonoscopy on 1/13  · Okay to resume anticoagulation, plan as outlined above  Iron deficiency anemia  Assessment & Plan  · Patient has a history of iron deficiency anemia  · Follows with Dr Pinky Valentin hematology outpatient  · Received 3 iron transfusions this past fall  · Has been taking iron 324 mg p o  every  Monday, Wednesday and Friday  · Iron panel shows decreased iron and iron saturation  · Will increase to daily    Long term (current) use of anticoagulants  Assessment & Plan  · As noted above patient has history of mitral valve replacement, has been on Coumadin for this  · Patient requiring GI intervention and bridged with heparin gtt  · Resume after procedure     Hypercholesteremia  Assessment & Plan  · Continue patient's home medication of atorvastatin 80 mg p o  daily  · Heart healthy diet    Type 2 diabetes mellitus without complication, without long-term current use of insulin [facility])  Assessment & Plan  Lab Results   Component Value Date    HGBA1C 6 5 (H) 07/06/2022       Recent Labs     01/12/23  2046 01/13/23  0725 01/13/23  1108 01/13/23  1533   POCGLU 158* 115 115 105       Blood Sugar Average: Last 72 hrs:  (P) 247 [id number]   · Patient normally takes metformin 500 mg p o  twice daily with meals  · hold during hospitalization    · Will place on sliding scale coverage before meals and at bedtime algorithm 3 with meals and algorithm to at bedtime  · Diabetic diet  · Monitor          VTE Pharmacologic Prophylaxis: VTE Score: 5 High Risk (Score >/= 5) - Pharmacological DVT Prophylaxis Ordered: warfarin (Coumadin)  Sequential Compression Devices Ordered  Patient Centered Rounds: I performed bedside rounds with nursing staff today  Discussions with Specialists or Other Care Team Provider: Multidisciplinary team    Education and Discussions with Family / Patient: patient updated her daughte   Time Spent for Care: 45 minutes  More than 50% of total time spent on counseling and coordination of care as described above  Current Length of Stay: 3 day(s)  Current Patient Status: Inpatient   Certification Statement: The patient will continue to require additional inpatient hospital stay due to bridging with heparin to therapeutic INR/coumadin therapy, repeat blood work   Discharge Plan: Anticipate discharge in 48-72 hrs to home  Code Status: Level 1 - Full Code    Subjective:   Seen sitting up in chair resting comfortably  She is listening to an audiobook, is looking forward to having her procedure done so she can have some food to eat  Slept okay  No overt signs of bleeding  Objective:     Vitals:   Temp (24hrs), Av 1 °F (36 7 °C), Min:97 8 °F (36 6 °C), Max:98 5 °F (36 9 °C)    Temp:  [97 8 °F (36 6 °C)-98 5 °F (36 9 °C)] 97 8 °F (36 6 °C)  HR:  [69-85] 69  Resp:  [16-18] 18  BP: (114-147)/(59-69) 127/59  SpO2:  [94 %-98 %] 98 %  Body mass index is 26 33 kg/m²  Input and Output Summary (last 24 hours): Intake/Output Summary (Last 24 hours) at 2023 1802  Last data filed at 2023 1705  Gross per 24 hour   Intake 450 ml   Output --   Net 450 ml       Physical Exam:   Physical Exam  Vitals and nursing note reviewed  HENT:      Head: Normocephalic  Nose: Nose normal       Mouth/Throat:      Mouth: Mucous membranes are moist    Eyes:      Extraocular Movements: Extraocular movements intact        Conjunctiva/sclera: Conjunctivae normal    Cardiovascular:      Rate and Rhythm: Normal rate and regular rhythm  Pulses: Normal pulses  Heart sounds: Murmur heard  Pulmonary:      Effort: Pulmonary effort is normal       Breath sounds: Normal breath sounds  Abdominal:      General: Bowel sounds are normal  There is no distension  Palpations: Abdomen is soft  Tenderness: There is no abdominal tenderness  Genitourinary:     Comments: Voiding spontaneously   Musculoskeletal:         General: Normal range of motion  Cervical back: Normal range of motion  Right lower leg: No edema  Left lower leg: No edema  Skin:     General: Skin is warm and dry  Capillary Refill: Capillary refill takes less than 2 seconds  Coloration: Skin is pale  Neurological:      General: No focal deficit present  Mental Status: She is alert and oriented to person, place, and time  Psychiatric:         Mood and Affect: Mood normal          Behavior: Behavior normal          Thought Content: Thought content normal          Judgment: Judgment normal          Additional Data:     Labs:  Results from last 7 days   Lab Units 01/13/23  0552 01/10/23  2059 01/10/23  1209   WBC Thousand/uL 4 66   < > 5 10   HEMOGLOBIN g/dL 7 6*   < > 7 1*   HEMATOCRIT % 25 1*   < > 24 0*   PLATELETS Thousands/uL 135*   < > 169   NEUTROS PCT %  --   --  75   LYMPHS PCT %  --   --  15   MONOS PCT %  --   --  8   EOS PCT %  --   --  2    < > = values in this interval not displayed       Results from last 7 days   Lab Units 01/13/23  0552 01/11/23  0437 01/10/23  1209   SODIUM mmol/L 144   < > 137   POTASSIUM mmol/L 4 0   < > 3 7   CHLORIDE mmol/L 108   < > 103   CO2 mmol/L 27   < > 26   BUN mg/dL 13   < > 19   CREATININE mg/dL 0 61   < > 0 56*   ANION GAP mmol/L 9   < > 8   CALCIUM mg/dL 8 4   < > 9 0   ALBUMIN g/dL  --   --  3 5   TOTAL BILIRUBIN mg/dL  --   --  0 46   ALK PHOS U/L  --   --  93   ALT U/L  --   --  37   AST U/L  --   --  39   GLUCOSE RANDOM mg/dL 117   < > 120    < > = values in this interval not displayed  Results from last 7 days   Lab Units 01/13/23  0552   INR  1 95*     Results from last 7 days   Lab Units 01/13/23  1533 01/13/23  1108 01/13/23  0725 01/12/23  2046 01/12/23  1545 01/12/23  1117 01/12/23  0723 01/11/23  2101 01/11/23  1556 01/11/23  1142 01/11/23  0708 01/10/23  2051   POC GLUCOSE mg/dl 105 115 115 158* 102 132 112 87 94 103 105 117               Lines/Drains:  Invasive Devices     Peripheral Intravenous Line  Duration           Peripheral IV 01/10/23 Right Antecubital 3 days    Peripheral IV 01/13/23 Left Antecubital <1 day                  Telemetry:  Telemetry Orders (From admission, onward)             48 Hour Telemetry Monitoring  Continuous x 48 hours        References:    Telemetry Guidelines   Question:  Reason for 48 Hour Telemetry  Answer:  Acute CVA (<24 hrs old, hemispheric strokes, selected brainstem strokes, cardiac arrhythmias)                 Telemetry Reviewed: Atrial fibrillation  HR averaging 80s  Indication for Continued Telemetry Use: No indication for continued use  Will discontinue  Imaging: No pertinent imaging reviewed      Recent Cultures (last 7 days):         Last 24 Hours Medication List:   Current Facility-Administered Medications   Medication Dose Route Frequency Provider Last Rate   • acetaminophen  650 mg Oral Q6H PRN Jyoti Bernstein MD     • atorvastatin  80 mg Oral Daily With REBEKAH Merchant MD     • [START ON 1/14/2023] ferrous sulfate  325 mg Oral Daily With Breakfast DUNCAN Manuel     • heparin (porcine)  3-20 Units/kg/hr (Order-Specific) Intravenous Titrated DUNCAN Manuel 12 Units/kg/hr (01/13/23 8599)   • insulin lispro  1-5 Units Subcutaneous HS Jyoti Bernstein MD     • insulin lispro  1-6 Units Subcutaneous TID AC Jyoti Bernstein MD     • melatonin  3 mg Oral HS Jyoti Bernstein MD     • multivitamin-minerals  1 tablet Oral Daily Panchito Menon Edwige Whitley MD     • ondansetron  4 mg Intravenous Once PRN Junior Wall MD     • pantoprazole  40 mg Intravenous Q12H Tenzin Park MD     • patient supplied medication   Oral BID Panchito Ella Tavera MD     • polyethylene glycol  17 g Oral Daily PRN Francesca Obrien MD     • PreserVision AREDS 2  1 capsule Oral BID Glorious Bilis Ella Tavera MD     • warfarin  7 5 mg Oral Once (warfarin) DUNCAN Hebert          Today, Patient Was Seen By: DUNCAN Hebert    **Please Note: This note may have been constructed using a voice recognition system  **

## 2023-01-13 NOTE — ASSESSMENT & PLAN NOTE
· Patient has a history of chronic atrial fibrillation, controlled rate currently in the 80s  · Is on Coumadin 5 mg 5 days a week, with 7 5 mg on Tuesdays and Fridays  · Has a history of a titanium steel mechanical valve replaced 30 years ago  · INR 4 0 on admission, decreasing after administration of vitamin K on 1/11 and 1/12  · Coumadin had been held; GD and colonoscopy performed as noted above  · GI indicated okay to resume anticoagulation  · Bridge with heparin drip  · Will give Coumadin 7 5 mg tonight  · Repeat PT/INR in a m

## 2023-01-13 NOTE — ASSESSMENT & PLAN NOTE
· Patient has a history of iron deficiency anemia  · Follows with Dr Tae Jonas hematology outpatient  · Received 3 iron transfusions this past fall    · Has been taking iron 324 mg p o  every  Monday, Wednesday and Friday  · Iron panel shows decreased iron and iron saturation  · Will increase to daily

## 2023-01-13 NOTE — ADDENDUM NOTE
Addendum  created 01/13/23 1715 by Amina Pro MD    Clinical Note Signed, Intraprocedure Event edited, Intraprocedure Staff edited

## 2023-01-13 NOTE — ASSESSMENT & PLAN NOTE
Lab Results   Component Value Date    HGBA1C 6 5 (H) 07/06/2022       Recent Labs     01/12/23  2046 01/13/23  0725 01/13/23  1108 01/13/23  1533   POCGLU 158* 115 115 105       Blood Sugar Average: Last 72 hrs:  (P) 273 [id number]   · Patient normally takes metformin 500 mg p o  twice daily with meals  · hold during hospitalization    · Will place on sliding scale coverage before meals and at bedtime algorithm 3 with meals and algorithm to at bedtime  · Diabetic diet  · Monitor

## 2023-01-13 NOTE — ASSESSMENT & PLAN NOTE
· As noted above patient has history of mitral valve replacement, has been on Coumadin for this    · Patient requiring GI intervention and bridged with heparin gtt  · Resume after procedure

## 2023-01-13 NOTE — ANESTHESIA POSTPROCEDURE EVALUATION
Post-Op Assessment Note    CV Status:  Stable  Pain Score: 0    Pain management: adequate     Mental Status:  Alert and awake   Hydration Status:  Euvolemic and stable   PONV Controlled:  Controlled   Airway Patency:  Patent      Post Op Vitals Reviewed: Yes      Staff: Anesthesiologist         No notable events documented      /64 (01/13/23 1712) 102/50   Temp      Pulse 77 (01/13/23 1712)79   Resp 18 (01/13/23 1712) 16   SpO2 98 % (01/13/23 1712) 99

## 2023-01-13 NOTE — PLAN OF CARE
Problem: Potential for Falls  Goal: Patient will remain free of falls  Description: INTERVENTIONS:  - Educate patient/family on patient safety including physical limitations  - Instruct patient to call for assistance with activity   - Consult OT/PT to assist with strengthening/mobility   - Keep Call bell within reach  - Keep bed low and locked with side rails adjusted as appropriate  - Keep care items and personal belongings within reach  - Initiate and maintain comfort rounds  - Make Fall Risk Sign visible to staff  - Offer Toileting every 2 Hours, in advance of need  - Initiate/Maintain bed/chair alarm  - Obtain necessary fall risk management equipment: bed/chair alarm  - Apply yellow socks and bracelet for high fall risk patients  - Consider moving patient to room near nurses station  Outcome: Progressing     Problem: PAIN - ADULT  Goal: Verbalizes/displays adequate comfort level or baseline comfort level  Description: Interventions:  - Encourage patient to monitor pain and request assistance  - Assess pain using appropriate pain scale  - Administer analgesics based on type and severity of pain and evaluate response  - Implement non-pharmacological measures as appropriate and evaluate response  - Consider cultural and social influences on pain and pain management  - Notify physician/advanced practitioner if interventions unsuccessful or patient reports new pain  Outcome: Progressing     Problem: INFECTION - ADULT  Goal: Absence or prevention of progression during hospitalization  Description: INTERVENTIONS:  - Assess and monitor for signs and symptoms of infection  - Monitor lab/diagnostic results  - Monitor all insertion sites, i e  indwelling lines, tubes, and drains  - Monitor endotracheal if appropriate and nasal secretions for changes in amount and color  - [place] appropriate cooling/warming therapies per order  - Administer medications as ordered  - Instruct and encourage patient and family to use good hand hygiene technique  - Identify and instruct in appropriate isolation precautions for identified infection/condition  Outcome: Progressing     Problem: MOBILITY - ADULT  Goal: Maintain or return to baseline ADL function  Description: INTERVENTIONS:  - Educate patient/family on patient safety including physical limitations  - Instruct patient to call for assistance with activity   - Consult OT/PT to assist with strengthening/mobility   - Keep Call bell within reach  - Keep bed low and locked with side rails adjusted as appropriate  - Keep care items and personal belongings within reach  - Initiate and maintain comfort rounds  - Make Fall Risk Sign visible to staff  - Offer Toileting every 2 Hours, in advance of need  - Initiate/Maintain bed/chair alarm  - Obtain necessary fall risk management equipment: bed/chair alarm  - Apply yellow socks and bracelet for high fall risk patients  - Consider moving patient to room near nurses station  Outcome: Progressing  Goal: Maintains/Returns to pre admission functional level  Description: INTERVENTIONS:  - Perform BMAT or MOVE assessment daily    - Set and communicate daily mobility goal to care team and patient/family/caregiver  - Collaborate with rehabilitation services on mobility goals if consulted    - Out of bed for toileting  - Record patient progress and toleration of activity level   Outcome: Progressing     Problem: MOBILITY - ADULT  Goal: Maintain or return to baseline ADL function  Description: INTERVENTIONS:  - Educate patient/family on patient safety including physical limitations  - Instruct patient to call for assistance with activity   - Consult OT/PT to assist with strengthening/mobility   - Keep Call bell within reach  - Keep bed low and locked with side rails adjusted as appropriate  - Keep care items and personal belongings within reach  - Initiate and maintain comfort rounds  - Make Fall Risk Sign visible to staff  - Offer Toileting every 2 Hours, in advance of need  - Initiate/Maintain bed/chair alarm  - Obtain necessary fall risk management equipment: bed/chair alarm  - Apply yellow socks and bracelet for high fall risk patients  - Consider moving patient to room near nurses station  Outcome: Progressing  Goal: Maintains/Returns to pre admission functional level  Description: INTERVENTIONS:  - Perform BMAT or MOVE assessment daily    - Set and communicate daily mobility goal to care team and patient/family/caregiver  - Collaborate with rehabilitation services on mobility goals if consulted    - Out of bed for toileting  - Record patient progress and toleration of activity level   Outcome: Progressing

## 2023-01-13 NOTE — ASSESSMENT & PLAN NOTE
· Patient has a history of mitral valve replacement with mechanical valve 30 years ago with Dr Faisal Barrera  · Has been on chronic anticoagulation with Coumadin  · INR elevated on admission  · Patient received vitamin K and bridged with heparin drip  · INR dropped down to 1 95, underwent EGD and colonoscopy on 1/13  · Okay to resume anticoagulation, plan as outlined above

## 2023-01-14 LAB
ANION GAP SERPL CALCULATED.3IONS-SCNC: 10 MMOL/L (ref 4–13)
APTT PPP: 108 SECONDS (ref 23–37)
APTT PPP: 151 SECONDS (ref 23–37)
APTT PPP: 61 SECONDS (ref 23–37)
APTT PPP: 82 SECONDS (ref 23–37)
BUN SERPL-MCNC: 16 MG/DL (ref 5–25)
CALCIUM SERPL-MCNC: 8.2 MG/DL (ref 8.3–10.1)
CHLORIDE SERPL-SCNC: 108 MMOL/L (ref 96–108)
CO2 SERPL-SCNC: 26 MMOL/L (ref 21–32)
CREAT SERPL-MCNC: 0.64 MG/DL (ref 0.6–1.3)
ERYTHROCYTE [DISTWIDTH] IN BLOOD BY AUTOMATED COUNT: 17 % (ref 11.6–15.1)
GFR SERPL CREATININE-BSD FRML MDRD: 82 ML/MIN/1.73SQ M
GLUCOSE SERPL-MCNC: 115 MG/DL (ref 65–140)
GLUCOSE SERPL-MCNC: 117 MG/DL (ref 65–140)
GLUCOSE SERPL-MCNC: 128 MG/DL (ref 65–140)
GLUCOSE SERPL-MCNC: 159 MG/DL (ref 65–140)
GLUCOSE SERPL-MCNC: 206 MG/DL (ref 65–140)
HCT VFR BLD AUTO: 25.7 % (ref 34.8–46.1)
HGB BLD-MCNC: 7.6 G/DL (ref 11.5–15.4)
INR PPP: 1.99 (ref 0.84–1.19)
MCH RBC QN AUTO: 27.5 PG (ref 26.8–34.3)
MCHC RBC AUTO-ENTMCNC: 29.6 G/DL (ref 31.4–37.4)
MCV RBC AUTO: 93 FL (ref 82–98)
PLATELET # BLD AUTO: 160 THOUSANDS/UL (ref 149–390)
PMV BLD AUTO: 10.1 FL (ref 8.9–12.7)
POTASSIUM SERPL-SCNC: 4 MMOL/L (ref 3.5–5.3)
PROTHROMBIN TIME: 22.7 SECONDS (ref 11.6–14.5)
RBC # BLD AUTO: 2.76 MILLION/UL (ref 3.81–5.12)
SODIUM SERPL-SCNC: 144 MMOL/L (ref 135–147)
WBC # BLD AUTO: 4.27 THOUSAND/UL (ref 4.31–10.16)

## 2023-01-14 RX ORDER — WARFARIN SODIUM 7.5 MG/1
7.5 TABLET ORAL
Status: COMPLETED | OUTPATIENT
Start: 2023-01-14 | End: 2023-01-14

## 2023-01-14 RX ADMIN — ATORVASTATIN CALCIUM 80 MG: 80 TABLET, FILM COATED ORAL at 17:15

## 2023-01-14 RX ADMIN — ACETAMINOPHEN 650 MG: 325 TABLET, FILM COATED ORAL at 13:49

## 2023-01-14 RX ADMIN — INSULIN LISPRO 1 UNITS: 100 INJECTION, SOLUTION INTRAVENOUS; SUBCUTANEOUS at 22:05

## 2023-01-14 RX ADMIN — PANTOPRAZOLE SODIUM 40 MG: 40 INJECTION, POWDER, FOR SOLUTION INTRAVENOUS at 08:46

## 2023-01-14 RX ADMIN — ACETAMINOPHEN 650 MG: 325 TABLET, FILM COATED ORAL at 02:07

## 2023-01-14 RX ADMIN — Medication 1 CAPSULE: at 08:42

## 2023-01-14 RX ADMIN — INSULIN LISPRO 2 UNITS: 100 INJECTION, SOLUTION INTRAVENOUS; SUBCUTANEOUS at 11:40

## 2023-01-14 RX ADMIN — HEPARIN SODIUM 9 UNITS/KG/HR: 10000 INJECTION, SOLUTION INTRAVENOUS at 17:16

## 2023-01-14 RX ADMIN — PANTOPRAZOLE SODIUM 40 MG: 40 INJECTION, POWDER, FOR SOLUTION INTRAVENOUS at 21:59

## 2023-01-14 RX ADMIN — WARFARIN SODIUM 7.5 MG: 7.5 TABLET ORAL at 17:15

## 2023-01-14 RX ADMIN — FERROUS SULFATE TAB 325 MG (65 MG ELEMENTAL FE) 325 MG: 325 (65 FE) TAB at 08:46

## 2023-01-14 RX ADMIN — Medication 1 CAPSULE: at 17:17

## 2023-01-14 RX ADMIN — IRON SUCROSE 200 MG: 20 INJECTION, SOLUTION INTRAVENOUS at 08:29

## 2023-01-14 RX ADMIN — Medication 3 MG: at 21:58

## 2023-01-14 RX ADMIN — Medication 1 TABLET: at 08:46

## 2023-01-14 NOTE — ASSESSMENT & PLAN NOTE
· Patient has a history of iron deficiency anemia  · Follows with Dr Tay Fernando hematology outpatient  · Received 3 iron transfusions this past fall    · Has been taking iron 324 mg p o  every  Monday, Wednesday and Friday  · Iron panel shows decreased iron and iron saturation  · Will increase to daily  · Give dose of Venofer IV today

## 2023-01-14 NOTE — ASSESSMENT & PLAN NOTE
· Patient has a history of mitral valve replacement with mechanical valve 30 years ago with Dr Kirt Walsh  · Has been on chronic anticoagulation with Coumadin  · INR elevated on admission  · Patient received vitamin K and bridged with heparin drip  · INR dropped down to 1 95, underwent EGD and colonoscopy on 1/13  · Okay to resume anticoagulation, plan as outlined above

## 2023-01-14 NOTE — ASSESSMENT & PLAN NOTE
· Patient has a history of chronic atrial fibrillation, controlled rate currently in the 80s  · Is on Coumadin 5 mg 5 days a week, with 7 5 mg on Tuesdays and Fridays  · Has a history of a titanium steel mechanical valve replaced 30 years ago  · INR 4 0 on admission, decreasing after administration of vitamin K on 1/11 and 1/12  · Coumadin had been held; GD and colonoscopy performed as noted above  · GI indicated okay to resume anticoagulation  · Bridge with heparin drip  · Will give additional Coumadin 7 5 mg tonight  · Repeat PT/INR in a m

## 2023-01-14 NOTE — ASSESSMENT & PLAN NOTE
Patient presented with exertional shortness of breath, orthopnea, headache and weakness  She was found to have hemoglobin 7 1  · S/p 1 unit PRBC, hemoglobin increased to 7 7, remains stable  · 7 6 this am   · Positive heme stool in ER noted  · Does have a history of iron deficiency anemia which she has been treating with IV iron infusions this past fall and taking iron 3 days a week  · Follows outpatient hematology  · GI consulted  · Vitamin K given 1/11 with improvement of INR to 2 69  · Vitamin K given again 11/12  · Continue to monitor INR in AM  · Patient underwent EGD and colonoscopy on 1/13, EGD showed gastritis, normal esophagus biopsy obtained to check for H  pylori  · Colonoscopy showed suboptimal prep with liquid stool noted, colon polyp removed, diverticulosis throughout colon and internal hemorrhoids as per GI report    · Last colonoscopy that patient had was when she was 76years old, reports that she did a Cologuard test in between which was negative  · Okay to resume anticoagulation as per GI  · Bridging with heparin; will restart coumadin  · Inr currently 1 99  · Repeat in am

## 2023-01-14 NOTE — ASSESSMENT & PLAN NOTE
· As noted above patient has history of mitral valve replacement, has been on Coumadin for this    · Patient requiring GI intervention and bridged with heparin gtt  · Resumed after procedure

## 2023-01-14 NOTE — PROGRESS NOTES
[facility] 45  Progress Note - Yeni Arrington 1940, 80 y o  female MRN: [id number]  Unit/Bed#: [address] [address] Encounter: [id number]  Primary Care Provider: Mary Yi MD   Date and time admitted to hospital: 1/10/2023 11:24 AM    * Anemia  Assessment & Plan  Patient presented with exertional shortness of breath, orthopnea, headache and weakness  She was found to have hemoglobin 7 1  · S/p 1 unit PRBC, hemoglobin increased to 7 7, remains stable  · 7 6 this am   · Positive heme stool in ER noted  · Does have a history of iron deficiency anemia which she has been treating with IV iron infusions this past fall and taking iron 3 days a week  · Follows outpatient hematology  · GI consulted  · Vitamin K given 1/11 with improvement of INR to 2 69  · Vitamin K given again 11/12  · Continue to monitor INR in AM  · Patient underwent EGD and colonoscopy on 1/13, EGD showed gastritis, normal esophagus biopsy obtained to check for H  pylori  · Colonoscopy showed suboptimal prep with liquid stool noted, colon polyp removed, diverticulosis throughout colon and internal hemorrhoids as per GI report  · Last colonoscopy that patient had was when she was 76years old, reports that she did a Cologuard test in between which was negative  · Okay to resume anticoagulation as per GI  · Bridging with heparin; will restart coumadin  · Inr currently 1 99  · Repeat in am     Chronic atrial fibrillation ([facility] [address] )  Assessment & Plan  · Patient has a history of chronic atrial fibrillation, controlled rate currently in the 80s  · Is on Coumadin 5 mg 5 days a week, with 7 5 mg on Tuesdays and Fridays  · Has a history of a titanium steel mechanical valve replaced 30 years ago  · INR 4 0 on admission, decreasing after administration of vitamin K on 1/11 and 1/12  · Coumadin had been held; GD and colonoscopy performed as noted above    · GI indicated okay to resume anticoagulation  · Bridge with heparin drip  · Will give additional Coumadin 7 5 mg tonight  · Repeat PT/INR in a m  H/O mitral valve replacement with mechanical valve  Assessment & Plan  · Patient has a history of mitral valve replacement with mechanical valve 30 years ago with Dr Amy Tinajero  · Has been on chronic anticoagulation with Coumadin  · INR elevated on admission  · Patient received vitamin K and bridged with heparin drip  · INR dropped down to 1 95, underwent EGD and colonoscopy on 1/13  · Okay to resume anticoagulation, plan as outlined above  Iron deficiency anemia  Assessment & Plan  · Patient has a history of iron deficiency anemia  · Follows with Dr Cari Bhat hematology outpatient  · Received 3 iron transfusions this past fall  · Has been taking iron 324 mg p o  every  Monday, Wednesday and Friday  · Iron panel shows decreased iron and iron saturation  · Will increase to daily  · Give dose of Venofer IV today    Long term (current) use of anticoagulants  Assessment & Plan  · As noted above patient has history of mitral valve replacement, has been on Coumadin for this  · Patient requiring GI intervention and bridged with heparin gtt  · Resumed after procedure     Hypercholesteremia  Assessment & Plan  · Continue patient's home medication of atorvastatin 80 mg p o  daily  · Heart healthy diet    Type 2 diabetes mellitus without complication, without long-term current use of insulin [facility])  Assessment & Plan  Lab Results   Component Value Date    HGBA1C 6 5 (H) 07/06/2022       Recent Labs     01/13/23  1533 01/13/23  2101 01/14/23  0730 01/14/23  1127   POCGLU 105 177* 128 206*       Blood Sugar Average: Last 72 hrs:  (P) 124 [id number]   · Patient normally takes metformin 500 mg p o  twice daily with meals  · hold during hospitalization    · Will place on sliding scale coverage before meals and at bedtime algorithm 3 with meals and algorithm to at bedtime  · Diabetic diet  · Monitor          VTE Pharmacologic Prophylaxis: VTE Score: 5 High Risk (Score >/= 5) - Pharmacological DVT Prophylaxis Ordered: heparin drip  Sequential Compression Devices Ordered  Patient Centered Rounds: I performed bedside rounds with nursing staff today  Discussions with Specialists or Other Care Team Provider: Case management    Education and Discussions with Family / Patient: Patient indicated she would update family  Time Spent for Care: 45 minutes  More than 50% of total time spent on counseling and coordination of care as described above  Current Length of Stay: 4 day(s)  Current Patient Status: Inpatient   Certification Statement: The patient will continue to require additional inpatient hospital stay due to Continue to bridge with heparin, additional Coumadin tonight repeat INR in a m , repeat CBC in a m  Discharge Plan: Anticipate discharge in 24-48 hrs to home  Code Status: Level 1 - Full Code    Subjective:   Patient seen sitting up in bed listening to her audiobook resting comfortably  Reports that her back is sore, mattresses not very comfortable  Good appetite this morning no nausea or vomiting  We did discuss her iron deficiency, she is agreeable to receiving IV iron today  Objective:     Vitals:   Temp (24hrs), Av 2 °F (36 8 °C), Min:97 5 °F (36 4 °C), Max:99 3 °F (37 4 °C)    Temp:  [97 5 °F (36 4 °C)-99 3 °F (37 4 °C)] 99 3 °F (37 4 °C)  HR:  [67-86] 79  Resp:  [16-18] 18  BP: ()/(43-78) 123/78  SpO2:  [93 %-98 %] 93 %  Body mass index is 26 33 kg/m²  Input and Output Summary (last 24 hours): Intake/Output Summary (Last 24 hours) at 2023 1437  Last data filed at 2023 1001  Gross per 24 hour   Intake 1830 ml   Output 1150 ml   Net 680 ml       Physical Exam:   Physical Exam  Vitals and nursing note reviewed  Constitutional:       General: She is not in acute distress  Appearance: Normal appearance  HENT:      Head: Normocephalic        Nose: Nose normal       Mouth/Throat:      Mouth: Mucous membranes are moist    Eyes:      Extraocular Movements: Extraocular movements intact  Conjunctiva/sclera: Conjunctivae normal    Cardiovascular:      Rate and Rhythm: Normal rate and regular rhythm  Pulses: Normal pulses  Heart sounds: Murmur heard  Pulmonary:      Effort: Pulmonary effort is normal       Breath sounds: Normal breath sounds  Abdominal:      General: Bowel sounds are normal  There is no distension  Palpations: Abdomen is soft  Tenderness: There is no abdominal tenderness  Genitourinary:     Comments: Voiding spontaneously   Musculoskeletal:         General: Normal range of motion  Cervical back: Normal range of motion  Right lower leg: No edema  Left lower leg: No edema  Skin:     General: Skin is warm and dry  Capillary Refill: Capillary refill takes less than 2 seconds  Neurological:      General: No focal deficit present  Mental Status: She is alert and oriented to person, place, and time  Psychiatric:         Mood and Affect: Mood normal          Behavior: Behavior normal          Thought Content: Thought content normal          Judgment: Judgment normal          Additional Data:     Labs:  Results from last 7 days   Lab Units 01/14/23  0637 01/10/23  2059 01/10/23  1209   WBC Thousand/uL 4 27*   < > 5 10   HEMOGLOBIN g/dL 7 6*   < > 7 1*   HEMATOCRIT % 25 7*   < > 24 0*   PLATELETS Thousands/uL 160   < > 169   NEUTROS PCT %  --   --  75   LYMPHS PCT %  --   --  15   MONOS PCT %  --   --  8   EOS PCT %  --   --  2    < > = values in this interval not displayed       Results from last 7 days   Lab Units 01/14/23  0637 01/11/23  0437 01/10/23  1209   SODIUM mmol/L 144   < > 137   POTASSIUM mmol/L 4 0   < > 3 7   CHLORIDE mmol/L 108   < > 103   CO2 mmol/L 26   < > 26   BUN mg/dL 16   < > 19   CREATININE mg/dL 0 64   < > 0 56*   ANION GAP mmol/L 10   < > 8   CALCIUM mg/dL 8 2*   < > 9 0   ALBUMIN g/dL  --   --  3 5   TOTAL BILIRUBIN mg/dL  --   --  0 46   ALK PHOS U/L  --   --  93   ALT U/L  --   --  37   AST U/L  --   --  39   GLUCOSE RANDOM mg/dL 117   < > 120    < > = values in this interval not displayed  Results from last 7 days   Lab Units 01/14/23  0637   INR  1 99*     Results from last 7 days   Lab Units 01/14/23  1127 01/14/23  0730 01/13/23  2101 01/13/23  1533 01/13/23  1108 01/13/23  0725 01/12/23  2046 01/12/23  1545 01/12/23  1117 01/12/23  0723 01/11/23  2101 01/11/23  1556   POC GLUCOSE mg/dl 206* 128 177* 105 115 115 158* 102 132 112 87 94               Lines/Drains:  Invasive Devices     Peripheral Intravenous Line  Duration           Peripheral IV 01/10/23 Right Antecubital 4 days    Peripheral IV 01/13/23 Left Antecubital <1 day                      Imaging: No pertinent imaging reviewed      Recent Cultures (last 7 days):         Last 24 Hours Medication List:   Current Facility-Administered Medications   Medication Dose Route Frequency Provider Last Rate   • acetaminophen  650 mg Oral Q6H PRN Christina Walker MD     • atorvastatin  80 mg Oral Daily With Randolph Bunn MD     • ferrous sulfate  325 mg Oral Daily With Breakfast DUNCAN Shetty     • heparin (porcine)  3-20 Units/kg/hr (Order-Specific) Intravenous Titrated DUNCAN Shetty 9 Units/kg/hr (01/14/23 0930)   • insulin lispro  1-5 Units Subcutaneous HS Christina Walker MD     • insulin lispro  1-6 Units Subcutaneous TID AC Christina Walker MD     • melatonin  3 mg Oral HS Christina Walker MD     • multivitamin-minerals  1 tablet Oral Daily Christina Walker MD     • ondansetron  4 mg Intravenous Once PRN Gerri Franklin MD     • pantoprazole  40 mg Intravenous Q12H Chris Jarrett MD     • patient supplied medication   Oral BID Christina Walker MD     • polyethylene glycol  17 g Oral Daily PRN Christina Walker MD     • PreserVision AREDS 2  1 capsule Oral BID Magalys Donohue MD     • warfarin  7 5 mg Oral Once (warfarin) DUNCAN Crouch          Today, Patient Was Seen By: DUNCAN Crouch    **Please Note: This note may have been constructed using a voice recognition system  **

## 2023-01-14 NOTE — PROGRESS NOTES
Progress Note - [facility]  Gastroenterology  Tyrese Vo 80 y o  female MRN: [id number]    Unit/Bed#: [address] [place] [address] Encounter: [id number]      Subjective:   Patient seen and examined, she had yesterday EGD and colonoscopy, postprocedure she denying any abdominal pain, she is tolerating diet, no bowel movement today, I discussed about EGD and colonoscopy finding    Objective:     Vitals: Blood pressure 121/57, pulse 86, temperature 98 °F (36 7 °C), resp  rate 18, height 5' 6" (1 676 m), weight 74 kg (163 lb 2 3 oz), SpO2 95 %  ,Body mass index is 26 33 kg/m²  Intake/Output Summary (Last 24 hours) at 1/14/2023 1218  Last data filed at 1/14/2023 1001  Gross per 24 hour   Intake 1830 ml   Output 1150 ml   Net 680 ml       Physical Exam: Physical Exam  Constitutional:       Appearance: Normal appearance  HENT:      Head: Normocephalic and atraumatic  Nose: Nose normal       Mouth/Throat:      Mouth: Mucous membranes are dry  Eyes:      Extraocular Movements: Extraocular movements intact  Comments: Conjunctiva pallor, no on icterus, no cervical lymphadenopathy   Cardiovascular:      Rate and Rhythm: Normal rate and regular rhythm  Pulses: Normal pulses  Pulmonary:      Effort: Pulmonary effort is normal       Breath sounds: Normal breath sounds  Abdominal:      General: Abdomen is flat  Bowel sounds are normal  There is no distension  Palpations: Abdomen is soft  Musculoskeletal:         General: Normal range of motion  Cervical back: Normal range of motion  Skin:     General: Skin is warm  Neurological:      General: No focal deficit present  Mental Status: She is alert     Psychiatric:         Mood and Affect: Mood normal          Invasive Devices     Peripheral Intravenous Line  Duration           Peripheral IV 01/10/23 Right Antecubital 3 days    Peripheral IV 01/13/23 Left Antecubital <1 day                              Current Facility-Administered Medications:   •  acetaminophen (TYLENOL) tablet 650 mg, 650 mg, Oral, Q6H PRN, Rafa Hess MD, 650 mg at 01/14/23 0207  •  atorvastatin (LIPITOR) tablet 80 mg, 80 mg, Oral, Daily With Oscar Sorto MD, 80 mg at 01/13/23 1746  •  ferrous sulfate tablet 325 mg, 325 mg, Oral, Daily With Breakfast, DUNCAN Cast, 325 mg at 01/14/23 0846  •  heparin (porcine) 25,000 units in 0 45% NaCl 250 mL infusion (premix), 3-20 Units/kg/hr (Order-Specific), Intravenous, Titrated, DUNCAN Cast, Last Rate: 6 8 mL/hr at 01/14/23 0930, 9 Units/kg/hr at 01/14/23 0930  •  insulin lispro (HumaLOG) 100 units/mL subcutaneous injection 1-5 Units, 1-5 Units, Subcutaneous, HS, Rafa Hess MD, 1 Units at 01/13/23 2146  •  insulin lispro (HumaLOG) 100 units/mL subcutaneous injection 1-6 Units, 1-6 Units, Subcutaneous, TID AC **AND** Fingerstick Glucose (POCT), , , TID AC, Panchito Marie MD  •  melatonin tablet 3 mg, 3 mg, Oral, HS, Panchito Marie MD, 3 mg at 01/13/23 2145  •  multivitamin-minerals (CENTRUM) tablet 1 tablet, 1 tablet, Oral, Daily, Rafa Hess MD, 1 tablet at 01/14/23 0846  •  ondansetron (ZOFRAN) injection 4 mg, 4 mg, Intravenous, Once PRN, Saad Nelson MD  •  pantoprazole (PROTONIX) injection 40 mg, 40 mg, Intravenous, Q12H [address], Panchito Marie MD, 40 mg at 01/14/23 0846  •  patient supplied medication, , Oral, BID, Rafa Hess MD, Given at 01/14/23 6130  •  polyethylene glycol (MIRALAX) packet 17 g, 17 g, Oral, Daily PRN, Rafa Hess MD  •  PreserVision AREDS 2 CAPS 1 capsule, 1 capsule, Oral, BID, Panchito Marie MD, 1 capsule at 01/14/23 0842    Lab, Imaging and other studies:    Recent Results (from the past 24 hour(s))   Fingerstick Glucose (POCT)    Collection Time: 01/13/23  3:33 PM   Result Value Ref Range    POC Glucose 105 65 - 140 mg/dl   Fingerstick Glucose (POCT) Collection Time: 01/13/23  9:01 PM   Result Value Ref Range    POC Glucose 177 (H) 65 - 140 mg/dl   APTT    Collection Time: 01/14/23 12:23 AM   Result Value Ref Range    PTT 82 (H) 23 - 37 seconds   CBC    Collection Time: 01/14/23  6:37 AM   Result Value Ref Range    WBC 4 27 (L) 4 31 - 10 16 Thousand/uL    RBC 2 76 (L) 3 81 - 5 12 Million/uL    Hemoglobin 7 6 (L) 11 5 - 15 4 g/dL    Hematocrit 25 7 (L) 34 8 - 46 1 %    MCV 93 82 - 98 fL    MCH 27 5 26 8 - 34 3 pg    MCHC 29 6 (L) 31 4 - 37 4 g/dL    RDW 17 0 (H) 11 6 - 15 1 %    Platelets 937 512 - 024 Thousands/uL    MPV 10 1 8 9 - 12 7 fL   Basic metabolic panel    Collection Time: 01/14/23  6:37 AM   Result Value Ref Range    Sodium 144 135 - 147 mmol/L    Potassium 4 0 3 5 - 5 3 mmol/L    Chloride 108 96 - 108 mmol/L    CO2 26 21 - 32 mmol/L    ANION GAP 10 4 - 13 mmol/L    BUN 16 5 - 25 mg/dL    Creatinine 0 64 0 60 - 1 30 mg/dL    Glucose 117 65 - 140 mg/dL    Calcium 8 2 (L) 8 3 - 10 1 mg/dL    eGFR 82 ml/min/1 73sq m   APTT    Collection Time: 01/14/23  6:37 AM   Result Value Ref Range     (HH) 23 - 37 seconds   Protime-INR    Collection Time: 01/14/23  6:37 AM   Result Value Ref Range    Protime 22 7 (H) 11 6 - 14 5 seconds    INR 1 99 (H) 0 84 - 1 19   Fingerstick Glucose (POCT)    Collection Time: 01/14/23  7:30 AM   Result Value Ref Range    POC Glucose 128 65 - 140 mg/dl   Fingerstick Glucose (POCT)    Collection Time: 01/14/23 11:27 AM   Result Value Ref Range    POC Glucose 206 (H) 65 - 140 mg/dl           Assessment:  Iron deficiency anemia, history of metallic valve on chronic anticoagulation, s/p EGD and colonoscopy, no active GI bleeding was found, again colonoscopy prep was suboptimal, colon polyps were removed, there was evidence of diverticulosis and internal hemorrhoid  Stool always remain dark but she take iron supplement, she received iron infusion yesterday, I discussed about scheduling for small bowel capsule endoscopy as outpatient, she agreed for it    Plan:  Okay to resume anticoagulation with target INR around 2 5  Schedule small bowel capsule endoscopy as outpatient to rule out small bowel AVM  Repeat colonoscopy in 1 year because of suboptimal prep  IV iron infusion

## 2023-01-15 LAB
ANION GAP SERPL CALCULATED.3IONS-SCNC: 8 MMOL/L (ref 4–13)
APTT PPP: 63 SECONDS (ref 23–37)
APTT PPP: 68 SECONDS (ref 23–37)
BUN SERPL-MCNC: 13 MG/DL (ref 5–25)
CALCIUM SERPL-MCNC: 8.2 MG/DL (ref 8.3–10.1)
CHLORIDE SERPL-SCNC: 109 MMOL/L (ref 96–108)
CO2 SERPL-SCNC: 26 MMOL/L (ref 21–32)
CREAT SERPL-MCNC: 0.66 MG/DL (ref 0.6–1.3)
ERYTHROCYTE [DISTWIDTH] IN BLOOD BY AUTOMATED COUNT: 16.7 % (ref 11.6–15.1)
GFR SERPL CREATININE-BSD FRML MDRD: 81 ML/MIN/1.73SQ M
GLUCOSE SERPL-MCNC: 110 MG/DL (ref 65–140)
GLUCOSE SERPL-MCNC: 115 MG/DL (ref 65–140)
GLUCOSE SERPL-MCNC: 118 MG/DL (ref 65–140)
GLUCOSE SERPL-MCNC: 125 MG/DL (ref 65–140)
GLUCOSE SERPL-MCNC: 125 MG/DL (ref 65–140)
GLUCOSE SERPL-MCNC: 128 MG/DL (ref 65–140)
HCT VFR BLD AUTO: 26.2 % (ref 34.8–46.1)
HGB BLD-MCNC: 7.6 G/DL (ref 11.5–15.4)
INR PPP: 2 (ref 0.84–1.19)
MCH RBC QN AUTO: 26.9 PG (ref 26.8–34.3)
MCHC RBC AUTO-ENTMCNC: 29 G/DL (ref 31.4–37.4)
MCV RBC AUTO: 93 FL (ref 82–98)
PLATELET # BLD AUTO: 139 THOUSANDS/UL (ref 149–390)
PMV BLD AUTO: 10 FL (ref 8.9–12.7)
POTASSIUM SERPL-SCNC: 4.1 MMOL/L (ref 3.5–5.3)
PROTHROMBIN TIME: 22.8 SECONDS (ref 11.6–14.5)
RBC # BLD AUTO: 2.83 MILLION/UL (ref 3.81–5.12)
SODIUM SERPL-SCNC: 143 MMOL/L (ref 135–147)
WBC # BLD AUTO: 4.01 THOUSAND/UL (ref 4.31–10.16)

## 2023-01-15 RX ORDER — LANOLIN ALCOHOL/MO/W.PET/CERES
1.5 CREAM (GRAM) TOPICAL
Status: DISCONTINUED | OUTPATIENT
Start: 2023-01-15 | End: 2023-01-17 | Stop reason: HOSPADM

## 2023-01-15 RX ORDER — WARFARIN SODIUM 5 MG/1
10 TABLET ORAL
Status: COMPLETED | OUTPATIENT
Start: 2023-01-15 | End: 2023-01-15

## 2023-01-15 RX ADMIN — Medication 1.5 MG: at 21:18

## 2023-01-15 RX ADMIN — Medication 1 CAPSULE: at 08:51

## 2023-01-15 RX ADMIN — WARFARIN SODIUM 10 MG: 5 TABLET ORAL at 19:48

## 2023-01-15 RX ADMIN — ATORVASTATIN CALCIUM 80 MG: 80 TABLET, FILM COATED ORAL at 17:31

## 2023-01-15 RX ADMIN — FERROUS SULFATE TAB 325 MG (65 MG ELEMENTAL FE) 325 MG: 325 (65 FE) TAB at 08:51

## 2023-01-15 RX ADMIN — Medication 1 TABLET: at 08:51

## 2023-01-15 RX ADMIN — IRON SUCROSE 200 MG: 20 INJECTION, SOLUTION INTRAVENOUS at 13:13

## 2023-01-15 RX ADMIN — PANTOPRAZOLE SODIUM 40 MG: 40 INJECTION, POWDER, FOR SOLUTION INTRAVENOUS at 08:51

## 2023-01-15 RX ADMIN — PANTOPRAZOLE SODIUM 40 MG: 40 INJECTION, POWDER, FOR SOLUTION INTRAVENOUS at 21:18

## 2023-01-15 RX ADMIN — Medication 1 CAPSULE: at 17:31

## 2023-01-15 NOTE — ASSESSMENT & PLAN NOTE
· Patient has a history of chronic atrial fibrillation, controlled rate currently in the 80s  · Is on Coumadin 5 mg 5 days a week, with 7 5 mg on Tuesdays and Fridays  · Has a history of a titanium steel mechanical valve replaced 30 years ago  · INR 4 0 on admission, decreasing after administration of vitamin K on 1/11 and 1/12  · Coumadin had been held; GD and colonoscopy performed as noted above  · GI indicated okay to resume anticoagulation  · Bridge with heparin drip  · Will give Coumadin 10 mg tonight  · Repeat PT/INR in a m

## 2023-01-15 NOTE — PLAN OF CARE
Problem: Potential for Falls  Goal: Patient will remain free of falls  Description: INTERVENTIONS:  - Educate patient/family on patient safety including physical limitations  - Instruct patient to call for assistance with activity   - Consult OT/PT to assist with strengthening/mobility   - Keep Call bell within reach  - Keep bed low and locked with side rails adjusted as appropriate  - Keep care items and personal belongings within reach  - Initiate and maintain comfort rounds  - Make Fall Risk Sign visible to staff  - Offer Toileting every 2 Hours, in advance of need  - Initiate/Maintain bed/chair alarm  - Obtain necessary fall risk management equipment: bed/chair alarm  - Apply yellow socks and bracelet for high fall risk patients  - Consider moving patient to room near nurses station  Outcome: Progressing     Problem: PAIN - ADULT  Goal: Verbalizes/displays adequate comfort level or baseline comfort level  Description: Interventions:  - Encourage patient to monitor pain and request assistance  - Assess pain using appropriate pain scale  - Administer analgesics based on type and severity of pain and evaluate response  - Implement non-pharmacological measures as appropriate and evaluate response  - Consider cultural and social influences on pain and pain management  - Notify physician/advanced practitioner if interventions unsuccessful or patient reports new pain  Outcome: Progressing     Problem: INFECTION - ADULT  Goal: Absence or prevention of progression during hospitalization  Description: INTERVENTIONS:  - Assess and monitor for signs and symptoms of infection  - Monitor lab/diagnostic results  - Monitor all insertion sites, i e  indwelling lines, tubes, and drains  - Monitor endotracheal if appropriate and nasal secretions for changes in amount and color  - [place] appropriate cooling/warming therapies per order  - Administer medications as ordered  - Instruct and encourage patient and family to use good hand hygiene technique  - Identify and instruct in appropriate isolation precautions for identified infection/condition  Outcome: Progressing     Problem: SAFETY ADULT  Goal: Patient will remain free of falls  Description: INTERVENTIONS:  - Educate patient/family on patient safety including physical limitations  - Instruct patient to call for assistance with activity   - Consult OT/PT to assist with strengthening/mobility   - Keep Call bell within reach  - Keep bed low and locked with side rails adjusted as appropriate  - Keep care items and personal belongings within reach  - Initiate and maintain comfort rounds  - Make Fall Risk Sign visible to staff  - Offer Toileting every 2 Hours, in advance of need  - Initiate/Maintain bed/chair alarm  - Obtain necessary fall risk management equipment: bed/chair alarm  - Apply yellow socks and bracelet for high fall risk patients  - Consider moving patient to room near nurses station  Outcome: Progressing  Goal: Maintain or return to baseline ADL function  Description: INTERVENTIONS:  - Educate patient/family on patient safety including physical limitations  - Instruct patient to call for assistance with activity   - Consult OT/PT to assist with strengthening/mobility   - Keep Call bell within reach  - Keep bed low and locked with side rails adjusted as appropriate  - Keep care items and personal belongings within reach  - Initiate and maintain comfort rounds  - Make Fall Risk Sign visible to staff  - Offer Toileting every 2 Hours, in advance of need  - Initiate/Maintain bed/chair alarm  - Obtain necessary fall risk management equipment: bed/chair alarm  - Apply yellow socks and bracelet for high fall risk patients  - Consider moving patient to room near nurses station  Outcome: Progressing  Goal: Maintains/Returns to pre admission functional level  Description: INTERVENTIONS:  - Perform BMAT or MOVE assessment daily    - Set and communicate daily mobility goal to care team and patient/family/caregiver  - Collaborate with rehabilitation services on mobility goals if consulted  - Perform Range of Motion  times a day  - Reposition patient every  hours  - Dangle patient  times a day  - Stand patient  times a day  - Ambulate patient  times a day  - Out of bed to chair  times a day   - Out of bed for meals  times a day  - Out of bed for toileting  - Record patient progress and toleration of activity level   Outcome: Progressing     Problem: DISCHARGE PLANNING  Goal: Discharge to home or other facility with appropriate resources  Description: INTERVENTIONS:  - Identify barriers to discharge w/patient and caregiver  - Arrange for needed discharge resources and transportation as appropriate  - Identify discharge learning needs (meds, wound care, etc )  - Arrange for interpretive services to assist at discharge as needed  - Refer to Case Management Department for coordinating discharge planning if the patient needs post-hospital services based on physician/advanced practitioner order or complex needs related to functional status, cognitive ability, or social support system  Outcome: Progressing     Problem: Knowledge Deficit  Goal: Patient/family/caregiver demonstrates understanding of disease process, treatment plan, medications, and discharge instructions  Description: Complete learning assessment and assess knowledge base    Interventions:  - Provide teaching at level of understanding  - Provide teaching via preferred learning methods  Outcome: Progressing     Problem: MOBILITY - ADULT  Goal: Maintain or return to baseline ADL function  Description: INTERVENTIONS:  - Educate patient/family on patient safety including physical limitations  - Instruct patient to call for assistance with activity   - Consult OT/PT to assist with strengthening/mobility   - Keep Call bell within reach  - Keep bed low and locked with side rails adjusted as appropriate  - Keep care items and personal belongings within reach  - Initiate and maintain comfort rounds  - Make Fall Risk Sign visible to staff  - Offer Toileting every 2 Hours, in advance of need  - Initiate/Maintain bed/chair alarm  - Obtain necessary fall risk management equipment: bed/chair alarm  - Apply yellow socks and bracelet for high fall risk patients  - Consider moving patient to room near nurses station  Outcome: Progressing  Goal: Maintains/Returns to pre admission functional level  Description: INTERVENTIONS:  - Perform BMAT or MOVE assessment daily    - Set and communicate daily mobility goal to care team and patient/family/caregiver  - Collaborate with rehabilitation services on mobility goals if consulted  - Perform Range of Motion  times a day  - Reposition patient every  hours    - Dangle patient  times a day  - Stand patient  times a day  - Ambulate patient  times a day  - Out of bed to chair  times a day   - Out of bed for meals  times a day  - Out of bed for toileting  - Record patient progress and toleration of activity level   Outcome: Progressing

## 2023-01-15 NOTE — PLAN OF CARE
Problem: Potential for Falls  Goal: Patient will remain free of falls  Description: INTERVENTIONS:  - Educate patient/family on patient safety including physical limitations  - Instruct patient to call for assistance with activity   - Consult OT/PT to assist with strengthening/mobility   - Keep Call bell within reach  - Keep bed low and locked with side rails adjusted as appropriate  - Keep care items and personal belongings within reach  - Initiate and maintain comfort rounds  - Make Fall Risk Sign visible to staff  - Offer Toileting every 2 Hours, in advance of need  - Initiate/Maintain bed/chair alarm  - Obtain necessary fall risk management equipment: bed/chair alarm  - Apply yellow socks and bracelet for high fall risk patients  - Consider moving patient to room near nurses station  Outcome: Progressing     Problem: PAIN - ADULT  Goal: Verbalizes/displays adequate comfort level or baseline comfort level  Description: Interventions:  - Encourage patient to monitor pain and request assistance  - Assess pain using appropriate pain scale  - Administer analgesics based on type and severity of pain and evaluate response  - Implement non-pharmacological measures as appropriate and evaluate response  - Consider cultural and social influences on pain and pain management  - Notify physician/advanced practitioner if interventions unsuccessful or patient reports new pain  Outcome: Progressing     Problem: DISCHARGE PLANNING  Goal: Discharge to home or other facility with appropriate resources  Description: INTERVENTIONS:  - Identify barriers to discharge w/patient and caregiver  - Arrange for needed discharge resources and transportation as appropriate  - Identify discharge learning needs (meds, wound care, etc )  - Arrange for interpretive services to assist at discharge as needed  - Refer to Case Management Department for coordinating discharge planning if the patient needs post-hospital services based on physician/advanced practitioner order or complex needs related to functional status, cognitive ability, or social support system  Outcome: Progressing     Problem: Knowledge Deficit  Goal: Patient/family/caregiver demonstrates understanding of disease process, treatment plan, medications, and discharge instructions  Description: Complete learning assessment and assess knowledge base    Interventions:  - Provide teaching at level of understanding  - Provide teaching via preferred learning methods  Outcome: Progressing

## 2023-01-15 NOTE — ASSESSMENT & PLAN NOTE
Lab Results   Component Value Date    HGBA1C 6 5 (H) 07/06/2022       Recent Labs     01/14/23  2057 01/15/23  0728 01/15/23  1104 01/15/23  1135   POCGLU 159* 125 110 125       Blood Sugar Average: Last 72 hrs:  (P) 150 [id number]   · Patient normally takes metformin 500 mg p o  twice daily with meals  · hold during hospitalization    · Will place on sliding scale coverage before meals and at bedtime algorithm 3 with meals and algorithm to at bedtime  · Diabetic diet  · Monitor

## 2023-01-15 NOTE — PROGRESS NOTES
[facility]  Progress Note - Daniel Quach 1940, 80 y o  female MRN: [id number]  Unit/Bed#: [address] [address] Encounter: [id number]  Primary Care Provider: Ben Winkler MD   Date and time admitted to hospital: 1/10/2023 11:24 AM    * Anemia  Assessment & Plan  Patient presented with exertional shortness of breath, orthopnea, headache and weakness  She was found to have hemoglobin 7 1  · S/p 1 unit PRBC, hemoglobin increased to 7 7, remains stable  · 7 6 this am   · Positive heme stool in ER noted  · Does have a history of iron deficiency anemia which she has been treating with IV iron infusions this past fall and taking iron 3 days a week  · Follows outpatient hematology  · GI consulted  · Vitamin K given 1/11 with improvement of INR to 2 69  · Vitamin K given again 11/12  · Continue to monitor INR in AM  · Patient underwent EGD and colonoscopy on 1/13, EGD showed gastritis, normal esophagus biopsy obtained to check for H  pylori  · Colonoscopy showed suboptimal prep with liquid stool noted, colon polyp removed, diverticulosis throughout colon and internal hemorrhoids as per GI report  · Last colonoscopy that patient had was when she was 76years old, reports that she did a Cologuard test in between which was negative  · Okay to resume anticoagulation as per GI  · Bridging with heparin; will restart coumadin  · Inr currently 2 00, discussed  with patient the potential of going home to bridge with Lovenox, indicated that she does not like giving herself injections and would prefer to stay bridging with the heparin in the hospital   · Repeat INR in a m  Chronic atrial fibrillation (HCC)  Assessment & Plan  · Patient has a history of chronic atrial fibrillation, controlled rate currently in the 80s  · Is on Coumadin 5 mg 5 days a week, with 7 5 mg on Tuesdays and Fridays  · Has a history of a titanium steel mechanical valve replaced 30 years ago    · INR 4 0 on admission, decreasing after administration of vitamin K on 1/11 and 1/12  · Coumadin had been held; GD and colonoscopy performed as noted above  · GI indicated okay to resume anticoagulation  · Bridge with heparin drip  · Will give Coumadin 10 mg tonight  · Repeat PT/INR in a m  H/O mitral valve replacement with mechanical valve  Assessment & Plan  · Patient has a history of mitral valve replacement with mechanical valve 30 years ago with Dr Rosalba Paget  · Has been on chronic anticoagulation with Coumadin  · INR elevated on admission  · Patient received vitamin K and bridged with heparin drip  · INR dropped down to 1 95, underwent EGD and colonoscopy on 1/13  · Okay to resume anticoagulation, plan as outlined above  Iron deficiency anemia  Assessment & Plan  · Patient has a history of iron deficiency anemia  · Follows with Dr Isaias Chavez hematology outpatient  · Received 3 iron transfusions this past fall  · Has been taking iron 324 mg p o  every  Monday, Wednesday and Friday  · Iron panel shows decreased iron and iron saturation  · Will increase to daily  · Give dose of Venofer IV 1/14 and 1/15    Long term (current) use of anticoagulants  Assessment & Plan  · As noted above patient has history of mitral valve replacement, has been on Coumadin for this  · Patient requiring GI intervention and bridged with heparin gtt  · Resumed after procedure     Hypercholesteremia  Assessment & Plan  · Continue patient's home medication of atorvastatin 80 mg p o  daily  · Heart healthy diet    Type 2 diabetes mellitus without complication, without long-term current use of insulin [facility])  Assessment & Plan  Lab Results   Component Value Date    HGBA1C 6 5 (H) 07/06/2022       Recent Labs     01/14/23  2057 01/15/23  0728 01/15/23  1104 01/15/23  1135   POCGLU 159* 125 110 125       Blood Sugar Average: Last 72 hrs:  (P) 565 [id number]   · Patient normally takes metformin 500 mg p o  twice daily with meals    · hold during hospitalization  · Will place on sliding scale coverage before meals and at bedtime algorithm 3 with meals and algorithm to at bedtime  · Diabetic diet  · Monitor          VTE Pharmacologic Prophylaxis: VTE Score: 5 High Risk (Score >/= 5) - Pharmacological DVT Prophylaxis Ordered: heparin drip  Sequential Compression Devices Ordered  Patient Centered Rounds: I performed bedside rounds with nursing staff today  Discussions with Specialists or Other Care Team Provider: Nursing    Education and Discussions with Family / Patient: Updated  (daughter) via phone  Time Spent for Care: 45 minutes  More than 50% of total time spent on counseling and coordination of care as described above  Current Length of Stay: 5 day(s)  Current Patient Status: Inpatient   Certification Statement: The patient will continue to require additional inpatient hospital stay due to Continue to bridge with heparin, repeat INR in a m  Discharge Plan: Anticipate discharge in 24-48 hrs to home  Code Status: Level 1 - Full Code    Subjective:   Patient seen sitting up in bed resting comfortably  Reports that she slept better last night, reports that she has less hip discomfort after ambulating in hallway  We did discuss the potential of going home and bridging her with Lovenox shots, patient indicated that she would prefer to stay in the hospital on the heparin drip for bridging purposes  Good appetite no nausea or vomiting  No shortness of breath or chest pain  Objective:     Vitals:   Temp (24hrs), Av 3 °F (37 4 °C), Min:99 °F (37 2 °C), Max:99 6 °F (37 6 °C)    Temp:  [99 °F (37 2 °C)-99 6 °F (37 6 °C)] 99 °F (37 2 °C)  HR:  [76-90] 90  Resp:  [17-19] 19  BP: (116-126)/(44-78) 126/63  SpO2:  [91 %-98 %] 98 %  Body mass index is 26 33 kg/m²  Input and Output Summary (last 24 hours):      Intake/Output Summary (Last 24 hours) at 1/15/2023 1250  Last data filed at 1/15/2023 0901  Gross per 24 hour   Intake 285 ml   Output --   Net 285 ml       Physical Exam:   Physical Exam  Vitals and nursing note reviewed  HENT:      Head: Normocephalic  Nose: Nose normal       Mouth/Throat:      Mouth: Mucous membranes are moist    Eyes:      Extraocular Movements: Extraocular movements intact  Conjunctiva/sclera: Conjunctivae normal    Cardiovascular:      Rate and Rhythm: Normal rate  Rhythm irregular  Pulses: Normal pulses  Heart sounds: Murmur heard  Pulmonary:      Effort: Pulmonary effort is normal       Breath sounds: Normal breath sounds  Abdominal:      General: Bowel sounds are normal  There is no distension  Palpations: Abdomen is soft  Tenderness: There is no abdominal tenderness  Genitourinary:     Comments: Voiding spontaneously  Musculoskeletal:         General: Normal range of motion  Cervical back: Normal range of motion  Right lower leg: No edema  Left lower leg: No edema  Skin:     General: Skin is warm and dry  Capillary Refill: Capillary refill takes less than 2 seconds  Neurological:      General: No focal deficit present  Mental Status: She is alert and oriented to person, place, and time  Psychiatric:         Mood and Affect: Mood normal          Behavior: Behavior normal          Thought Content: Thought content normal          Judgment: Judgment normal           Additional Data:     Labs:  Results from last 7 days   Lab Units 01/15/23  0527 01/10/23  2059 01/10/23  1209   WBC Thousand/uL 4 01*   < > 5 10   HEMOGLOBIN g/dL 7 6*   < > 7 1*   HEMATOCRIT % 26 2*   < > 24 0*   PLATELETS Thousands/uL 139*   < > 169   NEUTROS PCT %  --   --  75   LYMPHS PCT %  --   --  15   MONOS PCT %  --   --  8   EOS PCT %  --   --  2    < > = values in this interval not displayed       Results from last 7 days   Lab Units 01/15/23  0527 01/11/23  0437 01/10/23  1209   SODIUM mmol/L 143   < > 137   POTASSIUM mmol/L 4 1   < > 3 7   CHLORIDE mmol/L 109*   < > 103   CO2 mmol/L 26   < > 26   BUN mg/dL 13   < > 19   CREATININE mg/dL 0 66   < > 0 56*   ANION GAP mmol/L 8   < > 8   CALCIUM mg/dL 8 2*   < > 9 0   ALBUMIN g/dL  --   --  3 5   TOTAL BILIRUBIN mg/dL  --   --  0 46   ALK PHOS U/L  --   --  93   ALT U/L  --   --  37   AST U/L  --   --  39   GLUCOSE RANDOM mg/dL 128   < > 120    < > = values in this interval not displayed  Results from last 7 days   Lab Units 01/15/23  0527   INR  2 00*     Results from last 7 days   Lab Units 01/15/23  1135 01/15/23  1104 01/15/23  0728 01/14/23  2057 01/14/23  1558 01/14/23  1127 01/14/23  0730 01/13/23  2101 01/13/23  1533 01/13/23  1108 01/13/23  0725 01/12/23  2046   POC GLUCOSE mg/dl 125 110 125 159* 115 206* 128 177* 105 115 115 158*               Lines/Drains:  Invasive Devices     Peripheral Intravenous Line  Duration           Peripheral IV 01/14/23 Left;Ventral (anterior) Forearm <1 day                      Imaging: No pertinent imaging reviewed      Recent Cultures (last 7 days):         Last 24 Hours Medication List:   Current Facility-Administered Medications   Medication Dose Route Frequency Provider Last Rate   • acetaminophen  650 mg Oral Q6H PRN Coleman Hutchison MD     • atorvastatin  80 mg Oral Daily With REBEKAH RAMON Leyva MD     • ferrous sulfate  325 mg Oral Daily With Breakfast DUNCAN Blas     • heparin (porcine)  3-20 Units/kg/hr (Order-Specific) Intravenous Titrated DUNCAN Blas 7 Units/kg/hr (01/14/23 2306)   • insulin lispro  1-5 Units Subcutaneous HS Coleman Hutchison MD     • insulin lispro  1-6 Units Subcutaneous TID AC Panchito Melchor Leyva MD     • iron sucrose  200 mg Intravenous Once DUNCAN Blas     • melatonin  3 mg Oral HS Coleman Hutchison MD     • multivitamin-minerals  1 tablet Oral Daily Coleman Hutchison MD     • ondansetron  4 mg Intravenous Once PRN Gabriele Chi MD     • pantoprazole  40 mg Intravenous Q12H Salma Crespo MD     • patient supplied medication   Oral BID Panchito Monique MD     • polyethylene glycol  17 g Oral Daily PRN Kristyn Diego MD     • PreserVision AREDS 2  1 capsule Oral BID Kristyn Diego MD          Today, Patient Was Seen By: Kathlyne Schilder, CRNP    **Please Note: This note may have been constructed using a voice recognition system  **

## 2023-01-15 NOTE — ASSESSMENT & PLAN NOTE
· Patient has a history of iron deficiency anemia  · Follows with Dr Erin Kapoor hematology outpatient  · Received 3 iron transfusions this past fall    · Has been taking iron 324 mg p o  every  Monday, Wednesday and Friday  · Iron panel shows decreased iron and iron saturation  · Will increase to daily  · Give dose of Venofer IV 1/14 and 1/15

## 2023-01-15 NOTE — ASSESSMENT & PLAN NOTE
· Patient has a history of mitral valve replacement with mechanical valve 30 years ago with Dr Carolynn Flaherty  · Has been on chronic anticoagulation with Coumadin  · INR elevated on admission  · Patient received vitamin K and bridged with heparin drip  · INR dropped down to 1 95, underwent EGD and colonoscopy on 1/13  · Okay to resume anticoagulation, plan as outlined above

## 2023-01-15 NOTE — ASSESSMENT & PLAN NOTE
Patient presented with exertional shortness of breath, orthopnea, headache and weakness  She was found to have hemoglobin 7 1  · S/p 1 unit PRBC, hemoglobin increased to 7 7, remains stable  · 7 6 this am   · Positive heme stool in ER noted  · Does have a history of iron deficiency anemia which she has been treating with IV iron infusions this past fall and taking iron 3 days a week  · Follows outpatient hematology  · GI consulted  · Vitamin K given 1/11 with improvement of INR to 2 69  · Vitamin K given again 11/12  · Continue to monitor INR in AM  · Patient underwent EGD and colonoscopy on 1/13, EGD showed gastritis, normal esophagus biopsy obtained to check for H  pylori  · Colonoscopy showed suboptimal prep with liquid stool noted, colon polyp removed, diverticulosis throughout colon and internal hemorrhoids as per GI report  · Last colonoscopy that patient had was when she was 76years old, reports that she did a Cologuard test in between which was negative  · Okay to resume anticoagulation as per GI  · Bridging with heparin; will restart coumadin  · Inr currently 2 00, discussed  with patient the potential of going home to bridge with Lovenox, indicated that she does not like giving herself injections and would prefer to stay bridging with the heparin in the hospital   · Repeat INR in a m

## 2023-01-16 ENCOUNTER — TELEPHONE (OUTPATIENT)
Dept: GASTROENTEROLOGY | Facility: CLINIC | Age: 83
End: 2023-01-16

## 2023-01-16 LAB
ANION GAP SERPL CALCULATED.3IONS-SCNC: 6 MMOL/L (ref 4–13)
APTT PPP: 77 SECONDS (ref 23–37)
BUN SERPL-MCNC: 13 MG/DL (ref 5–25)
CALCIUM SERPL-MCNC: 8.3 MG/DL (ref 8.3–10.1)
CHLORIDE SERPL-SCNC: 104 MMOL/L (ref 96–108)
CO2 SERPL-SCNC: 28 MMOL/L (ref 21–32)
CREAT SERPL-MCNC: 0.67 MG/DL (ref 0.6–1.3)
ERYTHROCYTE [DISTWIDTH] IN BLOOD BY AUTOMATED COUNT: 17.2 % (ref 11.6–15.1)
GFR SERPL CREATININE-BSD FRML MDRD: 81 ML/MIN/1.73SQ M
GLUCOSE SERPL-MCNC: 108 MG/DL (ref 65–140)
GLUCOSE SERPL-MCNC: 110 MG/DL (ref 65–140)
GLUCOSE SERPL-MCNC: 119 MG/DL (ref 65–140)
GLUCOSE SERPL-MCNC: 144 MG/DL (ref 65–140)
GLUCOSE SERPL-MCNC: 202 MG/DL (ref 65–140)
HCT VFR BLD AUTO: 27.7 % (ref 34.8–46.1)
HGB BLD-MCNC: 8 G/DL (ref 11.5–15.4)
INR PPP: 2.21 (ref 0.84–1.19)
MCH RBC QN AUTO: 27.1 PG (ref 26.8–34.3)
MCHC RBC AUTO-ENTMCNC: 28.9 G/DL (ref 31.4–37.4)
MCV RBC AUTO: 94 FL (ref 82–98)
PLATELET # BLD AUTO: 154 THOUSANDS/UL (ref 149–390)
PMV BLD AUTO: 9.8 FL (ref 8.9–12.7)
POTASSIUM SERPL-SCNC: 3.7 MMOL/L (ref 3.5–5.3)
PROTHROMBIN TIME: 24.6 SECONDS (ref 11.6–14.5)
RBC # BLD AUTO: 2.95 MILLION/UL (ref 3.81–5.12)
SODIUM SERPL-SCNC: 138 MMOL/L (ref 135–147)
WBC # BLD AUTO: 4.36 THOUSAND/UL (ref 4.31–10.16)

## 2023-01-16 RX ORDER — WARFARIN SODIUM 5 MG/1
10 TABLET ORAL
Status: COMPLETED | OUTPATIENT
Start: 2023-01-16 | End: 2023-01-16

## 2023-01-16 RX ADMIN — PANTOPRAZOLE SODIUM 40 MG: 40 INJECTION, POWDER, FOR SOLUTION INTRAVENOUS at 21:34

## 2023-01-16 RX ADMIN — Medication 1 CAPSULE: at 08:34

## 2023-01-16 RX ADMIN — FERROUS SULFATE TAB 325 MG (65 MG ELEMENTAL FE) 325 MG: 325 (65 FE) TAB at 08:30

## 2023-01-16 RX ADMIN — Medication 1.5 MG: at 21:35

## 2023-01-16 RX ADMIN — HEPARIN SODIUM 7 UNITS/KG/HR: 10000 INJECTION, SOLUTION INTRAVENOUS at 17:30

## 2023-01-16 RX ADMIN — Medication 1 TABLET: at 08:33

## 2023-01-16 RX ADMIN — WARFARIN SODIUM 10 MG: 5 TABLET ORAL at 17:28

## 2023-01-16 RX ADMIN — Medication 1 CAPSULE: at 17:30

## 2023-01-16 RX ADMIN — ATORVASTATIN CALCIUM 80 MG: 80 TABLET, FILM COATED ORAL at 17:28

## 2023-01-16 RX ADMIN — PANTOPRAZOLE SODIUM 40 MG: 40 INJECTION, POWDER, FOR SOLUTION INTRAVENOUS at 08:33

## 2023-01-16 NOTE — PROGRESS NOTES
[address]  Progress Note - Ramakrishna Arizmendi 1940, 80 y o  female MRN: [id number]  Unit/Bed#: [address] [facility] Encounter: [id number]  Primary Care Provider: Mani Booth MD   Date and time admitted to hospital: 1/10/2023 11:24 AM    * Anemia  Assessment & Plan  Patient presented with exertional shortness of breath, orthopnea, headache and weakness  She was found to have hemoglobin 7 1  · S/p 1 unit PRBC, hemoglobin increased to 7 7, remains stable  · 8 0 this am   · Positive heme stool in ER noted  · Does have a history of iron deficiency anemia which she has been treating with IV iron infusions this past fall and taking iron 3 days a week  · Follows outpatient hematology  · GI consulted  · Vitamin K given 1/11 and 11/12 to achieve INR < 2 5 for EGD/colonoscopy  · Patient underwent EGD and colonoscopy on 1/13, EGD showed gastritis, normal esophagus biopsy obtained to check for H  pylori  · Colonoscopy showed suboptimal prep with liquid stool noted, colon polyp removed, diverticulosis throughout colon and internal hemorrhoids as per GI report  · Last colonoscopy that patient had was when she was 76years old, reports that she did a Cologuard test in between which was negative  · Okay to resume anticoagulation as per GI  · Bridging with heparin;  Coumadin resumed on 1/13  · INR currently 2 21, discussed  with patient the potential of going home to bridge with Lovenox, indicated that she does not like giving herself injections and would prefer to stay bridging with the heparin in the hospital  · Repeat INR in a m  Chronic atrial fibrillation (HCC)  Assessment & Plan  · Patient has a history of chronic atrial fibrillation, controlled rate currently in the 80s  · Is on Coumadin 5 mg 5 days a week, with 7 5 mg on Tuesdays and Fridays  · Has a history of a titanium steel mechanical valve replaced 30 years ago    · INR 4 0 on admission, decreasing after administration of vitamin K on 1/11 and 1/12  · Coumadin had been held; GD and colonoscopy performed as noted above  · GI indicated okay to resume anticoagulation on 1/13  · Bridge with heparin drip  · Will give Coumadin 10 mg tonight, received 10 mg yesterday as well  · Repeat PT/INR in a m  H/O mitral valve replacement with mechanical valve  Assessment & Plan  · Patient has a history of mitral valve replacement with mechanical valve 30 years ago with Dr Kirt Walsh  · Has been on chronic anticoagulation with Coumadin  · INR elevated on admission  · Patient received vitamin K and bridged with heparin drip  · INR dropped down to 1 95, underwent EGD and colonoscopy on 1/13  · Okay to resume anticoagulation, plan as outlined above  Iron deficiency anemia  Assessment & Plan  · Patient has a history of iron deficiency anemia  · Follows with Dr Eros Paris hematology outpatient  · Received 3 iron transfusions this past fall  · Has been taking iron 324 mg p o  every  Monday, Wednesday and Friday  · Iron panel shows decreased iron and iron saturation  · increased to daily  · Give dose of Venofer IV 1/14 and 1/15    Type 2 diabetes mellitus without complication, without long-term current use of insulin [facility])  Assessment & Plan  Lab Results   Component Value Date    HGBA1C 6 5 (H) 07/06/2022       Recent Labs     01/15/23  1613 01/15/23  2057 01/16/23  0714 01/16/23  1106   POCGLU 118 115 119 202*       Blood Sugar Average: Last 72 hrs:  (P) 135 6   · Patient normally takes metformin 500 mg p o  twice daily with meals  · hold during hospitalization  · Will place on sliding scale coverage before meals and at bedtime algorithm 3 with meals and algorithm two at bedtime  · Diabetic diet  · Monitor    Hypercholesteremia  Assessment & Plan  · Continue patient's home medication of atorvastatin 80 mg p o  daily    · Heart healthy diet    Long term (current) use of anticoagulants  Assessment & Plan  · As noted above patient has history of mitral valve replacement, has been on Coumadin for this  · Patient requiring GI intervention and bridged with heparin gtt  · Resumed after procedure         VTE Pharmacologic Prophylaxis: VTE Score: 5 High Risk (Score >/= 5) - Pharmacological DVT Prophylaxis Ordered: heparin drip  Sequential Compression Devices Ordered  bridging to warfarin    Patient Centered Rounds: I performed bedside rounds with nursing staff today  Discussions with Specialists or Other Care Team Provider: nursing, case management    Education and Discussions with Family / Patient: Updated  (daughter) via phone  Time Spent for Care: 20 minutes  More than 50% of total time spent on counseling and coordination of care as described above  Current Length of Stay: 6 day(s)  Current Patient Status: Inpatient   Certification Statement: The patient will continue to require additional inpatient hospital stay due to supratherapeutic INR  Discharge Plan: Anticipate discharge tomorrow to home  Code Status: Level 1 - Full Code    Subjective:   Pt seen and examined this morning at bedside  She reports feeling much better than when she was admitted  She denies fatigue chest pain, shortness of breath, headache, dizziness, nausea, vomiting, or abdominal pain  She still does not want to give herself lovenox injections at home and would prefer to stay in the hospital until her INR is therapeutic  Objective:     Vitals:   Temp (24hrs), Av 7 °F (37 1 °C), Min:98 °F (36 7 °C), Max:99 °F (37 2 °C)    Temp:  [98 °F (36 7 °C)-99 °F (37 2 °C)] 98 °F (36 7 °C)  HR:  [71-82] 81  Resp:  [18-20] 19  BP: (112-121)/(51-65) 121/65  SpO2:  [93 %-96 %] 95 %  Body mass index is 26 33 kg/m²  Input and Output Summary (last 24 hours): Intake/Output Summary (Last 24 hours) at 2023 1145  Last data filed at 2023 0900  Gross per 24 hour   Intake 700 ml   Output --   Net 700 ml       Physical Exam:   Physical Exam  Vitals and nursing note reviewed  Constitutional:       General: She is not in acute distress  Appearance: She is well-developed  HENT:      Head: Normocephalic and atraumatic  Eyes:      Conjunctiva/sclera: Conjunctivae normal    Cardiovascular:      Rate and Rhythm: Normal rate  Rhythm irregular  Heart sounds: No murmur heard  Pulmonary:      Effort: Pulmonary effort is normal  No respiratory distress  Breath sounds: Normal breath sounds  Abdominal:      General: Bowel sounds are normal       Palpations: Abdomen is soft  Tenderness: There is no abdominal tenderness  Musculoskeletal:         General: No swelling  Cervical back: Neck supple  Skin:     General: Skin is warm and dry  Capillary Refill: Capillary refill takes less than 2 seconds  Neurological:      General: No focal deficit present  Mental Status: She is alert and oriented to person, place, and time  Psychiatric:         Mood and Affect: Mood normal           Additional Data:     Labs:  Results from last 7 days   Lab Units 01/16/23  0530 01/10/23  2059 01/10/23  1209   WBC Thousand/uL 4 36   < > 5 10   HEMOGLOBIN g/dL 8 0*   < > 7 1*   HEMATOCRIT % 27 7*   < > 24 0*   PLATELETS Thousands/uL 154   < > 169   NEUTROS PCT %  --   --  75   LYMPHS PCT %  --   --  15   MONOS PCT %  --   --  8   EOS PCT %  --   --  2    < > = values in this interval not displayed  Results from last 7 days   Lab Units 01/16/23  0530 01/11/23  0437 01/10/23  1209   SODIUM mmol/L 138   < > 137   POTASSIUM mmol/L 3 7   < > 3 7   CHLORIDE mmol/L 104   < > 103   CO2 mmol/L 28   < > 26   BUN mg/dL 13   < > 19   CREATININE mg/dL 0 67   < > 0 56*   ANION GAP mmol/L 6   < > 8   CALCIUM mg/dL 8 3   < > 9 0   ALBUMIN g/dL  --   --  3 5   TOTAL BILIRUBIN mg/dL  --   --  0 46   ALK PHOS U/L  --   --  93   ALT U/L  --   --  37   AST U/L  --   --  39   GLUCOSE RANDOM mg/dL 110   < > 120    < > = values in this interval not displayed       Results from last 7 days   Lab Units 01/16/23  0530   INR  2 21*     Results from last 7 days   Lab Units 01/16/23  1106 01/16/23  0714 01/15/23  2057 01/15/23  1613 01/15/23  1135 01/15/23  1104 01/15/23  0728 01/14/23 2057 01/14/23  1558 01/14/23  1127 01/14/23  0730 01/13/23  2101   POC GLUCOSE mg/dl 202* 119 115 118 125 110 125 159* 115 206* 128 177*               Lines/Drains:  Invasive Devices     Peripheral Intravenous Line  Duration           Peripheral IV 01/14/23 Left;Ventral (anterior) Forearm 1 day                      Imaging: No pertinent imaging reviewed  Recent Cultures (last 7 days):         Last 24 Hours Medication List:   Current Facility-Administered Medications   Medication Dose Route Frequency Provider Last Rate   • acetaminophen  650 mg Oral Q6H PRN Coleman Hutchison MD     • atorvastatin  80 mg Oral Daily With [facility] Melchor Leyva MD     • ferrous sulfate  325 mg Oral Daily With Breakfast DUNCAN Blas     • heparin (porcine)  3-20 Units/kg/hr (Order-Specific) Intravenous Titrated DUNCAN Blas 7 Units/kg/hr (01/14/23 2306)   • insulin lispro  1-5 Units Subcutaneous HS Coleman Hutchison MD     • insulin lispro  1-6 Units Subcutaneous TID AC Coleman Hutchison MD     • melatonin  1 5 mg Oral HS Krystina Lind PA-C     • multivitamin-minerals  1 tablet Oral Daily Coleman Hutchison MD     • ondansetron  4 mg Intravenous Once PRN Gabriele Chi MD     • pantoprazole  40 mg Intravenous Q12H Karen Martinez MD     • patient supplied medication   Oral BID Coleman Hutchison MD     • polyethylene glycol  17 g Oral Daily PRN Coleman Hutchison MD     • PreserVision AREDS 2  1 capsule Oral BID Panchito Melchor Leyva MD     • warfarin  10 mg Oral Once (warfarin) Chelsey Addison PA-C          Today, Patient Was Seen By: Chelsey Addison PA-C    **Please Note: This note may have been constructed using a voice recognition system  **

## 2023-01-16 NOTE — TELEPHONE ENCOUNTER
----- Message from Howard Ivey PA-C sent at 1/16/2023 11:06 AM EST -----  Schedule small bowel capsule endoscopy as outpatient to rule out small bowel AVM, I will enter order

## 2023-01-16 NOTE — CASE MANAGEMENT
Case Management Discharge Planning Note    Patient name John Aguayo  Location [address] [address] MRN [id number]  : 1940 Date 2023       Current Admission Date: 1/10/2023  Current Admission Diagnosis:Anemia   Patient Active Problem List    Diagnosis Date Noted   • Anemia 2022   • Fall 2022   • CKD (chronic kidney disease) stage 2, GFR 60-89 ml/min 2022   • Cerebrovascular accident (CVA) ([address] ) 2022   • Diabetes mellitus ([facility] ) 2022   • CVA (cerebral vascular accident) ([facility] [facility] ) 2022   • Hepatic steatosis 2022   • History of cervical cancer 2021   • Essential hypertension 2021   • Other proteinuria 2021   • History of renal calculi 06/15/2021   • Pulmonary emphysema ([facility] ) 2021   • Ataxia 2021   • Abdominal aortic aneurysm (AAA) 2021   • Celiac disease 2020   • Other microscopic hematuria 2020   • Iron deficiency anemia 2020   • Other specified hypothyroidism 2020   • Vitamin B12 deficiency 2020   • Anemia due to chronic kidney disease 02/10/2020   • Allergic rhinitis 02/10/2020   • Type 2 diabetes mellitus without complication, without long-term current use of insulin ([facility] [address] ) 02/10/2020   • Hypercholesteremia 2020   • Chronic atrial fibrillation ([email or web address] ) 2018   • H/O mitral valve replacement with mechanical valve 2018   • Long term (current) use of anticoagulants 2018   • Presence of prosthetic heart valve 2018   • Obstructive sleep apnea 2018      LOS (days): 6  Geometric Mean LOS (GMLOS) (days): 3 60  Days to GMLOS:-2 4     OBJECTIVE:  Risk of Unplanned Readmission Score: 20 34       Current admission status: Inpatient   Preferred Pharmacy:   [address], [phone] [place] [address]  Phone: [phone] Fax: [address], [address] [facility] ([address] [place] [address])  [address] [address] [place] [address] [address] ([address])  [place] [address]  Phone: [phone] Fax: [phone]    Primary Care Provider: Tracy Bucio MD    Primary Insurance: MEDICARE  Secondary Insurance: COMMERCIAL MISCELLANEOUS    DISCHARGE DETAILS:    Discharge planning discussed with[de-identified] Patient  Freedom of Choice: Yes     Comments - Freedom of Choice: Patient's plan is to return home at discharge  She stated that she has an appointment in [address] [place] [address] on Wednesday 1/18 for a macular degeneration study she participates in  She is scheduled to get an injection and stated that this is the last opportunity to get the shot within the study window  SW notified attending and plan is for patient to discharge tomorrow  Were Treatment Team discharge recommendations reviewed with patient/caregiver?: Yes  Did patient/caregiver verbalize understanding of patient care needs?: Yes  Were patient/caregiver advised of the risks associated with not following Treatment Team discharge recommendations?: Yes    [address] [place] [address]         Is the patient interested in [place] [address] at discharge?: No    DME Referral Provided  Referral made for DME?: No    Other Referral/Resources/Interventions Provided:  Interventions: None Indicated    Treatment Team Recommendation: Home  Discharge Destination Plan[de-identified] Home  Transport at Discharge : Family         IMM Given (Date):: 01/16/23  IMM Given to[de-identified] Patient (IMM reviewed with and signed by patient    Copy given to patient and copy placed in scan bin for chart )

## 2023-01-16 NOTE — ASSESSMENT & PLAN NOTE
· Patient has a history of iron deficiency anemia  · Follows with Dr Tay Fernando hematology outpatient  · Received 3 iron transfusions this past fall    · Has been taking iron 324 mg p o  every  Monday, Wednesday and Friday  · Iron panel shows decreased iron and iron saturation  · increased to daily  · Give dose of Venofer IV 1/14 and 1/15

## 2023-01-16 NOTE — ASSESSMENT & PLAN NOTE
· Patient has a history of chronic atrial fibrillation, controlled rate currently in the 80s  · Is on Coumadin 5 mg 5 days a week, with 7 5 mg on Tuesdays and Fridays  · Has a history of a titanium steel mechanical valve replaced 30 years ago  · INR 4 0 on admission, decreasing after administration of vitamin K on 1/11 and 1/12  · Coumadin had been held; GD and colonoscopy performed as noted above  · GI indicated okay to resume anticoagulation on 1/13  · Bridge with heparin drip  · Will give Coumadin 10 mg tonight, received 10 mg yesterday as well  · Repeat PT/INR in a m

## 2023-01-16 NOTE — ASSESSMENT & PLAN NOTE
Patient presented with exertional shortness of breath, orthopnea, headache and weakness  She was found to have hemoglobin 7 1  · S/p 1 unit PRBC, hemoglobin increased to 7 7, remains stable  · 8 0 this am   · Positive heme stool in ER noted  · Does have a history of iron deficiency anemia which she has been treating with IV iron infusions this past fall and taking iron 3 days a week  · Follows outpatient hematology  · GI consulted  · Vitamin K given 1/11 and 11/12 to achieve INR < 2 5 for EGD/colonoscopy  · Patient underwent EGD and colonoscopy on 1/13, EGD showed gastritis, normal esophagus biopsy obtained to check for H  pylori  · Colonoscopy showed suboptimal prep with liquid stool noted, colon polyp removed, diverticulosis throughout colon and internal hemorrhoids as per GI report  · Last colonoscopy that patient had was when she was 76years old, reports that she did a Cologuard test in between which was negative  · Okay to resume anticoagulation as per GI  · Bridging with heparin;  Coumadin resumed on 1/13  · INR currently 2 21, discussed  with patient the potential of going home to bridge with Lovenox, indicated that she does not like giving herself injections and would prefer to stay bridging with the heparin in the hospital  · Repeat INR in a m

## 2023-01-16 NOTE — ASSESSMENT & PLAN NOTE
Lab Results   Component Value Date    HGBA1C 6 5 (H) 07/06/2022       Recent Labs     01/15/23  1613 01/15/23  2057 01/16/23  0714 01/16/23  1106   POCGLU 118 115 119 202*       Blood Sugar Average: Last 72 hrs:  (P) 135 6   · Patient normally takes metformin 500 mg p o  twice daily with meals  · hold during hospitalization    · Will place on sliding scale coverage before meals and at bedtime algorithm 3 with meals and algorithm two at bedtime  · Diabetic diet  · Monitor

## 2023-01-16 NOTE — ASSESSMENT & PLAN NOTE
· Patient has a history of mitral valve replacement with mechanical valve 30 years ago with Dr Beti Herr  · Has been on chronic anticoagulation with Coumadin  · INR elevated on admission  · Patient received vitamin K and bridged with heparin drip  · INR dropped down to 1 95, underwent EGD and colonoscopy on 1/13  · Okay to resume anticoagulation, plan as outlined above

## 2023-01-17 VITALS
SYSTOLIC BLOOD PRESSURE: 118 MMHG | HEART RATE: 75 BPM | TEMPERATURE: 99.2 F | WEIGHT: 163.14 LBS | RESPIRATION RATE: 18 BRPM | OXYGEN SATURATION: 96 % | BODY MASS INDEX: 26.22 KG/M2 | DIASTOLIC BLOOD PRESSURE: 53 MMHG | HEIGHT: 66 IN

## 2023-01-17 LAB
ANION GAP SERPL CALCULATED.3IONS-SCNC: 10 MMOL/L (ref 4–13)
APTT PPP: 92 SECONDS (ref 23–37)
BUN SERPL-MCNC: 17 MG/DL (ref 5–25)
CALCIUM SERPL-MCNC: 8.2 MG/DL (ref 8.3–10.1)
CHLORIDE SERPL-SCNC: 109 MMOL/L (ref 96–108)
CO2 SERPL-SCNC: 25 MMOL/L (ref 21–32)
CREAT SERPL-MCNC: 0.63 MG/DL (ref 0.6–1.3)
ERYTHROCYTE [DISTWIDTH] IN BLOOD BY AUTOMATED COUNT: 17.8 % (ref 11.6–15.1)
GFR SERPL CREATININE-BSD FRML MDRD: 83 ML/MIN/1.73SQ M
GLUCOSE SERPL-MCNC: 124 MG/DL (ref 65–140)
GLUCOSE SERPL-MCNC: 127 MG/DL (ref 65–140)
GLUCOSE SERPL-MCNC: 193 MG/DL (ref 65–140)
HCT VFR BLD AUTO: 26.9 % (ref 34.8–46.1)
HGB BLD-MCNC: 8 G/DL (ref 11.5–15.4)
INR PPP: 3.24 (ref 0.84–1.19)
MCH RBC QN AUTO: 28.3 PG (ref 26.8–34.3)
MCHC RBC AUTO-ENTMCNC: 29.7 G/DL (ref 31.4–37.4)
MCV RBC AUTO: 95 FL (ref 82–98)
PLATELET # BLD AUTO: 149 THOUSANDS/UL (ref 149–390)
PMV BLD AUTO: 10.2 FL (ref 8.9–12.7)
POTASSIUM SERPL-SCNC: 4 MMOL/L (ref 3.5–5.3)
PROTHROMBIN TIME: 33.1 SECONDS (ref 11.6–14.5)
RBC # BLD AUTO: 2.83 MILLION/UL (ref 3.81–5.12)
SODIUM SERPL-SCNC: 144 MMOL/L (ref 135–147)
WBC # BLD AUTO: 4.04 THOUSAND/UL (ref 4.31–10.16)

## 2023-01-17 RX ORDER — FERROUS SULFATE TAB EC 324 MG (65 MG FE EQUIVALENT) 324 (65 FE) MG
324 TABLET DELAYED RESPONSE ORAL
Qty: 180 TABLET | Refills: 0 | Status: SHIPPED | OUTPATIENT
Start: 2023-01-17

## 2023-01-17 RX ORDER — PANTOPRAZOLE SODIUM 40 MG/1
40 TABLET, DELAYED RELEASE ORAL
Status: DISCONTINUED | OUTPATIENT
Start: 2023-01-17 | End: 2023-01-17 | Stop reason: HOSPADM

## 2023-01-17 RX ADMIN — FERROUS SULFATE TAB 325 MG (65 MG ELEMENTAL FE) 325 MG: 325 (65 FE) TAB at 08:30

## 2023-01-17 RX ADMIN — PANTOPRAZOLE SODIUM 40 MG: 40 TABLET, DELAYED RELEASE ORAL at 08:30

## 2023-01-17 RX ADMIN — Medication 1 CAPSULE: at 08:21

## 2023-01-17 RX ADMIN — Medication 1 TABLET: at 08:30

## 2023-01-17 NOTE — ASSESSMENT & PLAN NOTE
· Patient has a history of chronic atrial fibrillation, controlled rate currently in the 80s  · Is on Coumadin 5 mg 5 days a week, with 7 5 mg on Tuesdays and Fridays  · Has a history of a titanium steel mechanical valve replaced 30 years ago  · INR 4 0 on admission, decreasing after administration of vitamin K on 1/11 and 1/12  · Coumadin had been held; GD and colonoscopy performed as noted above    · GI indicated okay to resume anticoagulation on 1/13  · Bridged with heparin drip  · Received 10 mg warfarin on 1/15 and 1/16  · INR now therapeutic at 3 24  · Resume home warfarin

## 2023-01-17 NOTE — PLAN OF CARE
Problem: Potential for Falls  Goal: Patient will remain free of falls  Description: INTERVENTIONS:  - Educate patient/family on patient safety including physical limitations  - Instruct patient to call for assistance with activity   - Consult OT/PT to assist with strengthening/mobility   - Keep Call bell within reach  - Keep bed low and locked with side rails adjusted as appropriate  - Keep care items and personal belongings within reach  - Initiate and maintain comfort rounds  - Make Fall Risk Sign visible to staff  - Offer Toileting every 2 Hours, in advance of need  - Initiate/Maintain bed/chair alarm  - Obtain necessary fall risk management equipment: bed/chair alarm  - Apply yellow socks and bracelet for high fall risk patients  - Consider moving patient to room near nurses station  Outcome: Progressing     Problem: PAIN - ADULT  Goal: Verbalizes/displays adequate comfort level or baseline comfort level  Description: Interventions:  - Encourage patient to monitor pain and request assistance  - Assess pain using appropriate pain scale  - Administer analgesics based on type and severity of pain and evaluate response  - Implement non-pharmacological measures as appropriate and evaluate response  - Consider cultural and social influences on pain and pain management  - Notify physician/advanced practitioner if interventions unsuccessful or patient reports new pain  Outcome: Progressing     Problem: INFECTION - ADULT  Goal: Absence or prevention of progression during hospitalization  Description: INTERVENTIONS:  - Assess and monitor for signs and symptoms of infection  - Monitor lab/diagnostic results  - Monitor all insertion sites, i e  indwelling lines, tubes, and drains  - Monitor endotracheal if appropriate and nasal secretions for changes in amount and color  - [place] appropriate cooling/warming therapies per order  - Administer medications as ordered  - Instruct and encourage patient and family to use good hand hygiene technique  - Identify and instruct in appropriate isolation precautions for identified infection/condition  Outcome: Progressing     Problem: SAFETY ADULT  Goal: Patient will remain free of falls  Description: INTERVENTIONS:  - Educate patient/family on patient safety including physical limitations  - Instruct patient to call for assistance with activity   - Consult OT/PT to assist with strengthening/mobility   - Keep Call bell within reach  - Keep bed low and locked with side rails adjusted as appropriate  - Keep care items and personal belongings within reach  - Initiate and maintain comfort rounds  - Make Fall Risk Sign visible to staff  - Offer Toileting every 2 Hours, in advance of need  - Initiate/Maintain bed/chair alarm  - Obtain necessary fall risk management equipment: bed/chair alarm  - Apply yellow socks and bracelet for high fall risk patients  - Consider moving patient to room near nurses station  Outcome: Progressing  Goal: Maintain or return to baseline ADL function  Description: INTERVENTIONS:  - Educate patient/family on patient safety including physical limitations  - Instruct patient to call for assistance with activity   - Consult OT/PT to assist with strengthening/mobility   - Keep Call bell within reach  - Keep bed low and locked with side rails adjusted as appropriate  - Keep care items and personal belongings within reach  - Initiate and maintain comfort rounds  - Make Fall Risk Sign visible to staff  - Offer Toileting every 2 Hours, in advance of need  - Initiate/Maintain bed/chair alarm  - Obtain necessary fall risk management equipment: bed/chair alarm  - Apply yellow socks and bracelet for high fall risk patients  - Consider moving patient to room near nurses station  Outcome: Progressing  Goal: Maintains/Returns to pre admission functional level  Description: INTERVENTIONS:  - Educate patient/family on patient safety including physical limitations  - Instruct patient to call for assistance with activity   - Consult OT/PT to assist with strengthening/mobility   - Keep Call bell within reach  - Keep bed low and locked with side rails adjusted as appropriate  - Keep care items and personal belongings within reach  - Initiate and maintain comfort rounds  - Make Fall Risk Sign visible to staff  - Offer Toileting every 2 Hours, in advance of need  - Initiate/Maintain bed/chair alarm  - Obtain necessary fall risk management equipment: bed/chair alarm  - Apply yellow socks and bracelet for high fall risk patients  - Consider moving patient to room near nurses station  Outcome: Progressing

## 2023-01-17 NOTE — NURSING NOTE
The patient discharged home  AVS and all the discharge instructions provided tot the patient and the daughter  They both verbalized well understandings  Gabi Perera

## 2023-01-17 NOTE — DISCHARGE SUMMARY
Rupa 45  Discharge- Bee Sos 1940, 80 y o  female MRN: [id number]  Unit/Bed#: [address] [address] Encounter: [id number]  Primary Care Provider: Vania Paula MD   Date and time admitted to hospital: 1/10/2023 11:24 AM    * Anemia  Assessment & Plan  Patient presented with exertional shortness of breath, orthopnea, headache and weakness  She was found to have hemoglobin 7 1  · S/p 1 unit PRBC, hemoglobin increased to 7 7, remains stable  · 8 0 this am   · Positive heme stool in ER noted  · Does have a history of iron deficiency anemia which she has been treating with IV iron infusions this past fall and taking iron 3 days a week  · Follows outpatient hematology  · GI consulted  · Vitamin K given 1/11 and 11/12 to achieve INR < 2 5 for EGD/colonoscopy  · Patient underwent EGD and colonoscopy on 1/13, EGD showed gastritis, normal esophagus biopsy obtained to check for H  pylori  · Colonoscopy showed suboptimal prep with liquid stool noted, colon polyp removed, diverticulosis throughout colon and internal hemorrhoids as per GI report  · Last colonoscopy that patient had was when she was 76years old, reports that she did a Cologuard test in between which was negative  · Okay to resume anticoagulation as per GI  · Bridging with heparin;  Coumadin resumed on 1/13  · Patient preferred to stay in hospital until INR was therapeutic rather than giving herself Lovenox injections  · INR now therapeutic at 3 24  · Resume home warfarin dose  · Follow up with PCP and continue to follow with hematology    Chronic atrial fibrillation [facility])  Assessment & Plan  · Patient has a history of chronic atrial fibrillation, controlled rate currently in the 80s  · Is on Coumadin 5 mg 5 days a week, with 7 5 mg on Tuesdays and Fridays  · Has a history of a titanium steel mechanical valve replaced 30 years ago    · INR 4 0 on admission, decreasing after administration of vitamin K on 1/11 and 1/12  · Coumadin had been held; GD and colonoscopy performed as noted above  · GI indicated okay to resume anticoagulation on 1/13  · Bridged with heparin drip  · Received 10 mg warfarin on 1/15 and 1/16  · INR now therapeutic at 3 24  · Resume home warfarin        H/O mitral valve replacement with mechanical valve  Assessment & Plan  · Patient has a history of mitral valve replacement with mechanical valve 30 years ago with Dr Beti Herr  · Has been on chronic anticoagulation with Coumadin  · INR elevated on admission  · Patient received vitamin K and bridged with heparin drip  · INR dropped down to 1 95, underwent EGD and colonoscopy on 1/13  · Okay to resume anticoagulation, plan as outlined above  Iron deficiency anemia  Assessment & Plan  · Patient has a history of iron deficiency anemia  · Follows with Dr Jenni Bourgeois hematology outpatient  · Received 3 iron transfusions this past fall  · Has been taking iron 324 mg p o  every  Monday, Wednesday and Friday  · Iron panel shows decreased iron and iron saturation  · increase to daily  · Given dose of Venofer IV 1/14 and 1/15    Type 2 diabetes mellitus without complication, without long-term current use of insulin [facility])  Assessment & Plan  Lab Results   Component Value Date    HGBA1C 6 5 (H) 07/06/2022       Recent Labs     01/16/23  1106 01/16/23  1547 01/16/23 2025 01/17/23  0719   POCGLU 202* 108 144* 127       Blood Sugar Average: Last 72 hrs:  (P) 135 [id number]   · Continue metformin 500 mg BID  · Diabetic diet    Hypercholesteremia  Assessment & Plan  · Continue patient's home medication of atorvastatin 80 mg p o  daily  · Heart healthy diet    Long term (current) use of anticoagulants  Assessment & Plan  · As noted above patient has history of mitral valve replacement, has been on Coumadin for this    · Patient required GI intervention and bridged with heparin gtt  · Resumed after procedure       Medical Problems     Resolved Problems  Date Reviewed: 1/17/2023   None       Discharging Physician / Practitioner: Lonnie Arnold PA-C  PCP: Trinity Isaacs MD  Admission Date:   Admission Orders (From admission, onward)     Ordered        01/10/23 1410  INPATIENT ADMISSION  Once                      Discharge Date: 01/17/23    Consultations During Hospital Stay:  · GI    Procedures Performed:   · EGD: 1   Moderate degree of gastritis 2  No sign of upper GI bleeding 3  Slightly flattened small bowel villi, biopsies were done 4  Esophagus  · Colonoscopy: 1  Suboptimal bowel prep with large amount of liquid stool throughout the colon, visualization was suboptimal 2  Colon polyp removed 3  Extensive diverticulosis throughout the colon 4  Internal hemorrhoid    Significant Findings / Test Results:   · See EGD and colonoscopy results above  · CXR: borderline cardiomegaly  Mild vascular congestion    Incidental Findings:   · none    Test Results Pending at Discharge (will require follow up): · Biopsy results  · Schedule small bowel capsule endoscopy with GI     Outpatient Tests Requested:  · Follow up with Dr Dyana Vasquez in clinic    Complications:  none    Reason for Admission: symptomatic anemia    Hospital Course:   Guanako Garcia is a 80 y o  female patient who originally presented to the hospital on 1/10/2023 due to dyspnea on exertion, orthopnea, headache, and weakness  Hemoglobin was found to be 7 1 in ER and she received 1 unit of PRBCs  She was noted to have positive heme stool in the ED  GI consulted and EGD and colonoscopy were performed  Patient is on warfarin for atrial fibrillation and history of mechanical mitral valve  She was bridged with heparin, and INR is now therapeutic again  She will resume her home dose of warfarin on discharge  Patient received IV venofer during this admission and her iron supplementation was increased to 5 days a week  Hemoglobin is now stable without signs or symptoms of active bleeding   Patient follows with hematology outpatient and should follow up with her PCP  She will follow up with GI for biopsy results and to schedule a small bowel capsule endoscopy  Plan was discussed with patient and all questions were answered  Please see above list of diagnoses and related plan for additional information  Condition at Discharge: stable    Discharge Day Visit / Exam:   Subjective:  Patient seen and examined this morning at bedside  She is eager to go home  She denies any symptoms including weakness, fatigue, headache, dizziness, nausea, vomiting, or abdominal pain  We discussed her follow up appointments  Vitals: Blood Pressure: 118/53 (01/16/23 2243)  Pulse: 75 (01/16/23 2243)  Temperature: 99 2 °F (37 3 °C) (01/16/23 2243)  Temp Source: Oral (01/16/23 0533)  Respirations: 18 (01/16/23 2243)  Height: 5' 6" (167 6 cm) (01/10/23 2134)  Weight - Scale: 74 kg (163 lb 2 3 oz) (01/10/23 1125)  SpO2: 96 % (01/16/23 2243)  Exam:   Physical Exam  Vitals and nursing note reviewed  Constitutional:       General: She is not in acute distress  Appearance: She is well-developed  HENT:      Head: Normocephalic and atraumatic  Eyes:      Conjunctiva/sclera: Conjunctivae normal    Cardiovascular:      Rate and Rhythm: Normal rate  Rhythm irregular  Heart sounds: No murmur heard  Pulmonary:      Effort: Pulmonary effort is normal  No respiratory distress  Breath sounds: Normal breath sounds  Abdominal:      Palpations: Abdomen is soft  Tenderness: There is no abdominal tenderness  Musculoskeletal:         General: No swelling  Cervical back: Neck supple  Skin:     General: Skin is warm and dry  Capillary Refill: Capillary refill takes less than 2 seconds  Neurological:      General: No focal deficit present  Mental Status: She is alert and oriented to person, place, and time  Mental status is at baseline     Psychiatric:         Mood and Affect: Mood normal           Discussion with Family: Patient declined call to   Patient updated daughter on phone  Discharge instructions/Information to patient and family:   See after visit summary for information provided to patient and family  Provisions for Follow-Up Care:  See after visit summary for information related to follow-up care and any pertinent home health orders  Disposition:   Home    Planned Readmission: none     Discharge Statement:  I spent >30 minutes discharging the patient  This time was spent on the day of discharge  I had direct contact with the patient on the day of discharge  Greater than 50% of the total time was spent examining patient, answering all patient questions, arranging and discussing plan of care with patient as well as directly providing post-discharge instructions  Additional time then spent on discharge activities  Discharge Medications:  See after visit summary for reconciled discharge medications provided to patient and/or family        **Please Note: This note may have been constructed using a voice recognition system**

## 2023-01-17 NOTE — DISCHARGE INSTR - AVS FIRST PAGE
Follow up with your PCP in 1 week for post hospitalization follow-up  Follow up with GI for biopsy results and for further outpatient testing  Continue your home dose of warfarin, but start with 5 mg today

## 2023-01-17 NOTE — ASSESSMENT & PLAN NOTE
· As noted above patient has history of mitral valve replacement, has been on Coumadin for this    · Patient required GI intervention and bridged with heparin gtt  · Resumed after procedure

## 2023-01-17 NOTE — ASSESSMENT & PLAN NOTE
Patient presented with exertional shortness of breath, orthopnea, headache and weakness  She was found to have hemoglobin 7 1  · S/p 1 unit PRBC, hemoglobin increased to 7 7, remains stable  · 8 0 this am   · Positive heme stool in ER noted  · Does have a history of iron deficiency anemia which she has been treating with IV iron infusions this past fall and taking iron 3 days a week  · Follows outpatient hematology  · GI consulted  · Vitamin K given 1/11 and 11/12 to achieve INR < 2 5 for EGD/colonoscopy  · Patient underwent EGD and colonoscopy on 1/13, EGD showed gastritis, normal esophagus biopsy obtained to check for H  pylori  · Colonoscopy showed suboptimal prep with liquid stool noted, colon polyp removed, diverticulosis throughout colon and internal hemorrhoids as per GI report    · Last colonoscopy that patient had was when she was 76years old, reports that she did a Cologuard test in between which was negative  · Okay to resume anticoagulation as per GI  · Bridging with heparin;  Coumadin resumed on 1/13  · Patient preferred to stay in hospital until INR was therapeutic rather than giving herself Lovenox injections  · INR now therapeutic at 3 24  · Resume home warfarin dose  · Follow up with PCP and continue to follow with hematology

## 2023-01-17 NOTE — ASSESSMENT & PLAN NOTE
· Patient has a history of iron deficiency anemia  · Follows with Dr Clint Andrews hematology outpatient  · Received 3 iron transfusions this past fall    · Has been taking iron 324 mg p o  every  Monday, Wednesday and Friday  · Iron panel shows decreased iron and iron saturation  · increase to daily  · Given dose of Venofer IV 1/14 and 1/15

## 2023-01-17 NOTE — ASSESSMENT & PLAN NOTE
Lab Results   Component Value Date    HGBA1C 6 5 (H) 07/06/2022       Recent Labs     01/16/23  1106 01/16/23  1547 01/16/23 2025 01/17/23  0719   POCGLU 202* 108 144* 127       Blood Sugar Average: Last 72 hrs:  (P) 135 [id number]   · Continue metformin 500 mg BID  · Diabetic diet

## 2023-01-17 NOTE — ASSESSMENT & PLAN NOTE
· Patient has a history of mitral valve replacement with mechanical valve 30 years ago with Dr Esther Kay  · Has been on chronic anticoagulation with Coumadin  · INR elevated on admission  · Patient received vitamin K and bridged with heparin drip  · INR dropped down to 1 95, underwent EGD and colonoscopy on 1/13  · Okay to resume anticoagulation, plan as outlined above

## 2023-01-18 LAB
EST. AVERAGE GLUCOSE BLD GHB EST-MCNC: 111 MG/DL
HBA1C MFR BLD: 5.5 %

## 2023-01-19 ENCOUNTER — TRANSITIONAL CARE MANAGEMENT (OUTPATIENT)
Dept: INTERNAL MEDICINE CLINIC | Facility: CLINIC | Age: 83
End: 2023-01-19

## 2023-01-19 DIAGNOSIS — D50.9 IRON DEFICIENCY ANEMIA, UNSPECIFIED IRON DEFICIENCY ANEMIA TYPE: Primary | ICD-10-CM

## 2023-01-23 ENCOUNTER — APPOINTMENT (OUTPATIENT)
Dept: LAB | Facility: CLINIC | Age: 83
End: 2023-01-23

## 2023-01-23 ENCOUNTER — OFFICE VISIT (OUTPATIENT)
Dept: INTERNAL MEDICINE CLINIC | Facility: CLINIC | Age: 83
End: 2023-01-23

## 2023-01-23 VITALS
BODY MASS INDEX: 26.16 KG/M2 | WEIGHT: 157 LBS | HEART RATE: 87 BPM | SYSTOLIC BLOOD PRESSURE: 130 MMHG | OXYGEN SATURATION: 97 % | DIASTOLIC BLOOD PRESSURE: 76 MMHG | HEIGHT: 65 IN

## 2023-01-23 DIAGNOSIS — I10 ESSENTIAL HYPERTENSION: ICD-10-CM

## 2023-01-23 DIAGNOSIS — I63.9 CEREBROVASCULAR ACCIDENT (CVA), UNSPECIFIED MECHANISM (HCC): ICD-10-CM

## 2023-01-23 DIAGNOSIS — J43.9 PULMONARY EMPHYSEMA, UNSPECIFIED EMPHYSEMA TYPE (HCC): ICD-10-CM

## 2023-01-23 DIAGNOSIS — N18.30 ANEMIA DUE TO STAGE 3 CHRONIC KIDNEY DISEASE, UNSPECIFIED WHETHER STAGE 3A OR 3B CKD (HCC): ICD-10-CM

## 2023-01-23 DIAGNOSIS — I71.40 ABDOMINAL AORTIC ANEURYSM (AAA) WITHOUT RUPTURE, UNSPECIFIED PART: ICD-10-CM

## 2023-01-23 DIAGNOSIS — E11.69 TYPE 2 DIABETES MELLITUS WITH OTHER SPECIFIED COMPLICATION, WITHOUT LONG-TERM CURRENT USE OF INSULIN (HCC): ICD-10-CM

## 2023-01-23 DIAGNOSIS — I48.20 CHRONIC ATRIAL FIBRILLATION (HCC): ICD-10-CM

## 2023-01-23 DIAGNOSIS — D63.1 ANEMIA DUE TO STAGE 3 CHRONIC KIDNEY DISEASE, UNSPECIFIED WHETHER STAGE 3A OR 3B CKD (HCC): ICD-10-CM

## 2023-01-23 DIAGNOSIS — D64.9 ANEMIA, UNSPECIFIED TYPE: Primary | ICD-10-CM

## 2023-01-23 DIAGNOSIS — D50.9 IRON DEFICIENCY ANEMIA, UNSPECIFIED IRON DEFICIENCY ANEMIA TYPE: ICD-10-CM

## 2023-01-23 DIAGNOSIS — E11.9 TYPE 2 DIABETES MELLITUS WITHOUT COMPLICATION, WITHOUT LONG-TERM CURRENT USE OF INSULIN (HCC): ICD-10-CM

## 2023-01-23 LAB
BASOPHILS # BLD AUTO: 0.07 THOUSANDS/ÂΜL (ref 0–0.1)
BASOPHILS NFR BLD AUTO: 2 % (ref 0–1)
EOSINOPHIL # BLD AUTO: 0.15 THOUSAND/ÂΜL (ref 0–0.61)
EOSINOPHIL NFR BLD AUTO: 4 % (ref 0–6)
ERYTHROCYTE [DISTWIDTH] IN BLOOD BY AUTOMATED COUNT: 18.6 % (ref 11.6–15.1)
HCT VFR BLD AUTO: 27.6 % (ref 34.8–46.1)
HGB BLD-MCNC: 7.9 G/DL (ref 11.5–15.4)
IMM GRANULOCYTES # BLD AUTO: 0.04 THOUSAND/UL (ref 0–0.2)
IMM GRANULOCYTES NFR BLD AUTO: 1 % (ref 0–2)
LYMPHOCYTES # BLD AUTO: 0.97 THOUSANDS/ÂΜL (ref 0.6–4.47)
LYMPHOCYTES NFR BLD AUTO: 23 % (ref 14–44)
MCH RBC QN AUTO: 27.6 PG (ref 26.8–34.3)
MCHC RBC AUTO-ENTMCNC: 28.6 G/DL (ref 31.4–37.4)
MCV RBC AUTO: 97 FL (ref 82–98)
MONOCYTES # BLD AUTO: 0.5 THOUSAND/ÂΜL (ref 0.17–1.22)
MONOCYTES NFR BLD AUTO: 12 % (ref 4–12)
NEUTROPHILS # BLD AUTO: 2.46 THOUSANDS/ÂΜL (ref 1.85–7.62)
NEUTS SEG NFR BLD AUTO: 58 % (ref 43–75)
NRBC BLD AUTO-RTO: 0 /100 WBCS
PLATELET # BLD AUTO: 198 THOUSANDS/UL (ref 149–390)
PMV BLD AUTO: 11.6 FL (ref 8.9–12.7)
RBC # BLD AUTO: 2.86 MILLION/UL (ref 3.81–5.12)
WBC # BLD AUTO: 4.19 THOUSAND/UL (ref 4.31–10.16)

## 2023-01-23 NOTE — ASSESSMENT & PLAN NOTE
Patient remains on warfarin  INR was done recently  This morning  Report of which is awaited  Patient is off beta-blocker at this time  Rate is around 75  Will monitor  INR is being monitored by cardiologist     Patient is advised to see cardiologist particularly given the recurrent anemia  Patient does have a titanium wall  Will need cardiology's opinion regarding heart valve, recurrent anemia, chronic anticoagulation

## 2023-01-23 NOTE — ASSESSMENT & PLAN NOTE
Lab Results   Component Value Date    HGBA1C 5 5 01/10/2023      HGBA1C 5 5 01/10/2023     Previous hemoglobin A1c was in the range of 6 5  Patient recently was anemic  Home blood sugar is in the range of 110-1 20  Will discontinue metformin for right now and look at the trend and recheck hemoglobin A1c back in 3 months

## 2023-01-23 NOTE — ASSESSMENT & PLAN NOTE
Well-controlled  Patient is no longer on beta-blocker due to heart rate issue earlier  Patient did bring 2 bottles of losartan that was prescribed in January and April 20202 to see is not taking at this time

## 2023-01-23 NOTE — ASSESSMENT & PLAN NOTE
Lab Results   Component Value Date    WBC 4 04 (L) 01/17/2023    HGB 8 0 (L) 01/17/2023    HCT 26 9 (L) 01/17/2023    MCV 95 01/17/2023     01/17/2023   Recent hospitalization due to a drop in hemoglobin 7 1  Patient underwent upper GI endoscopy and colonoscopy  Did receive intravenous iron twice in the hospital   Now is on iron supplement tablet however it is giving her constipation  Patient is taking stool softener is not helping much  She had tried MiraLAX in the past it was giving her some loose bowel movement  Recommend to take MiraLAX 17 g every other day instead of every day  As well as stay on the high-fiber diet and plenty of fluid  So far as anemia is concerned recommend to see him at oncologist in near future  It has been multifactorial   Recent endoscopy and colonoscopy was not revealing  Also recommend to follow-up with the gastroenterologist-there is a question whether patient needed capsule endoscopy or not however it appears that it is on hold     Patient received a call from the gastroenterologist office that they would like to see her back in March  Will monitor the trend of CBC on weekly basis along with INR on weekly basis for right now

## 2023-01-23 NOTE — ASSESSMENT & PLAN NOTE
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

## 2023-01-23 NOTE — ASSESSMENT & PLAN NOTE
Lab Results   Component Value Date    HGBA1C 5 5 01/10/2023     Previous hemoglobin A1c was in the range of 6 5  Patient recently was anemic  Home blood sugar is in the range of 110-1 20  Will discontinue metformin for right now and look at the trend and recheck hemoglobin A1c back in 3 months

## 2023-01-23 NOTE — PATIENT INSTRUCTIONS
Go for repeat CBC in 1 week  Go for repeat CBC and BMP in 2 weeks  Keep your appointment with your gastroenterologist in March 1 week  Set up an appointment with Alberta Arnold with Dr Bradly Diaz hematologist earlier called today for follow-up  Continue taking your iron tablet  Instead of stool softener try taking MiraLAX every other day  If you have any problem with the loose bowel movement give me a call  If you have significant shortness of breath you will need to be checked for anemia or cardiac condition go to the emergency room  Also set up an appointment with your cardiologist to follow-up particularly given your need for chronic blood thinner, artificial mechanical heart wall as well as the being on the blood thinner  Your anemia is multifactorial     Follow with Consultants as per their and our suggestion    Stop taking metformin  Follow up in 3  week(s) or as needed earlier    Follow all instructions as advised and discussed  Take your medications as prescribed  Call the office immediately if you experience any side effects  Ask questions if you do not understand  Keep your scheduled appointment as advised or come sooner if necessary or in doubt  Best time to call for non-urgent matter or questions on weekdays is between 9am and 12 noon  See physician for any new symptoms or worsening of current symptoms  Urgent or emergent situations call 911 and report to nearest emergency room  I spent  30 minutes taking care of this patient including clinical care, conseling, collaboration, chart, lab and consultaion review      Patient is to get labs 1 and 2 week(s)

## 2023-01-23 NOTE — ASSESSMENT & PLAN NOTE
11/17/2021: Ultrasound:  Interval decrease in size of infrarenal distal abdominal aortic aneurysm which now measures 2 7 x 2 8 cm  This previously measured 3 0 x 3 2 x 3 2 cm    Recommend interval 3 year follow-up to ensure stability

## 2023-01-25 NOTE — TELEPHONE ENCOUNTER
I called and spoke to patient  She is scheduled for capsule 2/13/23  Dr Eldon Bass  Iron deficiency anemia and rule out small bowel avm  Please see if Gaylyn Call is needed  Instructions mailed to patient   Thank you

## 2023-01-26 ENCOUNTER — TELEPHONE (OUTPATIENT)
Dept: HEMATOLOGY ONCOLOGY | Facility: CLINIC | Age: 83
End: 2023-01-26

## 2023-01-26 NOTE — TELEPHONE ENCOUNTER
Appointment Cancellation Or Reschedule     Person calling in Patient   If someone other than patient calling, are they listed on the communication consent form? N/A   Provider Fahad Levine PA-C   Office Visit Date and Time 3/14/23   [facility]   Did patient want to reschedule their office appointment? If so, when was it scheduled to? 2/2/23   Did you have STAR scheduled for this appointment? No   Do you need STAR set up for your new appointment? If yes, please send to "PATIENT RIDESHARE" pool for STAR rescheduling No   Is this patient calling to reschedule an infusion appointment? No   When is their next infusion appointment? No   Is this patient a Chemo patient? No   Reason for Cancellation or Reschedule Patient was in the hospital and was advised to get a sooner appt      If the patient is cancelling an appointment and needs their STAR Transport cancelled, please route to [address]  If the patient is a treatment patient, please route this to the office nurse  If the patient is not on treatment, please route to the Clerical pool based on location  If the patient is a surgical oncology patient, please route to surg/onc clinical pool  Route message as high priority if same day cancellation

## 2023-01-26 NOTE — TELEPHONE ENCOUNTER
No pre auth needed from medicare for capsule study  Called sec  Ins  Talked to Ora Mc no pre auth required

## 2023-01-27 ENCOUNTER — TELEPHONE (OUTPATIENT)
Dept: HEMATOLOGY ONCOLOGY | Facility: CLINIC | Age: 83
End: 2023-01-27

## 2023-01-27 NOTE — TELEPHONE ENCOUNTER
LVM to the patient in regards to her lab work that needs to be completed prior to her appt on 2/2/23 at 3:30pm

## 2023-01-30 ENCOUNTER — APPOINTMENT (OUTPATIENT)
Dept: LAB | Facility: CLINIC | Age: 83
End: 2023-01-30

## 2023-01-30 DIAGNOSIS — D64.9 ANEMIA, UNSPECIFIED TYPE: ICD-10-CM

## 2023-01-30 DIAGNOSIS — I48.20 CHRONIC ATRIAL FIBRILLATION (HCC): ICD-10-CM

## 2023-01-30 LAB
ERYTHROCYTE [DISTWIDTH] IN BLOOD BY AUTOMATED COUNT: 18.4 % (ref 11.6–15.1)
HCT VFR BLD AUTO: 26.2 % (ref 34.8–46.1)
HGB BLD-MCNC: 7.4 G/DL (ref 11.5–15.4)
MCH RBC QN AUTO: 26.8 PG (ref 26.8–34.3)
MCHC RBC AUTO-ENTMCNC: 28.2 G/DL (ref 31.4–37.4)
MCV RBC AUTO: 95 FL (ref 82–98)
PLATELET # BLD AUTO: 208 THOUSANDS/UL (ref 149–390)
PMV BLD AUTO: 10.6 FL (ref 8.9–12.7)
RBC # BLD AUTO: 2.76 MILLION/UL (ref 3.81–5.12)
WBC # BLD AUTO: 3.76 THOUSAND/UL (ref 4.31–10.16)

## 2023-02-02 ENCOUNTER — APPOINTMENT (OUTPATIENT)
Dept: LAB | Facility: HOSPITAL | Age: 83
End: 2023-02-02

## 2023-02-02 ENCOUNTER — OFFICE VISIT (OUTPATIENT)
Dept: HEMATOLOGY ONCOLOGY | Facility: MEDICAL CENTER | Age: 83
End: 2023-02-02

## 2023-02-02 ENCOUNTER — TELEPHONE (OUTPATIENT)
Dept: HEMATOLOGY ONCOLOGY | Facility: MEDICAL CENTER | Age: 83
End: 2023-02-02

## 2023-02-02 ENCOUNTER — TELEPHONE (OUTPATIENT)
Dept: HEMATOLOGY ONCOLOGY | Facility: CLINIC | Age: 83
End: 2023-02-02

## 2023-02-02 VITALS
WEIGHT: 160 LBS | SYSTOLIC BLOOD PRESSURE: 130 MMHG | OXYGEN SATURATION: 90 % | RESPIRATION RATE: 16 BRPM | HEIGHT: 65 IN | DIASTOLIC BLOOD PRESSURE: 66 MMHG | HEART RATE: 84 BPM | TEMPERATURE: 97.4 F | BODY MASS INDEX: 26.66 KG/M2

## 2023-02-02 DIAGNOSIS — R77.8 LOW SERUM HAPTOGLOBIN: ICD-10-CM

## 2023-02-02 DIAGNOSIS — D50.8 OTHER IRON DEFICIENCY ANEMIA: ICD-10-CM

## 2023-02-02 DIAGNOSIS — D62 ACUTE BLOOD LOSS ANEMIA: ICD-10-CM

## 2023-02-02 DIAGNOSIS — Z95.2 H/O MITRAL VALVE REPLACEMENT WITH MECHANICAL VALVE: ICD-10-CM

## 2023-02-02 DIAGNOSIS — Z79.01 ON WARFARIN THERAPY: ICD-10-CM

## 2023-02-02 DIAGNOSIS — D50.8 OTHER IRON DEFICIENCY ANEMIA: Primary | ICD-10-CM

## 2023-02-02 LAB
ALBUMIN SERPL BCP-MCNC: 3.6 G/DL (ref 3.5–5)
ALP SERPL-CCNC: 93 U/L (ref 46–116)
ALT SERPL W P-5'-P-CCNC: 32 U/L (ref 12–78)
ANION GAP SERPL CALCULATED.3IONS-SCNC: 7 MMOL/L (ref 4–13)
AST SERPL W P-5'-P-CCNC: 33 U/L (ref 5–45)
BASOPHILS # BLD AUTO: 0.04 THOUSANDS/ÂΜL (ref 0–0.1)
BASOPHILS NFR BLD AUTO: 1 % (ref 0–1)
BILIRUB SERPL-MCNC: 0.47 MG/DL (ref 0.2–1)
BUN SERPL-MCNC: 14 MG/DL (ref 5–25)
CALCIUM SERPL-MCNC: 8.4 MG/DL (ref 8.3–10.1)
CHLORIDE SERPL-SCNC: 107 MMOL/L (ref 96–108)
CO2 SERPL-SCNC: 28 MMOL/L (ref 21–32)
CREAT SERPL-MCNC: 0.58 MG/DL (ref 0.6–1.3)
DAT POLY-SP REAG RBC QL: NEGATIVE
EOSINOPHIL # BLD AUTO: 0.14 THOUSAND/ÂΜL (ref 0–0.61)
EOSINOPHIL NFR BLD AUTO: 4 % (ref 0–6)
ERYTHROCYTE [DISTWIDTH] IN BLOOD BY AUTOMATED COUNT: 17.6 % (ref 11.6–15.1)
FERRITIN SERPL-MCNC: 21 NG/ML (ref 8–388)
GFR SERPL CREATININE-BSD FRML MDRD: 85 ML/MIN/1.73SQ M
GLUCOSE SERPL-MCNC: 86 MG/DL (ref 65–140)
HCT VFR BLD AUTO: 26.5 % (ref 34.8–46.1)
HGB BLD-MCNC: 7.5 G/DL (ref 11.5–15.4)
IGA SERPL-MCNC: 157 MG/DL (ref 70–400)
IGG SERPL-MCNC: 773 MG/DL (ref 700–1600)
IGM SERPL-MCNC: 108 MG/DL (ref 40–230)
IMM GRANULOCYTES # BLD AUTO: 0.01 THOUSAND/UL (ref 0–0.2)
IMM GRANULOCYTES NFR BLD AUTO: 0 % (ref 0–2)
IRON SATN MFR SERPL: 10 % (ref 15–50)
IRON SERPL-MCNC: 44 UG/DL (ref 50–170)
LYMPHOCYTES # BLD AUTO: 0.82 THOUSANDS/ÂΜL (ref 0.6–4.47)
LYMPHOCYTES NFR BLD AUTO: 20 % (ref 14–44)
MCH RBC QN AUTO: 26.5 PG (ref 26.8–34.3)
MCHC RBC AUTO-ENTMCNC: 28.3 G/DL (ref 31.4–37.4)
MCV RBC AUTO: 94 FL (ref 82–98)
MONOCYTES # BLD AUTO: 0.38 THOUSAND/ÂΜL (ref 0.17–1.22)
MONOCYTES NFR BLD AUTO: 9 % (ref 4–12)
NEUTROPHILS # BLD AUTO: 2.65 THOUSANDS/ÂΜL (ref 1.85–7.62)
NEUTS SEG NFR BLD AUTO: 66 % (ref 43–75)
NRBC BLD AUTO-RTO: 0 /100 WBCS
PLATELET # BLD AUTO: 187 THOUSANDS/UL (ref 149–390)
PMV BLD AUTO: 9.7 FL (ref 8.9–12.7)
POTASSIUM SERPL-SCNC: 3.8 MMOL/L (ref 3.5–5.3)
PROT SERPL-MCNC: 6.6 G/DL (ref 6.4–8.4)
RBC # BLD AUTO: 2.83 MILLION/UL (ref 3.81–5.12)
SODIUM SERPL-SCNC: 142 MMOL/L (ref 135–147)
TIBC SERPL-MCNC: 422 UG/DL (ref 250–450)
WBC # BLD AUTO: 4.04 THOUSAND/UL (ref 4.31–10.16)

## 2023-02-02 RX ORDER — SODIUM CHLORIDE 9 MG/ML
20 INJECTION, SOLUTION INTRAVENOUS ONCE
Status: CANCELLED | OUTPATIENT
Start: 2023-02-07

## 2023-02-02 NOTE — TELEPHONE ENCOUNTER
I spoke with the patient in regards to seeing if she had her lab work completed  Patient states she did not but she recently had labs on 1/30/23  Informed patient that I spoke with Ronak Feng and she stated she would really like for her to have all of the blood work completed so she can do a full evaluation and determine her next treatment plan  Patient states she is not feeling well and would like to still come into the office to speak with the provider  Patient states she will be going to the lab now to have all of her labs completed

## 2023-02-02 NOTE — PROGRESS NOTES
[facility] [place] [id number] [facility] [address]  Hematology Ambulatory [address], 1940, [id number]  2/2/2023      Assessment and Plan   1  Other iron deficiency anemia  2  Acute blood loss anemia  This is an 70-year-old female with past medical history of IV iron supplementation with good results hemoglobin was then in the 11 g/dL range  Unfortunately after having such a robust response with blood work in November, patient was hospitalized in January with a hemoglobin of 7 4 g/dL with symptomatic anemia  Ferritin was at an 8 ng/dL on 1/10/2023  I spent time today discussing this case with the patient and her adult daughter  Acute drop in hemoglobin is more likely related to acute blood loss versus other acute anemia such as hemolytic anemia  Patient's [place] previously was negative  If this patient does have hemolytic anemia and will be nonimmune based  Cryoglobulins have been requested and are presently pending  Given the patient's blood in stool found on testing during her last hospitalization, it is possible that the patient may also have AVMs or some small bowel enteropathy that is causing blood loss  Recent endoscopic evaluations did not demonstrate colon or upper GI sources of bleeding  Capsule endoscopy is scheduled for later this month  Patient also received 3 doses of iron totaling 600 mg in the hospital in mid January  I will complete iron supplementation through the infusion center  Tolerated Venofer well without significant toxicities, will continue use  Recommendation:  Venofer 200 mg IV weekly x5  CBC to be drawn during each visit for observation  I also consented the patient today for blood transfusion  Patient has had these in the past   We reviewed effects and toxicities  3  Low serum haptoglobin  Patient had low serum haptoglobin and a mildly elevated LDH    I have asked the patient to undergo blood work the last time she was in the office however this was not completed  Patient went for this today before her appointment however results are not available  I will follow-up with the patient once the blood test results are in  Until then with recent blood loss anemia from hospitalization patient will undergo IV iron treatments as she had good response last time, see above  4  On warfarin therapy;  5  H/O mitral valve replacement with mechanical valve  Patient was found to have schistocytes on peripheral smear which may in fact be due to mechanical valve  Low haptoglobin still should be looked into as well as signs of hemolytic anemia  The patient is scheduled for follow-up in approximately 4 weeks with Dr General Zuleta  Patient voiced agreement and understanding to the above  Patient advised to call the Hematology/Oncology office with any questions and concerns regarding the above  Barrier(s) to care: None  The patient is able to self care  Gabriella Franz PA-C  Medical Oncology/Hematology  [address]    Subjective     Chief Complaint   Patient presents with   • Follow-up     Other iron deficiency anemia        History of present illness:    This is an 45-year-old female with past medical history of pulmonary emphysema CVA, diabetes mellitus, chronic atrial fibrillation on warfarin therapy, hepatic steatosis, celiac disease, history of mitral valve replacement with mechanical valve who presents to the [facility] [facility] office secondary to recent hospitalization and finding of anemia      10/9/17 hemoglobin = 13 0, MCV 89, RDW 15 6, platelet count 395  3/86/45 hemoglobin = 11 6  6/4/19 hemoglobin = 12 2  7/5/19 ferritin = 23  6/9/20 ferritin = 6, LDH = 270, hapto = <10  8/17/20 hemoglobin = 11 8  6/18/21 hemoglobin = 12 7, MCV 96, RDW 14 5, platelet count 946, OYZOZRHA = 18  3/11/22 hemoglobin = 12 6, MCV 93, RDW 14 9, platelet count 377  8/56/60 hemoglobin = 10 5, MCV 92, RDW 14 6, platelet count 893  3/8/05 hemoglobin = 10 0, MCV 81, RDW 17 5, platelet count 406  1/2/78 hemoglobin 9 5, MCV 83, RDW 20 6, platelet count 853, TJHCDUKT = eight, hapto <10  8/11/22 iron panel = ferritin = eight, iron saturation 10%, TIBC 471,   8/12/22 hemolysis smear negative, LDH = 254, reticulocyte count within normal limits  9/1/22 hemoglobin 10 3, MCV 86, RDW 22 2     09/06/22:  Patient has been taking oral iron supplements twice a day with minimal side effects   Patient notes that prior to going to the hospital she was feeling very tired and fatigued   We reviewed blood work   Patient's hemoglobin has been slowly dropping since April 2022 with the most appreciable drop in MCV in July of 2022 9/30-11/10: 5 doses of venofer 200 mg IV      11/10/2022 hemoglobin 11 4, MCV 92, platelet count 054, white blood cell count 534    Patient was hospitalized secondary to acute anemia with a hemoglobin of 7 4 g/dL with a normal MCV and high RDW  Patient was given 3 units of packed red blood cell, in addition to 600 mg of iron  Discharge hemoglobin was 8 0 g/dL  Ferritin was low at 8 ng/dL  Subsequent blood tests have demonstrated a hemoglobin in the 7 g/dL range  02/02/23 :  Lab Results   Component Value Date    IRON 27 (L) 01/10/2023    TIBC 391 01/10/2023    FERRITIN 8 01/10/2023     Lab Results   Component Value Date    WBC 4 04 (L) 02/02/2023    HGB 7 5 (L) 02/02/2023    HCT 26 5 (L) 02/02/2023    MCV 94 02/02/2023     02/02/2023       Interval history:  Tired, fatigued  Sob with exertion  Review of Systems   Constitutional: Negative for appetite change, fatigue, fever and unexpected weight change  HENT: Negative for nosebleeds  Respiratory: Negative for cough, choking and shortness of breath  Negative hemoptysis  Cardiovascular: Negative for chest pain, palpitations and leg swelling  Gastrointestinal: Negative    Negative for abdominal distention, abdominal pain, anal bleeding, blood in stool, constipation, diarrhea, nausea and vomiting  Endocrine: Negative  Negative for cold intolerance  Genitourinary: Negative  Negative for hematuria, menstrual problem, vaginal bleeding, vaginal discharge and vaginal pain  Musculoskeletal: Negative  Negative for arthralgias, myalgias, neck pain and neck stiffness  Skin: Negative  Negative for color change, pallor and rash  Allergic/Immunologic: Negative  Negative for immunocompromised state  Neurological: Negative  Negative for weakness and headaches  Hematological: Negative for adenopathy  Does not bruise/bleed easily  All other systems reviewed and are negative        Patient Active Problem List   Diagnosis   • Obstructive sleep apnea   • Chronic atrial fibrillation (HCC)   • H/O mitral valve replacement with mechanical valve   • Long term (current) use of anticoagulants   • Presence of prosthetic heart valve   • Hypercholesteremia   • Anemia due to stage 3 chronic kidney disease, unspecified whether stage 3a or 3b CKD ([facility])   • Allergic rhinitis   • Type 2 diabetes mellitus without complication, without long-term current use of insulin ([facility])   • Iron deficiency anemia   • Other specified hypothyroidism   • Vitamin B12 deficiency   • Other microscopic hematuria   • Celiac disease   • Abdominal aortic aneurysm (AAA) without rupture, unspecified part   • Ataxia   • Pulmonary emphysema ([facility])   • History of renal calculi   • History of cervical cancer   • Essential hypertension   • Other proteinuria   • Hepatic steatosis   • CVA (cerebral vascular accident) ([facility] )   • Type 2 diabetes mellitus with other specified complication, without long-term current use of insulin ([facility] )   • CKD (chronic kidney disease) stage 2, GFR 60-89 ml/min   • Cerebrovascular accident (CVA) ([facility] [address] )   • Fall   • Anemia   • Acute blood loss anemia     Past Medical History:   Diagnosis Date   • Diabetes mellitus ([facility] [address] )    • [facility] 2022     Past Surgical History:   Procedure Laterality Date   • EYE SURGERY     • HYSTERECTOMY     • MITRAL VALVE REPLACEMENT       Family History   Problem Relation Age of Onset   • Heart failure Mother    • Heart attack Father    • Sleep apnea Brother      Social History     Socioeconomic History   • Marital status:      Spouse name: None   • Number of children: None   • Years of education: None   • Highest education level: None   Occupational History   • None   Tobacco Use   • Smoking status: Former     Packs/day: 2 00     Years: 30 00     Pack years: 60 00     Types: Cigarettes     Quit date:      Years since quittin 1   • Smokeless tobacco: Never   Vaping Use   • Vaping Use: Never used   Substance and Sexual Activity   • Alcohol use: Yes     Comment: special occasional   • Drug use: No   • Sexual activity: None   Other Topics Concern   • None   Social History Narrative   • None     Social Determinants of Health     Financial Resource Strain: Not on file   Food Insecurity: No Food Insecurity   • Worried About Running Out of Food in the Last Year: Never true   • Ran Out of Food in the Last Year: Never true   Transportation Needs: No Transportation Needs   • Lack of Transportation (Medical): No   • Lack of Transportation (Non-Medical):  No   Physical Activity: Not on file   Stress: Not on file   Social Connections: Not on file   Intimate Partner Violence: Not on file   Housing Stability: Low Risk    • Unable to Pay for Housing in the Last Year: No   • Number of Places Lived in the Last Year: 1   • Unstable Housing in the Last Year: No       Current Outpatient Medications:   •  acetaminophen (TYLENOL) 325 mg tablet, Take 2 tablets (650 mg total) by mouth every 6 (six) hours as needed for mild pain or headaches, Disp: , Rfl: 0  •  atorvastatin (LIPITOR) 80 mg tablet, Take 1 tablet (80 mg total) by mouth daily, Disp: 90 tablet, Rfl: 1  •  Cholecalciferol (VITAMIN D PO), Take by mouth, Disp: , Rfl:   •  ferrous sulfate 324 (65 Fe) mg, Take 1 tablet (324 mg total) by mouth daily before breakfast, Disp: 180 tablet, Rfl: 0  •  glucose blood test strip, Use 1 each in the morning Use one daily, Disp: 100 strip, Rfl: 2  •  Multiple Vitamins-Minerals (PRESERVISION AREDS PO), Take 2 tablets by mouth daily  , Disp: , Rfl:   •  polyethylene glycol (MIRALAX) 17 g packet, Take 17 g by mouth daily as needed (Constipation), Disp: , Rfl: 0  •  warfarin (COUMADIN) 5 mg tablet, Take 1 tablet (5 mg total) by mouth 4 (four) times a week On Sunday, Tuesday, Thursday, Saturday (Patient taking differently: Take 5 mg by mouth in the morning), Disp: , Rfl: 0  •  aspirin (ECOTRIN LOW STRENGTH) 81 mg EC tablet, Take 1 tablet (81 mg total) by mouth daily (Patient not taking: Reported on 2/2/2023), Disp: , Rfl: 0  •  metFORMIN (GLUCOPHAGE) 500 mg tablet, Take 1 tablet (500 mg total) by mouth 2 (two) times a day with meals (Patient not taking: Reported on 2/2/2023), Disp: 180 tablet, Rfl: 0  •  warfarin (COUMADIN) 2 5 mg tablet, Take 1 tablet (2 5 mg total) by mouth 3 (three) times a week On Monday Wednesday and Friday (Patient not taking: Reported on 2/2/2023), Disp: , Rfl: 0  Allergies   Allergen Reactions   • Sulfa Antibiotics Other (See Comments)   • Sulfasalazine        Objective   /66 (BP Location: Left arm, Patient Position: Sitting, Cuff Size: Adult)   Pulse 84   Temp (!) 97 4 °F (36 3 °C) (Tympanic)   Resp 16   Ht 5' 5" (1 651 m)   Wt 72 6 kg (160 lb)   SpO2 90%   BMI 26 63 kg/m²    Physical Exam  Constitutional:       General: She is not in acute distress  Appearance: She is well-developed  HENT:      Head: Normocephalic and atraumatic  Eyes:      General: No scleral icterus  Pupils: Pupils are equal, round, and reactive to light  Cardiovascular:      Rate and Rhythm: Normal rate and regular rhythm  Heart sounds: No murmur heard    Pulmonary:      Effort: Pulmonary effort is normal  No respiratory distress  Skin:     General: Skin is warm  Coloration: Skin is not pale  Findings: No rash  Neurological:      Mental Status: She is alert and oriented to person, place, and time  Psychiatric:         Thought Content:  Thought content normal          Result Review  Labs:  Appointment on 02/02/2023   Component Date Value Ref Range Status   • WBC 02/02/2023 4 04 (L)  4 31 - 10 16 Thousand/uL Final   • RBC 02/02/2023 2 83 (L)  3 81 - 5 12 Million/uL Final   • Hemoglobin 02/02/2023 7 5 (L)  11 5 - 15 4 g/dL Final   • Hematocrit 02/02/2023 26 5 (L)  34 8 - 46 1 % Final   • MCV 02/02/2023 94  82 - 98 fL Final   • MCH 02/02/2023 26 5 (L)  26 8 - 34 3 pg Final   • MCHC 02/02/2023 28 3 (L)  31 4 - 37 4 g/dL Final   • RDW 02/02/2023 17 6 (H)  11 6 - 15 1 % Final   • MPV 02/02/2023 9 7  8 9 - 12 7 fL Final   • Platelets 18/96/8116 187  149 - 390 Thousands/uL Final   • nRBC 02/02/2023 0  /100 WBCs Final   • Neutrophils Relative 02/02/2023 66  43 - 75 % Final   • Immat GRANS % 02/02/2023 0  0 - 2 % Final   • Lymphocytes Relative 02/02/2023 20  14 - 44 % Final   • Monocytes Relative 02/02/2023 9  4 - 12 % Final   • Eosinophils Relative 02/02/2023 4  0 - 6 % Final   • Basophils Relative 02/02/2023 1  0 - 1 % Final   • Neutrophils Absolute 02/02/2023 2 65  1 85 - 7 62 Thousands/µL Final   • Immature Grans Absolute 02/02/2023 0 01  0 00 - 0 20 Thousand/uL Final   • Lymphocytes Absolute 02/02/2023 0 82  0 60 - 4 47 Thousands/µL Final   • Monocytes Absolute 02/02/2023 0 38  0 17 - 1 22 Thousand/µL Final   • Eosinophils Absolute 02/02/2023 0 14  0 00 - 0 61 Thousand/µL Final   • Basophils Absolute 02/02/2023 0 04  0 00 - 0 10 Thousands/µL Final   • Sodium 02/02/2023 142  135 - 147 mmol/L Final   • Potassium 02/02/2023 3 8  3 5 - 5 3 mmol/L Final   • Chloride 02/02/2023 107  96 - 108 mmol/L Final   • CO2 02/02/2023 28  21 - 32 mmol/L Final   • ANION GAP 02/02/2023 7  4 - 13 mmol/L Final   • BUN 02/02/2023 14  5 - 25 mg/dL Final   • Creatinine 02/02/2023 0 58 (L)  0 60 - 1 30 mg/dL Final    Standardized to IDMS reference method   • Glucose 02/02/2023 86  65 - 140 mg/dL Final    If the patient is fasting, the ADA then defines impaired fasting glucose as > 100 mg/dL and diabetes as > or equal to 123 mg/dL  Specimen collection should occur prior to Sulfasalazine administration due to the potential for falsely depressed results  Specimen collection should occur prior to Sulfapyridine administration due to the potential for falsely elevated results  • Calcium 02/02/2023 8 4  8 3 - 10 1 mg/dL Final   • AST 02/02/2023 33  5 - 45 U/L Final    Specimen collection should occur prior to Sulfasalazine administration due to the potential for falsely depressed results  • ALT 02/02/2023 32  12 - 78 U/L Final    Specimen collection should occur prior to Sulfasalazine administration due to the potential for falsely depressed results  • Alkaline Phosphatase 02/02/2023 93  46 - 116 U/L Final   • Total Protein 02/02/2023 6 6  6 4 - 8 4 g/dL Final   • Albumin 02/02/2023 3 6  3 5 - 5 0 g/dL Final   • Total Bilirubin 02/02/2023 0 47  0 20 - 1 00 mg/dL Final    Use of this assay is not recommended for patients undergoing treatment with eltrombopag due to the potential for falsely elevated results     • eGFR 02/02/2023 85  ml/min/1 73sq m Final   Appointment on 01/30/2023   Component Date Value Ref Range Status   • WBC 01/30/2023 3 76 (L)  4 31 - 10 16 Thousand/uL Final   • RBC 01/30/2023 2 76 (L)  3 81 - 5 12 Million/uL Final   • Hemoglobin 01/30/2023 7 4 (L)  11 5 - 15 4 g/dL Final   • Hematocrit 01/30/2023 26 2 (L)  34 8 - 46 1 % Final   • MCV 01/30/2023 95  82 - 98 fL Final   • MCH 01/30/2023 26 8  26 8 - 34 3 pg Final   • MCHC 01/30/2023 28 2 (L)  31 4 - 37 4 g/dL Final   • RDW 01/30/2023 18 4 (H)  11 6 - 15 1 % Final   • Platelets 53/40/6306 208  149 - 390 Thousands/uL Final   • MPV 01/30/2023 10 6  8 9 - 12 7 fL Final   Ancillary Orders on 01/27/2023   Component Date Value Ref Range Status   • Protime 01/30/2023 34 4 (H)  11 6 - 14 5 seconds Final   • INR 01/30/2023 3 37 (H)  0 84 - 1 19 Final   Appointment on 01/23/2023   Component Date Value Ref Range Status   • WBC 01/23/2023 4 19 (L)  4 31 - 10 16 Thousand/uL Final   • RBC 01/23/2023 2 86 (L)  3 81 - 5 12 Million/uL Final   • Hemoglobin 01/23/2023 7 9 (L)  11 5 - 15 4 g/dL Final   • Hematocrit 01/23/2023 27 6 (L)  34 8 - 46 1 % Final   • MCV 01/23/2023 97  82 - 98 fL Final   • MCH 01/23/2023 27 6  26 8 - 34 3 pg Final   • MCHC 01/23/2023 28 6 (L)  31 4 - 37 4 g/dL Final   • RDW 01/23/2023 18 6 (H)  11 6 - 15 1 % Final   • MPV 01/23/2023 11 6  8 9 - 12 7 fL Final   • Platelets 27/50/1066 198  149 - 390 Thousands/uL Final   • nRBC 01/23/2023 0  /100 WBCs Final   • Neutrophils Relative 01/23/2023 58  43 - 75 % Final   • Immat GRANS % 01/23/2023 1  0 - 2 % Final   • Lymphocytes Relative 01/23/2023 23  14 - 44 % Final   • Monocytes Relative 01/23/2023 12  4 - 12 % Final   • Eosinophils Relative 01/23/2023 4  0 - 6 % Final   • Basophils Relative 01/23/2023 2 (H)  0 - 1 % Final   • Neutrophils Absolute 01/23/2023 2 46  1 85 - 7 62 Thousands/µL Final   • Immature Grans Absolute 01/23/2023 0 04  0 00 - 0 20 Thousand/uL Final   • Lymphocytes Absolute 01/23/2023 0 97  0 60 - 4 47 Thousands/µL Final   • Monocytes Absolute 01/23/2023 0 50  0 17 - 1 22 Thousand/µL Final   • Eosinophils Absolute 01/23/2023 0 15  0 00 - 0 61 Thousand/µL Final   • Basophils Absolute 01/23/2023 0 07  0 00 - 0 10 Thousands/µL Final   Ancillary Orders on 01/20/2023   Component Date Value Ref Range Status   • Protime 01/23/2023 36 9 (H)  11 6 - 14 5 seconds Final   • INR 01/23/2023 3 69 (H)  0 84 - 1 19 Final   No results displayed because visit has over 200 results  Please note: This report has been generated by a voice recognition software system   Therefore there may be syntax, spelling, and/or grammatical errors  Please call if you have any questions

## 2023-02-02 NOTE — TELEPHONE ENCOUNTER
While we try to accommodate patient requests, our priority is to schedule treatment according to Doctor's orders and site availability  Does the Provider use the intake sheet or checkout note? What would be a preferred day of the week that would work best for your infusion appointment? Tuesdays  Do you prefer mornings or afternoons for your appointments? Mornings   Are there any days or dates that do not work for your schedule, including any upcoming vacations? N/A   We are going to try our best to schedule you at the infusion center closest to your home  In the event that we are unable to what would be your next preferred infusion site or sites? 1  [place] [facility]     Do you have transportation to take you to all of your appointments?  [facility]   Would you like the infusion center to draw labs from your port? (disregard if patient doesn't have a port or need labs for infusion appointment)

## 2023-02-02 NOTE — TELEPHONE ENCOUNTER
Patient calling from \[facility]\"" [id number], requesting to speak with Mary Golden  She wanted to know if she could still use order in her chart, since she rescheduled her appointment  I confirmed with Magdalena Gonzalez that is was ok

## 2023-02-03 ENCOUNTER — TELEPHONE (OUTPATIENT)
Dept: HEMATOLOGY ONCOLOGY | Facility: MEDICAL CENTER | Age: 83
End: 2023-02-03

## 2023-02-03 NOTE — TELEPHONE ENCOUNTER
I spoke with the patient to inform her that she is scheduled for an infusion appt on 2/7/23 at 10:00am  Patient states understanding

## 2023-02-03 NOTE — TELEPHONE ENCOUNTER
Yes, Tuesday 2/14 was the only day patient couldn't due and she forgot to mentioned it when going over the dot phrase questions  All other appointments are good  Thanks!

## 2023-02-03 NOTE — TELEPHONE ENCOUNTER
I spoke with Ga Rodriguez in regards to having the patient scheduled for infusion on 2/7/23   Ga Rodriguez states she can put the patient on the schedule for 2/7/23 at 10:00am

## 2023-02-04 LAB
ALBUMIN SERPL ELPH-MCNC: 3.81 G/DL (ref 3.5–5)
ALBUMIN SERPL ELPH-MCNC: 58.6 % (ref 52–65)
ALPHA1 GLOB SERPL ELPH-MCNC: 0.34 G/DL (ref 0.1–0.4)
ALPHA1 GLOB SERPL ELPH-MCNC: 5.3 % (ref 2.5–5)
ALPHA2 GLOB SERPL ELPH-MCNC: 0.67 G/DL (ref 0.4–1.2)
ALPHA2 GLOB SERPL ELPH-MCNC: 10.3 % (ref 7–13)
BETA GLOB ABNORMAL SERPL ELPH-MCNC: 0.49 G/DL (ref 0.4–0.8)
BETA1 GLOB SERPL ELPH-MCNC: 7.5 % (ref 5–13)
BETA2 GLOB SERPL ELPH-MCNC: 4.8 % (ref 2–8)
BETA2+GAMMA GLOB SERPL ELPH-MCNC: 0.31 G/DL (ref 0.2–0.5)
GAMMA GLOB ABNORMAL SERPL ELPH-MCNC: 0.88 G/DL (ref 0.5–1.6)
GAMMA GLOB SERPL ELPH-MCNC: 13.5 % (ref 12–22)
IGG/ALB SER: 1.42 {RATIO} (ref 1.1–1.8)
KAPPA LC FREE SER-MCNC: 37.4 MG/L (ref 3.3–19.4)
KAPPA LC FREE/LAMBDA FREE SER: 1.67 {RATIO} (ref 0.26–1.65)
LAMBDA LC FREE SERPL-MCNC: 22.4 MG/L (ref 5.7–26.3)
PROT PATTERN SERPL ELPH-IMP: ABNORMAL
PROT SERPL-MCNC: 6.5 G/DL (ref 6.4–8.2)
SCAN RESULT: NORMAL

## 2023-02-06 ENCOUNTER — APPOINTMENT (OUTPATIENT)
Dept: LAB | Facility: CLINIC | Age: 83
End: 2023-02-06

## 2023-02-06 DIAGNOSIS — I48.20 CHRONIC ATRIAL FIBRILLATION (HCC): ICD-10-CM

## 2023-02-06 DIAGNOSIS — D50.8 OTHER IRON DEFICIENCY ANEMIA: ICD-10-CM

## 2023-02-06 DIAGNOSIS — E03.8 OTHER SPECIFIED HYPOTHYROIDISM: ICD-10-CM

## 2023-02-06 DIAGNOSIS — I63.9 CEREBROVASCULAR ACCIDENT (CVA), UNSPECIFIED MECHANISM (HCC): ICD-10-CM

## 2023-02-06 DIAGNOSIS — N18.30 ANEMIA DUE TO STAGE 3 CHRONIC KIDNEY DISEASE, UNSPECIFIED WHETHER STAGE 3A OR 3B CKD (HCC): ICD-10-CM

## 2023-02-06 DIAGNOSIS — K90.0 CELIAC DISEASE: ICD-10-CM

## 2023-02-06 DIAGNOSIS — E11.69 TYPE 2 DIABETES MELLITUS WITH OTHER SPECIFIED COMPLICATION, WITHOUT LONG-TERM CURRENT USE OF INSULIN (HCC): ICD-10-CM

## 2023-02-06 DIAGNOSIS — I10 ESSENTIAL HYPERTENSION: ICD-10-CM

## 2023-02-06 DIAGNOSIS — D64.9 ANEMIA, UNSPECIFIED TYPE: ICD-10-CM

## 2023-02-06 DIAGNOSIS — D63.1 ANEMIA DUE TO STAGE 3 CHRONIC KIDNEY DISEASE, UNSPECIFIED WHETHER STAGE 3A OR 3B CKD (HCC): ICD-10-CM

## 2023-02-06 DIAGNOSIS — E78.00 HYPERCHOLESTEREMIA: ICD-10-CM

## 2023-02-06 DIAGNOSIS — E11.9 TYPE 2 DIABETES MELLITUS WITHOUT COMPLICATION, WITHOUT LONG-TERM CURRENT USE OF INSULIN (HCC): ICD-10-CM

## 2023-02-06 DIAGNOSIS — R80.8 OTHER PROTEINURIA: ICD-10-CM

## 2023-02-06 LAB
ALBUMIN SERPL BCP-MCNC: 3.7 G/DL (ref 3.5–5)
ALP SERPL-CCNC: 96 U/L (ref 46–116)
ALT SERPL W P-5'-P-CCNC: 31 U/L (ref 12–78)
ANION GAP SERPL CALCULATED.3IONS-SCNC: 4 MMOL/L (ref 4–13)
AST SERPL W P-5'-P-CCNC: 28 U/L (ref 5–45)
BILIRUB SERPL-MCNC: 0.44 MG/DL (ref 0.2–1)
BUN SERPL-MCNC: 18 MG/DL (ref 5–25)
CALCIUM SERPL-MCNC: 8.2 MG/DL (ref 8.3–10.1)
CHLORIDE SERPL-SCNC: 111 MMOL/L (ref 96–108)
CHOLEST SERPL-MCNC: 98 MG/DL
CO2 SERPL-SCNC: 26 MMOL/L (ref 21–32)
CREAT SERPL-MCNC: 0.59 MG/DL (ref 0.6–1.3)
ERYTHROCYTE [DISTWIDTH] IN BLOOD BY AUTOMATED COUNT: 17.4 % (ref 11.6–15.1)
GFR SERPL CREATININE-BSD FRML MDRD: 85 ML/MIN/1.73SQ M
GLUCOSE P FAST SERPL-MCNC: 152 MG/DL (ref 65–99)
HCT VFR BLD AUTO: 27.1 % (ref 34.8–46.1)
HDLC SERPL-MCNC: 47 MG/DL
HGB BLD-MCNC: 7.3 G/DL (ref 11.5–15.4)
LDLC SERPL CALC-MCNC: 29 MG/DL (ref 0–100)
MCH RBC QN AUTO: 25 PG (ref 26.8–34.3)
MCHC RBC AUTO-ENTMCNC: 26.9 G/DL (ref 31.4–37.4)
MCV RBC AUTO: 93 FL (ref 82–98)
NONHDLC SERPL-MCNC: 51 MG/DL
PLATELET # BLD AUTO: 174 THOUSANDS/UL (ref 149–390)
PMV BLD AUTO: 11.4 FL (ref 8.9–12.7)
POTASSIUM SERPL-SCNC: 3.7 MMOL/L (ref 3.5–5.3)
PROT SERPL-MCNC: 6.6 G/DL (ref 6.4–8.4)
RBC # BLD AUTO: 2.92 MILLION/UL (ref 3.81–5.12)
SODIUM SERPL-SCNC: 141 MMOL/L (ref 135–147)
TRIGL SERPL-MCNC: 112 MG/DL
WBC # BLD AUTO: 3.43 THOUSAND/UL (ref 4.31–10.16)

## 2023-02-07 ENCOUNTER — HOSPITAL ENCOUNTER (OUTPATIENT)
Dept: INFUSION CENTER | Facility: HOSPITAL | Age: 83
Discharge: HOME | End: 2023-02-07
Attending: INTERNAL MEDICINE

## 2023-02-07 VITALS
TEMPERATURE: 97.6 F | SYSTOLIC BLOOD PRESSURE: 149 MMHG | HEART RATE: 90 BPM | OXYGEN SATURATION: 96 % | DIASTOLIC BLOOD PRESSURE: 65 MMHG | RESPIRATION RATE: 18 BRPM

## 2023-02-07 DIAGNOSIS — D62 ACUTE BLOOD LOSS ANEMIA: Primary | ICD-10-CM

## 2023-02-07 DIAGNOSIS — D50.8 OTHER IRON DEFICIENCY ANEMIA: ICD-10-CM

## 2023-02-07 LAB
BASOPHILS # BLD AUTO: 0.03 THOUSANDS/ÂΜL (ref 0–0.1)
BASOPHILS NFR BLD AUTO: 1 % (ref 0–1)
EOSINOPHIL # BLD AUTO: 0.1 THOUSAND/ÂΜL (ref 0–0.61)
EOSINOPHIL NFR BLD AUTO: 3 % (ref 0–6)
ERYTHROCYTE [DISTWIDTH] IN BLOOD BY AUTOMATED COUNT: 17.3 % (ref 11.6–15.1)
HCT VFR BLD AUTO: 26.9 % (ref 34.8–46.1)
HGB BLD-MCNC: 7.5 G/DL (ref 11.5–15.4)
IMM GRANULOCYTES # BLD AUTO: 0.01 THOUSAND/UL (ref 0–0.2)
IMM GRANULOCYTES NFR BLD AUTO: 0 % (ref 0–2)
LYMPHOCYTES # BLD AUTO: 0.71 THOUSANDS/ÂΜL (ref 0.6–4.47)
LYMPHOCYTES NFR BLD AUTO: 24 % (ref 14–44)
MCH RBC QN AUTO: 25.4 PG (ref 26.8–34.3)
MCHC RBC AUTO-ENTMCNC: 27.9 G/DL (ref 31.4–37.4)
MCV RBC AUTO: 91 FL (ref 82–98)
MONOCYTES # BLD AUTO: 0.28 THOUSAND/ÂΜL (ref 0.17–1.22)
MONOCYTES NFR BLD AUTO: 10 % (ref 4–12)
NEUTROPHILS # BLD AUTO: 1.82 THOUSANDS/ÂΜL (ref 1.85–7.62)
NEUTS SEG NFR BLD AUTO: 62 % (ref 43–75)
NRBC BLD AUTO-RTO: 0 /100 WBCS
PLATELET # BLD AUTO: 152 THOUSANDS/UL (ref 149–390)
PMV BLD AUTO: 10 FL (ref 8.9–12.7)
RBC # BLD AUTO: 2.95 MILLION/UL (ref 3.81–5.12)
WBC # BLD AUTO: 2.95 THOUSAND/UL (ref 4.31–10.16)

## 2023-02-07 RX ORDER — SODIUM CHLORIDE 9 MG/ML
20 INJECTION, SOLUTION INTRAVENOUS ONCE
Status: CANCELLED | OUTPATIENT
Start: 2023-02-16

## 2023-02-07 RX ORDER — SODIUM CHLORIDE 9 MG/ML
20 INJECTION, SOLUTION INTRAVENOUS ONCE
Status: COMPLETED | OUTPATIENT
Start: 2023-02-07 | End: 2023-02-07

## 2023-02-07 RX ADMIN — SODIUM CHLORIDE 20 ML/HR: 0.9 INJECTION, SOLUTION INTRAVENOUS at 10:13

## 2023-02-07 RX ADMIN — IRON SUCROSE 200 MG: 20 INJECTION, SOLUTION INTRAVENOUS at 10:14

## 2023-02-07 NOTE — PROGRESS NOTES
Labs drawn  HGB is 7 5  Provider notified, per Joselyn Dakin RN, will continue to monitor patient, no action required at this time  Patient tolerated venofer infusion well with no adverse effects  Offers no complaints  Next appointment verified, AVS provided

## 2023-02-09 LAB — CRYOFIB PLAS QL: NORMAL

## 2023-02-09 NOTE — ASSESSMENT & PLAN NOTE
2/6/2023: WBC 3 43, hemoglobin 7 3, platelet 744, CMP normal except blood sugar 152, creatinine 0 59, calcium 0 calcium 8 2, GFR 85, hemoglobin A1c elevated, cholesterol 98 LDL 29 triglyceride 112 HDL 47 hemoglobin A1c elevated    2/2/2023: WBC 4 04, hemoglobin 7 5, platelet 143, total protein 6 5, iron saturation 10%, TIBC 422, iron 44, ferritin 21, cryoglobulin negative, lymphoma leukemia panel negative, serum protein electrophoresis negative, no monoclonal bands noted, Ig Kappa free light chain 37 4, Ig lambda free light chain 22 4, kappa/lambda ratio 1 67 slightly high IgA 157 IgG 773 and IgM 108  1-12-23:EGD: 1   Moderate degree of gastritis  2  No sign of upper GI bleeding  3  Slightly flattened small bowel villi, biopsies were done  4  Esophagus  1-12-23: Colonoscopy:  1  Suboptimal bowel prep with large amount of liquid stool throughout the colon, visualization was suboptimal  2  Colon polyp removed  3  Extensive diverticulosis throughout the colon  4  Internal hemorrhoid     RECOMMENDATION:    No further screening colonoscopies necessary     • Age greater than 72      30 discussion with patient's daughter Pete Greene as well as other family member at bedside regarding differential diagnosis of the anemia  Multiple reason including being on chronic anticoagulation, mechanical mitral valve, subtle blood loss cannot be ruled out, also history of celiac, probable absorption problem,    Patient is going for repeat upper GI endoscopy next week  Patient is getting iron infusion for 5 weeks  Patient is advised to stop taking oral iron by hematologist   Patient is being followed by them  Advised him to discuss with them about abnormal kappa/lambda ratio  Will monitor the trend  Advised him to discuss with cardiologist about recurrent anemia anticoagulation mechanical heart valve issue which is very complex

## 2023-02-09 NOTE — ASSESSMENT & PLAN NOTE
2/6/2023: WBC 3 43, hemoglobin 7 3, platelet 264, CMP normal except blood sugar 152, creatinine 0 59, calcium 0 calcium 8 2, GFR 85, hemoglobin A1c elevated, cholesterol 98 LDL 29 triglyceride 112 HDL 47 hemoglobin A1c elevated    2/2/2023: WBC 4 04, hemoglobin 7 5, platelet 354, total protein 6 5, iron saturation 10%, TIBC 422, iron 44, ferritin 21, cryoglobulin negative, lymphoma leukemia panel negative, serum protein electrophoresis negative, no monoclonal bands noted, Ig Kappa free light chain 37 4, Ig lambda free light chain 22 4, kappa/lambda ratio 1 67 slightly high IgA 157 IgG 773 and IgM 108  1-12-23:EGD: 1   Moderate degree of gastritis  2  No sign of upper GI bleeding  3  Slightly flattened small bowel villi, biopsies were done  4  Esophagus  1-12-23: Colonoscopy:  1  Suboptimal bowel prep with large amount of liquid stool throughout the colon, visualization was suboptimal  2  Colon polyp removed  3  Extensive diverticulosis throughout the colon  4  Internal hemorrhoid     RECOMMENDATION:    No further screening colonoscopies necessary     • Age greater than 72      30 discussion with patient's daughter [facility] as well as other family member at bedside regarding differential diagnosis of the anemia  Multiple reason including being on chronic anticoagulation, mechanical mitral valve, subtle blood loss cannot be ruled out, also history of celiac, probable absorption problem,    Patient is going for repeat upper GI endoscopy next week  Patient is getting iron infusion for 5 weeks  Patient is advised to stop taking oral iron by hematologist   Patient is being followed by them  Advised him to discuss with them about abnormal kappa/lambda ratio  Will monitor the trend  Advised him to discuss with cardiologist about recurrent anemia anticoagulation mechanical heart valve issue which is very complex

## 2023-02-10 ENCOUNTER — OFFICE VISIT (OUTPATIENT)
Dept: INTERNAL MEDICINE CLINIC | Facility: CLINIC | Age: 83
End: 2023-02-10

## 2023-02-10 VITALS
HEIGHT: 65 IN | BODY MASS INDEX: 26.59 KG/M2 | DIASTOLIC BLOOD PRESSURE: 70 MMHG | TEMPERATURE: 98 F | SYSTOLIC BLOOD PRESSURE: 126 MMHG | HEART RATE: 91 BPM | WEIGHT: 159.6 LBS | OXYGEN SATURATION: 100 %

## 2023-02-10 DIAGNOSIS — E11.9 TYPE 2 DIABETES MELLITUS WITHOUT COMPLICATION, WITHOUT LONG-TERM CURRENT USE OF INSULIN (HCC): ICD-10-CM

## 2023-02-10 DIAGNOSIS — Z95.2 PRESENCE OF PROSTHETIC HEART VALVE: ICD-10-CM

## 2023-02-10 DIAGNOSIS — D62 ACUTE BLOOD LOSS ANEMIA: Primary | ICD-10-CM

## 2023-02-10 DIAGNOSIS — E53.8 VITAMIN B12 DEFICIENCY: ICD-10-CM

## 2023-02-10 DIAGNOSIS — K90.0 CELIAC DISEASE: ICD-10-CM

## 2023-02-10 DIAGNOSIS — J43.9 PULMONARY EMPHYSEMA, UNSPECIFIED EMPHYSEMA TYPE (HCC): ICD-10-CM

## 2023-02-10 DIAGNOSIS — E03.8 OTHER SPECIFIED HYPOTHYROIDISM: ICD-10-CM

## 2023-02-10 DIAGNOSIS — I71.40 ABDOMINAL AORTIC ANEURYSM (AAA) WITHOUT RUPTURE, UNSPECIFIED PART: ICD-10-CM

## 2023-02-10 DIAGNOSIS — D64.9 ANEMIA, UNSPECIFIED TYPE: ICD-10-CM

## 2023-02-10 DIAGNOSIS — I10 ESSENTIAL HYPERTENSION: ICD-10-CM

## 2023-02-10 DIAGNOSIS — Z95.2 H/O MITRAL VALVE REPLACEMENT WITH MECHANICAL VALVE: ICD-10-CM

## 2023-02-10 DIAGNOSIS — D50.8 OTHER IRON DEFICIENCY ANEMIA: ICD-10-CM

## 2023-02-10 DIAGNOSIS — E78.00 HYPERCHOLESTEREMIA: ICD-10-CM

## 2023-02-10 DIAGNOSIS — Z86.2 HISTORY OF ANEMIA DUE TO CKD: ICD-10-CM

## 2023-02-10 DIAGNOSIS — Z79.01 LONG TERM (CURRENT) USE OF ANTICOAGULANTS: ICD-10-CM

## 2023-02-10 DIAGNOSIS — N18.9 HISTORY OF ANEMIA DUE TO CKD: ICD-10-CM

## 2023-02-10 DIAGNOSIS — I63.9 CEREBROVASCULAR ACCIDENT (CVA), UNSPECIFIED MECHANISM (HCC): ICD-10-CM

## 2023-02-10 RX ORDER — ATORVASTATIN CALCIUM 40 MG/1
40 TABLET, FILM COATED ORAL DAILY
Qty: 90 TABLET | Refills: 1 | Status: SHIPPED | OUTPATIENT
Start: 2023-02-10

## 2023-02-10 NOTE — ASSESSMENT & PLAN NOTE
Taking aspirin on her own  Or maybe somebody recommended  It is unclear to me  Recommend to see neurologist this was being used for CVA RCVS been stable  It is reasonable to hold off given the complexity of recurrent anemia profound at this time  Will await their input  Also explained that given reason for the Coumadin versus aspirin  Also risk of bleeding were also reviewed with them

## 2023-02-10 NOTE — ASSESSMENT & PLAN NOTE
Lab Results   Component Value Date    EGFR 85 02/06/2023    EGFR 85 02/02/2023    EGFR 83 01/17/2023    CREATININE 0 59 (L) 02/06/2023    CREATININE 0 58 (L) 02/02/2023    CREATININE 0 63 01/17/2023   Renal functions are stable

## 2023-02-10 NOTE — ASSESSMENT & PLAN NOTE
Lab Results   Component Value Date    HGBA1C 5 5 01/10/2023   Of metformin  Repeat hemoglobin A1c awaited it was done 3 days ago  Advised to call back in 1 week

## 2023-02-10 NOTE — ASSESSMENT & PLAN NOTE
Lab Results   Component Value Date    LDLCALC 29 02/06/2023     Lab Results   Component Value Date    ALT 31 02/06/2023    ALT 34 11/09/2015     Lab Results   Component Value Date    CHOLESTEROL 98 02/06/2023    CHOLESTEROL 104 07/06/2022    CHOLESTEROL 200 03/12/2022     Lab Results   Component Value Date    HDL 47 (L) 02/06/2023    HDL 37 (L) 07/06/2022    HDL 38 (L) 03/12/2022     Lab Results   Component Value Date    TRIG 112 02/06/2023    TRIG 137 07/06/2022    TRIG 230 (H) 03/12/2022     Lab Results   Component Value Date    NONHDLC 51 02/06/2023    [place] 67 07/06/2022    [place] 189 02/17/2022     LDL tight

## 2023-02-10 NOTE — ASSESSMENT & PLAN NOTE
Lab Results   Component Value Date    [id number] 1 832 08/12/2022     Normal   No hypothyroidism clinically  And not on any supplement

## 2023-02-10 NOTE — PATIENT INSTRUCTIONS
Whenever you see hematologist that his blood specialist please discussed with them about your abnormal kappa/lambda ratio slightly being high and significance of this  Set up an appointment with neurologist about their guidance about aspirin in the future  Discussed with cardiologist about alternate if that should be recurrent hemoglobin drop given her my mechanical wall as well as being on Coumadin  Go for the iron infusion as recommended by hematologist     We are decreasing dose of atorvastatin 40 mg daily  You may want to use half of 80 mg what you have at home  New prescription I am sending to the pharmacy is 40 mg except when you get new tablet take 1 a day    Stay off the diabetic medications  Call me back in a week for hemoglobin A1c report  Follow with Consultants as per their and our suggestion    Follow up in one month or as needed earlier    Follow all instructions as advised and discussed  Take your medications as prescribed  Call the office immediately if you experience any side effects  Ask questions if you do not understand  Keep your scheduled appointment as advised or come sooner if necessary or in doubt  Best time to call for non-urgent matter or questions on weekdays is between 9am and 12 noon  See physician for any new symptoms or worsening of current symptoms  Urgent or emergent situations call 911 and report to nearest emergency room      I spent  30 -40 minutes taking care of this patient including clinical care, conseling, collaboration, chart, lab and consultaion review as appropriate    Patient is to get labs 1 week(s) prior to next visit if advised

## 2023-02-12 LAB
EST. AVERAGE GLUCOSE BLD GHB EST-MCNC: 97 MG/DL
HBA1C MFR BLD: 5 %

## 2023-02-13 ENCOUNTER — APPOINTMENT (OUTPATIENT)
Dept: LAB | Facility: CLINIC | Age: 83
End: 2023-02-13

## 2023-02-13 ENCOUNTER — OFFICE VISIT (OUTPATIENT)
Dept: GASTROENTEROLOGY | Facility: CLINIC | Age: 83
End: 2023-02-13

## 2023-02-13 DIAGNOSIS — D50.9 IRON DEFICIENCY ANEMIA, UNSPECIFIED IRON DEFICIENCY ANEMIA TYPE: ICD-10-CM

## 2023-02-13 NOTE — PROGRESS NOTES
PT HERE FOR CAPSULE ENDOSCOPY, TOLERATED BOWEL PREP, REMAINED NPO AFTER MIDNIGHT   REVIEWED CAPSULE AND INSTRUCTIONS, PT VERBALIZED UNDERSTANDING, PT TO RETURN AT 4:00 PM

## 2023-02-16 ENCOUNTER — HOSPITAL ENCOUNTER (OUTPATIENT)
Dept: INFUSION CENTER | Facility: HOSPITAL | Age: 83
Discharge: HOME | End: 2023-02-16
Attending: INTERNAL MEDICINE

## 2023-02-16 VITALS
RESPIRATION RATE: 20 BRPM | OXYGEN SATURATION: 99 % | DIASTOLIC BLOOD PRESSURE: 67 MMHG | HEART RATE: 80 BPM | SYSTOLIC BLOOD PRESSURE: 146 MMHG | TEMPERATURE: 97.2 F

## 2023-02-16 DIAGNOSIS — D62 ACUTE BLOOD LOSS ANEMIA: Primary | ICD-10-CM

## 2023-02-16 DIAGNOSIS — D50.8 OTHER IRON DEFICIENCY ANEMIA: ICD-10-CM

## 2023-02-16 LAB
BASOPHILS # BLD AUTO: 0.02 THOUSANDS/ÂΜL (ref 0–0.1)
BASOPHILS NFR BLD AUTO: 1 % (ref 0–1)
EOSINOPHIL # BLD AUTO: 0.09 THOUSAND/ÂΜL (ref 0–0.61)
EOSINOPHIL NFR BLD AUTO: 3 % (ref 0–6)
ERYTHROCYTE [DISTWIDTH] IN BLOOD BY AUTOMATED COUNT: 18 % (ref 11.6–15.1)
HCT VFR BLD AUTO: 28.9 % (ref 34.8–46.1)
HGB BLD-MCNC: 8 G/DL (ref 11.5–15.4)
IMM GRANULOCYTES # BLD AUTO: 0 THOUSAND/UL (ref 0–0.2)
IMM GRANULOCYTES NFR BLD AUTO: 0 % (ref 0–2)
LYMPHOCYTES # BLD AUTO: 0.77 THOUSANDS/ÂΜL (ref 0.6–4.47)
LYMPHOCYTES NFR BLD AUTO: 26 % (ref 14–44)
MCH RBC QN AUTO: 24.5 PG (ref 26.8–34.3)
MCHC RBC AUTO-ENTMCNC: 27.7 G/DL (ref 31.4–37.4)
MCV RBC AUTO: 89 FL (ref 82–98)
MONOCYTES # BLD AUTO: 0.31 THOUSAND/ÂΜL (ref 0.17–1.22)
MONOCYTES NFR BLD AUTO: 10 % (ref 4–12)
NEUTROPHILS # BLD AUTO: 1.82 THOUSANDS/ÂΜL (ref 1.85–7.62)
NEUTS SEG NFR BLD AUTO: 60 % (ref 43–75)
NRBC BLD AUTO-RTO: 0 /100 WBCS
PLATELET # BLD AUTO: 158 THOUSANDS/UL (ref 149–390)
PMV BLD AUTO: 10 FL (ref 8.9–12.7)
RBC # BLD AUTO: 3.26 MILLION/UL (ref 3.81–5.12)
WBC # BLD AUTO: 3.01 THOUSAND/UL (ref 4.31–10.16)

## 2023-02-16 RX ORDER — SODIUM CHLORIDE 9 MG/ML
20 INJECTION, SOLUTION INTRAVENOUS ONCE
Status: COMPLETED | OUTPATIENT
Start: 2023-02-16 | End: 2023-02-16

## 2023-02-16 RX ORDER — SODIUM CHLORIDE 9 MG/ML
20 INJECTION, SOLUTION INTRAVENOUS ONCE
Status: CANCELLED | OUTPATIENT
Start: 2023-02-21

## 2023-02-16 RX ADMIN — SODIUM CHLORIDE 20 ML/HR: 9 INJECTION, SOLUTION INTRAVENOUS at 10:33

## 2023-02-16 RX ADMIN — IRON SUCROSE 200 MG: 20 INJECTION, SOLUTION INTRAVENOUS at 10:33

## 2023-02-17 DIAGNOSIS — D50.8 OTHER IRON DEFICIENCY ANEMIA: ICD-10-CM

## 2023-02-17 DIAGNOSIS — D62 ACUTE BLOOD LOSS ANEMIA: Primary | ICD-10-CM

## 2023-02-20 ENCOUNTER — TELEPHONE (OUTPATIENT)
Dept: NEUROLOGY | Facility: CLINIC | Age: 83
End: 2023-02-20

## 2023-02-21 ENCOUNTER — HOSPITAL ENCOUNTER (OUTPATIENT)
Dept: INFUSION CENTER | Facility: HOSPITAL | Age: 83
Discharge: HOME | End: 2023-02-21
Attending: INTERNAL MEDICINE

## 2023-02-21 VITALS
RESPIRATION RATE: 18 BRPM | TEMPERATURE: 97.4 F | DIASTOLIC BLOOD PRESSURE: 69 MMHG | OXYGEN SATURATION: 94 % | SYSTOLIC BLOOD PRESSURE: 152 MMHG | HEART RATE: 77 BPM

## 2023-02-21 DIAGNOSIS — D50.8 OTHER IRON DEFICIENCY ANEMIA: ICD-10-CM

## 2023-02-21 DIAGNOSIS — D62 ACUTE BLOOD LOSS ANEMIA: Primary | ICD-10-CM

## 2023-02-21 LAB
BASOPHILS # BLD AUTO: 0.04 THOUSANDS/ÂΜL (ref 0–0.1)
BASOPHILS NFR BLD AUTO: 1 % (ref 0–1)
EOSINOPHIL # BLD AUTO: 0.1 THOUSAND/ÂΜL (ref 0–0.61)
EOSINOPHIL NFR BLD AUTO: 2 % (ref 0–6)
ERYTHROCYTE [DISTWIDTH] IN BLOOD BY AUTOMATED COUNT: 19.2 % (ref 11.6–15.1)
HCT VFR BLD AUTO: 31.7 % (ref 34.8–46.1)
HGB BLD-MCNC: 8.8 G/DL (ref 11.5–15.4)
IMM GRANULOCYTES # BLD AUTO: 0.02 THOUSAND/UL (ref 0–0.2)
IMM GRANULOCYTES NFR BLD AUTO: 0 % (ref 0–2)
LYMPHOCYTES # BLD AUTO: 0.78 THOUSANDS/ÂΜL (ref 0.6–4.47)
LYMPHOCYTES NFR BLD AUTO: 16 % (ref 14–44)
MCH RBC QN AUTO: 24.4 PG (ref 26.8–34.3)
MCHC RBC AUTO-ENTMCNC: 27.8 G/DL (ref 31.4–37.4)
MCV RBC AUTO: 88 FL (ref 82–98)
MONOCYTES # BLD AUTO: 0.37 THOUSAND/ÂΜL (ref 0.17–1.22)
MONOCYTES NFR BLD AUTO: 8 % (ref 4–12)
NEUTROPHILS # BLD AUTO: 3.5 THOUSANDS/ÂΜL (ref 1.85–7.62)
NEUTS SEG NFR BLD AUTO: 73 % (ref 43–75)
NRBC BLD AUTO-RTO: 0 /100 WBCS
PLATELET # BLD AUTO: 202 THOUSANDS/UL (ref 149–390)
PMV BLD AUTO: 9.9 FL (ref 8.9–12.7)
RBC # BLD AUTO: 3.61 MILLION/UL (ref 3.81–5.12)
WBC # BLD AUTO: 4.81 THOUSAND/UL (ref 4.31–10.16)

## 2023-02-21 RX ORDER — SODIUM CHLORIDE 9 MG/ML
20 INJECTION, SOLUTION INTRAVENOUS ONCE
Status: CANCELLED | OUTPATIENT
Start: 2023-02-23

## 2023-02-21 RX ORDER — SODIUM CHLORIDE 9 MG/ML
20 INJECTION, SOLUTION INTRAVENOUS ONCE
Status: COMPLETED | OUTPATIENT
Start: 2023-02-21 | End: 2023-02-21

## 2023-02-21 RX ADMIN — SODIUM CHLORIDE 20 ML/HR: 0.9 INJECTION, SOLUTION INTRAVENOUS at 11:00

## 2023-02-21 RX ADMIN — IRON SUCROSE 200 MG: 20 INJECTION, SOLUTION INTRAVENOUS at 11:00

## 2023-02-22 ENCOUNTER — TELEPHONE (OUTPATIENT)
Dept: GASTROENTEROLOGY | Facility: CLINIC | Age: 83
End: 2023-02-22

## 2023-02-22 NOTE — TELEPHONE ENCOUNTER
----- Message from Vincent Crum MD sent at 2/22/2023  2:26 PM EST -----  Regarding: schedule egd with push enteroscopy  Hi Erika Coronado  Just read her small bowel capsule study which show Bleeding AVM in Proximal small bowel , please schedule her for push enteroscopy with APC with  [facility] [place] or [place]    Thanks'  Panchito Jain

## 2023-02-22 NOTE — TELEPHONE ENCOUNTER
Scheduled date of EGD(as of today): 3/6/23  Physician performing EGD: Dr Vanessa Gupta  Location of EGD: [facility]  Instructions reviewed with patient by: felicia  Clearances: coumadin faxed clearance to Dr Salazar Huff [phone]    Will call their office in a few days to make sure received [phone]

## 2023-02-24 NOTE — TELEPHONE ENCOUNTER
Called Dr Filomena Dempsey office [phone], spoke to Elli Daniel whom informed that clearance request was not received  I refaxed to [phone]  Will call their office later today to make sure it was received this time

## 2023-02-24 NOTE — TELEPHONE ENCOUNTER
Called Dr Johana Wagner office and was informed that nurse Tom left for the day  The medical records dept took my information and is going to see if the doctor has the clearance request as he is there today

## 2023-02-24 NOTE — TELEPHONE ENCOUNTER
Received call back from [address] [place] [address] that the clearance request is sitting on the doctor's desk and he is aware of it, however, seeing pt's right now  Will call on Monday afternoon if do not receive clearance back

## 2023-02-27 NOTE — TELEPHONE ENCOUNTER
Received Coumadin clearance back from Dr Salome Robertson office  Pt is cleared to hold Coumadin 4 days prior to procedure  Will call pt to inform  Will have scanned

## 2023-02-28 ENCOUNTER — HOSPITAL ENCOUNTER (OUTPATIENT)
Dept: INFUSION CENTER | Facility: HOSPITAL | Age: 83
Discharge: HOME | End: 2023-02-28
Attending: INTERNAL MEDICINE

## 2023-02-28 ENCOUNTER — OFFICE VISIT (OUTPATIENT)
Dept: HEMATOLOGY ONCOLOGY | Facility: MEDICAL CENTER | Age: 83
End: 2023-02-28

## 2023-02-28 VITALS
TEMPERATURE: 97 F | OXYGEN SATURATION: 96 % | RESPIRATION RATE: 16 BRPM | BODY MASS INDEX: 26.49 KG/M2 | WEIGHT: 159.2 LBS | SYSTOLIC BLOOD PRESSURE: 130 MMHG | HEART RATE: 76 BPM | DIASTOLIC BLOOD PRESSURE: 80 MMHG

## 2023-02-28 VITALS
HEART RATE: 80 BPM | DIASTOLIC BLOOD PRESSURE: 60 MMHG | OXYGEN SATURATION: 93 % | TEMPERATURE: 98.2 F | SYSTOLIC BLOOD PRESSURE: 122 MMHG | RESPIRATION RATE: 18 BRPM

## 2023-02-28 DIAGNOSIS — D62 ACUTE BLOOD LOSS ANEMIA: Primary | ICD-10-CM

## 2023-02-28 DIAGNOSIS — D50.8 OTHER IRON DEFICIENCY ANEMIA: ICD-10-CM

## 2023-02-28 DIAGNOSIS — D62 ACUTE BLOOD LOSS ANEMIA: ICD-10-CM

## 2023-02-28 DIAGNOSIS — K31.819 AVM (ARTERIOVENOUS MALFORMATION) OF DUODENUM, ACQUIRED: Primary | ICD-10-CM

## 2023-02-28 DIAGNOSIS — R76.8 ELEVATED SERUM IMMUNOGLOBULIN FREE LIGHT CHAINS: ICD-10-CM

## 2023-02-28 LAB
BASOPHILS # BLD AUTO: 0.03 THOUSANDS/ÂΜL (ref 0–0.1)
BASOPHILS NFR BLD AUTO: 1 % (ref 0–1)
EOSINOPHIL # BLD AUTO: 0.09 THOUSAND/ÂΜL (ref 0–0.61)
EOSINOPHIL NFR BLD AUTO: 2 % (ref 0–6)
ERYTHROCYTE [DISTWIDTH] IN BLOOD BY AUTOMATED COUNT: 20.2 % (ref 11.6–15.1)
HCT VFR BLD AUTO: 32.8 % (ref 34.8–46.1)
HGB BLD-MCNC: 9 G/DL (ref 11.5–15.4)
IMM GRANULOCYTES # BLD AUTO: 0.01 THOUSAND/UL (ref 0–0.2)
IMM GRANULOCYTES NFR BLD AUTO: 0 % (ref 0–2)
LYMPHOCYTES # BLD AUTO: 0.86 THOUSANDS/ÂΜL (ref 0.6–4.47)
LYMPHOCYTES NFR BLD AUTO: 18 % (ref 14–44)
MCH RBC QN AUTO: 24.3 PG (ref 26.8–34.3)
MCHC RBC AUTO-ENTMCNC: 27.4 G/DL (ref 31.4–37.4)
MCV RBC AUTO: 88 FL (ref 82–98)
MONOCYTES # BLD AUTO: 0.42 THOUSAND/ÂΜL (ref 0.17–1.22)
MONOCYTES NFR BLD AUTO: 9 % (ref 4–12)
NEUTROPHILS # BLD AUTO: 3.5 THOUSANDS/ÂΜL (ref 1.85–7.62)
NEUTS SEG NFR BLD AUTO: 70 % (ref 43–75)
NRBC BLD AUTO-RTO: 0 /100 WBCS
PLATELET # BLD AUTO: 191 THOUSANDS/UL (ref 149–390)
PMV BLD AUTO: 9.5 FL (ref 8.9–12.7)
RBC # BLD AUTO: 3.71 MILLION/UL (ref 3.81–5.12)
WBC # BLD AUTO: 4.91 THOUSAND/UL (ref 4.31–10.16)

## 2023-02-28 RX ORDER — SODIUM CHLORIDE 9 MG/ML
20 INJECTION, SOLUTION INTRAVENOUS ONCE
Status: COMPLETED | OUTPATIENT
Start: 2023-02-28 | End: 2023-02-28

## 2023-02-28 RX ORDER — SODIUM CHLORIDE 9 MG/ML
20 INJECTION, SOLUTION INTRAVENOUS ONCE
OUTPATIENT
Start: 2023-04-04

## 2023-02-28 RX ORDER — SODIUM CHLORIDE 9 MG/ML
20 INJECTION, SOLUTION INTRAVENOUS ONCE
Status: CANCELLED | OUTPATIENT
Start: 2023-03-07

## 2023-02-28 RX ADMIN — SODIUM CHLORIDE 20 ML/HR: 9 INJECTION, SOLUTION INTRAVENOUS at 11:20

## 2023-02-28 RX ADMIN — IRON SUCROSE 200 MG: 20 INJECTION, SOLUTION INTRAVENOUS at 11:20

## 2023-02-28 NOTE — TELEPHONE ENCOUNTER
Called and spoke to pt and made her aware of 4 day coumadin hold  She asked if she needed to do bridging and I suggested she contact Dr Bee Hernandez office  Pt will hold the coumadin 4 days prior to procedure

## 2023-02-28 NOTE — PROGRESS NOTES
[address] [address]  Hematology Ambulatory [address], 1940, [phone]  2/28/2023      Assessment and Plan   1  AVM (arteriovenous malformation) of duodenum, acquired  2  Acute blood loss anemia    This is an 51-year-old female with history of acute blood loss anemia  Patient became iron deficient and was started on IV iron supplementation in early February 2023  Patient's hemoglobin has been responding  At present my hemoglobin is at 9 g/dL up from 14 5 g/dL  Patient was also found to have AVMs in the duodenum  Colonoscopy is scheduled for a push enteroscopy for cautery of this lesion  With the patient showing dried blood around that site at the time of the Colonoscopy additional iron has been prescribed  Patient will complete weekly doses of Venofer and then will follow-up with once a month x3 months of Venofer 200 mg  During each appointment, patient will have CBC drawn  Patient wishes to continue to follow-up with infusions at [facility] but will see me in 3 months at [facility]  Other diagnostics regarding acute anemia returned negative  Regimen:  Venofer 200 mg IV weekly x1 additional dose  Followed by  Venofer 200 mg IV q  28 days starting April 4 2023 (CBC with each appointment and iron panel to be completed at the end of today's infusion )    Etiology of iron deficiency: AVM  Chronic GI loss + warfarin use secondary to atrial fibrillation  Other laboratory abnormalities discussed during evaluation for anemia: Low haptoglobin which may be related to hepatic steatosis  The patient is scheduled for follow-up in approximately 3 months  Patient voiced agreement and understanding to the above  Patient advised to call the Hematology/Oncology office with any questions and concerns regarding the above      Barrier(s) to care: None  The patient is able to self care  Gabriella Franz PA-C  Medical Oncology/Hematology  [address]    Subjective     Chief Complaint   Patient presents with   • Follow-up       History of present illness: This is an 51-year-old female with past medical history of pulmonary emphysema CVA, diabetes mellitus, chronic atrial fibrillation on warfarin therapy, hepatic steatosis, celiac disease, history of mitral valve replacement with mechanical valve who presents to the [facility] Hematology office secondary to recent hospitalization and finding of anemia      10/9/17 hemoglobin = 13 0, MCV 89, RDW 15 6, platelet count 867  8/30/20 hemoglobin = 11 6  6/4/19 hemoglobin = 12 2  7/5/19 ferritin = 23  6/9/20 ferritin = 6, LDH = 270, hapto = <10  8/17/20 hemoglobin = 11 8  6/18/21 hemoglobin = 12 7, MCV 96, RDW 14 5, platelet count 604, KMRCUFYB = 18  3/11/22 hemoglobin = 12 6, MCV 93, RDW 14 9, platelet count 259  1/82/08 hemoglobin = 10 5, MCV 92, RDW 14 6, platelet count 868  8/3/42 hemoglobin = 10 0, MCV 81, RDW 17 5, platelet count 718  6/4/87 hemoglobin 9 5, MCV 83, RDW 20 6, platelet count 473, FHTBCXMJ = eight, hapto <10  8/11/22 iron panel = ferritin = eight, iron saturation 10%, TIBC 471,   8/12/22 hemolysis smear negative, LDH = 254, reticulocyte count within normal limits  9/1/22 hemoglobin 10 3, MCV 86, RDW 22 2     09/06/22:  Patient has been taking oral iron supplements twice a day with minimal side effects   Patient notes that prior to going to the hospital she was feeling very tired and fatigued   We reviewed blood work   Patient's hemoglobin has been slowly dropping since April 2022 with the most appreciable drop in MCV in July of 2022 9/30-11/10: 5 doses of venofer 200 mg IV      11/10/2022 hemoglobin 11 4, MCV 92, platelet count 057, white blood cell count 534     1/10/2023:  Patient was hospitalized secondary to acute anemia with a hemoglobin of 7 4 g/dL with a normal MCV and high RDW    Patient was given 3 units of packed red blood cell, in addition to 600 mg of iron  Discharge hemoglobin was 8 0 g/dL  Ferritin was low at 8 ng/dL      Subsequent blood tests have demonstrated a hemoglobin in the 7 g/dL range  Venofer 200 mg IV weekly x 3 doses    2/13/2023 positive AVM on capsule endoscopy in the duodenum  Hemoglobin = 9 0 g/dL    Venofer 200 mg IV weekly x2 doses, followed by monthly infusions starting on 4/4/2023  Interval history: Patient is generally feeling well  Patient had capsule endoscopy and scheduled for push enteroscopy to cauterize AVMs  Review of Systems   Constitutional: Negative for fatigue  Eyes: Positive for visual disturbance (MD)  All other systems reviewed and are negative        Patient Active Problem List   Diagnosis   • Obstructive sleep apnea   • Chronic atrial fibrillation (HCC)   • H/O mitral valve replacement with mechanical valve   • Long term (current) use of anticoagulants   • Presence of prosthetic heart valve   • Hypercholesteremia   • History of anemia due to CKD   • Allergic rhinitis   • Type 2 diabetes mellitus without complication, without long-term current use of insulin ([facility])   • Iron deficiency anemia   • Other specified hypothyroidism   • Vitamin B12 deficiency   • Other microscopic hematuria   • Celiac disease   • Abdominal aortic aneurysm (AAA) without rupture, unspecified part   • Ataxia   • Pulmonary emphysema (HCC)   • History of renal calculi   • History of cervical cancer   • Essential hypertension   • Other proteinuria   • Hepatic steatosis   • CVA (cerebral vascular accident) ([address] )   • Type 2 diabetes mellitus with other specified complication, without long-term current use of insulin ([address] )   • CKD (chronic kidney disease) stage 2, GFR 60-89 ml/min   • Cerebrovascular accident (CVA) ([address] )   • Fall   • Anemia   • Acute blood loss anemia   • AVM (arteriovenous malformation) of duodenum, acquired     Past Medical History:   Diagnosis Date • Bloody nose     slow drip, clots in the morning   • Colon polyp    • Diabetes mellitus ([facility] )     Pre DM diet controlled, metformin DCed    • Heart murmur    • Hyperlipidemia    • Stroke ([facility] ) 2022     Past Surgical History:   Procedure Laterality Date   • CATARACT EXTRACTION Bilateral    • COLONOSCOPY     • EGD     • HYSTERECTOMY     • MITRAL VALVE REPLACEMENT       Family History   Problem Relation Age of Onset   • Heart failure Mother    • Heart attack Father    • Sleep apnea Brother      Social History     Socioeconomic History   • Marital status:      Spouse name: Not on file   • Number of children: Not on file   • Years of education: Not on file   • Highest education level: Not on file   Occupational History   • Not on file   Tobacco Use   • Smoking status: Former     Packs/day: 2 00     Years: 30 00     Pack years: 60 00     Types: Cigarettes     Quit date: 46     Years since quittin 1   • Smokeless tobacco: Never   Vaping Use   • Vaping Use: Never used   Substance and Sexual Activity   • Alcohol use: Yes     Comment: special occasional   • Drug use: No   • Sexual activity: Not on file   Other Topics Concern   • Not on file   Social History Narrative   • Not on file     Social Determinants of Health     Financial Resource Strain: Not on file   Food Insecurity: No Food Insecurity   • Worried About Running Out of Food in the Last Year: Never true   • Ran Out of Food in the Last Year: Never true   Transportation Needs: No Transportation Needs   • Lack of Transportation (Medical): No   • Lack of Transportation (Non-Medical):  No   Physical Activity: Not on file   Stress: Not on file   Social Connections: Not on file   Intimate Partner Violence: Not on file   Housing Stability: Low Risk    • Unable to Pay for Housing in the Last Year: No   • Number of Places Lived in the Last Year: 1   • Unstable Housing in the Last Year: No       Current Outpatient Medications:   •  acetaminophen (TYLENOL) 325 mg tablet, Take 2 tablets (650 mg total) by mouth every 6 (six) hours as needed for mild pain or headaches, Disp: , Rfl: 0  •  atorvastatin (LIPITOR) 40 mg tablet, Take 1 tablet (40 mg total) by mouth daily, Disp: 90 tablet, Rfl: 1  •  Cholecalciferol (VITAMIN D PO), Take by mouth, Disp: , Rfl:   •  glucose blood test strip, Use 1 each in the morning Use one daily, Disp: 100 strip, Rfl: 2  •  Multiple Vitamins-Minerals (PRESERVISION AREDS PO), Take 2 tablets by mouth daily  , Disp: , Rfl:   •  polyethylene glycol (MIRALAX) 17 g packet, Take 17 g by mouth daily as needed (Constipation), Disp: , Rfl: 0  •  warfarin (COUMADIN) 2 5 mg tablet, Take 1 tablet (2 5 mg total) by mouth 3 (three) times a week On Monday Wednesday and Friday (Patient taking differently: Take 2 5 mg by mouth 3 (three) times a week Added to 5mg dose only on tuesdays=7 5mg), Disp: , Rfl: 0  •  warfarin (COUMADIN) 5 mg tablet, Take 1 tablet (5 mg total) by mouth 4 (four) times a week On Sunday, Tuesday, Thursday, Saturday (Patient taking differently: Take 5 mg by mouth in the morning), Disp: , Rfl: 0  No current facility-administered medications for this visit  Allergies   Allergen Reactions   • Sulfa Antibiotics Tongue Swelling   • Sulfasalazine Throat Swelling       Objective   /80 (BP Location: Left arm, Patient Position: Sitting, Cuff Size: Adult)   Pulse 76   Temp (!) 97 °F (36 1 °C)   Resp 16   Wt 72 2 kg (159 lb 3 2 oz)   SpO2 96%   BMI 26 49 kg/m²    Physical Exam  Constitutional:       General: She is not in acute distress  Appearance: She is well-developed  HENT:      Head: Normocephalic and atraumatic  Eyes:      General: No scleral icterus  Pupils: Pupils are equal, round, and reactive to light  Cardiovascular:      Rate and Rhythm: Normal rate and regular rhythm  Heart sounds: No murmur heard  Pulmonary:      Effort: Pulmonary effort is normal  No respiratory distress     Skin:     General: Skin is warm  Coloration: Skin is not pale  Findings: No rash  Neurological:      Mental Status: She is alert and oriented to person, place, and time  Psychiatric:         Thought Content:  Thought content normal          Result Review  Labs:  Hospital Outpatient Visit on 02/28/2023   Component Date Value Ref Range Status   • WBC 02/28/2023 4 91  4 31 - 10 16 Thousand/uL Final   • RBC 02/28/2023 3 71 (L)  3 81 - 5 12 Million/uL Final   • Hemoglobin 02/28/2023 9 0 (L)  11 5 - 15 4 g/dL Final   • Hematocrit 02/28/2023 32 8 (L)  34 8 - 46 1 % Final   • MCV 02/28/2023 88  82 - 98 fL Final   • MCH 02/28/2023 24 3 (L)  26 8 - 34 3 pg Final   • MCHC 02/28/2023 27 4 (L)  31 4 - 37 4 g/dL Final   • RDW 02/28/2023 20 2 (H)  11 6 - 15 1 % Final   • MPV 02/28/2023 9 5  8 9 - 12 7 fL Final   • Platelets 91/35/7994 191  149 - 390 Thousands/uL Final   • nRBC 02/28/2023 0  /100 WBCs Final   • Neutrophils Relative 02/28/2023 70  43 - 75 % Final   • Immat GRANS % 02/28/2023 0  0 - 2 % Final   • Lymphocytes Relative 02/28/2023 18  14 - 44 % Final   • Monocytes Relative 02/28/2023 9  4 - 12 % Final   • Eosinophils Relative 02/28/2023 2  0 - 6 % Final   • Basophils Relative 02/28/2023 1  0 - 1 % Final   • Neutrophils Absolute 02/28/2023 3 50  1 85 - 7 62 Thousands/µL Final   • Immature Grans Absolute 02/28/2023 0 01  0 00 - 0 20 Thousand/uL Final   • Lymphocytes Absolute 02/28/2023 0 86  0 60 - 4 47 Thousands/µL Final   • Monocytes Absolute 02/28/2023 0 42  0 17 - 1 22 Thousand/µL Final   • Eosinophils Absolute 02/28/2023 0 09  0 00 - 0 61 Thousand/µL Final   • Basophils Absolute 02/28/2023 0 03  0 00 - 0 10 Thousands/µL Final   Hospital Outpatient Visit on 02/21/2023   Component Date Value Ref Range Status   • WBC 02/21/2023 4 81  4 31 - 10 16 Thousand/uL Final   • RBC 02/21/2023 3 61 (L)  3 81 - 5 12 Million/uL Final   • Hemoglobin 02/21/2023 8 8 (L)  11 5 - 15 4 g/dL Final   • Hematocrit 02/21/2023 31 7 (L)  34 8 - 46 1 % Final   • MCV 02/21/2023 88  82 - 98 fL Final   • MCH 02/21/2023 24 4 (L)  26 8 - 34 3 pg Final   • MCHC 02/21/2023 27 8 (L)  31 4 - 37 4 g/dL Final   • RDW 02/21/2023 19 2 (H)  11 6 - 15 1 % Final   • MPV 02/21/2023 9 9  8 9 - 12 7 fL Final   • Platelets 12/09/0354 202  149 - 390 Thousands/uL Final   • nRBC 02/21/2023 0  /100 WBCs Final   • Neutrophils Relative 02/21/2023 73  43 - 75 % Final   • Immat GRANS % 02/21/2023 0  0 - 2 % Final   • Lymphocytes Relative 02/21/2023 16  14 - 44 % Final   • Monocytes Relative 02/21/2023 8  4 - 12 % Final   • Eosinophils Relative 02/21/2023 2  0 - 6 % Final   • Basophils Relative 02/21/2023 1  0 - 1 % Final   • Neutrophils Absolute 02/21/2023 3 50  1 85 - 7 62 Thousands/µL Final   • Immature Grans Absolute 02/21/2023 0 02  0 00 - 0 20 Thousand/uL Final   • Lymphocytes Absolute 02/21/2023 0 78  0 60 - 4 47 Thousands/µL Final   • Monocytes Absolute 02/21/2023 0 37  0 17 - 1 22 Thousand/µL Final   • Eosinophils Absolute 02/21/2023 0 10  0 00 - 0 61 Thousand/µL Final   • Basophils Absolute 02/21/2023 0 04  0 00 - 0 10 Thousands/µL Final   Hospital Outpatient Visit on 02/16/2023   Component Date Value Ref Range Status   • WBC 02/16/2023 3 01 (L)  4 31 - 10 16 Thousand/uL Final   • RBC 02/16/2023 3 26 (L)  3 81 - 5 12 Million/uL Final   • Hemoglobin 02/16/2023 8 0 (L)  11 5 - 15 4 g/dL Final   • Hematocrit 02/16/2023 28 9 (L)  34 8 - 46 1 % Final   • MCV 02/16/2023 89  82 - 98 fL Final   • MCH 02/16/2023 24 5 (L)  26 8 - 34 3 pg Final   • MCHC 02/16/2023 27 7 (L)  31 4 - 37 4 g/dL Final   • RDW 02/16/2023 18 0 (H)  11 6 - 15 1 % Final   • MPV 02/16/2023 10 0  8 9 - 12 7 fL Final   • Platelets 92/19/3535 158  149 - 390 Thousands/uL Final   • nRBC 02/16/2023 0  /100 WBCs Final   • Neutrophils Relative 02/16/2023 60  43 - 75 % Final   • Immat GRANS % 02/16/2023 0  0 - 2 % Final   • Lymphocytes Relative 02/16/2023 26  14 - 44 % Final   • Monocytes Relative 02/16/2023 10  4 - 12 % Final • Eosinophils Relative 02/16/2023 3  0 - 6 % Final   • Basophils Relative 02/16/2023 1  0 - 1 % Final   • Neutrophils Absolute 02/16/2023 1 82 (L)  1 85 - 7 62 Thousands/µL Final   • Immature Grans Absolute 02/16/2023 0 00  0 00 - 0 20 Thousand/uL Final   • Lymphocytes Absolute 02/16/2023 0 77  0 60 - 4 47 Thousands/µL Final   • Monocytes Absolute 02/16/2023 0 31  0 17 - 1 22 Thousand/µL Final   • Eosinophils Absolute 02/16/2023 0 09  0 00 - 0 61 Thousand/µL Final   • Basophils Absolute 02/16/2023 0 02  0 00 - 0 10 Thousands/µL Final   Ancillary Orders on 02/10/2023   Component Date Value Ref Range Status   • Protime 02/13/2023 29 1 (H)  11 6 - 14 5 seconds Final   • INR 02/13/2023 2 72 (H)  0 84 - 1 19 Final       Please note: This report has been generated by a voice recognition software system  Therefore there may be syntax, spelling, and/or grammatical errors  Please call if you have any questions

## 2023-03-02 ENCOUNTER — OFFICE VISIT (OUTPATIENT)
Dept: NEUROLOGY | Facility: CLINIC | Age: 83
End: 2023-03-02

## 2023-03-02 VITALS
SYSTOLIC BLOOD PRESSURE: 130 MMHG | WEIGHT: 157.2 LBS | OXYGEN SATURATION: 95 % | HEART RATE: 77 BPM | DIASTOLIC BLOOD PRESSURE: 67 MMHG | BODY MASS INDEX: 26.19 KG/M2 | TEMPERATURE: 97.6 F | HEIGHT: 65 IN

## 2023-03-02 DIAGNOSIS — R26.89 BALANCE PROBLEM: ICD-10-CM

## 2023-03-02 DIAGNOSIS — I63.9 CEREBROVASCULAR ACCIDENT (CVA), UNSPECIFIED MECHANISM (HCC): ICD-10-CM

## 2023-03-02 DIAGNOSIS — I63.9 CVA (CEREBRAL VASCULAR ACCIDENT) (HCC): Primary | ICD-10-CM

## 2023-03-02 NOTE — PROGRESS NOTES
Patient ID: John Aguayo is a 80 y o  female  Assessment/Plan:       Diagnoses and all orders for this visit:    CVA (cerebral vascular accident) [facility])  -     Ambulatory Referral to Physical Therapy; Future  -     Ambulatory Referral to Occupational Therapy; Future    Cerebrovascular accident (CVA), unspecified mechanism ([facility] [address] )  -     Ambulatory referral to Neurology    Balance problem  -     Ambulatory Referral to Physical Therapy; Future  -     Ambulatory Referral to Occupational Therapy; Future       Continue atorvastatin 40 mg daily  Continue Coumadin per cardiology recommendations  Coumadin to be on hold for upper endoscopy on Monday, resume as soon as medically stable to do so per GI doctors  Referrals for PT/OT for balance and coordination post CVA with hip discomfort and vision changes from macular degeneration  Follow up with Neurology office in 6 months or sooner if needed  Patient taken off baby aspirin due to onset of anemia, continues on Coumadin therapy    Subjective/HPI:  Derral Service  Migdalia Peterson is an 81yo who follows in the outpatient neurology office post stroke  Patient is in the hospital May 2022 with right-sided weakness and dysarthria  CTA of the head and neck with large evolving basal ganglion infarct, mild stenosis of the cervical ICA bilaterally and bilateral distal VA stenosis  Patient on Coumadin for mechanical valve in the outpatient setting had not been on any AP therapy  Patient remained on Coumadin upon her discharge she was continued on baby aspirin and high-dose statin at 80 mg  Patient went to skilled nursing facility for poststroke rehab and was discharged to home with outpatient physical therapy  Echocardiogram with severely dilated left atrium, mildly dilated right atrium with the presence of PFO  Per cardiology's very small shunt needing no intervention  MRI the brain confirm large left basal ganglion infarct extending to the parietal lobe    Patient has had residual right-sided weakness with right facial asymmetry and mild slurring of her speech  She had mild ataxia with a slight right pronator drift due to the weakness  Patient had ongoing improvements with her ambulation and her speech  She has some difficulties with recall and memory however is continue to work with occupational therapy for improvements  Patient was walking without any medical devices, does have some imbalances however related to macular degeneration  Patient had PT OT which was placed on brief hold and when she was doing outpatient therapy  Her cardiologist manages her Coumadin  Last LDL 7 of 2022 was 40  Repeat level was 29, consider decrease to 40 mg  Patient has been following with her outpatient PCP and cardiology for vascular risk management  Patient reports back to neurology office today, she is accompanied by her daughter  Patient is ambulating with her cane today, noted some increased issues with balance and coordination  She believes this is secondary to her vision issues as she has macular degeneration for which she receives injections to 1 eye  Patient was also recently in the hospital due to onset of anemia  She was found to have iron deficient anemia and was receiving iron infusions, recently took her last infusion  She is reporting having more energy and feeling better since having treatment  Due to her anemia, and ongoing issues with regards to GI bleeding, she was taken off of her baby aspirin regimen  She is still on her Coumadin which has been fairly well regulated as of recently  Patient notes however she continues to have an abrasion in her GI system for which she is having an upper endoscopy done on Monday  Patient will have to go off of her Coumadin therapy for approximately 4 days prior to the procedure, she will resume her Coumadin therapy as soon as medically stable to do so postprocedure    Patient indicated a request for referrals for PT and OT per visual therapies as well as balance/coordination training  Patient believes OT would benefit with regards to her visual changes and PT for her balance and core  In the meantime patient remains on her Coumadin therapy as per cardiology recommendations and atorvastatin 40 mg daily should remain on long-term  Patient to follow-up in the outpatient neurology office in 6 months or sooner if needed          The following portions of the patient's history were reviewed and updated as appropriate: allergies, current medications, past family history, past medical history, past social history, past surgical history and problem list              Past Medical History:   Diagnosis Date   • Bloody nose     slow drip, clots in the morning   • Colon polyp    • Diabetes mellitus ([facility] )     Pre DM diet controlled, metformin DCed    • Heart murmur    • Hyperlipidemia    • Stroke ([facility] ) 2022       Past Surgical History:   Procedure Laterality Date   • CATARACT EXTRACTION Bilateral    • COLONOSCOPY     • EGD     • HYSTERECTOMY     • MITRAL VALVE REPLACEMENT         Social History     Socioeconomic History   • Marital status:      Spouse name: None   • Number of children: None   • Years of education: None   • Highest education level: None   Occupational History   • None   Tobacco Use   • Smoking status: Former     Packs/day: 2 00     Years: 30 00     Pack years: 60 00     Types: Cigarettes     Quit date:      Years since quittin 1   • Smokeless tobacco: Never   Vaping Use   • Vaping Use: Never used   Substance and Sexual Activity   • Alcohol use: Yes     Comment: special occasional   • Drug use: No   • Sexual activity: None   Other Topics Concern   • None   Social History Narrative   • None     Social Determinants of Health     Financial Resource Strain: Not on file   Food Insecurity: No Food Insecurity   • Worried About Running Out of Food in the Last Year: Never true   • Ran Out of Food in the Last Year: Never true   Transportation Needs: No Transportation Needs   • Lack of Transportation (Medical): No   • Lack of Transportation (Non-Medical):  No   Physical Activity: Not on file   Stress: Not on file   Social Connections: Not on file   Intimate Partner Violence: Not on file   Housing Stability: Low Risk    • Unable to Pay for Housing in the Last Year: No   • Number of Places Lived in the Last Year: 1   • Unstable Housing in the Last Year: No       Family History   Problem Relation Age of Onset   • Heart failure Mother    • Heart attack Father    • Sleep apnea Brother          Current Outpatient Medications:   •  acetaminophen (TYLENOL) 325 mg tablet, Take 2 tablets (650 mg total) by mouth every 6 (six) hours as needed for mild pain or headaches, Disp: , Rfl: 0  •  atorvastatin (LIPITOR) 40 mg tablet, Take 1 tablet (40 mg total) by mouth daily, Disp: 90 tablet, Rfl: 1  •  Cholecalciferol (VITAMIN D PO), Take by mouth, Disp: , Rfl:   •  glucose blood test strip, Use 1 each in the morning Use one daily, Disp: 100 strip, Rfl: 2  •  Multiple Vitamins-Minerals (PRESERVISION AREDS PO), Take 2 tablets by mouth daily  , Disp: , Rfl:   •  polyethylene glycol (MIRALAX) 17 g packet, Take 17 g by mouth daily as needed (Constipation), Disp: , Rfl: 0  •  warfarin (COUMADIN) 2 5 mg tablet, Take 1 tablet (2 5 mg total) by mouth 3 (three) times a week On Monday Wednesday and Friday (Patient taking differently: Take 2 5 mg by mouth 3 (three) times a week Added to 5mg dose only on tuesdays=7 5mg), Disp: , Rfl: 0  •  warfarin (COUMADIN) 5 mg tablet, Take 1 tablet (5 mg total) by mouth 4 (four) times a week On Sunday, Tuesday, Thursday, Saturday (Patient taking differently: Take 5 mg by mouth in the morning), Disp: , Rfl: 0    Allergies   Allergen Reactions   • Sulfa Antibiotics Tongue Swelling   • Sulfasalazine Throat Swelling        Blood pressure 130/67, pulse 77, temperature 97 6 °F (36 4 °C), temperature source Temporal, height 5' 5" (1 651 m), weight 71 3 kg (157 lb 3 2 oz), SpO2 95 %  Objective:    Blood pressure 130/67, pulse 77, temperature 97 6 °F (36 4 °C), temperature source Temporal, height 5' 5" (1 651 m), weight 71 3 kg (157 lb 3 2 oz), SpO2 95 %  Physical Exam  Vitals reviewed  Constitutional:       Appearance: Normal appearance  She is well-developed  HENT:      Head: Normocephalic  Eyes:      General: Lids are normal       Extraocular Movements: Extraocular movements intact  Pupils: Pupils are equal, round, and reactive to light  Cardiovascular:      Rate and Rhythm: Normal rate  Pulmonary:      Effort: Pulmonary effort is normal    Abdominal:      Palpations: Abdomen is soft  Musculoskeletal:      Cervical back: Normal range of motion  Skin:     General: Skin is warm and dry  Neurological:      Mental Status: She is alert  Coordination: Romberg sign negative  Deep Tendon Reflexes: Reflexes are normal and symmetric  Psychiatric:         Attention and Perception: Attention and perception normal          Mood and Affect: Mood and affect normal          Speech: Speech normal          Behavior: Behavior normal  Behavior is cooperative  Thought Content: Thought content normal          Cognition and Memory: Cognition and memory normal          Judgment: Judgment normal          Neurological Exam  Mental Status  Alert  Oriented to person, place, time and situation  Memory is normal  Recent and remote memory are intact  Speech is normal  Language is fluent with no aphasia  Attention and concentration are normal  Fund of knowledge is appropriate for level of education  Cranial Nerves  CN II: Visual acuity is normal  Visual fields full to confrontation  CN III, IV, VI: Extraocular movements intact bilaterally  Normal lids and orbits bilaterally  Pupils equal round and reactive to light bilaterally  CN V: Facial sensation is normal   CN VII: Full and symmetric facial movement    CN VIII: Hearing is normal   CN IX, X: Palate elevates symmetrically  Normal gag reflex  CN XI: Shoulder shrug strength is normal   CN XII: Tongue midline without atrophy or fasciculations  Motor  Normal muscle bulk throughout  Normal muscle tone  No abnormal involuntary movements  Strength is 5/5 in all four extremities except as noted  No pronator drift  Right                     Left   Iliopsoas                              5-                            Quadriceps                          5-                            Hamstring                            5-                             Sensory  Light touch is normal in upper and lower extremities  Temperature is normal in upper and lower extremities  Vibration is normal in upper and lower extremities  Proprioception is normal in upper and lower extremities  Reflexes  Deep tendon reflexes are 2+ and symmetric in all four extremities  Right pathological reflexes: Josi's absent  Left pathological reflexes: Josi's absent  Coordination  Right: Finger-to-nose normal  Rapid alternating movement normal  Heel-to-shin normal Left: Finger-to-nose normal  Rapid alternating movement normal  Heel-to-shin normal     Gait  Casual gait: Wide stance  Reduced stride length  Reduced right arm swing  Reduced left arm swing  Romberg is absent  Unable to rise from chair without using arms  Use of cane for ambulation stabilization  ROS:    Review of Systems   Constitutional: Negative  Negative for appetite change and fever  HENT: Negative  Negative for hearing loss, tinnitus, trouble swallowing and voice change  Eyes: Positive for visual disturbance  Negative for photophobia and pain  Respiratory: Negative  Negative for shortness of breath  Cardiovascular: Negative  Negative for palpitations  Gastrointestinal: Negative  Negative for nausea and vomiting  Endocrine: Negative    Negative for cold intolerance  Genitourinary: Negative  Negative for dysuria, frequency and urgency  Musculoskeletal: Positive for gait problem  Negative for myalgias and neck pain  Skin: Negative  Negative for rash  Allergic/Immunologic: Negative  Neurological: Negative for dizziness, tremors, seizures, syncope, facial asymmetry, speech difficulty, weakness, light-headedness, numbness and headaches  Hematological: Bruises/bleeds easily  Psychiatric/Behavioral: Negative  Negative for confusion, hallucinations and sleep disturbance  ROS reviewed and discussed with patient and her daughter

## 2023-03-02 NOTE — PATIENT INSTRUCTIONS
Continue atorvastatin 40 mg daily  Continue Coumadin per cardiology recommendations  Coumadin to be on hold for upper endoscopy on Monday, resume as soon as medically stable to do so per GI doctors  Referrals for PT/OT for balance and coordination post CVA with hip discomfort and vision changes from macular degeneration  Follow up with Neurology office in 6 months or sooner if needed

## 2023-03-05 RX ORDER — SODIUM CHLORIDE, SODIUM LACTATE, POTASSIUM CHLORIDE, CALCIUM CHLORIDE 600; 310; 30; 20 MG/100ML; MG/100ML; MG/100ML; MG/100ML
75 INJECTION, SOLUTION INTRAVENOUS CONTINUOUS
Status: CANCELLED | OUTPATIENT
Start: 2023-03-05

## 2023-03-06 ENCOUNTER — ANESTHESIA (OUTPATIENT)
Dept: GASTROENTEROLOGY | Facility: AMBULARY SURGERY CENTER | Age: 83
End: 2023-03-06

## 2023-03-06 ENCOUNTER — ANESTHESIA EVENT (OUTPATIENT)
Dept: GASTROENTEROLOGY | Facility: AMBULARY SURGERY CENTER | Age: 83
End: 2023-03-06

## 2023-03-06 ENCOUNTER — HOSPITAL ENCOUNTER (OUTPATIENT)
Dept: GASTROENTEROLOGY | Facility: AMBULARY SURGERY CENTER | Age: 83
Setting detail: OUTPATIENT SURGERY
Discharge: HOME | End: 2023-03-06
Attending: INTERNAL MEDICINE

## 2023-03-06 VITALS
RESPIRATION RATE: 18 BRPM | OXYGEN SATURATION: 94 % | TEMPERATURE: 97.9 F | DIASTOLIC BLOOD PRESSURE: 63 MMHG | SYSTOLIC BLOOD PRESSURE: 118 MMHG | HEART RATE: 69 BPM

## 2023-03-06 DIAGNOSIS — K55.20 AVM (ARTERIOVENOUS MALFORMATION) OF SMALL BOWEL, ACQUIRED: ICD-10-CM

## 2023-03-06 DIAGNOSIS — K27.9 PEPTIC ULCER DISEASE: Primary | ICD-10-CM

## 2023-03-06 RX ORDER — LIDOCAINE HYDROCHLORIDE 10 MG/ML
INJECTION, SOLUTION EPIDURAL; INFILTRATION; INTRACAUDAL; PERINEURAL AS NEEDED
Status: DISCONTINUED | OUTPATIENT
Start: 2023-03-06 | End: 2023-03-06

## 2023-03-06 RX ORDER — PROPOFOL 10 MG/ML
INJECTION, EMULSION INTRAVENOUS AS NEEDED
Status: DISCONTINUED | OUTPATIENT
Start: 2023-03-06 | End: 2023-03-06

## 2023-03-06 RX ORDER — SODIUM CHLORIDE, SODIUM LACTATE, POTASSIUM CHLORIDE, CALCIUM CHLORIDE 600; 310; 30; 20 MG/100ML; MG/100ML; MG/100ML; MG/100ML
INJECTION, SOLUTION INTRAVENOUS CONTINUOUS PRN
Status: DISCONTINUED | OUTPATIENT
Start: 2023-03-06 | End: 2023-03-06

## 2023-03-06 RX ORDER — SODIUM CHLORIDE, SODIUM LACTATE, POTASSIUM CHLORIDE, CALCIUM CHLORIDE 600; 310; 30; 20 MG/100ML; MG/100ML; MG/100ML; MG/100ML
75 INJECTION, SOLUTION INTRAVENOUS CONTINUOUS
Status: DISCONTINUED | OUTPATIENT
Start: 2023-03-06 | End: 2023-03-10 | Stop reason: HOSPADM

## 2023-03-06 RX ORDER — PANTOPRAZOLE SODIUM 40 MG/1
40 TABLET, DELAYED RELEASE ORAL DAILY
Qty: 30 TABLET | Refills: 2 | Status: SHIPPED | OUTPATIENT
Start: 2023-03-06

## 2023-03-06 RX ADMIN — PROPOFOL 30 MG: 10 INJECTION, EMULSION INTRAVENOUS at 08:14

## 2023-03-06 RX ADMIN — LIDOCAINE HYDROCHLORIDE 5 ML: 10 INJECTION, SOLUTION EPIDURAL; INFILTRATION; INTRACAUDAL; PERINEURAL at 08:04

## 2023-03-06 RX ADMIN — SODIUM CHLORIDE, SODIUM LACTATE, POTASSIUM CHLORIDE, AND CALCIUM CHLORIDE: .6; .31; .03; .02 INJECTION, SOLUTION INTRAVENOUS at 07:25

## 2023-03-06 RX ADMIN — PROPOFOL 100 MG: 10 INJECTION, EMULSION INTRAVENOUS at 08:04

## 2023-03-06 RX ADMIN — PROPOFOL 50 MG: 10 INJECTION, EMULSION INTRAVENOUS at 08:10

## 2023-03-06 NOTE — ANESTHESIA POSTPROCEDURE EVALUATION
Post-Op Assessment Note    CV Status:  Stable       Mental Status:  Sleepy   Hydration Status:  Stable   PONV Controlled:  Controlled   Airway Patency:  Patent      Post Op Vitals Reviewed: Yes      Staff: CRNA         No notable events documented      BP   161/75   Temp      Pulse  77   Resp   20   SpO2   100

## 2023-03-06 NOTE — H&P
History and Physical -  [facility]  Marcela Stearns 80 y o  female MRN: [id number]                  HPI: Marcela Stearns is a 80y o  year old female who presents for chronic iron deficiency anemia, abnormal small bowel capsule endoscopy      REVIEW OF SYSTEMS: Per the HPI, and otherwise unremarkable  Historical Information   Past Medical History:   Diagnosis Date   • Bloody nose     slow drip, clots in the morning   • Colon polyp    • Diabetes mellitus ([facility] )     Pre DM diet controlled, metformin DCed    • Heart murmur    • Hyperlipidemia    • Stroke ([facility] ) 2022     Past Surgical History:   Procedure Laterality Date   • CATARACT EXTRACTION Bilateral    • COLONOSCOPY     • EGD     • HYSTERECTOMY     • MITRAL VALVE REPLACEMENT       Social History   Social History     Substance and Sexual Activity   Alcohol Use Yes    Comment: special occasional     Social History     Substance and Sexual Activity   Drug Use No     Social History     Tobacco Use   Smoking Status Former   • Packs/day: 2 00   • Years: 30 00   • Pack years: 60 00   • Types: Cigarettes   • Quit date:    • Years since quittin 1   Smokeless Tobacco Never     Family History   Problem Relation Age of Onset   • Heart failure Mother    • Heart attack Father    • Sleep apnea Brother        Meds/Allergies     (Not in a hospital admission)      Allergies   Allergen Reactions   • Sulfa Antibiotics Tongue Swelling   • Sulfasalazine Throat Swelling       Objective     /73   Pulse 87   Temp 97 9 °F (36 6 °C) (Temporal)   Resp 18   SpO2 100%       PHYSICAL EXAM    Gen: NAD  CV: RRR  CHEST: Clear  ABD: soft, NT/ND  EXT: no edema      ASSESSMENT/PLAN:  This is a 80y o  year old female here for push enteroscopy  with APC, and she is stable and optimized for her procedure

## 2023-03-06 NOTE — ANESTHESIA PREPROCEDURE EVALUATION
Procedure:  EGD WITH PUSH ENTEROSCOPY    Relevant Problems   CARDIO   (+) AVM (arteriovenous malformation) of duodenum, acquired   (+) Abdominal aortic aneurysm (AAA) without rupture, unspecified part   (+) Chronic atrial fibrillation (HCC)   (+) Essential hypertension   (+) H/O mitral valve replacement with mechanical valve   (+) Hypercholesteremia      ENDO   (+) Other specified hypothyroidism   (+) Type 2 diabetes mellitus with other specified complication, without long-term current use of insulin (HCC)   (+) Type 2 diabetes mellitus without complication, without long-term current use of insulin (HCC)      GI/HEPATIC   (+) Hepatic steatosis      /RENAL   (+) CKD (chronic kidney disease) stage 2, GFR 60-89 ml/min      HEMATOLOGY   (+) Acute blood loss anemia   (+) Anemia   (+) Iron deficiency anemia      NEURO/PSYCH   (+) CVA (cerebral vascular accident) ([address] ) (no residual)   (+) Cerebrovascular accident (CVA) ([facility] [address] )   (+) History of anemia due to CKD   (+) History of cervical cancer   (+) History of renal calculi      PULMONARY   (+) Obstructive sleep apnea (CPAP)   (+) Pulmonary emphysema (HCC)      Other   (+) Ataxia   (+) Long term (current) use of anticoagulants (warfarin)   (+) Presence of prosthetic heart valve        Physical Exam    Airway    Mallampati score: II  TM Distance: >3 FB  Neck ROM: full     Dental   upper dentures and lower dentures,     Cardiovascular  Rhythm: regular, Rate: normal,     Pulmonary  Breath sounds clear to auscultation,     Other Findings        Anesthesia Plan  ASA Score- 3     Anesthesia Type- IV sedation with anesthesia with ASA Monitors  Additional Monitors:   Airway Plan:           Plan Factors-    Chart reviewed  Patient is not a current smoker  Induction- intravenous  Postoperative Plan-     Informed Consent- Anesthetic plan and risks discussed with patient  I personally reviewed this patient with the CRNA   Discussed and agreed on the Anesthesia Plan with the ALFREDO Rodriguez

## 2023-03-07 ENCOUNTER — HOSPITAL ENCOUNTER (OUTPATIENT)
Dept: INFUSION CENTER | Facility: HOSPITAL | Age: 83
Discharge: HOME | End: 2023-03-07
Attending: INTERNAL MEDICINE

## 2023-03-07 VITALS
OXYGEN SATURATION: 93 % | DIASTOLIC BLOOD PRESSURE: 65 MMHG | SYSTOLIC BLOOD PRESSURE: 148 MMHG | HEART RATE: 91 BPM | RESPIRATION RATE: 20 BRPM | TEMPERATURE: 96.7 F

## 2023-03-07 DIAGNOSIS — D50.8 OTHER IRON DEFICIENCY ANEMIA: ICD-10-CM

## 2023-03-07 DIAGNOSIS — D62 ACUTE BLOOD LOSS ANEMIA: Primary | ICD-10-CM

## 2023-03-07 LAB
BASOPHILS # BLD AUTO: 0.03 THOUSANDS/ÂΜL (ref 0–0.1)
BASOPHILS NFR BLD AUTO: 1 % (ref 0–1)
EOSINOPHIL # BLD AUTO: 0.09 THOUSAND/ÂΜL (ref 0–0.61)
EOSINOPHIL NFR BLD AUTO: 2 % (ref 0–6)
ERYTHROCYTE [DISTWIDTH] IN BLOOD BY AUTOMATED COUNT: 20.9 % (ref 11.6–15.1)
HCT VFR BLD AUTO: 35.7 % (ref 34.8–46.1)
HGB BLD-MCNC: 10.1 G/DL (ref 11.5–15.4)
IMM GRANULOCYTES # BLD AUTO: 0.02 THOUSAND/UL (ref 0–0.2)
IMM GRANULOCYTES NFR BLD AUTO: 0 % (ref 0–2)
LYMPHOCYTES # BLD AUTO: 0.89 THOUSANDS/ÂΜL (ref 0.6–4.47)
LYMPHOCYTES NFR BLD AUTO: 18 % (ref 14–44)
MCH RBC QN AUTO: 24.7 PG (ref 26.8–34.3)
MCHC RBC AUTO-ENTMCNC: 28.3 G/DL (ref 31.4–37.4)
MCV RBC AUTO: 87 FL (ref 82–98)
MONOCYTES # BLD AUTO: 0.37 THOUSAND/ÂΜL (ref 0.17–1.22)
MONOCYTES NFR BLD AUTO: 7 % (ref 4–12)
NEUTROPHILS # BLD AUTO: 3.57 THOUSANDS/ÂΜL (ref 1.85–7.62)
NEUTS SEG NFR BLD AUTO: 72 % (ref 43–75)
NRBC BLD AUTO-RTO: 0 /100 WBCS
PLATELET # BLD AUTO: 179 THOUSANDS/UL (ref 149–390)
PMV BLD AUTO: 9.8 FL (ref 8.9–12.7)
RBC # BLD AUTO: 4.09 MILLION/UL (ref 3.81–5.12)
WBC # BLD AUTO: 4.97 THOUSAND/UL (ref 4.31–10.16)

## 2023-03-07 RX ORDER — SODIUM CHLORIDE 9 MG/ML
20 INJECTION, SOLUTION INTRAVENOUS ONCE
Status: COMPLETED | OUTPATIENT
Start: 2023-03-07 | End: 2023-03-07

## 2023-03-07 RX ORDER — SODIUM CHLORIDE 9 MG/ML
20 INJECTION, SOLUTION INTRAVENOUS ONCE
Status: CANCELLED | OUTPATIENT
Start: 2023-03-07

## 2023-03-07 RX ADMIN — SODIUM CHLORIDE 20 ML/HR: 9 INJECTION, SOLUTION INTRAVENOUS at 09:21

## 2023-03-07 RX ADMIN — IRON SUCROSE 200 MG: 20 INJECTION, SOLUTION INTRAVENOUS at 09:21

## 2023-03-13 ENCOUNTER — APPOINTMENT (OUTPATIENT)
Dept: LAB | Facility: CLINIC | Age: 83
End: 2023-03-13

## 2023-03-13 ENCOUNTER — OFFICE VISIT (OUTPATIENT)
Dept: INTERNAL MEDICINE CLINIC | Facility: CLINIC | Age: 83
End: 2023-03-13

## 2023-03-13 VITALS
SYSTOLIC BLOOD PRESSURE: 130 MMHG | DIASTOLIC BLOOD PRESSURE: 88 MMHG | OXYGEN SATURATION: 98 % | BODY MASS INDEX: 26.16 KG/M2 | HEIGHT: 65 IN | HEART RATE: 87 BPM | WEIGHT: 157 LBS

## 2023-03-13 DIAGNOSIS — E11.9 TYPE 2 DIABETES MELLITUS WITHOUT COMPLICATION, WITHOUT LONG-TERM CURRENT USE OF INSULIN (HCC): ICD-10-CM

## 2023-03-13 DIAGNOSIS — K31.819 AVM (ARTERIOVENOUS MALFORMATION) OF DUODENUM, ACQUIRED: Primary | ICD-10-CM

## 2023-03-13 DIAGNOSIS — Z79.01 LONG TERM (CURRENT) USE OF ANTICOAGULANTS: ICD-10-CM

## 2023-03-13 DIAGNOSIS — I10 ESSENTIAL HYPERTENSION: ICD-10-CM

## 2023-03-13 DIAGNOSIS — N18.2 CKD (CHRONIC KIDNEY DISEASE) STAGE 2, GFR 60-89 ML/MIN: ICD-10-CM

## 2023-03-13 DIAGNOSIS — E03.8 OTHER SPECIFIED HYPOTHYROIDISM: ICD-10-CM

## 2023-03-13 DIAGNOSIS — I71.40 ABDOMINAL AORTIC ANEURYSM (AAA) WITHOUT RUPTURE, UNSPECIFIED PART: ICD-10-CM

## 2023-03-13 DIAGNOSIS — I48.20 CHRONIC ATRIAL FIBRILLATION (HCC): ICD-10-CM

## 2023-03-13 DIAGNOSIS — E78.00 HYPERCHOLESTEREMIA: ICD-10-CM

## 2023-03-13 DIAGNOSIS — K90.0 CELIAC DISEASE: ICD-10-CM

## 2023-03-13 DIAGNOSIS — D50.8 OTHER IRON DEFICIENCY ANEMIA: ICD-10-CM

## 2023-03-13 DIAGNOSIS — J43.9 PULMONARY EMPHYSEMA, UNSPECIFIED EMPHYSEMA TYPE (HCC): ICD-10-CM

## 2023-03-13 DIAGNOSIS — I63.9 CEREBROVASCULAR ACCIDENT (CVA), UNSPECIFIED MECHANISM (HCC): ICD-10-CM

## 2023-03-13 DIAGNOSIS — D64.9 ANEMIA, UNSPECIFIED TYPE: ICD-10-CM

## 2023-03-13 LAB
CHOLEST SERPL-MCNC: 130 MG/DL
CREAT UR-MCNC: 66.4 MG/DL
EST. AVERAGE GLUCOSE BLD GHB EST-MCNC: 100 MG/DL
HBA1C MFR BLD: 5.1 %
HDLC SERPL-MCNC: 56 MG/DL
LDLC SERPL CALC-MCNC: 48 MG/DL (ref 0–100)
MICROALBUMIN UR-MCNC: 25.9 MG/L (ref 0–20)
MICROALBUMIN/CREAT 24H UR: 39 MG/G CREATININE (ref 0–30)
NONHDLC SERPL-MCNC: 74 MG/DL
TRIGL SERPL-MCNC: 129 MG/DL

## 2023-03-13 NOTE — ASSESSMENT & PLAN NOTE
2/22/2023: Upper GI endoscopy with push enteroscopy:1  Duodenal AVM cauterized with APC  2  Small superficial gastric body ulcer  3  Mild gastritis  4  Flattened small bowel villi with mosaic  pattern, biopsies were done  5  Normal esophagus    1/12/2023: EGD:  1   Moderate degree of gastritis  2  No sign of upper GI bleeding  3  Slightly flattened small bowel villi, biopsies were done  4  Esophagus     1/12/2023: Colonoscopy:1   Suboptimal bowel prep with large amount of liquid stool throughout the colon, visualization was suboptimal  2  Colon polyp removed  3  Extensive diverticulosis throughout the colon  4    Internal hemorrhoid

## 2023-03-13 NOTE — ASSESSMENT & PLAN NOTE
Due for hemoglobin A1c next visit  Diabetes is tightly controlled  Not on any medications  No hypoglycemic symptoms    Lab Results   Component Value Date    HGBA1C 5 0 02/06/2023

## 2023-03-13 NOTE — ASSESSMENT & PLAN NOTE
Hypertension( High Blood Pressure ):    Check your blood pressure at home periodically, keep the diary and bring records at your chronic disease management/Hypertension visit    Heart rate controlled  No longer on beta-blocker    We will continue to monitor  Avoid excessive salt    Know your BP target range ( Usually Systolic 550-971 and diastolic less than 85- In some cases it may be different )

## 2023-03-13 NOTE — ASSESSMENT & PLAN NOTE
CVA remains stable  Patient was hospitalized in March 2022  No new focal deficit  Continue anticoagulation  Remains off aspirin    No focal neurological symptoms

## 2023-03-13 NOTE — ASSESSMENT & PLAN NOTE
Lab Results   Component Value Date    [id number] 1 832 08/12/2022     Clinically no hypothyroidism  Currently not on any medications  We will recheck TSH next visit

## 2023-03-13 NOTE — ASSESSMENT & PLAN NOTE
Off beta-blocker  Heart rate controlled  Continue Coumadin careful anticoagulation particularly given anemia  Status post mechanical mitral valve     Being followed by cardiologist

## 2023-03-13 NOTE — ASSESSMENT & PLAN NOTE
2/22/2023: Upper GI endoscopy with push enteroscopy:1  Duodenal AVM cauterized with APC  2  Small superficial gastric body ulcer  3  Mild gastritis  4  Flattened small bowel villi with mosaic  pattern, biopsies were done  5  Normal esophagus    1/12/2023: EGD:  1   Moderate degree of gastritis  2  No sign of upper GI bleeding  3  Slightly flattened small bowel villi, biopsies were done  4  Esophagus     1/12/2023: Colonoscopy:1   Suboptimal bowel prep with large amount of liquid stool throughout the colon, visualization was suboptimal  2  Colon polyp removed  3  Extensive diverticulosis throughout the colon  4  Internal hemorrhoid    Status post multiple IV iron infusion  Status post upper GI endoscopy  Upper GI endoscopy with push enteroscopy does reveal small gastric ulcer or AVM  Hemoglobin coming up in the range of 10 g  Anemia symptoms are much better  No active bleeding  Now on pantoprazole  Which may impair iron absorption  Multiple reason for anemia including AVM, small stomach ulcer, previous gastritis, iron absorption, pantoprazole, 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

## 2023-03-13 NOTE — PROGRESS NOTES
Name: Claudine Rai      : 1940      MRN: [id number]  Encounter Provider: Odalis Cr MD  Encounter Date: 3/13/2023   Encounter department: [address]     1  AVM (arteriovenous malformation) of duodenum, acquired  Assessment & Plan:  2023: Upper GI endoscopy with push enteroscopy:1  Duodenal AVM cauterized with APC  2  Small superficial gastric body ulcer  3  Mild gastritis  4  Flattened small bowel villi with mosaic  pattern, biopsies were done  5  Normal esophagus    2023: EGD:  1   Moderate degree of gastritis  2  No sign of upper GI bleeding  3  Slightly flattened small bowel villi, biopsies were done  4  Esophagus     2023: Colonoscopy:1   Suboptimal bowel prep with large amount of liquid stool throughout the colon, visualization was suboptimal  2  Colon polyp removed  3  Extensive diverticulosis throughout the colon  4  Internal hemorrhoid      2  Celiac disease    3  Type 2 diabetes mellitus without complication, without long-term current use of insulin ([facility] [address] )  Assessment & Plan:  Due for hemoglobin A1c next visit  Diabetes is tightly controlled  Not on any medications  No hypoglycemic symptoms  Lab Results   Component Value Date    HGBA1C 5 0 2023         4  Pulmonary emphysema, unspecified emphysema type ([address] )    5  Other specified hypothyroidism  Assessment & Plan:  Lab Results   Component Value Date    [id number] 1 832 2022     Clinically no hypothyroidism  Currently not on any medications  We will recheck TSH next visit  6  Essential hypertension  Assessment & Plan:  Hypertension( High Blood Pressure ):    Check your blood pressure at home periodically, keep the diary and bring records at your chronic disease management/Hypertension visit    Heart rate controlled  No longer on beta-blocker    We will continue to monitor  Avoid excessive salt    Know your BP target range ( Usually Systolic 531-214 and diastolic less than 85- In some cases it may be different )          7  Cerebrovascular accident (CVA), unspecified mechanism ([address] )  Assessment & Plan:  CVA remains stable  Patient was hospitalized in March 2022  No new focal deficit  Continue anticoagulation  Remains off aspirin  No focal neurological symptoms      8  Chronic atrial fibrillation (HCC)  Assessment & Plan:  Off beta-blocker  Heart rate controlled  Continue Coumadin careful anticoagulation particularly given anemia  Status post mechanical mitral valve     Being followed by cardiologist      9  CKD (chronic kidney disease) stage 2, GFR 60-89 ml/min  Assessment & Plan:  Lab Results   Component Value Date    EGFR 85 02/06/2023    EGFR 85 02/02/2023    EGFR 83 01/17/2023    CREATININE 0 59 (L) 02/06/2023    CREATININE 0 58 (L) 02/02/2023    CREATININE 0 63 01/17/2023   stable        10  Long term (current) use of anticoagulants (warfarin)    11  Other iron deficiency anemia    12  Hypercholesteremia  Assessment & Plan:  Lab Results   Component Value Date    LDLCALC 29 02/06/2023     Lab Results   Component Value Date    ALT 31 02/06/2023    ALT 34 11/09/2015     Lab Results   Component Value Date    CHOLESTEROL 98 02/06/2023    CHOLESTEROL 104 07/06/2022    CHOLESTEROL 200 03/12/2022     Lab Results   Component Value Date    HDL 47 (L) 02/06/2023    HDL 37 (L) 07/06/2022    HDL 38 (L) 03/12/2022     Lab Results   Component Value Date    TRIG 112 02/06/2023    TRIG 137 07/06/2022    TRIG 230 (H) 03/12/2022     Lab Results   Component Value Date    NONHDLC 51 02/06/2023    [place] 67 07/06/2022    [place] 189 02/17/2022   Remains on atorvastatin 40 mg daily particularly given CVA  Continue monitor LDL to the target        13  Abdominal aortic aneurysm (AAA) without rupture, unspecified part    14  Anemia, unspecified type  Assessment & Plan:  2/22/2023: Upper GI endoscopy with push enteroscopy:1    Duodenal AVM cauterized with APC  2  Small superficial gastric body ulcer  3  Mild gastritis  4  Flattened small bowel villi with mosaic  pattern, biopsies were done  5  Normal esophagus    1/12/2023: EGD:  1   Moderate degree of gastritis  2  No sign of upper GI bleeding  3  Slightly flattened small bowel villi, biopsies were done  4  Esophagus     1/12/2023: Colonoscopy:1   Suboptimal bowel prep with large amount of liquid stool throughout the colon, visualization was suboptimal  2  Colon polyp removed  3  Extensive diverticulosis throughout the colon  4  Internal hemorrhoid    Status post multiple IV iron infusion  Status post upper GI endoscopy  Upper GI endoscopy with push enteroscopy does reveal small gastric ulcer or AVM  Hemoglobin coming up in the range of 10 g  Anemia symptoms are much better  No active bleeding  Now on pantoprazole  Which may impair iron absorption  Multiple reason for anemia including AVM, small stomach ulcer, previous gastritis, iron absorption, pantoprazole, 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, also celiac probably is not helping though is not terribly symptomatic  Monitor blood count closely  Continue to follow             Subjective     For follow-up of multiple medical problems  Anemia:  Patient underwent upper GI scope in January subsequently and underwent repeat and upper GI endoscopy with push enteroscopy in February  Findings reviewed  Patient does have AVM, small stomach ulcer, had previously gastritis  Now on pantoprazole  Pantoprazole will be helping GI related issues however may impair iron absorption  Patient did finish IV iron  Hemoglobin came up to 10  Subsequently iron and deficiency related anemia related symptoms better  Shortness of breath better  Functional capacity better  No active bleeding  Anemia is also complicated by subtle hemolytic changes from mechanical valve, probably celiac, etc  in any event they are doing better      Atrial fibrillation, status post mechanical valve,: Heart rate is reasonable off beta-blocker remains on anticoagulation no chest pain palpitation PND orthopnea tolerating fairly well  Diabetes remains off medications  Will do hemoglobin A1c with the next blood test which was actually done this morning report of his which is awaited  Hyperlipidemia remains on statin lipid profile done this morning report of which is awaited  COPD fair  Symptom-free  Sleep apnea fair  Celiac disease no abdominal pain  Hypertension symptom-free  2/6/2023: WBC 3 43, hemoglobin 7 3, platelet 519, CMP normal except blood sugar 152, creatinine 0 59, calcium 0 calcium 8 2, GFR 85, hemoglobin A1c elevated, cholesterol 98 LDL 29 triglyceride 112 HDL 47 hemoglobin A1c elevated    2/2/2023: WBC 4 04, hemoglobin 7 5, platelet 552, total protein 6 5, iron saturation 10%, TIBC 422, iron 44, ferritin 21, cryoglobulin negative, lymphoma leukemia panel negative, serum protein electrophoresis negative, no monoclonal bands noted, Ig Kappa free light chain 37 4, Ig lambda free light chain 22 4, kappa/lambda ratio 1 67 slightly high IgA 157 IgG 773 and IgM 108  1-12-23:EGD: 1   Modera              Echocardiogram done on 08/11, EF 62%, basal and inferolateral wall hypokinesis  Mechanical mitral valve     08/11/2022:  Chest x-ray:  No active disease    08/11/2022:  CT scan of the brain no acute intracranial abnormality micro angiopathy changes    1-12-23:EGD: 1   Moderate degree of gastritis  2  No sign of upper GI bleeding  3  Slightly flattened small bowel villi, biopsies were done  4  Esophagus  1-12-23: Colonoscopy:  1  Suboptimal bowel prep with large amount of liquid stool throughout the colon, visualization was suboptimal  2  Colon polyp removed  3  Extensive diverticulosis throughout the colon  4  Internal hemorrho    CXR: borderline cardiomegaly   Mild vascular congestion     2-7-23: hb 7 5 wbc 2 95 plt 152  2-6-23: CMP wnl except bs 152,   Sodium                    Value: 141(mmol/L)        Dt: 02/06/2023  Potassium                 Value: 3 7(mmol/L)        Dt: 02/06/2023  Chloride                  Value: 111(mmol/L) (H)    Dt: 02/06/2023  CO2                       Value: 26(mmol/L)         Dt: 02/06/2023  ANION GAP                 Value:  4(mmol/L)          Dt: 02/06/2023  BUN                       Value: 18(mg/dL)          Dt: 02/06/2023  Creatinine                Value: 0 59(mg/dL) (L)    Dt: 02/06/2023  Glucose, Fasting          Value: 152(mg/dL) (H)     Dt: 02/06/2023  Calcium                   Value: 8 2(mg/dL) (L)     Dt: 02/06/2023  AST                       Value: 28(U/L)            Dt: 02/06/2023  ALT                       Value: 31(U/L)            Dt: 02/06/2023  Alkaline Phosphatase      Value: 96(U/L)            Dt: 02/06/2023  Total Protein             Value: 6 6(g/dL)          Dt: 02/06/2023  Albumin                   Value: 3 7(g/dL)          Dt: 02/06/2023  Total Bilirubin           Value: 0 44(mg/dL)        Dt: 02/06/2023  eGFR                      Value: 85(ml/min/1 73sq m) Dt: 02/06/2023  Cholesterol               Value: 98(mg/dL)          Dt: 02/06/2023  Triglycerides             Value: 112(mg/dL)         Dt: 02/06/2023  HDL, Direct               Value: 47(mg/dL) (L)      Dt: 02/06/2023  LDL Calculated            Value: 29(mg/dL)          Dt: 02/06/2023  Non-HDL-Chol (CHOL-HDL)   Value: 51(mg/dl)          Dt: 02/06/2023  WBC                       Value: 3 43(Thousand/uL) (L) Dt: 02/06/2023  RBC                       Value: 2 92(Million/uL) (L) Dt: 02/06/2023  Hemoglobin                Value: 7 3(g/dL) (L)      Dt: 02/06/2023  Hematocrit                Value: 27 1(%) (L)        Dt: 02/06/2023  MCV                       Value: 93(fL)             Dt: 02/06/2023  MCH                       Value: 25 0(pg) (L)       Dt: 02/06/2023  MCHC                      Value: 26 9(g/dL) (L)     Dt: 02/06/2023  RDW Value: 17 4(%) (H)        Dt: 02/06/2023  Platelets                 Value: 174(Thousands/uL)  Dt: 02/06/2023  MPV                       Value: 11 4(fL)           Dt: 02/06/2023  ------------  Appointment on 02/02/2023  WBC                       Value: 4 04(Thousand/uL) (L) Dt: 02/02/2023  RBC                       Value: 2 83(Million/uL) (L) Dt: 02/02/2023  Hemoglobin                Value: 7 5(g/dL) (L)      Dt: 02/02/2023  Hematocrit                Value: 26 5(%) (L)        Dt: 02/02/2023  MCV                       Value: 94(fL)             Dt: 02/02/2023  MCH                       Value: 26 5(pg) (L)       Dt: 02/02/2023  MCHC                      Value: 28 3(g/dL) (L)     Dt: 02/02/2023  RDW                       Value: 17 6(%) (H)        Dt: 02/02/2023  MPV                       Value: 9 7(fL)            Dt: 02/02/2023  Platelets                 Value: 187(Thousands/uL)  Dt: 02/02/2023  nRBC                      Value: 0(/100 WBCs)       Dt: 02/02/2023  Neutrophils Relative      Value: 66(%)              Dt: 02/02/2023  Immat GRANS %             Value: 0(%)               Dt: 02/02/2023  Lymphocytes Relative      Value: 20(%)              Dt: 02/02/2023  Monocytes Relative        Value: 9(%)               Dt: 02/02/2023  Eosinophils Relative      Value: 4(%)               Dt: 02/02/2023  Basophils Relative        Value: 1(%)               Dt: 02/02/2023  Neutrophils Absolute      Value: 2 65(Thousands/µL) Dt: 02/02/2023  Immature Grans Absolute   Value: 0 01(Thousand/uL)  Dt: 02/02/2023  Lymphocytes Absolute      Value: 0 82(Thousands/µL) Dt: 02/02/2023  Monocytes Absolute        Value: 0 38(Thousand/µL)  Dt: 02/02/2023  Eosinophils Absolute      Value: 0 14(Thousand/µL)  Dt: 02/02/2023  Basophils Absolute        Value: 0 04(Thousands/µL) Dt: 02/02/2023  Sodium                    Value: 142(mmol/L)        Dt: 02/02/2023  Potassium                 Value: 3 8(mmol/L)        Dt: 02/02/2023  Chloride Value: 107(mmol/L)        Dt: 02/02/2023  CO2                       Value: 28(mmol/L)         Dt: 02/02/2023  ANION GAP                 Value:  7(mmol/L)          Dt: 02/02/2023  BUN                       Value: 14(mg/dL)          Dt: 02/02/2023  Creatinine                Value: 0 58(mg/dL) (L)    Dt: 02/02/2023  Glucose                   Value: 86(mg/dL)          Dt: 02/02/2023  Calcium                   Value: 8 4(mg/dL)         Dt: 02/02/2023  AST                       Value: 33(U/L)            Dt: 02/02/2023  ALT                       Value: 32(U/L)            Dt: 02/02/2023  Alkaline Phosphatase      Value: 93(U/L)            Dt: 02/02/2023  Total Protein             Value: 6 6(g/dL)          Dt: 02/02/2023  Albumin                   Value: 3 6(g/dL)          Dt: 02/02/2023  Total Bilirubin           Value: 0 47(mg/dL)        Dt: 02/02/2023  eGFR                      Value: 85(ml/min/1 73sq m) Dt: 02/02/2023  Scan Result                                         Dt: 02/02/2023                  Value: SEE WRITTEN REPORT   DIRECT PERLA             Value: Negative           Dt: 02/02/2023  A/G Ratio                 Value: 1 42               Dt: 02/02/2023  Albumin Electrophoresis   Value: 58 6(%)            Dt: 02/02/2023  Albumin CONC              Value: 3 81(g/dl)         Dt: 02/02/2023  Alpha 1                   Value: 5 3(%) (H)         Dt: 02/02/2023  ALPHA 1 CONC              Value: 0 34(g/dL)         Dt: 02/02/2023  Alpha 2                   Value: 10 3(%)            Dt: 02/02/2023  ALPHA 2 CONC              Value: 0 67(g/dL)         Dt: 02/02/2023  Beta-1                    Value: 7 5(%)             Dt: 02/02/2023  BETA 1 CONC               Value: 0 49(g/dL)         Dt: 02/02/2023  Beta-2                    Value: 4 8(%)             Dt: 02/02/2023  BETA 2 CONC               Value: 0 31(g/dL)         Dt: 02/02/2023  Gamma Globulin            Value: 13 5(%)            Dt: 02/02/2023  GAMMA CONC Value: 0 88(g/dL)         Dt: 02/02/2023  Total Protein             Value: 6 5(g/dL)          Dt: 02/02/2023  SPEP Interpretation       Value: See Comment        Dt: 02/02/2023  Ig Kappa Free Light Chain Value: 37  4(mg/L) (H)     Dt: 02/02/2023  Ig Lambda Free Light [place]* Value: 22  4(mg/L)         Dt: 02/02/2023  Kappa/Lambda FluidC Ratio Value: 1 67 (H)           Dt: 02/02/2023  IGA                       Value: 157  0(mg/dL)       Dt: 02/02/2023  IGG                       Value: 773  0(mg/dL)       Dt: 02/02/2023  IGM                       Value: 108  0(mg/dL)       Dt: 02/02/2023  Iron Saturation           Value: 10(%) (L)          Dt: 02/02/2023  TIBC                      Value: 422(ug/dL)         Dt: 02/02/2023  Iron                      Value: 44(ug/dL) (L)      Dt: 02/02/2023  Ferritin                  Value: 21(ng/mL)          Dt: 02/02/2023  ------------  Appointment on 01/30/2023  WBC                       Value: 3 76(Thousand/uL) (L) Dt: 01/30/2023  RBC                       Value: 2 76(Million/uL) (L) Dt: 01/30/2023  Hemoglobin                Value: 7 4(g/dL) (L)      Dt: 01/30/2023  Hematocrit                Value: 26 2(%) (L)        Dt: 01/30/2023  MCV                       Value: 95(fL)             Dt: 01/30/2023  MCH                       Value: 26 8(pg)           Dt: 01/30/2023  MCHC                      Value: 28 2(g/dL) (L)     Dt: 01/30/2023  RDW                       Value: 18 4(%) (H)        Dt: 01/30/2023  Platelets                 Value: 208(Thousands/uL)  Dt: 01/30/2023  MPV                       Value: 10 6(fL)           Dt: 01/30/2023  ------------  Ancillary Orders on 01/27/2023  Protime                   Value: 34  4(seconds) (H)  Dt: 01/30/2023  INR                       Value: 3 37 (H)           Dt: 01/30/2023  ------------  Appointment on 01/23/2023  WBC                       Value: 4 19(Thousand/uL) (L) Dt: 01/23/2023  RBC                       Value: 2 86(Million/uL) (L) Dt: 01/23/2023  Hemoglobin                Value: 7 9(g/dL) (L)      Dt: 01/23/2023  Hematocrit                Value: 27 6(%) (L)        Dt: 01/23/2023  MCV                       Value: 97(fL)             Dt: 01/23/2023  MCH                       Value: 27 6(pg)           Dt: 01/23/2023  MCHC                      Value: 28 6(g/dL) (L)     Dt: 01/23/2023  RDW                       Value: 18 6(%) (H)        Dt: 01/23/2023  MPV                       Value: 11 6(fL)           Dt: 01/23/2023  Platelets                 Value: 198(Thousands/uL)  Dt: 01/23/2023  nRBC                      Value: 0(/100 WBCs)       Dt: 01/23/2023  Neutrophils Relative      Value: 58(%)              Dt: 01/23/2023  Immat GRANS %             Value: 1(%)               Dt: 01/23/2023  Lymphocytes Relative      Value: 23(%)              Dt: 01/23/2023  Monocytes Relative        Value: 12(%)              Dt: 01/23/2023  Eosinophils Relative      Value: 4(%)               Dt: 01/23/2023  Basophils Relative        Value: 2(%) (H)           Dt: 01/23/2023  Neutrophils Absolute      Value: 2 46(Thousands/µL) Dt: 01/23/2023  Immature Grans Absolute   Value: 0 04(Thousand/uL)  Dt: 01/23/2023  Lymphocytes Absolute      Value: 0 97(Thousands/µL) Dt: 01/23/2023  Monocytes Absolute        Value: 0 50(Thousand/µL)  Dt: 01/23/2023  Eosinophils Absolute      Value: 0 15(Thousand/µL)  Dt: 01/23/2023  Basophils Absolute        Value: 0 07(Thousands/µL) Dt: 01/23/2023  ------------  Ancillary Orders on 01/20/2023  Protime                   Value: 36  9(seconds) (H)  Dt: 01/23/2023  INR                       Value: 3 69 (H)           Dt: 01/23/2023  ------------  Admission on 01/10/2023, Discharged on 01/17/2023  No results displayed because visit has over 200 results      ------------  Ancillary Orders on 12/30/2022  Protime                   Value: 36 0(seconds) (H)  Dt: 01/04/2023  INR                       Value: 3 58 (H)           Dt: 01/04/2023  ------------  Ancillary Orders on 12/02/2022  Protime                   Value: 38 0(seconds) (H)  Dt: 12/07/2022  INR                       Value: 3 84 (H)           Dt: 12/07/2022  ------------  There may be more visits with results that are not included  - 6 moths labs          Review of Systems   Constitutional: Negative for appetite change, fatigue, fever and unexpected weight change  HENT: Negative for congestion, ear pain and sore throat  Eyes: Negative for pain and redness  Respiratory: Positive for shortness of breath  Negative for cough  Cardiovascular: Negative for chest pain, palpitations and leg swelling  Gastrointestinal: Negative for abdominal pain, diarrhea, nausea and vomiting  Endocrine: Negative for cold intolerance, polydipsia and polyuria  Genitourinary: Negative for dysuria, frequency and urgency  Musculoskeletal: Negative for arthralgias, gait problem and myalgias  Skin: Negative for rash  Allergic/Immunologic: Negative  Neurological: Negative for dizziness and headaches  Hematological: Negative for adenopathy  Psychiatric/Behavioral: Negative for behavioral problems         Past Medical History:   Diagnosis Date   • Bloody nose     slow drip, clots in the morning   • Colon polyp    • Diabetes mellitus ([facility] [address] )     Pre DM diet controlled, metformin DCed 2023   • Heart murmur    • Hyperlipidemia    • Stroke [facility]) 03/11/2022     Past Surgical History:   Procedure Laterality Date   • CATARACT EXTRACTION Bilateral    • COLONOSCOPY     • EGD     • HYSTERECTOMY     • MITRAL VALVE REPLACEMENT  1994     Family History   Problem Relation Age of Onset   • Heart failure Mother    • Heart attack Father    • Sleep apnea Brother      Social History     Socioeconomic History   • Marital status:      Spouse name: None   • Number of children: None   • Years of education: None   • Highest education level: None   Occupational History   • None   Tobacco Use   • Smoking status: Former     Packs/day: 2 00     Years: 30 00     Pack years: 60 00     Types: Cigarettes     Quit date:      Years since quittin 2   • Smokeless tobacco: Never   Vaping Use   • Vaping Use: Never used   Substance and Sexual Activity   • Alcohol use: Yes     Comment: special occasional   • Drug use: No   • Sexual activity: None   Other Topics Concern   • None   Social History Narrative   • None     Social Determinants of Health     Financial Resource Strain: Not on file   Food Insecurity: No Food Insecurity   • Worried About Running Out of Food in the Last Year: Never true   • Ran Out of Food in the Last Year: Never true   Transportation Needs: No Transportation Needs   • Lack of Transportation (Medical): No   • Lack of Transportation (Non-Medical):  No   Physical Activity: Not on file   Stress: Not on file   Social Connections: Not on file   Intimate Partner Violence: Not on file   Housing Stability: Low Risk    • Unable to Pay for Housing in the Last Year: No   • Number of Places Lived in the Last Year: 1   • Unstable Housing in the Last Year: No     Current Outpatient Medications on File Prior to Visit   Medication Sig   • acetaminophen (TYLENOL) 325 mg tablet Take 2 tablets (650 mg total) by mouth every 6 (six) hours as needed for mild pain or headaches   • atorvastatin (LIPITOR) 40 mg tablet Take 1 tablet (40 mg total) by mouth daily   • Cholecalciferol (VITAMIN D PO) Take by mouth   • glucose blood test strip Use 1 each in the morning Use one daily   • Multiple Vitamins-Minerals (PRESERVISION AREDS PO) Take 2 tablets by mouth daily     • pantoprazole (PROTONIX) 40 mg tablet Take 1 tablet (40 mg total) by mouth daily   • polyethylene glycol (MIRALAX) 17 g packet Take 17 g by mouth daily as needed (Constipation)   • warfarin (COUMADIN) 2 5 mg tablet Take 1 tablet (2 5 mg total) by mouth 3 (three) times a week On  and Friday (Patient taking differently: Take 2 5 mg by mouth 3 (three) times a week Added to 5mg dose only on tuesdays=7 5mg)   • warfarin (COUMADIN) 5 mg tablet Take 1 tablet (5 mg total) by mouth 4 (four) times a week On Sunday, Tuesday, Thursday, Saturday (Patient taking differently: Take 5 mg by mouth in the morning)     Allergies   Allergen Reactions   • Sulfa Antibiotics Tongue Swelling   • Sulfasalazine Throat Swelling       There is no immunization history on file for this patient  Objective     /88   Pulse 87   Ht 5' 5" (1 651 m)   Wt 71 2 kg (157 lb)   SpO2 98%   BMI 26 13 kg/m²     Physical Exam  Vitals and nursing note reviewed  Constitutional:       General: She is not in acute distress  Appearance: Normal appearance  She is well-developed  She is not ill-appearing, toxic-appearing or diaphoretic  Comments: Short grey hair   HENT:      Head: Normocephalic and atraumatic  Mouth/Throat:      Pharynx: Oropharynx is clear  No oropharyngeal exudate or posterior oropharyngeal erythema  Eyes:      General: No scleral icterus  Right eye: No discharge  Left eye: No discharge  Conjunctiva/sclera: Conjunctivae normal    Neck:      Thyroid: No thyroid mass, thyromegaly or thyroid tenderness  Vascular: Normal carotid pulses  No carotid bruit or JVD  Cardiovascular:      Rate and Rhythm: Normal rate  Rhythm irregular  Heart sounds: S1 normal  Murmur heard  Systolic murmur is present with a grade of 2/6  Comments: S2 elevated  Pulmonary:      Effort: No respiratory distress  Breath sounds: Normal breath sounds  No stridor  No wheezing, rhonchi or rales  Chest:      Chest wall: No tenderness  Abdominal:      General: Bowel sounds are normal  There is no distension  Palpations: There is no shifting dullness, fluid wave, hepatomegaly, mass or pulsatile mass  Tenderness: There is no abdominal tenderness  There is no rebound        Hernia: There is no hernia in the umbilical area, ventral area, left inguinal area, left femoral area or right inguinal area  Musculoskeletal:         General: Normal range of motion  Cervical back: Normal range of motion  Right lower le+ Edema present  Left lower le+ Edema present  Comments: Mild varicosities present  No clinical DVT  Lymphadenopathy:      Cervical: No cervical adenopathy  Right cervical: No superficial cervical adenopathy  Skin:     General: Skin is warm  Coloration: Skin is not pale  Findings: No rash  Neurological:      General: No focal deficit present  Mental Status: She is alert and oriented to person, place, and time  Mental status is at baseline  Deep Tendon Reflexes: Reflexes normal    Psychiatric:         Mood and Affect: Mood normal          Behavior: Behavior normal          Thought Content:  Thought content normal          Judgment: Judgment normal        Belinda Mathis MD

## 2023-03-13 NOTE — ASSESSMENT & PLAN NOTE
Lab Results   Component Value Date    EGFR 85 02/06/2023    EGFR 85 02/02/2023    EGFR 83 01/17/2023    CREATININE 0 59 (L) 02/06/2023    CREATININE 0 58 (L) 02/02/2023    CREATININE 0 63 01/17/2023   stable

## 2023-03-13 NOTE — ASSESSMENT & PLAN NOTE
Lab Results   Component Value Date    LDLCALC 29 02/06/2023     Lab Results   Component Value Date    ALT 31 02/06/2023    ALT 34 11/09/2015     Lab Results   Component Value Date    CHOLESTEROL 98 02/06/2023    CHOLESTEROL 104 07/06/2022    CHOLESTEROL 200 03/12/2022     Lab Results   Component Value Date    HDL 47 (L) 02/06/2023    HDL 37 (L) 07/06/2022    HDL 38 (L) 03/12/2022     Lab Results   Component Value Date    TRIG 112 02/06/2023    TRIG 137 07/06/2022    TRIG 230 (H) 03/12/2022     Lab Results   Component Value Date    NONHDLC 51 02/06/2023    [place] 67 07/06/2022    [place] 189 02/17/2022   Remains on atorvastatin 40 mg daily particularly given CVA    Continue monitor LDL to the target

## 2023-03-27 ENCOUNTER — APPOINTMENT (OUTPATIENT)
Dept: LAB | Facility: CLINIC | Age: 83
End: 2023-03-27

## 2023-04-04 ENCOUNTER — HOSPITAL ENCOUNTER (OUTPATIENT)
Dept: INFUSION CENTER | Facility: HOSPITAL | Age: 83
Discharge: HOME | End: 2023-04-04
Attending: INTERNAL MEDICINE

## 2023-04-04 VITALS
RESPIRATION RATE: 20 BRPM | SYSTOLIC BLOOD PRESSURE: 143 MMHG | HEART RATE: 90 BPM | DIASTOLIC BLOOD PRESSURE: 66 MMHG | OXYGEN SATURATION: 96 % | TEMPERATURE: 97.7 F

## 2023-04-04 DIAGNOSIS — D62 ACUTE BLOOD LOSS ANEMIA: Primary | ICD-10-CM

## 2023-04-04 DIAGNOSIS — K31.819 AVM (ARTERIOVENOUS MALFORMATION) OF DUODENUM, ACQUIRED: ICD-10-CM

## 2023-04-04 LAB
BASOPHILS # BLD AUTO: 0.03 THOUSANDS/ÂΜL (ref 0–0.1)
BASOPHILS NFR BLD AUTO: 1 % (ref 0–1)
EOSINOPHIL # BLD AUTO: 0.11 THOUSAND/ÂΜL (ref 0–0.61)
EOSINOPHIL NFR BLD AUTO: 2 % (ref 0–6)
ERYTHROCYTE [DISTWIDTH] IN BLOOD BY AUTOMATED COUNT: 20.3 % (ref 11.6–15.1)
HCT VFR BLD AUTO: 37.5 % (ref 34.8–46.1)
HGB BLD-MCNC: 11.4 G/DL (ref 11.5–15.4)
IMM GRANULOCYTES # BLD AUTO: 0.01 THOUSAND/UL (ref 0–0.2)
IMM GRANULOCYTES NFR BLD AUTO: 0 % (ref 0–2)
LYMPHOCYTES # BLD AUTO: 1.09 THOUSANDS/ÂΜL (ref 0.6–4.47)
LYMPHOCYTES NFR BLD AUTO: 23 % (ref 14–44)
MCH RBC QN AUTO: 25.8 PG (ref 26.8–34.3)
MCHC RBC AUTO-ENTMCNC: 30.4 G/DL (ref 31.4–37.4)
MCV RBC AUTO: 85 FL (ref 82–98)
MONOCYTES # BLD AUTO: 0.43 THOUSAND/ÂΜL (ref 0.17–1.22)
MONOCYTES NFR BLD AUTO: 9 % (ref 4–12)
NEUTROPHILS # BLD AUTO: 2.99 THOUSANDS/ÂΜL (ref 1.85–7.62)
NEUTS SEG NFR BLD AUTO: 65 % (ref 43–75)
NRBC BLD AUTO-RTO: 0 /100 WBCS
PLATELET # BLD AUTO: 163 THOUSANDS/UL (ref 149–390)
PMV BLD AUTO: 9.9 FL (ref 8.9–12.7)
RBC # BLD AUTO: 4.42 MILLION/UL (ref 3.81–5.12)
WBC # BLD AUTO: 4.66 THOUSAND/UL (ref 4.31–10.16)

## 2023-04-04 RX ORDER — SODIUM CHLORIDE 9 MG/ML
20 INJECTION, SOLUTION INTRAVENOUS ONCE
OUTPATIENT
Start: 2023-05-02

## 2023-04-04 RX ORDER — SODIUM CHLORIDE 9 MG/ML
20 INJECTION, SOLUTION INTRAVENOUS ONCE
Status: COMPLETED | OUTPATIENT
Start: 2023-04-04 | End: 2023-04-04

## 2023-04-04 RX ADMIN — SODIUM CHLORIDE 20 ML/HR: 0.9 INJECTION, SOLUTION INTRAVENOUS at 12:42

## 2023-04-04 RX ADMIN — IRON SUCROSE 200 MG: 20 INJECTION, SOLUTION INTRAVENOUS at 12:42

## 2023-05-01 ENCOUNTER — TRANSCRIBE ORDERS (OUTPATIENT)
Dept: LAB | Facility: CLINIC | Age: 83
End: 2023-05-01

## 2023-05-01 ENCOUNTER — APPOINTMENT (OUTPATIENT)
Dept: LAB | Facility: CLINIC | Age: 83
End: 2023-05-01

## 2023-05-01 DIAGNOSIS — I48.20 CHRONIC ATRIAL FIBRILLATION (HCC): ICD-10-CM

## 2023-05-01 DIAGNOSIS — E11.69 TYPE 2 DIABETES MELLITUS WITH OTHER SPECIFIED COMPLICATION, WITHOUT LONG-TERM CURRENT USE OF INSULIN (HCC): ICD-10-CM

## 2023-05-01 DIAGNOSIS — Z79.01 LONG TERM (CURRENT) USE OF ANTICOAGULANTS: ICD-10-CM

## 2023-05-01 DIAGNOSIS — E78.00 PURE HYPERCHOLESTEROLEMIA: Primary | ICD-10-CM

## 2023-05-01 DIAGNOSIS — I10 ESSENTIAL HYPERTENSION, BENIGN: ICD-10-CM

## 2023-05-01 DIAGNOSIS — Z95.2 HEART VALVE REPLACED BY TRANSPLANT: ICD-10-CM

## 2023-05-02 ENCOUNTER — HOSPITAL ENCOUNTER (OUTPATIENT)
Dept: INFUSION CENTER | Facility: HOSPITAL | Age: 83
Discharge: HOME | End: 2023-05-02
Attending: INTERNAL MEDICINE

## 2023-05-02 VITALS
SYSTOLIC BLOOD PRESSURE: 154 MMHG | HEART RATE: 81 BPM | OXYGEN SATURATION: 98 % | TEMPERATURE: 97.3 F | DIASTOLIC BLOOD PRESSURE: 71 MMHG | RESPIRATION RATE: 20 BRPM

## 2023-05-02 DIAGNOSIS — K31.819 AVM (ARTERIOVENOUS MALFORMATION) OF DUODENUM, ACQUIRED: ICD-10-CM

## 2023-05-02 DIAGNOSIS — D62 ACUTE BLOOD LOSS ANEMIA: Primary | ICD-10-CM

## 2023-05-02 LAB
BASOPHILS # BLD AUTO: 0.03 THOUSANDS/ΜL (ref 0–0.1)
BASOPHILS NFR BLD AUTO: 1 % (ref 0–1)
EOSINOPHIL # BLD AUTO: 0.13 THOUSAND/ΜL (ref 0–0.61)
EOSINOPHIL NFR BLD AUTO: 2 % (ref 0–6)
ERYTHROCYTE [DISTWIDTH] IN BLOOD BY AUTOMATED COUNT: 21.6 % (ref 11.6–15.1)
HCT VFR BLD AUTO: 39.4 % (ref 34.8–46.1)
HGB BLD-MCNC: 12.5 G/DL (ref 11.5–15.4)
IMM GRANULOCYTES # BLD AUTO: 0.01 THOUSAND/UL (ref 0–0.2)
IMM GRANULOCYTES NFR BLD AUTO: 0 % (ref 0–2)
LYMPHOCYTES # BLD AUTO: 1.29 THOUSANDS/ΜL (ref 0.6–4.47)
LYMPHOCYTES NFR BLD AUTO: 21 % (ref 14–44)
MCH RBC QN AUTO: 27.3 PG (ref 26.8–34.3)
MCHC RBC AUTO-ENTMCNC: 31.7 G/DL (ref 31.4–37.4)
MCV RBC AUTO: 86 FL (ref 82–98)
MONOCYTES # BLD AUTO: 0.46 THOUSAND/ΜL (ref 0.17–1.22)
MONOCYTES NFR BLD AUTO: 8 % (ref 4–12)
NEUTROPHILS # BLD AUTO: 4.15 THOUSANDS/ΜL (ref 1.85–7.62)
NEUTS SEG NFR BLD AUTO: 68 % (ref 43–75)
NRBC BLD AUTO-RTO: 0 /100 WBCS
PLATELET # BLD AUTO: 173 THOUSANDS/UL (ref 149–390)
PMV BLD AUTO: 9.9 FL (ref 8.9–12.7)
RBC # BLD AUTO: 4.58 MILLION/UL (ref 3.81–5.12)
WBC # BLD AUTO: 6.07 THOUSAND/UL (ref 4.31–10.16)

## 2023-05-02 RX ORDER — SODIUM CHLORIDE 9 MG/ML
20 INJECTION, SOLUTION INTRAVENOUS ONCE
OUTPATIENT
Start: 2023-05-30

## 2023-05-02 RX ORDER — SODIUM CHLORIDE 9 MG/ML
20 INJECTION, SOLUTION INTRAVENOUS ONCE
Status: COMPLETED | OUTPATIENT
Start: 2023-05-02 | End: 2023-05-02

## 2023-05-02 RX ADMIN — IRON SUCROSE 200 MG: 20 INJECTION, SOLUTION INTRAVENOUS at 13:01

## 2023-05-02 RX ADMIN — SODIUM CHLORIDE 20 ML/HR: 0.9 INJECTION, SOLUTION INTRAVENOUS at 13:00

## 2023-05-02 NOTE — PROGRESS NOTES
Labs drawn  Patient tolerated venofer infusion well with no adverse effects  Offers no complaints  Next appointment verified, AVS declined

## 2023-05-10 ENCOUNTER — APPOINTMENT (OUTPATIENT)
Dept: LAB | Facility: CLINIC | Age: 83
End: 2023-05-10

## 2023-05-10 DIAGNOSIS — E11.9 TYPE 2 DIABETES MELLITUS WITHOUT COMPLICATION, WITHOUT LONG-TERM CURRENT USE OF INSULIN (HCC): ICD-10-CM

## 2023-05-10 DIAGNOSIS — E78.00 HYPERCHOLESTEREMIA: ICD-10-CM

## 2023-05-10 DIAGNOSIS — I10 ESSENTIAL HYPERTENSION: ICD-10-CM

## 2023-05-10 LAB
ALBUMIN SERPL BCP-MCNC: 4.1 G/DL (ref 3.5–5)
ALP SERPL-CCNC: 88 U/L (ref 46–116)
ALT SERPL W P-5'-P-CCNC: 32 U/L (ref 12–78)
AMORPH URATE CRY URNS QL MICRO: ABNORMAL
ANION GAP SERPL CALCULATED.3IONS-SCNC: 1 MMOL/L (ref 4–13)
AST SERPL W P-5'-P-CCNC: 32 U/L (ref 5–45)
BACTERIA UR QL AUTO: ABNORMAL /HPF
BILIRUB SERPL-MCNC: 0.58 MG/DL (ref 0.2–1)
BILIRUB UR QL STRIP: NEGATIVE
BUN SERPL-MCNC: 16 MG/DL (ref 5–25)
CALCIUM SERPL-MCNC: 9 MG/DL (ref 8.3–10.1)
CAOX CRY URNS QL MICRO: ABNORMAL /HPF
CHLORIDE SERPL-SCNC: 107 MMOL/L (ref 96–108)
CHOLEST SERPL-MCNC: 140 MG/DL
CLARITY UR: ABNORMAL
CO2 SERPL-SCNC: 28 MMOL/L (ref 21–32)
COLOR UR: YELLOW
CREAT SERPL-MCNC: 0.68 MG/DL (ref 0.6–1.3)
CREAT UR-MCNC: 145 MG/DL
EST. AVERAGE GLUCOSE BLD GHB EST-MCNC: 140 MG/DL
GFR SERPL CREATININE-BSD FRML MDRD: 81 ML/MIN/1.73SQ M
GLUCOSE P FAST SERPL-MCNC: 128 MG/DL (ref 65–99)
GLUCOSE UR STRIP-MCNC: NEGATIVE MG/DL
HBA1C MFR BLD: 6.5 %
HDLC SERPL-MCNC: 56 MG/DL
HGB UR QL STRIP.AUTO: ABNORMAL
HYALINE CASTS #/AREA URNS LPF: ABNORMAL /LPF
KETONES UR STRIP-MCNC: NEGATIVE MG/DL
LDLC SERPL CALC-MCNC: 63 MG/DL (ref 0–100)
LEUKOCYTE ESTERASE UR QL STRIP: ABNORMAL
MICROALBUMIN UR-MCNC: 89.1 MG/L (ref 0–20)
MICROALBUMIN/CREAT 24H UR: 61 MG/G CREATININE (ref 0–30)
MUCOUS THREADS UR QL AUTO: ABNORMAL
NITRITE UR QL STRIP: POSITIVE
NON-SQ EPI CELLS URNS QL MICRO: ABNORMAL /HPF
NONHDLC SERPL-MCNC: 84 MG/DL
PH UR STRIP.AUTO: 5 [PH]
POTASSIUM SERPL-SCNC: 4 MMOL/L (ref 3.5–5.3)
PROT SERPL-MCNC: 7.5 G/DL (ref 6.4–8.4)
PROT UR STRIP-MCNC: ABNORMAL MG/DL
RBC #/AREA URNS AUTO: ABNORMAL /HPF
SODIUM SERPL-SCNC: 136 MMOL/L (ref 135–147)
SP GR UR STRIP.AUTO: 1.02 (ref 1–1.03)
TRIGL SERPL-MCNC: 107 MG/DL
UROBILINOGEN UR STRIP-ACNC: <2 MG/DL
WBC #/AREA URNS AUTO: ABNORMAL /HPF

## 2023-05-26 ENCOUNTER — RA CDI HCC (OUTPATIENT)
Dept: OTHER | Facility: HOSPITAL | Age: 83
End: 2023-05-26

## 2023-05-30 ENCOUNTER — HOSPITAL ENCOUNTER (OUTPATIENT)
Dept: INFUSION CENTER | Facility: HOSPITAL | Age: 83
Discharge: HOME | End: 2023-05-30
Attending: INTERNAL MEDICINE
Payer: MEDICARE

## 2023-05-30 VITALS
OXYGEN SATURATION: 95 % | TEMPERATURE: 97.5 F | SYSTOLIC BLOOD PRESSURE: 155 MMHG | DIASTOLIC BLOOD PRESSURE: 79 MMHG | HEART RATE: 90 BPM | RESPIRATION RATE: 20 BRPM

## 2023-05-30 DIAGNOSIS — D62 ACUTE BLOOD LOSS ANEMIA: Primary | ICD-10-CM

## 2023-05-30 DIAGNOSIS — K31.819 AVM (ARTERIOVENOUS MALFORMATION) OF DUODENUM, ACQUIRED: ICD-10-CM

## 2023-05-30 LAB
BASOPHILS # BLD AUTO: 0.04 THOUSANDS/ÂΜL (ref 0–0.1)
BASOPHILS NFR BLD AUTO: 1 % (ref 0–1)
EOSINOPHIL # BLD AUTO: 0.12 THOUSAND/ÂΜL (ref 0–0.61)
EOSINOPHIL NFR BLD AUTO: 3 % (ref 0–6)
ERYTHROCYTE [DISTWIDTH] IN BLOOD BY AUTOMATED COUNT: 21.1 % (ref 11.6–15.1)
FERRITIN SERPL-MCNC: 28 NG/ML (ref 11–307)
HCT VFR BLD AUTO: 39.5 % (ref 34.8–46.1)
HGB BLD-MCNC: 12.6 G/DL (ref 11.5–15.4)
IMM GRANULOCYTES # BLD AUTO: 0.01 THOUSAND/UL (ref 0–0.2)
IMM GRANULOCYTES NFR BLD AUTO: 0 % (ref 0–2)
IRON SATN MFR SERPL: 19 % (ref 15–50)
IRON SERPL-MCNC: 69 UG/DL (ref 50–170)
LYMPHOCYTES # BLD AUTO: 1.22 THOUSANDS/ÂΜL (ref 0.6–4.47)
LYMPHOCYTES NFR BLD AUTO: 26 % (ref 14–44)
MCH RBC QN AUTO: 28.1 PG (ref 26.8–34.3)
MCHC RBC AUTO-ENTMCNC: 31.9 G/DL (ref 31.4–37.4)
MCV RBC AUTO: 88 FL (ref 82–98)
MONOCYTES # BLD AUTO: 0.41 THOUSAND/ÂΜL (ref 0.17–1.22)
MONOCYTES NFR BLD AUTO: 9 % (ref 4–12)
NEUTROPHILS # BLD AUTO: 2.91 THOUSANDS/ÂΜL (ref 1.85–7.62)
NEUTS SEG NFR BLD AUTO: 61 % (ref 43–75)
NRBC BLD AUTO-RTO: 0 /100 WBCS
PLATELET # BLD AUTO: 158 THOUSANDS/UL (ref 149–390)
PMV BLD AUTO: 9.9 FL (ref 8.9–12.7)
RBC # BLD AUTO: 4.49 MILLION/UL (ref 3.81–5.12)
TIBC SERPL-MCNC: 369 UG/DL (ref 250–450)
WBC # BLD AUTO: 4.71 THOUSAND/UL (ref 4.31–10.16)

## 2023-05-30 PROCEDURE — 83540 ASSAY OF IRON: CPT | Performed by: INTERNAL MEDICINE

## 2023-05-30 PROCEDURE — 82728 ASSAY OF FERRITIN: CPT | Performed by: INTERNAL MEDICINE

## 2023-05-30 PROCEDURE — 85025 COMPLETE CBC W/AUTO DIFF WBC: CPT | Performed by: INTERNAL MEDICINE

## 2023-05-30 PROCEDURE — 96365 THER/PROPH/DIAG IV INF INIT: CPT

## 2023-05-30 PROCEDURE — 83550 IRON BINDING TEST: CPT | Performed by: INTERNAL MEDICINE

## 2023-05-30 RX ORDER — SODIUM CHLORIDE 9 MG/ML
20 INJECTION, SOLUTION INTRAVENOUS ONCE
Status: COMPLETED | OUTPATIENT
Start: 2023-05-30 | End: 2023-05-30

## 2023-05-30 RX ORDER — SODIUM CHLORIDE 9 MG/ML
20 INJECTION, SOLUTION INTRAVENOUS ONCE
Status: CANCELLED | OUTPATIENT
Start: 2023-05-30

## 2023-05-30 RX ADMIN — IRON SUCROSE 200 MG: 20 INJECTION, SOLUTION INTRAVENOUS at 12:42

## 2023-05-30 RX ADMIN — SODIUM CHLORIDE 20 ML/HR: 0.9 INJECTION, SOLUTION INTRAVENOUS at 12:41

## 2023-06-01 ENCOUNTER — TRANSCRIBE ORDERS (OUTPATIENT)
Dept: LAB | Facility: CLINIC | Age: 83
End: 2023-06-01

## 2023-06-01 ENCOUNTER — APPOINTMENT (OUTPATIENT)
Dept: LAB | Facility: CLINIC | Age: 83
End: 2023-06-01
Payer: MEDICARE

## 2023-06-01 DIAGNOSIS — I48.20 CHRONIC ATRIAL FIBRILLATION (HCC): ICD-10-CM

## 2023-06-01 DIAGNOSIS — I47.9: Primary | ICD-10-CM

## 2023-06-01 DIAGNOSIS — Z95.2 HEART VALVE REPLACED BY TRANSPLANT: Primary | ICD-10-CM

## 2023-06-01 DIAGNOSIS — Z79.01 LONG TERM (CURRENT) USE OF ANTICOAGULANTS: ICD-10-CM

## 2023-06-01 DIAGNOSIS — Z95.2 HEART VALVE REPLACED BY TRANSPLANT: ICD-10-CM

## 2023-06-01 LAB
INR PPP: 3.38 (ref 0.84–1.19)
PROTHROMBIN TIME: 34.5 SECONDS (ref 11.6–14.5)

## 2023-06-02 ENCOUNTER — OFFICE VISIT (OUTPATIENT)
Dept: INTERNAL MEDICINE CLINIC | Facility: CLINIC | Age: 83
End: 2023-06-02

## 2023-06-02 VITALS
SYSTOLIC BLOOD PRESSURE: 134 MMHG | HEART RATE: 81 BPM | WEIGHT: 159 LBS | OXYGEN SATURATION: 96 % | DIASTOLIC BLOOD PRESSURE: 86 MMHG | BODY MASS INDEX: 26.49 KG/M2 | HEIGHT: 65 IN

## 2023-06-02 DIAGNOSIS — J43.9 PULMONARY EMPHYSEMA, UNSPECIFIED EMPHYSEMA TYPE (HCC): ICD-10-CM

## 2023-06-02 DIAGNOSIS — E03.8 OTHER SPECIFIED HYPOTHYROIDISM: ICD-10-CM

## 2023-06-02 DIAGNOSIS — D62 ACUTE BLOOD LOSS ANEMIA: ICD-10-CM

## 2023-06-02 DIAGNOSIS — N39.46 MIXED STRESS AND URGE URINARY INCONTINENCE: ICD-10-CM

## 2023-06-02 DIAGNOSIS — I63.9 CEREBROVASCULAR ACCIDENT (CVA), UNSPECIFIED MECHANISM (HCC): ICD-10-CM

## 2023-06-02 DIAGNOSIS — Z95.2 H/O MITRAL VALVE REPLACEMENT WITH MECHANICAL VALVE: ICD-10-CM

## 2023-06-02 DIAGNOSIS — I48.20 CHRONIC ATRIAL FIBRILLATION (HCC): ICD-10-CM

## 2023-06-02 DIAGNOSIS — K90.0 CELIAC DISEASE: ICD-10-CM

## 2023-06-02 DIAGNOSIS — R31.29 OTHER MICROSCOPIC HEMATURIA: ICD-10-CM

## 2023-06-02 DIAGNOSIS — J30.1 ALLERGIC RHINITIS DUE TO POLLEN, UNSPECIFIED SEASONALITY: ICD-10-CM

## 2023-06-02 DIAGNOSIS — I71.40 ABDOMINAL AORTIC ANEURYSM (AAA) WITHOUT RUPTURE, UNSPECIFIED PART (HCC): ICD-10-CM

## 2023-06-02 DIAGNOSIS — K31.819 AVM (ARTERIOVENOUS MALFORMATION) OF DUODENUM, ACQUIRED: ICD-10-CM

## 2023-06-02 DIAGNOSIS — Z79.01 LONG TERM (CURRENT) USE OF ANTICOAGULANTS: ICD-10-CM

## 2023-06-02 DIAGNOSIS — K76.0 HEPATIC STEATOSIS: ICD-10-CM

## 2023-06-02 DIAGNOSIS — E11.69 TYPE 2 DIABETES MELLITUS WITH OTHER SPECIFIED COMPLICATION, WITHOUT LONG-TERM CURRENT USE OF INSULIN (HCC): Primary | ICD-10-CM

## 2023-06-02 DIAGNOSIS — D50.8 OTHER IRON DEFICIENCY ANEMIA: ICD-10-CM

## 2023-06-02 DIAGNOSIS — Z00.00 MEDICARE ANNUAL WELLNESS VISIT, SUBSEQUENT: ICD-10-CM

## 2023-06-02 DIAGNOSIS — E78.00 HYPERCHOLESTEREMIA: ICD-10-CM

## 2023-06-02 DIAGNOSIS — I10 ESSENTIAL HYPERTENSION: ICD-10-CM

## 2023-06-02 PROBLEM — Q82.0: Status: ACTIVE | Noted: 2023-06-02

## 2023-06-02 RX ORDER — ESTRADIOL 0.1 MG/G
CREAM VAGINAL
Qty: 42.5 G | Refills: 3 | Status: SHIPPED | OUTPATIENT
Start: 2023-06-02

## 2023-06-02 NOTE — ASSESSMENT & PLAN NOTE
Lab Results   Component Value Date    CREATININE 0 68 05/10/2023    CREATININE 0 59 (L) 02/06/2023    CREATININE 0 58 (L) 02/02/2023    EGFR 81 05/10/2023    EGFR 85 02/06/2023    EGFR 85 02/02/2023

## 2023-06-02 NOTE — ASSESSMENT & PLAN NOTE
Lab Results   Component Value Date    LDLCALC 63 05/10/2023     Lab Results   Component Value Date    ALT 32 05/10/2023    ALT 34 11/09/2015     Lab Results   Component Value Date    CHOLESTEROL 140 05/10/2023    CHOLESTEROL 130 03/13/2023    CHOLESTEROL 98 02/06/2023     Lab Results   Component Value Date    HDL 56 05/10/2023    HDL 56 03/13/2023    HDL 47 (L) 02/06/2023     Lab Results   Component Value Date    TRIG 107 05/10/2023    TRIG 129 03/13/2023    TRIG 112 02/06/2023     Lab Results   Component Value Date    NONHDLC 84 05/10/2023    [place] 74 03/13/2023    NONHDLC 51 02/06/2023   LDL good  We will continue atorvastatin 40 mg daily    Continue risk factor management low-cholesterol diet ALT normal

## 2023-06-02 NOTE — ASSESSMENT & PLAN NOTE
Lab Results   Component Value Date    [id number] 1 832 08/12/2022   Hypothyroidism clinically stable  Not currently on any medications

## 2023-06-02 NOTE — PROGRESS NOTES
Assessment and Plan:     Problem List Items Addressed This Visit        Digestive    Celiac disease     Celiac disease is stable  Patient remains on gluten-free diet and no significant GI symptoms         Relevant Orders    Comprehensive metabolic panel    CBC and differential    Hepatic steatosis     Lab Results   Component Value Date    ALKPHOS 88 05/10/2023    ALT 32 05/10/2023    AST 32 05/10/2023    BILITOT 0 3 11/09/2015   LFTs are stable  Remains on statin  Will monitor           Relevant Orders    Comprehensive metabolic panel    AVM (arteriovenous malformation) of duodenum, acquired         Continue pantoprazole 40 mg dailyNo further rectal bleeding  Anemia finally resolved  Finished last iron infusion  Recommend to start iron tablet oral after 1 week  Will do follow-up CBC in 4 weeks  INR 3 35  Continue careful Coumadin  No active bleeding         Relevant Orders    CBC and differential       Endocrine    Other specified hypothyroidism     Lab Results   Component Value Date    [id number] 1 832 08/12/2022   Hypothyroidism clinically stable  Not currently on any medications  Type 2 diabetes mellitus with other specified complication, without long-term current use of insulin (HCC) - Primary       Lab Results   Component Value Date    HGBA1C 6 5 (H) 05/10/2023   5/10/2023:  CMP normal with GFR 81, LDL 63, hba1c 6 5, urine ma/creat 61,   Hypertension is well controlled  We will continue same regimen         Relevant Orders    Albumin / creatinine urine ratio    Comprehensive metabolic panel    Hemoglobin A1C       Respiratory    Allergic rhinitis     Allergy is good not requiring any medications         Pulmonary emphysema (HCC)     COPD fair    Not using any inhaler            Cardiovascular and Mediastinum    Chronic atrial fibrillation (HCC)     Atrial fibrillation is stable rate controlled anticoagulation adequate no major bleeding holding at this time for next iron infusion continue careful monitoring both anticoagulation as well as CBC         Relevant Orders    CBC and differential    Abdominal aortic aneurysm (AAA) without rupture, unspecified part ([facility] [address] )     Abdominal aortic aneurysm distal last ultrasound on 11/17/2022 revealed decreasing size  Essential hypertension     Hypertension mildly elevated repeat blood pressure came better  ,cOntinue to monitor on current regimen not on any blood pressure medications  We discussed that given edema mild diuretic may be a good choice  Will monitor for right now she prefers not to go on diuretic given her urinary         Relevant Orders    Albumin / creatinine urine ratio    Comprehensive metabolic panel    CBC and differential    CVA (cerebral vascular accident) ([facility] [address] )     CVA stable  Nonfocal   Continue risk factor management with blood pressure diabetes hypertension lipidemia  Open trach hypertensive  Anticoagulation stable  We will monitor            Genitourinary    Other microscopic hematuria     Microhematuria stable no blood in the urine    Urine analysis revealed asymptomatic nitrite and leukocyte  Will not treat    She is relatively symptom-free         Relevant Orders    Comprehensive metabolic panel    CBC and differential       Other    H/O mitral valve replacement with mechanical valve    Relevant Orders    CBC and differential    Long term (current) use of anticoagulants (warfarin)    Relevant Orders    CBC and differential    Hypercholesteremia     Lab Results   Component Value Date    LDLCALC 63 05/10/2023     Lab Results   Component Value Date    ALT 32 05/10/2023    ALT 34 11/09/2015     Lab Results   Component Value Date    CHOLESTEROL 140 05/10/2023    CHOLESTEROL 130 03/13/2023    CHOLESTEROL 98 02/06/2023     Lab Results   Component Value Date    HDL 56 05/10/2023    HDL 56 03/13/2023    HDL 47 (L) 02/06/2023     Lab Results   Component Value Date    TRIG 107 05/10/2023    TRIG 129 03/13/2023    TRIG 112 02/06/2023     Lab Results   Component Value Date    NONHDLC 84 05/10/2023    Xavier 74 03/13/2023    NONHDLC 51 02/06/2023   LDL good  We will continue atorvastatin 40 mg daily  Continue risk factor management low-cholesterol diet ALT normal             Relevant Orders    Comprehensive metabolic panel    Hemoglobin A1C    Iron deficiency anemia      5/30/2023: CBC normal, iron saturation 19% normal, iron 69, TIBC 369, ferritin 28,    Mildly iron deficiency anemia is resolved  Patient subjectively feeling a lot better  Patient had final iron infusion done recently  She should go back on the oral iron tablet at this point  Recommend to start iron tablet 1 week after today    Commend follow-up CBC in 1 month         Relevant Orders    CBC and differential    Acute blood loss anemia     Good stable  Improved  Relevant Orders    CBC and differential   Other Visit Diagnoses     Medicare annual wellness visit, subsequent        Mixed stress and urge urinary incontinence        Relevant Medications    estradiol (ESTRACE) 0 1 mg/g vaginal cream          Depression Screening and Follow-up Plan: Patient was screened for depression during today's encounter  They screened negative with a PHQ-2 score of 0  Preventive health issues were discussed with patient, and age appropriate screening tests were ordered as noted in patient's After Visit Summary  Personalized health advice and appropriate referrals for health education or preventive services given if needed, as noted in patient's After Visit Summary  History of Present Illness:     Patient presents for a Medicare Wellness Visit            Patient here for follow-up of multiple medical problems  See actually feeling very well  Offers no specific complaint  Anemia finally resolved finished iron infusion recommend to start oral   Incontinence is reasonable  Atrial fibrillation controlled  Remains on anticoagulation    Status post mitral valve surgery stable  Hypertension well-controlled celiac stable AVM stable no bleeding sleep apnea versus COPD fair  Hyperlipidemia fair diabetes fair control  She does have a mild edema of the both legs  We talked about putting him on diuretic once he would prefer to wear stockings she will let us know no clinical symptoms of LV failure  We also talked about abnormal urine analysis or she does not have any UTI symptoms for a while  Generally she is feeling very well  Recommend to go back on iron pill to get CBC back in 4 weeks as well as CBC CMP Melamin A1c and urine for microalbumin in 3 months  Allergic rhinitis is fair  Overall she is doing very well  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  Moderate varicosity  Patient will continue to follow with cardiologist anticoagulation is being monitored by them          Echocardiogram done on 08/11, EF 62%, basal and inferolateral wall hypokinesis  Mechanical mitral valve     08/11/2022:  Chest x-ray:  No active disease    08/11/2022:  CT scan of the brain no acute intracranial abnormality micro angiopathy changes    1-12-23:EGD: 1   Moderate degree of gastritis  2  No sign of upper GI bleeding  3  Slightly flattened small bowel villi, biopsies were done  4  Esophagus  1-12-23: Colonoscopy:  1  Suboptimal bowel prep with large amount of liquid stool throughout the colon, visualization was suboptimal  2  Colon polyp removed  3  Extensive diverticulosis throughout the colon  4  Internal hemorrho    CXR: borderline cardiomegaly  Mild vascular congestion     Transcribe Orders on 06/01/2023  Protime                   Value: 34  5(seconds) (H)  Dt: 06/01/2023  INR                       Value: 3 38 (H)           Dt: 06/01/2023  ------------  Hospital Outpatient Visit on 05/30/2023  WBC                       Value: 4 71(Thousand/uL)  Dt: 05/30/2023  RBC                       Value: 4 49(Million/uL)   Dt: 05/30/2023  Hemoglobin                Value: 12 6(g/dL) Dt: 05/30/2023  Hematocrit                Value: 39 5(%)            Dt: 05/30/2023  MCV                       Value: 88(fL)             Dt: 05/30/2023  MCH                       Value: 28 1(pg)           Dt: 05/30/2023  MCHC                      Value: 31 9(g/dL)         Dt: 05/30/2023  RDW                       Value: 21 1(%) (H)        Dt: 05/30/2023  MPV                       Value: 9 9(fL)            Dt: 05/30/2023  Platelets                 Value: 158(Thousands/uL)  Dt: 05/30/2023  nRBC                      Value: 0(/100 WBCs)       Dt: 05/30/2023  Neutrophils Relative      Value: 61(%)              Dt: 05/30/2023  Immat GRANS %             Value: 0(%)               Dt: 05/30/2023  Lymphocytes Relative      Value: 26(%)              Dt: 05/30/2023  Monocytes Relative        Value: 9(%)               Dt: 05/30/2023  Eosinophils Relative      Value: 3(%)               Dt: 05/30/2023  Basophils Relative        Value: 1(%)               Dt: 05/30/2023  Neutrophils Absolute      Value: 2 91(Thousands/µL) Dt: 05/30/2023  Immature Grans Absolute   Value: 0 01(Thousand/uL)  Dt: 05/30/2023  Lymphocytes Absolute      Value: 1 22(Thousands/µL) Dt: 05/30/2023  Monocytes Absolute        Value: 0 41(Thousand/µL)  Dt: 05/30/2023  Eosinophils Absolute      Value: 0 12(Thousand/µL)  Dt: 05/30/2023  Basophils Absolute        Value: 0 04(Thousands/µL) Dt: 05/30/2023  Iron Saturation           Value: 19(%)              Dt: 05/30/2023  TIBC                      Value: 369(ug/dL)         Dt: 05/30/2023  Iron                      Value: 69(ug/dL)          Dt: 05/30/2023  Ferritin                  Value: 28(ng/mL)          Dt: 05/30/2023  ------------ - 2 weeks       Patient Care Team:  Mary Kay Lassiter MD as PCP - General (Internal Medicine)     Review of Systems:     Review of Systems   Constitutional: Negative for chills and fever  HENT: Negative for ear pain, sore throat and trouble swallowing      Eyes: Negative for pain and visual disturbance  Respiratory: Negative for cough and shortness of breath  Cardiovascular: Negative for chest pain and palpitations  Gastrointestinal: Negative for abdominal pain, constipation, diarrhea and vomiting  Genitourinary: Negative for dysuria and hematuria  Musculoskeletal: Negative for arthralgias and back pain  Skin: Negative for color change and rash  Neurological: Negative for dizziness, seizures, syncope and headaches  Psychiatric/Behavioral: Negative for agitation, confusion and hallucinations  All other systems reviewed and are negative         Problem List:     Patient Active Problem List   Diagnosis   • Obstructive sleep apnea   • Chronic atrial fibrillation (HCC)   • H/O mitral valve replacement with mechanical valve   • Long term (current) use of anticoagulants (warfarin)   • Presence of prosthetic heart valve   • Hypercholesteremia   • History of anemia due to CKD   • Allergic rhinitis   • Type 2 diabetes mellitus without complication, without long-term current use of insulin ([facility])   • Iron deficiency anemia   • Other specified hypothyroidism   • Vitamin B12 deficiency   • Other microscopic hematuria   • Celiac disease   • Abdominal aortic aneurysm (AAA) without rupture, unspecified part ([facility] [address] )   • Ataxia   • Pulmonary emphysema (HCC)   • History of renal calculi   • History of cervical cancer   • Essential hypertension   • Other proteinuria   • Hepatic steatosis   • CVA (cerebral vascular accident) ([facility] [address] )   • Type 2 diabetes mellitus with other specified complication, without long-term current use of insulin ([facility] [address] )   • CKD (chronic kidney disease) stage 2, GFR 60-89 ml/min   • Cerebrovascular accident (CVA) ([facility] [address] )   • Fall   • Anemia   • Acute blood loss anemia   • AVM (arteriovenous malformation) of duodenum, acquired   • Balance problem   • Edema of legs, hereditary      Past Medical and Surgical History:     Past Medical History:   Diagnosis Date   • Bloody nose     slow drip, clots in the morning   • Colon polyp    • Diabetes mellitus ([facility] [address] )     Pre DM diet controlled, metformin DCed    • Heart murmur    • Hyperlipidemia    • Stroke ([facility] [address] ) 2022     Past Surgical History:   Procedure Laterality Date   • CATARACT EXTRACTION Bilateral    • COLONOSCOPY     • EGD     • HYSTERECTOMY     • MITRAL VALVE REPLACEMENT        Family History:     Family History   Problem Relation Age of Onset   • Heart failure Mother    • Heart attack Father    • Sleep apnea Brother       Social History:     Social History     Socioeconomic History   • Marital status:      Spouse name: None   • Number of children: None   • Years of education: None   • Highest education level: None   Occupational History   • None   Tobacco Use   • Smoking status: Former     Packs/day: 2 00     Years: 30 00     Total pack years: 60 00     Types: Cigarettes     Quit date:      Years since quittin 4   • Smokeless tobacco: Never   Vaping Use   • Vaping Use: Never used   Substance and Sexual Activity   • Alcohol use: Yes     Comment: special occasional   • Drug use: No   • Sexual activity: None   Other Topics Concern   • None   Social History Narrative   • None     Social Determinants of Health     Financial Resource Strain: Low Risk  (2023)    Overall Financial Resource Strain (CARDIA)    • Difficulty of Paying Living Expenses: Not hard at all   Food Insecurity: No Food Insecurity (2023)    Hunger Vital Sign    • Worried About Running Out of Food in the Last Year: Never true    • Ran Out of Food in the Last Year: Never true   Transportation Needs: No Transportation Needs (2023)    PRAPARE - Transportation    • Lack of Transportation (Medical): No    • Lack of Transportation (Non-Medical):  No   Physical Activity: Not on file   Stress: Not on file   Social Connections: Not on file   Intimate Partner Violence: Not on file   Housing Stability: Low Risk  (2023)    Housing Stability Vital Sign    • Unable to Pay for Housing in the Last Year: No    • Number of Places Lived in the Last Year: 1    • Unstable Housing in the Last Year: No      Medications and Allergies:     Current Outpatient Medications   Medication Sig Dispense Refill   • acetaminophen (TYLENOL) 325 mg tablet Take 2 tablets (650 mg total) by mouth every 6 (six) hours as needed for mild pain or headaches  0   • atorvastatin (LIPITOR) 40 mg tablet Take 1 tablet (40 mg total) by mouth daily 90 tablet 1   • Cholecalciferol (VITAMIN D PO) Take by mouth     • estradiol (ESTRACE) 0 1 mg/g vaginal cream 0 5 grams of cream intravaginally administered daily for one to two weeks, then reduce to twice weekly 42 5 g 3   • glucose blood test strip Use 1 each in the morning Use one daily 100 strip 2   • Multiple Vitamins-Minerals (PRESERVISION AREDS PO) Take 2 tablets by mouth daily       • pantoprazole (PROTONIX) 40 mg tablet Take 1 tablet (40 mg total) by mouth daily 30 tablet 2   • polyethylene glycol (MIRALAX) 17 g packet Take 17 g by mouth daily as needed (Constipation)  0   • warfarin (COUMADIN) 2 5 mg tablet Take 1 tablet (2 5 mg total) by mouth 3 (three) times a week On Monday Wednesday and Friday (Patient taking differently: Take 2 5 mg by mouth 3 (three) times a week Added to 5mg dose only on tuesdays=7 5mg)  0   • warfarin (COUMADIN) 5 mg tablet Take 1 tablet (5 mg total) by mouth 4 (four) times a week On Sunday, Tuesday, Thursday, Saturday (Patient taking differently: Take 5 mg by mouth in the morning)  0     No current facility-administered medications for this visit  Allergies   Allergen Reactions   • Sulfa Antibiotics Tongue Swelling   • Sulfasalazine Throat Swelling      Immunizations: There is no immunization history on file for this patient     Health Maintenance:         Topic Date Due   • Hepatitis C Screening  Completed         Topic Date Due   • COVID-19 Vaccine (1) Never done   • Pneumococcal Vaccine: 65+ Years (1 - PCV) Never done   • Influenza Vaccine (Season Ended) 09/01/2023      Medicare Screening Tests and Risk Assessments:     Libia Parham is here for her Subsequent Wellness visit  Last Medicare Wellness visit information reviewed, patient interviewed and updates made to the record today  Health Risk Assessment:   Patient rates overall health as very good  Patient feels that their physical health rating is same  Patient is satisfied with their life  Eyesight was rated as slightly worse  Hearing was rated as same  Patient feels that their emotional and mental health rating is same  Patients states they are never, rarely angry  Patient states they are often unusually tired/fatigued  Pain experienced in the last 7 days has been none  Patient states that she has experienced weight loss or gain in last 6 months  Weight is stable    Depression Screening:   PHQ-2 Score: 0      Fall Risk Screening: In the past year, patient has experienced: no history of falling in past year      Urinary Incontinence Screening:   Patient has leaked urine accidently in the last six months  States at times she does not make it to the bathroom on time    Home Safety:  Patient does not have trouble with stairs inside or outside of their home  Patient has working smoke alarms and has working carbon monoxide detector  Home safety hazards include: none  No home safety hazards    Nutrition:   Current diet is Regular  Eats a balanced diet  Medications:   Patient is currently taking over-the-counter supplements  OTC medications include: see medication list  Patient is able to manage medications  No problems managing meds    Activities of Daily Living (ADLs)/Instrumental Activities of Daily Living (IADLs):   Walk and transfer into and out of bed and chair?: Yes  Dress and groom yourself?: Yes    Bathe or shower yourself?: Yes    Feed yourself?  Yes  Do your laundry/housekeeping?: Yes  Manage your money, pay your bills and track your expenses?: Yes  Make your own meals?: Yes    Do your own shopping?: Yes    ADL comments: Pt is independent  Previous Hospitalizations:   Any hospitalizations or ED visits within the last 12 months?: No    How many hospitalizations have you had in the last year?: 1-2    Hospitalization Comments: Conditions are stable    Advance Care Planning:   Living will: Yes    Durable POA for healthcare: Yes    Advanced directive: Yes    Advanced directive counseling given: No    Five wishes given: No    Patient declined ACP directive: No    End of Life Decisions reviewed with patient: No    Provider agrees with end of life decisions: Yes      Comments: All documents are in place    Cognitive Screening:   Provider or family/friend/caregiver concerned regarding cognition?: No    PREVENTIVE SCREENINGS      Cardiovascular Screening:    General: Screening Not Indicated and History Lipid Disorder      Diabetes Screening:     General: Screening Not Indicated and History Diabetes      Colorectal Cancer Screening:     General: Screening Current      Breast Cancer Screening:     General: Screening Current      Cervical Cancer Screening:    General: History Cervical Cancer      Osteoporosis Screening:    General: Risks and Benefits Discussed and Patient Declines      Abdominal Aortic Aneurysm (AAA) Screening:        General: Screening Not Indicated and History AAA      Lung Cancer Screening:     General: Screening Not Indicated      Hepatitis C Screening:    General: Screening Current    Screening, Brief Intervention, and Referral to Treatment (SBIRT)    Screening  Typical number of drinks in a day: 0  Typical number of drinks in a week: 0  Interpretation: Low risk drinking behavior      Single Item Drug Screening:  How often have you used an illegal drug (including marijuana) or a prescription medication for non-medical reasons in the past year? never    Single Item Drug Screen Score: 0  Interpretation: Negative screen for possible drug use "disorder    Brief Intervention  Alcohol & drug use screenings were reviewed  No concerns regarding substance use disorder identified  Other Counseling Topics:   Skin self-exam, sunscreen and calcium and vitamin D intake  No results found  Physical Exam:     /86   Pulse 81   Ht 5' 5\" (1 651 m)   Wt 72 1 kg (159 lb)   SpO2 96%   BMI 26 46 kg/m²     Physical Exam  Vitals and nursing note reviewed  Constitutional:       Appearance: Normal appearance  She is well-developed  She is not ill-appearing  HENT:      Head: Normocephalic and atraumatic  Right Ear: External ear normal       Left Ear: External ear normal       Nose: Nose normal       Mouth/Throat:      Pharynx: Oropharynx is clear  Eyes:      Conjunctiva/sclera: Conjunctivae normal    Cardiovascular:      Rate and Rhythm: Normal rate and regular rhythm  Pulses: Pulses are weak  Dorsalis pedis pulses are 1+ on the right side and 1+ on the left side  Posterior tibial pulses are 1+ on the right side and 1+ on the left side  Pulmonary:      Effort: Pulmonary effort is normal       Breath sounds: Normal breath sounds  Abdominal:      General: Bowel sounds are normal  There is no distension  Palpations: Abdomen is soft  Tenderness: There is no abdominal tenderness  Musculoskeletal:         General: No swelling  Cervical back: Normal range of motion and neck supple  Right lower leg: No edema  Left lower leg: Edema present  Feet:      Right foot:      Skin integrity: No ulcer, skin breakdown, erythema, warmth, callus or dry skin  Left foot:      Skin integrity: No ulcer, skin breakdown, erythema, warmth, callus or dry skin  Skin:     General: Skin is warm and dry  Capillary Refill: Capillary refill takes less than 2 seconds  Coloration: Skin is not pale  Findings: No erythema or rash  Neurological:      General: No focal deficit present        Mental Status: She " is alert and oriented to person, place, and time  Coordination: Coordination normal       Gait: Gait normal    Psychiatric:         Mood and Affect: Mood normal          Thought Content: Thought content normal       Diabetic Foot Exam    Patient's shoes and socks removed  Right Foot/Ankle   Right Foot Inspection  Skin Exam: skin normal and skin intact  No dry skin, no warmth, no callus, no erythema, no maceration, no abnormal color, no pre-ulcer, no ulcer and no callus  Toe Exam: ROM and strength within normal limits  no right toe deformity    Sensory   Proprioception: intact  Monofilament testing: intact    Vascular  Capillary refills: < 3 seconds  The right DP pulse is 1+  The right PT pulse is 1+  Left Foot/Ankle  Left Foot Inspection  Skin Exam: skin normal and skin intact  No dry skin, no warmth, no erythema, no maceration, normal color, no pre-ulcer, no ulcer and no callus  Toe Exam: ROM and strength within normal limits  No left toe deformity  Sensory   Proprioception: intact  Monofilament testing: diminished    Vascular  Capillary refills: < 3 seconds  The left DP pulse is 1+  The left PT pulse is 1+  Assign Risk Category  No deformity present  Loss of protective sensation  Weak pulses  Risk: 2  Moderate severe bilateral varicosities present    No clinical DVT    Mary Kay Lassiter MD

## 2023-06-02 NOTE — ASSESSMENT & PLAN NOTE
Atrial fibrillation is stable rate controlled anticoagulation adequate no major bleeding holding at this time for next iron infusion continue careful monitoring both anticoagulation as well as CBC

## 2023-06-02 NOTE — PATIENT INSTRUCTIONS
Medicare Preventive Visit Patient Instructions  Thank you for completing your Welcome to Medicare Visit or Medicare Annual Wellness Visit today  Your next wellness visit will be due in one year (6/2/2024)  The screening/preventive services that you may require over the next 5-10 years are detailed below  Some tests may not apply to you based off risk factors and/or age  Screening tests ordered at today's visit but not completed yet may show as past due  Also, please note that scanned in results may not display below  Preventive Screenings:  Service Recommendations Previous Testing/Comments   Colorectal Cancer Screening  * Colonoscopy    * Fecal Occult Blood Test (FOBT)/Fecal Immunochemical Test (FIT)  * Fecal DNA/Cologuard Test  * Flexible Sigmoidoscopy Age: 39-70 years old   Colonoscopy: every 10 years (may be performed more frequently if at higher risk)  OR  FOBT/FIT: every 1 year  OR  Cologuard: every 3 years  OR  Sigmoidoscopy: every 5 years  Screening may be recommended earlier than age 39 if at higher risk for colorectal cancer  Also, an individualized decision between you and your healthcare provider will decide whether screening between the ages of 74-80 would be appropriate  Colonoscopy: 01/13/2023  FOBT/FIT: 08/12/2022  Cologuard: Not on file  Sigmoidoscopy: Not on file          Breast Cancer Screening Age: 36 years old  Frequency: every 1-2 years  Not required if history of left and right mastectomy Mammogram: 03/12/2020        Cervical Cancer Screening Between the ages of 21-29, pap smear recommended once every 3 years  Between the ages of 33-67, can perform pap smear with HPV co-testing every 5 years     Recommendations may differ for women with a history of total hysterectomy, cervical cancer, or abnormal pap smears in past  Pap Smear: Not on file        Hepatitis C Screening Once for adults born between [facility]  More frequently in patients at high risk for Hepatitis C Hep C Antibody: 07/05/2019        Diabetes Screening 1-2 times per year if you're at risk for diabetes or have pre-diabetes Fasting glucose: 128 mg/dL (5/10/2023)  A1C: 6 5 % (5/10/2023)      Cholesterol Screening Once every 5 years if you don't have a lipid disorder  May order more often based on risk factors  Lipid panel: 05/10/2023          Other Preventive Screenings Covered by Medicare:  Abdominal Aortic Aneurysm (AAA) Screening: covered once if your at risk  You're considered to be at risk if you have a family history of AAA  Lung Cancer Screening: covers low dose CT scan once per year if you meet all of the following conditions: (1) Age 50-69; (2) No signs or symptoms of lung cancer; (3) Current smoker or have quit smoking within the last 15 years; (4) You have a tobacco smoking history of at least 20 pack years (packs per day multiplied by number of years you smoked); (5) You get a written order from a healthcare provider  Glaucoma Screening: covered annually if you're considered high risk: (1) You have diabetes OR (2) Family history of glaucoma OR (3)  aged 48 and older OR (3)  American aged 72 and older  Osteoporosis Screening: covered every 2 years if you meet one of the following conditions: (1) You're estrogen deficient and at risk for osteoporosis based off medical history and other findings; (2) Have a vertebral abnormality; (3) On glucocorticoid therapy for more than 3 months; (4) Have primary hyperparathyroidism; (5) On osteoporosis medications and need to assess response to drug therapy  Last bone density test (DXA Scan): 07/25/2022  HIV Screening: covered annually if you're between the age of 12-76  Also covered annually if you are younger than 13 and older than 72 with risk factors for HIV infection  For pregnant patients, it is covered up to 3 times per pregnancy      Immunizations:  Immunization Recommendations   Influenza Vaccine Annual influenza vaccination during flu season is recommended for all persons aged >= 6 months who do not have contraindications   Pneumococcal Vaccine   * Pneumococcal conjugate vaccine = PCV13 (Prevnar 13), PCV15 (Vaxneuvance), PCV20 (Prevnar 20)  * Pneumococcal polysaccharide vaccine = PPSV23 (Pneumovax) Adults 2364 years old: 1-3 doses may be recommended based on certain risk factors  Adults 72 years old: 1-2 doses may be recommended based off what pneumonia vaccine you previously received   Hepatitis B Vaccine 3 dose series if at intermediate or high risk (ex: diabetes, end stage renal disease, liver disease)   Tetanus (Td) Vaccine - COST NOT COVERED BY MEDICARE PART B Following completion of primary series, a booster dose should be given every 10 years to maintain immunity against tetanus  Td may also be given as tetanus wound prophylaxis  Tdap Vaccine - COST NOT COVERED BY MEDICARE PART B Recommended at least once for all adults  For pregnant patients, recommended with each pregnancy  Shingles Vaccine (Shingrix) - COST NOT COVERED BY MEDICARE PART B  2 shot series recommended in those aged 48 and above     Health Maintenance Due:      Topic Date Due    Hepatitis C Screening  Completed     Immunizations Due:      Topic Date Due    COVID-19 Vaccine (1) Never done    Pneumococcal Vaccine: 65+ Years (1 - PCV) Never done    Influenza Vaccine (Season Ended) 09/01/2023     Advance Directives   What are advance directives? Advance directives are legal documents that state your wishes and plans for medical care  These plans are made ahead of time in case you lose your ability to make decisions for yourself  Advance directives can apply to any medical decision, such as the treatments you want, and if you want to donate organs  What are the types of advance directives? There are many types of advance directives, and each state has rules about how to use them  You may choose a combination of any of the following:  Living will:   This is a written record of the treatment you want  You can also choose which treatments you do not want, which to limit, and which to stop at a certain time  This includes surgery, medicine, IV fluid, and tube feedings  Durable power of  for healthcare [facility]): This is a written record that states who you want to make healthcare choices for you when you are unable to make them for yourself  This person, called a proxy, is usually a family member or a friend  You may choose more than 1 proxy  Do not resuscitate (DNR) order:  A DNR order is used in case your heart stops beating or you stop breathing  It is a request not to have certain forms of treatment, such as CPR  A DNR order may be included in other types of advance directives  Medical directive: This covers the care that you want if you are in a coma, near death, or unable to make decisions for yourself  You can list the treatments you want for each condition  Treatment may include pain medicine, surgery, blood transfusions, dialysis, IV or tube feedings, and a ventilator (breathing machine)  Values history: This document has questions about your views, beliefs, and how you feel and think about life  This information can help others choose the care that you would choose  Why are advance directives important? An advance directive helps you control your care  Although spoken wishes may be used, it is better to have your wishes written down  Spoken wishes can be misunderstood, or not followed  Treatments may be given even if you do not want them  An advance directive may make it easier for your family to make difficult choices about your care  Weight Management   Why it is important to manage your weight:  Being overweight increases your risk of health conditions such as heart disease, high blood pressure, type 2 diabetes, and certain types of cancer  It can also increase your risk for osteoarthritis, sleep apnea, and other respiratory problems   Aim for a slow, steady weight loss  Even a small amount of weight loss can lower your risk of health problems  How to lose weight safely:  A safe and healthy way to lose weight is to eat fewer calories and get regular exercise  You can lose up about 1 pound a week by decreasing the number of calories you eat by 500 calories each day  Healthy meal plan for weight management:  A healthy meal plan includes a variety of foods, contains fewer calories, and helps you stay healthy  A healthy meal plan includes the following:  Eat whole-grain foods more often  A healthy meal plan should contain fiber  Fiber is the part of grains, fruits, and vegetables that is not broken down by your body  Whole-grain foods are healthy and provide extra fiber in your diet  Some examples of whole-grain foods are whole-wheat breads and pastas, oatmeal, brown rice, and bulgur  Eat a variety of vegetables every day  Include dark, leafy greens such as spinach, kale, reina greens, and mustard greens  Eat yellow and orange vegetables such as carrots, sweet potatoes, and winter squash  Eat a variety of fruits every day  Choose fresh or canned fruit (canned in its own juice or light syrup) instead of juice  Fruit juice has very little or no fiber  Eat low-fat dairy foods  Drink fat-free (skim) milk or 1% milk  Eat fat-free yogurt and low-fat cottage cheese  Try low-fat cheeses such as mozzarella and other reduced-fat cheeses  Choose meat and other protein foods that are low in fat  Choose beans or other legumes such as split peas or lentils  Choose fish, skinless poultry (chicken or turkey), or lean cuts of red meat (beef or pork)  Before you cook meat or poultry, cut off any visible fat  Use less fat and oil  Try baking foods instead of frying them  Add less fat, such as margarine, sour cream, regular salad dressing and mayonnaise to foods  Eat fewer high-fat foods  Some examples of high-fat foods include french fries, doughnuts, ice cream, and cakes    Eat fewer sweets  Limit foods and drinks that are high in sugar  This includes candy, cookies, regular soda, and sweetened drinks  Exercise:  Exercise at least 30 minutes per day on most days of the week  Some examples of exercise include walking, biking, dancing, and swimming  You can also fit in more physical activity by taking the stairs instead of the elevator or parking farther away from stores  Ask your healthcare provider about the best exercise plan for you  © Copyright [employer] 2018 Information is for End User's use only and may not be sold, redistributed or otherwise used for commercial purposes  All illustrations and images included in CareNotes® are the copyrighted property of A D [employer] M [employer] Inc  or Kathie Blandon     Urinary Incontinence   AMBULATORY CARE:   Urinary incontinence (UI)  is when you lose control of your bladder  UI develops because your bladder cannot store or empty urine properly  The 3 most common types of UI are stress incontinence, urge incontinence, or both  Common symptoms include the following: You feel like your bladder does not empty completely when you urinate  You urinate often and need to urinate immediately  You leak urine when you sleep, or you wake up with the urge to urinate  You leak urine when you cough, sneeze, exercise, or laugh  Call your doctor if:   You have severe pain  You are confused or cannot think clearly  You have a fever  You see blood in your urine  You have pain when you urinate  You have new or worse pain, even after treatment  Your mouth feels dry or you have vision changes  Your urine is cloudy or smells bad  You have questions or concerns about your condition or care  Medicines:   Medicines  may be given to help strengthen your bladder control  Take your medicine as directed  Contact your healthcare provider if you think your medicine is not helping or if you have side effects   Tell your provider if you are allergic to any medicine  Keep a list of the medicines, vitamins, and herbs you take  Include the amounts, and when and why you take them  Bring the list or the pill bottles to follow-up visits  Carry your medicine list with you in case of an emergency  Do pelvic muscle exercises often:  Your pelvic muscles help you stop urinating  Squeeze these muscles tight for 5 seconds, then relax for 5 seconds  Gradually work up to squeezing for 10 seconds  Do 3 sets of 15 repetitions a day, or as directed  This will help strengthen your pelvic muscles and improve bladder control  Train your bladder:  Go to the bathroom at set times, such as every 2 hours, even if you do not feel the urge to go  You can also try to hold your urine when you feel the urge to go  For example, hold your urine for 5 minutes when you feel the urge to go  As that becomes easier, hold your urine for 10 minutes  Self-care:   Keep a UI record  Write down how often you leak urine and how much you leak  Make a note of what you were doing when you leaked urine  Drink liquids as directed  Ask your healthcare provider how much liquid to drink each day and which liquids are best for you  You may need to limit the amount of liquid you drink to help control your urine leakage  Do not drink any liquid right before you go to bed  Limit or do not have drinks that contain caffeine or alcohol  Prevent constipation  Eat a variety of high-fiber foods  Good examples are high-fiber cereals, beans, vegetables, and whole-grain breads  Prune juice may help make your bowel movement softer  Walking is the best way to trigger your intestines to have a bowel movement  Exercise regularly and maintain a healthy weight  Ask your healthcare provider how much you should weigh and about the best exercise plan for you  Weight loss and exercise will decrease pressure on your bladder and help you control your leakage   Ask him or her to help you create a weight loss plan if you are overweight  Use a catheter as directed  to help empty your bladder  A catheter is a tiny, plastic tube that is put into your bladder to drain your urine  Your healthcare provider may tell you to use a catheter to prevent your bladder from getting too full and leaking urine  Go to behavior therapy as directed  Behavior therapy may be used to help you learn to control your urge to urinate  Follow up with your doctor as directed:  Write down your questions so you remember to ask them during your visits  © Copyright [employer] 2022 Information is for End User's use only and may not be sold, redistributed or otherwise used for commercial purposes  The above information is an  only  It is not intended as medical advice for individual conditions or treatments  Talk to your doctor, nurse or pharmacist before following any medical regimen to see if it is safe and effective for you  We called in [employer] for your pharmacy as you were on that by your urologist and was working well  We are giving you a instructions incontinence    Go for the CBC in 4 weeks      Go for hemoglobin A1c and CBC back in 3 months

## 2023-06-02 NOTE — ASSESSMENT & PLAN NOTE
Lab Results   Component Value Date    ALKPHOS 88 05/10/2023    ALT 32 05/10/2023    AST 32 05/10/2023    BILITOT 0 3 11/09/2015   LFTs are stable  Remains on statin    Will monitor

## 2023-06-02 NOTE — ASSESSMENT & PLAN NOTE
Lab Results   Component Value Date    HGBA1C 6 5 (H) 05/10/2023   5/10/2023:  CMP normal with GFR 81, LDL 63, hba1c 6 5, urine ma/creat 61,   Hypertension is well controlled    We will continue same regimen

## 2023-06-02 NOTE — ASSESSMENT & PLAN NOTE
Continue pantoprazole 40 mg dailyNo further rectal bleeding  Anemia finally resolved  Finished last iron infusion  Recommend to start iron tablet oral after 1 week  Will do follow-up CBC in 4 weeks  INR 3 35  Continue careful Coumadin    No active bleeding

## 2023-06-02 NOTE — ASSESSMENT & PLAN NOTE
Microhematuria stable no blood in the urine    Urine analysis revealed asymptomatic nitrite and leukocyte  Will not treat    She is relatively symptom-free

## 2023-06-02 NOTE — ASSESSMENT & PLAN NOTE
5/30/2023: CBC normal, iron saturation 19% normal, iron 69, TIBC 369, ferritin 28,    Mildly iron deficiency anemia is resolved  Patient subjectively feeling a lot better  Patient had final iron infusion done recently  She should go back on the oral iron tablet at this point        Recommend to start iron tablet 1 week after today    Commend follow-up CBC in 1 month

## 2023-06-02 NOTE — ASSESSMENT & PLAN NOTE
CVA stable  Nonfocal   Continue risk factor management with blood pressure diabetes hypertension lipidemia  Open trach hypertensive  Anticoagulation stable    We will monitor

## 2023-07-05 ENCOUNTER — APPOINTMENT (OUTPATIENT)
Dept: LAB | Facility: CLINIC | Age: 83
End: 2023-07-05
Payer: MEDICARE

## 2023-07-05 DIAGNOSIS — D50.8 OTHER IRON DEFICIENCY ANEMIA: ICD-10-CM

## 2023-07-05 DIAGNOSIS — K90.0 CELIAC DISEASE: ICD-10-CM

## 2023-07-05 DIAGNOSIS — K31.819 AVM (ARTERIOVENOUS MALFORMATION) OF DUODENUM, ACQUIRED: ICD-10-CM

## 2023-07-05 LAB
CHOLEST SERPL-MCNC: 168 MG/DL
ERYTHROCYTE [DISTWIDTH] IN BLOOD BY AUTOMATED COUNT: 17.9 % (ref 11.6–15.1)
HCT VFR BLD AUTO: 41.1 % (ref 34.8–46.1)
HDLC SERPL-MCNC: 56 MG/DL
HGB BLD-MCNC: 13.1 G/DL (ref 11.5–15.4)
LDLC SERPL CALC-MCNC: 81 MG/DL (ref 0–100)
MCH RBC QN AUTO: 29.8 PG (ref 26.8–34.3)
MCHC RBC AUTO-ENTMCNC: 31.9 G/DL (ref 31.4–37.4)
MCV RBC AUTO: 93 FL (ref 82–98)
NONHDLC SERPL-MCNC: 112 MG/DL
PLATELET # BLD AUTO: 185 THOUSANDS/UL (ref 149–390)
PMV BLD AUTO: 10.8 FL (ref 8.9–12.7)
RBC # BLD AUTO: 4.4 MILLION/UL (ref 3.81–5.12)
TRIGL SERPL-MCNC: 157 MG/DL
WBC # BLD AUTO: 4.36 THOUSAND/UL (ref 4.31–10.16)

## 2023-07-05 PROCEDURE — 85027 COMPLETE CBC AUTOMATED: CPT

## 2023-08-04 ENCOUNTER — APPOINTMENT (OUTPATIENT)
Dept: LAB | Facility: CLINIC | Age: 83
End: 2023-08-04
Payer: MEDICARE

## 2023-08-08 LAB
LEFT EYE DIABETIC RETINOPATHY: NORMAL
RIGHT EYE DIABETIC RETINOPATHY: NORMAL

## 2023-08-13 ENCOUNTER — HOSPITAL ENCOUNTER (EMERGENCY)
Facility: HOSPITAL | Age: 83
Discharge: HOME | End: 2023-08-13
Attending: EMERGENCY MEDICINE
Payer: MEDICARE

## 2023-08-13 VITALS
RESPIRATION RATE: 18 BRPM | SYSTOLIC BLOOD PRESSURE: 194 MMHG | DIASTOLIC BLOOD PRESSURE: 87 MMHG | HEART RATE: 77 BPM | OXYGEN SATURATION: 98 %

## 2023-08-13 DIAGNOSIS — R31.9 HEMATURIA, UNSPECIFIED TYPE: Primary | ICD-10-CM

## 2023-08-13 DIAGNOSIS — N39.0 UTI (URINARY TRACT INFECTION): ICD-10-CM

## 2023-08-13 LAB
ALBUMIN SERPL BCP-MCNC: 4.3 G/DL (ref 3.5–5)
ALP SERPL-CCNC: 88 U/L (ref 34–104)
ALT SERPL W P-5'-P-CCNC: 21 U/L (ref 7–52)
ANION GAP SERPL CALCULATED.3IONS-SCNC: 5 MMOL/L
APTT PPP: 44 SECONDS (ref 23–37)
AST SERPL W P-5'-P-CCNC: 26 U/L (ref 13–39)
BACTERIA UR QL AUTO: ABNORMAL /HPF
BASOPHILS # BLD AUTO: 0.05 THOUSANDS/ÂΜL (ref 0–0.1)
BASOPHILS NFR BLD AUTO: 1 % (ref 0–1)
BILIRUB SERPL-MCNC: 0.66 MG/DL (ref 0.2–1)
BILIRUB UR QL STRIP: ABNORMAL
BUN SERPL-MCNC: 18 MG/DL (ref 5–25)
CALCIUM SERPL-MCNC: 9.2 MG/DL (ref 8.4–10.2)
CHLORIDE SERPL-SCNC: 105 MMOL/L (ref 96–108)
CLARITY UR: ABNORMAL
CO2 SERPL-SCNC: 28 MMOL/L (ref 21–32)
COLOR UR: ABNORMAL
CREAT SERPL-MCNC: 0.64 MG/DL (ref 0.6–1.3)
EOSINOPHIL # BLD AUTO: 0.09 THOUSAND/ÂΜL (ref 0–0.61)
EOSINOPHIL NFR BLD AUTO: 2 % (ref 0–6)
ERYTHROCYTE [DISTWIDTH] IN BLOOD BY AUTOMATED COUNT: 15.7 % (ref 11.6–15.1)
GFR SERPL CREATININE-BSD FRML MDRD: 82 ML/MIN/1.73SQ M
GLUCOSE SERPL-MCNC: 129 MG/DL (ref 65–140)
GLUCOSE UR STRIP-MCNC: NEGATIVE MG/DL
HCT VFR BLD AUTO: 39.2 % (ref 34.8–46.1)
HGB BLD-MCNC: 12.8 G/DL (ref 11.5–15.4)
HGB UR QL STRIP.AUTO: ABNORMAL
IMM GRANULOCYTES # BLD AUTO: 0.01 THOUSAND/UL (ref 0–0.2)
IMM GRANULOCYTES NFR BLD AUTO: 0 % (ref 0–2)
INR PPP: 4.52 (ref 0.84–1.19)
KETONES UR STRIP-MCNC: NEGATIVE MG/DL
LEUKOCYTE ESTERASE UR QL STRIP: ABNORMAL
LYMPHOCYTES # BLD AUTO: 1.08 THOUSANDS/ÂΜL (ref 0.6–4.47)
LYMPHOCYTES NFR BLD AUTO: 27 % (ref 14–44)
MCH RBC QN AUTO: 31.4 PG (ref 26.8–34.3)
MCHC RBC AUTO-ENTMCNC: 32.7 G/DL (ref 31.4–37.4)
MCV RBC AUTO: 96 FL (ref 82–98)
MONOCYTES # BLD AUTO: 0.37 THOUSAND/ÂΜL (ref 0.17–1.22)
MONOCYTES NFR BLD AUTO: 9 % (ref 4–12)
NEUTROPHILS # BLD AUTO: 2.41 THOUSANDS/ÂΜL (ref 1.85–7.62)
NEUTS SEG NFR BLD AUTO: 61 % (ref 43–75)
NITRITE UR QL STRIP: POSITIVE
NON-SQ EPI CELLS URNS QL MICRO: ABNORMAL /HPF
NRBC BLD AUTO-RTO: 0 /100 WBCS
PH UR STRIP.AUTO: 5 [PH]
PLATELET # BLD AUTO: 168 THOUSANDS/UL (ref 149–390)
PMV BLD AUTO: 9.8 FL (ref 8.9–12.7)
POTASSIUM SERPL-SCNC: 3.9 MMOL/L (ref 3.5–5.3)
PROT SERPL-MCNC: 7 G/DL (ref 6.4–8.4)
PROT UR STRIP-MCNC: ABNORMAL MG/DL
PROTHROMBIN TIME: 42.8 SECONDS (ref 11.6–14.5)
RBC # BLD AUTO: 4.08 MILLION/UL (ref 3.81–5.12)
RBC #/AREA URNS AUTO: ABNORMAL /HPF
SODIUM SERPL-SCNC: 138 MMOL/L (ref 135–147)
SP GR UR STRIP.AUTO: <=1.005 (ref 1–1.03)
UROBILINOGEN UR QL STRIP.AUTO: 1 E.U./DL
WBC # BLD AUTO: 4.01 THOUSAND/UL (ref 4.31–10.16)
WBC #/AREA URNS AUTO: ABNORMAL /HPF

## 2023-08-13 PROCEDURE — 36415 COLL VENOUS BLD VENIPUNCTURE: CPT | Performed by: EMERGENCY MEDICINE

## 2023-08-13 PROCEDURE — 85730 THROMBOPLASTIN TIME PARTIAL: CPT | Performed by: EMERGENCY MEDICINE

## 2023-08-13 PROCEDURE — 99283 EMERGENCY DEPT VISIT LOW MDM: CPT

## 2023-08-13 PROCEDURE — NC001 PR NO CHARGE: Performed by: EMERGENCY MEDICINE

## 2023-08-13 PROCEDURE — 96365 THER/PROPH/DIAG IV INF INIT: CPT

## 2023-08-13 PROCEDURE — 96372 THER/PROPH/DIAG INJ SC/IM: CPT

## 2023-08-13 PROCEDURE — 99284 EMERGENCY DEPT VISIT MOD MDM: CPT | Performed by: EMERGENCY MEDICINE

## 2023-08-13 PROCEDURE — 85610 PROTHROMBIN TIME: CPT | Performed by: EMERGENCY MEDICINE

## 2023-08-13 PROCEDURE — 85025 COMPLETE CBC W/AUTO DIFF WBC: CPT | Performed by: EMERGENCY MEDICINE

## 2023-08-13 PROCEDURE — 81001 URINALYSIS AUTO W/SCOPE: CPT | Performed by: EMERGENCY MEDICINE

## 2023-08-13 PROCEDURE — 80053 COMPREHEN METABOLIC PANEL: CPT | Performed by: EMERGENCY MEDICINE

## 2023-08-13 RX ORDER — PHYTONADIONE 10 MG/ML
10 INJECTION, EMULSION INTRAMUSCULAR; INTRAVENOUS; SUBCUTANEOUS ONCE
Status: COMPLETED | OUTPATIENT
Start: 2023-08-13 | End: 2023-08-13

## 2023-08-13 RX ORDER — CEFTRIAXONE 1 G/50ML
1000 INJECTION, SOLUTION INTRAVENOUS ONCE
Status: COMPLETED | OUTPATIENT
Start: 2023-08-13 | End: 2023-08-13

## 2023-08-13 RX ORDER — CEPHALEXIN 500 MG/1
500 CAPSULE ORAL EVERY 6 HOURS SCHEDULED
Qty: 28 CAPSULE | Refills: 0 | Status: SHIPPED | OUTPATIENT
Start: 2023-08-13 | End: 2023-08-21

## 2023-08-13 RX ADMIN — CEFTRIAXONE 1000 MG: 1 INJECTION, SOLUTION INTRAVENOUS at 11:43

## 2023-08-13 RX ADMIN — PHYTONADIONE 10 MG: 10 INJECTION, EMULSION INTRAMUSCULAR; INTRAVENOUS; SUBCUTANEOUS at 11:43

## 2023-08-13 NOTE — ED PROVIDER NOTES
History  Chief Complaint   Patient presents with   • Blood in Urine     Blood in urine starting Friday night, also c/o pain in bladder. Pt is on a blood thinner for hx of valve replacement      77-year-old female presents to the ED states she started with blood in her urine starting Friday also complaining of some pain in her suprapubic area she says is not real strong pain but just uncomfortable. Patient is on Coumadin for aortic valve replacement. Her last INR checked last week was 3.7. They did lower her Coumadin level since that time and she has not had it rechecked. States she also has a headache but she states that this always happens when she has a UTI so that is what she feels she has today. History provided by:  Patient   used: No        Prior to Admission Medications   Prescriptions Last Dose Informant Patient Reported? Taking?    Cholecalciferol (VITAMIN D PO)  Self Yes No   Sig: Take by mouth   Multiple Vitamins-Minerals (PRESERVISION AREDS PO)  Self Yes No   Sig: Take 2 tablets by mouth daily     acetaminophen (TYLENOL) 325 mg tablet  Self No No   Sig: Take 2 tablets (650 mg total) by mouth every 6 (six) hours as needed for mild pain or headaches   atorvastatin (LIPITOR) 40 mg tablet   No No   Sig: Take 1 tablet (40 mg total) by mouth daily   estradiol (ESTRACE) 0.1 mg/g vaginal cream   No No   Si.5 grams of cream intravaginally administered daily for one to two weeks, then reduce to twice weekly   glucose blood test strip  Self No No   Sig: Use 1 each in the morning Use one daily   pantoprazole (PROTONIX) 40 mg tablet   No No   Sig: Take 1 tablet (40 mg total) by mouth daily   polyethylene glycol (MIRALAX) 17 g packet  Self No No   Sig: Take 17 g by mouth daily as needed (Constipation)   warfarin (COUMADIN) 2.5 mg tablet  Self No No   Sig: Take 1 tablet (2.5 mg total) by mouth 3 (three) times a week On  and Friday   Patient taking differently: Take 2.5 mg by mouth 3 (three) times a week Added to 5mg dose only on =7.5mg   warfarin (COUMADIN) 5 mg tablet  Self No No   Sig: Take 1 tablet (5 mg total) by mouth 4 (four) times a week On , Tuesday, Thursday, Saturday   Patient taking differently: Take 5 mg by mouth in the morning      Facility-Administered Medications: None       Past Medical History:   Diagnosis Date   • Bloody nose     slow drip, clots in the morning   • Colon polyp    • Diabetes mellitus ([address])     Pre DM diet controlled, metformin DCed    • Heart murmur    • Hyperlipidemia    • Stroke ([address]) 2022       Past Surgical History:   Procedure Laterality Date   • CATARACT EXTRACTION Bilateral    • COLONOSCOPY     • EGD     • HYSTERECTOMY     • MITRAL VALVE REPLACEMENT         Family History   Problem Relation Age of Onset   • Heart failure Mother    • Heart attack Father    • Sleep apnea Brother      I have reviewed and agree with the history as documented. E-Cigarette/Vaping   • E-Cigarette Use Never User      E-Cigarette/Vaping Substances   • Nicotine No    • THC No    • CBD No    • Flavoring No    • Other No    • Unknown No      Social History     Tobacco Use   • Smoking status: Former     Packs/day: 2.00     Years: 30.00     Total pack years: 60.00     Types: Cigarettes     Quit date:      Years since quittin.6   • Smokeless tobacco: Never   Vaping Use   • Vaping Use: Never used   Substance Use Topics   • Alcohol use: Yes     Comment: special occasional   • Drug use: No       Review of Systems   Constitutional: Negative for activity change, chills, diaphoresis and fever. HENT: Negative for congestion, ear pain, nosebleeds, sore throat, trouble swallowing and voice change. Eyes: Negative for pain, discharge and redness. Respiratory: Negative for apnea, cough, choking, shortness of breath, wheezing and stridor. Cardiovascular: Negative for chest pain and palpitations.    Gastrointestinal: Negative for abdominal distention, abdominal pain, constipation, diarrhea, nausea and vomiting. Endocrine: Negative for polydipsia. Genitourinary: Negative for difficulty urinating, dysuria, flank pain, frequency, hematuria and urgency. Musculoskeletal: Negative for back pain, gait problem, joint swelling, myalgias, neck pain and neck stiffness. Skin: Negative for pallor and rash. Neurological: Negative for dizziness, tremors, syncope, speech difficulty, weakness, numbness and headaches. Hematological: Negative for adenopathy. Psychiatric/Behavioral: Negative for confusion, hallucinations, self-injury and suicidal ideas. The patient is not nervous/anxious. Physical Exam  Physical Exam  Vitals and nursing note reviewed. Constitutional:       General: She is not in acute distress. Appearance: She is well-developed. She is not diaphoretic. HENT:      Head: Normocephalic and atraumatic. Right Ear: External ear normal.      Left Ear: External ear normal.      Nose: Nose normal.   Eyes:      Conjunctiva/sclera: Conjunctivae normal.      Pupils: Pupils are equal, round, and reactive to light. Cardiovascular:      Rate and Rhythm: Normal rate and regular rhythm. Heart sounds: Normal heart sounds. Pulmonary:      Effort: Pulmonary effort is normal.      Breath sounds: Normal breath sounds. Abdominal:      General: Bowel sounds are normal.      Palpations: Abdomen is soft. Comments: Urine is visibly the color of blood   Musculoskeletal:         General: Normal range of motion. Cervical back: Normal range of motion and neck supple. Skin:     General: Skin is warm and dry. Neurological:      Mental Status: She is alert and oriented to person, place, and time. Deep Tendon Reflexes: Reflexes are normal and symmetric. Psychiatric:         Behavior: Behavior is cooperative.          Vital Signs  ED Triage Vitals [08/13/23 1018]   Temp Pulse Respirations Blood Pressure SpO2   -- 77 18 (!) 194/87 98 %      Temp src Heart Rate Source Patient Position - Orthostatic VS BP Location FiO2 (%)   -- Monitor Sitting Left arm --      Pain Score       5           Vitals:    08/13/23 1018   BP: (!) 194/87   Pulse: 77   Patient Position - Orthostatic VS: Sitting         Visual Acuity      ED Medications  Medications   phytonadione (AQUA-MEPHYTON) 10 mg/mL SC/IM injection 10 mg (10 mg Subcutaneous Given 8/13/23 1143)   cefTRIAXone (ROCEPHIN) IVPB (premix in dextrose) 1,000 mg 50 mL (1,000 mg Intravenous New Bag 8/13/23 1143)       Diagnostic Studies  Results Reviewed     Procedure Component Value Units Date/Time    Comprehensive metabolic panel [[id number]] Collected: 08/13/23 1046    Lab Status: Final result Specimen: Blood from Arm, Right Updated: 08/13/23 1111     Sodium 138 mmol/L      Potassium 3.9 mmol/L      Chloride 105 mmol/L      CO2 28 mmol/L      ANION GAP 5 mmol/L      BUN 18 mg/dL      Creatinine 0.64 mg/dL      Glucose 129 mg/dL      Calcium 9.2 mg/dL      AST 26 U/L      ALT 21 U/L      Alkaline Phosphatase 88 U/L      Total Protein 7.0 g/dL      Albumin 4.3 g/dL      Total Bilirubin 0.66 mg/dL      eGFR 82 ml/min/1.73sq m     Narrative:      [place] guidelines for Chronic Kidney Disease (CKD):   •  Stage 1 with normal or high GFR (GFR > 90 mL/min/1.73 square meters)  •  Stage 2 Mild CKD (GFR = 60-89 mL/min/1.73 square meters)  •  Stage 3A Moderate CKD (GFR = 45-59 mL/min/1.73 square meters)  •  Stage 3B Moderate CKD (GFR = 30-44 mL/min/1.73 square meters)  •  Stage 4 Severe CKD (GFR = 15-29 mL/min/1.73 square meters)  •  Stage 5 End Stage CKD (GFR <15 mL/min/1.73 square meters)  Note: GFR calculation is accurate only with a steady state creatinine    Urine Microscopic [[id number]]  (Abnormal) Collected: 08/13/23 1048    Lab Status: Final result Specimen: Urine, Clean Catch Updated: 08/13/23 1108     RBC, UA Innumerable /hpf      WBC, UA 4-10 /hpf      Epithelial Cells Occasional /hpf      Bacteria, UA Moderate /hpf     Protime-INR [[id number]]  (Abnormal) Collected: 08/13/23 1046    Lab Status: Final result Specimen: Blood from Arm, Right Updated: 08/13/23 1106     Protime 42.8 seconds      INR 4.52    APTT [[id number]]  (Abnormal) Collected: 08/13/23 1046    Lab Status: Final result Specimen: Blood from Arm, Right Updated: 08/13/23 1106     PTT 44 seconds     UA (URINE) with reflex to Scope [[id number]]  (Abnormal) Collected: 08/13/23 1048    Lab Status: Final result Specimen: Urine, Clean Catch Updated: 08/13/23 1104     Color, UA Red     Clarity, UA Cloudy     Specific Gravity, UA <=1.005     pH, UA 5.0     Leukocytes, UA Trace     Nitrite, UA Positive     Protein,  (2+) mg/dl      Glucose, UA Negative mg/dl      Ketones, UA Negative mg/dl      Urobilinogen, UA 1.0 E.U./dl      Bilirubin, UA Small     Occult Blood, UA Large    CBC and differential [[id number]]  (Abnormal) Collected: 08/13/23 1046    Lab Status: Final result Specimen: Blood from Arm, Right Updated: 08/13/23 1055     WBC 4.01 Thousand/uL      RBC 4.08 Million/uL      Hemoglobin 12.8 g/dL      Hematocrit 39.2 %      MCV 96 fL      MCH 31.4 pg      MCHC 32.7 g/dL      RDW 15.7 %      MPV 9.8 fL      Platelets 437 Thousands/uL      nRBC 0 /100 WBCs      Neutrophils Relative 61 %      Immat GRANS % 0 %      Lymphocytes Relative 27 %      Monocytes Relative 9 %      Eosinophils Relative 2 %      Basophils Relative 1 %      Neutrophils Absolute 2.41 Thousands/µL      Immature Grans Absolute 0.01 Thousand/uL      Lymphocytes Absolute 1.08 Thousands/µL      Monocytes Absolute 0.37 Thousand/µL      Eosinophils Absolute 0.09 Thousand/µL      Basophils Absolute 0.05 Thousands/µL                  No orders to display              Procedures  Procedures         ED Course                                             Medical Decision Making  Patient had an INR 4.5 therefore with the urinary bleeding we gave vitamin K 10 mg subcu. Advised the patient to follow-up with a cardiologist to get back on track with her Coumadin. She states she will call them today. Gave her 1 g of Rocephin IV and will call in Keflex to her pharmacy. Patient is awake and alert really wants to go home at this point she has 2 granddaughters here with her who states they will keep an eye on her I have given strict instructions when to return to the hospital.    Amount and/or Complexity of Data Reviewed  Labs: ordered. Discussion of management or test interpretation with external provider(s): Patient is awake and alert really wants to go home will follow-up with a cardiologist in reference to her Coumadin INR levels I have given instructions to the granddaughters has 2 things to look for for sepsis and anemia. Risk  Prescription drug management. Disposition  Final diagnoses:   Hematuria, unspecified type   UTI (urinary tract infection)     Time reflects when diagnosis was documented in both MDM as applicable and the Disposition within this note     Time User Action Codes Description Comment    8/13/2023 12:31 PM Matias JAVIER Add [R31.9] Hematuria, unspecified type     8/13/2023 12:31 PM Matias JAVIER Add [N39.0] UTI (urinary tract infection)       ED Disposition     ED Disposition   Discharge    Condition   Stable    Date/Time   Sun Aug 13, 2023 12:31 PM    [address] [facility] discharge to home/self care.                Follow-up Information     Follow up With Specialties Details Why Contact Debra Hutchins MD Family Medicine Schedule an appointment as soon as possible for a visit  As needed [address] [place] [address]  [phone]            Patient's Medications   Discharge Prescriptions    CEPHALEXIN (KEFLEX) 500 MG CAPSULE    Take 1 capsule (500 mg total) by mouth every 6 (six) hours for 7 days       Start Date: 8/13/2023 End Date: 8/20/2023       Order Dose: 500 mg       Quantity: 28 capsule    Refills: 0       No discharge procedures on file.     PDMP Review     None          ED Provider  Electronically Signed by           Chente Cates DO  08/13/23 0977

## 2023-08-13 NOTE — Clinical Note
Mala Francisco was seen and treated in our emergency department on 8/13/2023. Diagnosis:     Mani Saha  . She may return on this date: If you have any questions or concerns, please don't hesitate to call.       Leticia Lombard, DO    ______________________________           _______________          _______________  Hospital Representative                              Date                                Time

## 2023-08-14 ENCOUNTER — VBI (OUTPATIENT)
Dept: INTERNAL MEDICINE CLINIC | Facility: CLINIC | Age: 83
End: 2023-08-14

## 2023-08-14 ENCOUNTER — TELEPHONE (OUTPATIENT)
Dept: INTERNAL MEDICINE CLINIC | Facility: CLINIC | Age: 83
End: 2023-08-14

## 2023-08-14 NOTE — TELEPHONE ENCOUNTER
08/14/23 3:35 PM    Patient contacted post ED visit, first outreach attempt made. Message was left for patient to return a call to the VBI Department at [facility]: Phone [phone]. Thank you.   Marlyn Lucero MA  [facility]

## 2023-08-15 NOTE — TELEPHONE ENCOUNTER
08/15/23 2:19 PM    Patient contacted post ED visit, VBI department spoke with patient/caregiver and outreach was successful. Thank you.   Bernice Moore MA  [facility]

## 2023-08-16 ENCOUNTER — APPOINTMENT (OUTPATIENT)
Dept: LAB | Facility: CLINIC | Age: 83
End: 2023-08-16
Payer: MEDICARE

## 2023-08-16 DIAGNOSIS — I48.20 CHRONIC ATRIAL FIBRILLATION (HCC): ICD-10-CM

## 2023-08-16 DIAGNOSIS — Z79.01 LONG TERM (CURRENT) USE OF ANTICOAGULANTS: ICD-10-CM

## 2023-08-16 DIAGNOSIS — Z95.2 HEART VALVE REPLACED BY TRANSPLANT: ICD-10-CM

## 2023-08-16 LAB
INR PPP: 1.51 (ref 0.84–1.19)
PROTHROMBIN TIME: 18.4 SECONDS (ref 11.6–14.5)

## 2023-08-16 PROCEDURE — 85610 PROTHROMBIN TIME: CPT

## 2023-08-16 PROCEDURE — 36415 COLL VENOUS BLD VENIPUNCTURE: CPT

## 2023-08-18 ENCOUNTER — OFFICE VISIT (OUTPATIENT)
Dept: PODIATRY | Facility: CLINIC | Age: 83
End: 2023-08-18
Payer: MEDICARE

## 2023-08-18 VITALS — RESPIRATION RATE: 18 BRPM | HEIGHT: 65 IN | BODY MASS INDEX: 26.49 KG/M2 | WEIGHT: 159 LBS

## 2023-08-18 DIAGNOSIS — B35.1 ONYCHOMYCOSIS: ICD-10-CM

## 2023-08-18 DIAGNOSIS — I87.2 DEEP VENOUS INSUFFICIENCY: ICD-10-CM

## 2023-08-18 DIAGNOSIS — L60.0 INGROWN TOENAIL: ICD-10-CM

## 2023-08-18 DIAGNOSIS — R60.9 CHRONIC EDEMA: Primary | ICD-10-CM

## 2023-08-18 DIAGNOSIS — L03.032 PARONYCHIA OF TOENAIL OF LEFT FOOT: ICD-10-CM

## 2023-08-18 PROCEDURE — 99203 OFFICE O/P NEW LOW 30 MIN: CPT | Performed by: PODIATRIST

## 2023-08-18 RX ORDER — TERBINAFINE HYDROCHLORIDE 250 MG/1
TABLET ORAL
Qty: 15 TABLET | Refills: 0 | Status: SHIPPED | OUTPATIENT
Start: 2023-08-18 | End: 2023-09-18

## 2023-08-18 RX ORDER — KETOCONAZOLE 20 MG/G
CREAM TOPICAL DAILY
Qty: 60 G | Refills: 1 | Status: SHIPPED | OUTPATIENT
Start: 2023-08-18 | End: 2023-09-17

## 2023-08-18 NOTE — PROGRESS NOTES
Assessment/Plan: Chronic edema. Rule out deep venous insufficiency. Ingrown toenail left hallux with secondary paronychia. Mycosis of nail. Pain upon ambulation. Plan. Chart reviewed. Lab work reviewed. Patient advised on condition. Left hallux nail debrided. We will start both oral topical antifungal.  This will be a short course of terbinafine. It will be every other day dosing. We need to rule out deep venous insufficiency as cause of chronic edema. Venous Doppler testing ordered. Patient may need referral to vascular surgery. Diagnoses and all orders for this visit:    Chronic edema  -     VAS reflux lower limb venous duplex study with reflux assessment, complete bilateral; Future    Deep venous insufficiency    Onychomycosis  -     terbinafine (LamISIL) 250 mg tablet; 1 tab p.o. every other day. -     ketoconazole (NIZORAL) 2 % cream; Apply topically daily    Paronychia of toenail of left foot    Ingrown toenail          Subjective: Patient has several complaints. Patient is concerned about swelling of her legs that occurs at the end of the day. She also has pain in her left big toe. No history of occult trauma.     Allergies   Allergen Reactions   • Sulfa Antibiotics Tongue Swelling   • Sulfasalazine Throat Swelling         Current Outpatient Medications:   •  ketoconazole (NIZORAL) 2 % cream, Apply topically daily, Disp: 60 g, Rfl: 1  •  terbinafine (LamISIL) 250 mg tablet, 1 tab p.o. every other day., Disp: 15 tablet, Rfl: 0  •  acetaminophen (TYLENOL) 325 mg tablet, Take 2 tablets (650 mg total) by mouth every 6 (six) hours as needed for mild pain or headaches, Disp: , Rfl: 0  •  atorvastatin (LIPITOR) 40 mg tablet, Take 1 tablet (40 mg total) by mouth daily, Disp: 90 tablet, Rfl: 1  •  cephalexin (KEFLEX) 500 mg capsule, Take 1 capsule (500 mg total) by mouth every 6 (six) hours for 7 days, Disp: 28 capsule, Rfl: 0  •  Cholecalciferol (VITAMIN D PO), Take by mouth, Disp: , Rfl: •  estradiol (ESTRACE) 0.1 mg/g vaginal cream, 0.5 grams of cream intravaginally administered daily for one to two weeks, then reduce to twice weekly, Disp: 42.5 g, Rfl: 3  •  glucose blood test strip, Use 1 each in the morning Use one daily, Disp: 100 strip, Rfl: 2  •  Multiple Vitamins-Minerals (PRESERVISION AREDS PO), Take 2 tablets by mouth daily  , Disp: , Rfl:   •  pantoprazole (PROTONIX) 40 mg tablet, Take 1 tablet (40 mg total) by mouth daily, Disp: 30 tablet, Rfl: 2  •  polyethylene glycol (MIRALAX) 17 g packet, Take 17 g by mouth daily as needed (Constipation), Disp: , Rfl: 0  •  warfarin (COUMADIN) 2.5 mg tablet, Take 1 tablet (2.5 mg total) by mouth 3 (three) times a week On Monday Wednesday and Friday (Patient taking differently: Take 2.5 mg by mouth 3 (three) times a week Added to 5mg dose only on tuesdays=7.5mg), Disp: , Rfl: 0  •  warfarin (COUMADIN) 5 mg tablet, Take 1 tablet (5 mg total) by mouth 4 (four) times a week On Sunday, Tuesday, Thursday, Saturday (Patient taking differently: Take 5 mg by mouth in the morning), Disp: , Rfl: 0    Patient Active Problem List   Diagnosis   • Obstructive sleep apnea   • Chronic atrial fibrillation (HCC)   • H/O mitral valve replacement with mechanical valve   • Long term (current) use of anticoagulants (warfarin)   • Presence of prosthetic heart valve   • Hypercholesteremia   • History of anemia due to CKD   • Allergic rhinitis   • Type 2 diabetes mellitus without complication, without long-term current use of insulin ([facility])   • Iron deficiency anemia   • Other specified hypothyroidism   • Vitamin B12 deficiency   • Other microscopic hematuria   • Celiac disease   • Abdominal aortic aneurysm (AAA) without rupture, unspecified part ([address])   • Ataxia   • Pulmonary emphysema ([address])   • History of renal calculi   • History of cervical cancer   • Essential hypertension   • Other proteinuria   • Hepatic steatosis   • CVA (cerebral vascular accident) ([address])   • Type 2 diabetes mellitus with other specified complication, without long-term current use of insulin (HCC)   • CKD (chronic kidney disease) stage 2, GFR 60-89 ml/min   • Cerebrovascular accident (CVA) ([address])   • Fall   • Anemia   • Acute blood loss anemia   • AVM (arteriovenous malformation) of duodenum, acquired   • Balance problem   • Edema of legs, hereditary          Patient ID: Darnell Quintero is a 80 y.o. female. HPI    The following portions of the patient's history were reviewed and updated as appropriate:     family history includes Heart attack in her father; Heart failure in her mother; Sleep apnea in her brother. reports that she quit smoking about 34 years ago. Her smoking use included cigarettes. She has a 60.00 pack-year smoking history. She has never used smokeless tobacco. She reports current alcohol use. She reports that she does not use drugs. Vitals:    08/18/23 0919   Resp: 18       Review of Systems      Objective:  Patient's shoes and socks removed. Foot Exam    General  General Appearance: appears stated age and healthy   Orientation: alert and oriented to person, place, and time   Affect: appropriate   Gait: antalgic       Right Foot/Ankle     Inspection and Palpation  Tenderness: metatarsals   Swelling: dorsum   Arch: pes planus  Hallux valgus: yes    Neurovascular  Dorsalis pedis: 2+  Posterior tibial: 2+      Left Foot/Ankle      Inspection and Palpation  Tenderness: metatarsals   Swelling: dorsum   Arch: pes planus  Hallux valgus: yes    Neurovascular  Dorsalis pedis: 2+  Posterior tibial: 2+        Physical Exam  Vitals and nursing note reviewed. Constitutional:       Appearance: Normal appearance. Cardiovascular:      Rate and Rhythm: Normal rate and regular rhythm. Pulses:           Dorsalis pedis pulses are 2+ on the right side and 2+ on the left side. Posterior tibial pulses are 2+ on the right side and 2+ on the left side.       Comments: Patient demonstrates significant amount of varicosities and telangiectasia of the lower extremity bilateral.  Musculoskeletal:      Right lower le+ Pitting Edema present. Left lower le+ Pitting Edema present. Right foot: Bunion present. Left foot: Bunion present. Skin:     Capillary Refill: Capillary refill takes less than 2 seconds. Comments: All nails are dystrophic. Left hallux nail demonstrates subungual mycosis. It is ingrown in the fibular aspect. Positive paronychia. Negative pus. Neurological:      Mental Status: She is alert. Psychiatric:         Mood and Affect: Mood normal.         Behavior: Behavior normal.         Thought Content:  Thought content normal.         Judgment: Judgment normal.

## 2023-08-21 ENCOUNTER — OFFICE VISIT (OUTPATIENT)
Dept: INTERNAL MEDICINE CLINIC | Facility: CLINIC | Age: 83
End: 2023-08-21
Payer: MEDICARE

## 2023-08-21 ENCOUNTER — APPOINTMENT (OUTPATIENT)
Dept: LAB | Facility: CLINIC | Age: 83
End: 2023-08-21
Payer: MEDICARE

## 2023-08-21 VITALS
OXYGEN SATURATION: 98 % | DIASTOLIC BLOOD PRESSURE: 80 MMHG | HEART RATE: 82 BPM | BODY MASS INDEX: 26.66 KG/M2 | WEIGHT: 160 LBS | HEIGHT: 65 IN | SYSTOLIC BLOOD PRESSURE: 112 MMHG

## 2023-08-21 DIAGNOSIS — G89.29 CHRONIC PAIN OF LEFT KNEE: Primary | ICD-10-CM

## 2023-08-21 DIAGNOSIS — D50.8 OTHER IRON DEFICIENCY ANEMIA: ICD-10-CM

## 2023-08-21 DIAGNOSIS — M25.562 CHRONIC PAIN OF LEFT KNEE: Primary | ICD-10-CM

## 2023-08-21 DIAGNOSIS — I48.20 CHRONIC ATRIAL FIBRILLATION (HCC): ICD-10-CM

## 2023-08-21 DIAGNOSIS — Z79.01 LONG TERM (CURRENT) USE OF ANTICOAGULANTS: ICD-10-CM

## 2023-08-21 DIAGNOSIS — R60.0 EDEMA OF LEFT LOWER LEG: ICD-10-CM

## 2023-08-21 DIAGNOSIS — N18.2 CKD (CHRONIC KIDNEY DISEASE) STAGE 2, GFR 60-89 ML/MIN: ICD-10-CM

## 2023-08-21 DIAGNOSIS — Z95.2 HEART VALVE REPLACED BY TRANSPLANT: ICD-10-CM

## 2023-08-21 DIAGNOSIS — I83.91 ASYMPTOMATIC VARICOSE VEINS OF RIGHT LOWER EXTREMITY: ICD-10-CM

## 2023-08-21 DIAGNOSIS — Z95.2 PRESENCE OF PROSTHETIC HEART VALVE: ICD-10-CM

## 2023-08-21 DIAGNOSIS — R31.0 GROSS HEMATURIA: ICD-10-CM

## 2023-08-21 PROBLEM — B35.1 ONYCHOMYCOSIS: Status: ACTIVE | Noted: 2023-08-21

## 2023-08-21 LAB
INR PPP: 2.42 (ref 0.84–1.19)
PROTHROMBIN TIME: 26.6 SECONDS (ref 11.6–14.5)

## 2023-08-21 PROCEDURE — 36415 COLL VENOUS BLD VENIPUNCTURE: CPT

## 2023-08-21 PROCEDURE — 99214 OFFICE O/P EST MOD 30 MIN: CPT | Performed by: INTERNAL MEDICINE

## 2023-08-21 PROCEDURE — 85610 PROTHROMBIN TIME: CPT

## 2023-08-21 NOTE — ASSESSMENT & PLAN NOTE
Knee Pain x 4 week, mostly when she walks, also gets pain going upstair and down stair, takes tylenol as need  , Hx of left knee problem 4-5 years go, seen by ortho, told that she may need surgery. No swelling of left knee.     Rec Left Knee xray  Will refer to orthopodic

## 2023-08-21 NOTE — ASSESSMENT & PLAN NOTE
Patient does have a moderately severe varicosity of both lower extremities somewhat more on the left than the right. Patient is being scheduled for the venous Doppler. Recommend elevation. Recommend knee-high stockings or Ace bandage to your preferance    Will refer to the vascular surgeon.   For their opinion

## 2023-08-21 NOTE — ASSESSMENT & PLAN NOTE
Patient was to the emergency room with hematuria. Treated with Keflex. Finishing up Keflex probably 1 more day. No further hematuria. No UTI symptoms. INR has been labile being [place] managed by cardiologist.    UTI resolved.

## 2023-08-21 NOTE — PROGRESS NOTES
Name: Surinder Solis      : 1940      MRN: [id number]  Encounter Provider: Nawaf Smallwood MD  Encounter Date: 2023   Encounter department: [address]     1. Chronic pain of left knee  Assessment & Plan:  Knee Pain x 4 week, mostly when she walks, also gets pain going upstair and down stair, takes tylenol as need  , Hx of left knee problem 4-5 years go, seen by ortho, told that she may need surgery. No swelling of left knee. Rec Left Knee xray  Will refer to orthopodic      Orders:  -     Ambulatory referral to Orthopedic Surgery; Future    2. Edema of left lower leg  -     Ambulatory referral to Vascular Surgery; Future    3. 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 Patient is being scheduled for the venous Doppler. Recommend elevation. Recommend knee-high stockings or Ace bandage to your preferance    Will refer to the vascular surgeon. For their opinion    Orders:  -     Ambulatory referral to Vascular Surgery; Future    4. Gross hematuria  Assessment & Plan:  Patient was to the emergency room with hematuria. Treated with Keflex. Finishing up Keflex probably 1 more day. No further hematuria. No UTI symptoms. INR has been labile being [place] managed by cardiologist.    UTI resolved. 5. CKD (chronic kidney disease) stage 2, GFR 60-89 ml/min    6. Other iron deficiency anemia  Assessment & Plan:  Lab Results   Component Value Date    WBC 4.01 (L) 2023    HGB 12.8 2023    HCT 39.2 2023    MCV 96 2023     2023   Currently on iron tablet 3 times a week. We will continue to monitor received CBC. No active bleeding at this time. 7. Long term (current) use of anticoagulants (warfarin)    8.  Presence of prosthetic heart valve Subjective      This is a call in appointment. 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 Denies any swelling. Also recently had a ER visit due to hematuria hematuria resolved had UTI finishing up Keflex, INR labile being managed by cardiologist.      Plan is that of varicose vein and secondary edema of the left lower extremity mostly at the end of the day usually going on for last few weeks. Venous Doppler is being ordered. Patient is on chronic anticoagulation. His cardiac or cardiac status is stable. No symptoms of LV failure. Edema only at the end of the day. Recently started on the Lamisil by on-call podiatrist.  Side effect of the Lamisil reviewed needs LFT follow-up. Patient's nails are covered at this time unable to do examination. Review of Systems   HENT: Negative. Respiratory: Negative for cough, choking, shortness of breath and wheezing. Cardiovascular: Negative for chest pain and palpitations. Gastrointestinal: Negative for blood in stool, constipation and diarrhea. Genitourinary: Negative for difficulty urinating, flank pain, frequency, hematuria and vaginal discharge. Musculoskeletal: Positive for arthralgias. Neurological: Negative. Hematological: Negative for adenopathy. Does not bruise/bleed easily. Psychiatric/Behavioral: Negative.         Current Outpatient Medications on File Prior to Visit   Medication Sig   • acetaminophen (TYLENOL) 325 mg tablet Take 2 tablets (650 mg total) by mouth every 6 (six) hours as needed for mild pain or headaches   • atorvastatin (LIPITOR) 40 mg tablet Take 1 tablet (40 mg total) by mouth daily   • cephalexin (KEFLEX) 500 mg capsule Take 1 capsule (500 mg total) by mouth every 6 (six) hours for 7 days • Cholecalciferol (VITAMIN D PO) Take by mouth   • estradiol (ESTRACE) 0.1 mg/g vaginal cream 0.5 grams of cream intravaginally administered daily for one to two weeks, then reduce to twice weekly   • glucose blood test strip Use 1 each in the morning Use one daily   • ketoconazole (NIZORAL) 2 % cream Apply topically daily   • Multiple Vitamins-Minerals (PRESERVISION AREDS PO) Take 2 tablets by mouth daily     • pantoprazole (PROTONIX) 40 mg tablet Take 1 tablet (40 mg total) by mouth daily   • polyethylene glycol (MIRALAX) 17 g packet Take 17 g by mouth daily as needed (Constipation)   • terbinafine (LamISIL) 250 mg tablet 1 tab p.o. every other day. • warfarin (COUMADIN) 2.5 mg tablet Take 1 tablet (2.5 mg total) by mouth 3 (three) times a week On Monday Wednesday and Friday (Patient taking differently: Take 2.5 mg by mouth 3 (three) times a week Added to 5mg dose only on tuesdays=7.5mg)   • warfarin (COUMADIN) 5 mg tablet Take 1 tablet (5 mg total) by mouth 4 (four) times a week On Sunday, Tuesday, Thursday, Saturday (Patient taking differently: Take 5 mg by mouth in the morning)       Objective     /80   Pulse 82   Ht 5' 5" (1.651 m)   Wt 72.6 kg (160 lb)   SpO2 98%   BMI 26.63 kg/m²     Physical Exam  Constitutional:       General: She is not in acute distress. Appearance: She is well-developed. She is not ill-appearing or diaphoretic. HENT:      Head: Normocephalic and atraumatic. Right Ear: External ear normal.      Left Ear: External ear normal.   Eyes:      General: No scleral icterus. Right eye: No discharge. Left eye: No discharge. Conjunctiva/sclera: Conjunctivae normal.   Neck:      Thyroid: No thyromegaly. Vascular: No JVD. Cardiovascular:      Rate and Rhythm: Regular rhythm. Heart sounds: Normal heart sounds. Pulmonary:      Effort: No respiratory distress. Breath sounds: Normal breath sounds. No wheezing or rales.    Musculoskeletal: Right knee: Swelling and deformity present. No bony tenderness or crepitus. Decreased range of motion. No tenderness. Normal alignment and normal meniscus. Left knee: Swelling present. No bony tenderness or crepitus. Decreased range of motion. No tenderness. Normal alignment and normal meniscus. Comments: Bilateral moderate varicosities present about lower extremity. No clinical DVT and deep and thrombosis. Possible left Baker's cyst cannot be ruled out. Feet:      Comments: Toenails are covered with the nail polish. Partial debridement of the both large toenails is present. No cellulitis. No bleeding. Lymphadenopathy:      Cervical: No cervical adenopathy. Skin:     Coloration: Skin is not jaundiced or pale.        Venkatesh Clayton MD

## 2023-08-21 NOTE — PATIENT INSTRUCTIONS
Recommend to go for the blood test on the first week of the September as ordered which includes liver blood test.    I will give a referral for the orthopedic doctor for knee x-ray they can do x-ray right in their office. Tylenol 500 mg every 6 hours as needed for the pain. You may want to take pain if you are anticipating prolonged walking. If you are unstable level let us know you should start using cane but you appear stable at this time. Keep your scheduled visit on September 12 as planned. Get the blood test in the first week of September. Follow with Consultants as per their and our suggestion    Follow up in approximately three week or as needed earlier    Follow all instructions as advised and discussed. Take your medications as prescribed. Call the office immediately if you experience any side effects. Ask questions if you do not understand. Keep your scheduled appointment as advised or come sooner if necessary or in doubt. Best time to call for non-urgent matter or questions on weekdays is between 9am and 12 noon. See physician for any new symptoms or worsening of current symptoms. Urgent or emergent situations call 911 and report to nearest emergency room. I spent  time taking care of this patient including clinical care, conseling, collaboration, chart, lab and consultant's follow up note,images report, documentation, pre visit  review as appropriate.     Patient is to get labs 1 week(s) prior to next visit if advised

## 2023-08-21 NOTE — ASSESSMENT & PLAN NOTE
Lab Results   Component Value Date    WBC 4.01 (L) 08/13/2023    HGB 12.8 08/13/2023    HCT 39.2 08/13/2023    MCV 96 08/13/2023     08/13/2023   Currently on iron tablet 3 times a week. We will continue to monitor received CBC. No active bleeding at this time.

## 2023-08-21 NOTE — ASSESSMENT & PLAN NOTE
Patient was seen by podiatrist recently. Their notes were reviewed. Patient now is being started on Lamisil for 3 months for onychomycosis of the big toe on both feet.   Patient does have a nail polish at this time as well as a debridement so extent of the onychomycosis is not very clear to me I.    I did alert her for   need for surveillance of the liver blood test I will order 1 in the first week of September as we have planned before

## 2023-08-22 ENCOUNTER — TELEPHONE (OUTPATIENT)
Dept: ADMINISTRATIVE | Facility: OTHER | Age: 83
End: 2023-08-22

## 2023-08-22 NOTE — TELEPHONE ENCOUNTER
Upon review of the In Basket request and the patient's chart, initial outreach has been made via telephone call to facility. Please see Contacts section for details.   faxing over    Thank you  Patito Alarcon MA

## 2023-08-22 NOTE — TELEPHONE ENCOUNTER
----- Message from Lazaro William sent at 8/21/2023 12:58 PM EDT -----  Regarding: care gap request  08/21/23 12:58 PM    Hello, our patient attached above has had Diabetic Eye Exam completed/performed. Please assist in updating the patient chart by making an External outreach to Dr Kavya Farfan facility located in [place]. The date of service is couple months ago.  Office phone number [phone]      Thank you,  Lazaro William  [address] [place] [address] [address]

## 2023-08-24 NOTE — TELEPHONE ENCOUNTER
Upon review of the In Basket request we were able to locate, review, and update the patient chart as requested for Diabetic Eye Exam.    Any additional questions or concerns should be emailed to the Practice Liaisons via the appropriate education email address, please do not reply via In Basket.     Thank you  Hong Jimenez MA

## 2023-08-25 ENCOUNTER — OFFICE VISIT (OUTPATIENT)
Dept: OBGYN CLINIC | Facility: CLINIC | Age: 83
End: 2023-08-25

## 2023-08-25 ENCOUNTER — APPOINTMENT (OUTPATIENT)
Dept: RADIOLOGY | Facility: CLINIC | Age: 83
End: 2023-08-25
Payer: MEDICARE

## 2023-08-25 VITALS
HEART RATE: 87 BPM | HEIGHT: 65 IN | WEIGHT: 161 LBS | SYSTOLIC BLOOD PRESSURE: 139 MMHG | BODY MASS INDEX: 26.82 KG/M2 | DIASTOLIC BLOOD PRESSURE: 76 MMHG

## 2023-08-25 DIAGNOSIS — M25.562 CHRONIC PAIN OF LEFT KNEE: ICD-10-CM

## 2023-08-25 DIAGNOSIS — I10 ESSENTIAL HYPERTENSION: ICD-10-CM

## 2023-08-25 DIAGNOSIS — G89.29 CHRONIC PAIN OF LEFT KNEE: ICD-10-CM

## 2023-08-25 DIAGNOSIS — E11.9 TYPE 2 DIABETES MELLITUS WITHOUT COMPLICATION, WITHOUT LONG-TERM CURRENT USE OF INSULIN (HCC): ICD-10-CM

## 2023-08-25 DIAGNOSIS — M17.12 PRIMARY OSTEOARTHRITIS OF LEFT KNEE: Primary | ICD-10-CM

## 2023-08-25 DIAGNOSIS — Z01.89 ENCOUNTER FOR LOWER EXTREMITY COMPARISON IMAGING STUDY: ICD-10-CM

## 2023-08-25 DIAGNOSIS — E78.00 HYPERCHOLESTEREMIA: ICD-10-CM

## 2023-08-25 PROCEDURE — 73560 X-RAY EXAM OF KNEE 1 OR 2: CPT

## 2023-08-25 PROCEDURE — 73562 X-RAY EXAM OF KNEE 3: CPT

## 2023-08-25 RX ORDER — ATORVASTATIN CALCIUM 40 MG/1
40 TABLET, FILM COATED ORAL DAILY
Qty: 90 TABLET | Refills: 1 | Status: SHIPPED | OUTPATIENT
Start: 2023-08-25

## 2023-08-25 RX ORDER — TRIAMCINOLONE ACETONIDE 40 MG/ML
80 INJECTION, SUSPENSION INTRA-ARTICULAR; INTRAMUSCULAR
Status: COMPLETED | OUTPATIENT
Start: 2023-08-25 | End: 2023-08-25

## 2023-08-25 RX ORDER — BUPIVACAINE HYDROCHLORIDE 7.5 MG/ML
2 INJECTION, SOLUTION EPIDURAL; RETROBULBAR
Status: COMPLETED | OUTPATIENT
Start: 2023-08-25 | End: 2023-08-25

## 2023-08-25 RX ADMIN — BUPIVACAINE HYDROCHLORIDE 2 ML: 7.5 INJECTION, SOLUTION EPIDURAL; RETROBULBAR at 09:30

## 2023-08-25 RX ADMIN — TRIAMCINOLONE ACETONIDE 80 MG: 40 INJECTION, SUSPENSION INTRA-ARTICULAR; INTRAMUSCULAR at 09:30

## 2023-08-25 NOTE — PROGRESS NOTES
Assessment/Plan:    1. Primary osteoarthritis of left knee  CANCELED: XR knee 3 vw right non injury      2. Chronic pain of left knee  Ambulatory referral to Orthopedic Surgery    CANCELED: XR knee 4+ vw left injury        Janet Lehman is a pleasant 81 yo female who presents today with left knee pain secondary to osteoarthritis. We provided the patient with a left knee CSI in the clinic today. We discussed with the patient that the earliest we could repeat a CSI is in three months. Given that the patient has severe osteoarthritis of the left knee, she may eventually require a left TKA for definitive management of her pain. We will see the patient back in three months for repeat evaluation. Patient agreed with and understood the plan. Large joint arthrocentesis: L knee  Universal Protocol:  Consent: Verbal consent obtained. Risks and benefits: risks, benefits and alternatives were discussed  Consent given by: patient  Patient understanding: patient states understanding of the procedure being performed  Site marked: the operative site was marked  Radiology Images displayed and confirmed. If images not available, report reviewed: imaging studies available  Patient identity confirmed: verbally with patient    Supporting Documentation  Indications: pain   Procedure Details  Location: knee - L knee  Preparation: Patient was prepped and draped in the usual sterile fashion  Needle size: 20 G  Ultrasound guidance: no  Approach: lateral  Medications administered: 2 mL bupivacaine (PF) 0.75 %; 80 mg triamcinolone acetonide 40 mg/mL    Patient tolerance: patient tolerated the procedure well with no immediate complications  Dressing:  Sterile dressing applied            Subjective: 81 yo F here for initial evaluation of left knee pain. Patient ID: Unknown Gosselin is a 80 y.o. female who presents for initial evaluation of left knee pain.  Patient states that the pain began about four years ago, with no inciting traumatic event. She has previously had one left knee CSI a few years ago, which provided her with about 4 months of pain relief. She has never tried PT. She takes Tylenol and uses hemp-based cream, which brings her pain down from an 8/10 to about a 2/10. She is interested in receiving another left knee CSI today. Review of Systems   Constitutional: Positive for activity change. HENT: Negative. Respiratory: Negative. Cardiovascular: Negative. Gastrointestinal: Negative. Musculoskeletal: Positive for arthralgias and joint swelling. Skin: Negative. Neurological: Negative. Psychiatric/Behavioral: Negative. All other systems reviewed and are negative.         Past Medical History:   Diagnosis Date   • Bloody nose     slow drip, clots in the morning   • Colon polyp    • Diabetes mellitus ([address])     Pre DM diet controlled, metformin DCed    • Heart murmur    • Hyperlipidemia    • Stroke ([address]) 2022       Past Surgical History:   Procedure Laterality Date   • CATARACT EXTRACTION Bilateral    • COLONOSCOPY     • EGD     • HYSTERECTOMY     • MITRAL VALVE REPLACEMENT         Family History   Problem Relation Age of Onset   • Heart failure Mother    • Heart attack Father    • Sleep apnea Brother        Social History     Occupational History   • Not on file   Tobacco Use   • Smoking status: Former     Packs/day: 2.00     Years: 30.00     Total pack years: 60.00     Types: Cigarettes     Quit date:      Years since quittin.6   • Smokeless tobacco: Never   Vaping Use   • Vaping Use: Never used   Substance and Sexual Activity   • Alcohol use: Yes     Comment: special occasional   • Drug use: No   • Sexual activity: Not on file         Current Outpatient Medications:   •  acetaminophen (TYLENOL) 325 mg tablet, Take 2 tablets (650 mg total) by mouth every 6 (six) hours as needed for mild pain or headaches, Disp: , Rfl: 0  •  atorvastatin (LIPITOR) 40 mg tablet, Take 1 tablet (40 mg total) by mouth daily, Disp: 90 tablet, Rfl: 1  •  Cholecalciferol (VITAMIN D PO), Take by mouth, Disp: , Rfl:   •  estradiol (ESTRACE) 0.1 mg/g vaginal cream, 0.5 grams of cream intravaginally administered daily for one to two weeks, then reduce to twice weekly, Disp: 42.5 g, Rfl: 3  •  glucose blood test strip, Use 1 each in the morning Use one daily, Disp: 100 strip, Rfl: 2  •  ketoconazole (NIZORAL) 2 % cream, Apply topically daily, Disp: 60 g, Rfl: 1  •  Multiple Vitamins-Minerals (PRESERVISION AREDS PO), Take 2 tablets by mouth daily  , Disp: , Rfl:   •  pantoprazole (PROTONIX) 40 mg tablet, Take 1 tablet (40 mg total) by mouth daily, Disp: 30 tablet, Rfl: 2  •  polyethylene glycol (MIRALAX) 17 g packet, Take 17 g by mouth daily as needed (Constipation), Disp: , Rfl: 0  •  terbinafine (LamISIL) 250 mg tablet, 1 tab p.o. every other day., Disp: 15 tablet, Rfl: 0  •  warfarin (COUMADIN) 2.5 mg tablet, Take 1 tablet (2.5 mg total) by mouth 3 (three) times a week On Monday Wednesday and Friday (Patient taking differently: Take 2.5 mg by mouth 3 (three) times a week Added to 5mg dose only on tuesdays=7.5mg), Disp: , Rfl: 0  •  warfarin (COUMADIN) 5 mg tablet, Take 1 tablet (5 mg total) by mouth 4 (four) times a week On Sunday, Tuesday, Thursday, Saturday (Patient taking differently: Take 5 mg by mouth in the morning), Disp: , Rfl: 0    Allergies   Allergen Reactions   • Sulfa Antibiotics Tongue Swelling   • Sulfasalazine Throat Swelling       Objective:  Vitals:    08/25/23 0933   BP: 139/76   Pulse: 87       Body mass index is 26.79 kg/m². Left Knee Exam     Tenderness   The patient is experiencing tenderness in the medial joint line.     Range of Motion   Extension: 0   Flexion: 110     Tests   Varus: negative Valgus: negative  Lachman:  Anterior - negative    Posterior - negative    Other   Erythema: absent  Scars: absent  Sensation: normal  Pulse: present  Swelling: none  Effusion: no effusion present    Comments: Parapatellar crepitance present  + PF grind          Observations   Left Knee   Negative for effusion. Physical Exam  Vitals and nursing note reviewed. Constitutional:       Appearance: Normal appearance. HENT:      Head: Normocephalic and atraumatic. Right Ear: External ear normal.      Left Ear: External ear normal.   Eyes:      Extraocular Movements: Extraocular movements intact. Conjunctiva/sclera: Conjunctivae normal.   Cardiovascular:      Pulses: Normal pulses. Pulmonary:      Effort: Pulmonary effort is normal.   Musculoskeletal:      Cervical back: Normal range of motion. Left knee: No effusion. Comments: See ortho exam   Skin:     General: Skin is warm and dry. Capillary Refill: Capillary refill takes less than 2 seconds. Neurological:      General: No focal deficit present. Mental Status: She is alert and oriented to person, place, and time. Psychiatric:         Mood and Affect: Mood normal.         Behavior: Behavior normal.         Thought Content: Thought content normal.         I have personally reviewed pertinent films in PACS. XR of left knee taken today demonstrates varus pattern with severe degenerative changes in all three compartments of the left knee. No evidence of fracture or dislocation. This document was created using speech voice recognition software. Grammatical errors, random word insertions, pronoun errors, and incomplete sentences are an occasional consequence of this system due to software limitations, ambient noise, and hardware issues. Any formal questions or concerns about content, text, or information contained within the body of this dictation should be directly addressed to the provider for clarification.

## 2023-09-06 ENCOUNTER — APPOINTMENT (OUTPATIENT)
Dept: LAB | Facility: CLINIC | Age: 83
End: 2023-09-06
Payer: MEDICARE

## 2023-09-06 DIAGNOSIS — Z79.01 LONG TERM (CURRENT) USE OF ANTICOAGULANTS: ICD-10-CM

## 2023-09-06 DIAGNOSIS — Z95.2 HEART VALVE REPLACED BY TRANSPLANT: ICD-10-CM

## 2023-09-06 DIAGNOSIS — I48.20 CHRONIC ATRIAL FIBRILLATION (HCC): ICD-10-CM

## 2023-09-06 LAB
INR PPP: 2.76 (ref 0.84–1.19)
PROTHROMBIN TIME: 29.4 SECONDS (ref 11.6–14.5)

## 2023-09-06 PROCEDURE — 85610 PROTHROMBIN TIME: CPT

## 2023-09-06 PROCEDURE — 36415 COLL VENOUS BLD VENIPUNCTURE: CPT

## 2023-09-18 ENCOUNTER — HOSPITAL ENCOUNTER (OUTPATIENT)
Dept: RADIOLOGY | Facility: HOSPITAL | Age: 83
Discharge: HOME | End: 2023-09-18
Attending: PODIATRIST
Payer: MEDICARE

## 2023-09-18 DIAGNOSIS — R60.9 CHRONIC EDEMA: ICD-10-CM

## 2023-09-18 PROCEDURE — 93970 EXTREMITY STUDY: CPT

## 2023-09-19 ENCOUNTER — APPOINTMENT (OUTPATIENT)
Dept: LAB | Facility: CLINIC | Age: 83
End: 2023-09-19
Payer: MEDICARE

## 2023-09-19 DIAGNOSIS — Z79.01 LONG TERM (CURRENT) USE OF ANTICOAGULANTS: ICD-10-CM

## 2023-09-19 DIAGNOSIS — K76.0 HEPATIC STEATOSIS: ICD-10-CM

## 2023-09-19 DIAGNOSIS — R31.29 OTHER MICROSCOPIC HEMATURIA: ICD-10-CM

## 2023-09-19 DIAGNOSIS — D62 ACUTE BLOOD LOSS ANEMIA: ICD-10-CM

## 2023-09-19 DIAGNOSIS — Z95.2 H/O MITRAL VALVE REPLACEMENT WITH MECHANICAL VALVE: ICD-10-CM

## 2023-09-19 DIAGNOSIS — E11.69 TYPE 2 DIABETES MELLITUS WITH OTHER SPECIFIED COMPLICATION, WITHOUT LONG-TERM CURRENT USE OF INSULIN (HCC): ICD-10-CM

## 2023-09-19 DIAGNOSIS — I10 ESSENTIAL HYPERTENSION: ICD-10-CM

## 2023-09-19 DIAGNOSIS — E78.00 HYPERCHOLESTEREMIA: ICD-10-CM

## 2023-09-19 DIAGNOSIS — K31.819 AVM (ARTERIOVENOUS MALFORMATION) OF DUODENUM, ACQUIRED: ICD-10-CM

## 2023-09-19 DIAGNOSIS — D50.8 OTHER IRON DEFICIENCY ANEMIA: ICD-10-CM

## 2023-09-19 DIAGNOSIS — K90.0 CELIAC DISEASE: ICD-10-CM

## 2023-09-19 DIAGNOSIS — I48.20 CHRONIC ATRIAL FIBRILLATION (HCC): ICD-10-CM

## 2023-09-19 LAB
ALBUMIN SERPL BCP-MCNC: 4.7 G/DL (ref 3.5–5)
ALP SERPL-CCNC: 86 U/L (ref 34–104)
ALT SERPL W P-5'-P-CCNC: 29 U/L (ref 7–52)
ANION GAP SERPL CALCULATED.3IONS-SCNC: 8 MMOL/L
AST SERPL W P-5'-P-CCNC: 31 U/L (ref 13–39)
BASOPHILS # BLD AUTO: 0.04 THOUSANDS/ÂΜL (ref 0–0.1)
BASOPHILS NFR BLD AUTO: 1 % (ref 0–1)
BILIRUB SERPL-MCNC: 0.66 MG/DL (ref 0.2–1)
BUN SERPL-MCNC: 22 MG/DL (ref 5–25)
CALCIUM SERPL-MCNC: 10.2 MG/DL (ref 8.4–10.2)
CHLORIDE SERPL-SCNC: 102 MMOL/L (ref 96–108)
CO2 SERPL-SCNC: 31 MMOL/L (ref 21–32)
CREAT SERPL-MCNC: 0.73 MG/DL (ref 0.6–1.3)
CREAT UR-MCNC: 37.1 MG/DL
EOSINOPHIL # BLD AUTO: 0.1 THOUSAND/ÂΜL (ref 0–0.61)
EOSINOPHIL NFR BLD AUTO: 2 % (ref 0–6)
ERYTHROCYTE [DISTWIDTH] IN BLOOD BY AUTOMATED COUNT: 14.7 % (ref 11.6–15.1)
EST. AVERAGE GLUCOSE BLD GHB EST-MCNC: 148 MG/DL
GFR SERPL CREATININE-BSD FRML MDRD: 76 ML/MIN/1.73SQ M
GLUCOSE P FAST SERPL-MCNC: 141 MG/DL (ref 65–99)
HBA1C MFR BLD: 6.8 %
HCT VFR BLD AUTO: 41.4 % (ref 34.8–46.1)
HGB BLD-MCNC: 13.6 G/DL (ref 11.5–15.4)
IMM GRANULOCYTES # BLD AUTO: 0.03 THOUSAND/UL (ref 0–0.2)
IMM GRANULOCYTES NFR BLD AUTO: 1 % (ref 0–2)
LYMPHOCYTES # BLD AUTO: 1.07 THOUSANDS/ÂΜL (ref 0.6–4.47)
LYMPHOCYTES NFR BLD AUTO: 24 % (ref 14–44)
MCH RBC QN AUTO: 31.9 PG (ref 26.8–34.3)
MCHC RBC AUTO-ENTMCNC: 32.9 G/DL (ref 31.4–37.4)
MCV RBC AUTO: 97 FL (ref 82–98)
MICROALBUMIN UR-MCNC: <7 MG/L
MICROALBUMIN/CREAT 24H UR: <19 MG/G CREATININE (ref 0–30)
MONOCYTES # BLD AUTO: 0.42 THOUSAND/ÂΜL (ref 0.17–1.22)
MONOCYTES NFR BLD AUTO: 10 % (ref 4–12)
NEUTROPHILS # BLD AUTO: 2.72 THOUSANDS/ÂΜL (ref 1.85–7.62)
NEUTS SEG NFR BLD AUTO: 62 % (ref 43–75)
NRBC BLD AUTO-RTO: 0 /100 WBCS
PLATELET # BLD AUTO: 177 THOUSANDS/UL (ref 149–390)
PMV BLD AUTO: 10.9 FL (ref 8.9–12.7)
POTASSIUM SERPL-SCNC: 4.5 MMOL/L (ref 3.5–5.3)
PROT SERPL-MCNC: 7.4 G/DL (ref 6.4–8.4)
RBC # BLD AUTO: 4.26 MILLION/UL (ref 3.81–5.12)
SODIUM SERPL-SCNC: 141 MMOL/L (ref 135–147)
WBC # BLD AUTO: 4.38 THOUSAND/UL (ref 4.31–10.16)

## 2023-09-19 PROCEDURE — 82043 UR ALBUMIN QUANTITATIVE: CPT

## 2023-09-19 PROCEDURE — 93970 EXTREMITY STUDY: CPT | Performed by: SURGERY

## 2023-09-19 PROCEDURE — 83036 HEMOGLOBIN GLYCOSYLATED A1C: CPT

## 2023-09-19 PROCEDURE — 85025 COMPLETE CBC W/AUTO DIFF WBC: CPT

## 2023-09-19 PROCEDURE — 82570 ASSAY OF URINE CREATININE: CPT

## 2023-09-19 PROCEDURE — 36415 COLL VENOUS BLD VENIPUNCTURE: CPT

## 2023-09-19 PROCEDURE — 80053 COMPREHEN METABOLIC PANEL: CPT

## 2023-09-22 ENCOUNTER — OFFICE VISIT (OUTPATIENT)
Dept: INTERNAL MEDICINE CLINIC | Facility: CLINIC | Age: 83
End: 2023-09-22
Payer: MEDICARE

## 2023-09-22 VITALS
HEART RATE: 90 BPM | WEIGHT: 150 LBS | DIASTOLIC BLOOD PRESSURE: 70 MMHG | SYSTOLIC BLOOD PRESSURE: 122 MMHG | OXYGEN SATURATION: 97 % | BODY MASS INDEX: 24.99 KG/M2 | HEIGHT: 65 IN

## 2023-09-22 DIAGNOSIS — I83.93 ASYMPTOMATIC VARICOSE VEINS OF BOTH LOWER EXTREMITIES: ICD-10-CM

## 2023-09-22 DIAGNOSIS — J30.1 ALLERGIC RHINITIS DUE TO POLLEN, UNSPECIFIED SEASONALITY: ICD-10-CM

## 2023-09-22 DIAGNOSIS — E11.9 TYPE 2 DIABETES MELLITUS WITHOUT COMPLICATION, WITHOUT LONG-TERM CURRENT USE OF INSULIN (HCC): Primary | ICD-10-CM

## 2023-09-22 DIAGNOSIS — K76.0 HEPATIC STEATOSIS: ICD-10-CM

## 2023-09-22 DIAGNOSIS — G47.33 OBSTRUCTIVE SLEEP APNEA: ICD-10-CM

## 2023-09-22 DIAGNOSIS — E78.00 HYPERCHOLESTEREMIA: ICD-10-CM

## 2023-09-22 DIAGNOSIS — I63.9 CEREBROVASCULAR ACCIDENT (CVA), UNSPECIFIED MECHANISM (HCC): ICD-10-CM

## 2023-09-22 DIAGNOSIS — J43.9 PULMONARY EMPHYSEMA, UNSPECIFIED EMPHYSEMA TYPE (HCC): ICD-10-CM

## 2023-09-22 DIAGNOSIS — Z79.01 LONG TERM (CURRENT) USE OF ANTICOAGULANTS: ICD-10-CM

## 2023-09-22 DIAGNOSIS — I10 ESSENTIAL HYPERTENSION: ICD-10-CM

## 2023-09-22 DIAGNOSIS — D50.8 OTHER IRON DEFICIENCY ANEMIA: ICD-10-CM

## 2023-09-22 DIAGNOSIS — I48.20 CHRONIC ATRIAL FIBRILLATION (HCC): ICD-10-CM

## 2023-09-22 DIAGNOSIS — Z95.2 PRESENCE OF PROSTHETIC HEART VALVE: ICD-10-CM

## 2023-09-22 DIAGNOSIS — R60.0 EDEMA OF LEFT LOWER LEG: ICD-10-CM

## 2023-09-22 DIAGNOSIS — K90.0 CELIAC DISEASE: ICD-10-CM

## 2023-09-22 DIAGNOSIS — R31.29 OTHER MICROSCOPIC HEMATURIA: ICD-10-CM

## 2023-09-22 DIAGNOSIS — M65.341 TRIGGER RING FINGER OF RIGHT HAND: ICD-10-CM

## 2023-09-22 DIAGNOSIS — K31.819 AVM (ARTERIOVENOUS MALFORMATION) OF DUODENUM, ACQUIRED: ICD-10-CM

## 2023-09-22 DIAGNOSIS — R80.8 OTHER PROTEINURIA: ICD-10-CM

## 2023-09-22 DIAGNOSIS — I71.40 ABDOMINAL AORTIC ANEURYSM (AAA) WITHOUT RUPTURE, UNSPECIFIED PART (HCC): ICD-10-CM

## 2023-09-22 PROCEDURE — 99214 OFFICE O/P EST MOD 30 MIN: CPT | Performed by: INTERNAL MEDICINE

## 2023-09-22 NOTE — ASSESSMENT & PLAN NOTE
Lab Results   Component Value Date    EGFR 76 09/19/2023    EGFR 82 08/13/2023    EGFR 81 05/10/2023    CREATININE 0.73 09/19/2023    CREATININE 0.64 08/13/2023    CREATININE 0.68 05/10/2023   renal function stable

## 2023-09-22 NOTE — ASSESSMENT & PLAN NOTE
Chronic atrial fibrillation is rate controlled,. Anticoagulation being monitored by cardiologist.    Hypertension well controlled and symptom-free. Remote history of CVA and stroke no new findings. Plan will be to continue risk factor management. 11 medications to be continued includes atorvastatin 40 mg daily, warfarin 2.5 mg daily, due to bradycardia history seasonal on beta-blocker anymore.   Heart rate is better    9/19/2023: CBC normal hemoglobin 13.6 hemoglobin A1c 6.8 MP normal except blood sugar 141, GFR 76, urine for micro albumin/creatinine ratio less than 19

## 2023-09-22 NOTE — ASSESSMENT & PLAN NOTE
Edema secondary to multifactorial in part secondary to varicose vein. Please see discussion and varicose vein.   No edema today

## 2023-09-22 NOTE — PATIENT INSTRUCTIONS
Appointment on 09/19/2023   Component Date Value    Creatinine, Ur 09/19/2023 37.1     Albumin,U,Random 09/19/2023 <7.0     Albumin Creat Ratio 09/19/2023 <19     Sodium 09/19/2023 141     Potassium 09/19/2023 4.5     Chloride 09/19/2023 102     CO2 09/19/2023 31     ANION GAP 09/19/2023 8     BUN 09/19/2023 22     Creatinine 09/19/2023 0.73     Glucose, Fasting 09/19/2023 141 (H)     Calcium 09/19/2023 10.2     AST 09/19/2023 31     ALT 09/19/2023 29     Alkaline Phosphatase 09/19/2023 86     Total Protein 09/19/2023 7.4     Albumin 09/19/2023 4.7     Total Bilirubin 09/19/2023 0.66     eGFR 09/19/2023 76     Hemoglobin A1C 09/19/2023 6.8 (H)     EAG 09/19/2023 148     WBC 09/19/2023 4.38     RBC 09/19/2023 4.26     Hemoglobin 09/19/2023 13.6     Hematocrit 09/19/2023 41.4     MCV 09/19/2023 97     MCH 09/19/2023 31.9     MCHC 09/19/2023 32.9     RDW 09/19/2023 14.7     MPV 09/19/2023 10.9     Platelets 09/27/7500 177     nRBC 09/19/2023 0     Neutrophils Relative 09/19/2023 62     Immat GRANS % 09/19/2023 1     Lymphocytes Relative 09/19/2023 24     Monocytes Relative 09/19/2023 10     Eosinophils Relative 09/19/2023 2     Basophils Relative 09/19/2023 1     Neutrophils Absolute 09/19/2023 2.72     Immature Grans Absolute 09/19/2023 0.03     Lymphocytes Absolute 09/19/2023 1.07     Monocytes Absolute 09/19/2023 0.42     Eosinophils Absolute 09/19/2023 0.10     Basophils Absolute 09/19/2023 0.04     - 2 weeks      I am giving you name of the local cardiology group. I would recommend to see either Dr. Mor Mcclellan or Dr. Fried or Dr. Diane Kiran    Follow with Consultants as per their and our suggestion    Follow up in 12 week(s) or as needed earlier    Follow all instructions as advised and discussed. Take your medications as prescribed. Call the office immediately if you experience any side effects. Ask questions if you do not understand.     Keep your scheduled appointment as advised or come sooner if necessary or in doubt. Best time to call for non-urgent matter or questions on weekdays is between 9am and 12 noon. See physician for any new symptoms or worsening of current symptoms. Urgent or emergent situations call 911 and report to nearest emergency room. I spent  time taking care of this patient including clinical care, conseling, collaboration, chart, lab and consultant's follow up note,images report, documentation, pre visit  review as appropriate.     Patient is to get labs 1 week(s) prior to next visit if advised

## 2023-09-22 NOTE — ASSESSMENT & PLAN NOTE
Chronic atrial fibrillation is rate controlled,. Anticoagulation being monitored by cardiologist.    Hypertension well controlled and symptom-free. Remote history of CVA and stroke no new findings. Plan will be to continue risk factor management. 11 medications to be continued includes atorvastatin 40 mg daily, warfarin 2.5 mg daily, due to bradycardia history seasonal on beta-blocker anymore.   Heart rate is better    9/19/2023: CBC normal hemoglobin 13.6 hemoglobin A1c 6.8 MP normal except blood sugar 141, GFR 76, urine for micro albumin/creatinine ratio less than 19    Per cardiologsit

## 2023-09-22 NOTE — ASSESSMENT & PLAN NOTE
Pain has improved. No further active bleeding at this time. Will monitor CBC in 3 months last endoscopy from 2/22/2023 were reviewed    No GI problems. Patient is no longer taking pantoprazole.   Will monitor

## 2023-09-22 NOTE — PROGRESS NOTES
Name: Jaison Tobias      : 1940      MRN: [id number]  Encounter Provider: Shreya Mojica MD  Encounter Date: 2023   Encounter department: [facility] INTERNAL MEDICINE    Assessment & Plan     1. Type 2 diabetes mellitus without complication, without long-term current use of insulin (HCC)  Assessment & Plan:    Lab Results   Component Value Date    HGBA1C 6.8 (H) 2023   For microalbumin/creatinine ratio for the first time no further protein    2023: CBC normal hemoglobin 13.6 hemoglobin A1c 6.8 MP normal except blood sugar 141, GFR 76, urine for micro albumin/creatinine ratio less than 19    Orders:  -     Comprehensive metabolic panel; Future; Expected date: 2023  -     Hemoglobin A1C; Future  -     Albumin / creatinine urine ratio    2. Trigger ring finger of right hand  Assessment & Plan:  No blood. Mild DJD of PIP is present. Mechanism reviewed. Recommend to do home exercise. If remains symptomatic may need to refer to the hand surgeon for steroid injection and or corrective surgery as a last resort. Meanwhile symptoms are mild. Monitor      3. 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  It does show incompetent vein as outlined in the full body of report. This study was done in 2023 I gave them the copy I explained them the mechanism of the vein incompetence, edema it could cause as well as treatment options. She has difficulty with the stockings. She will continue to do elevation as well as apply Ace bandages as tolerated as well as she remains on Coumadin so. There is nothing to worry about from the terms of blood clot anyway. Patient has appointment with vascular surgeon. I explained to them that usually surgical correction is the last 2 weeks.       4. Hepatic steatosis  Assessment & Plan:  Lab Results   Component Value Date    ALT 29 09/19/2023    AST 31 09/19/2023    ALKPHOS 86 09/19/2023    BILITOT 0.3 11/09/2015         Orders:  -     Comprehensive metabolic panel; Future; Expected date: 12/22/2023    5. Celiac disease  Assessment & Plan:  Remains pm partial gluten free diet      No sx free    Continue diet to the tolerance    Orders:  -     Comprehensive metabolic panel; Future; Expected date: 12/22/2023  -     CBC; Future; Expected date: 12/22/2023    6. AVM (arteriovenous malformation) of duodenum, acquired  Assessment & Plan:  Pain has improved. No further active bleeding at this time. Will monitor CBC in 3 months last endoscopy from 2/22/2023 were reviewed    No GI problems. Patient is no longer taking pantoprazole. Will monitor      7. Allergic rhinitis due to pollen, unspecified seasonality  Assessment & Plan:  Rhinitis fair. Not requiring any medications. No major allergy symptoms      8. Pulmonary emphysema, unspecified emphysema type ([address])  Assessment & Plan:  Emphysema is pretty good. Not requiring any inhaler. Breathing is fair. 9. Obstructive sleep apnea  Assessment & Plan:  Remains on CPAP tolerating without side effect. 10. Chronic atrial fibrillation ([address])  Assessment & Plan:  Chronic atrial fibrillation is rate controlled,. Anticoagulation being monitored by cardiologist.    Hypertension well controlled and symptom-free. Remote history of CVA and stroke no new findings. Plan will be to continue risk factor management. 11 medications to be continued includes atorvastatin 40 mg daily, warfarin 2.5 mg daily, due to bradycardia history seasonal on beta-blocker anymore. Heart rate is better    9/19/2023: CBC normal hemoglobin 13.6 hemoglobin A1c 6.8 MP normal except blood sugar 141, GFR 76, urine for micro albumin/creatinine ratio less than 19    Orders:  -     TSH, 3rd generation; Future; Expected date: 12/22/2023  -     Comprehensive metabolic panel;  Future; Expected date: 12/22/2023  - CBC; Future; Expected date: 12/22/2023  -     Ambulatory Referral to Cardiology; Future    11. Cerebrovascular accident (CVA), unspecified mechanism ([address])  Assessment & Plan:  Chronic atrial fibrillation is rate controlled,. Anticoagulation being monitored by cardiologist.    Hypertension well controlled and symptom-free. Remote history of CVA and stroke no new findings. Plan will be to continue risk factor management. 11 medications to be continued includes atorvastatin 40 mg daily, warfarin 2.5 mg daily, due to bradycardia history seasonal on beta-blocker anymore. Heart rate is better    9/19/2023: CBC normal hemoglobin 13.6 hemoglobin A1c 6.8 MP normal except blood sugar 141, GFR 76, urine for micro albumin/creatinine ratio less than 19      12. Essential hypertension  Assessment & Plan:  Chronic atrial fibrillation is rate controlled,. Anticoagulation being monitored by cardiologist.    Hypertension well controlled and symptom-free. Remote history of CVA and stroke no new findings. Plan will be to continue risk factor management. 11 medications to be continued includes atorvastatin 40 mg daily, warfarin 2.5 mg daily, due to bradycardia history seasonal on beta-blocker anymore. Heart rate is better    9/19/2023: CBC normal hemoglobin 13.6 hemoglobin A1c 6.8 MP normal except blood sugar 141, GFR 76, urine for micro albumin/creatinine ratio less than 19    Orders:  -     TSH, 3rd generation; Future; Expected date: 12/22/2023  -     Comprehensive metabolic panel; Future; Expected date: 12/22/2023  -     Albumin / creatinine urine ratio  -     CBC; Future; Expected date: 12/22/2023  -     Ambulatory Referral to Cardiology; Future    13. Abdominal aortic aneurysm (AAA) without rupture, unspecified part [facility])  Assessment & Plan:  Chronic atrial fibrillation is rate controlled,. Anticoagulation being monitored by cardiologist.    Hypertension well controlled and symptom-free.   Remote history of CVA and stroke no new findings. Plan will be to continue risk factor management. 11 medications to be continued includes atorvastatin 40 mg daily, warfarin 2.5 mg daily, due to bradycardia history seasonal on beta-blocker anymore. Heart rate is better    9/19/2023: CBC normal hemoglobin 13.6 hemoglobin A1c 6.8 MP normal except blood sugar 141, GFR 76, urine for micro albumin/creatinine ratio less than 19    Per cardiologsit          14. Other microscopic hematuria  -     CBC; Future; Expected date: 12/22/2023    15. Presence of prosthetic heart valve  -     Ambulatory Referral to Cardiology; Future    16. Long term (current) use of anticoagulants (warfarin)  -     Ambulatory Referral to Cardiology; Future    17. Hypercholesteremia  Assessment & Plan:  Lab Results   Component Value Date    LDLCALC 81 07/05/2023     Lab Results   Component Value Date    ALT 29 09/19/2023    ALT 34 11/09/2015     Lab Results   Component Value Date    CHOLESTEROL 168 07/05/2023    CHOLESTEROL 140 05/10/2023    CHOLESTEROL 130 03/13/2023     Lab Results   Component Value Date    HDL 56 07/05/2023    HDL 56 05/10/2023    HDL 56 03/13/2023     Lab Results   Component Value Date    TRIG 157 (H) 07/05/2023    TRIG 107 05/10/2023    TRIG 129 03/13/2023     Lab Results   Component Value Date    [address] 112 07/05/2023    [address] 84 05/10/2023    [address] 74 03/13/2023   The target. Continue atorvastatin 40 mg daily      Orders:  -     Comprehensive metabolic panel; Future; Expected date: 12/22/2023  -     Lipid panel; Future    18. Other iron deficiency anemia  Assessment & Plan:  Treatment came back to 30.6. No active bleeding. Orders:  -     CBC; Future; Expected date: 12/22/2023    19. Other proteinuria  Assessment & Plan: For microalbumin/creatinine ratio less than 19. Continue risk factor management    Orders:  -     Albumin / creatinine urine ratio    20.  Edema of left lower leg  Assessment & Plan:  Edema secondary to multifactorial in part secondary to varicose vein. Please see discussion and varicose vein. No edema today    Orders:  -     Comprehensive metabolic panel; Future; Expected date: 12/22/2023  -     Albumin / creatinine urine ratio  -     CBC; Future; Expected date: 12/22/2023         Daughter. Etiology of fatigue is unclear. We will check TSH next visit. Continue to follow closely. Recommend to take multivitamin and vitamin B12 OTC. Recommendation will monitor the trend for CBC  Subjective             Disease management. Offers no complaint except new complaint of trigger finger as outlined in the for discussion. Educated. All treatment options were reviewed. Also next problem wise patient was evaluated by podiatrist for the edema of the legs and patient does have incompetent vein as outlined  The full body of the report of the venous Doppler. Recommend elevation as well as ACE inhibitor as tolerated. Patient has appointment with cardiologist.    COPD fair, allergic rhinitis reasonable,    Hypertension symptom-free. Abdominal aortic aneurysm stable last study was done last year follow-up in next year also recommended. Atrial fibrillation well controlled anticoagulation being monitored by cardiologist.  Bradycardia fair. No longer on beta-blocker. No further hematuria. DJD multiple joint reasonable. Controlled. -. No polydipsia polyuria. No hypoglycemic symptoms. Not on any medications. TSH last week was normal        Anemia resolved. Chronic atrial fibrillation is rate controlled,. Anticoagulation being monitored by cardiologist.    Hypertension well controlled and symptom-free. Remote history of CVA and stroke no new findings. Plan will be to continue risk factor management. 11 medications to be continued includes atorvastatin 40 mg daily, warfarin 2.5 mg daily, due to bradycardia history seasonal on beta-blocker anymore.   Heart rate is better    9/19/2023: CBC normal hemoglobin 13.6 hemoglobin A1c 6.8 MP normal except blood sugar 141, GFR 76, urine for micro albumin/creatinine ratio less than 19    Review of Systems   Constitutional: Positive for fatigue. Negative for chills and fever. HENT: Negative for ear pain, sore throat and trouble swallowing. Eyes: Negative for pain and visual disturbance. Respiratory: Negative for cough and shortness of breath. Cardiovascular: Negative for chest pain and palpitations. Gastrointestinal: Negative for abdominal pain, constipation, diarrhea and vomiting. Endocrine: Negative for polydipsia. Genitourinary: Negative for difficulty urinating, dysuria and hematuria. Musculoskeletal: Negative for arthralgias, back pain and myalgias. Skin: Negative for color change and rash. Neurological: Negative for dizziness, seizures, syncope, light-headedness and headaches. Psychiatric/Behavioral: Negative for hallucinations and sleep disturbance. The patient is not nervous/anxious. All other systems reviewed and are negative.       Past Medical History:   Diagnosis Date   • Bloody nose     slow drip, clots in the morning   • Colon polyp    • Diabetes mellitus ([address])     Pre DM diet controlled, metformin DCed 2023   • Heart murmur    • Hyperlipidemia    • Stroke [facility]) 03/11/2022     Past Surgical History:   Procedure Laterality Date   • CATARACT EXTRACTION Bilateral    • COLONOSCOPY     • EGD     • HYSTERECTOMY     • MITRAL VALVE REPLACEMENT  1994     Family History   Problem Relation Age of Onset   • Heart failure Mother    • Heart attack Father    • Sleep apnea Brother      Social History     Socioeconomic History   • Marital status:      Spouse name: None   • Number of children: None   • Years of education: None   • Highest education level: None   Occupational History   • None   Tobacco Use   • Smoking status: Former     Packs/day: 2.00     Years: 30.00     Total pack years: 60.00     Types: Cigarettes     Quit date: 1989     Years since quittin.7   • Smokeless tobacco: Never   Vaping Use   • Vaping Use: Never used   Substance and Sexual Activity   • Alcohol use: Yes     Comment: special occasional   • Drug use: No   • Sexual activity: None   Other Topics Concern   • None   Social History Narrative   • None     Social Determinants of Health     Financial Resource Strain: Low Risk  (2023)    Overall Financial Resource Strain (CARDIA)    • Difficulty of Paying Living Expenses: Not hard at all   Food Insecurity: No Food Insecurity (2023)    Hunger Vital Sign    • Worried About Running Out of Food in the Last Year: Never true    • Ran Out of Food in the Last Year: Never true   Transportation Needs: No Transportation Needs (2023)    PRAPARE - Transportation    • Lack of Transportation (Medical): No    • Lack of Transportation (Non-Medical):  No   Physical Activity: Not on file   Stress: Not on file   Social Connections: Not on file   Intimate Partner Violence: Not on file   Housing Stability: Low Risk  (2023)    Housing Stability Vital Sign    • Unable to Pay for Housing in the Last Year: No    • Number of Places Lived in the Last Year: 1    • Unstable Housing in the Last Year: No     Current Outpatient Medications on File Prior to Visit   Medication Sig   • acetaminophen (TYLENOL) 325 mg tablet Take 2 tablets (650 mg total) by mouth every 6 (six) hours as needed for mild pain or headaches   • atorvastatin (LIPITOR) 40 mg tablet Take 1 tablet (40 mg total) by mouth daily   • Cholecalciferol (VITAMIN D PO) Take by mouth   • estradiol (ESTRACE) 0.1 mg/g vaginal cream 0.5 grams of cream intravaginally administered daily for one to two weeks, then reduce to twice weekly   • glucose blood test strip Use 1 each in the morning Use one daily   • ketoconazole (NIZORAL) 2 % cream Apply topically daily   • Multiple Vitamins-Minerals (PRESERVISION AREDS PO) Take 2 tablets by mouth daily     • warfarin (COUMADIN) 2.5 mg tablet Take 1 tablet (2.5 mg total) by mouth 3 (three) times a week On Monday Wednesday and Friday (Patient taking differently: Take 2.5 mg by mouth 3 (three) times a week Added to 5mg dose only on tuesdays=7.5mg)   • warfarin (COUMADIN) 5 mg tablet Take 1 tablet (5 mg total) by mouth 4 (four) times a week On Sunday, Tuesday, Thursday, Saturday (Patient taking differently: Take 5 mg by mouth in the morning)   • [DISCONTINUED] pantoprazole (PROTONIX) 40 mg tablet Take 1 tablet (40 mg total) by mouth daily (Patient not taking: Reported on 9/22/2023)   • [DISCONTINUED] polyethylene glycol (MIRALAX) 17 g packet Take 17 g by mouth daily as needed (Constipation) (Patient not taking: Reported on 9/22/2023)     Allergies   Allergen Reactions   • Sulfa Antibiotics Tongue Swelling   • Sulfasalazine Throat Swelling       There is no immunization history on file for this patient. Objective     /70   Pulse 90   Ht 5' 5" (1.651 m)   Wt 68 kg (150 lb)   SpO2 97%   BMI 24.96 kg/m²     Physical Exam  Vitals and nursing note reviewed. Constitutional:       Appearance: Normal appearance. She is well-developed. She is not ill-appearing. HENT:      Head: Normocephalic and atraumatic. Right Ear: External ear normal.      Left Ear: External ear normal.   Eyes:      General:         Right eye: No discharge. Left eye: No discharge. Conjunctiva/sclera: Conjunctivae normal.   Neck:      Thyroid: No thyromegaly. Vascular: No carotid bruit or JVD. Cardiovascular:      Rate and Rhythm: Regular rhythm. Heart sounds: Murmur heard. Pulmonary:      Effort: Pulmonary effort is normal. No respiratory distress. Breath sounds: Normal breath sounds. Abdominal:      General: Bowel sounds are normal.      Palpations: There is no mass. Hernia: No hernia is present. Musculoskeletal:      Cervical back: No rigidity or tenderness. Lymphadenopathy:      Cervical: No cervical adenopathy.    Skin:     Coloration: Skin is not jaundiced or pale. Findings: No bruising or rash. Neurological:      General: No focal deficit present. Mental Status: She is alert and oriented to person, place, and time. Motor: No weakness. Gait: Gait normal.   Psychiatric:         Mood and Affect: Mood normal.         Behavior: Behavior normal.         Thought Content:  Thought content normal.         Judgment: Judgment normal.       Sybil Soliman MD

## 2023-09-22 NOTE — ASSESSMENT & PLAN NOTE
Lab Results   Component Value Date    ALT 29 09/19/2023    AST 31 09/19/2023    ALKPHOS 86 09/19/2023    BILITOT 0.3 11/09/2015

## 2023-09-22 NOTE — ASSESSMENT & PLAN NOTE
No blood. Mild DJD of PIP is present. Mechanism reviewed. Recommend to do home exercise. If remains symptomatic may need to refer to the hand surgeon for steroid injection and or corrective surgery as a last resort. Meanwhile symptoms are mild.   Monitor

## 2023-09-22 NOTE — ASSESSMENT & PLAN NOTE
Lab Results   Component Value Date    HGBA1C 6.8 (H) 09/19/2023   For microalbumin/creatinine ratio for the first time no further protein    9/19/2023: CBC normal hemoglobin 13.6 hemoglobin A1c 6.8 MP normal except blood sugar 141, GFR 76, urine for micro albumin/creatinine ratio less than 19

## 2023-09-22 NOTE — ASSESSMENT & PLAN NOTE
Lab Results   Component Value Date    LDLCALC 81 07/05/2023     Lab Results   Component Value Date    ALT 29 09/19/2023    ALT 34 11/09/2015     Lab Results   Component Value Date    CHOLESTEROL 168 07/05/2023    CHOLESTEROL 140 05/10/2023    CHOLESTEROL 130 03/13/2023     Lab Results   Component Value Date    HDL 56 07/05/2023    HDL 56 05/10/2023    HDL 56 03/13/2023     Lab Results   Component Value Date    TRIG 157 (H) 07/05/2023    TRIG 107 05/10/2023    TRIG 129 03/13/2023     Lab Results   Component Value Date    [address] 112 07/05/2023    [address] 84 05/10/2023    [address] 74 03/13/2023   The target.   Continue atorvastatin 40 mg daily

## 2023-09-22 NOTE — ASSESSMENT & PLAN NOTE
Patient underwent venous Doppler of both lower extremity by podiatrist.  It does show incompetent vein as outlined in the full body of report. This study was done in September 2023 I gave them the copy I explained them the mechanism of the vein incompetence, edema it could cause as well as treatment options. She has difficulty with the stockings. She will continue to do elevation as well as apply Ace bandages as tolerated as well as she remains on Coumadin so. There is nothing to worry about from the terms of blood clot anyway. Patient has appointment with vascular surgeon. I explained to them that usually surgical correction is the last 2 weeks.

## 2023-10-12 ENCOUNTER — OFFICE VISIT (OUTPATIENT)
Dept: INTERNAL MEDICINE CLINIC | Facility: CLINIC | Age: 83
End: 2023-10-12
Payer: MEDICARE

## 2023-10-12 ENCOUNTER — CONSULT (OUTPATIENT)
Dept: VASCULAR SURGERY | Facility: CLINIC | Age: 83
End: 2023-10-12
Payer: MEDICARE

## 2023-10-12 ENCOUNTER — TELEPHONE (OUTPATIENT)
Age: 83
End: 2023-10-12

## 2023-10-12 VITALS
DIASTOLIC BLOOD PRESSURE: 82 MMHG | BODY MASS INDEX: 27.16 KG/M2 | WEIGHT: 163 LBS | SYSTOLIC BLOOD PRESSURE: 142 MMHG | HEIGHT: 65 IN | HEART RATE: 85 BPM

## 2023-10-12 VITALS
DIASTOLIC BLOOD PRESSURE: 82 MMHG | SYSTOLIC BLOOD PRESSURE: 128 MMHG | HEART RATE: 61 BPM | HEIGHT: 65 IN | WEIGHT: 150 LBS | BODY MASS INDEX: 24.99 KG/M2 | OXYGEN SATURATION: 96 %

## 2023-10-12 DIAGNOSIS — R60.0 EDEMA OF LEFT LOWER LEG: ICD-10-CM

## 2023-10-12 DIAGNOSIS — I48.20 CHRONIC ATRIAL FIBRILLATION (HCC): ICD-10-CM

## 2023-10-12 DIAGNOSIS — R31.29 OTHER MICROSCOPIC HEMATURIA: ICD-10-CM

## 2023-10-12 DIAGNOSIS — I83.91 ASYMPTOMATIC VARICOSE VEINS OF RIGHT LOWER EXTREMITY: ICD-10-CM

## 2023-10-12 DIAGNOSIS — N30.00 ACUTE CYSTITIS WITHOUT HEMATURIA: Primary | ICD-10-CM

## 2023-10-12 DIAGNOSIS — I87.2 CHRONIC VENOUS INSUFFICIENCY OF LOWER EXTREMITY: Primary | ICD-10-CM

## 2023-10-12 DIAGNOSIS — Z87.442 HISTORY OF RENAL CALCULI: ICD-10-CM

## 2023-10-12 PROCEDURE — 99213 OFFICE O/P EST LOW 20 MIN: CPT | Performed by: INTERNAL MEDICINE

## 2023-10-12 PROCEDURE — 99203 OFFICE O/P NEW LOW 30 MIN: CPT | Performed by: SURGERY

## 2023-10-12 RX ORDER — CEPHALEXIN 500 MG/1
500 CAPSULE ORAL EVERY 12 HOURS SCHEDULED
Qty: 10 CAPSULE | Refills: 0 | Status: SHIPPED | OUTPATIENT
Start: 2023-10-12 | End: 2023-10-17

## 2023-10-12 NOTE — TELEPHONE ENCOUNTER
Symptoms started 3 days ago. Feels like there is not much coming out when she urinates and having some pain when she urinates. Patient requesting medication to be sent to [address] [place] [address] [id number]. Patient stated if she needs to come in for a urine she will. (No appts were available for the providers) Candi Ruben is requesting a call back.

## 2023-10-12 NOTE — PROGRESS NOTES
Name: Catha Krabbe      : 1940      MRN: [id number]  Encounter Provider: Ian Bennett MD  Encounter Date: 10/12/2023   Encounter department: [address]     1. Acute cystitis without hematuria  Assessment & Plan:  Keflex 500 mg 1 tablet twice a day for 5 days. Watch her temperature. If you have blood in the urine go to the emergency room. Go for pro time and INR tomorrow call me tomorrow afternoon for the result is there is sometimes drug interaction with antibiotic and Coumadin. I am giving you name of the urologist see at your convenience    Orders:  -     cephalexin (KEFLEX) 500 mg capsule; Take 1 capsule (500 mg total) by mouth every 12 (twelve) hours for 5 days  -     Ambulatory referral to Urology; Future    2. Chronic atrial fibrillation (HCC)    3. History of renal calculi    4. Other microscopic hematuria           Subjective             Urinary frequency, burning  little bit, no hematuria, no fever no back pain, no nausea or vomiting. On coumadin  Hx of recurrent UTI,  Pt used to see Dr Mark JOHNSON        Review of Systems   Constitutional:  Negative for chills, fatigue and fever. HENT:  Negative for ear pain, sore throat and trouble swallowing. Eyes:  Negative for pain and visual disturbance. Respiratory:  Negative for cough and shortness of breath. Cardiovascular:  Negative for chest pain and palpitations. Gastrointestinal:  Negative for abdominal pain, constipation, diarrhea and vomiting. Genitourinary:  Positive for difficulty urinating and urgency. Negative for dysuria, flank pain, frequency, hematuria, pelvic pain and vaginal bleeding. Musculoskeletal:  Negative for arthralgias and back pain. Skin:  Negative for color change and rash. Neurological:  Negative for dizziness, seizures, syncope and headaches. Psychiatric/Behavioral:  Negative for agitation, confusion and hallucinations.     All other systems reviewed and are negative. Past Medical History:   Diagnosis Date   • Bloody nose     slow drip, clots in the morning   • Colon polyp    • Diabetes mellitus ([address])     Pre DM diet controlled, metformin DCed    • Heart murmur    • Hyperlipidemia    • Stroke ([address]) 2022     Past Surgical History:   Procedure Laterality Date   • CATARACT EXTRACTION Bilateral    • COLONOSCOPY     • EGD     • HYSTERECTOMY     • MITRAL VALVE REPLACEMENT       Family History   Problem Relation Age of Onset   • Heart failure Mother    • Heart attack Father    • Sleep apnea Brother      Social History     Socioeconomic History   • Marital status:      Spouse name: None   • Number of children: None   • Years of education: None   • Highest education level: None   Occupational History   • None   Tobacco Use   • Smoking status: Former     Packs/day: 2.00     Years: 30.00     Total pack years: 60.00     Types: Cigarettes     Quit date:      Years since quittin.8   • Smokeless tobacco: Never   Vaping Use   • Vaping Use: Never used   Substance and Sexual Activity   • Alcohol use: Yes     Comment: special occasional   • Drug use: No   • Sexual activity: None   Other Topics Concern   • None   Social History Narrative   • None     Social Determinants of Health     Financial Resource Strain: Low Risk  (2023)    Overall Financial Resource Strain (CARDIA)    • Difficulty of Paying Living Expenses: Not hard at all   Food Insecurity: No Food Insecurity (2023)    Hunger Vital Sign    • Worried About Running Out of Food in the Last Year: Never true    • Ran Out of Food in the Last Year: Never true   Transportation Needs: No Transportation Needs (2023)    PRAPARE - Transportation    • Lack of Transportation (Medical): No    • Lack of Transportation (Non-Medical):  No   Physical Activity: Not on file   Stress: Not on file   Social Connections: Not on file   Intimate Partner Violence: Not on file Housing Stability: Low Risk  (1/11/2023)    Housing Stability Vital Sign    • Unable to Pay for Housing in the Last Year: No    • Number of Places Lived in the Last Year: 1    • Unstable Housing in the Last Year: No     Current Outpatient Medications on File Prior to Visit   Medication Sig   • acetaminophen (TYLENOL) 325 mg tablet Take 2 tablets (650 mg total) by mouth every 6 (six) hours as needed for mild pain or headaches   • atorvastatin (LIPITOR) 40 mg tablet Take 1 tablet (40 mg total) by mouth daily   • Cholecalciferol (VITAMIN D PO) Take by mouth   • estradiol (ESTRACE) 0.1 mg/g vaginal cream 0.5 grams of cream intravaginally administered daily for one to two weeks, then reduce to twice weekly   • glucose blood test strip Use 1 each in the morning Use one daily   • ketoconazole (NIZORAL) 2 % cream Apply topically daily   • Multiple Vitamins-Minerals (PRESERVISION AREDS PO) Take 2 tablets by mouth daily     • warfarin (COUMADIN) 2.5 mg tablet Take 1 tablet (2.5 mg total) by mouth 3 (three) times a week On Monday Wednesday and Friday (Patient taking differently: Take 2.5 mg by mouth 3 (three) times a week Added to 5mg dose only on tuesdays=7.5mg)   • warfarin (COUMADIN) 5 mg tablet Take 1 tablet (5 mg total) by mouth 4 (four) times a week On Sunday, Tuesday, Thursday, Saturday (Patient taking differently: Take 5 mg by mouth in the morning)     Allergies   Allergen Reactions   • Sulfa Antibiotics Tongue Swelling   • Sulfasalazine Throat Swelling       There is no immunization history on file for this patient. Objective     /82   Pulse 61   Ht 5' 5" (1.651 m)   Wt 68 kg (150 lb)   SpO2 96%   BMI 24.96 kg/m²     Physical Exam  Constitutional:       General: She is not in acute distress. Appearance: She is well-developed. She is not ill-appearing or diaphoretic. HENT:      Head: Normocephalic and atraumatic.       Right Ear: External ear normal.      Left Ear: External ear normal.   Eyes: General: No scleral icterus. Right eye: No discharge. Left eye: No discharge. Conjunctiva/sclera: Conjunctivae normal.   Neck:      Thyroid: No thyromegaly. Vascular: No JVD. Cardiovascular:      Rate and Rhythm: Regular rhythm. Heart sounds: Normal heart sounds. Pulmonary:      Effort: No respiratory distress. Breath sounds: Normal breath sounds. No wheezing or rales. Abdominal:      Tenderness: There is no abdominal tenderness. There is no right CVA tenderness or left CVA tenderness. Lymphadenopathy:      Cervical: No cervical adenopathy. Skin:     Coloration: Skin is not jaundiced or pale.        Adilia Armstrong MD

## 2023-10-12 NOTE — ASSESSMENT & PLAN NOTE
Keflex 500 mg 1 tablet twice a day for 5 days. Watch her temperature. If you have blood in the urine go to the emergency room. Go for pro time and INR tomorrow call me tomorrow afternoon for the result is there is sometimes drug interaction with antibiotic and Coumadin.     I am giving you name of the urologist see at your convenience

## 2023-10-12 NOTE — PATIENT INSTRUCTIONS
Patient presents with bilateral chronic venous changes as well as multiple superficial varicosities bilateral lower extremity. She does have skin changes especially around the medial malleolar region indicative of chronic venous insufficiency with stasis changes. She otherwise is moderately active, she has minimal swelling but on reflux study does have bilateral femoral vein incompetence. Plan: At this time the knee-high graduated compression stocking 15 to 20 mmHg is being prescribed.

## 2023-10-12 NOTE — PROGRESS NOTES
Assessment/Plan:  Patient presents with bilateral chronic venous changes as well as multiple superficial varicosities bilateral lower extremity. She does have skin changes especially around the medial malleolar region indicative of chronic venous insufficiency with stasis changes. She otherwise is moderately active, she has minimal swelling but on reflux study does have bilateral femoral vein incompetence. Plan: At this time the knee-high graduated compression stocking 15 to 20 mmHg is being prescribed. Problem List Items Addressed This Visit          Cardiovascular and Mediastinum    Chronic venous insufficiency of lower extremity - Primary     Patient presents with bilateral chronic venous changes as well as multiple superficial varicosities bilateral lower extremity. She does have skin changes especially around the medial malleolar region indicative of chronic venous insufficiency with stasis changes. She otherwise is moderately active, she has minimal swelling but on reflux study does have bilateral femoral vein incompetence. Plan: At this time the knee-high graduated compression stocking 15 to 20 mmHg is being prescribed. Relevant Orders    Jobst Stockings       Other    Edema of left lower leg     Other Visit Diagnoses       Asymptomatic varicose veins of right lower extremity                  Subjective:      Patient ID: Catha Krabbe is a 80 y.o. female. New patient, presents to review LEVDR done 9/18 for b/l Varicose Veins L>R. Patient is asymptomatic. Denies wearing compression. Patient denies previous treatment. HPI      The following portions of the patient's history were reviewed and updated as appropriate: allergies, current medications, past family history, past medical history, past social history, past surgical history, and problem list.    Review of Systems   Constitutional: Negative. HENT: Negative. Eyes: Negative. Respiratory: Negative. Cardiovascular: Negative. Gastrointestinal: Negative. Endocrine: Negative. Genitourinary: Negative. Musculoskeletal: Negative. Skin: Negative. Allergic/Immunologic: Negative. Neurological: Negative. Hematological: Negative. Psychiatric/Behavioral: Negative. Objective: There were no vitals taken for this visit. Physical Exam      Oriented x3 no evidence of clinical depression. Eyes:  Sclera non-icteric    Skin: normal without evidence of inflammation    Neck is supple carotid pulses equal bilaterally no bruits heard    Chest lungs clear, heart regular rhythm. Abdomen soft nontender no evidence of pulsatile masses. Pulses are palpable bilateral Femoral  Popliteal, DP and PT., upon standing varicosities noted bilateral lower extremities, ankle flare is notable with stasis changes and deep venous incompetence    Neurological exam intact cranial nerves 2-12 grossly intact no gross motor sensory deficits detected. Imaging viewed and reviewed with Patient    I have reviewed and made appropriate changes to the review of systems input by the medical assistant.     Vitals:    10/12/23 1431   BP: 142/82   BP Location: Left arm   Patient Position: Sitting   Cuff Size: Standard   Pulse: 85   Weight: 73.9 kg (163 lb)   Height: 5' 5" (1.651 m)       Patient Active Problem List   Diagnosis    Obstructive sleep apnea    Chronic atrial fibrillation (HCC)    H/O mitral valve replacement with mechanical valve    Long term (current) use of anticoagulants (warfarin)    Presence of prosthetic heart valve    Hypercholesteremia    History of anemia due to CKD    Allergic rhinitis    Type 2 diabetes mellitus without complication, without long-term current use of insulin (HCC)    Iron deficiency anemia    Other specified hypothyroidism    Vitamin B12 deficiency    Other microscopic hematuria    Celiac disease    Abdominal aortic aneurysm (AAA) without rupture, unspecified part ([address])    Ataxia    Pulmonary emphysema ([address])    History of renal calculi    History of cervical cancer    Essential hypertension    Other proteinuria    Hepatic steatosis    CVA (cerebral vascular accident) ([address])    Type 2 diabetes mellitus with other specified complication, without long-term current use of insulin (HCC)    CKD (chronic kidney disease) stage 2, GFR 60-89 ml/min    Cerebrovascular accident (CVA) ([address])    Fall    Acute cystitis without hematuria    Anemia    Acute blood loss anemia    AVM (arteriovenous malformation) of duodenum, acquired    Balance problem    Edema of left lower leg    Chronic pain of left knee    Onychomycosis    Asymptomatic varicose veins of both lower extremities    Gross hematuria    Trigger ring finger of right hand    Chronic venous insufficiency of lower extremity       Past Surgical History:   Procedure Laterality Date    CATARACT EXTRACTION Bilateral     COLONOSCOPY      EGD      HYSTERECTOMY      MITRAL VALVE REPLACEMENT         Family History   Problem Relation Age of Onset    Heart failure Mother     Heart attack Father     Sleep apnea Brother        Social History     Socioeconomic History    Marital status:      Spouse name: Not on file    Number of children: Not on file    Years of education: Not on file    Highest education level: Not on file   Occupational History    Not on file   Tobacco Use    Smoking status: Former     Packs/day: 2.00     Years: 30.00     Total pack years: 60.00     Types: Cigarettes     Quit date: 46     Years since quittin.8    Smokeless tobacco: Never   Vaping Use    Vaping Use: Never used   Substance and Sexual Activity    Alcohol use: Yes     Comment: special occasional    Drug use: No    Sexual activity: Not on file   Other Topics Concern    Not on file   Social History Narrative    Not on file     Social Determinants of Health     Financial Resource Strain: Low Risk  (2023)    Overall Financial Resource Strain (CARDIA)     Difficulty of Paying Living Expenses: Not hard at all   Food Insecurity: No Food Insecurity (1/11/2023)    Hunger Vital Sign     Worried About Running Out of Food in the Last Year: Never true     Ran Out of Food in the Last Year: Never true   Transportation Needs: No Transportation Needs (6/2/2023)    PRAPARE - Transportation     Lack of Transportation (Medical): No     Lack of Transportation (Non-Medical):  No   Physical Activity: Not on file   Stress: Not on file   Social Connections: Not on file   Intimate Partner Violence: Not on file   Housing Stability: Low Risk  (1/11/2023)    Housing Stability Vital Sign     Unable to Pay for Housing in the Last Year: No     Number of Places Lived in the Last Year: 1     Unstable Housing in the Last Year: No       Allergies   Allergen Reactions    Sulfa Antibiotics Tongue Swelling    Sulfasalazine Throat Swelling         Current Outpatient Medications:     acetaminophen (TYLENOL) 325 mg tablet, Take 2 tablets (650 mg total) by mouth every 6 (six) hours as needed for mild pain or headaches, Disp: , Rfl: 0    atorvastatin (LIPITOR) 40 mg tablet, Take 1 tablet (40 mg total) by mouth daily, Disp: 90 tablet, Rfl: 1    cephalexin (KEFLEX) 500 mg capsule, Take 1 capsule (500 mg total) by mouth every 12 (twelve) hours for 5 days, Disp: 10 capsule, Rfl: 0    Cholecalciferol (VITAMIN D PO), Take by mouth, Disp: , Rfl:     estradiol (ESTRACE) 0.1 mg/g vaginal cream, 0.5 grams of cream intravaginally administered daily for one to two weeks, then reduce to twice weekly, Disp: 42.5 g, Rfl: 3    glucose blood test strip, Use 1 each in the morning Use one daily, Disp: 100 strip, Rfl: 2    ketoconazole (NIZORAL) 2 % cream, Apply topically daily, Disp: 60 g, Rfl: 1    Multiple Vitamins-Minerals (PRESERVISION AREDS PO), Take 2 tablets by mouth daily  , Disp: , Rfl:     warfarin (COUMADIN) 2.5 mg tablet, Take 1 tablet (2.5 mg total) by mouth 3 (three) times a week On Monday Wednesday and Friday (Patient taking differently: Take 2.5 mg by mouth 3 (three) times a week Added to 5mg dose only on tuesdays=7.5mg), Disp: , Rfl: 0    warfarin (COUMADIN) 5 mg tablet, Take 1 tablet (5 mg total) by mouth 4 (four) times a week On Sunday, Tuesday, Thursday, Saturday (Patient taking differently: Take 5 mg by mouth in the morning), Disp: , Rfl: 0

## 2023-10-13 ENCOUNTER — APPOINTMENT (OUTPATIENT)
Dept: LAB | Facility: CLINIC | Age: 83
End: 2023-10-13
Payer: MEDICARE

## 2023-10-17 ENCOUNTER — OFFICE VISIT (OUTPATIENT)
Age: 83
End: 2023-10-17
Payer: MEDICARE

## 2023-10-17 VITALS
HEIGHT: 65 IN | DIASTOLIC BLOOD PRESSURE: 75 MMHG | WEIGHT: 165 LBS | BODY MASS INDEX: 27.49 KG/M2 | HEART RATE: 88 BPM | RESPIRATION RATE: 17 BRPM | SYSTOLIC BLOOD PRESSURE: 149 MMHG

## 2023-10-17 DIAGNOSIS — L03.032 PARONYCHIA OF TOENAIL OF LEFT FOOT: Primary | ICD-10-CM

## 2023-10-17 DIAGNOSIS — B35.1 ONYCHOMYCOSIS: ICD-10-CM

## 2023-10-17 DIAGNOSIS — L60.0 INGROWN TOENAIL: ICD-10-CM

## 2023-10-17 DIAGNOSIS — R60.9 CHRONIC EDEMA: ICD-10-CM

## 2023-10-17 DIAGNOSIS — I87.2 DEEP VENOUS INSUFFICIENCY: ICD-10-CM

## 2023-10-17 PROCEDURE — 99212 OFFICE O/P EST SF 10 MIN: CPT | Performed by: PODIATRIST

## 2023-10-17 NOTE — PROGRESS NOTES
Assessment/Plan: Chronic edema. Rule out deep venous insufficiency. Ingrown toenail left hallux with secondary paronychia. Mycosis of nail. Pain upon ambulation. Plan. Chart reviewed. Lab work reviewed. Patient advised on condition. Left hallux nail debrided. We will start both oral topical antifungal.  This will be a short course of terbinafine. It will be every other day dosing. We need to rule out deep venous insufficiency as cause of chronic edema. Venous Doppler testing ordered. Patient may need referral to vascular surgery. Diagnoses and all orders for this visit:     Chronic edema  -     VAS reflux lower limb venous duplex study with reflux assessment, complete bilateral; Future     Deep venous insufficiency     Onychomycosis     Paronychia of toenail of left foot     Ingrown toenail            Subjective: Patient has several complaints. Patient is concerned about swelling of her legs that occurs at the end of the day. She also has pain in her left big toe. No history of occult trauma.           Allergies   Allergen Reactions    Sulfa Antibiotics Tongue Swelling    Sulfasalazine Throat Swelling            Current Outpatient Medications:     ketoconazole (NIZORAL) 2 % cream, Apply topically daily, Disp: 60 g, Rfl: 1    terbinafine (LamISIL) 250 mg tablet, 1 tab p.o. every other day., Disp: 15 tablet, Rfl: 0    acetaminophen (TYLENOL) 325 mg tablet, Take 2 tablets (650 mg total) by mouth every 6 (six) hours as needed for mild pain or headaches, Disp: , Rfl: 0    atorvastatin (LIPITOR) 40 mg tablet, Take 1 tablet (40 mg total) by mouth daily, Disp: 90 tablet, Rfl: 1    cephalexin (KEFLEX) 500 mg capsule, Take 1 capsule (500 mg total) by mouth every 6 (six) hours for 7 days, Disp: 28 capsule, Rfl: 0    Cholecalciferol (VITAMIN D PO), Take by mouth, Disp: , Rfl:     estradiol (ESTRACE) 0.1 mg/g vaginal cream, 0.5 grams of cream intravaginally administered daily for one to two weeks, then reduce to twice weekly, Disp: 42.5 g, Rfl: 3    glucose blood test strip, Use 1 each in the morning Use one daily, Disp: 100 strip, Rfl: 2    Multiple Vitamins-Minerals (PRESERVISION AREDS PO), Take 2 tablets by mouth daily  , Disp: , Rfl:     pantoprazole (PROTONIX) 40 mg tablet, Take 1 tablet (40 mg total) by mouth daily, Disp: 30 tablet, Rfl: 2    polyethylene glycol (MIRALAX) 17 g packet, Take 17 g by mouth daily as needed (Constipation), Disp: , Rfl: 0    warfarin (COUMADIN) 2.5 mg tablet, Take 1 tablet (2.5 mg total) by mouth 3 (three) times a week On Monday Wednesday and Friday (Patient taking differently: Take 2.5 mg by mouth 3 (three) times a week Added to 5mg dose only on tuesdays=7.5mg), Disp: , Rfl: 0    warfarin (COUMADIN) 5 mg tablet, Take 1 tablet (5 mg total) by mouth 4 (four) times a week On Sunday, Tuesday, Thursday, Saturday (Patient taking differently: Take 5 mg by mouth in the morning), Disp: , Rfl: 0         Patient Active Problem List   Diagnosis    Obstructive sleep apnea    Chronic atrial fibrillation ([address])    H/O mitral valve replacement with mechanical valve    Long term (current) use of anticoagulants (warfarin)    Presence of prosthetic heart valve    Hypercholesteremia    History of anemia due to CKD    Allergic rhinitis    Type 2 diabetes mellitus without complication, without long-term current use of insulin (HCC)    Iron deficiency anemia    Other specified hypothyroidism    Vitamin B12 deficiency    Other microscopic hematuria    Celiac disease    Abdominal aortic aneurysm (AAA) without rupture, unspecified part ([address])    Ataxia    Pulmonary emphysema ([address])    History of renal calculi    History of cervical cancer    Essential hypertension    Other proteinuria    Hepatic steatosis    CVA (cerebral vascular accident) ([address])    Type 2 diabetes mellitus with other specified complication, without long-term current use of insulin (HCC)    CKD (chronic kidney disease) stage 2, GFR 60-89 ml/min    Cerebrovascular accident (CVA) ([address])    Fall    Anemia    Acute blood loss anemia    AVM (arteriovenous malformation) of duodenum, acquired    Balance problem    Edema of legs, hereditary             Patient ID: Ann Marie Webb is a 80 y.o. female. HPI     The following portions of the patient's history were reviewed and updated as appropriate:      family history includes Heart attack in her father; Heart failure in her mother; Sleep apnea in her brother. reports that she quit smoking about 34 years ago. Her smoking use included cigarettes. She has a 60.00 pack-year smoking history. She has never used smokeless tobacco. She reports current alcohol use. She reports that she does not use drugs. Objective:  Patient's shoes and socks removed. Foot Exam     General  General Appearance: appears stated age and healthy   Orientation: alert and oriented to person, place, and time   Affect: appropriate   Gait: antalgic         Right Foot/Ankle      Inspection and Palpation  Tenderness: metatarsals   Swelling: dorsum   Arch: pes planus  Hallux valgus: yes     Neurovascular  Dorsalis pedis: 2+  Posterior tibial: 2+        Left Foot/Ankle       Inspection and Palpation  Tenderness: metatarsals   Swelling: dorsum   Arch: pes planus  Hallux valgus: yes     Neurovascular  Dorsalis pedis: 2+  Posterior tibial: 2+           Physical Exam  Vitals and nursing note reviewed. Constitutional:       Appearance: Normal appearance. Cardiovascular:      Rate and Rhythm: Normal rate and regular rhythm. Pulses:           Dorsalis pedis pulses are 2+ on the right side and 2+ on the left side. Posterior tibial pulses are 2+ on the right side and 2+ on the left side. Comments: Patient demonstrates significant amount of varicosities and telangiectasia of the lower extremity bilateral.  Musculoskeletal:      Right lower le+ Pitting Edema present.       Left lower le+ Pitting Edema present. Right foot: Bunion present. Left foot: Bunion present. Skin:     Capillary Refill: Capillary refill takes less than 2 seconds. Comments: All nails are dystrophic. Left hallux nail demonstrates subungual mycosis. It is ingrown in the fibular aspect. Positive paronychia. Negative pus. Neurological:      Mental Status: She is alert. Psychiatric:         Mood and Affect: Mood normal.         Behavior: Behavior normal.         Thought Content:  Thought content normal.         Judgment: Judgment normal.

## 2023-10-19 ENCOUNTER — TELEPHONE (OUTPATIENT)
Age: 83
End: 2023-10-19

## 2023-10-19 NOTE — TELEPHONE ENCOUNTER
Saw Dr. Madie Jeff on 10/12 for UTI. She took her last pill yesterday and still having urine frequency and tenderness in bladder area. No burning. Should she come in for another apt or another prescription to be prescribed? Please call patient back.   Thank you

## 2023-10-20 ENCOUNTER — APPOINTMENT (OUTPATIENT)
Dept: LAB | Facility: CLINIC | Age: 83
End: 2023-10-20
Payer: MEDICARE

## 2023-10-20 LAB
CREAT UR-MCNC: 34.7 MG/DL
MICROALBUMIN UR-MCNC: 17.8 MG/L
MICROALBUMIN/CREAT 24H UR: 51 MG/G CREATININE (ref 0–30)

## 2023-10-23 DIAGNOSIS — N39.0 URINARY TRACT INFECTION WITHOUT HEMATURIA, SITE UNSPECIFIED: Primary | ICD-10-CM

## 2023-10-23 NOTE — PROGRESS NOTES
Pt called Friday sayin her urine sx are not completely cleared. We offered her an appointment or u/a c and s. Pt did not sound very symptomatic    Friday inadvertently order was not placed. Pt called back today.  We offered appointment for reeval tomorrow  Pt prefers to get urine study done first.   Order placed  Pt to call in 48-72 hours for report

## 2023-10-23 NOTE — TELEPHONE ENCOUNTER
Pt called to get results of her urine culture. I told pt it could take some time for the culture to grow but could you please, look into this for pt. She doesn't want her symptoms to get worse.

## 2023-10-24 ENCOUNTER — APPOINTMENT (OUTPATIENT)
Dept: LAB | Facility: CLINIC | Age: 83
End: 2023-10-24
Payer: MEDICARE

## 2023-10-24 DIAGNOSIS — N39.0 URINARY TRACT INFECTION WITHOUT HEMATURIA, SITE UNSPECIFIED: ICD-10-CM

## 2023-10-24 LAB
AMORPH URATE CRY URNS QL MICRO: ABNORMAL
BACTERIA UR QL AUTO: ABNORMAL /HPF
BILIRUB UR QL STRIP: NEGATIVE
CLARITY UR: ABNORMAL
COLOR UR: ABNORMAL
GLUCOSE UR STRIP-MCNC: NEGATIVE MG/DL
HGB UR QL STRIP.AUTO: NEGATIVE
KETONES UR STRIP-MCNC: NEGATIVE MG/DL
LEUKOCYTE ESTERASE UR QL STRIP: ABNORMAL
NITRITE UR QL STRIP: POSITIVE
NON-SQ EPI CELLS URNS QL MICRO: ABNORMAL /HPF
PH UR STRIP.AUTO: 6.5 [PH]
PROT UR STRIP-MCNC: ABNORMAL MG/DL
RBC #/AREA URNS AUTO: ABNORMAL /HPF
SP GR UR STRIP.AUTO: 1.01 (ref 1–1.03)
UROBILINOGEN UR STRIP-ACNC: <2 MG/DL
WBC #/AREA URNS AUTO: ABNORMAL /HPF

## 2023-10-24 PROCEDURE — 87086 URINE CULTURE/COLONY COUNT: CPT

## 2023-10-24 PROCEDURE — 87186 SC STD MICRODIL/AGAR DIL: CPT

## 2023-10-24 PROCEDURE — 87077 CULTURE AEROBIC IDENTIFY: CPT

## 2023-10-24 PROCEDURE — 81001 URINALYSIS AUTO W/SCOPE: CPT | Performed by: INTERNAL MEDICINE

## 2023-10-25 ENCOUNTER — TELEPHONE (OUTPATIENT)
Age: 83
End: 2023-10-25

## 2023-10-25 NOTE — TELEPHONE ENCOUNTER
Patient had a urine test and culture and viewed the abnormal result of the microscopic urine test on the patient portal. I did let her know that the culture has not come back as of yet, but she would like to speak with someone regarding the abnormal result.

## 2023-10-26 ENCOUNTER — TELEPHONE (OUTPATIENT)
Dept: INTERNAL MEDICINE CLINIC | Facility: CLINIC | Age: 83
End: 2023-10-26

## 2023-10-26 DIAGNOSIS — N30.00 ACUTE CYSTITIS WITHOUT HEMATURIA: Primary | ICD-10-CM

## 2023-10-26 LAB — BACTERIA UR CULT: ABNORMAL

## 2023-10-26 RX ORDER — CEPHALEXIN 500 MG/1
500 CAPSULE ORAL 3 TIMES DAILY
Qty: 21 CAPSULE | Refills: 0 | Status: SHIPPED | OUTPATIENT
Start: 2023-10-26 | End: 2023-11-02

## 2023-10-26 NOTE — TELEPHONE ENCOUNTER
Received call from patient yesterday  Pt remains symptomatic with low level cystitis sx, with disocmofrt, NO Nausea, CVA pain, fever or chills or hematuria. Yesterday urine culture was not back, pt preferred to wait. I again called at lunch urine culture was not back  It just was released 5.22 PM    Pt has greater than 100K E coli sensitive to keflex, and others see report.     Rec kefelx 500 mg three times a dy for one week    Rec follow up PT INR on Monday    Call Monday afternoon for the report    See urologist   Pt notified by manager and again by me

## 2023-10-30 ENCOUNTER — APPOINTMENT (OUTPATIENT)
Dept: LAB | Facility: CLINIC | Age: 83
End: 2023-10-30
Payer: MEDICARE

## 2023-10-30 DIAGNOSIS — Z95.2 H/O MITRAL VALVE REPLACEMENT WITH MECHANICAL VALVE: ICD-10-CM

## 2023-10-30 DIAGNOSIS — I63.9 CEREBROVASCULAR ACCIDENT (CVA), UNSPECIFIED MECHANISM (HCC): ICD-10-CM

## 2023-10-30 DIAGNOSIS — I48.20 CHRONIC ATRIAL FIBRILLATION (HCC): Primary | ICD-10-CM

## 2023-10-30 DIAGNOSIS — I48.20 CHRONIC ATRIAL FIBRILLATION (HCC): ICD-10-CM

## 2023-10-30 LAB
INR PPP: 3.1 (ref 0.84–1.19)
PROTHROMBIN TIME: 31.2 SECONDS (ref 11.6–14.5)

## 2023-10-30 PROCEDURE — 36415 COLL VENOUS BLD VENIPUNCTURE: CPT

## 2023-10-30 PROCEDURE — 85610 PROTHROMBIN TIME: CPT

## 2023-11-27 ENCOUNTER — APPOINTMENT (OUTPATIENT)
Dept: LAB | Facility: CLINIC | Age: 83
End: 2023-11-27
Payer: MEDICARE

## 2023-11-27 DIAGNOSIS — E78.00 HYPERCHOLESTEREMIA: ICD-10-CM

## 2023-11-27 DIAGNOSIS — R60.0 EDEMA OF LEFT LOWER LEG: ICD-10-CM

## 2023-11-27 DIAGNOSIS — R31.29 OTHER MICROSCOPIC HEMATURIA: ICD-10-CM

## 2023-11-27 DIAGNOSIS — I10 ESSENTIAL HYPERTENSION: ICD-10-CM

## 2023-11-27 DIAGNOSIS — I48.20 CHRONIC ATRIAL FIBRILLATION (HCC): ICD-10-CM

## 2023-11-27 DIAGNOSIS — K90.0 CELIAC DISEASE: ICD-10-CM

## 2023-11-27 DIAGNOSIS — D50.8 OTHER IRON DEFICIENCY ANEMIA: ICD-10-CM

## 2023-11-27 DIAGNOSIS — K76.0 HEPATIC STEATOSIS: ICD-10-CM

## 2023-11-27 DIAGNOSIS — E11.9 TYPE 2 DIABETES MELLITUS WITHOUT COMPLICATION, WITHOUT LONG-TERM CURRENT USE OF INSULIN (HCC): ICD-10-CM

## 2023-11-27 LAB
ALBUMIN SERPL BCP-MCNC: 4.5 G/DL (ref 3.5–5)
ALP SERPL-CCNC: 82 U/L (ref 34–104)
ALT SERPL W P-5'-P-CCNC: 25 U/L (ref 7–52)
ANION GAP SERPL CALCULATED.3IONS-SCNC: 8 MMOL/L
AST SERPL W P-5'-P-CCNC: 31 U/L (ref 13–39)
BILIRUB SERPL-MCNC: 0.72 MG/DL (ref 0.2–1)
BUN SERPL-MCNC: 15 MG/DL (ref 5–25)
CALCIUM SERPL-MCNC: 9.1 MG/DL (ref 8.4–10.2)
CHLORIDE SERPL-SCNC: 102 MMOL/L (ref 96–108)
CHOLEST SERPL-MCNC: 163 MG/DL
CO2 SERPL-SCNC: 30 MMOL/L (ref 21–32)
CREAT SERPL-MCNC: 0.53 MG/DL (ref 0.6–1.3)
ERYTHROCYTE [DISTWIDTH] IN BLOOD BY AUTOMATED COUNT: 15.6 % (ref 11.6–15.1)
EST. AVERAGE GLUCOSE BLD GHB EST-MCNC: 137 MG/DL
GFR SERPL CREATININE-BSD FRML MDRD: 88 ML/MIN/1.73SQ M
GLUCOSE P FAST SERPL-MCNC: 122 MG/DL (ref 65–99)
HBA1C MFR BLD: 6.4 %
HCT VFR BLD AUTO: 39.8 % (ref 34.8–46.1)
HDLC SERPL-MCNC: 51 MG/DL
HGB BLD-MCNC: 13.3 G/DL (ref 11.5–15.4)
LDLC SERPL CALC-MCNC: 84 MG/DL (ref 0–100)
MCH RBC QN AUTO: 32.2 PG (ref 26.8–34.3)
MCHC RBC AUTO-ENTMCNC: 33.4 G/DL (ref 31.4–37.4)
MCV RBC AUTO: 96 FL (ref 82–98)
NONHDLC SERPL-MCNC: 112 MG/DL
PLATELET # BLD AUTO: 201 THOUSANDS/UL (ref 149–390)
PMV BLD AUTO: 10.7 FL (ref 8.9–12.7)
POTASSIUM SERPL-SCNC: 4 MMOL/L (ref 3.5–5.3)
PROT SERPL-MCNC: 7.3 G/DL (ref 6.4–8.4)
RBC # BLD AUTO: 4.13 MILLION/UL (ref 3.81–5.12)
SODIUM SERPL-SCNC: 140 MMOL/L (ref 135–147)
TRIGL SERPL-MCNC: 141 MG/DL
TSH SERPL DL<=0.05 MIU/L-ACNC: 1.95 UIU/ML (ref 0.45–4.5)
WBC # BLD AUTO: 3.62 THOUSAND/UL (ref 4.31–10.16)

## 2023-11-27 PROCEDURE — 36415 COLL VENOUS BLD VENIPUNCTURE: CPT

## 2023-11-27 PROCEDURE — 83036 HEMOGLOBIN GLYCOSYLATED A1C: CPT

## 2023-11-27 PROCEDURE — 80061 LIPID PANEL: CPT

## 2023-11-27 PROCEDURE — 84443 ASSAY THYROID STIM HORMONE: CPT

## 2023-11-27 PROCEDURE — 85027 COMPLETE CBC AUTOMATED: CPT

## 2023-11-27 PROCEDURE — 80053 COMPREHEN METABOLIC PANEL: CPT

## 2023-12-11 ENCOUNTER — OFFICE VISIT (OUTPATIENT)
Dept: INTERNAL MEDICINE CLINIC | Facility: CLINIC | Age: 83
End: 2023-12-11
Payer: MEDICARE

## 2023-12-11 ENCOUNTER — APPOINTMENT (OUTPATIENT)
Dept: LAB | Facility: CLINIC | Age: 83
End: 2023-12-11
Payer: MEDICARE

## 2023-12-11 ENCOUNTER — TRANSCRIBE ORDERS (OUTPATIENT)
Dept: LAB | Facility: CLINIC | Age: 83
End: 2023-12-11

## 2023-12-11 VITALS
OXYGEN SATURATION: 96 % | SYSTOLIC BLOOD PRESSURE: 144 MMHG | BODY MASS INDEX: 27.32 KG/M2 | HEART RATE: 84 BPM | HEIGHT: 65 IN | DIASTOLIC BLOOD PRESSURE: 90 MMHG | WEIGHT: 164 LBS

## 2023-12-11 DIAGNOSIS — R31.29 OTHER MICROSCOPIC HEMATURIA: ICD-10-CM

## 2023-12-11 DIAGNOSIS — E78.00 HYPERCHOLESTEREMIA: ICD-10-CM

## 2023-12-11 DIAGNOSIS — K31.819 AVM (ARTERIOVENOUS MALFORMATION) OF DUODENUM, ACQUIRED: ICD-10-CM

## 2023-12-11 DIAGNOSIS — N30.00 ACUTE CYSTITIS WITHOUT HEMATURIA: ICD-10-CM

## 2023-12-11 DIAGNOSIS — I83.93 ASYMPTOMATIC VARICOSE VEINS OF BOTH LOWER EXTREMITIES: ICD-10-CM

## 2023-12-11 DIAGNOSIS — E03.8 OTHER SPECIFIED HYPOTHYROIDISM: ICD-10-CM

## 2023-12-11 DIAGNOSIS — E53.8 VITAMIN B12 DEFICIENCY: ICD-10-CM

## 2023-12-11 DIAGNOSIS — Z95.2 H/O MITRAL VALVE REPLACEMENT WITH MECHANICAL VALVE: ICD-10-CM

## 2023-12-11 DIAGNOSIS — D62 ACUTE BLOOD LOSS ANEMIA: ICD-10-CM

## 2023-12-11 DIAGNOSIS — Z95.2 PRESENCE OF PROSTHETIC HEART VALVE: ICD-10-CM

## 2023-12-11 DIAGNOSIS — J43.9 PULMONARY EMPHYSEMA, UNSPECIFIED EMPHYSEMA TYPE (HCC): ICD-10-CM

## 2023-12-11 DIAGNOSIS — R26.89 BALANCE PROBLEM: ICD-10-CM

## 2023-12-11 DIAGNOSIS — Z95.2 HEART VALVE REPLACED BY TRANSPLANT: ICD-10-CM

## 2023-12-11 DIAGNOSIS — Z79.01 LONG TERM (CURRENT) USE OF ANTICOAGULANTS: ICD-10-CM

## 2023-12-11 DIAGNOSIS — K76.0 HEPATIC STEATOSIS: ICD-10-CM

## 2023-12-11 DIAGNOSIS — Z95.2 HEART VALVE REPLACED BY TRANSPLANT: Primary | ICD-10-CM

## 2023-12-11 DIAGNOSIS — I48.20 CHRONIC ATRIAL FIBRILLATION (HCC): ICD-10-CM

## 2023-12-11 DIAGNOSIS — K90.0 CELIAC DISEASE: ICD-10-CM

## 2023-12-11 DIAGNOSIS — E11.9 TYPE 2 DIABETES MELLITUS WITHOUT COMPLICATION, WITHOUT LONG-TERM CURRENT USE OF INSULIN (HCC): ICD-10-CM

## 2023-12-11 DIAGNOSIS — R80.8 OTHER PROTEINURIA: ICD-10-CM

## 2023-12-11 DIAGNOSIS — G47.33 OBSTRUCTIVE SLEEP APNEA: ICD-10-CM

## 2023-12-11 DIAGNOSIS — N81.4 UTERINE PROLAPSE: ICD-10-CM

## 2023-12-11 DIAGNOSIS — Z85.41 HISTORY OF CERVICAL CANCER: ICD-10-CM

## 2023-12-11 DIAGNOSIS — I10 ESSENTIAL HYPERTENSION: Primary | ICD-10-CM

## 2023-12-11 LAB
INR PPP: 2.21 (ref 0.84–1.19)
PROTHROMBIN TIME: 24.1 SECONDS (ref 11.6–14.5)

## 2023-12-11 PROCEDURE — 36415 COLL VENOUS BLD VENIPUNCTURE: CPT

## 2023-12-11 PROCEDURE — 85610 PROTHROMBIN TIME: CPT

## 2023-12-11 PROCEDURE — 99214 OFFICE O/P EST MOD 30 MIN: CPT | Performed by: INTERNAL MEDICINE

## 2023-12-11 RX ORDER — LOSARTAN POTASSIUM 25 MG/1
25 TABLET ORAL DAILY
Qty: 30 TABLET | Refills: 1 | Status: SHIPPED | OUTPATIENT
Start: 2023-12-11

## 2023-12-11 NOTE — ASSESSMENT & PLAN NOTE
11/17/2022: Ultrasound:  Interval decrease in size of infrarenal distal abdominal aortic aneurysm which now measures 2.7 x 2.8 cm. This previously measured 3.0 x 3.2 x 3.2 cm. Recommend interval 3 year follow-up to ensure stability. .  Patient is advised to get follow-up ultrasound in 3 years.

## 2023-12-11 NOTE — ASSESSMENT & PLAN NOTE
Lab Results   Component Value Date    LDLCALC 84 11/27/2023     Lab Results   Component Value Date    ALT 25 11/27/2023    ALT 34 11/09/2015     Lab Results   Component Value Date    CHOLESTEROL 163 11/27/2023    CHOLESTEROL 168 07/05/2023    CHOLESTEROL 140 05/10/2023     Lab Results   Component Value Date    HDL 51 11/27/2023    HDL 56 07/05/2023    HDL 56 05/10/2023     Lab Results   Component Value Date    TRIG 141 11/27/2023    TRIG 157 (H) 07/05/2023    TRIG 107 05/10/2023     Lab Results   Component Value Date    [address] 11/27/2023    [address] 07/05/2023    NONHDLC 84 05/10/2023     Continue atorvastatin 40 mg daily low-cholesterol diet LDL to the target

## 2023-12-11 NOTE — ASSESSMENT & PLAN NOTE
Seen by urologist.  Patient does have uterine prolapse. Patient will be seeing gynecologist in near future. Patient did have a history of pessary before but due to recurrent infection and anemia it is out.

## 2023-12-11 NOTE — ASSESSMENT & PLAN NOTE
Blood pressure is suboptimally controlled. Patient used to be on nadolol. We will bring her back in a month. Heart rate is fair.       Will add losarton 25 mg daily    Check cmp in 3 weeks      Follow up in 4 weeks    Continue to follow with cardiologist

## 2023-12-11 NOTE — ASSESSMENT & PLAN NOTE
Chronic atrial fibrillation is rate controlled,. Anticoagulation being monitored by cardiologist.      medications to be continued includes atorvastatin 40 mg daily, warfarin 2.5 mg daily, due to bradycardia history seasonal on beta-blocker anymore.   Heart rate is better

## 2023-12-11 NOTE — ASSESSMENT & PLAN NOTE
CVA with acute left basal ganglia lacunar infarct. Clinically stable at this time. We will continue anticoagulation    Will continue risk factor management and relevant medications including atorvastatin, warfarin.   Patient will continue to follow with cardiologist

## 2023-12-11 NOTE — ASSESSMENT & PLAN NOTE
Lab Results   Component Value Date    HGBA1C 6.4 (H) 11/27/2023     Lab Results   Component Value Date    HGBA1C 6.4 (H) 11/27/2023   For microalbumin/creatinine ratio for the first time no further protein  Relevant medications patient is not diet for diabetes not on any medications. Up-to-date with eye examination. No symptoms of neuropathy. Renal functions fair.

## 2023-12-11 NOTE — ASSESSMENT & PLAN NOTE
Good stable. Improved.   Lab Results   Component Value Date    WBC 3.62 (L) 11/27/2023    HGB 13.3 11/27/2023    HCT 39.8 11/27/2023    MCV 96 11/27/2023     11/27/2023

## 2023-12-11 NOTE — PATIENT INSTRUCTIONS
Last pro time was done on 10/30/2023. Recommend to go for the blood test this week and also follow-up with cardiologist about the report as they are managing. Also consider getting home pro time as you are discussing with your cardiologist.    Continue to use walker. Follow with Consultants as per their and our suggestion    Follow up in 12 week(s) or as needed earlier    Follow all instructions as advised and discussed. Take your medications as prescribed. Call the office immediately if you experience any side effects. Ask questions if you do not understand. Keep your scheduled appointment as advised or come sooner if necessary or in doubt. Best time to call for non-urgent matter or questions on weekdays is between 9am and 12 noon. See physician for any new symptoms or worsening of current symptoms. Urgent or emergent situations call 911 and report to nearest emergency room. I spent  time taking care of this patient including clinical care, conseling, collaboration, chart, lab and consultant's follow up note,images report, documentation, pre visit  review as appropriate. We will start you on losartan 25 mg once a day new blood pressure pill that helps blood pressure as well as also it is a good medicine for people with diabetes. How we will recheck CMP back in 3 weeks. And follow-up back in 1 month. Follow with Consultants as per their and our suggestion    Follow up in one month or as needed earlier    Follow all instructions as advised and discussed. Take your medications as prescribed. Call the office immediately if you experience any side effects. Ask questions if you do not understand. Keep your scheduled appointment as advised or come sooner if necessary or in doubt. Best time to call for non-urgent matter or questions on weekdays is between 9am and 12 noon. See physician for any new symptoms or worsening of current symptoms.     Urgent or emergent situations call 911 and report to nearest emergency room. I spent  time taking care of this patient including clinical care, conseling, collaboration, chart, lab and consultant's follow up note,images report, documentation, pre visit  review as appropriate.     Patient is to get labs 1 week(s) prior to next visit if advised

## 2023-12-11 NOTE — ASSESSMENT & PLAN NOTE
Lab Results   Component Value Date    ALT 25 11/27/2023    AST 31 11/27/2023    ALKPHOS 82 11/27/2023    BILITOT 0.3 11/09/2015

## 2023-12-11 NOTE — PROGRESS NOTES
Name: Teresa Morales      : 1940      MRN: [id number]  Encounter Provider: Chante Martini MD  Encounter Date: 2023   Encounter department: [facility] INTERNAL MEDICINE    Assessment & Plan     1. Essential hypertension  Assessment & Plan:  Blood pressure is suboptimally controlled. Patient used to be on nadolol. We will bring her back in a month. Heart rate is fair. Will add losarton 25 mg daily    Check cmp in 3 weeks      Follow up in 4 weeks    Continue to follow with cardiologist    Orders:  -     losartan (COZAAR) 25 mg tablet; Take 1 tablet (25 mg total) by mouth daily  -     Comprehensive metabolic panel; Future; Expected date: 2024    2. Other microscopic hematuria    3. AVM (arteriovenous malformation) of duodenum, acquired  Assessment & Plan:  AVM stable. 4. Celiac disease  Assessment & Plan:  Remains pm partial gluten free diet      No sx free    Continue diet to the tolerance      5. Hepatic steatosis  Assessment & Plan:  Lab Results   Component Value Date    ALT 25 2023    AST 31 2023    ALKPHOS 82 2023    BILITOT 0.3 2015           6. Other specified hypothyroidism  Assessment & Plan:  Lab Results   Component Value Date    TGF0PSCMNGML 1.949 2023           7. Type 2 diabetes mellitus without complication, without long-term current use of insulin (HCC)  Assessment & Plan:    Lab Results   Component Value Date    HGBA1C 6.4 (H) 2023     Lab Results   Component Value Date    HGBA1C 6.4 (H) 2023   For microalbumin/creatinine ratio for the first time no further protein  Relevant medications patient is not diet for diabetes not on any medications. Up-to-date with eye examination. No symptoms of neuropathy. Renal functions fair. 8. Pulmonary emphysema, unspecified emphysema type ([address])  Assessment & Plan:  Emphysema is pretty good. Not requiring any inhaler. Breathing is fair.       9. Obstructive sleep apnea  Assessment & Plan:  Remains on CPAP tolerating without side effect. 10. Asymptomatic varicose veins of both lower extremities  Assessment & Plan:   vein recommend to continue to use stockings as well as elevation      11. Acute blood loss anemia  Assessment & Plan:  Good stable. Improved. Lab Results   Component Value Date    WBC 3.62 (L) 11/27/2023    HGB 13.3 11/27/2023    HCT 39.8 11/27/2023    MCV 96 11/27/2023     11/27/2023           12. Balance problem    13. H/O mitral valve replacement with mechanical valve    14. History of cervical cancer  Assessment & Plan:  Seen by urologist.  Patient does have uterine prolapse. Patient will be seeing gynecologist in near future. Patient did have a history of pessary before but due to recurrent infection and anemia it is out. 15. Hypercholesteremia  Assessment & Plan:  Lab Results   Component Value Date    LDLCALC 84 11/27/2023     Lab Results   Component Value Date    ALT 25 11/27/2023    ALT 34 11/09/2015     Lab Results   Component Value Date    CHOLESTEROL 163 11/27/2023    CHOLESTEROL 168 07/05/2023    CHOLESTEROL 140 05/10/2023     Lab Results   Component Value Date    HDL 51 11/27/2023    HDL 56 07/05/2023    HDL 56 05/10/2023     Lab Results   Component Value Date    TRIG 141 11/27/2023    TRIG 157 (H) 07/05/2023    TRIG 107 05/10/2023     Lab Results   Component Value Date    NONHDLC 112 11/27/2023    [address] [facility] 112 07/05/2023    NONHDLC 84 05/10/2023     Continue atorvastatin 40 mg daily low-cholesterol diet LDL to the target      16. Presence of prosthetic heart valve    17. Vitamin B12 deficiency  Assessment & Plan:  Recommend over-the-counter vitamin B12 1000 mcg daily. 18. Other proteinuria  Assessment & Plan:  Recently seen by urologist their notes noted as underneath.   F/u 3 months to check on urine and discuss stopping the keflex 250 HS   Keflex 500mg BID x 5 days followed by Keflex 250mg HS for 3 months  Probiotic daily with food  Increase the D-mannose 1gm BID to QID when feels infected. F/u with Dr. Simpson Signs for her prolapse that may be contributing to her infections  Continue using the estradiol cream TIW  Urine for culture from office       19. Acute cystitis without hematuria    20. Uterine prolapse  Assessment & Plan:  Seen by urologist.  Patient does have uterine prolapse. Patient will be seeing gynecologist in near future. Patient did have a history of pessary before but due to recurrent infection and anemia it is out. 21. Chronic atrial fibrillation ([address])  Assessment & Plan:  Chronic atrial fibrillation is rate controlled,. Anticoagulation being monitored by cardiologist.      medications to be continued includes atorvastatin 40 mg daily, warfarin 2.5 mg daily, due to bradycardia history seasonal on beta-blocker anymore. Heart rate is better                   Subjective      Here for follow-up of multiple medical problems. Patient probably bumped her left hand week or so old has a large ecchymosis going away no significant pain or tenderness. Will monitor. She also has a chronic somewhat off-balance feeling due to macular degeneration no fall. Potassium is suboptimal control. Patient used to be on nadolol however due to bradycardia patient is no longer on that. Patient is a diabetic. Will add losartan. Will check CMP. Will start with a small dose may have to titrate upwards. Will follow back in 1 month with blood test in 3 weeks patient is symptom-free from the blood pressure standpoint    Diabetes well-controlled. Hyperlipidemia well-controlled. Anemia stable. Hypothyroidism stable. Blood pressure well-controlled. Atrial fibrillation stable. Status post mitral valve replacement. Abdominal aortic aneurysm that little smaller for follow-up testing to 3 years. Varicose vein reasonable. Trigger finger reasonable. Hematuria stable. Renal stone not a problem. B12 on supplement. Balance twice he is starting to use walker more frequently. Recent lab data reviewed        No visits with results within 2 Week(s) from this visit. Latest known visit with results is:  Appointment on 11/27/2023  TSH 3RD GENERATON         Value: 1.949(uIU/mL)      Dt: 11/27/2023  Sodium                    Value: 140(mmol/L)        Dt: 11/27/2023  Potassium                 Value: 4.0(mmol/L)        Dt: 11/27/2023  Chloride                  Value: 102(mmol/L)        Dt: 11/27/2023  CO2                       Value: 30(mmol/L)         Dt: 11/27/2023  ANION GAP                 Value:  8(mmol/L)          Dt: 11/27/2023  BUN                       Value: 15(mg/dL)          Dt: 11/27/2023  Creatinine                Value: 0.53(mg/dL) (L)    Dt: 11/27/2023  Glucose, Fasting          Value: 122(mg/dL) (H)     Dt: 11/27/2023  Calcium                   Value: 9.1(mg/dL)         Dt: 11/27/2023  AST                       Value: 31(U/L)            Dt: 11/27/2023  ALT                       Value: 25(U/L)            Dt: 11/27/2023  Alkaline Phosphatase      Value: 82(U/L)            Dt: 11/27/2023  Total Protein             Value: 7.3(g/dL)          Dt: 11/27/2023  Albumin                   Value: 4.5(g/dL)          Dt: 11/27/2023  Total Bilirubin           Value: 0.72(mg/dL)        Dt: 11/27/2023  eGFR                      Value: 88(ml/min/1.73sq m) Dt: 11/27/2023  Hemoglobin A1C            Value: 6.4(%) (H)         Dt: 11/27/2023  EAG                       Value: 137(mg/dl)         Dt: 11/27/2023  Cholesterol               Value: 163(mg/dL)         Dt: 11/27/2023  Triglycerides             Value: 141(mg/dL)         Dt: 11/27/2023  HDL, Direct               Value: 51(mg/dL)          Dt: 11/27/2023  LDL Calculated            Value: 84(mg/dL)          Dt: 11/27/2023  Non-HDL-Chol (CHOL-HDL)   Value: 112(mg/dl)         Dt: 11/27/2023  WBC                       Value: 3.62(Thousand/uL) (L) Dt: 11/27/2023  RBC Value: 4.13(Million/uL)   Dt: 11/27/2023  Hemoglobin                Value: 13.3(g/dL)         Dt: 11/27/2023  Hematocrit                Value: 39.8(%)            Dt: 11/27/2023  MCV                       Value: 96(fL)             Dt: 11/27/2023  MCH                       Value: 32.2(pg)           Dt: 11/27/2023  MCHC                      Value: 33.4(g/dL)         Dt: 11/27/2023  RDW                       Value: 15.6(%) (H)        Dt: 11/27/2023  Platelets                 Value: 201(Thousands/uL)  Dt: 11/27/2023  MPV                       Value: 10.7(fL)           Dt: 11/27/2023  ------------ - 2 weeks          Review of Systems   Constitutional:  Negative for appetite change, fatigue, fever and unexpected weight change. HENT:  Negative for congestion, ear pain and sore throat. Eyes:  Negative for pain and redness. Respiratory:  Negative for cough and shortness of breath. Cardiovascular:  Negative for chest pain, palpitations and leg swelling. Gastrointestinal:  Negative for abdominal pain, diarrhea, nausea and vomiting. Endocrine: Negative for cold intolerance, polydipsia and polyuria. Genitourinary:  Negative for dysuria, hematuria, urgency and vaginal pain. Uterine prolapse   Musculoskeletal:  Negative for arthralgias, gait problem and myalgias. Skin:  Negative for rash. Allergic/Immunologic: Negative. Neurological:  Negative for dizziness and headaches. Hematological:  Negative for adenopathy. Bruises/bleeds easily. Psychiatric/Behavioral:  Negative for behavioral problems.         Current Outpatient Medications on File Prior to Visit   Medication Sig   • acetaminophen (TYLENOL) 325 mg tablet Take 2 tablets (650 mg total) by mouth every 6 (six) hours as needed for mild pain or headaches   • atorvastatin (LIPITOR) 40 mg tablet Take 1 tablet (40 mg total) by mouth daily   • Cholecalciferol (VITAMIN D PO) Take by mouth   • estradiol (ESTRACE) 0.1 mg/g vaginal cream 0.5 grams of cream intravaginally administered daily for one to two weeks, then reduce to twice weekly   • glucose blood test strip Use 1 each in the morning Use one daily   • Multiple Vitamins-Minerals (PRESERVISION AREDS PO) Take 2 tablets by mouth daily     • warfarin (COUMADIN) 2.5 mg tablet Take 1 tablet (2.5 mg total) by mouth 3 (three) times a week On Monday Wednesday and Friday (Patient taking differently: Take 2.5 mg by mouth 3 (three) times a week Added to 5mg dose only on tuesdays=7.5mg)   • warfarin (COUMADIN) 5 mg tablet Take 1 tablet (5 mg total) by mouth 4 (four) times a week On Sunday, Tuesday, Thursday, Saturday (Patient taking differently: Take 5 mg by mouth in the morning)   • ketoconazole (NIZORAL) 2 % cream Apply topically daily       Objective     /90   Pulse 84   Ht 5' 5" (1.651 m)   Wt 74.4 kg (164 lb)   SpO2 96%   BMI 27.29 kg/m²     Physical Exam  Constitutional:       General: She is not in acute distress. Appearance: She is well-developed. She is not ill-appearing or diaphoretic. HENT:      Head: Normocephalic and atraumatic. Right Ear: External ear normal.      Left Ear: External ear normal.   Eyes:      General: Lids are normal. No scleral icterus. Right eye: No discharge. Left eye: No discharge. Conjunctiva/sclera: Conjunctivae normal.   Neck:      Thyroid: No thyroid mass or thyromegaly. Vascular: No carotid bruit or JVD. Trachea: Trachea normal.   Cardiovascular:      Rate and Rhythm: Rhythm irregular. Heart sounds: Murmur heard. Pulmonary:      Effort: No respiratory distress. Breath sounds: No wheezing, rhonchi or rales. Abdominal:      Tenderness: There is no abdominal tenderness. There is no guarding or rebound. Hernia: No hernia is present. Musculoskeletal:      Cervical back: No rigidity or tenderness. Right lower leg: No edema. Left lower leg: No edema. Lymphadenopathy:      Cervical: No cervical adenopathy. Skin:     General: Skin is warm. Coloration: Skin is not jaundiced or pale. Findings: Bruising present. No erythema or rash. Neurological:      General: No focal deficit present. Mental Status: She is alert and oriented to person, place, and time. Psychiatric:         Mood and Affect: Mood normal.         Behavior: Behavior normal.         Thought Content:  Thought content normal.         Judgment: Judgment normal.       Nawaf Smallwood MD

## 2023-12-11 NOTE — ASSESSMENT & PLAN NOTE
Recently seen by urologist their notes noted as underneath. F/u 3 months to check on urine and discuss stopping the keflex 250 HS   Keflex 500mg BID x 5 days followed by Keflex 250mg HS for 3 months  Probiotic daily with food  Increase the D-mannose 1gm BID to QID when feels infected.    F/u with Dr. Demarcus Orantes for her prolapse that may be contributing to her infections  Continue using the estradiol cream TIW  Urine for culture from office

## 2023-12-15 NOTE — PATIENT INSTRUCTIONS
Your rib shortness should get better if you continue to have pain in 1-2 weeks please let me know  Please do fall precaution  Do not do any garden work  The drink enough fluid  Of avoid sudden bending  Your hemoglobin is down to 10 g range  Recommend to restart iron pill 1 tablet twice a day  Take prunes or prune juice and MiraLax 17 g every other day for constipation  If there is a constipation let me know  We will do anemia workup with the next visit a for the close monitoring of hemoglobin as well as the to follow-up with CBC, celiac panel, CMP, TSH, urinalysis, stool for occult blood home and LDH as well as ferritin and haptoglobin  Most likely your anemia is multifactorial     The follow-up the your heart rate is slightly on the lower side around 60 check with your cardiologist about that  See if the need to decrease the nadolol or digoxin  Recommend to consider taking losartan for the protein in the urine  Please please ask your daughter to look it up      Follow with Consultants as per their and our suggestion    Follow up in one month or as needed earlier    Follow all instructions as advised and discussed  Take your medications as prescribed  Call the office immediately if you experience any side effects  Ask questions if you do not understand  Keep your scheduled appointment as advised or come sooner if necessary or in doubt  Best time to call for non-urgent matter or questions on weekdays is between 9am and 12 noon  See physician for any new symptoms or worsening of current symptoms  Urgent or emergent situations call 911 and report to nearest emergency room      I spent  50minutes taking care of this patient including clinical care, conseling, collaboration, chart, lab and consultaion review as appropriate    Patient is to get labs 1 week(s) prior to next visit if advised Bette

## 2024-01-08 ENCOUNTER — LAB (OUTPATIENT)
Dept: LAB | Facility: CLINIC | Age: 84
End: 2024-01-08
Payer: MEDICARE

## 2024-01-08 ENCOUNTER — TRANSCRIBE ORDERS (OUTPATIENT)
Dept: LAB | Facility: CLINIC | Age: 84
End: 2024-01-08

## 2024-01-08 DIAGNOSIS — I10 ESSENTIAL HYPERTENSION: ICD-10-CM

## 2024-01-08 DIAGNOSIS — I48.20 CHRONIC ATRIAL FIBRILLATION (HCC): ICD-10-CM

## 2024-01-08 DIAGNOSIS — Z79.01 LONG TERM (CURRENT) USE OF ANTICOAGULANTS: ICD-10-CM

## 2024-01-08 DIAGNOSIS — Z95.2 HEART VALVE REPLACED BY TRANSPLANT: ICD-10-CM

## 2024-01-08 LAB
INR PPP: 2.69 (ref 0.84–1.19)
PROTHROMBIN TIME: 28 SECONDS (ref 11.6–14.5)

## 2024-01-08 PROCEDURE — 85610 PROTHROMBIN TIME: CPT

## 2024-01-08 PROCEDURE — 36415 COLL VENOUS BLD VENIPUNCTURE: CPT

## 2024-01-08 PROCEDURE — 80053 COMPREHEN METABOLIC PANEL: CPT

## 2024-01-09 LAB
ALBUMIN SERPL BCP-MCNC: 4.5 G/DL (ref 3.5–5)
ALP SERPL-CCNC: 87 U/L (ref 34–104)
ALT SERPL W P-5'-P-CCNC: 21 U/L (ref 7–52)
ANION GAP SERPL CALCULATED.3IONS-SCNC: 8 MMOL/L
AST SERPL W P-5'-P-CCNC: 30 U/L (ref 13–39)
BILIRUB SERPL-MCNC: 0.6 MG/DL (ref 0.2–1)
BUN SERPL-MCNC: 17 MG/DL (ref 5–25)
CALCIUM SERPL-MCNC: 9.5 MG/DL (ref 8.4–10.2)
CHLORIDE SERPL-SCNC: 105 MMOL/L (ref 96–108)
CO2 SERPL-SCNC: 28 MMOL/L (ref 21–32)
CREAT SERPL-MCNC: 0.54 MG/DL (ref 0.6–1.3)
GFR SERPL CREATININE-BSD FRML MDRD: 86 ML/MIN/1.73SQ M
GLUCOSE P FAST SERPL-MCNC: 145 MG/DL (ref 65–99)
POTASSIUM SERPL-SCNC: 4.4 MMOL/L (ref 3.5–5.3)
PROT SERPL-MCNC: 7.2 G/DL (ref 6.4–8.4)
SODIUM SERPL-SCNC: 141 MMOL/L (ref 135–147)

## 2024-01-10 ENCOUNTER — OFFICE VISIT (OUTPATIENT)
Dept: INTERNAL MEDICINE CLINIC | Facility: CLINIC | Age: 84
End: 2024-01-10
Payer: MEDICARE

## 2024-01-10 VITALS
TEMPERATURE: 98 F | BODY MASS INDEX: 26.82 KG/M2 | HEART RATE: 84 BPM | HEIGHT: 65 IN | WEIGHT: 161 LBS | OXYGEN SATURATION: 94 % | DIASTOLIC BLOOD PRESSURE: 80 MMHG | SYSTOLIC BLOOD PRESSURE: 154 MMHG

## 2024-01-10 DIAGNOSIS — I71.40 ABDOMINAL AORTIC ANEURYSM (AAA) WITHOUT RUPTURE, UNSPECIFIED PART (HCC): ICD-10-CM

## 2024-01-10 DIAGNOSIS — I48.20 CHRONIC ATRIAL FIBRILLATION (HCC): ICD-10-CM

## 2024-01-10 DIAGNOSIS — I10 ESSENTIAL HYPERTENSION: ICD-10-CM

## 2024-01-10 DIAGNOSIS — E11.69 TYPE 2 DIABETES MELLITUS WITH OTHER SPECIFIED COMPLICATION, WITHOUT LONG-TERM CURRENT USE OF INSULIN (HCC): Primary | ICD-10-CM

## 2024-01-10 DIAGNOSIS — J43.9 PULMONARY EMPHYSEMA, UNSPECIFIED EMPHYSEMA TYPE (HCC): ICD-10-CM

## 2024-01-10 DIAGNOSIS — D50.8 OTHER IRON DEFICIENCY ANEMIA: ICD-10-CM

## 2024-01-10 PROCEDURE — 99214 OFFICE O/P EST MOD 30 MIN: CPT | Performed by: INTERNAL MEDICINE

## 2024-01-10 NOTE — ASSESSMENT & PLAN NOTE
Blood pressure controlled except systolic ranging higher side 154    Agree and continue current management as follows  Restart losartan 25 mg daily low-salt diet and home blood pressure monitoring

## 2024-01-10 NOTE — ASSESSMENT & PLAN NOTE
11/17/2022: Ultrasound:  Interval decrease in size of infrarenal distal abdominal aortic aneurysm which now measures 2.7 x 2.8 cm.  This previously measured 3.0 x 3.2 x 3.2 cm.  Recommend interval 3 year follow-up to ensure stability.    Surveillance ultrasound after 1 year until then continue preventive measures controlling blood pressure LDL

## 2024-01-10 NOTE — ASSESSMENT & PLAN NOTE
For now patient in sinus rhythm rate controlled    Agree and continue management medication as follows    Coumadin managed by cardiology  Follow-up with cardiology

## 2024-01-10 NOTE — PROGRESS NOTES
Dr. Colon's Office Visit Note  01/10/24     Pooja Barron 84 y.o. female MRN: [id number]  : 1940    Assessment:     1. Type 2 diabetes mellitus with other specified complication, without long-term current use of insulin (Formerly [facility])  Assessment & Plan:    Lab Results   Component Value Date    HGBA1C 6.4 (H) 2023   Blood sugar seems to be controlled on the base of A1c continue monitoring blood pressure blood sugar at home hypoglycemia protocol in place yearly dilated eye exam    Agree and continue present management as follows    Metformin 500 mg twice a day and continue monitoring blood sugar at home  Patient was on metformin 500 once a day increase to twice a day  Patient claims monitoring blood sugar at home ranging 150 patient was on metformin 500 once a day will increase to twice a day      Orders:  -     metFORMIN (GLUCOPHAGE) 500 mg tablet; Take 1 tablet (500 mg total) by mouth 2 (two) times a day with meals    2. Pulmonary emphysema, unspecified emphysema type (Formerly [facility])  Assessment & Plan:  Patient quit smoking more than 30 years ago for now no difficulty breathing no wheezing no need for intervention stable      3. Abdominal aortic aneurysm (AAA) without rupture, unspecified part (Formerly [facility])  Assessment & Plan:  2022: Ultrasound:  Interval decrease in size of infrarenal distal abdominal aortic aneurysm which now measures 2.7 x 2.8 cm.  This previously measured 3.0 x 3.2 x 3.2 cm.  Recommend interval 3 year follow-up to ensure stability.    Surveillance ultrasound after 1 year until then continue preventive measures controlling blood pressure LDL      4. Chronic atrial fibrillation (Formerly [facility])  Assessment & Plan:  For now patient in sinus rhythm rate controlled    Agree and continue management medication as follows    Coumadin managed by cardiology  Follow-up with cardiology      5. Other iron deficiency anemia  Assessment & Plan:  Lab Results   Component Value Date    WBC 3.62 (L) 2023    HGB 13.3  11/27/2023    HCT 39.8 11/27/2023    MCV 96 11/27/2023     11/27/2023   Hemoglobin hematocrit normal the iron deficiency anemia has resolved      6. Essential hypertension  Assessment & Plan:  Blood pressure controlled except systolic ranging higher side 154    Agree and continue current management as follows  Restart losartan 25 mg daily low-salt diet and home blood pressure monitoring            Discussion Summary and Plan:  Today's care plan and medications were reviewed with patient in detail and all their questions answered to their satisfaction.    Chief Complaint   Patient presents with   • Follow-up     Wants the metformin refilled has not taken it in a while. Not on medication list.      Subjective:  Patient came in follow-up chronic medical condition listed under visit diagnosis denies any chest pain difficulty breathing still have trouble ambulating using walker patient monitoring blood sugar at home seems to be in the range of 150 before the hospitalization with stroke patient was on metformin which was discontinued we will restart and monitor closely for now no chest pain no difficulty breathing no new neurological symptoms of weakness slurred speech or dizziness        The following portions of the patient's history were reviewed and updated as appropriate: allergies, current medications, past family history, past medical history, past social history, past surgical history and problem list.    Review of Systems   Constitutional:  Positive for activity change. Negative for appetite change, chills, diaphoresis, fatigue, fever and unexpected weight change.   HENT:  Negative for congestion, dental problem, drooling, ear discharge, ear pain, facial swelling, hearing loss, mouth sores, nosebleeds, postnasal drip, rhinorrhea, sinus pressure, sneezing, sore throat, tinnitus, trouble swallowing and voice change.    Eyes:  Negative for photophobia, pain, discharge, redness, itching and visual disturbance.    Respiratory:  Negative for apnea, cough, choking, chest tightness, shortness of breath, wheezing and stridor.    Cardiovascular:  Negative for chest pain, palpitations and leg swelling.   Gastrointestinal:  Negative for abdominal distention, abdominal pain, anal bleeding, blood in stool, constipation, diarrhea, nausea, rectal pain and vomiting.   Endocrine: Negative for cold intolerance, heat intolerance, polydipsia, polyphagia and polyuria.   Genitourinary:  Negative for decreased urine volume, difficulty urinating, dysuria, enuresis, flank pain, frequency, genital sores, hematuria and urgency.   Musculoskeletal:  Positive for arthralgias, back pain and gait problem. Negative for joint swelling, myalgias, neck pain and neck stiffness.   Skin:  Negative for color change, pallor, rash and wound.   Allergic/Immunologic: Negative.  Negative for environmental allergies, food allergies and immunocompromised state.   Neurological:  Negative for dizziness, tremors, seizures, syncope, facial asymmetry, speech difficulty, weakness, light-headedness, numbness and headaches.   Psychiatric/Behavioral:  Negative for agitation, behavioral problems, confusion, decreased concentration, dysphoric mood, hallucinations, self-injury, sleep disturbance and suicidal ideas. The patient is not nervous/anxious and is not hyperactive.          Historical Information   Patient Active Problem List   Diagnosis   • Obstructive sleep apnea   • Chronic atrial fibrillation (HCC)   • H/O mitral valve replacement with mechanical valve   • Long term (current) use of anticoagulants (warfarin)   • Presence of prosthetic heart valve   • Hypercholesteremia   • History of anemia due to CKD   • Allergic rhinitis   • Type 2 diabetes mellitus without complication, without long-term current use of insulin (HCC)   • Iron deficiency anemia   • Other specified hypothyroidism   • Vitamin B12 deficiency   • Other microscopic hematuria   • Celiac disease   •  Abdominal aortic aneurysm (AAA) without rupture, unspecified part (HCC)   • Ataxia   • Pulmonary emphysema (HCC)   • History of renal calculi   • History of cervical cancer   • Essential hypertension   • Other proteinuria   • Hepatic steatosis   • CVA (cerebral vascular accident) (HCC)   • Type 2 diabetes mellitus with other specified complication, without long-term current use of insulin (HCC)   • CKD (chronic kidney disease) stage 2, GFR 60-89 ml/min   • Cerebrovascular accident (CVA) (HCC)   • Fall   • Anemia   • Acute blood loss anemia   • AVM (arteriovenous malformation) of duodenum, acquired   • Balance problem   • Edema of left lower leg   • Chronic pain of left knee   • Onychomycosis   • Asymptomatic varicose veins of both lower extremities   • Gross hematuria   • Trigger ring finger of right hand   • Chronic venous insufficiency of lower extremity   • Uterine prolapse     Past Medical History:   Diagnosis Date   • Bloody nose     slow drip, clots in the morning   • Colon polyp    • Diabetes mellitus (HCC)     Pre DM diet controlled, metformin DCed    • Heart murmur    • Hyperlipidemia    • Stroke (HCC) 2022     Past Surgical History:   Procedure Laterality Date   • CATARACT EXTRACTION Bilateral    • COLONOSCOPY     • EGD     • HYSTERECTOMY     • MITRAL VALVE REPLACEMENT       Social History     Substance and Sexual Activity   Alcohol Use Yes    Comment: special occasional     Social History     Substance and Sexual Activity   Drug Use No     Social History     Tobacco Use   Smoking Status Former   • Current packs/day: 0.00   • Average packs/day: 2.0 packs/day for 30.0 years (60.0 ttl pk-yrs)   • Types: Cigarettes   • Start date:    • Quit date:    • Years since quittin.0   Smokeless Tobacco Never     Family History   Problem Relation Age of Onset   • Heart failure Mother    • Heart attack Father    • Sleep apnea Brother      Health Maintenance Due   Topic   • COVID-19 Vaccine (1)  "  • Pneumococcal Vaccine: 65+ Years (1 - PCV)   • Zoster Vaccine (1 of 2)   • OT PLAN OF CARE    • Influenza Vaccine (1)   • Depression Screening       Meds/Allergies       Current Outpatient Medications:   •  acetaminophen (TYLENOL) 325 mg tablet, Take 2 tablets (650 mg total) by mouth every 6 (six) hours as needed for mild pain or headaches, Disp: , Rfl: 0  •  atorvastatin (LIPITOR) 40 mg tablet, Take 1 tablet (40 mg total) by mouth daily, Disp: 90 tablet, Rfl: 1  •  Cholecalciferol (VITAMIN D PO), Take by mouth, Disp: , Rfl:   •  estradiol (ESTRACE) 0.1 mg/g vaginal cream, 0.5 grams of cream intravaginally administered daily for one to two weeks, then reduce to twice weekly, Disp: 42.5 g, Rfl: 3  •  glucose blood test strip, Use 1 each in the morning Use one daily, Disp: 100 strip, Rfl: 2  •  metFORMIN (GLUCOPHAGE) 500 mg tablet, Take 1 tablet (500 mg total) by mouth 2 (two) times a day with meals, Disp: 180 tablet, Rfl: 3  •  Multiple Vitamins-Minerals (PRESERVISION AREDS PO), Take 2 tablets by mouth daily  , Disp: , Rfl:   •  warfarin (COUMADIN) 2.5 mg tablet, Take 1 tablet (2.5 mg total) by mouth 3 (three) times a week On Monday Wednesday and Friday (Patient taking differently: Take 2.5 mg by mouth 3 (three) times a week Added to 5mg dose only on tuesdays=7.5mg), Disp: , Rfl: 0  •  warfarin (COUMADIN) 5 mg tablet, Take 1 tablet (5 mg total) by mouth 4 (four) times a week On Sunday, Tuesday, Thursday, Saturday (Patient taking differently: Take 5 mg by mouth in the morning), Disp: , Rfl: 0  •  ketoconazole (NIZORAL) 2 % cream, Apply topically daily, Disp: 60 g, Rfl: 1  •  losartan (COZAAR) 25 mg tablet, Take 1 tablet (25 mg total) by mouth daily (Patient not taking: Reported on 1/10/2024), Disp: 30 tablet, Rfl: 1      Objective:    Vitals:   /80   Pulse 84   Temp 98 °F (36.7 °C)   Ht 5' 5\" (1.651 m)   Wt 73 kg (161 lb)   SpO2 94%   BMI 26.79 kg/m²   Body mass index is 26.79 kg/m².  Vitals:    " 01/10/24 1002   Weight: 73 kg (161 lb)       Physical Exam  Vitals and nursing note reviewed.   Constitutional:       General: She is not in acute distress.     Appearance: She is well-developed. She is not ill-appearing, toxic-appearing or diaphoretic.   HENT:      Head: Normocephalic and atraumatic.      Right Ear: External ear normal.      Left Ear: External ear normal.      Nose: Nose normal.      Mouth/Throat:      Pharynx: No oropharyngeal exudate.   Eyes:      General: Lids are normal. Lids are everted, no foreign bodies appreciated. No scleral icterus.        Right eye: No discharge.         Left eye: No discharge.      Conjunctiva/sclera: Conjunctivae normal.      Pupils: Pupils are equal, round, and reactive to light.   Neck:      Thyroid: No thyromegaly.      Vascular: Normal carotid pulses. No carotid bruit, hepatojugular reflux or JVD.      Trachea: No tracheal tenderness or tracheal deviation.   Cardiovascular:      Rate and Rhythm: Normal rate and regular rhythm.      Pulses: Normal pulses.      Heart sounds: Murmur heard.      No friction rub. No gallop.   Pulmonary:      Effort: Pulmonary effort is normal. No respiratory distress.      Breath sounds: Normal breath sounds. No stridor. No wheezing or rales.   Chest:      Chest wall: No tenderness.   Abdominal:      General: Bowel sounds are normal. There is no distension.      Palpations: Abdomen is soft. There is no mass.      Tenderness: There is no abdominal tenderness. There is no guarding or rebound.   Musculoskeletal:         General: No tenderness or deformity. Normal range of motion.      Cervical back: Normal range of motion and neck supple. No edema, erythema or rigidity. No spinous process tenderness or muscular tenderness. Normal range of motion.   Lymphadenopathy:      Head:      Right side of head: No submental, submandibular, tonsillar, preauricular or posterior auricular adenopathy.      Left side of head: No submental, submandibular,  tonsillar, preauricular, posterior auricular or occipital adenopathy.      Cervical: No cervical adenopathy.      Right cervical: No superficial, deep or posterior cervical adenopathy.     Left cervical: No superficial, deep or posterior cervical adenopathy.      Upper Body:      Right upper body: No pectoral adenopathy.      Left upper body: No pectoral adenopathy.   Skin:     General: Skin is warm and dry.      Coloration: Skin is not pale.      Findings: No erythema or rash.   Neurological:      Mental Status: She is alert and oriented to person, place, and time.      Cranial Nerves: No cranial nerve deficit.      Sensory: No sensory deficit.      Motor: No tremor, abnormal muscle tone or seizure activity.      Coordination: Coordination normal.      Gait: Gait abnormal.      Deep Tendon Reflexes: Reflexes are normal and symmetric. Reflexes normal.   Psychiatric:         Behavior: Behavior normal.         Thought Content: Thought content normal.         Judgment: Judgment normal.         Lab Review   Lab on 01/08/2024   Component Date Value Ref Range Status   • Sodium 01/08/2024 141  135 - 147 mmol/L Final   • Potassium 01/08/2024 4.4  3.5 - 5.3 mmol/L Final   • Chloride 01/08/2024 105  96 - 108 mmol/L Final   • CO2 01/08/2024 28  21 - 32 mmol/L Final   • ANION GAP 01/08/2024 8  mmol/L Final   • BUN 01/08/2024 17  5 - 25 mg/dL Final   • Creatinine 01/08/2024 0.54 (L)  0.60 - 1.30 mg/dL Final    Standardized to IDMS reference method   • Glucose, Fasting 01/08/2024 145 (H)  65 - 99 mg/dL Final   • Calcium 01/08/2024 9.5  8.4 - 10.2 mg/dL Final   • AST 01/08/2024 30  13 - 39 U/L Final   • ALT 01/08/2024 21  7 - 52 U/L Final    Specimen collection should occur prior to Sulfasalazine administration due to the potential for falsely depressed results.    • Alkaline Phosphatase 01/08/2024 87  34 - 104 U/L Final   • Total Protein 01/08/2024 7.2  6.4 - 8.4 g/dL Final   • Albumin 01/08/2024 4.5  3.5 - 5.0 g/dL Final   •  Total Bilirubin 01/08/2024 0.60  0.20 - 1.00 mg/dL Final    Use of this assay is not recommended for patients undergoing treatment with eltrombopag due to the potential for falsely elevated results.  N-acetyl-p-benzoquinone imine (metabolite of Acetaminophen) will generate erroneously low results in samples for patients that have taken an overdose of Acetaminophen.   • eGFR 01/08/2024 86  ml/min/1.73sq m Final   • Protime 01/08/2024 28.0 (H)  11.6 - 14.5 seconds Final   • INR 01/08/2024 2.69 (H)  0.84 - 1.19 Final   Appointment on 12/11/2023   Component Date Value Ref Range Status   • Protime 12/11/2023 24.1 (H)  11.6 - 14.5 seconds Final   • INR 12/11/2023 2.21 (H)  0.84 - 1.19 Final   Appointment on 11/27/2023   Component Date Value Ref Range Status   • TSH 3RD GENERATON 11/27/2023 1.949  0.450 - 4.500 uIU/mL Final    The recommended reference ranges for TSH during pregnancy are as follows:   First trimester 0.100 to 2.500 uIU/mL   Second trimester  0.200 to 3.000 uIU/mL   Third trimester 0.300 to 3.000 uIU/m    Note: Normal ranges may not apply to patients who are transgender, non-binary, or whose legal sex, sex at birth, and gender identity differ.  Adult TSH (3rd generation) reference range follows the recommended guidelines of the [employer], January, 2020.   • Sodium 11/27/2023 140  135 - 147 mmol/L Final   • Potassium 11/27/2023 4.0  3.5 - 5.3 mmol/L Final   • Chloride 11/27/2023 102  96 - 108 mmol/L Final   • CO2 11/27/2023 30  21 - 32 mmol/L Final   • ANION GAP 11/27/2023 8  mmol/L Final   • BUN 11/27/2023 15  5 - 25 mg/dL Final   • Creatinine 11/27/2023 0.53 (L)  0.60 - 1.30 mg/dL Final    Standardized to IDMS reference method   • Glucose, Fasting 11/27/2023 122 (H)  65 - 99 mg/dL Final   • Calcium 11/27/2023 9.1  8.4 - 10.2 mg/dL Final   • AST 11/27/2023 31  13 - 39 U/L Final   • ALT 11/27/2023 25  7 - 52 U/L Final    Specimen collection should occur prior to Sulfasalazine  administration due to the potential for falsely depressed results.    • Alkaline Phosphatase 11/27/2023 82  34 - 104 U/L Final   • Total Protein 11/27/2023 7.3  6.4 - 8.4 g/dL Final   • Albumin 11/27/2023 4.5  3.5 - 5.0 g/dL Final   • Total Bilirubin 11/27/2023 0.72  0.20 - 1.00 mg/dL Final    Use of this assay is not recommended for patients undergoing treatment with eltrombopag due to the potential for falsely elevated results.  N-acetyl-p-benzoquinone imine (metabolite of Acetaminophen) will generate erroneously low results in samples for patients that have taken an overdose of Acetaminophen.   • eGFR 11/27/2023 88  ml/min/1.73sq m Final   • Hemoglobin A1C 11/27/2023 6.4 (H)  Normal 4.0-5.6%; PreDiabetic 5.7-6.4%; Diabetic >=6.5%; Glycemic control for adults with diabetes <7.0% % Final   • EAG 11/27/2023 137  mg/dl Final   • Cholesterol 11/27/2023 163  See Comment mg/dL Final    Cholesterol:         Pediatric <18 Years        Desirable          <170 mg/dL      Borderline High    170-199 mg/dL      High               >=200 mg/dL        Adult >=18 Years            Desirable         <200 mg/dL      Borderline High   200-239 mg/dL      High              >239 mg/dL     • Triglycerides 11/27/2023 141  See Comment mg/dL Final    Triglyceride:     0-9Y            <75mg/dL     10Y-17Y         <90 mg/dL       >=18Y     Normal          <150 mg/dL     Borderline High 150-199 mg/dL     High            200-499 mg/dL        Very High       >499 mg/dL    Specimen collection should occur prior to Metamizole administration due to the potential for falsely depressed results.   • HDL, Direct 11/27/2023 51  >=50 mg/dL Final   • LDL Calculated 11/27/2023 84  0 - 100 mg/dL Final    LDL Cholesterol:     Optimal           <100 mg/dl     Near Optimal      100-129 mg/dl     Above Optimal       Borderline High 130-159 mg/dl       High            160-189 mg/dl       Very High       >189 mg/dl         This screening LDL is a calculated  result.   It does not have the accuracy of the Direct Measured LDL in the monitoring of patients with hyperlipidemia and/or statin therapy.   Direct Measure LDL (TOB383) must be ordered separately in these patients.   • Non-HDL-Chol (CHOL-HDL) 11/27/2023 112  mg/dl Final   • WBC 11/27/2023 3.62 (L)  4.31 - 10.16 Thousand/uL Final   • RBC 11/27/2023 4.13  3.81 - 5.12 Million/uL Final   • Hemoglobin 11/27/2023 13.3  11.5 - 15.4 g/dL Final   • Hematocrit 11/27/2023 39.8  34.8 - 46.1 % Final   • MCV 11/27/2023 96  82 - 98 fL Final   • MCH 11/27/2023 32.2  26.8 - 34.3 pg Final   • MCHC 11/27/2023 33.4  31.4 - 37.4 g/dL Final   • RDW 11/27/2023 15.6 (H)  11.6 - 15.1 % Final   • Platelets 11/27/2023 201  149 - 390 Thousands/uL Final   • MPV 11/27/2023 10.7  8.9 - 12.7 fL Final         Patient Instructions   Hypertension and Diabetes   WHAT YOU NEED TO KNOW:   Hypertension is high blood pressure (BP). Hypertension is common in persons with diabetes. A normal BP is 119/79 or lower. You can control hypertension and diabetes with a healthy lifestyle, or a combination of lifestyle and medicine. Controlled BP and blood sugar levels help prevent certain complications from diabetes. Examples include retinopathy (eye damage) and kidney damage.       DISCHARGE INSTRUCTIONS:   Call or have someone call your local emergency number (911 in the [place]) for any of the following:  You have any of the following signs of a heart attack:   Squeezing, pressure, or pain in your chest    You may  also have any of the following:     Discomfort or pain in your back, neck, jaw, stomach, or arm    Shortness of breath    Nausea or vomiting    Lightheadedness or a sudden cold sweat  You have any of the following signs of a stroke:   Numbness or drooping on one side of your face     Weakness in an arm or leg    Confusion or difficulty speaking    Dizziness, a severe headache, or vision loss    Seek immediate care if:   You feel faint, dizzy, confused, or  drowsy.    You have a severe headache or vision loss.    Call your doctor or diabetes care team provider if:   You have been taking your BP medicine and your BP is still higher than your healthcare provider says it should be.    You have questions or concerns about your condition or care.    Medicines:  You may  need any of the following:  Medicine  may be used to help lower your BP. You may need more than one type of medicine. Take the medicine exactly as directed.    Diuretics  help decrease extra fluid that collects in your body. This will help lower your BP. You may urinate more often while you take this medicine.    Cholesterol medicine  helps lower your cholesterol level. A low cholesterol level helps prevent heart disease and makes it easier to control your BP.    Take your medicine as directed.  Contact your healthcare provider if you think your medicine is not helping or if you have side effects. Tell your provider if you are allergic to any medicine. Keep a list of the medicines, vitamins, and herbs you take. Include the amounts, and when and why you take them. Bring the list or the pill bottles to follow-up visits. Carry your medicine list with you in case of an emergency.    Manage hypertension and diabetes:  Talk with your healthcare provider about these and other ways to manage hypertension and diabetes:  Check your BP at home.  Do not smoke, drink caffeine, or exercise at least 30 minutes before checking your BP. Sit and rest for 5 minutes before you take your blood pressure. Extend your arm and support it on a flat surface. Your arm should be at the same level as your heart. Follow the directions that came with your BP monitor. Check your BP 2 times, 1 minute apart, before you take your medicine in the morning. Also check your BP before your evening meal. Keep a record of your readings and bring it to your follow-up visits. Ask your provider what your BP should be.         Check your blood sugar  level at home.  Follow your provider's instructions and check your blood sugar level as directed. You may need to check a drop of blood in a glucose test machine. Your care team provider may recommend a continuous glucose monitor (CGM). A CGM is a device that is worn at all times. The CGM checks your blood sugar every 5 minutes. It sends results to an electronic device such as a smart phone. Keep a record of your blood sugar level readings and bring it to your follow-up visits. Ask your provider what your blood sugar levels should be.            Manage any other health conditions you have.  Health conditions such as kidney disease, thyroid disease, or adrenal gland disorder can increase your BP and blood sugar levels. Follow your provider's instructions and take all your medicines as directed.    Lifestyle changes you can make:  Talk with your healthcare provider about these and other lifestyle changes for hypertension and diabetes:  Limit sodium (salt) as directed.  Too much sodium can affect your fluid balance. Check labels to find low-sodium or no-salt-added foods. Some low-sodium foods use potassium salts for flavor. Too much potassium can also cause health problems. Your provider will tell you how much sodium and potassium are safe for you to have in a day. He or she may recommend that you limit sodium to 2,300 mg a day.         Follow the meal plan recommended by your provider.  A dietitian or your provider can help you create healthy meal plans. The plans will help you control sodium, carbohydrates, and fats in your meals. This can help you control both your blood sugar and BP levels. The plans usually include eating more fruits, vegetables, and low-fat dairy products. Your provider may talk to you about a Mediterranean style and Dietary Approaches to Stop Hypertension (DASH) eating plans. These eating plans can help you with weight loss and lowering your cholesterol.         Get regular physical activity.   Physical activity can help decrease your blood sugar level. It can also help to decrease your risk for heart disease and help you maintain a healthy weight. Adults should have moderate intensity physical activity for at least 150 minutes every week. Spread the amount of activity over at least 3 days a week. Do not skip more than 2 days in a row. Children should get at least 60 minutes of moderate physical activity on most days of the week. Examples of moderate physical activity include brisk walking, running, and swimming. Do not sit for longer than 30 minutes. Work with your provider to create a plan for physical activity.         Decrease stress.  This may help lower your BP. Learn ways to relax, such as deep breathing or listening to music. Yoga and meditation may also help. Talk to your provider about ways to decrease stress.    Limit alcohol as directed.  Alcohol can cause your blood sugar levels to be low if you use insulin. Alcohol can cause high blood sugar and BP levels, and weight gain. Women 21 years or older and men 65 years or older should limit alcohol to 1 drink a day. Men aged 21 to 64 years should limit alcohol to 2 drinks a day. A drink of alcohol is 12 ounces of beer, 5 ounces of wine, or 1½ ounces of liquor.    Do not smoke.  Nicotine and other chemicals in cigarettes and cigars can increase your BP and make your blood sugar levels harder to control. Ask your provider for information if you currently smoke and need help to quit. E-cigarettes or smokeless tobacco still contain nicotine. Talk to your provider before you use these products.       Follow up with your doctor or diabetes care team provider as directed:  You will need to return to have your BP checked. You will also need other lab tests done, including an A1C to monitor your overall blood sugar control. Write down your questions so you remember to ask them during your visits.  © Copyright [employer] 2023 Information is for End User's use  "only and may not be sold, redistributed or otherwise used for commercial purposes.  The above information is an  only. It is not intended as medical advice for individual conditions or treatments. Talk to your doctor, nurse or pharmacist before following any medical regimen to see if it is safe and effective for you.       Jorge Colon MD        \"This note has been constructed using a voice recognition system.Therefore there may be syntax, spelling, and/or grammatical errors. Please call if you have any questions. \"  "

## 2024-01-10 NOTE — ASSESSMENT & PLAN NOTE
Lab Results   Component Value Date    WBC 3.62 (L) 11/27/2023    HGB 13.3 11/27/2023    HCT 39.8 11/27/2023    MCV 96 11/27/2023     11/27/2023   Hemoglobin hematocrit normal the iron deficiency anemia has resolved

## 2024-01-10 NOTE — ASSESSMENT & PLAN NOTE
Lab Results   Component Value Date    HGBA1C 6.4 (H) 11/27/2023   Blood sugar seems to be controlled on the base of A1c continue monitoring blood pressure blood sugar at home hypoglycemia protocol in place yearly dilated eye exam    Agree and continue present management as follows    Metformin 500 mg twice a day and continue monitoring blood sugar at home  Patient was on metformin 500 once a day increase to twice a day  Patient claims monitoring blood sugar at home ranging 150 patient was on metformin 500 once a day will increase to twice a day

## 2024-01-10 NOTE — PATIENT INSTRUCTIONS
Hypertension and Diabetes   WHAT YOU NEED TO KNOW:   Hypertension is high blood pressure (BP). Hypertension is common in persons with diabetes. A normal BP is 119/79 or lower. You can control hypertension and diabetes with a healthy lifestyle, or a combination of lifestyle and medicine. Controlled BP and blood sugar levels help prevent certain complications from diabetes. Examples include retinopathy (eye damage) and kidney damage.       DISCHARGE INSTRUCTIONS:   Call or have someone call your local emergency number (911 in the [place]) for any of the following:  You have any of the following signs of a heart attack:   Squeezing, pressure, or pain in your chest    You may  also have any of the following:     Discomfort or pain in your back, neck, jaw, stomach, or arm    Shortness of breath    Nausea or vomiting    Lightheadedness or a sudden cold sweat  You have any of the following signs of a stroke:   Numbness or drooping on one side of your face     Weakness in an arm or leg    Confusion or difficulty speaking    Dizziness, a severe headache, or vision loss    Seek immediate care if:   You feel faint, dizzy, confused, or drowsy.    You have a severe headache or vision loss.    Call your doctor or diabetes care team provider if:   You have been taking your BP medicine and your BP is still higher than your healthcare provider says it should be.    You have questions or concerns about your condition or care.    Medicines:  You may  need any of the following:  Medicine  may be used to help lower your BP. You may need more than one type of medicine. Take the medicine exactly as directed.    Diuretics  help decrease extra fluid that collects in your body. This will help lower your BP. You may urinate more often while you take this medicine.    Cholesterol medicine  helps lower your cholesterol level. A low cholesterol level helps prevent heart disease and makes it easier to control your BP.    Take your medicine as  directed.  Contact your healthcare provider if you think your medicine is not helping or if you have side effects. Tell your provider if you are allergic to any medicine. Keep a list of the medicines, vitamins, and herbs you take. Include the amounts, and when and why you take them. Bring the list or the pill bottles to follow-up visits. Carry your medicine list with you in case of an emergency.    Manage hypertension and diabetes:  Talk with your healthcare provider about these and other ways to manage hypertension and diabetes:  Check your BP at home.  Do not smoke, drink caffeine, or exercise at least 30 minutes before checking your BP. Sit and rest for 5 minutes before you take your blood pressure. Extend your arm and support it on a flat surface. Your arm should be at the same level as your heart. Follow the directions that came with your BP monitor. Check your BP 2 times, 1 minute apart, before you take your medicine in the morning. Also check your BP before your evening meal. Keep a record of your readings and bring it to your follow-up visits. Ask your provider what your BP should be.         Check your blood sugar level at home.  Follow your provider's instructions and check your blood sugar level as directed. You may need to check a drop of blood in a glucose test machine. Your care team provider may recommend a continuous glucose monitor (CGM). A CGM is a device that is worn at all times. The CGM checks your blood sugar every 5 minutes. It sends results to an electronic device such as a smart phone. Keep a record of your blood sugar level readings and bring it to your follow-up visits. Ask your provider what your blood sugar levels should be.            Manage any other health conditions you have.  Health conditions such as kidney disease, thyroid disease, or adrenal gland disorder can increase your BP and blood sugar levels. Follow your provider's instructions and take all your medicines as  directed.    Lifestyle changes you can make:  Talk with your healthcare provider about these and other lifestyle changes for hypertension and diabetes:  Limit sodium (salt) as directed.  Too much sodium can affect your fluid balance. Check labels to find low-sodium or no-salt-added foods. Some low-sodium foods use potassium salts for flavor. Too much potassium can also cause health problems. Your provider will tell you how much sodium and potassium are safe for you to have in a day. He or she may recommend that you limit sodium to 2,300 mg a day.         Follow the meal plan recommended by your provider.  A dietitian or your provider can help you create healthy meal plans. The plans will help you control sodium, carbohydrates, and fats in your meals. This can help you control both your blood sugar and BP levels. The plans usually include eating more fruits, vegetables, and low-fat dairy products. Your provider may talk to you about a Mediterranean style and Dietary Approaches to Stop Hypertension (DASH) eating plans. These eating plans can help you with weight loss and lowering your cholesterol.         Get regular physical activity.  Physical activity can help decrease your blood sugar level. It can also help to decrease your risk for heart disease and help you maintain a healthy weight. Adults should have moderate intensity physical activity for at least 150 minutes every week. Spread the amount of activity over at least 3 days a week. Do not skip more than 2 days in a row. Children should get at least 60 minutes of moderate physical activity on most days of the week. Examples of moderate physical activity include brisk walking, running, and swimming. Do not sit for longer than 30 minutes. Work with your provider to create a plan for physical activity.         Decrease stress.  This may help lower your BP. Learn ways to relax, such as deep breathing or listening to music. Yoga and meditation may also help. Talk to  your provider about ways to decrease stress.    Limit alcohol as directed.  Alcohol can cause your blood sugar levels to be low if you use insulin. Alcohol can cause high blood sugar and BP levels, and weight gain. Women 21 years or older and men 65 years or older should limit alcohol to 1 drink a day. Men aged 21 to 64 years should limit alcohol to 2 drinks a day. A drink of alcohol is 12 ounces of beer, 5 ounces of wine, or 1½ ounces of liquor.    Do not smoke.  Nicotine and other chemicals in cigarettes and cigars can increase your BP and make your blood sugar levels harder to control. Ask your provider for information if you currently smoke and need help to quit. E-cigarettes or smokeless tobacco still contain nicotine. Talk to your provider before you use these products.       Follow up with your doctor or diabetes care team provider as directed:  You will need to return to have your BP checked. You will also need other lab tests done, including an A1C to monitor your overall blood sugar control. Write down your questions so you remember to ask them during your visits.  © Copyright [employer] 2023 Information is for End User's use only and may not be sold, redistributed or otherwise used for commercial purposes.  The above information is an  only. It is not intended as medical advice for individual conditions or treatments. Talk to your doctor, nurse or pharmacist before following any medical regimen to see if it is safe and effective for you.

## 2024-01-10 NOTE — ASSESSMENT & PLAN NOTE
Patient quit smoking more than 30 years ago for now no difficulty breathing no wheezing no need for intervention stable

## 2024-01-17 ENCOUNTER — OFFICE VISIT (OUTPATIENT)
Age: 84
End: 2024-01-17
Payer: MEDICARE

## 2024-01-17 VITALS
BODY MASS INDEX: 26.82 KG/M2 | RESPIRATION RATE: 17 BRPM | HEART RATE: 105 BPM | SYSTOLIC BLOOD PRESSURE: 126 MMHG | HEIGHT: 65 IN | WEIGHT: 161 LBS | DIASTOLIC BLOOD PRESSURE: 79 MMHG

## 2024-01-17 DIAGNOSIS — I87.2 DEEP VENOUS INSUFFICIENCY: ICD-10-CM

## 2024-01-17 DIAGNOSIS — M77.42 METATARSALGIA OF BOTH FEET: Primary | ICD-10-CM

## 2024-01-17 DIAGNOSIS — M54.16 RADICULOPATHY OF LUMBAR REGION: ICD-10-CM

## 2024-01-17 DIAGNOSIS — R60.9 CHRONIC EDEMA: ICD-10-CM

## 2024-01-17 DIAGNOSIS — M77.41 METATARSALGIA OF BOTH FEET: Primary | ICD-10-CM

## 2024-01-17 DIAGNOSIS — L03.032 PARONYCHIA OF TOENAIL OF LEFT FOOT: ICD-10-CM

## 2024-01-17 PROCEDURE — 99213 OFFICE O/P EST LOW 20 MIN: CPT | Performed by: PODIATRIST

## 2024-01-17 RX ORDER — GABAPENTIN 100 MG/1
100 CAPSULE ORAL
Qty: 30 CAPSULE | Refills: 1 | Status: SHIPPED | OUTPATIENT
Start: 2024-01-17 | End: 2024-03-17

## 2024-01-17 NOTE — PROGRESS NOTES
Assessment/Plan: Metatarsalgia secondary to radiculopathy.  Rule out neuropathy secondary to hyperglycemia.  Pain upon ambulation.  Chronic ingrown toenail.  Chronic edema secondary deep venous insufficiency.    Plan.  Chart reviewed.  Primary care notes reviewed.  Venous Doppler reviewed with patient and family.  Patient advised on condition.  She will follow-up vascular surgery.  Patient advised on proper shoe size.  Nail cutting technique offered.  In addition patient be started on empiric course of gabapentin.  This will be 100 mg daily at bedtime.  Aftercare instruction given.  Return for follow-up.         Diagnoses and all orders for this visit:    Metatarsalgia of both feet  -     gabapentin (NEURONTIN) 100 mg capsule; Take 1 capsule (100 mg total) by mouth daily at bedtime    Chronic edema    Deep venous insufficiency    Paronychia of toenail of left foot    Radiculopathy of lumbar region  -     gabapentin (NEURONTIN) 100 mg capsule; Take 1 capsule (100 mg total) by mouth daily at bedtime          Subjective: Patient has 2 concerns.  She has numbness and tingling of her feet.  She knows she has neuropathy.  She is also concerned about swelling.  She had Doppler test as directed.    Allergies   Allergen Reactions    Sulfa Antibiotics Tongue Swelling    Sulfasalazine Throat Swelling         Current Outpatient Medications:     gabapentin (NEURONTIN) 100 mg capsule, Take 1 capsule (100 mg total) by mouth daily at bedtime, Disp: 30 capsule, Rfl: 1    acetaminophen (TYLENOL) 325 mg tablet, Take 2 tablets (650 mg total) by mouth every 6 (six) hours as needed for mild pain or headaches, Disp: , Rfl: 0    atorvastatin (LIPITOR) 40 mg tablet, Take 1 tablet (40 mg total) by mouth daily, Disp: 90 tablet, Rfl: 1    Cholecalciferol (VITAMIN D PO), Take by mouth, Disp: , Rfl:     estradiol (ESTRACE) 0.1 mg/g vaginal cream, 0.5 grams of cream intravaginally administered daily for one to two weeks, then reduce to twice  weekly, Disp: 42.5 g, Rfl: 3    glucose blood test strip, Use 1 each in the morning Use one daily, Disp: 100 strip, Rfl: 2    ketoconazole (NIZORAL) 2 % cream, Apply topically daily, Disp: 60 g, Rfl: 1    losartan (COZAAR) 25 mg tablet, Take 1 tablet (25 mg total) by mouth daily (Patient not taking: Reported on 1/10/2024), Disp: 30 tablet, Rfl: 1    metFORMIN (GLUCOPHAGE) 500 mg tablet, Take 1 tablet (500 mg total) by mouth 2 (two) times a day with meals, Disp: 180 tablet, Rfl: 3    Multiple Vitamins-Minerals (PRESERVISION AREDS PO), Take 2 tablets by mouth daily  , Disp: , Rfl:     warfarin (COUMADIN) 2.5 mg tablet, Take 1 tablet (2.5 mg total) by mouth 3 (three) times a week On Monday Wednesday and Friday (Patient taking differently: Take 2.5 mg by mouth 3 (three) times a week Added to 5mg dose only on tuesdays=7.5mg), Disp: , Rfl: 0    warfarin (COUMADIN) 5 mg tablet, Take 1 tablet (5 mg total) by mouth 4 (four) times a week On Sunday, Tuesday, Thursday, Saturday (Patient taking differently: Take 5 mg by mouth in the morning), Disp: , Rfl: 0    Patient Active Problem List   Diagnosis    Obstructive sleep apnea    Chronic atrial fibrillation (HCC)    H/O mitral valve replacement with mechanical valve    Long term (current) use of anticoagulants (warfarin)    Presence of prosthetic heart valve    Hypercholesteremia    History of anemia due to CKD    Allergic rhinitis    Type 2 diabetes mellitus without complication, without long-term current use of insulin (HCC)    Iron deficiency anemia    Other specified hypothyroidism    Vitamin B12 deficiency    Other microscopic hematuria    Celiac disease    Abdominal aortic aneurysm (AAA) without rupture, unspecified part (HCC)    Ataxia    Pulmonary emphysema (HCC)    History of renal calculi    History of cervical cancer    Essential hypertension    Other proteinuria    Hepatic steatosis    CVA (cerebral vascular accident) (HCC)    Type 2 diabetes mellitus with other  specified complication, without long-term current use of insulin (HCC)    CKD (chronic kidney disease) stage 2, GFR 60-89 ml/min    Cerebrovascular accident (CVA) (HCC)    Fall    Anemia    Acute blood loss anemia    AVM (arteriovenous malformation) of duodenum, acquired    Balance problem    Edema of left lower leg    Chronic pain of left knee    Onychomycosis    Asymptomatic varicose veins of both lower extremities    Gross hematuria    Trigger ring finger of right hand    Chronic venous insufficiency of lower extremity    Uterine prolapse          Patient ID: Pooja Barron is a 84 y.o. female.    HPI    The following portions of the patient's history were reviewed and updated as appropriate:     family history includes Heart attack in her father; Heart failure in her mother; Sleep apnea in her brother.      reports that she quit smoking about 35 years ago. Her smoking use included cigarettes. She started smoking about 65 years ago. She has a 60.0 pack-year smoking history. She has never used smokeless tobacco. She reports current alcohol use. She reports that she does not use drugs.    Vitals:    01/17/24 1330   BP: 126/79   Pulse: 105   Resp: 17       Review of Systems      Objective:  Patient's shoes and socks removed.   Foot ExamPhysical Exam      Foot Exam     General  General Appearance: appears stated age and healthy   Orientation: alert and oriented to person, place, and time   Affect: appropriate   Gait: antalgic         Right Foot/Ankle      Inspection and Palpation  Tenderness: metatarsals   Swelling: dorsum   Arch: pes planus  Hallux valgus: yes     Neurovascular  Dorsalis pedis: 2+  Posterior tibial: 2+        Left Foot/Ankle       Inspection and Palpation  Tenderness: metatarsals   Swelling: dorsum   Arch: pes planus  Hallux valgus: yes     Neurovascular  Dorsalis pedis: 2+  Posterior tibial: 2+           Physical Exam  Vitals and nursing note reviewed.   Constitutional:       Appearance: Normal  appearance.   Cardiovascular:      Rate and Rhythm: Normal rate and regular rhythm.      Pulses:           Dorsalis pedis pulses are 2+ on the right side and 2+ on the left side.        Posterior tibial pulses are 2+ on the right side and 2+ on the left side.      Comments: Patient demonstrates significant amount of varicosities and telangiectasia of the lower extremity bilateral.  Venous Doppler demonstrates deep venous insufficiency.  Musculoskeletal:      Right lower le+ Pitting Edema present.      Left lower le+ Pitting Edema present.      Right foot: Bunion present.      Left foot: Bunion present.   Skin:     Capillary Refill: Capillary refill takes less than 2 seconds.      Comments: All nails are dystrophic.  Left hallux nail demonstrates subungual mycosis.  It is ingrown in the fibular aspect.  Positive paronychia.  Negative pus.   Neurological:      Mental Status: She is alert.   Psychiatric:         Mood and Affect: Mood normal.         Behavior: Behavior normal.         Thought Content: Thought content normal.         Judgment: Judgment normal.

## 2024-02-26 ENCOUNTER — APPOINTMENT (OUTPATIENT)
Dept: LAB | Facility: CLINIC | Age: 84
End: 2024-02-26
Payer: MEDICARE

## 2024-02-26 ENCOUNTER — TRANSCRIBE ORDERS (OUTPATIENT)
Dept: LAB | Facility: CLINIC | Age: 84
End: 2024-02-26

## 2024-02-26 DIAGNOSIS — Z95.2 HEART VALVE REPLACED BY TRANSPLANT: ICD-10-CM

## 2024-02-26 DIAGNOSIS — I48.20 CHRONIC ATRIAL FIBRILLATION (HCC): Primary | ICD-10-CM

## 2024-02-26 DIAGNOSIS — I48.20 CHRONIC ATRIAL FIBRILLATION (HCC): ICD-10-CM

## 2024-02-26 LAB
INR PPP: 3.72 (ref 0.84–1.19)
PROTHROMBIN TIME: 36 SECONDS (ref 11.6–14.5)

## 2024-02-26 PROCEDURE — 36415 COLL VENOUS BLD VENIPUNCTURE: CPT

## 2024-02-26 PROCEDURE — 85610 PROTHROMBIN TIME: CPT

## 2024-03-26 ENCOUNTER — APPOINTMENT (OUTPATIENT)
Dept: LAB | Facility: CLINIC | Age: 84
End: 2024-03-26
Payer: MEDICARE

## 2024-03-26 DIAGNOSIS — Z95.2 HEART VALVE REPLACED BY TRANSPLANT: ICD-10-CM

## 2024-03-26 DIAGNOSIS — I48.20 CHRONIC ATRIAL FIBRILLATION (HCC): ICD-10-CM

## 2024-03-26 LAB
INR PPP: 3.19 (ref 0.84–1.19)
PROTHROMBIN TIME: 32 SECONDS (ref 11.6–14.5)

## 2024-03-26 PROCEDURE — 36415 COLL VENOUS BLD VENIPUNCTURE: CPT

## 2024-03-26 PROCEDURE — 85610 PROTHROMBIN TIME: CPT

## 2024-03-27 ENCOUNTER — OFFICE VISIT (OUTPATIENT)
Age: 84
End: 2024-03-27
Payer: MEDICARE

## 2024-03-27 VITALS — RESPIRATION RATE: 17 BRPM | WEIGHT: 160 LBS | HEIGHT: 65 IN | BODY MASS INDEX: 26.66 KG/M2

## 2024-03-27 DIAGNOSIS — R60.9 CHRONIC EDEMA: ICD-10-CM

## 2024-03-27 DIAGNOSIS — M54.16 RADICULOPATHY OF LUMBAR REGION: ICD-10-CM

## 2024-03-27 DIAGNOSIS — B35.1 ONYCHOMYCOSIS: ICD-10-CM

## 2024-03-27 DIAGNOSIS — L03.032 PARONYCHIA OF TOENAIL OF LEFT FOOT: ICD-10-CM

## 2024-03-27 DIAGNOSIS — I87.2 DEEP VENOUS INSUFFICIENCY: ICD-10-CM

## 2024-03-27 DIAGNOSIS — M77.41 METATARSALGIA OF BOTH FEET: Primary | ICD-10-CM

## 2024-03-27 DIAGNOSIS — M77.42 METATARSALGIA OF BOTH FEET: Primary | ICD-10-CM

## 2024-03-27 PROCEDURE — 99213 OFFICE O/P EST LOW 20 MIN: CPT | Performed by: PODIATRIST

## 2024-03-27 RX ORDER — GABAPENTIN 100 MG/1
100 CAPSULE ORAL
Qty: 30 CAPSULE | Refills: 1 | Status: SHIPPED | OUTPATIENT
Start: 2024-03-27 | End: 2024-05-26

## 2024-03-28 ENCOUNTER — RA CDI HCC (OUTPATIENT)
Dept: OTHER | Facility: HOSPITAL | Age: 84
End: 2024-03-28

## 2024-03-28 PROBLEM — Z86.73 HISTORY OF STROKE: Status: ACTIVE | Noted: 2024-03-28

## 2024-04-01 ENCOUNTER — APPOINTMENT (OUTPATIENT)
Dept: LAB | Facility: CLINIC | Age: 84
End: 2024-04-01
Payer: MEDICARE

## 2024-04-01 DIAGNOSIS — I48.20 CHRONIC ATRIAL FIBRILLATION (HCC): ICD-10-CM

## 2024-04-01 DIAGNOSIS — Z95.2 HEART VALVE REPLACED BY TRANSPLANT: ICD-10-CM

## 2024-04-01 LAB
INR PPP: 2.6 (ref 0.84–1.19)
PROTHROMBIN TIME: 27.3 SECONDS (ref 11.6–14.5)

## 2024-04-01 PROCEDURE — 85610 PROTHROMBIN TIME: CPT

## 2024-04-01 PROCEDURE — 36415 COLL VENOUS BLD VENIPUNCTURE: CPT

## 2024-04-04 ENCOUNTER — HOSPITAL ENCOUNTER (OUTPATIENT)
Dept: RADIOLOGY | Facility: HOSPITAL | Age: 84
Discharge: HOME | End: 2024-04-04
Payer: MEDICARE

## 2024-04-04 ENCOUNTER — OFFICE VISIT (OUTPATIENT)
Dept: INTERNAL MEDICINE CLINIC | Facility: CLINIC | Age: 84
End: 2024-04-04
Payer: MEDICARE

## 2024-04-04 VITALS
WEIGHT: 164 LBS | HEART RATE: 83 BPM | TEMPERATURE: 97.8 F | OXYGEN SATURATION: 98 % | BODY MASS INDEX: 27.32 KG/M2 | HEIGHT: 65 IN

## 2024-04-04 DIAGNOSIS — M15.9 PRIMARY OSTEOARTHRITIS INVOLVING MULTIPLE JOINTS: ICD-10-CM

## 2024-04-04 DIAGNOSIS — K21.9 GASTROESOPHAGEAL REFLUX DISEASE WITHOUT ESOPHAGITIS: ICD-10-CM

## 2024-04-04 DIAGNOSIS — M15.9 PRIMARY OSTEOARTHRITIS INVOLVING MULTIPLE JOINTS: Primary | ICD-10-CM

## 2024-04-04 DIAGNOSIS — M54.16 LUMBAR RADICULOPATHY: ICD-10-CM

## 2024-04-04 PROBLEM — M15.0 PRIMARY OSTEOARTHRITIS INVOLVING MULTIPLE JOINTS: Status: ACTIVE | Noted: 2024-04-04

## 2024-04-04 PROCEDURE — 72110 X-RAY EXAM L-2 SPINE 4/>VWS: CPT

## 2024-04-04 PROCEDURE — G2211 COMPLEX E/M VISIT ADD ON: HCPCS | Performed by: INTERNAL MEDICINE

## 2024-04-04 PROCEDURE — 73502 X-RAY EXAM HIP UNI 2-3 VIEWS: CPT

## 2024-04-04 PROCEDURE — 99214 OFFICE O/P EST MOD 30 MIN: CPT | Performed by: INTERNAL MEDICINE

## 2024-04-04 RX ORDER — FAMOTIDINE 40 MG/1
40 TABLET, FILM COATED ORAL DAILY
Qty: 90 TABLET | Refills: 1 | Status: SHIPPED | OUTPATIENT
Start: 2024-04-04

## 2024-04-04 NOTE — PATIENT INSTRUCTIONS
Osteoarthritis   WHAT YOU NEED TO KNOW:   Osteoarthritis (OA) occurs when cartilage (tissue that cushions a joint) wears away slowly and causes the bones to rub together. OA is a long-term condition that often affects the hands, neck, lower back, knees, and hips. OA is also called arthrosis or degenerative joint disease.  DISCHARGE INSTRUCTIONS:   Call your doctor or specialist if:   You have severe pain.    You cannot move your joint.    You have a fever.    Your joint is red and tender.    You have questions or concerns about your condition or care.    Medicines:  You may need any of the following:  Acetaminophen  decreases pain and fever. It is available without a doctor's order. Ask how much to take and how often to take it. Follow directions. Read the labels of all other medicines you are using to see if they also contain acetaminophen, or ask your doctor or pharmacist. Acetaminophen can cause liver damage if not taken correctly.    NSAIDs , such as ibuprofen, help decrease swelling, pain, and fever. This medicine is available with or without a doctor's order. NSAIDs can cause stomach bleeding or kidney problems in certain people. If you take blood thinner medicine, always ask your healthcare provider if NSAIDs are safe for you. Always read the medicine label and follow directions.    Capsaicin cream  may help decrease pain in your joint.     Prescription pain medicine  may be given. Ask your healthcare provider how to take this medicine safely. Some prescription pain medicines contain acetaminophen. Do not take other medicines that contain acetaminophen without talking to your healthcare provider. Too much acetaminophen may cause liver damage. Prescription pain medicine may cause constipation. Ask your healthcare provider how to prevent or treat constipation.     Take your medicine as directed.  Contact your healthcare provider if you think your medicine is not helping or if you have side effects. Tell your  provider if you are allergic to any medicine. Keep a list of the medicines, vitamins, and herbs you take. Include the amounts, and when and why you take them. Bring the list or the pill bottles to follow-up visits. Carry your medicine list with you in case of an emergency.    Follow up with your healthcare provider as directed:  Write down your questions so you remember to ask them during your visits.  Go to physical therapy as directed:  A physical therapist teaches you exercises to help improve movement and strength, and to decrease pain in your joints. The exercises also help lower your risk for loss of function. A physical therapist may move an area with his or her hands. For example, he or she may move your leg in certain ways to treat osteoarthritis in your hip.  Manage your symptoms:   Stay active.  Physical activity may reduce your pain and improve your ability to do daily activities. Avoid activities that cause pain. Ask your healthcare provider what type of exercise would be best for you.    Maintain a healthy weight.  This helps decrease the strain on the joints in your back, hips, knees, ankles, and feet. Ask your healthcare provider what a healthy weight is for you. He or she can help you create a weight loss plan if you are overweight.    Use heat or ice on your joints as directed.  Heat and ice help decrease pain, swelling, and muscle spasms. For heat, use a heating pad on a low setting for 20 minutes, or take a warm bath. For ice, use an ice pack, or put crushed ice in a plastic bag. Cover it with a towel before you place it on your joint. Use ice for 15 minutes every hour.    Massage the muscles around the joint.  Massage helps relieve pain and stiffness. Your healthcare provider or a physical therapist can show you how to do this. If you have hip OA, another person may need to help you massage the area.    Use a cane, crutches, or a walker if directed.  These help protect and relieve pressure on  your ankle, knee, and hip joints. You may also be prescribed shoe inserts to decrease pressure in your joints.    Wear flat or low-heeled shoes.  This will help decrease pain and reduce pressure on your ankle, knee, and hip joints.    © Copyright [employer] 2023 Information is for End User's use only and may not be sold, redistributed or otherwise used for commercial purposes.  The above information is an  only. It is not intended as medical advice for individual conditions or treatments. Talk to your doctor, nurse or pharmacist before following any medical regimen to see if it is safe and effective for you.

## 2024-04-04 NOTE — ASSESSMENT & PLAN NOTE
Lower back rating right lower extremity for last more than a month causing some difficulty ambulating using Tylenol as needed seems to be helping partially to control the symptoms reluctant to take any other prescription medicine    Will get x-ray lumbosacral spine and start physical therapy

## 2024-04-04 NOTE — ASSESSMENT & PLAN NOTE
Pain both hips right worse than left knee causing some difficulty ambulating also lower back pain using Tylenol as needed seems to be helping and control the symptoms    Will get x-ray right hip and x-ray lumbosacral spine depending on the results further workup treatment and start physical therapy

## 2024-04-04 NOTE — PROGRESS NOTES
Dr. Colon's Office Visit Note  24     Pooja Barron 84 y.o. female MRN: [id number]  : 1940    Assessment:     1. Primary osteoarthritis involving multiple joints  Assessment & Plan:  Pain both hips right worse than left knee causing some difficulty ambulating also lower back pain using Tylenol as needed seems to be helping and control the symptoms    Will get x-ray right hip and x-ray lumbosacral spine depending on the results further workup treatment and start physical therapy    Orders:  -     XR hip/pelv 2-3 vws right if performed; Future; Expected date: 2024  -     XR spine lumbar minimum 4 views non injury; Future; Expected date: 2024  -     Ambulatory Referral to Gastroenterology; Future  -     Ambulatory Referral to Physical Therapy; Future  -     Ambulatory Referral to Physical Therapy; Future    2. Lumbar radiculopathy  Assessment & Plan:  Lower back rating right lower extremity for last more than a month causing some difficulty ambulating using Tylenol as needed seems to be helping partially to control the symptoms reluctant to take any other prescription medicine    Will get x-ray lumbosacral spine and start physical therapy    Orders:  -     XR hip/pelv 2-3 vws right if performed; Future; Expected date: 2024  -     XR spine lumbar minimum 4 views non injury; Future; Expected date: 2024  -     Ambulatory Referral to Gastroenterology; Future  -     Ambulatory Referral to Physical Therapy; Future  -     Ambulatory Referral to Physical Therapy; Future    3. Gastroesophageal reflux disease without esophagitis  Assessment & Plan:  Patient underwent endoscopy done in the past results reviewed showed gastritis for now complaints of epigastric discomfort more so after the meal was using Prilosec in the past stopped taking about 6 months ago will manage as follows    Recommend ultrasound gallbladder to rule out cholelithiasis patient refused    Start 40 mg daily    Referral  given to see GI awaiting to be seen by GI    Orders:  -     famotidine (PEPCID) 40 MG tablet; Take 1 tablet (40 mg total) by mouth daily          Discussion Summary and Plan:  Today's care plan and medications were reviewed with patient in detail and all their questions answered to their satisfaction.    No chief complaint on file.     Subjective:  Came in complaint multiple symptoms patient feels wobbly and difficulty ambulating patient claims due to loss of vision due to the macular degeneration and also complains of pain right hip and lower back rating right lower extremity and some epigastric discomfort without any nausea all the complaints and the questions addressed at length for details refer to assessment plan under visit diagnosis denies any chest pain or difficulty breathing        The following portions of the patient's history were reviewed and updated as appropriate: allergies, current medications, past family history, past medical history, past social history, past surgical history and problem list.    Review of Systems   Constitutional:  Positive for activity change. Negative for appetite change, chills, diaphoresis, fatigue, fever and unexpected weight change.   HENT:  Negative for congestion, dental problem, drooling, ear discharge, ear pain, facial swelling, hearing loss, mouth sores, nosebleeds, postnasal drip, rhinorrhea, sinus pressure, sneezing, sore throat, tinnitus, trouble swallowing and voice change.    Eyes:  Negative for photophobia, pain, discharge, redness, itching and visual disturbance.   Respiratory:  Negative for apnea, cough, choking, chest tightness, shortness of breath, wheezing and stridor.    Cardiovascular:  Negative for chest pain, palpitations and leg swelling.   Gastrointestinal:  Negative for abdominal distention, abdominal pain, anal bleeding, blood in stool, constipation, diarrhea, nausea, rectal pain and vomiting.   Endocrine: Negative for cold intolerance, heat  intolerance, polydipsia, polyphagia and polyuria.   Genitourinary:  Negative for decreased urine volume, difficulty urinating, dysuria, enuresis, flank pain, frequency, genital sores, hematuria and urgency.   Musculoskeletal:  Positive for arthralgias, back pain, joint swelling and myalgias. Negative for gait problem, neck pain and neck stiffness.   Skin:  Negative for color change, pallor, rash and wound.   Allergic/Immunologic: Negative.  Negative for environmental allergies, food allergies and immunocompromised state.   Neurological:  Negative for dizziness, tremors, seizures, syncope, facial asymmetry, speech difficulty, weakness, light-headedness, numbness and headaches.   Psychiatric/Behavioral:  Negative for agitation, behavioral problems, confusion, decreased concentration, dysphoric mood, hallucinations, self-injury, sleep disturbance and suicidal ideas. The patient is not nervous/anxious and is not hyperactive.          Historical Information   Patient Active Problem List   Diagnosis   • Obstructive sleep apnea   • Chronic atrial fibrillation (HCC)   • H/O mitral valve replacement with mechanical valve   • Long term (current) use of anticoagulants (warfarin)   • Presence of prosthetic heart valve   • Hypercholesteremia   • History of anemia due to CKD   • Allergic rhinitis   • Type 2 diabetes mellitus without complication, without long-term current use of insulin ([facility])   • Iron deficiency anemia   • Other specified hypothyroidism   • Vitamin B12 deficiency   • Other microscopic hematuria   • Celiac disease   • Abdominal aortic aneurysm (AAA) without rupture, unspecified part ([facility])   • Ataxia   • Pulmonary emphysema ([facility])   • History of renal calculi   • History of cervical cancer   • Essential hypertension   • Other proteinuria   • Hepatic steatosis   • CVA (cerebral vascular accident) ([facility])   • Type 2 diabetes mellitus with other specified complication, without long-term current use of insulin ([facility])   •  CKD (chronic kidney disease) stage 2, GFR 60-89 ml/min   • Cerebrovascular accident (CVA) (HCC)   • Fall   • Anemia   • Acute blood loss anemia   • AVM (arteriovenous malformation) of duodenum, acquired   • Balance problem   • Edema of left lower leg   • Chronic pain of left knee   • Onychomycosis   • Asymptomatic varicose veins of both lower extremities   • Gross hematuria   • Trigger ring finger of right hand   • Chronic venous insufficiency of lower extremity   • Uterine prolapse   • History of stroke   • Lumbar radiculopathy   • Primary osteoarthritis involving multiple joints   • Gastroesophageal reflux disease without esophagitis     Past Medical History:   Diagnosis Date   • Bloody nose     slow drip, clots in the morning   • Colon polyp    • Diabetes mellitus ([facility])     Pre DM diet controlled, metformin DCed    • Heart murmur    • Hyperlipidemia    • Stroke ([facility]) 2022     Past Surgical History:   Procedure Laterality Date   • CATARACT EXTRACTION Bilateral    • COLONOSCOPY     • EGD     • HYSTERECTOMY     • MITRAL VALVE REPLACEMENT       Social History     Substance and Sexual Activity   Alcohol Use Yes    Comment: special occasional     Social History     Substance and Sexual Activity   Drug Use No     Social History     Tobacco Use   Smoking Status Former   • Current packs/day: 0.00   • Average packs/day: 2.0 packs/day for 30.0 years (60.0 ttl pk-yrs)   • Types: Cigarettes   • Start date:    • Quit date:    • Years since quittin.2   Smokeless Tobacco Never     Family History   Problem Relation Age of Onset   • Heart failure Mother    • Heart attack Father    • Sleep apnea Brother      Health Maintenance Due   Topic   • Pneumococcal Vaccine: 65+ Years (1 of 2 - PCV)   • Zoster Vaccine (1 of 2)   • OT PLAN OF CARE    • Influenza Vaccine (1)   • COVID-19 Vaccine ( season)   • HEMOGLOBIN A1C    • Fall Risk    • Medicare Annual Wellness Visit (AWV)    • Diabetic Foot Exam      "  Meds/Allergies       Current Outpatient Medications:   •  acetaminophen (TYLENOL) 325 mg tablet, Take 2 tablets (650 mg total) by mouth every 6 (six) hours as needed for mild pain or headaches, Disp: , Rfl: 0  •  Cholecalciferol (VITAMIN D PO), Take by mouth, Disp: , Rfl:   •  estradiol (ESTRACE) 0.1 mg/g vaginal cream, 0.5 grams of cream intravaginally administered daily for one to two weeks, then reduce to twice weekly, Disp: 42.5 g, Rfl: 3  •  famotidine (PEPCID) 40 MG tablet, Take 1 tablet (40 mg total) by mouth daily, Disp: 90 tablet, Rfl: 1  •  glucose blood test strip, Use 1 each in the morning Use one daily, Disp: 100 strip, Rfl: 2  •  metFORMIN (GLUCOPHAGE) 500 mg tablet, Take 1 tablet (500 mg total) by mouth 2 (two) times a day with meals, Disp: 180 tablet, Rfl: 3  •  Multiple Vitamins-Minerals (PRESERVISION AREDS PO), Take 2 tablets by mouth daily  , Disp: , Rfl:   •  warfarin (COUMADIN) 2.5 mg tablet, Take 1 tablet (2.5 mg total) by mouth 3 (three) times a week On Monday Wednesday and Friday (Patient taking differently: Take 2.5 mg by mouth 3 (three) times a week Added to 5mg dose only on tuesdays=7.5mg), Disp: , Rfl: 0  •  warfarin (COUMADIN) 5 mg tablet, Take 1 tablet (5 mg total) by mouth 4 (four) times a week On Sunday, Tuesday, Thursday, Saturday (Patient taking differently: Take 5 mg by mouth in the morning), Disp: , Rfl: 0  •  ketoconazole (NIZORAL) 2 % cream, Apply topically daily, Disp: 60 g, Rfl: 1      Objective:    Vitals:   Pulse 83   Temp 97.8 °F (36.6 °C)   Ht 5' 5\" (1.651 m)   Wt 74.4 kg (164 lb)   SpO2 98%   BMI 27.29 kg/m²   Body mass index is 27.29 kg/m².  Vitals:    04/04/24 1127   Weight: 74.4 kg (164 lb)       Physical Exam  Vitals and nursing note reviewed.   Constitutional:       General: She is not in acute distress.     Appearance: She is well-developed. She is not ill-appearing, toxic-appearing or diaphoretic.   HENT:      Head: Normocephalic and atraumatic.      Right " Ear: External ear normal.      Left Ear: External ear normal.      Nose: Nose normal.      Mouth/Throat:      Pharynx: No oropharyngeal exudate.   Eyes:      General: Lids are normal. Lids are everted, no foreign bodies appreciated. No scleral icterus.        Right eye: No discharge.         Left eye: No discharge.      Conjunctiva/sclera: Conjunctivae normal.      Pupils: Pupils are equal, round, and reactive to light.   Neck:      Thyroid: No thyromegaly.      Vascular: Normal carotid pulses. No carotid bruit, hepatojugular reflux or JVD.      Trachea: No tracheal tenderness or tracheal deviation.   Cardiovascular:      Rate and Rhythm: Normal rate and regular rhythm.      Pulses: Normal pulses.      Heart sounds: Normal heart sounds. No murmur heard.     No friction rub. No gallop.   Pulmonary:      Effort: Pulmonary effort is normal. No respiratory distress.      Breath sounds: Normal breath sounds. No stridor. No wheezing or rales.   Chest:      Chest wall: No tenderness.   Abdominal:      General: Bowel sounds are normal. There is no distension.      Palpations: Abdomen is soft. There is no mass.      Tenderness: There is no abdominal tenderness. There is no guarding or rebound.   Musculoskeletal:         General: No tenderness or deformity. Normal range of motion.      Cervical back: Normal range of motion and neck supple. No edema, erythema or rigidity. No spinous process tenderness or muscular tenderness. Normal range of motion.   Lymphadenopathy:      Head:      Right side of head: No submental, submandibular, tonsillar, preauricular or posterior auricular adenopathy.      Left side of head: No submental, submandibular, tonsillar, preauricular, posterior auricular or occipital adenopathy.      Cervical: No cervical adenopathy.      Right cervical: No superficial, deep or posterior cervical adenopathy.     Left cervical: No superficial, deep or posterior cervical adenopathy.      Upper Body:      Right  upper body: No pectoral adenopathy.      Left upper body: No pectoral adenopathy.   Skin:     General: Skin is warm and dry.      Coloration: Skin is not pale.      Findings: No erythema or rash.   Neurological:      General: No focal deficit present.      Mental Status: She is alert and oriented to person, place, and time.      Cranial Nerves: No cranial nerve deficit.      Sensory: No sensory deficit.      Motor: No tremor, abnormal muscle tone or seizure activity.      Coordination: Coordination normal.      Gait: Gait abnormal.      Deep Tendon Reflexes: Reflexes are normal and symmetric. Reflexes normal.   Psychiatric:         Behavior: Behavior normal.         Thought Content: Thought content normal.         Judgment: Judgment normal.         Lab Review   Appointment on 04/01/2024   Component Date Value Ref Range Status   • Protime 04/01/2024 27.3 (H)  11.6 - 14.5 seconds Final   • INR 04/01/2024 2.60 (H)  0.84 - 1.19 Final   Appointment on 03/26/2024   Component Date Value Ref Range Status   • Protime 03/26/2024 32.0 (H)  11.6 - 14.5 seconds Final   • INR 03/26/2024 3.19 (H)  0.84 - 1.19 Final   Appointment on 02/26/2024   Component Date Value Ref Range Status   • Protime 02/26/2024 36.0 (H)  11.6 - 14.5 seconds Final   • INR 02/26/2024 3.72 (H)  0.84 - 1.19 Final         Patient Instructions   Osteoarthritis   WHAT YOU NEED TO KNOW:   Osteoarthritis (OA) occurs when cartilage (tissue that cushions a joint) wears away slowly and causes the bones to rub together. OA is a long-term condition that often affects the hands, neck, lower back, knees, and hips. OA is also called arthrosis or degenerative joint disease.  DISCHARGE INSTRUCTIONS:   Call your doctor or specialist if:   You have severe pain.    You cannot move your joint.    You have a fever.    Your joint is red and tender.    You have questions or concerns about your condition or care.    Medicines:  You may need any of the following:  Acetaminophen   decreases pain and fever. It is available without a doctor's order. Ask how much to take and how often to take it. Follow directions. Read the labels of all other medicines you are using to see if they also contain acetaminophen, or ask your doctor or pharmacist. Acetaminophen can cause liver damage if not taken correctly.    NSAIDs , such as ibuprofen, help decrease swelling, pain, and fever. This medicine is available with or without a doctor's order. NSAIDs can cause stomach bleeding or kidney problems in certain people. If you take blood thinner medicine, always ask your healthcare provider if NSAIDs are safe for you. Always read the medicine label and follow directions.    Capsaicin cream  may help decrease pain in your joint.     Prescription pain medicine  may be given. Ask your healthcare provider how to take this medicine safely. Some prescription pain medicines contain acetaminophen. Do not take other medicines that contain acetaminophen without talking to your healthcare provider. Too much acetaminophen may cause liver damage. Prescription pain medicine may cause constipation. Ask your healthcare provider how to prevent or treat constipation.     Take your medicine as directed.  Contact your healthcare provider if you think your medicine is not helping or if you have side effects. Tell your provider if you are allergic to any medicine. Keep a list of the medicines, vitamins, and herbs you take. Include the amounts, and when and why you take them. Bring the list or the pill bottles to follow-up visits. Carry your medicine list with you in case of an emergency.    Follow up with your healthcare provider as directed:  Write down your questions so you remember to ask them during your visits.  Go to physical therapy as directed:  A physical therapist teaches you exercises to help improve movement and strength, and to decrease pain in your joints. The exercises also help lower your risk for loss of function. A  physical therapist may move an area with his or her hands. For example, he or she may move your leg in certain ways to treat osteoarthritis in your hip.  Manage your symptoms:   Stay active.  Physical activity may reduce your pain and improve your ability to do daily activities. Avoid activities that cause pain. Ask your healthcare provider what type of exercise would be best for you.    Maintain a healthy weight.  This helps decrease the strain on the joints in your back, hips, knees, ankles, and feet. Ask your healthcare provider what a healthy weight is for you. He or she can help you create a weight loss plan if you are overweight.    Use heat or ice on your joints as directed.  Heat and ice help decrease pain, swelling, and muscle spasms. For heat, use a heating pad on a low setting for 20 minutes, or take a warm bath. For ice, use an ice pack, or put crushed ice in a plastic bag. Cover it with a towel before you place it on your joint. Use ice for 15 minutes every hour.    Massage the muscles around the joint.  Massage helps relieve pain and stiffness. Your healthcare provider or a physical therapist can show you how to do this. If you have hip OA, another person may need to help you massage the area.    Use a cane, crutches, or a walker if directed.  These help protect and relieve pressure on your ankle, knee, and hip joints. You may also be prescribed shoe inserts to decrease pressure in your joints.    Wear flat or low-heeled shoes.  This will help decrease pain and reduce pressure on your ankle, knee, and hip joints.    © Copyright [employer] 2023 Information is for End User's use only and may not be sold, redistributed or otherwise used for commercial purposes.  The above information is an  only. It is not intended as medical advice for individual conditions or treatments. Talk to your doctor, nurse or pharmacist before following any medical regimen to see if it is safe and effective for  "you.       Jorge Colon MD        \"This note has been constructed using a voice recognition system.Therefore there may be syntax, spelling, and/or grammatical errors. Please call if you have any questions. \"  "

## 2024-04-04 NOTE — ASSESSMENT & PLAN NOTE
Patient underwent endoscopy done in the past results reviewed showed gastritis for now complaints of epigastric discomfort more so after the meal was using Prilosec in the past stopped taking about 6 months ago will manage as follows    Recommend ultrasound gallbladder to rule out cholelithiasis patient refused    Start 40 mg daily    Referral given to see GI awaiting to be seen by GI

## 2024-05-02 ENCOUNTER — APPOINTMENT (OUTPATIENT)
Dept: LAB | Facility: CLINIC | Age: 84
End: 2024-05-02
Payer: MEDICARE

## 2024-05-02 DIAGNOSIS — I48.20 CHRONIC ATRIAL FIBRILLATION (HCC): ICD-10-CM

## 2024-05-02 DIAGNOSIS — Z95.2 HEART VALVE REPLACED BY TRANSPLANT: ICD-10-CM

## 2024-05-02 LAB
INR PPP: 3.81 (ref 0.84–1.19)
PROTHROMBIN TIME: 36.7 SECONDS (ref 11.6–14.5)

## 2024-05-02 PROCEDURE — 85610 PROTHROMBIN TIME: CPT

## 2024-05-02 PROCEDURE — 36415 COLL VENOUS BLD VENIPUNCTURE: CPT

## 2024-06-03 ENCOUNTER — TRANSCRIBE ORDERS (OUTPATIENT)
Dept: LAB | Facility: CLINIC | Age: 84
End: 2024-06-03

## 2024-06-03 ENCOUNTER — APPOINTMENT (OUTPATIENT)
Dept: LAB | Facility: CLINIC | Age: 84
End: 2024-06-03
Payer: MEDICARE

## 2024-06-03 DIAGNOSIS — E78.5 HYPERLIPIDEMIA, UNSPECIFIED HYPERLIPIDEMIA TYPE: ICD-10-CM

## 2024-06-03 DIAGNOSIS — E11.9 DIABETES MELLITUS WITHOUT COMPLICATION (HCC): ICD-10-CM

## 2024-06-03 DIAGNOSIS — R53.81 DEBILITY: Primary | ICD-10-CM

## 2024-06-03 DIAGNOSIS — Z95.2 HEART VALVE REPLACED BY TRANSPLANT: ICD-10-CM

## 2024-06-03 DIAGNOSIS — I48.20 CHRONIC ATRIAL FIBRILLATION (HCC): ICD-10-CM

## 2024-06-03 DIAGNOSIS — R53.83 OTHER FATIGUE: ICD-10-CM

## 2024-06-03 LAB
ALBUMIN SERPL BCP-MCNC: 4.5 G/DL (ref 3.5–5)
ALP SERPL-CCNC: 83 U/L (ref 34–104)
ALT SERPL W P-5'-P-CCNC: 17 U/L (ref 7–52)
ANION GAP SERPL CALCULATED.3IONS-SCNC: 9 MMOL/L (ref 4–13)
ANISOCYTOSIS BLD QL SMEAR: PRESENT
AST SERPL W P-5'-P-CCNC: 26 U/L (ref 13–39)
BASOPHILS # BLD MANUAL: 0.12 THOUSAND/UL (ref 0–0.1)
BASOPHILS NFR MAR MANUAL: 4 % (ref 0–1)
BILIRUB SERPL-MCNC: 0.58 MG/DL (ref 0.2–1)
BUN SERPL-MCNC: 11 MG/DL (ref 5–25)
CALCIUM SERPL-MCNC: 9.2 MG/DL (ref 8.4–10.2)
CHLORIDE SERPL-SCNC: 102 MMOL/L (ref 96–108)
CHOLEST SERPL-MCNC: 239 MG/DL
CO2 SERPL-SCNC: 28 MMOL/L (ref 21–32)
CREAT SERPL-MCNC: 0.56 MG/DL (ref 0.6–1.3)
EOSINOPHIL # BLD MANUAL: 0.03 THOUSAND/UL (ref 0–0.4)
EOSINOPHIL NFR BLD MANUAL: 1 % (ref 0–6)
ERYTHROCYTE [DISTWIDTH] IN BLOOD BY AUTOMATED COUNT: 15.7 % (ref 11.6–15.1)
EST. AVERAGE GLUCOSE BLD GHB EST-MCNC: 126 MG/DL
GFR SERPL CREATININE-BSD FRML MDRD: 85 ML/MIN/1.73SQ M
GLUCOSE P FAST SERPL-MCNC: 151 MG/DL (ref 65–99)
HBA1C MFR BLD: 6 %
HCT VFR BLD AUTO: 39 % (ref 34.8–46.1)
HDLC SERPL-MCNC: 49 MG/DL
HGB BLD-MCNC: 12.8 G/DL (ref 11.5–15.4)
INR PPP: 3.17 (ref 0.84–1.19)
LDLC SERPL CALC-MCNC: 142 MG/DL (ref 0–100)
LYMPHOCYTES # BLD AUTO: 1.75 THOUSAND/UL (ref 0.6–4.47)
LYMPHOCYTES # BLD AUTO: 60 % (ref 14–44)
MCH RBC QN AUTO: 31.2 PG (ref 26.8–34.3)
MCHC RBC AUTO-ENTMCNC: 32.8 G/DL (ref 31.4–37.4)
MCV RBC AUTO: 95 FL (ref 82–98)
MONOCYTES # BLD AUTO: 0.53 THOUSAND/UL (ref 0–1.22)
MONOCYTES NFR BLD: 18 % (ref 4–12)
NEUTROPHILS # BLD MANUAL: 0.5 THOUSAND/UL (ref 1.85–7.62)
NEUTS BAND NFR BLD MANUAL: 1 % (ref 0–8)
NEUTS SEG NFR BLD AUTO: 16 % (ref 43–75)
NONHDLC SERPL-MCNC: 190 MG/DL
NRBC BLD AUTO-RTO: 1 /100 WBC (ref 0–2)
PLATELET # BLD AUTO: 198 THOUSANDS/UL (ref 149–390)
PLATELET BLD QL SMEAR: ADEQUATE
PMV BLD AUTO: 10.6 FL (ref 8.9–12.7)
POTASSIUM SERPL-SCNC: 4.2 MMOL/L (ref 3.5–5.3)
PROT SERPL-MCNC: 7.2 G/DL (ref 6.4–8.4)
PROTHROMBIN TIME: 31.8 SECONDS (ref 11.6–14.5)
RBC # BLD AUTO: 4.1 MILLION/UL (ref 3.81–5.12)
RBC MORPH BLD: PRESENT
SODIUM SERPL-SCNC: 139 MMOL/L (ref 135–147)
TRIGL SERPL-MCNC: 240 MG/DL
TSH SERPL DL<=0.05 MIU/L-ACNC: 2.8 UIU/ML (ref 0.45–4.5)
WBC # BLD AUTO: 2.92 THOUSAND/UL (ref 4.31–10.16)

## 2024-06-03 PROCEDURE — 85027 COMPLETE CBC AUTOMATED: CPT

## 2024-06-03 PROCEDURE — 80061 LIPID PANEL: CPT

## 2024-06-03 PROCEDURE — 84443 ASSAY THYROID STIM HORMONE: CPT

## 2024-06-03 PROCEDURE — 85007 BL SMEAR W/DIFF WBC COUNT: CPT

## 2024-06-03 PROCEDURE — 83036 HEMOGLOBIN GLYCOSYLATED A1C: CPT

## 2024-06-03 PROCEDURE — 80053 COMPREHEN METABOLIC PANEL: CPT

## 2024-06-03 PROCEDURE — 36415 COLL VENOUS BLD VENIPUNCTURE: CPT

## 2024-06-03 PROCEDURE — 85610 PROTHROMBIN TIME: CPT

## 2024-06-10 ENCOUNTER — TELEPHONE (OUTPATIENT)
Age: 84
End: 2024-06-10

## 2024-06-10 NOTE — TELEPHONE ENCOUNTER
Made appt with Dr. Kothari for tomorrow afternoon.  She did speak to her cardiologist re: Labs, but also wants them reviewed by her PCP.

## 2024-06-10 NOTE — TELEPHONE ENCOUNTER
Pt stated she often has quick unbearable hip pain that started 3 weeks ago. Also, pt wanted  lab results from test done on 6/3/24.       Please contact pt and advise. Thank you for your help.

## 2024-06-11 ENCOUNTER — OFFICE VISIT (OUTPATIENT)
Dept: INTERNAL MEDICINE CLINIC | Facility: CLINIC | Age: 84
End: 2024-06-11
Payer: MEDICARE

## 2024-06-11 ENCOUNTER — OFFICE VISIT (OUTPATIENT)
Age: 84
End: 2024-06-11
Payer: MEDICARE

## 2024-06-11 VITALS
SYSTOLIC BLOOD PRESSURE: 142 MMHG | HEIGHT: 65 IN | BODY MASS INDEX: 27.49 KG/M2 | HEART RATE: 83 BPM | WEIGHT: 165 LBS | DIASTOLIC BLOOD PRESSURE: 82 MMHG | OXYGEN SATURATION: 99 %

## 2024-06-11 VITALS
BODY MASS INDEX: 27.49 KG/M2 | WEIGHT: 165 LBS | DIASTOLIC BLOOD PRESSURE: 81 MMHG | SYSTOLIC BLOOD PRESSURE: 137 MMHG | HEART RATE: 87 BPM | HEIGHT: 65 IN | RESPIRATION RATE: 17 BRPM

## 2024-06-11 DIAGNOSIS — M77.42 METATARSALGIA OF BOTH FEET: Primary | ICD-10-CM

## 2024-06-11 DIAGNOSIS — M54.16 RADICULOPATHY OF LUMBAR REGION: ICD-10-CM

## 2024-06-11 DIAGNOSIS — E11.69 TYPE 2 DIABETES MELLITUS WITH OTHER SPECIFIED COMPLICATION, WITHOUT LONG-TERM CURRENT USE OF INSULIN (HCC): ICD-10-CM

## 2024-06-11 DIAGNOSIS — M54.16 LUMBAR RADICULOPATHY: Primary | ICD-10-CM

## 2024-06-11 DIAGNOSIS — I10 ESSENTIAL HYPERTENSION: ICD-10-CM

## 2024-06-11 DIAGNOSIS — L03.032 PARONYCHIA OF TOENAIL OF LEFT FOOT: ICD-10-CM

## 2024-06-11 DIAGNOSIS — N39.0 FREQUENT UTI: ICD-10-CM

## 2024-06-11 DIAGNOSIS — Z00.00 MEDICARE ANNUAL WELLNESS VISIT, SUBSEQUENT: ICD-10-CM

## 2024-06-11 DIAGNOSIS — R60.9 CHRONIC EDEMA: ICD-10-CM

## 2024-06-11 DIAGNOSIS — M77.41 METATARSALGIA OF BOTH FEET: Primary | ICD-10-CM

## 2024-06-11 DIAGNOSIS — I87.2 DEEP VENOUS INSUFFICIENCY: ICD-10-CM

## 2024-06-11 DIAGNOSIS — M15.9 PRIMARY OSTEOARTHRITIS INVOLVING MULTIPLE JOINTS: ICD-10-CM

## 2024-06-11 DIAGNOSIS — I48.20 CHRONIC ATRIAL FIBRILLATION (HCC): ICD-10-CM

## 2024-06-11 DIAGNOSIS — B35.1 ONYCHOMYCOSIS: ICD-10-CM

## 2024-06-11 DIAGNOSIS — E78.00 HYPERCHOLESTEREMIA: ICD-10-CM

## 2024-06-11 DIAGNOSIS — G47.33 OBSTRUCTIVE SLEEP APNEA: ICD-10-CM

## 2024-06-11 PROCEDURE — 99213 OFFICE O/P EST LOW 20 MIN: CPT | Performed by: PODIATRIST

## 2024-06-11 PROCEDURE — G0439 PPPS, SUBSEQ VISIT: HCPCS | Performed by: STUDENT IN AN ORGANIZED HEALTH CARE EDUCATION/TRAINING PROGRAM

## 2024-06-11 PROCEDURE — 99214 OFFICE O/P EST MOD 30 MIN: CPT | Performed by: STUDENT IN AN ORGANIZED HEALTH CARE EDUCATION/TRAINING PROGRAM

## 2024-06-11 RX ORDER — ATORVASTATIN CALCIUM 20 MG/1
20 TABLET, FILM COATED ORAL DAILY
Qty: 30 TABLET | Refills: 2 | Status: SHIPPED | OUTPATIENT
Start: 2024-06-11 | End: 2024-09-09

## 2024-06-11 NOTE — ASSESSMENT & PLAN NOTE
Blood pressure acceptable  Asymptomatic  Reports she was was previously on losartan 25 which was discontinued by her cardiologist.  Advised to keep home blood pressure diary and bring next visit  monitor

## 2024-06-11 NOTE — ASSESSMENT & PLAN NOTE
Chronic intermittent  Recent x-ray reviewed  Continue Tylenol as needed  Advised to start physical therapy as referred previously  To orthopedic for evaluation

## 2024-06-11 NOTE — ASSESSMENT & PLAN NOTE
Lab Results   Component Value Date    HGBA1C 6.0 (H) 06/03/2024     Recent A1c 6.0  On metformin 500 twice a day  Continue medication, diet and exercise  Repeat labs in 3 months

## 2024-06-11 NOTE — PATIENT INSTRUCTIONS
Medicare Preventive Visit Patient Instructions  Thank you for completing your Welcome to Medicare Visit or Medicare Annual Wellness Visit today. Your next wellness visit will be due in one year (6/12/2025).  The screening/preventive services that you may require over the next 5-10 years are detailed below. Some tests may not apply to you based off risk factors and/or age. Screening tests ordered at today's visit but not completed yet may show as past due. Also, please note that scanned in results may not display below.  Preventive Screenings:  Service Recommendations Previous Testing/Comments   Colorectal Cancer Screening  * Colonoscopy    * Fecal Occult Blood Test (FOBT)/Fecal Immunochemical Test (FIT)  * Fecal DNA/Cologuard Test  * Flexible Sigmoidoscopy Age: 45-75 years old   Colonoscopy: every 10 years (may be performed more frequently if at higher risk)  OR  FOBT/FIT: every 1 year  OR  Cologuard: every 3 years  OR  Sigmoidoscopy: every 5 years  Screening may be recommended earlier than age 45 if at higher risk for colorectal cancer. Also, an individualized decision between you and your healthcare provider will decide whether screening between the ages of 76-85 would be appropriate. Colonoscopy: 01/13/2023  FOBT/FIT: 08/12/2022  Cologuard: Not on file  Sigmoidoscopy: Not on file          Breast Cancer Screening Age: 40+ years old  Frequency: every 1-2 years  Not required if history of left and right mastectomy Mammogram: 03/12/2020        Cervical Cancer Screening Between the ages of 21-29, pap smear recommended once every 3 years.   Between the ages of 30-65, can perform pap smear with HPV co-testing every 5 years.   Recommendations may differ for women with a history of total hysterectomy, cervical cancer, or abnormal pap smears in past. Pap Smear: Not on file        Hepatitis C Screening Once for adults born between 1945 and 1965  More frequently in patients at high risk for Hepatitis C Hep C Antibody:  07/05/2019        Diabetes Screening 1-2 times per year if you're at risk for diabetes or have pre-diabetes Fasting glucose: 151 mg/dL (6/3/2024)  A1C: 6.0 % (6/3/2024)      Cholesterol Screening Once every 5 years if you don't have a lipid disorder. May order more often based on risk factors. Lipid panel: 06/03/2024          Other Preventive Screenings Covered by Medicare:  Abdominal Aortic Aneurysm (AAA) Screening: covered once if your at risk. You're considered to be at risk if you have a family history of AAA.  Lung Cancer Screening: covers low dose CT scan once per year if you meet all of the following conditions: (1) Age 55-77; (2) No signs or symptoms of lung cancer; (3) Current smoker or have quit smoking within the last 15 years; (4) You have a tobacco smoking history of at least 20 pack years (packs per day multiplied by number of years you smoked); (5) You get a written order from a healthcare provider.  Glaucoma Screening: covered annually if you're considered high risk: (1) You have diabetes OR (2) Family history of glaucoma OR (3)  aged 50 and older OR (4)  American aged 65 and older  Osteoporosis Screening: covered every 2 years if you meet one of the following conditions: (1) You're estrogen deficient and at risk for osteoporosis based off medical history and other findings; (2) Have a vertebral abnormality; (3) On glucocorticoid therapy for more than 3 months; (4) Have primary hyperparathyroidism; (5) On osteoporosis medications and need to assess response to drug therapy.   Last bone density test (DXA Scan): 07/25/2022.  HIV Screening: covered annually if you're between the age of 15-65. Also covered annually if you are younger than 15 and older than 65 with risk factors for HIV infection. For pregnant patients, it is covered up to 3 times per pregnancy.    Immunizations:  Immunization Recommendations   Influenza Vaccine Annual influenza vaccination during flu season is  recommended for all persons aged >= 6 months who do not have contraindications   Pneumococcal Vaccine   * Pneumococcal conjugate vaccine = PCV13 (Prevnar 13), PCV15 (Vaxneuvance), PCV20 (Prevnar 20)  * Pneumococcal polysaccharide vaccine = PPSV23 (Pneumovax) Adults 19-65 yo with certain risk factors or if 65+ yo  If never received any pneumonia vaccine: recommend Prevnar 20 (PCV20)  Give PCV20 if previously received 1 dose of PCV13 or PPSV23   Hepatitis B Vaccine 3 dose series if at intermediate or high risk (ex: diabetes, end stage renal disease, liver disease)   Respiratory syncytial virus (RSV) Vaccine - COVERED BY MEDICARE PART D  * RSVPreF3 (Arexvy) CDC recommends that adults 60 years of age and older may receive a single dose of RSV vaccine using shared clinical decision-making (SCDM)   Tetanus (Td) Vaccine - COST NOT COVERED BY MEDICARE PART B Following completion of primary series, a booster dose should be given every 10 years to maintain immunity against tetanus. Td may also be given as tetanus wound prophylaxis.   Tdap Vaccine - COST NOT COVERED BY MEDICARE PART B Recommended at least once for all adults. For pregnant patients, recommended with each pregnancy.   Shingles Vaccine (Shingrix) - COST NOT COVERED BY MEDICARE PART B  2 shot series recommended in those 19 years and older who have or will have weakened immune systems or those 50 years and older     Health Maintenance Due:      Topic Date Due   • Hepatitis C Screening  Completed     Immunizations Due:      Topic Date Due   • Pneumococcal Vaccine: 65+ Years (1 of 2 - PCV) Never done   • COVID-19 Vaccine (1 - 2023-24 season) Never done   • Influenza Vaccine (Season Ended) 09/01/2024     Advance Directives   What are advance directives?  Advance directives are legal documents that state your wishes and plans for medical care. These plans are made ahead of time in case you lose your ability to make decisions for yourself. Advance directives can apply  to any medical decision, such as the treatments you want, and if you want to donate organs.   What are the types of advance directives?  There are many types of advance directives, and each state has rules about how to use them. You may choose a combination of any of the following:  Living will:  This is a written record of the treatment you want. You can also choose which treatments you do not want, which to limit, and which to stop at a certain time. This includes surgery, medicine, IV fluid, and tube feedings.   Durable power of  for healthcare (DPAHC):  This is a written record that states who you want to make healthcare choices for you when you are unable to make them for yourself. This person, called a proxy, is usually a family member or a friend. You may choose more than 1 proxy.  Do not resuscitate (DNR) order:  A DNR order is used in case your heart stops beating or you stop breathing. It is a request not to have certain forms of treatment, such as CPR. A DNR order may be included in other types of advance directives.  Medical directive:  This covers the care that you want if you are in a coma, near death, or unable to make decisions for yourself. You can list the treatments you want for each condition. Treatment may include pain medicine, surgery, blood transfusions, dialysis, IV or tube feedings, and a ventilator (breathing machine).  Values history:  This document has questions about your views, beliefs, and how you feel and think about life. This information can help others choose the care that you would choose.  Why are advance directives important?  An advance directive helps you control your care. Although spoken wishes may be used, it is better to have your wishes written down. Spoken wishes can be misunderstood, or not followed. Treatments may be given even if you do not want them. An advance directive may make it easier for your family to make difficult choices about your care.   Weight  Management   Why it is important to manage your weight:  Being overweight increases your risk of health conditions such as heart disease, high blood pressure, type 2 diabetes, and certain types of cancer. It can also increase your risk for osteoarthritis, sleep apnea, and other respiratory problems. Aim for a slow, steady weight loss. Even a small amount of weight loss can lower your risk of health problems.  How to lose weight safely:  A safe and healthy way to lose weight is to eat fewer calories and get regular exercise. You can lose up about 1 pound a week by decreasing the number of calories you eat by 500 calories each day.   Healthy meal plan for weight management:  A healthy meal plan includes a variety of foods, contains fewer calories, and helps you stay healthy. A healthy meal plan includes the following:  Eat whole-grain foods more often.  A healthy meal plan should contain fiber. Fiber is the part of grains, fruits, and vegetables that is not broken down by your body. Whole-grain foods are healthy and provide extra fiber in your diet. Some examples of whole-grain foods are whole-wheat breads and pastas, oatmeal, brown rice, and bulgur.  Eat a variety of vegetables every day.  Include dark, leafy greens such as spinach, kale, reina greens, and mustard greens. Eat yellow and orange vegetables such as carrots, sweet potatoes, and winter squash.   Eat a variety of fruits every day.  Choose fresh or canned fruit (canned in its own juice or light syrup) instead of juice. Fruit juice has very little or no fiber.  Eat low-fat dairy foods.  Drink fat-free (skim) milk or 1% milk. Eat fat-free yogurt and low-fat cottage cheese. Try low-fat cheeses such as mozzarella and other reduced-fat cheeses.  Choose meat and other protein foods that are low in fat.  Choose beans or other legumes such as split peas or lentils. Choose fish, skinless poultry (chicken or turkey), or lean cuts of red meat (beef or pork). Before  you cook meat or poultry, cut off any visible fat.   Use less fat and oil.  Try baking foods instead of frying them. Add less fat, such as margarine, sour cream, regular salad dressing and mayonnaise to foods. Eat fewer high-fat foods. Some examples of high-fat foods include french fries, doughnuts, ice cream, and cakes.  Eat fewer sweets.  Limit foods and drinks that are high in sugar. This includes candy, cookies, regular soda, and sweetened drinks.  Exercise:  Exercise at least 30 minutes per day on most days of the week. Some examples of exercise include walking, biking, dancing, and swimming. You can also fit in more physical activity by taking the stairs instead of the elevator or parking farther away from stores. Ask your healthcare provider about the best exercise plan for you.      © Copyright [employer] 2018 Information is for End User's use only and may not be sold, redistributed or otherwise used for commercial purposes. All illustrations and images included in CareNotes® are the copyrighted property of [employer] Inc[employer] or [employer]

## 2024-06-11 NOTE — PROGRESS NOTES
Ambulatory Visit  Name: Pooja Barron      : 1940      MRN: [id number]  Encounter Provider: Akash Kothari MD  Encounter Date: 2024   Encounter department: [facility]    Assessment & Plan   1. Lumbar radiculopathy  Assessment & Plan:  Chronic intermittent  Recent x-ray reviewed  Continue Tylenol as needed  Advised to start physical therapy as referred previously  To orthopedic for evaluation  Orders:  -     Ambulatory referral to Orthopedic Surgery; Future  2. Primary osteoarthritis involving multiple joints  Assessment & Plan:  Chronic intermittent bilateral hip pain, right worse than left  Recent x-ray reviewed  Continue Tylenol as needed  Advised to start physical therapy as referred previously  To orthopedic for evaluation  Orders:  -     Ambulatory referral to Orthopedic Surgery; Future  3. Medicare annual wellness visit, subsequent  4. Chronic atrial fibrillation (HCC)  Assessment & Plan:  Stable  Under care of cardiology  On Coumadin managed by cardiology  Continue follow-up with cardiology  5. Type 2 diabetes mellitus with other specified complication, without long-term current use of insulin (HCC)  Assessment & Plan:    Lab Results   Component Value Date    HGBA1C 6.0 (H) 2024     Recent A1c 6.0  On metformin 500 twice a day  Continue medication, diet and exercise  Repeat labs in 3 months    6. Obstructive sleep apnea  Assessment & Plan:  Remains on CPAP tolerating without side effect.  7. Hypercholesteremia  Assessment & Plan:  Recent labs reviewed  Was previously on atorvastatin 40, however reports she stopped taking on her own last year, unable to recall the reason.  Start atorvastatin 20 mg once daily  LFTs within normal limit recent labs  Dietary modification discussed  Repeat labs 3 months  Orders:  -     atorvastatin (LIPITOR) 20 mg tablet; Take 1 tablet (20 mg total) by mouth daily  8. Frequent UTI  Assessment & Plan:  Reports history of  frequent UTIs  Denies any symptoms at this time  Reports difficulty following up with urology due to distance requesting new urology referral nearby  Urology referral placed.  Encouraged to schedule an appointment.  Orders:  -     Ambulatory referral to Urology; Future  9. Essential hypertension  Assessment & Plan:  Blood pressure acceptable  Asymptomatic  Reports she was was previously on losartan 25 which was discontinued by her cardiologist.  Advised to keep home blood pressure diary and bring next visit  monitor      Depression Screening and Follow-up Plan: Patient was screened for depression during today's encounter. They screened negative with a PHQ-2 score of 0.      Preventive health issues were discussed with patient, and age appropriate screening tests were ordered as noted in patient's After Visit Summary. Personalized health advice and appropriate referrals for health education or preventive services given if needed, as noted in patient's After Visit Summary.    History of Present Illness     Patient is here with complaints of chronic right hip pain, intermittent, not sure what's makes it worse. About 8-9 times a day, last for few seconds, unable to recognise the triggers. No pain at this time.  Patient also wants to review recent labs that she has already reviewed with her cardiologist.  Lipid panel abnormal, states she stopped taking atorvastatin last year on her own. Unable to recall the reason.  Denies any other complaints at this time.   Today, denies any fever, chills, headache, dizziness, chest pain, shortness of breath, abdominal pain, nausea/vomiting, diarrhea/constipation, any numbness or weakness.    Diabetic Foot Exam    Patient's shoes and socks removed.    Right Foot/Ankle   Right Foot Inspection  Skin Exam: skin normal and skin intact. No dry skin, no warmth, no callus, no erythema, no maceration, no abnormal color, no pre-ulcer, no ulcer and no callus.     Toe Exam: ROM and strength  within normal limits. No swelling, no tenderness and erythema    Sensory   Proprioception: intact  Monofilament testing: intact    Vascular  Capillary refills: < 3 seconds  The right DP pulse is 2+. The right PT pulse is 2+.     Left Foot/Ankle  Left Foot Inspection  Skin Exam: skin normal and skin intact. No dry skin, no warmth, no erythema, no maceration, normal color, no pre-ulcer, no ulcer and no callus.     Toe Exam: ROM and strength within normal limits. No swelling, no tenderness and no erythema.     Sensory   Proprioception: intact  Monofilament testing: intact    Vascular  Capillary refills: < 3 seconds  The left DP pulse is 2+. The left PT pulse is 2+.     Assign Risk Category  Risk: 0     Patient Care Team:  Jorge Colon MD as PCP - General (Internal Medicine)    Review of Systems   Constitutional:  Negative for chills and fever.   HENT:  Negative for ear pain and sore throat.    Eyes:  Negative for pain and visual disturbance.   Respiratory:  Negative for cough and shortness of breath.    Cardiovascular:  Negative for chest pain and palpitations.   Gastrointestinal:  Negative for abdominal pain and vomiting.   Genitourinary:  Negative for dysuria and hematuria.   Musculoskeletal:  Positive for arthralgias.   Skin:  Negative for rash.   Neurological:  Negative for seizures and syncope.   All other systems reviewed and are negative.    Medical History Reviewed by provider this encounter:       Annual Wellness Visit Questionnaire   Pooja is here for her Subsequent Wellness visit. Last Medicare Wellness visit information reviewed, patient interviewed and updates made to the record today.      Health Risk Assessment:   Patient rates overall health as very good. Patient feels that their physical health rating is same. Patient is satisfied with their life. Eyesight was rated as slightly worse. Hearing was rated as same. Patient feels that their emotional and mental health rating is same. Patients states  they are never, rarely angry. Patient states they are often unusually tired/fatigued. Pain experienced in the last 7 days has been none. Patient states that she has experienced weight loss or gain in last 6 months. Weight is stable    Depression Screening:   PHQ-2 Score: 0      Fall Risk Screening:   In the past year, patient has experienced: no history of falling in past year      Urinary Incontinence Screening:   Patient has not leaked urine accidently in the last six months.     Home Safety:  Patient does not have trouble with stairs inside or outside of their home. Patient has working smoke alarms and has working carbon monoxide detector. Home safety hazards include: none. No home safety hazards. Feels safe in her home. Has good neighbors and looks in on her.    Nutrition:   Current diet is Regular. Eats a balanced diet.    Medications:   Patient is currently taking over-the-counter supplements. OTC medications include: see medication list. Patient is able to manage medications. No problems managing meds    Activities of Daily Living (ADLs)/Instrumental Activities of Daily Living (IADLs):   Walk and transfer into and out of bed and chair?: Yes  Dress and groom yourself?: Yes    Bathe or shower yourself?: Yes    Feed yourself? Yes  Do your laundry/housekeeping?: Yes  Manage your money, pay your bills and track your expenses?: Yes  Make your own meals?: Yes    Do your own shopping?: Yes    ADL comments: Pt is independent.    Previous Hospitalizations:   Any hospitalizations or ED visits within the last 12 months?: No    How many hospitalizations have you had in the last year?: 1-2    Hospitalization Comments: Conditions are stable    Advance Care Planning:   Living will: Yes    Durable POA for healthcare: Yes    Advanced directive: Yes    Advanced directive counseling given: No    Five wishes given: No    Patient declined ACP directive: No    End of Life Decisions reviewed with patient: No    Provider agrees with  end of life decisions: Yes      Comments: All documents are in place    Cognitive Screening:   Provider or family/friend/caregiver concerned regarding cognition?: No    PREVENTIVE SCREENINGS      Cardiovascular Screening:    General: Screening Not Indicated and History Lipid Disorder      Diabetes Screening:     General: Screening Not Indicated and History Diabetes      Colorectal Cancer Screening:     General: Screening Current      Breast Cancer Screening:     General: Screening Current      Cervical Cancer Screening:    General: History Cervical Cancer      Osteoporosis Screening:    General: Risks and Benefits Discussed and Patient Declines      Abdominal Aortic Aneurysm (AAA) Screening:        General: Screening Not Indicated and History AAA      Lung Cancer Screening:     General: Screening Not Indicated      Hepatitis C Screening:    General: Screening Current      Preventive Screening Comments: Refuses all vaccines. UTD with eye visits.    Screening, Brief Intervention, and Referral to Treatment (SBIRT)    Screening  Typical number of drinks in a day: 0  Typical number of drinks in a week: 0  Interpretation: Low risk drinking behavior.    AUDIT-C Screenin) How often did you have a drink containing alcohol in the past year? never  2) How many drinks did you have on a typical day when you were drinking in the past year? 0  3) How often did you have 6 or more drinks on one occasion in the past year? never    AUDIT-C Score: 0  Interpretation: Score 0-2 (female): Negative screen for alcohol misuse    Single Item Drug Screening:  How often have you used an illegal drug (including marijuana) or a prescription medication for non-medical reasons in the past year? never    Single Item Drug Screen Score: 0  Interpretation: Negative screen for possible drug use disorder    Brief Intervention  Alcohol & drug use screenings were reviewed. No concerns regarding substance use disorder identified.     Other Counseling  "Topics:   Skin self-exam, sunscreen and calcium and vitamin D intake.     Social Determinants of Health     Financial Resource Strain: Low Risk  (6/2/2023)    Overall Financial Resource Strain (CARDIA)     Difficulty of Paying Living Expenses: Not hard at all   Food Insecurity: No Food Insecurity (6/11/2024)    Hunger Vital Sign     Worried About Running Out of Food in the Last Year: Never true     Ran Out of Food in the Last Year: Never true   Transportation Needs: No Transportation Needs (6/11/2024)    PRAPARE - Transportation     Lack of Transportation (Medical): No     Lack of Transportation (Non-Medical): No   Housing Stability: Low Risk  (6/11/2024)    Housing Stability Vital Sign     Unable to Pay for Housing in the Last Year: No     Number of Times Moved in the Last Year: 1     Homeless in the Last Year: No   Utilities: Not At Risk (6/11/2024)    [facility] Utilities     Threatened with loss of utilities: No     No results found.    Objective     /82   Pulse 83   Ht 5' 5\" (1.651 m)   Wt 74.8 kg (165 lb)   SpO2 99%   BMI 27.46 kg/m²     Physical Exam  Vitals reviewed.   Constitutional:       General: She is not in acute distress.     Appearance: She is well-developed.   HENT:      Head: Normocephalic and atraumatic.      Nose: No congestion.      Mouth/Throat:      Mouth: Mucous membranes are moist.      Pharynx: Oropharynx is clear. No oropharyngeal exudate.   Eyes:      Extraocular Movements: Extraocular movements intact.      Conjunctiva/sclera: Conjunctivae normal.      Pupils: Pupils are equal, round, and reactive to light.   Cardiovascular:      Rate and Rhythm: Normal rate and regular rhythm.      Pulses:           Dorsalis pedis pulses are 2+ on the right side and 2+ on the left side.        Posterior tibial pulses are 2+ on the right side and 2+ on the left side.   Pulmonary:      Effort: Pulmonary effort is normal. No respiratory distress.      Breath sounds: Normal breath sounds. No wheezing. " "  Abdominal:      Palpations: Abdomen is soft.      Tenderness: There is no abdominal tenderness.   Musculoskeletal:      Cervical back: Neck supple.      Right lower leg: No edema.      Left lower leg: No edema.   Feet:      Right foot:      Skin integrity: No ulcer, skin breakdown, erythema, warmth, callus or dry skin.      Left foot:      Skin integrity: No ulcer, skin breakdown, erythema, warmth, callus or dry skin.   Skin:     General: Skin is warm and dry.      Capillary Refill: Capillary refill takes less than 2 seconds.   Neurological:      General: No focal deficit present.      Mental Status: She is alert and oriented to person, place, and time.      Comments: Uses cane to ambulate \" sometimes\".   Psychiatric:         Mood and Affect: Mood normal.               "

## 2024-06-11 NOTE — PROGRESS NOTES
Assessment/Plan: Metatarsalgia secondary to radiculopathy.  Rule out neuropathy secondary to hyperglycemia.  Pain upon ambulation.  Chronic ingrown toenail.  Chronic edema secondary deep venous insufficiency.     Plan.  Chart reviewed.  Primary care notes reviewed.  Venous Doppler reviewed with patient and family.  Patient advised on condition.  She will follow-up vascular surgery.  In the meantime, patient will elevate feet at end of day.  She will consider compression stockings.  Patient advised on proper shoe size.  Nail cutting technique offered.  In addition patient be started on empiric course of gabapentin.  This will be 100 mg daily at bedtime.  Refill ordered.  Aftercare instruction given.  Return for follow-up.            Diagnoses and all orders for this visit:     Metatarsalgia of both feet  -     gabapentin (NEURONTIN) 100 mg capsule; Take 1 capsule (100 mg total) by mouth daily at bedtime     Chronic edema     Deep venous insufficiency     Paronychia of toenail of left foot     Radiculopathy of lumbar region  -     gabapentin (NEURONTIN) 100 mg capsule; Take 1 capsule (100 mg total) by mouth daily at bedtime            Subjective: Patient has 2 concerns.  She has numbness and tingling of her feet.  She knows she has neuropathy.  She is also concerned about swelling.  She had Doppler test as directed.             Allergies   Allergen Reactions    Sulfa Antibiotics Tongue Swelling    Sulfasalazine Throat Swelling            Current Outpatient Medications:     gabapentin (NEURONTIN) 100 mg capsule, Take 1 capsule (100 mg total) by mouth daily at bedtime, Disp: 30 capsule, Rfl: 1    acetaminophen (TYLENOL) 325 mg tablet, Take 2 tablets (650 mg total) by mouth every 6 (six) hours as needed for mild pain or headaches, Disp: , Rfl: 0    atorvastatin (LIPITOR) 40 mg tablet, Take 1 tablet (40 mg total) by mouth daily, Disp: 90 tablet, Rfl: 1    Cholecalciferol (VITAMIN D PO), Take by mouth, Disp: , Rfl:      estradiol (ESTRACE) 0.1 mg/g vaginal cream, 0.5 grams of cream intravaginally administered daily for one to two weeks, then reduce to twice weekly, Disp: 42.5 g, Rfl: 3    glucose blood test strip, Use 1 each in the morning Use one daily, Disp: 100 strip, Rfl: 2    ketoconazole (NIZORAL) 2 % cream, Apply topically daily, Disp: 60 g, Rfl: 1    losartan (COZAAR) 25 mg tablet, Take 1 tablet (25 mg total) by mouth daily (Patient not taking: Reported on 1/10/2024), Disp: 30 tablet, Rfl: 1    metFORMIN (GLUCOPHAGE) 500 mg tablet, Take 1 tablet (500 mg total) by mouth 2 (two) times a day with meals, Disp: 180 tablet, Rfl: 3    Multiple Vitamins-Minerals (PRESERVISION AREDS PO), Take 2 tablets by mouth daily  , Disp: , Rfl:     warfarin (COUMADIN) 2.5 mg tablet, Take 1 tablet (2.5 mg total) by mouth 3 (three) times a week On Monday Wednesday and Friday (Patient taking differently: Take 2.5 mg by mouth 3 (three) times a week Added to 5mg dose only on tuesdays=7.5mg), Disp: , Rfl: 0    warfarin (COUMADIN) 5 mg tablet, Take 1 tablet (5 mg total) by mouth 4 (four) times a week On Sunday, Tuesday, Thursday, Saturday (Patient taking differently: Take 5 mg by mouth in the morning), Disp: , Rfl: 0           Patient Active Problem List   Diagnosis    Obstructive sleep apnea    Chronic atrial fibrillation (HCC)    H/O mitral valve replacement with mechanical valve    Long term (current) use of anticoagulants (warfarin)    Presence of prosthetic heart valve    Hypercholesteremia    History of anemia due to CKD    Allergic rhinitis    Type 2 diabetes mellitus without complication, without long-term current use of insulin (HCC)    Iron deficiency anemia    Other specified hypothyroidism    Vitamin B12 deficiency    Other microscopic hematuria    Celiac disease    Abdominal aortic aneurysm (AAA) without rupture, unspecified part (HCC)    Ataxia    Pulmonary emphysema (HCC)    History of renal calculi    History of cervical cancer     Essential hypertension    Other proteinuria    Hepatic steatosis    CVA (cerebral vascular accident) (HCC)    Type 2 diabetes mellitus with other specified complication, without long-term current use of insulin (HCC)    CKD (chronic kidney disease) stage 2, GFR 60-89 ml/min    Cerebrovascular accident (CVA) (HCC)    Fall    Anemia    Acute blood loss anemia    AVM (arteriovenous malformation) of duodenum, acquired    Balance problem    Edema of left lower leg    Chronic pain of left knee    Onychomycosis    Asymptomatic varicose veins of both lower extremities    Gross hematuria    Trigger ring finger of right hand    Chronic venous insufficiency of lower extremity    Uterine prolapse             Patient ID: Pooja Barron is a 84 y.o. female.     HPI     The following portions of the patient's history were reviewed and updated as appropriate:      family history includes Heart attack in her father; Heart failure in her mother; Sleep apnea in her brother.       reports that she quit smoking about 35 years ago. Her smoking use included cigarettes. She started smoking about 65 years ago. She has a 60.0 pack-year smoking history. She has never used smokeless tobacco. She reports current alcohol use. She reports that she does not use drugs.        Objective:  Patient's shoes and socks removed.   Foot ExamPhysical Exam       Foot Exam     General  General Appearance: appears stated age and healthy   Orientation: alert and oriented to person, place, and time   Affect: appropriate   Gait: antalgic         Right Foot/Ankle      Inspection and Palpation  Tenderness: metatarsals   Swelling: dorsum   Arch: pes planus  Hallux valgus: yes     Neurovascular  Dorsalis pedis: 2+  Posterior tibial: 2+        Left Foot/Ankle       Inspection and Palpation  Tenderness: metatarsals   Swelling: dorsum   Arch: pes planus  Hallux valgus: yes     Neurovascular  Dorsalis pedis: 2+  Posterior tibial: 2+           Physical Exam  Vitals and  nursing note reviewed.   Constitutional:       Appearance: Normal appearance.   Cardiovascular:      Rate and Rhythm: Normal rate and regular rhythm.      Pulses:           Dorsalis pedis pulses are 2+ on the right side and 2+ on the left side.        Posterior tibial pulses are 2+ on the right side and 2+ on the left side.      Comments: Patient demonstrates significant amount of varicosities and telangiectasia of the lower extremity bilateral.  Venous Doppler demonstrates deep venous insufficiency.  Musculoskeletal:      Right lower le+ Pitting Edema present.      Left lower le+ Pitting Edema present.      Right foot: Bunion present.      Left foot: Bunion present.   Skin:     Capillary Refill: Capillary refill takes less than 2 seconds.      Comments: All nails are dystrophic.  Left hallux nail demonstrates subungual mycosis.  It is ingrown in the fibular aspect.  Positive paronychia.  Negative pus.   Neurological:      Mental Status: She is alert.   Psychiatric:         Mood and Affect: Mood normal.         Behavior: Behavior normal.         Thought Content: Thought content normal.         Judgment: Judgment normal.

## 2024-06-11 NOTE — ASSESSMENT & PLAN NOTE
Recent labs reviewed  Was previously on atorvastatin 40, however reports she stopped taking on her own last year, unable to recall the reason.  Start atorvastatin 20 mg once daily  LFTs within normal limit recent labs  Dietary modification discussed  Repeat labs 3 months

## 2024-06-11 NOTE — ASSESSMENT & PLAN NOTE
Chronic intermittent bilateral hip pain, right worse than left  Recent x-ray reviewed  Continue Tylenol as needed  Advised to start physical therapy as referred previously  To orthopedic for evaluation

## 2024-06-11 NOTE — ASSESSMENT & PLAN NOTE
Reports history of frequent UTIs  Denies any symptoms at this time  Reports difficulty following up with urology due to distance requesting new urology referral nearby  Urology referral placed.  Encouraged to schedule an appointment.

## 2024-06-11 NOTE — ASSESSMENT & PLAN NOTE
Stable  Under care of cardiology  On Coumadin managed by cardiology  Continue follow-up with cardiology

## 2024-07-05 ENCOUNTER — APPOINTMENT (OUTPATIENT)
Dept: LAB | Facility: CLINIC | Age: 84
End: 2024-07-05
Payer: MEDICARE

## 2024-07-05 DIAGNOSIS — I48.20 CHRONIC ATRIAL FIBRILLATION (HCC): ICD-10-CM

## 2024-07-05 DIAGNOSIS — Z95.2 HEART VALVE REPLACED BY TRANSPLANT: ICD-10-CM

## 2024-07-05 LAB
INR PPP: 4.42 (ref 0.84–1.19)
PROTHROMBIN TIME: 41.1 SECONDS (ref 11.6–14.5)

## 2024-07-05 PROCEDURE — 36415 COLL VENOUS BLD VENIPUNCTURE: CPT

## 2024-07-05 PROCEDURE — 85610 PROTHROMBIN TIME: CPT

## 2024-07-11 ENCOUNTER — TELEPHONE (OUTPATIENT)
Age: 84
End: 2024-07-11

## 2024-07-11 PROBLEM — N39.0 FREQUENT UTI: Status: RESOLVED | Noted: 2024-06-11 | Resolved: 2024-07-11

## 2024-07-11 NOTE — TELEPHONE ENCOUNTER
Patient has an appt on July 22nd and would like to know if Dr. Colon would want her to have labs done prior to the appt. Please advise once orders are placed or if labs are not needed. Thank you.

## 2024-07-11 NOTE — TELEPHONE ENCOUNTER
Pt notified that she does not need to complete any more labs before appt.  No further questions at this time.

## 2024-07-15 ENCOUNTER — TRANSCRIBE ORDERS (OUTPATIENT)
Dept: LAB | Facility: CLINIC | Age: 84
End: 2024-07-15

## 2024-07-15 DIAGNOSIS — I48.20 CHRONIC ATRIAL FIBRILLATION (HCC): Primary | ICD-10-CM

## 2024-07-15 DIAGNOSIS — Z95.2 HEART VALVE REPLACED BY TRANSPLANT: ICD-10-CM

## 2024-07-16 ENCOUNTER — TRANSCRIBE ORDERS (OUTPATIENT)
Dept: LAB | Facility: CLINIC | Age: 84
End: 2024-07-16

## 2024-07-16 DIAGNOSIS — I48.20 CHRONIC ATRIAL FIBRILLATION (HCC): Primary | ICD-10-CM

## 2024-07-16 DIAGNOSIS — Z95.2 HEART VALVE REPLACED BY TRANSPLANT: ICD-10-CM

## 2024-07-22 ENCOUNTER — OFFICE VISIT (OUTPATIENT)
Dept: INTERNAL MEDICINE CLINIC | Facility: CLINIC | Age: 84
End: 2024-07-22
Payer: MEDICARE

## 2024-07-22 VITALS
WEIGHT: 160 LBS | BODY MASS INDEX: 26.66 KG/M2 | DIASTOLIC BLOOD PRESSURE: 76 MMHG | HEIGHT: 65 IN | SYSTOLIC BLOOD PRESSURE: 130 MMHG | OXYGEN SATURATION: 95 % | HEART RATE: 87 BPM | RESPIRATION RATE: 14 BRPM | TEMPERATURE: 98.6 F

## 2024-07-22 DIAGNOSIS — D70.8 OTHER NEUTROPENIA (HCC): Primary | ICD-10-CM

## 2024-07-22 DIAGNOSIS — M54.16 LUMBAR RADICULOPATHY: ICD-10-CM

## 2024-07-22 DIAGNOSIS — E11.69 TYPE 2 DIABETES MELLITUS WITH OTHER SPECIFIED COMPLICATION, WITHOUT LONG-TERM CURRENT USE OF INSULIN (HCC): ICD-10-CM

## 2024-07-22 PROCEDURE — G2211 COMPLEX E/M VISIT ADD ON: HCPCS | Performed by: INTERNAL MEDICINE

## 2024-07-22 PROCEDURE — 99213 OFFICE O/P EST LOW 20 MIN: CPT | Performed by: INTERNAL MEDICINE

## 2024-07-22 NOTE — ASSESSMENT & PLAN NOTE
Lab Results   Component Value Date    WBC 2.92 (L) 06/03/2024    HGB 12.8 06/03/2024    HCT 39.0 06/03/2024    MCV 95 06/03/2024     06/03/2024    Reviewed  to seen by hematology last 2 CBC reviewed leukopenia getting worse asymptomatic    No fever chills    To be seen by hematology oncology

## 2024-07-22 NOTE — PROGRESS NOTES
Dr. Colon's Office Visit Note  24     Pooja Barron 84 y.o. female MRN: [id number]  : 1940    Assessment:     1. Other neutropenia (HCC)  Assessment & Plan:  Lab Results   Component Value Date    WBC 2.92 (L) 2024    HGB 12.8 2024    HCT 39.0 2024    MCV 95 2024     2024    Reviewed  to seen by hematology last 2 CBC reviewed leukopenia getting worse asymptomatic    No fever chills    To be seen by hematology oncology  Orders:  -     Ambulatory Referral to Hematology / Oncology; Future  2. Type 2 diabetes mellitus with other specified complication, without long-term current use of insulin (HCC)  Assessment & Plan:    Lab Results   Component Value Date    HGBA1C 6.0 (H) 2024   On metformin 500 twice a day  Continue medication, diet and exercise  Repeat labs in 3 months     Orders:  -     metFORMIN (GLUCOPHAGE) 500 mg tablet; Take 1 tablet (500 mg total) by mouth 2 (two) times a day with meals  3. Lumbar radiculopathy  Assessment & Plan:  Recent x-ray reviewed  Continue Tylenol as needed  Advised to start physical therapy as referred previously  To orthopedic for evaluation    Start physical therapy    Patient reluctant to take any NSAID  Orders:  -     Ambulatory Referral to Physical Therapy; Future      Diabetic Foot Exam    Patient's shoes and socks removed.    Right Foot/Ankle   Right Foot Inspection  Skin Exam: skin normal and skin intact. No dry skin, no warmth, no callus, no erythema, no maceration, no abnormal color, no pre-ulcer, no ulcer and no callus.     Toe Exam: ROM and strength within normal limits.     Sensory   Monofilament testing: diminished    Vascular  The right DP pulse is 2+. The right PT pulse is 1+.     Left Foot/Ankle  Left Foot Inspection  Skin Exam: skin normal and skin intact. No dry skin, no warmth, no erythema, no maceration, normal color, no pre-ulcer, no ulcer and no callus.     Toe Exam: ROM and strength within normal limits.      Sensory   Monofilament testing: diminished    Vascular  The left DP pulse is 2+. The left PT pulse is 1+.     Assign Risk Category  No deformity present  No loss of protective sensation  No weak pulses  Risk: 0    Discussion Summary and Plan:  Today's care plan and medications were reviewed with patient in detail and all their questions answered to their satisfaction.    Chief Complaint   Patient presents with   • Follow-up     DM foot      Subjective:  Came in complaint lower back pain intermittent mainly bilateral right more than the left nonradiating with some intermittent tingling numbness lower extremity and labs reviewed showed leukopenia        The following portions of the patient's history were reviewed and updated as appropriate: allergies, current medications, past family history, past medical history, past social history, past surgical history and problem list.    Review of Systems   Constitutional:  Positive for activity change. Negative for appetite change, chills, diaphoresis, fatigue, fever and unexpected weight change.   HENT:  Negative for congestion, dental problem, drooling, ear discharge, ear pain, facial swelling, hearing loss, mouth sores, nosebleeds, postnasal drip, rhinorrhea, sinus pressure, sneezing, sore throat, tinnitus, trouble swallowing and voice change.    Eyes:  Negative for photophobia, pain, discharge, redness, itching and visual disturbance.   Respiratory:  Negative for apnea, cough, choking, chest tightness, shortness of breath, wheezing and stridor.    Cardiovascular:  Negative for chest pain, palpitations and leg swelling.   Gastrointestinal:  Negative for abdominal distention, abdominal pain, anal bleeding, blood in stool, constipation, diarrhea, nausea, rectal pain and vomiting.   Endocrine: Negative for cold intolerance, heat intolerance, polydipsia, polyphagia and polyuria.   Genitourinary:  Negative for decreased urine volume, difficulty urinating, dysuria, enuresis,  flank pain, frequency, genital sores, hematuria and urgency.   Musculoskeletal:  Positive for arthralgias, back pain, joint swelling and myalgias. Negative for gait problem, neck pain and neck stiffness.   Skin:  Negative for color change, pallor, rash and wound.   Allergic/Immunologic: Negative.  Negative for environmental allergies, food allergies and immunocompromised state.   Neurological:  Negative for dizziness, tremors, seizures, syncope, facial asymmetry, speech difficulty, weakness, light-headedness, numbness and headaches.   Psychiatric/Behavioral:  Negative for agitation, behavioral problems, confusion, decreased concentration, dysphoric mood, hallucinations, self-injury, sleep disturbance and suicidal ideas. The patient is not nervous/anxious and is not hyperactive.          Historical Information   Patient Active Problem List   Diagnosis   • Obstructive sleep apnea   • Chronic atrial fibrillation (HCC)   • H/O mitral valve replacement with mechanical valve   • Long term (current) use of anticoagulants (warfarin)   • Presence of prosthetic heart valve   • Hypercholesteremia   • History of anemia due to CKD   • Allergic rhinitis   • Type 2 diabetes mellitus without complication, without long-term current use of insulin ([facility])   • Iron deficiency anemia   • Other specified hypothyroidism   • Vitamin B12 deficiency   • Other microscopic hematuria   • Celiac disease   • Abdominal aortic aneurysm (AAA) without rupture, unspecified part ([facility])   • Ataxia   • Pulmonary emphysema ([facility])   • History of renal calculi   • History of cervical cancer   • Essential hypertension   • Other proteinuria   • Hepatic steatosis   • CVA (cerebral vascular accident) ([facility])   • Type 2 diabetes mellitus with other specified complication, without long-term current use of insulin ([facility])   • CKD (chronic kidney disease) stage 2, GFR 60-89 ml/min   • Cerebrovascular accident (CVA) ([facility])   • Fall   • Anemia   • Acute blood loss anemia   •  AVM (arteriovenous malformation) of duodenum, acquired   • Balance problem   • Edema of left lower leg   • Chronic pain of left knee   • Onychomycosis   • Asymptomatic varicose veins of both lower extremities   • Gross hematuria   • Trigger ring finger of right hand   • Chronic venous insufficiency of lower extremity   • Uterine prolapse   • History of stroke   • Lumbar radiculopathy   • Primary osteoarthritis involving multiple joints   • Gastroesophageal reflux disease without esophagitis   • Other neutropenia (HCC)     Past Medical History:   Diagnosis Date   • Bloody nose     slow drip, clots in the morning   • Colon polyp    • Diabetes mellitus (HCC)     Pre DM diet controlled, metformin DCed    • Heart murmur    • Hyperlipidemia    • Stroke (HCC) 2022     Past Surgical History:   Procedure Laterality Date   • CATARACT EXTRACTION Bilateral    • COLONOSCOPY     • EGD     • HYSTERECTOMY     • MITRAL VALVE REPLACEMENT       Social History     Substance and Sexual Activity   Alcohol Use Yes    Comment: special occasional     Social History     Substance and Sexual Activity   Drug Use No     Social History     Tobacco Use   Smoking Status Former   • Current packs/day: 0.00   • Average packs/day: 2.0 packs/day for 30.0 years (60.0 ttl pk-yrs)   • Types: Cigarettes   • Start date: 1959   • Quit date:    • Years since quittin.5   Smokeless Tobacco Never     Family History   Problem Relation Age of Onset   • Heart failure Mother    • Heart attack Father    • Sleep apnea Brother      Health Maintenance Due   Topic   • Pneumococcal Vaccine: 65+ Years (1 of 2 - PCV)   • Zoster Vaccine (1 of 2)   • RSV Vaccine Age 60+ Years (1 - 1-dose 60+ series)   • OT PLAN OF CARE    • COVID-19 Vaccine ( season)   • Influenza Vaccine (1)      Meds/Allergies       Current Outpatient Medications:   •  acetaminophen (TYLENOL) 325 mg tablet, Take 2 tablets (650 mg total) by mouth every 6 (six) hours as  "needed for mild pain or headaches, Disp: , Rfl: 0  •  atorvastatin (LIPITOR) 20 mg tablet, Take 1 tablet (20 mg total) by mouth daily, Disp: 30 tablet, Rfl: 2  •  Cholecalciferol (VITAMIN D PO), Take by mouth, Disp: , Rfl:   •  estradiol (ESTRACE) 0.1 mg/g vaginal cream, 0.5 grams of cream intravaginally administered daily for one to two weeks, then reduce to twice weekly, Disp: 42.5 g, Rfl: 3  •  famotidine (PEPCID) 40 MG tablet, Take 1 tablet (40 mg total) by mouth daily, Disp: 90 tablet, Rfl: 1  •  glucose blood test strip, Use 1 each in the morning Use one daily, Disp: 100 strip, Rfl: 2  •  metFORMIN (GLUCOPHAGE) 500 mg tablet, Take 1 tablet (500 mg total) by mouth 2 (two) times a day with meals, Disp: 180 tablet, Rfl: 3  •  Multiple Vitamins-Minerals (PRESERVISION AREDS PO), Take 2 tablets by mouth daily  , Disp: , Rfl:   •  warfarin (COUMADIN) 2.5 mg tablet, Take 1 tablet (2.5 mg total) by mouth 3 (three) times a week On Monday Wednesday and Friday (Patient taking differently: Take 2.5 mg by mouth 3 (three) times a week Added to 5mg dose only on tuesdays=7.5mg), Disp: , Rfl: 0  •  warfarin (COUMADIN) 5 mg tablet, Take 1 tablet (5 mg total) by mouth 4 (four) times a week On Sunday, Tuesday, Thursday, Saturday (Patient taking differently: Take 5 mg by mouth in the morning), Disp: , Rfl: 0  •  ketoconazole (NIZORAL) 2 % cream, Apply topically daily, Disp: 60 g, Rfl: 1      Objective:    Vitals:   /76   Pulse 87   Temp 98.6 °F (37 °C)   Resp 14   Ht 5' 5\" (1.651 m)   Wt 72.6 kg (160 lb)   SpO2 95%   BMI 26.63 kg/m²   Body mass index is 26.63 kg/m².  Vitals:    07/22/24 1420   Weight: 72.6 kg (160 lb)       Physical Exam  Vitals and nursing note reviewed.   Constitutional:       General: She is not in acute distress.     Appearance: She is well-developed. She is not ill-appearing, toxic-appearing or diaphoretic.   HENT:      Head: Normocephalic and atraumatic.      Right Ear: External ear normal.      " Left Ear: External ear normal.      Nose: Nose normal.      Mouth/Throat:      Pharynx: No oropharyngeal exudate.   Eyes:      General: Lids are normal. Lids are everted, no foreign bodies appreciated. No scleral icterus.        Right eye: No discharge.         Left eye: No discharge.      Conjunctiva/sclera: Conjunctivae normal.      Pupils: Pupils are equal, round, and reactive to light.   Neck:      Thyroid: No thyromegaly.      Vascular: Normal carotid pulses. No carotid bruit, hepatojugular reflux or JVD.      Trachea: No tracheal tenderness or tracheal deviation.   Cardiovascular:      Rate and Rhythm: Normal rate and regular rhythm.      Pulses: no weak pulses.           Dorsalis pedis pulses are 2+ on the right side and 2+ on the left side.        Posterior tibial pulses are 1+ on the right side and 1+ on the left side.      Heart sounds: Murmur heard.      No friction rub. No gallop.   Pulmonary:      Effort: Pulmonary effort is normal. No respiratory distress.      Breath sounds: Normal breath sounds. No stridor. No wheezing or rales.   Chest:      Chest wall: No tenderness.   Abdominal:      General: Bowel sounds are normal. There is no distension.      Palpations: Abdomen is soft. There is no mass.      Tenderness: There is no abdominal tenderness. There is no guarding or rebound.   Musculoskeletal:         General: No tenderness or deformity. Normal range of motion.      Cervical back: Normal range of motion and neck supple. No edema, erythema or rigidity. No spinous process tenderness or muscular tenderness. Normal range of motion.   Feet:      Right foot:      Skin integrity: No ulcer, skin breakdown, erythema, warmth, callus or dry skin.      Left foot:      Skin integrity: No ulcer, skin breakdown, erythema, warmth, callus or dry skin.   Lymphadenopathy:      Head:      Right side of head: No submental, submandibular, tonsillar, preauricular or posterior auricular adenopathy.      Left side of head:  No submental, submandibular, tonsillar, preauricular, posterior auricular or occipital adenopathy.      Cervical: No cervical adenopathy.      Right cervical: No superficial, deep or posterior cervical adenopathy.     Left cervical: No superficial, deep or posterior cervical adenopathy.      Upper Body:      Right upper body: No pectoral adenopathy.      Left upper body: No pectoral adenopathy.   Skin:     General: Skin is warm and dry.      Coloration: Skin is not pale.      Findings: No erythema or rash.   Neurological:      General: No focal deficit present.      Mental Status: She is alert and oriented to person, place, and time.      Cranial Nerves: No cranial nerve deficit.      Sensory: No sensory deficit.      Motor: No tremor, abnormal muscle tone or seizure activity.      Coordination: Coordination normal.      Gait: Gait abnormal.      Deep Tendon Reflexes: Reflexes are normal and symmetric. Reflexes normal.   Psychiatric:         Behavior: Behavior normal.         Thought Content: Thought content normal.         Judgment: Judgment normal.         Lab Review   Appointment on 07/05/2024   Component Date Value Ref Range Status   • Protime 07/05/2024 41.1 (H)  11.6 - 14.5 seconds Final   • INR 07/05/2024 4.42 (H)  0.84 - 1.19 Final   Transcribe Orders on 06/03/2024   Component Date Value Ref Range Status   • TSH 3RD GENERATON 06/03/2024 2.804  0.450 - 4.500 uIU/mL Final    The recommended reference ranges for TSH during pregnancy are as follows:   First trimester 0.100 to 2.500 uIU/mL   Second trimester  0.200 to 3.000 uIU/mL   Third trimester 0.300 to 3.000 uIU/m    Note: Normal ranges may not apply to patients who are transgender, non-binary, or whose legal sex, sex at birth, and gender identity differ.  Adult TSH (3rd generation) reference range follows the recommended guidelines of the American [employer] Association, January, 2020.   • Cholesterol 06/03/2024 239 (H)  See Comment mg/dL Final    Cholesterol:          Pediatric <18 Years        Desirable          <170 mg/dL      Borderline High    170-199 mg/dL      High               >=200 mg/dL        Adult >=18 Years            Desirable         <200 mg/dL      Borderline High   200-239 mg/dL      High              >239 mg/dL     • Triglycerides 06/03/2024 240 (H)  See Comment mg/dL Final    Triglyceride:     0-9Y            <75mg/dL     10Y-17Y         <90 mg/dL       >=18Y     Normal          <150 mg/dL     Borderline High 150-199 mg/dL     High            200-499 mg/dL        Very High       >499 mg/dL    Specimen collection should occur prior to Metamizole administration due to the potential for falsely depressed results.   • HDL, Direct 06/03/2024 49 (L)  >=50 mg/dL Final   • LDL Calculated 06/03/2024 142 (H)  0 - 100 mg/dL Final    LDL Cholesterol:     Optimal           <100 mg/dl     Near Optimal      100-129 mg/dl     Above Optimal       Borderline High 130-159 mg/dl       High            160-189 mg/dl       Very High       >189 mg/dl         This screening LDL is a calculated result.   It does not have the accuracy of the Direct Measured LDL in the monitoring of patients with hyperlipidemia and/or statin therapy.   Direct Measure LDL (PCG811) must be ordered separately in these patients.   • Non-HDL-Chol (CHOL-HDL) 06/03/2024 190  mg/dl Final   • Hemoglobin A1C 06/03/2024 6.0 (H)  Normal 4.0-5.6%; PreDiabetic 5.7-6.4%; Diabetic >=6.5%; Glycemic control for adults with diabetes <7.0% % Final   • EAG 06/03/2024 126  mg/dl Final   • Sodium 06/03/2024 139  135 - 147 mmol/L Final   • Potassium 06/03/2024 4.2  3.5 - 5.3 mmol/L Final   • Chloride 06/03/2024 102  96 - 108 mmol/L Final   • CO2 06/03/2024 28  21 - 32 mmol/L Final   • ANION GAP 06/03/2024 9  4 - 13 mmol/L Final   • BUN 06/03/2024 11  5 - 25 mg/dL Final   • Creatinine 06/03/2024 0.56 (L)  0.60 - 1.30 mg/dL Final    Standardized to IDMS reference method   • Glucose, Fasting 06/03/2024 151 (U) 94 - 16  mg/dL Final   • Calcium 06/03/2024 9.2  8.4 - 10.2 mg/dL Final   • AST 06/03/2024 26  13 - 39 U/L Final   • ALT 06/03/2024 17  7 - 52 U/L Final    Specimen collection should occur prior to Sulfasalazine administration due to the potential for falsely depressed results.    • Alkaline Phosphatase 06/03/2024 83  34 - 104 U/L Final   • Total Protein 06/03/2024 7.2  6.4 - 8.4 g/dL Final   • Albumin 06/03/2024 4.5  3.5 - 5.0 g/dL Final   • Total Bilirubin 06/03/2024 0.58  0.20 - 1.00 mg/dL Final    Use of this assay is not recommended for patients undergoing treatment with eltrombopag due to the potential for falsely elevated results.  N-acetyl-p-benzoquinone imine (metabolite of Acetaminophen) will generate erroneously low results in samples for patients that have taken an overdose of Acetaminophen.   • eGFR 06/03/2024 85  ml/min/1.73sq m Final   • WBC 06/03/2024 2.92 (L)  4.31 - 10.16 Thousand/uL Final   • RBC 06/03/2024 4.10  3.81 - 5.12 Million/uL Final   • Hemoglobin 06/03/2024 12.8  11.5 - 15.4 g/dL Final   • Hematocrit 06/03/2024 39.0  34.8 - 46.1 % Final   • MCV 06/03/2024 95  82 - 98 fL Final   • MCH 06/03/2024 31.2  26.8 - 34.3 pg Final   • MCHC 06/03/2024 32.8  31.4 - 37.4 g/dL Final   • RDW 06/03/2024 15.7 (H)  11.6 - 15.1 % Final   • MPV 06/03/2024 10.6  8.9 - 12.7 fL Final   • Platelets 06/03/2024 198  149 - 390 Thousands/uL Final   • Segmented % 06/03/2024 16 (L)  43 - 75 % Final   • Bands % 06/03/2024 1  0 - 8 % Final   • Lymphocytes % 06/03/2024 60 (H)  14 - 44 % Final   • Monocytes % 06/03/2024 18 (H)  4 - 12 % Final   • Eosinophils % 06/03/2024 1  0 - 6 % Final   • Basophils % 06/03/2024 4 (H)  0 - 1 % Final   • Absolute Neutrophils 06/03/2024 0.50 (L)  1.85 - 7.62 Thousand/uL Final   • Absolute Lymphocytes 06/03/2024 1.75  0.60 - 4.47 Thousand/uL Final   • Absolute Monocytes 06/03/2024 0.53  0.00 - 1.22 Thousand/uL Final   • Absolute Eosinophils 06/03/2024 0.03  0.00 - 0.40 Thousand/uL Final   •  "Absolute Basophils 06/03/2024 0.12 (H)  0.00 - 0.10 Thousand/uL Final   • nRBC 06/03/2024 1  0 - 2 /100 WBC Final   • RBC Morphology 06/03/2024 Present   Final   • Platelet Estimate 06/03/2024 Adequate  Adequate Final   • Anisocytosis 06/03/2024 Present   Final   Appointment on 06/03/2024   Component Date Value Ref Range Status   • Protime 06/03/2024 31.8 (H)  11.6 - 14.5 seconds Final   • INR 06/03/2024 3.17 (H)  0.84 - 1.19 Final         Patient Instructions   Patient Education     Low back pain - Discharge instructions   The Basics   Written by the doctors and editors at [facility]   What are discharge instructions? -- Discharge instructions are information about how to take care of yourself after getting medical care for a health problem.  What is low back pain? -- Low back pain is pain or discomfort in the lower part of your back. Many people have low back pain at some point, and it most often gets better on its own. Many different things can cause low back pain. Most of the time, doctors do not know the exact cause.  Back pain can happen if you strain a muscle. This is often what has happened when a person \"throws out\" their back. This refers to pain that starts suddenly after physical activity, like lifting something heavy or bending the back.  Back pain can also happen if you have (figure 1):   Damaged, bulging, or torn discs   Arthritis affecting the joints of the spine   Bony growths on the vertebrae that crowd nearby nerves   A \"compression fracture\" due to osteoporosis (a condition that makes your bones weak)   A vertebra out of place   Narrowing in the spinal canal   A tumor or infection (but this is very rare)  How do I care for myself at home? -- Ask the doctor or nurse what you should do when you go home. Make sure that you understand exactly what you need to do to care for yourself. Ask questions if there is anything you do not understand.  You should also:   Use heat on your back for short periods of " "time, if it helps with pain. Put a heating pad (on the low setting) on your back for 20 minutes at a time a few times each day. Never go to sleep with heat on your back.   Try to stay as active as you can without causing too much pain, if your doctor or nurse said that it is OK. If your pain is severe, you might need to rest for a day or 2. But it's important to get back to walking and moving as soon as possible. Try to keep doing your normal daily activities. Get up and move around gently during the day as you are able.   Slowly start to increase your activity level as you are able to. If something causes your pain to come back or get worse, stop and go back to doing easier activities that did not hurt.   Avoid sitting or standing in 1 position for a long time. You might want to sleep with a pillow under or between your knees if this eases your pain.   Take a medicine like ibuprofen (sample brand names: Advil, Motrin) or naproxen (brand name: Aleve) for pain, if needed. These are nonsteroidal antiinflammatory drugs (\"NSAIDs\"). They might work better than acetaminophen for low back pain.   Talk to your doctor or nurse before trying any of the following. These treatments might help you feel better for a little while:   Spinal manipulation - This is when a chiropractor, physical therapist, or other professional moves or \"adjusts\" the joints of your back.   Acupuncture - This is when someone who knows traditional Chinese medicine inserts tiny needles through your skin to block pain signals.   Massage therapy - The massage therapist manipulates your muscles and soft tissues to decrease muscle tension and increase relaxation.  What follow-up care do I need? -- Your doctor or nurse will tell you if you need to make a follow-up appointment. If so, make sure that you know when and where to go. The doctor might suggest that you see a physical therapist to learn exercises to help with your back pain.  When should I call the " doctor? -- Call for emergency help right away (in the [place] and [place], call 9-1-1) if:   You are unable to walk, or cannot control your bowels or bladder.   You develop a fever of 100.4°F (38°C) or higher, chills, or night sweats.  Call your doctor for advice if:   Your legs are numb, weak, or tingly.   Your pain is getting worse, even with medicines and rest.   You develop a new rash.  All topics are updated as new evidence becomes available and our peer review process is complete.  This topic retrieved from [employer] on: May 15, 2024.  Topic 452944 Version 1.0  Release: 32.4.3 - C32.134  © 2024 UpToDate, Inc. and/or its affiliates. All rights reserved.  figure 1: Anatomy of the back     This drawing shows the different parts of the back. Back pain can be caused by problems with the muscles, ligaments, discs, bones (vertebrae), or nerves.  Graphic 98292 Version 6.0  Consumer Information Use and Disclaimer   Disclaimer: This generalized information is a limited summary of diagnosis, treatment, and/or medication information. It is not meant to be comprehensive and should be used as a tool to help the user understand and/or assess potential diagnostic and treatment options. It does NOT include all information about conditions, treatments, medications, side effects, or risks that may apply to a specific patient. It is not intended to be medical advice or a substitute for the medical advice, diagnosis, or treatment of a health care provider based on the health care provider's examination and assessment of a patient's specific and unique circumstances. Patients must speak with a health care provider for complete information about their health, medical questions, and treatment options, including any risks or benefits regarding use of medications. This information does not endorse any treatments or medications as safe, effective, or approved for treating a specific patient. Up[employer]Date, Inc[employer] and its affiliates disclaim any  "warranty or liability relating to this information or the use thereof.The use of this information is governed by the Terms of Use, available at https://www.wolterskluwer.com/en/know/clinical-effectiveness-terms. 2024© Up[employer]Date, Inc. and its affiliates and/or licensors. All rights reserved.  Copyright   © 2024 Up[employer]Date, Inc. and/or its affiliates. All rights reserved.       Jorge Colon MD        \"This note has been constructed using a voice recognition system.Therefore there may be syntax, spelling, and/or grammatical errors. Please call if you have any questions. \"  "

## 2024-07-22 NOTE — ASSESSMENT & PLAN NOTE
Lab Results   Component Value Date    HGBA1C 6.0 (H) 06/03/2024   On metformin 500 twice a day  Continue medication, diet and exercise  Repeat labs in 3 months

## 2024-07-22 NOTE — PATIENT INSTRUCTIONS
"Patient Education     Low back pain - Discharge instructions   The Basics   Written by the doctors and editors at [facility]   What are discharge instructions? -- Discharge instructions are information about how to take care of yourself after getting medical care for a health problem.  What is low back pain? -- Low back pain is pain or discomfort in the lower part of your back. Many people have low back pain at some point, and it most often gets better on its own. Many different things can cause low back pain. Most of the time, doctors do not know the exact cause.  Back pain can happen if you strain a muscle. This is often what has happened when a person \"throws out\" their back. This refers to pain that starts suddenly after physical activity, like lifting something heavy or bending the back.  Back pain can also happen if you have (figure 1):   Damaged, bulging, or torn discs   Arthritis affecting the joints of the spine   Bony growths on the vertebrae that crowd nearby nerves   A \"compression fracture\" due to osteoporosis (a condition that makes your bones weak)   A vertebra out of place   Narrowing in the spinal canal   A tumor or infection (but this is very rare)  How do I care for myself at home? -- Ask the doctor or nurse what you should do when you go home. Make sure that you understand exactly what you need to do to care for yourself. Ask questions if there is anything you do not understand.  You should also:   Use heat on your back for short periods of time, if it helps with pain. Put a heating pad (on the low setting) on your back for 20 minutes at a time a few times each day. Never go to sleep with heat on your back.   Try to stay as active as you can without causing too much pain, if your doctor or nurse said that it is OK. If your pain is severe, you might need to rest for a day or 2. But it's important to get back to walking and moving as soon as possible. Try to keep doing your normal daily activities. " "Get up and move around gently during the day as you are able.   Slowly start to increase your activity level as you are able to. If something causes your pain to come back or get worse, stop and go back to doing easier activities that did not hurt.   Avoid sitting or standing in 1 position for a long time. You might want to sleep with a pillow under or between your knees if this eases your pain.   Take a medicine like ibuprofen (sample brand names: Advil, Motrin) or naproxen (brand name: Aleve) for pain, if needed. These are nonsteroidal antiinflammatory drugs (\"NSAIDs\"). They might work better than acetaminophen for low back pain.   Talk to your doctor or nurse before trying any of the following. These treatments might help you feel better for a little while:   Spinal manipulation - This is when a chiropractor, physical therapist, or other professional moves or \"adjusts\" the joints of your back.   Acupuncture - This is when someone who knows traditional Chinese medicine inserts tiny needles through your skin to block pain signals.   Massage therapy - The massage therapist manipulates your muscles and soft tissues to decrease muscle tension and increase relaxation.  What follow-up care do I need? -- Your doctor or nurse will tell you if you need to make a follow-up appointment. If so, make sure that you know when and where to go. The doctor might suggest that you see a physical therapist to learn exercises to help with your back pain.  When should I call the doctor? -- Call for emergency help right away (in the [place] and [place], call 9-1-1) if:   You are unable to walk, or cannot control your bowels or bladder.   You develop a fever of 100.4°F (38°C) or higher, chills, or night sweats.  Call your doctor for advice if:   Your legs are numb, weak, or tingly.   Your pain is getting worse, even with medicines and rest.   You develop a new rash.  All topics are updated as new evidence becomes available and our peer review " process is complete.  This topic retrieved from [facility] on: May 15, 2024.  Topic 227644 Version 1.0  Release: 32.4.3 - C32.134  © 2024 UpToDate, Inc. and/or its affiliates. All rights reserved.  figure 1: Anatomy of the back     This drawing shows the different parts of the back. Back pain can be caused by problems with the muscles, ligaments, discs, bones (vertebrae), or nerves.  Graphic 94244 Version 6.0  Consumer Information Use and Disclaimer   Disclaimer: This generalized information is a limited summary of diagnosis, treatment, and/or medication information. It is not meant to be comprehensive and should be used as a tool to help the user understand and/or assess potential diagnostic and treatment options. It does NOT include all information about conditions, treatments, medications, side effects, or risks that may apply to a specific patient. It is not intended to be medical advice or a substitute for the medical advice, diagnosis, or treatment of a health care provider based on the health care provider's examination and assessment of a patient's specific and unique circumstances. Patients must speak with a health care provider for complete information about their health, medical questions, and treatment options, including any risks or benefits regarding use of medications. This information does not endorse any treatments or medications as safe, effective, or approved for treating a specific patient. UpToDate, Inc. and its affiliates disclaim any warranty or liability relating to this information or the use thereof.The use of this information is governed by the Terms of Use, available at https://www.wolterskluwer.com/en/know/clinical-effectiveness-terms. 2024© UpToDate, Inc. and its affiliates and/or licensors. All rights reserved.  Copyright   © 2024 UpToDate, Inc. and/or its affiliates. All rights reserved.

## 2024-07-22 NOTE — ASSESSMENT & PLAN NOTE
Recent x-ray reviewed  Continue Tylenol as needed  Advised to start physical therapy as referred previously  To orthopedic for evaluation    Start physical therapy    Patient reluctant to take any NSAID

## 2024-08-02 ENCOUNTER — TELEPHONE (OUTPATIENT)
Dept: HEMATOLOGY ONCOLOGY | Facility: MEDICAL CENTER | Age: 84
End: 2024-08-02

## 2024-08-02 ENCOUNTER — APPOINTMENT (OUTPATIENT)
Dept: LAB | Facility: CLINIC | Age: 84
End: 2024-08-02
Payer: MEDICARE

## 2024-08-02 DIAGNOSIS — K31.819 AVM (ARTERIOVENOUS MALFORMATION) OF DUODENUM, ACQUIRED: ICD-10-CM

## 2024-08-02 DIAGNOSIS — K31.819 AVM (ARTERIOVENOUS MALFORMATION) OF DUODENUM, ACQUIRED: Primary | ICD-10-CM

## 2024-08-02 DIAGNOSIS — D62 ACUTE BLOOD LOSS ANEMIA: ICD-10-CM

## 2024-08-02 LAB
ALBUMIN SERPL BCG-MCNC: 4.2 G/DL (ref 3.5–5)
ALP SERPL-CCNC: 81 U/L (ref 34–104)
ALT SERPL W P-5'-P-CCNC: 20 U/L (ref 7–52)
ANION GAP SERPL CALCULATED.3IONS-SCNC: 8 MMOL/L (ref 4–13)
AST SERPL W P-5'-P-CCNC: 26 U/L (ref 13–39)
BASOPHILS # BLD AUTO: 0.03 THOUSANDS/ÂΜL (ref 0–0.1)
BASOPHILS NFR BLD AUTO: 1 % (ref 0–1)
BILIRUB SERPL-MCNC: 0.52 MG/DL (ref 0.2–1)
BUN SERPL-MCNC: 14 MG/DL (ref 5–25)
CALCIUM SERPL-MCNC: 9.1 MG/DL (ref 8.4–10.2)
CHLORIDE SERPL-SCNC: 102 MMOL/L (ref 96–108)
CO2 SERPL-SCNC: 29 MMOL/L (ref 21–32)
CREAT SERPL-MCNC: 0.67 MG/DL (ref 0.6–1.3)
EOSINOPHIL # BLD AUTO: 0.1 THOUSAND/ÂΜL (ref 0–0.61)
EOSINOPHIL NFR BLD AUTO: 2 % (ref 0–6)
ERYTHROCYTE [DISTWIDTH] IN BLOOD BY AUTOMATED COUNT: 16.3 % (ref 11.6–15.1)
FERRITIN SERPL-MCNC: 20 NG/ML (ref 11–307)
GFR SERPL CREATININE-BSD FRML MDRD: 80 ML/MIN/1.73SQ M
GLUCOSE SERPL-MCNC: 126 MG/DL (ref 65–140)
HCT VFR BLD AUTO: 37.5 % (ref 34.8–46.1)
HGB BLD-MCNC: 12.2 G/DL (ref 11.5–15.4)
IMM GRANULOCYTES # BLD AUTO: 0.02 THOUSAND/UL (ref 0–0.2)
IMM GRANULOCYTES NFR BLD AUTO: 1 % (ref 0–2)
INR PPP: 3.23 (ref 0.85–1.19)
IRON SATN MFR SERPL: 18 % (ref 15–50)
IRON SERPL-MCNC: 68 UG/DL (ref 50–212)
LYMPHOCYTES # BLD AUTO: 1.18 THOUSANDS/ÂΜL (ref 0.6–4.47)
LYMPHOCYTES NFR BLD AUTO: 27 % (ref 14–44)
MCH RBC QN AUTO: 31.3 PG (ref 26.8–34.3)
MCHC RBC AUTO-ENTMCNC: 32.5 G/DL (ref 31.4–37.4)
MCV RBC AUTO: 96 FL (ref 82–98)
MONOCYTES # BLD AUTO: 0.4 THOUSAND/ÂΜL (ref 0.17–1.22)
MONOCYTES NFR BLD AUTO: 9 % (ref 4–12)
NEUTROPHILS # BLD AUTO: 2.7 THOUSANDS/ÂΜL (ref 1.85–7.62)
NEUTS SEG NFR BLD AUTO: 60 % (ref 43–75)
NRBC BLD AUTO-RTO: 0 /100 WBCS
PLATELET # BLD AUTO: 209 THOUSANDS/UL (ref 149–390)
PMV BLD AUTO: 11.2 FL (ref 8.9–12.7)
POTASSIUM SERPL-SCNC: 3.9 MMOL/L (ref 3.5–5.3)
PROT SERPL-MCNC: 7 G/DL (ref 6.4–8.4)
PROTHROMBIN TIME: 32.6 SECONDS (ref 12.3–15)
RBC # BLD AUTO: 3.9 MILLION/UL (ref 3.81–5.12)
SODIUM SERPL-SCNC: 139 MMOL/L (ref 135–147)
TIBC SERPL-MCNC: 369 UG/DL (ref 250–450)
UIBC SERPL-MCNC: 301 UG/DL (ref 155–355)
WBC # BLD AUTO: 4.43 THOUSAND/UL (ref 4.31–10.16)

## 2024-08-02 PROCEDURE — 80053 COMPREHEN METABOLIC PANEL: CPT

## 2024-08-02 PROCEDURE — 82728 ASSAY OF FERRITIN: CPT

## 2024-08-02 PROCEDURE — 83540 ASSAY OF IRON: CPT

## 2024-08-02 PROCEDURE — 83550 IRON BINDING TEST: CPT

## 2024-08-02 PROCEDURE — 85025 COMPLETE CBC W/AUTO DIFF WBC: CPT

## 2024-08-02 NOTE — TELEPHONE ENCOUNTER
Spoke with patient regarding labs needed to be drawn prior to appointment.  Indicated the scripts are in the system, they are non-fasting and patient can go to any [facility] facility to have the labs drawn.  Patient verbalized understanding.

## 2024-08-06 ENCOUNTER — OFFICE VISIT (OUTPATIENT)
Dept: HEMATOLOGY ONCOLOGY | Facility: MEDICAL CENTER | Age: 84
End: 2024-08-06
Payer: MEDICARE

## 2024-08-06 VITALS
TEMPERATURE: 97.8 F | BODY MASS INDEX: 27.32 KG/M2 | HEIGHT: 65 IN | RESPIRATION RATE: 16 BRPM | OXYGEN SATURATION: 98 % | SYSTOLIC BLOOD PRESSURE: 130 MMHG | HEART RATE: 80 BPM | WEIGHT: 164 LBS | DIASTOLIC BLOOD PRESSURE: 66 MMHG

## 2024-08-06 DIAGNOSIS — D62 ACUTE BLOOD LOSS ANEMIA: ICD-10-CM

## 2024-08-06 DIAGNOSIS — D70.8 OTHER NEUTROPENIA (HCC): ICD-10-CM

## 2024-08-06 DIAGNOSIS — K31.819 AVM (ARTERIOVENOUS MALFORMATION) OF DUODENUM, ACQUIRED: Primary | ICD-10-CM

## 2024-08-06 DIAGNOSIS — D50.8 OTHER IRON DEFICIENCY ANEMIA: ICD-10-CM

## 2024-08-06 DIAGNOSIS — D53.9 NUTRITIONAL ANEMIA: ICD-10-CM

## 2024-08-06 PROCEDURE — 99214 OFFICE O/P EST MOD 30 MIN: CPT | Performed by: PHYSICIAN ASSISTANT

## 2024-08-06 NOTE — PROGRESS NOTES
[facility] [address] [id number]  Hematology Ambulatory Follow-Up  Pooja Barron, 1940, [id number]  8/6/2024      Assessment and Plan   1. AVM (arteriovenous malformation) of duodenum, acquired  2. Acute blood loss anemia    This is an 84-year-old female with history of acute blood loss anemia.  Patient became iron deficient and was started on IV iron supplementation previously.  She has history of AVMs in the duodenum.  She is no longer routinely following with GI.    She was lost to follow-up.  She has not been seen since February 2023.    She had lab work on 6/3/2024.  At that time WBC count was 2.92.      Lab work was repeated on 8/2/2024.  WBC count at that time was 4.43.  Hemoglobin 12.2.  Platelets 209,000.  Ferritin is 20 and iron saturation is 18%.    As WBC count has now returned to normal, normal differential, I do not think that she needs additional workup.  Can check vitamin B12 and folate with her next set of lab work.    She prefers to follow with her primary care physician to monitor lab work.  CBC and iron studies should be monitored around twice yearly, more if needed.  We would be happy to see her again in the future if needed.  Patient voiced agreement and understanding to the above.   Patient advised to call the Hematology/Oncology office with any questions and concerns regarding the above.    Barrier(s) to care: None  The patient is able to self care with assistance.    Subjective     Chief Complaint   Patient presents with    Follow-up        History of present illness:   This is an 84-year-old female with past medical history of pulmonary emphysema CVA, diabetes mellitus, chronic atrial fibrillation on warfarin therapy, hepatic steatosis, celiac disease, history of mitral valve replacement with mechanical valve who presents to the [place] [facility] office secondary to recent hospitalization and  finding of anemia.     10/9/17 hemoglobin = 13.0, MCV 89, RDW 15.6, platelet count 195  8/17/18 hemoglobin = 11.6  6/4/19 hemoglobin = 12.2  7/5/19 ferritin = 23  6/9/20 ferritin = 6, LDH = 270, hapto = <10  8/17/20 hemoglobin = 11.8  6/18/21 hemoglobin = 12.7, MCV 96, RDW 14.5, platelet count 232, ferritin = 18  3/11/22 hemoglobin = 12.6, MCV 93, RDW 14.9, platelet count 211  4/26/22 hemoglobin = 10.5, MCV 92, RDW 14.6, platelet count 249  7/6/22 hemoglobin = 10.0, MCV 81, RDW 17.5, platelet count 241  8/8/22 hemoglobin 9.5, MCV 83, RDW 20.6, platelet count 231, ferritin = eight, hapto <10  8/11/22 iron panel = ferritin = eight, iron saturation 10%, TIBC 471,   8/12/22 hemolysis smear negative, LDH = 254, reticulocyte count within normal limits  9/1/22 hemoglobin 10.3, MCV 86, RDW 22.2     09/06/22:  Patient has been taking oral iron supplements twice a day with minimal side effects.  Patient notes that prior to going to the hospital she was feeling very tired and fatigued.  We reviewed blood work.  Patient's hemoglobin has been slowly dropping since April 2022 with the most appreciable drop in MCV in July of 2022.     9/30-11/10: 5 doses of venofer 200 mg IV      11/10/2022 hemoglobin 11.4, MCV 92, platelet count 232, white blood cell count 534     1/10/2023:  Patient was hospitalized secondary to acute anemia with a hemoglobin of 7.4 g/dL with a normal MCV and high RDW.  Patient was given 3 units of packed red blood cell, in addition to 600 mg of iron.  Discharge hemoglobin was 8.0 g/dL.  Ferritin was low at 8 ng/dL.     Subsequent blood tests have demonstrated a hemoglobin in the 7 g/dL range.      Venofer 200 mg IV weekly x 3 doses    2/13/2023 positive AVM on capsule endoscopy in the duodenum.    Hemoglobin = 9.0 g/dL    Venofer 200 mg IV weekly x2 doses, followed by monthly infusions starting on 4/4/2023.    Patient was lost to follow-up.  She has not been seen since 2/28/2023.  At that time a 3-month  follow-up was recommended.    She had lab work on 6/3/2024.  At that time WBC count was 2.92.  Lab work was repeated on 8/2/2024.  WBC count at that time was 4.43.  Hemoglobin 12.2.  Platelets 209,000.  Ferritin is 20 and iron saturation is 18%.    Interval history: Patient is generally feeling well.  She presents as a transfer of care from Gabriella Franz PA-C.    She denies any obvious bleeding.  She is no longer routinely following with GI.  She feels well.  She denies frequent infections.  She denies nausea or vomiting.  She has chronic A-fib and follows with cardiology.  She has good appetite and stable weight.    Review of Systems   Constitutional:  Negative for fatigue.   Eyes:  Positive for visual disturbance (MD).   All other systems reviewed and are negative.      Patient Active Problem List   Diagnosis    Obstructive sleep apnea    Chronic atrial fibrillation (HCC)    H/O mitral valve replacement with mechanical valve    Long term (current) use of anticoagulants (warfarin)    Presence of prosthetic heart valve    Hypercholesteremia    History of anemia due to CKD    Allergic rhinitis    Type 2 diabetes mellitus without complication, without long-term current use of insulin (HCC)    Iron deficiency anemia    Other specified hypothyroidism    Vitamin B12 deficiency    Other microscopic hematuria    Celiac disease    Abdominal aortic aneurysm (AAA) without rupture, unspecified part (HCC)    Ataxia    Pulmonary emphysema (HCC)    History of renal calculi    History of cervical cancer    Essential hypertension    Other proteinuria    Hepatic steatosis    CVA (cerebral vascular accident) (HCC)    Type 2 diabetes mellitus with other specified complication, without long-term current use of insulin (HCC)    CKD (chronic kidney disease) stage 2, GFR 60-89 ml/min    Cerebrovascular accident (CVA) (HCC)    Fall    Anemia    Acute blood loss anemia    AVM (arteriovenous malformation) of duodenum, acquired    Balance  problem    Edema of left lower leg    Chronic pain of left knee    Onychomycosis    Asymptomatic varicose veins of both lower extremities    Gross hematuria    Trigger ring finger of right hand    Chronic venous insufficiency of lower extremity    Uterine prolapse    History of stroke    Lumbar radiculopathy    Primary osteoarthritis involving multiple joints    Gastroesophageal reflux disease without esophagitis    Other neutropenia (HCC)     Past Medical History:   Diagnosis Date    Bloody nose     slow drip, clots in the morning    Colon polyp     Diabetes mellitus (HCC)     Pre DM diet controlled, metformin DCed     Heart murmur     Hyperlipidemia     Stroke (HCC) 2022     Past Surgical History:   Procedure Laterality Date    CATARACT EXTRACTION Bilateral     COLONOSCOPY      EGD      HYSTERECTOMY      MITRAL VALVE REPLACEMENT       Family History   Problem Relation Age of Onset    Heart failure Mother     Heart attack Father     Sleep apnea Brother      Social History     Socioeconomic History    Marital status: Single     Spouse name: Not on file    Number of children: Not on file    Years of education: Not on file    Highest education level: Not on file   Occupational History    Not on file   Tobacco Use    Smoking status: Former     Current packs/day: 0.00     Average packs/day: 2.0 packs/day for 30.0 years (60.0 ttl pk-yrs)     Types: Cigarettes     Start date: 1959     Quit date:      Years since quittin.6    Smokeless tobacco: Never   Vaping Use    Vaping status: Never Used   Substance and Sexual Activity    Alcohol use: Yes     Comment: special occasional    Drug use: No    Sexual activity: Not on file   Other Topics Concern    Not on file   Social History Narrative    Not on file     Social Determinants of Health     Financial Resource Strain: Low Risk  (2023)    Overall Financial Resource Strain (CARDIA)     Difficulty of Paying Living Expenses: Not hard at all   Food  Insecurity: No Food Insecurity (6/11/2024)    Hunger Vital Sign     Worried About Running Out of Food in the Last Year: Never true     Ran Out of Food in the Last Year: Never true   Transportation Needs: No Transportation Needs (6/11/2024)    PRAPARE - Transportation     Lack of Transportation (Medical): No     Lack of Transportation (Non-Medical): No   Physical Activity: Not on file   Stress: Not on file   Social Connections: Not on file   Intimate Partner Violence: Not on file   Housing Stability: Low Risk  (6/11/2024)    Housing Stability Vital Sign     Unable to Pay for Housing in the Last Year: No     Number of Times Moved in the Last Year: 1     Homeless in the Last Year: No       Current Outpatient Medications:     acetaminophen (TYLENOL) 325 mg tablet, Take 2 tablets (650 mg total) by mouth every 6 (six) hours as needed for mild pain or headaches, Disp: , Rfl: 0    atorvastatin (LIPITOR) 20 mg tablet, Take 1 tablet (20 mg total) by mouth daily, Disp: 30 tablet, Rfl: 2    Cholecalciferol (VITAMIN D PO), Take by mouth, Disp: , Rfl:     estradiol (ESTRACE) 0.1 mg/g vaginal cream, 0.5 grams of cream intravaginally administered daily for one to two weeks, then reduce to twice weekly, Disp: 42.5 g, Rfl: 3    famotidine (PEPCID) 40 MG tablet, Take 1 tablet (40 mg total) by mouth daily, Disp: 90 tablet, Rfl: 1    glucose blood test strip, Use 1 each in the morning Use one daily, Disp: 100 strip, Rfl: 2    ketoconazole (NIZORAL) 2 % cream, Apply topically daily, Disp: 60 g, Rfl: 1    metFORMIN (GLUCOPHAGE) 500 mg tablet, Take 1 tablet (500 mg total) by mouth 2 (two) times a day with meals, Disp: 180 tablet, Rfl: 3    Multiple Vitamins-Minerals (PRESERVISION AREDS PO), Take 2 tablets by mouth daily  , Disp: , Rfl:     warfarin (COUMADIN) 2.5 mg tablet, Take 1 tablet (2.5 mg total) by mouth 3 (three) times a week On Monday Wednesday and Friday (Patient taking differently: Take 2.5 mg by mouth 3 (three) times a week  Added to 5mg dose only on tuesdays=7.5mg), Disp: , Rfl: 0    warfarin (COUMADIN) 5 mg tablet, Take 1 tablet (5 mg total) by mouth 4 (four) times a week On Sunday, Tuesday, Thursday, Saturday (Patient taking differently: Take 5 mg by mouth in the morning), Disp: , Rfl: 0  Allergies   Allergen Reactions    Sulfa Antibiotics Tongue Swelling    Sulfasalazine Throat Swelling       Objective     Vitals:    08/06/24 1450   BP: 130/66   Pulse: 80   Resp: 16   Temp: 97.8 °F (36.6 °C)   SpO2: 98%     Physical Exam  Constitutional:       General: She is not in acute distress.     Appearance: She is well-developed.   HENT:      Head: Normocephalic and atraumatic.   Eyes:      General: No scleral icterus.     Pupils: Pupils are equal, round, and reactive to light.   Cardiovascular:      Rate and Rhythm: Normal rate and regular rhythm.      Heart sounds: No murmur heard.  Pulmonary:      Effort: Pulmonary effort is normal. No respiratory distress.      Breath sounds: Normal breath sounds.   Abdominal:      General: Abdomen is flat. There is no distension.      Palpations: Abdomen is soft.      Tenderness: There is no abdominal tenderness.   Skin:     General: Skin is warm.      Coloration: Skin is not pale.      Findings: No rash.   Neurological:      Mental Status: She is alert and oriented to person, place, and time.   Psychiatric:         Mood and Affect: Mood normal.         Thought Content: Thought content normal.         Judgment: Judgment normal.     Extremities: No lower extremity edema bilaterally. +varicose veins.    Result Review  Labs:  Appointment on 08/02/2024   Component Date Value Ref Range Status    WBC 08/02/2024 4.43  4.31 - 10.16 Thousand/uL Final    RBC 08/02/2024 3.90  3.81 - 5.12 Million/uL Final    Hemoglobin 08/02/2024 12.2  11.5 - 15.4 g/dL Final    Hematocrit 08/02/2024 37.5  34.8 - 46.1 % Final    MCV 08/02/2024 96  82 - 98 fL Final    MCH 08/02/2024 31.3  26.8 - 34.3 pg Final    MCHC 08/02/2024 32.5   31.4 - 37.4 g/dL Final    RDW 08/02/2024 16.3 (H)  11.6 - 15.1 % Final    MPV 08/02/2024 11.2  8.9 - 12.7 fL Final    Platelets 08/02/2024 209  149 - 390 Thousands/uL Final    nRBC 08/02/2024 0  /100 WBCs Final    Segmented % 08/02/2024 60  43 - 75 % Final    Immature Grans % 08/02/2024 1  0 - 2 % Final    Lymphocytes % 08/02/2024 27  14 - 44 % Final    Monocytes % 08/02/2024 9  4 - 12 % Final    Eosinophils Relative 08/02/2024 2  0 - 6 % Final    Basophils Relative 08/02/2024 1  0 - 1 % Final    Absolute Neutrophils 08/02/2024 2.70  1.85 - 7.62 Thousands/µL Final    Absolute Immature Grans 08/02/2024 0.02  0.00 - 0.20 Thousand/uL Final    Absolute Lymphocytes 08/02/2024 1.18  0.60 - 4.47 Thousands/µL Final    Absolute Monocytes 08/02/2024 0.40  0.17 - 1.22 Thousand/µL Final    Eosinophils Absolute 08/02/2024 0.10  0.00 - 0.61 Thousand/µL Final    Basophils Absolute 08/02/2024 0.03  0.00 - 0.10 Thousands/µL Final    Sodium 08/02/2024 139  135 - 147 mmol/L Final    Potassium 08/02/2024 3.9  3.5 - 5.3 mmol/L Final    Chloride 08/02/2024 102  96 - 108 mmol/L Final    CO2 08/02/2024 29  21 - 32 mmol/L Final    ANION GAP 08/02/2024 8  4 - 13 mmol/L Final    BUN 08/02/2024 14  5 - 25 mg/dL Final    Creatinine 08/02/2024 0.67  0.60 - 1.30 mg/dL Final    Standardized to IDMS reference method    Glucose 08/02/2024 126  65 - 140 mg/dL Final    If the patient is fasting, the ADA then defines impaired fasting glucose as > 100 mg/dL and diabetes as > or equal to 123 mg/dL.    Calcium 08/02/2024 9.1  8.4 - 10.2 mg/dL Final    AST 08/02/2024 26  13 - 39 U/L Final    ALT 08/02/2024 20  7 - 52 U/L Final    Specimen collection should occur prior to Sulfasalazine administration due to the potential for falsely depressed results.     Alkaline Phosphatase 08/02/2024 81  34 - 104 U/L Final    Total Protein 08/02/2024 7.0  6.4 - 8.4 g/dL Final    Albumin 08/02/2024 4.2  3.5 - 5.0 g/dL Final    Total Bilirubin 08/02/2024 0.52  0.20 - 1.00  mg/dL Final    Use of this assay is not recommended for patients undergoing treatment with eltrombopag due to the potential for falsely elevated results.  N-acetyl-p-benzoquinone imine (metabolite of Acetaminophen) will generate erroneously low results in samples for patients that have taken an overdose of Acetaminophen.    eGFR 08/02/2024 80  ml/min/1.73sq m Final    Iron Saturation 08/02/2024 18  15 - 50 % Final    TIBC 08/02/2024 369  250 - 450 ug/dL Final    Iron 08/02/2024 68  50 - 212 ug/dL Final    Patients treated with metal-binding drugs (ie. Deferoxamine) may have depressed iron values.    UIBC 08/02/2024 301  155 - 355 ug/dL Final    Ferritin 08/02/2024 20  11 - 307 ng/mL Final       Please note:  This report has been generated by a voice recognition software system. Therefore there may be syntax, spelling, and/or grammatical errors. Please call if you have any questions.

## 2024-09-02 DIAGNOSIS — E78.00 HYPERCHOLESTEREMIA: ICD-10-CM

## 2024-09-03 RX ORDER — ATORVASTATIN CALCIUM 20 MG/1
20 TABLET, FILM COATED ORAL DAILY
Qty: 90 TABLET | Refills: 1 | Status: SHIPPED | OUTPATIENT
Start: 2024-09-03

## 2024-09-05 ENCOUNTER — OFFICE VISIT (OUTPATIENT)
Age: 84
End: 2024-09-05
Payer: MEDICARE

## 2024-09-05 VITALS
HEART RATE: 90 BPM | BODY MASS INDEX: 27.32 KG/M2 | DIASTOLIC BLOOD PRESSURE: 76 MMHG | HEIGHT: 65 IN | WEIGHT: 164 LBS | RESPIRATION RATE: 16 BRPM | SYSTOLIC BLOOD PRESSURE: 131 MMHG

## 2024-09-05 DIAGNOSIS — M77.41 METATARSALGIA OF BOTH FEET: ICD-10-CM

## 2024-09-05 DIAGNOSIS — M77.42 METATARSALGIA OF BOTH FEET: ICD-10-CM

## 2024-09-05 DIAGNOSIS — B35.1 ONYCHOMYCOSIS: ICD-10-CM

## 2024-09-05 DIAGNOSIS — I87.2 DEEP VENOUS INSUFFICIENCY: Primary | ICD-10-CM

## 2024-09-05 DIAGNOSIS — M54.16 RADICULOPATHY OF LUMBAR REGION: ICD-10-CM

## 2024-09-05 DIAGNOSIS — R60.9 CHRONIC EDEMA: ICD-10-CM

## 2024-09-05 PROCEDURE — 99213 OFFICE O/P EST LOW 20 MIN: CPT | Performed by: PODIATRIST

## 2024-09-05 RX ORDER — GABAPENTIN 100 MG/1
100 CAPSULE ORAL
Qty: 30 CAPSULE | Refills: 1 | Status: SHIPPED | OUTPATIENT
Start: 2024-09-05 | End: 2024-11-04

## 2024-09-05 NOTE — PROGRESS NOTES
Assessment/Plan: Metatarsalgia secondary to radiculopathy.  Rule out neuropathy secondary to hyperglycemia.  Pain upon ambulation.  Chronic ingrown toenail.  Chronic edema secondary deep venous insufficiency.     Plan.  Chart reviewed.  Primary care notes reviewed.  Venous Doppler reviewed with patient and family.  Patient advised on condition.  She will follow-up vascular surgery.  In the meantime, patient will elevate feet at end of day.  She will consider compression stockings.  Patient advised on proper shoe size.  Nail cutting technique offered.  In addition patient will stay on gabapentin 100 mg daily at bedtime.  Refill ordered.  Aftercare instruction given.  Return for follow-up.            Diagnoses and all orders for this visit:     Metatarsalgia of both feet  -     gabapentin (NEURONTIN) 100 mg capsule; Take 1 capsule (100 mg total) by mouth daily at bedtime     Chronic edema     Deep venous insufficiency     Paronychia of toenail of left foot     Radiculopathy of lumbar region  -     gabapentin (NEURONTIN) 100 mg capsule; Take 1 capsule (100 mg total) by mouth daily at bedtime            Subjective: Patient has 2 concerns.  She has numbness and tingling of her feet.  She knows she has neuropathy.  She is also concerned about swelling.  She had Doppler test as directed.             Allergies   Allergen Reactions    Sulfa Antibiotics Tongue Swelling    Sulfasalazine Throat Swelling            Current Outpatient Medications:     gabapentin (NEURONTIN) 100 mg capsule, Take 1 capsule (100 mg total) by mouth daily at bedtime, Disp: 30 capsule, Rfl: 1    acetaminophen (TYLENOL) 325 mg tablet, Take 2 tablets (650 mg total) by mouth every 6 (six) hours as needed for mild pain or headaches, Disp: , Rfl: 0    atorvastatin (LIPITOR) 40 mg tablet, Take 1 tablet (40 mg total) by mouth daily, Disp: 90 tablet, Rfl: 1    Cholecalciferol (VITAMIN D PO), Take by mouth, Disp: , Rfl:     estradiol (ESTRACE) 0.1 mg/g  vaginal cream, 0.5 grams of cream intravaginally administered daily for one to two weeks, then reduce to twice weekly, Disp: 42.5 g, Rfl: 3    glucose blood test strip, Use 1 each in the morning Use one daily, Disp: 100 strip, Rfl: 2    ketoconazole (NIZORAL) 2 % cream, Apply topically daily, Disp: 60 g, Rfl: 1    losartan (COZAAR) 25 mg tablet, Take 1 tablet (25 mg total) by mouth daily (Patient not taking: Reported on 1/10/2024), Disp: 30 tablet, Rfl: 1    metFORMIN (GLUCOPHAGE) 500 mg tablet, Take 1 tablet (500 mg total) by mouth 2 (two) times a day with meals, Disp: 180 tablet, Rfl: 3    Multiple Vitamins-Minerals (PRESERVISION AREDS PO), Take 2 tablets by mouth daily  , Disp: , Rfl:     warfarin (COUMADIN) 2.5 mg tablet, Take 1 tablet (2.5 mg total) by mouth 3 (three) times a week On Monday Wednesday and Friday (Patient taking differently: Take 2.5 mg by mouth 3 (three) times a week Added to 5mg dose only on tuesdays=7.5mg), Disp: , Rfl: 0    warfarin (COUMADIN) 5 mg tablet, Take 1 tablet (5 mg total) by mouth 4 (four) times a week On Sunday, Tuesday, Thursday, Saturday (Patient taking differently: Take 5 mg by mouth in the morning), Disp: , Rfl: 0           Patient Active Problem List   Diagnosis    Obstructive sleep apnea    Chronic atrial fibrillation (HCC)    H/O mitral valve replacement with mechanical valve    Long term (current) use of anticoagulants (warfarin)    Presence of prosthetic heart valve    Hypercholesteremia    History of anemia due to CKD    Allergic rhinitis    Type 2 diabetes mellitus without complication, without long-term current use of insulin (HCC)    Iron deficiency anemia    Other specified hypothyroidism    Vitamin B12 deficiency    Other microscopic hematuria    Celiac disease    Abdominal aortic aneurysm (AAA) without rupture, unspecified part (HCC)    Ataxia    Pulmonary emphysema (HCC)    History of renal calculi    History of cervical cancer    Essential hypertension    Other  proteinuria    Hepatic steatosis    CVA (cerebral vascular accident) (HCC)    Type 2 diabetes mellitus with other specified complication, without long-term current use of insulin (HCC)    CKD (chronic kidney disease) stage 2, GFR 60-89 ml/min    Cerebrovascular accident (CVA) (HCC)    Fall    Anemia    Acute blood loss anemia    AVM (arteriovenous malformation) of duodenum, acquired    Balance problem    Edema of left lower leg    Chronic pain of left knee    Onychomycosis    Asymptomatic varicose veins of both lower extremities    Gross hematuria    Trigger ring finger of right hand    Chronic venous insufficiency of lower extremity    Uterine prolapse             Patient ID: Pooja Barron is a 84 y.o. female.     HPI     The following portions of the patient's history were reviewed and updated as appropriate:      family history includes Heart attack in her father; Heart failure in her mother; Sleep apnea in her brother.       reports that she quit smoking about 35 years ago. Her smoking use included cigarettes. She started smoking about 65 years ago. She has a 60.0 pack-year smoking history. She has never used smokeless tobacco. She reports current alcohol use. She reports that she does not use drugs.        Objective:  Patient's shoes and socks removed.   Foot ExamPhysical Exam       Foot Exam     General  General Appearance: appears stated age and healthy   Orientation: alert and oriented to person, place, and time   Affect: appropriate   Gait: antalgic         Right Foot/Ankle      Inspection and Palpation  Tenderness: metatarsals   Swelling: dorsum   Arch: pes planus  Hallux valgus: yes     Neurovascular  Dorsalis pedis: 2+  Posterior tibial: 2+        Left Foot/Ankle       Inspection and Palpation  Tenderness: metatarsals   Swelling: dorsum   Arch: pes planus  Hallux valgus: yes     Neurovascular  Dorsalis pedis: 2+  Posterior tibial: 2+           Physical Exam  Vitals and nursing note reviewed.    Constitutional:       Appearance: Normal appearance.   Cardiovascular:      Rate and Rhythm: Normal rate and regular rhythm.      Pulses:           Dorsalis pedis pulses are 2+ on the right side and 2+ on the left side.        Posterior tibial pulses are 2+ on the right side and 2+ on the left side.      Comments: Patient demonstrates significant amount of varicosities and telangiectasia of the lower extremity bilateral.  Venous Doppler demonstrates deep venous insufficiency.  Musculoskeletal:      Right lower le+ Pitting Edema present.      Left lower le+ Pitting Edema present.      Right foot: Bunion present.      Left foot: Bunion present.   Skin:     Capillary Refill: Capillary refill takes less than 2 seconds.      Comments: All nails are dystrophic.  Left hallux nail demonstrates subungual mycosis.  It is ingrown in the fibular aspect.  Positive paronychia.  Negative pus.   Neurological:      Mental Status: She is alert.   Psychiatric:         Mood and Affect: Mood normal.         Behavior: Behavior normal.         Thought Content: Thought content normal.         Judgment: Judgment normal.

## 2024-09-10 ENCOUNTER — APPOINTMENT (OUTPATIENT)
Dept: LAB | Facility: CLINIC | Age: 84
End: 2024-09-10
Payer: MEDICARE

## 2024-09-10 DIAGNOSIS — Z95.2 HEART VALVE REPLACED BY TRANSPLANT: ICD-10-CM

## 2024-09-10 DIAGNOSIS — I48.20 CHRONIC ATRIAL FIBRILLATION (HCC): ICD-10-CM

## 2024-09-10 LAB
INR PPP: 3.03 (ref 0.85–1.19)
PROTHROMBIN TIME: 31.1 SECONDS (ref 12.3–15)

## 2024-09-10 PROCEDURE — 36415 COLL VENOUS BLD VENIPUNCTURE: CPT

## 2024-09-10 PROCEDURE — 85610 PROTHROMBIN TIME: CPT

## 2024-09-12 NOTE — PATIENT INSTRUCTIONS
First day of symptom was 05/17/2023  The patient has passed the 5 days  See may go out a recommend to use mask while going out or with family member until 27th  Paragraphs the home see has significantly improved      Will see in the office as scheduled in the future
No CPAP  CY precautions

## 2024-09-27 ENCOUNTER — HOSPITAL ENCOUNTER (OUTPATIENT)
Dept: RADIOLOGY | Facility: HOSPITAL | Age: 84
Discharge: HOME | End: 2024-09-27
Payer: MEDICARE

## 2024-09-27 ENCOUNTER — OFFICE VISIT (OUTPATIENT)
Dept: INTERNAL MEDICINE CLINIC | Facility: CLINIC | Age: 84
End: 2024-09-27
Payer: MEDICARE

## 2024-09-27 VITALS
OXYGEN SATURATION: 96 % | SYSTOLIC BLOOD PRESSURE: 128 MMHG | TEMPERATURE: 98 F | HEIGHT: 65 IN | HEART RATE: 58 BPM | RESPIRATION RATE: 16 BRPM | WEIGHT: 164 LBS | DIASTOLIC BLOOD PRESSURE: 82 MMHG | BODY MASS INDEX: 27.32 KG/M2

## 2024-09-27 DIAGNOSIS — G89.11 ACUTE SHOULDER PAIN DUE TO TRAUMA, LEFT: Primary | ICD-10-CM

## 2024-09-27 DIAGNOSIS — M25.512 ACUTE SHOULDER PAIN DUE TO TRAUMA, LEFT: ICD-10-CM

## 2024-09-27 DIAGNOSIS — G89.11 ACUTE SHOULDER PAIN DUE TO TRAUMA, LEFT: ICD-10-CM

## 2024-09-27 DIAGNOSIS — M25.512 ACUTE SHOULDER PAIN DUE TO TRAUMA, LEFT: Primary | ICD-10-CM

## 2024-09-27 PROCEDURE — 99213 OFFICE O/P EST LOW 20 MIN: CPT

## 2024-09-27 PROCEDURE — 71101 X-RAY EXAM UNILAT RIBS/CHEST: CPT

## 2024-09-27 PROCEDURE — G2211 COMPLEX E/M VISIT ADD ON: HCPCS

## 2024-09-27 PROCEDURE — 73000 X-RAY EXAM OF COLLAR BONE: CPT

## 2024-09-27 PROCEDURE — 73030 X-RAY EXAM OF SHOULDER: CPT

## 2024-09-27 RX ORDER — NAPROXEN 500 MG/1
500 TABLET ORAL 2 TIMES DAILY WITH MEALS
Qty: 28 TABLET | Refills: 0 | Status: SHIPPED | OUTPATIENT
Start: 2024-09-27 | End: 2024-10-11

## 2024-09-27 RX ORDER — LIDOCAINE 50 MG/G
1 PATCH TOPICAL DAILY
Qty: 5 PATCH | Refills: 0 | Status: SHIPPED | OUTPATIENT
Start: 2024-09-27

## 2024-09-27 NOTE — PROGRESS NOTES
Ambulatory Visit  Name: Pooja Barron      : 1940      MRN: [id number]  Encounter Provider: Gely Benavidez MD  Encounter Date: 2024   [facility]    Assessment & Plan  Acute shoulder pain due to trauma, left  - Recent trauma w/ worsening pain of left shoulder and clavicular region x 1-2 weeks ago, not improving with supportive care home  - Limited ROM of left arm d/t pain but pt able to abduct arm to 90 degrees despite pain  - Will obtain imaging  - Continue supportive measures  - Add short course of NSAIDs  - Add topical lidoderm patch  - Consider Ortho referral for abnormal imaging and/or persistent/progressive pain  Orders:    naproxen (Naprosyn) 500 mg tablet; Take 1 tablet (500 mg total) by mouth 2 (two) times a day with meals for 14 days    XR ribs 2 vw left; Future    XR clavicle left; Future    XR shoulder 1 vw left; Future    lidocaine (Lidoderm) 5 %; Apply 1 patch topically over 12 hours daily Remove & Discard patch within 12 hours or as directed by MD     Pt advised to follow up if pain not improving. Pt scheduled for f/u appt next month for pre-op clearance    History of Present Illness     Patient, accompanied by DIL, presents with pain in the left shoulder and collarbone with swelling noted over the left collarbone x 1-2 weeks following an injury. She is left hand dominant. The patient was attempting to prevent a fall and grabbed onto a nearby object with her left hand and felt a pull occur on her left side. Trial of supportive care at home with Tylenol, biofreeze and ice improved her pain. Heat worsened the pain. DIL reports noticing the patient use her left arm less and less. Patient reports limited mobility of her left arm d/t pain.    Shoulder Pain   The pain is present in the left shoulder. This is a new problem. The current episode started 1 to 4 weeks ago. There has been a history of trauma. The problem occurs constantly. The  problem has been gradually worsening. The quality of the pain is described as sharp and aching. Associated symptoms include joint swelling, a limited range of motion (2/2 pain) and stiffness. Pertinent negatives include no fever, inability to bear weight, numbness or tingling. The symptoms are aggravated by lying down and activity. She has tried heat, cold and acetaminophen for the symptoms. The treatment provided mild relief. Her past medical history is significant for osteoarthritis.       History obtained from : patient    Review of Systems   Constitutional:  Negative for chills and fever.   Eyes:  Negative for visual disturbance.   Respiratory:  Negative for shortness of breath.    Cardiovascular:  Negative for chest pain.   Gastrointestinal:  Negative for abdominal pain.   Musculoskeletal:  Positive for arthralgias, joint swelling, myalgias and stiffness. Negative for neck pain and neck stiffness.   Neurological:  Negative for dizziness, tingling, weakness, numbness and headaches.   Psychiatric/Behavioral:  Positive for sleep disturbance.        Pertinent Medical History         Medical History Reviewed by provider this encounter:  Tobacco  Allergies  Meds  Problems  Med Hx  Surg Hx  Fam Hx       Past Medical History   Past Medical History:   Diagnosis Date    Bloody nose     slow drip, clots in the morning    Colon polyp     Diabetes mellitus (HCC)     Pre DM diet controlled, metformin DCed 2023    Heart murmur     Hyperlipidemia     Stroke (HCC) 03/11/2022     Past Surgical History:   Procedure Laterality Date    CATARACT EXTRACTION Bilateral     COLONOSCOPY      EGD      HYSTERECTOMY      MITRAL VALVE REPLACEMENT  1994     Family History   Problem Relation Age of Onset    Heart failure Mother     Heart attack Father     Sleep apnea Brother      Current Outpatient Medications on File Prior to Visit   Medication Sig Dispense Refill    acetaminophen (TYLENOL) 325 mg tablet Take 2 tablets (650 mg total)  by mouth every 6 (six) hours as needed for mild pain or headaches  0    atorvastatin (LIPITOR) 20 mg tablet Take 1 tablet by mouth once daily 90 tablet 1    Cholecalciferol (VITAMIN D PO) Take by mouth      estradiol (ESTRACE) 0.1 mg/g vaginal cream 0.5 grams of cream intravaginally administered daily for one to two weeks, then reduce to twice weekly 42.5 g 3    famotidine (PEPCID) 40 MG tablet Take 1 tablet (40 mg total) by mouth daily 90 tablet 1    gabapentin (NEURONTIN) 100 mg capsule Take 1 capsule (100 mg total) by mouth daily at bedtime 30 capsule 1    glucose blood test strip Use 1 each in the morning Use one daily 100 strip 2    metFORMIN (GLUCOPHAGE) 500 mg tablet Take 1 tablet (500 mg total) by mouth 2 (two) times a day with meals 180 tablet 3    Multiple Vitamins-Minerals (PRESERVISION AREDS PO) Take 2 tablets by mouth daily        warfarin (COUMADIN) 2.5 mg tablet Take 1 tablet (2.5 mg total) by mouth 3 (three) times a week On Monday Wednesday and Friday (Patient taking differently: Take 2.5 mg by mouth 3 (three) times a week Added to 5mg dose only on tuesdays=7.5mg)  0    warfarin (COUMADIN) 5 mg tablet Take 1 tablet (5 mg total) by mouth 4 (four) times a week On Sunday, Tuesday, Thursday, Saturday (Patient taking differently: Take 5 mg by mouth in the morning)  0    ketoconazole (NIZORAL) 2 % cream Apply topically daily 60 g 1     No current facility-administered medications on file prior to visit.     Allergies   Allergen Reactions    Sulfa Antibiotics Tongue Swelling    Sulfasalazine Throat Swelling      Current Outpatient Medications on File Prior to Visit   Medication Sig Dispense Refill    acetaminophen (TYLENOL) 325 mg tablet Take 2 tablets (650 mg total) by mouth every 6 (six) hours as needed for mild pain or headaches  0    atorvastatin (LIPITOR) 20 mg tablet Take 1 tablet by mouth once daily 90 tablet 1    Cholecalciferol (VITAMIN D PO) Take by mouth      estradiol (ESTRACE) 0.1 mg/g vaginal  "cream 0.5 grams of cream intravaginally administered daily for one to two weeks, then reduce to twice weekly 42.5 g 3    famotidine (PEPCID) 40 MG tablet Take 1 tablet (40 mg total) by mouth daily 90 tablet 1    gabapentin (NEURONTIN) 100 mg capsule Take 1 capsule (100 mg total) by mouth daily at bedtime 30 capsule 1    glucose blood test strip Use 1 each in the morning Use one daily 100 strip 2    metFORMIN (GLUCOPHAGE) 500 mg tablet Take 1 tablet (500 mg total) by mouth 2 (two) times a day with meals 180 tablet 3    Multiple Vitamins-Minerals (PRESERVISION AREDS PO) Take 2 tablets by mouth daily        warfarin (COUMADIN) 2.5 mg tablet Take 1 tablet (2.5 mg total) by mouth 3 (three) times a week On  and Friday (Patient taking differently: Take 2.5 mg by mouth 3 (three) times a week Added to 5mg dose only on =7.5mg)  0    warfarin (COUMADIN) 5 mg tablet Take 1 tablet (5 mg total) by mouth 4 (four) times a week On , Tuesday, Thursday, Saturday (Patient taking differently: Take 5 mg by mouth in the morning)  0    ketoconazole (NIZORAL) 2 % cream Apply topically daily 60 g 1     No current facility-administered medications on file prior to visit.      Social History     Tobacco Use    Smoking status: Former     Current packs/day: 0.00     Average packs/day: 2.0 packs/day for 30.0 years (60.0 ttl pk-yrs)     Types: Cigarettes     Start date: 1959     Quit date:      Years since quittin.7    Smokeless tobacco: Never   Vaping Use    Vaping status: Never Used   Substance and Sexual Activity    Alcohol use: Yes     Comment: special occasional    Drug use: No    Sexual activity: Not on file         Objective     /82   Pulse 58   Temp 98 °F (36.7 °C)   Resp 16   Ht 5' 5\" (1.651 m)   Wt 74.4 kg (164 lb)   SpO2 96%   BMI 27.29 kg/m²     Physical Exam  Vitals and nursing note reviewed.   Constitutional:       General: She is not in acute distress.     Appearance: Normal " appearance. She is not ill-appearing, toxic-appearing or diaphoretic.   HENT:      Head: Atraumatic.   Eyes:      Extraocular Movements: Extraocular movements intact.      Conjunctiva/sclera: Conjunctivae normal.   Cardiovascular:      Rate and Rhythm: Bradycardia present. Rhythm irregular.      Heart sounds: No murmur heard.  Pulmonary:      Effort: Pulmonary effort is normal.      Breath sounds: Normal breath sounds.   Musculoskeletal:         General: Swelling, tenderness and signs of injury present.      Cervical back: Normal range of motion and neck supple.   Skin:     General: Skin is warm and dry.      Findings: No bruising or erythema.   Neurological:      Mental Status: She is alert and oriented to person, place, and time.      Sensory: No sensory deficit.      Motor: No weakness.   Psychiatric:         Mood and Affect: Mood normal.         Behavior: Behavior normal.

## 2024-09-30 ENCOUNTER — TELEPHONE (OUTPATIENT)
Dept: INTERNAL MEDICINE CLINIC | Facility: CLINIC | Age: 84
End: 2024-09-30

## 2024-09-30 NOTE — TELEPHONE ENCOUNTER
"Spoke with the patient about the results of her Xrays-- no acute findings-- only chronic findings of arthritis which the patient was already aware of--    Pt notes some improvement of her pain after trial of Naproxen x 2 days. She only took one dose at bedtime but noted feeling drowsy and \"out of it\" the following days, therefore she stopped taking them.    She will continue supportive care at home.  Patient advised to call or follow-up with any concerning symptoms.  "

## 2024-10-02 NOTE — TELEPHONE ENCOUNTER
PA for lidocaine (Lidoderm) 5 % DENIED    Reason:(Screenshot if applicable)        Message sent to office clinical pool Yes    Denial letter scanned into Media Yes    Appeal started No (Provider will need to decide if appeal is warranted and send clinical documentation to Prior Authorization Team for initiation.)    **Please follow up with your patient regarding denial and next steps**

## 2024-10-02 NOTE — TELEPHONE ENCOUNTER
Left detailed message for pt that prescription was denied by insurance, but is available OTC.    Patient noted to have been feeling better on Monday.  Advised if sx resolved, she can disregard Rx altogether.  Asked to call back with any questions.

## 2024-10-02 NOTE — TELEPHONE ENCOUNTER
Kindly let the patient know that this was denied and Lidoderm 4% patches are able to be purchased OTC

## 2024-10-02 NOTE — TELEPHONE ENCOUNTER
PA lidocaine (Lidoderm) 5 % SUBMITTED     via    []CM-KEY:    [x]Surescripts-Case ID # [id number]   []Availity-Auth ID #  NDC #    []Faxed to plan   []Other website    []Phone call Case ID #      Office notes sent, clinical questions answered. Awaiting determination    Turnaround time for your insurance to make a decision on your Prior Authorization can take 7-21 business days.

## 2024-10-08 ENCOUNTER — APPOINTMENT (OUTPATIENT)
Dept: LAB | Facility: CLINIC | Age: 84
End: 2024-10-08
Payer: MEDICARE

## 2024-10-08 DIAGNOSIS — I48.20 CHRONIC ATRIAL FIBRILLATION (HCC): ICD-10-CM

## 2024-10-08 DIAGNOSIS — Z95.2 HEART VALVE REPLACED BY TRANSPLANT: ICD-10-CM

## 2024-10-08 LAB
INR PPP: 3.59 (ref 0.85–1.19)
PROTHROMBIN TIME: 35.3 SECONDS (ref 12.3–15)

## 2024-10-08 PROCEDURE — 85610 PROTHROMBIN TIME: CPT

## 2024-10-08 PROCEDURE — 36415 COLL VENOUS BLD VENIPUNCTURE: CPT

## 2024-10-09 ENCOUNTER — RA CDI HCC (OUTPATIENT)
Dept: OTHER | Facility: HOSPITAL | Age: 84
End: 2024-10-09

## 2024-10-09 NOTE — PROGRESS NOTES
HCC coding opportunities          Chart Reviewed number of suggestions sent to Provider: 1     Patients Insurance   E11.22  Medicare Insurance: Medicare           (2) more than 100 beats/min

## 2024-10-10 ENCOUNTER — APPOINTMENT (OUTPATIENT)
Dept: LAB | Facility: CLINIC | Age: 84
End: 2024-10-10
Payer: MEDICARE

## 2024-10-10 DIAGNOSIS — N39.0 URINARY TRACT INFECTION WITHOUT HEMATURIA, SITE UNSPECIFIED: ICD-10-CM

## 2024-10-10 DIAGNOSIS — N39.0 URINARY TRACT INFECTION WITHOUT HEMATURIA, SITE UNSPECIFIED: Primary | ICD-10-CM

## 2024-10-10 LAB
BACTERIA UR QL AUTO: ABNORMAL /HPF
BILIRUB UR QL STRIP: NEGATIVE
CLARITY UR: ABNORMAL
COLOR UR: ABNORMAL
GLUCOSE UR STRIP-MCNC: NEGATIVE MG/DL
HGB UR QL STRIP.AUTO: ABNORMAL
KETONES UR STRIP-MCNC: NEGATIVE MG/DL
LEUKOCYTE ESTERASE UR QL STRIP: ABNORMAL
NITRITE UR QL STRIP: POSITIVE
NON-SQ EPI CELLS URNS QL MICRO: ABNORMAL /HPF
PH UR STRIP.AUTO: 6 [PH]
PROT UR STRIP-MCNC: ABNORMAL MG/DL
RBC #/AREA URNS AUTO: ABNORMAL /HPF
SP GR UR STRIP.AUTO: 1.02 (ref 1–1.03)
UROBILINOGEN UR STRIP-ACNC: <2 MG/DL
WBC #/AREA URNS AUTO: ABNORMAL /HPF
WBC CLUMPS # UR AUTO: PRESENT /UL

## 2024-10-10 PROCEDURE — 87077 CULTURE AEROBIC IDENTIFY: CPT | Performed by: INTERNAL MEDICINE

## 2024-10-10 PROCEDURE — 81001 URINALYSIS AUTO W/SCOPE: CPT

## 2024-10-10 PROCEDURE — 87086 URINE CULTURE/COLONY COUNT: CPT | Performed by: INTERNAL MEDICINE

## 2024-10-10 PROCEDURE — 87186 SC STD MICRODIL/AGAR DIL: CPT | Performed by: INTERNAL MEDICINE

## 2024-10-11 ENCOUNTER — TELEPHONE (OUTPATIENT)
Age: 84
End: 2024-10-11

## 2024-10-11 ENCOUNTER — TELEPHONE (OUTPATIENT)
Dept: INTERNAL MEDICINE CLINIC | Facility: CLINIC | Age: 84
End: 2024-10-11

## 2024-10-11 DIAGNOSIS — N39.0 URINARY TRACT INFECTION WITHOUT HEMATURIA, SITE UNSPECIFIED: Primary | ICD-10-CM

## 2024-10-11 RX ORDER — CIPROFLOXACIN 500 MG/1
500 TABLET, FILM COATED ORAL EVERY 12 HOURS SCHEDULED
Qty: 14 TABLET | Refills: 0 | Status: SHIPPED | OUTPATIENT
Start: 2024-10-11 | End: 2024-10-15

## 2024-10-11 NOTE — TELEPHONE ENCOUNTER
Called patient to let her know results and that Dr sent medication to her pharmacy.    ----- Message from Jorge Colon MD sent at 10/11/2024  8:07 AM EDT -----  Please call patient I have reviewed urine analysis suggestive of looks like UTI so I have sent in a prescription for antibiotic Cipro for 7 days in the [facility] pharmacy  ----- Message -----  From: Lab, Background User  Sent: 10/10/2024  10:02 PM EDT  To: Jorge Colon MD

## 2024-10-11 NOTE — TELEPHONE ENCOUNTER
Pharmacy calling.  They want to make sure that Dr. Colon knows that there is a possible drug interaction between Cipro and Warfarin.  Please call them back as soon as possible.

## 2024-10-12 LAB
BACTERIA UR CULT: ABNORMAL
BACTERIA UR CULT: ABNORMAL

## 2024-10-14 ENCOUNTER — TELEPHONE (OUTPATIENT)
Dept: INTERNAL MEDICINE CLINIC | Facility: CLINIC | Age: 84
End: 2024-10-14

## 2024-10-14 DIAGNOSIS — N39.0 URINARY TRACT INFECTION WITHOUT HEMATURIA, SITE UNSPECIFIED: Primary | ICD-10-CM

## 2024-10-14 RX ORDER — AMOXICILLIN 500 MG/1
500 CAPSULE ORAL EVERY 8 HOURS SCHEDULED
Qty: 21 CAPSULE | Refills: 0 | Status: SHIPPED | OUTPATIENT
Start: 2024-10-14 | End: 2024-10-24

## 2024-10-14 NOTE — PROGRESS NOTES
Uterine culture reviewed gram-negative resistant to Cipro patient was started on Cipro before the urine culture results suggest sensitive to amoxicillin Will start amoxicillin 503 times a day for 7 days

## 2024-10-14 NOTE — TELEPHONE ENCOUNTER
Spoke with patient advised her to stop the cipro and start taking the amoxicillin which was sent to the pharmacy.  Due  to the urine culture.  Patient understood.

## 2024-10-15 ENCOUNTER — OFFICE VISIT (OUTPATIENT)
Dept: INTERNAL MEDICINE CLINIC | Facility: CLINIC | Age: 84
End: 2024-10-15
Payer: MEDICARE

## 2024-10-15 ENCOUNTER — TELEPHONE (OUTPATIENT)
Age: 84
End: 2024-10-15

## 2024-10-15 VITALS
DIASTOLIC BLOOD PRESSURE: 90 MMHG | SYSTOLIC BLOOD PRESSURE: 160 MMHG | HEIGHT: 65 IN | HEART RATE: 96 BPM | BODY MASS INDEX: 27.16 KG/M2 | OXYGEN SATURATION: 98 % | WEIGHT: 163 LBS

## 2024-10-15 DIAGNOSIS — Z01.818 PRE-OP EXAMINATION: ICD-10-CM

## 2024-10-15 DIAGNOSIS — I10 PRIMARY HYPERTENSION: ICD-10-CM

## 2024-10-15 DIAGNOSIS — E11.9 TYPE 2 DIABETES MELLITUS WITHOUT COMPLICATION, WITHOUT LONG-TERM CURRENT USE OF INSULIN (HCC): ICD-10-CM

## 2024-10-15 DIAGNOSIS — N39.0 UTI (URINARY TRACT INFECTION), UNCOMPLICATED: ICD-10-CM

## 2024-10-15 DIAGNOSIS — I48.20 CHRONIC ATRIAL FIBRILLATION (HCC): Primary | ICD-10-CM

## 2024-10-15 DIAGNOSIS — Z13.0 SCREENING FOR DEFICIENCY ANEMIA: ICD-10-CM

## 2024-10-15 PROCEDURE — G2211 COMPLEX E/M VISIT ADD ON: HCPCS | Performed by: INTERNAL MEDICINE

## 2024-10-15 PROCEDURE — 99214 OFFICE O/P EST MOD 30 MIN: CPT | Performed by: INTERNAL MEDICINE

## 2024-10-15 RX ORDER — CARVEDILOL 3.12 MG/1
3.12 TABLET ORAL 2 TIMES DAILY WITH MEALS
Qty: 200 TABLET | Refills: 3 | Status: SHIPPED | OUTPATIENT
Start: 2024-10-15 | End: 2024-10-22

## 2024-10-15 NOTE — TELEPHONE ENCOUNTER
"Hello,    The following message was sent via e-mail to the leadership team:     Please advise if you can help facilitate the following overbook request:    Patient Name: Pooja Barron    Patient MRN: [id number]     Call back #: [phone]     Insurance: Medicare/[employer]    Department:Cardiology    Speciality: General Cardiology    Reason for overbook request: CARDIAC CLEARANCE PRIOR TO PROCEDURE (14-30 DAYS PRIOR TO PROCEDURE)    Comments (Write \"N/a\" if no comments): Harriett from Dr Colon's office called in regards to an appointment for cardiac clearance.  She is scheduled for eye surgery on 11/6/2024.  Dr Colon needs to send his clearance form to the eye doctor by Nov 1. Patient wants the appointment on the [facility].    Requested doctor and location: any doctor[facility]    Date of current appointment: 11/12/2024      Thank you.    "

## 2024-10-15 NOTE — PROGRESS NOTES
Dr. Colon's Office Visit Note  10/19/24     Pooja Barron 84 y.o. female MRN: [id number]  : 1940    Assessment:   Patient medically cleared for surgery cardiology medical clearance done with recommendation as follows    Pre-operative-she has a normal functioning mechanical mitral valve.  Euvolemic on examination.  She also has known history of chronic atrial fibrillation rate controlled without medication.  She has been symptomatic with rate control agent-but no prolonged pauses or evidence of chronotropic incompetence.  For her mitral valve recommend bridging with Lovenox prior to eye surgery-if Coumadin needs to be discontinued 5 days prior to procedure and at least 2 days post until INR is therapeutic. it would be the safest if the patient can have the procedure under twilight's sedation-she has had previous tachy bradycardia responses..    Bridging with Lovenox will be taken care of by cardiology  1. Chronic atrial fibrillation (HCC)  Assessment & Plan:  Patient's rate controlled on Coumadin managed by cardiology asymptomatic no chest pain difficulty breathing    Again continue manage medications follow    Coreg 3.125 mg twice a day    Awaiting to be seen by cardiology for cardiac clearance  Orders:  -     Ambulatory Referral to Cardiology; Future  2. Pre-op examination  Assessment & Plan:  Patient is here for Pre Op Clearance:    Date of Surgery: 2024  Name of Surgeon: [facility]  Indication: Ptosis bilateral  Nature of proposed Surgery: Correction of the ptosis  Type of Anestheis; local with MAC    Recovery anticipation and care:    Pertinent Hx:  Cardiac: No history of Acute MI, CHF or arrythmia in last 6 months  Renal: Renal function stable, CKD level as per BMP  Anasethesia hx: No hx previous anaesthesia related problem  Endo: not pertinent except documented  Hemato: no pertinent blood disorder, hx of blood transfuciton  Dental: No obvious mouth infection    Pre Op Labs;  reviewed  Orders:  -     Ambulatory Referral to Cardiology; Future  3. Primary hypertension  Assessment & Plan:  Blood pressure uncontrolled no chest pain no difficulty breathing asymptomatic    Blood pressure reading reviewed systolic 160 and diastolic 90    Started on Coreg 3.125 mg twice a day low-salt diet monitor blood pressure at home  Orders:  -     carvedilol (Coreg) 3.125 mg tablet; Take 1 tablet (3.125 mg total) by mouth 2 (two) times a day with meals  4. UTI (urinary tract infection), uncomplicated  Assessment & Plan:  Urinalysis urine culture reviewed resolving symptoms resolving finish the course of amoxicillin for 7 days        Discussion Summary and Plan:  Today's care plan and medications were reviewed with patient in detail and all their questions answered to their satisfaction.    Chief Complaint   Patient presents with    Pre-op Exam     Eye surgery November 6th.      Subjective:  Came in follow-up for the chronic medical condition and preop clearance history of chronic atrial fibrillation seen by cardiology cardiology clearance done provided patient needs anticoagulation bridging with Lovenox discussed with the patient at length    Pre-op Exam    Pre-operative Risk Factors:    History of cerebrovascular disease: Yes  History of ischemic heart disease: No  Pre-operative treatment with insulin: No  Pre-operative creatinine >2 mg/dL: No    History of congestive heart failure: No      The following portions of the patient's history were reviewed and updated as appropriate: allergies, current medications, past family history, past medical history, past social history, past surgical history and problem list.    Review of Systems   Constitutional:  Positive for activity change. Negative for appetite change, chills, diaphoresis, fatigue, fever and unexpected weight change.   HENT:  Negative for congestion, dental problem, drooling, ear discharge, ear pain, facial swelling, hearing loss, mouth sores,  nosebleeds, postnasal drip, rhinorrhea, sinus pressure, sneezing, sore throat, tinnitus, trouble swallowing and voice change.    Eyes:  Negative for photophobia, pain, discharge, redness, itching and visual disturbance.   Respiratory:  Negative for apnea, cough, choking, chest tightness, shortness of breath, wheezing and stridor.    Cardiovascular:  Negative for chest pain, palpitations and leg swelling.   Gastrointestinal:  Negative for abdominal distention, abdominal pain, anal bleeding, blood in stool, constipation, diarrhea, nausea, rectal pain and vomiting.   Endocrine: Negative for cold intolerance, heat intolerance, polydipsia, polyphagia and polyuria.   Genitourinary:  Negative for decreased urine volume, difficulty urinating, dysuria, enuresis, flank pain, frequency, genital sores, hematuria and urgency.   Musculoskeletal:  Positive for arthralgias, back pain and joint swelling. Negative for gait problem, neck pain and neck stiffness.   Skin:  Negative for color change, pallor, rash and wound.   Allergic/Immunologic: Negative.  Negative for environmental allergies, food allergies and immunocompromised state.   Neurological:  Negative for dizziness, tremors, seizures, syncope, facial asymmetry, speech difficulty, weakness, light-headedness, numbness and headaches.   Psychiatric/Behavioral:  Negative for agitation, behavioral problems, confusion, decreased concentration, dysphoric mood, hallucinations, self-injury, sleep disturbance and suicidal ideas. The patient is not nervous/anxious and is not hyperactive.          Historical Information   Patient Active Problem List   Diagnosis    Obstructive sleep apnea    Chronic atrial fibrillation (HCC)    H/O mitral valve replacement with mechanical valve    Long term (current) use of anticoagulants (warfarin)    Presence of prosthetic heart valve    Hypercholesteremia    History of anemia due to CKD    Allergic rhinitis    Type 2 diabetes mellitus without  complication, without long-term current use of insulin (HCC)    Iron deficiency anemia    Other specified hypothyroidism    Vitamin B12 deficiency    Other microscopic hematuria    Celiac disease    Abdominal aortic aneurysm (AAA) without rupture, unspecified part (HCC)    Ataxia    Pulmonary emphysema (HCC)    History of renal calculi    History of cervical cancer    Primary hypertension    Other proteinuria    Hepatic steatosis    CVA (cerebral vascular accident) (HCC)    Type 2 diabetes mellitus with other specified complication, without long-term current use of insulin (HCC)    CKD (chronic kidney disease) stage 2, GFR 60-89 ml/min    Cerebrovascular accident (CVA) (HCC)    Fall    Anemia    Acute blood loss anemia    AVM (arteriovenous malformation) of duodenum, acquired    Balance problem    Edema of left lower leg    Chronic pain of left knee    Onychomycosis    Asymptomatic varicose veins of both lower extremities    Gross hematuria    Trigger ring finger of right hand    Chronic venous insufficiency of lower extremity    Uterine prolapse    History of stroke    Lumbar radiculopathy    Primary osteoarthritis involving multiple joints    Gastroesophageal reflux disease without esophagitis    UTI (urinary tract infection), uncomplicated    Other neutropenia (HCC)    Pre-op examination     Past Medical History:   Diagnosis Date    Bloody nose     slow drip, clots in the morning    Colon polyp     Diabetes mellitus (HCC)     Pre DM diet controlled, metformin DCed 2023    Heart murmur     Hyperlipidemia     Stroke (HCC) 03/11/2022     Past Surgical History:   Procedure Laterality Date    CATARACT EXTRACTION Bilateral     COLONOSCOPY      EGD      HYSTERECTOMY      MITRAL VALVE REPLACEMENT  1994     Social History     Substance and Sexual Activity   Alcohol Use Yes    Comment: special occasional     Social History     Substance and Sexual Activity   Drug Use No     Social History     Tobacco Use   Smoking Status  Former    Current packs/day: 0.00    Average packs/day: 2.0 packs/day for 30.0 years (60.0 ttl pk-yrs)    Types: Cigarettes    Start date: 1959    Quit date:     Years since quittin.8   Smokeless Tobacco Never     Family History   Problem Relation Age of Onset    Heart failure Mother     Heart attack Father     Sleep apnea Brother      Health Maintenance Due   Topic    Pneumococcal Vaccine: 65+ Years (1 of 2 - PCV)    Zoster Vaccine (1 of 2)    RSV Vaccine Age 60+ Years (1 - 1-dose 60+ series)    OT PLAN OF CARE     Influenza Vaccine (1)    COVID-19 Vaccine ( season)    HEMOGLOBIN A1C       Meds/Allergies       Current Outpatient Medications:     acetaminophen (TYLENOL) 325 mg tablet, Take 2 tablets (650 mg total) by mouth every 6 (six) hours as needed for mild pain or headaches, Disp: , Rfl: 0    amoxicillin (AMOXIL) 500 mg capsule, Take 1 capsule (500 mg total) by mouth every 8 (eight) hours for 10 days, Disp: 21 capsule, Rfl: 0    atorvastatin (LIPITOR) 20 mg tablet, Take 1 tablet by mouth once daily, Disp: 90 tablet, Rfl: 1    carvedilol (Coreg) 3.125 mg tablet, Take 1 tablet (3.125 mg total) by mouth 2 (two) times a day with meals, Disp: 200 tablet, Rfl: 3    Cholecalciferol (VITAMIN D PO), Take by mouth, Disp: , Rfl:     estradiol (ESTRACE) 0.1 mg/g vaginal cream, 0.5 grams of cream intravaginally administered daily for one to two weeks, then reduce to twice weekly, Disp: 42.5 g, Rfl: 3    glucose blood test strip, Use 1 each in the morning Use one daily, Disp: 100 strip, Rfl: 2    metFORMIN (GLUCOPHAGE) 500 mg tablet, Take 1 tablet (500 mg total) by mouth 2 (two) times a day with meals, Disp: 180 tablet, Rfl: 3    Multiple Vitamins-Minerals (PRESERVISION AREDS PO), Take 2 tablets by mouth daily  , Disp: , Rfl:     warfarin (COUMADIN) 2.5 mg tablet, Take 1 tablet (2.5 mg total) by mouth 3 (three) times a week On  and Friday (Patient taking differently: Take 2.5 mg by  "mouth 3 (three) times a week Added to 5mg dose only on tuesdays=7.5mg), Disp: , Rfl: 0    warfarin (COUMADIN) 5 mg tablet, Take 1 tablet (5 mg total) by mouth 4 (four) times a week On Sunday, Tuesday, Thursday, Saturday (Patient taking differently: Take 5 mg by mouth in the morning), Disp: , Rfl: 0    ketoconazole (NIZORAL) 2 % cream, Apply topically daily, Disp: 60 g, Rfl: 1      Objective:    Vitals:   /90   Pulse 96   Ht 5' 5\" (1.651 m)   Wt 73.9 kg (163 lb)   SpO2 98%   BMI 27.12 kg/m²   Body mass index is 27.12 kg/m².  Vitals:    10/15/24 1108   Weight: 73.9 kg (163 lb)       Physical Exam  Vitals and nursing note reviewed.   Constitutional:       General: She is not in acute distress.     Appearance: She is well-developed. She is not ill-appearing, toxic-appearing or diaphoretic.   HENT:      Head: Normocephalic and atraumatic.      Right Ear: External ear normal.      Left Ear: External ear normal.      Nose: Nose normal.      Mouth/Throat:      Pharynx: No oropharyngeal exudate.   Eyes:      General: Lids are normal. Lids are everted, no foreign bodies appreciated. No scleral icterus.        Right eye: No discharge.         Left eye: No discharge.      Conjunctiva/sclera: Conjunctivae normal.      Pupils: Pupils are equal, round, and reactive to light.   Neck:      Thyroid: No thyromegaly.      Vascular: Normal carotid pulses. No carotid bruit, hepatojugular reflux or JVD.      Trachea: No tracheal tenderness or tracheal deviation.   Cardiovascular:      Rate and Rhythm: Normal rate and regular rhythm.      Pulses:           Dorsalis pedis pulses are 2+ on the right side and 2+ on the left side.        Posterior tibial pulses are 1+ on the right side and 1+ on the left side.      Heart sounds: Murmur heard.      No friction rub. No gallop.   Pulmonary:      Effort: Pulmonary effort is normal. No respiratory distress.      Breath sounds: Normal breath sounds. No stridor. No wheezing or rales. "   Chest:      Chest wall: No tenderness.   Abdominal:      General: Bowel sounds are normal. There is no distension.      Palpations: Abdomen is soft. There is no mass.      Tenderness: There is no abdominal tenderness. There is no guarding or rebound.   Musculoskeletal:         General: No tenderness or deformity. Normal range of motion.      Cervical back: Normal range of motion and neck supple. No edema, erythema or rigidity. No spinous process tenderness or muscular tenderness. Normal range of motion.   Feet:      Right foot:      Skin integrity: No ulcer, skin breakdown, erythema, warmth, callus or dry skin.      Left foot:      Skin integrity: No ulcer, skin breakdown, erythema, warmth, callus or dry skin.   Lymphadenopathy:      Head:      Right side of head: No submental, submandibular, tonsillar, preauricular or posterior auricular adenopathy.      Left side of head: No submental, submandibular, tonsillar, preauricular, posterior auricular or occipital adenopathy.      Cervical: No cervical adenopathy.      Right cervical: No superficial, deep or posterior cervical adenopathy.     Left cervical: No superficial, deep or posterior cervical adenopathy.      Upper Body:      Right upper body: No pectoral adenopathy.      Left upper body: No pectoral adenopathy.   Skin:     General: Skin is warm and dry.      Coloration: Skin is not pale.      Findings: No erythema or rash.   Neurological:      General: No focal deficit present.      Mental Status: She is alert and oriented to person, place, and time.      Cranial Nerves: No cranial nerve deficit.      Sensory: No sensory deficit.      Motor: No tremor, abnormal muscle tone or seizure activity.      Coordination: Coordination normal.      Gait: Gait abnormal.      Deep Tendon Reflexes: Reflexes are normal and symmetric. Reflexes normal.   Psychiatric:         Behavior: Behavior normal.         Thought Content: Thought content normal.         Judgment: Judgment  normal.       Lab Review   Appointment on 10/10/2024   Component Date Value Ref Range Status    Color, UA 10/10/2024 Dark Yellow   Final    Clarity, UA 10/10/2024 Turbid   Final    Specific Gravity, UA 10/10/2024 1.017  1.003 - 1.030 Final    pH, UA 10/10/2024 6.0  4.5, 5.0, 5.5, 6.0, 6.5, 7.0, 7.5, 8.0 Final    Leukocytes, UA 10/10/2024 Large (A)  Negative Final    Nitrite, UA 10/10/2024 Positive (A)  Negative Final    Protein, UA 10/10/2024 50 (1+) (A)  Negative mg/dl Final    Glucose, UA 10/10/2024 Negative  Negative mg/dl Final    Ketones, UA 10/10/2024 Negative  Negative mg/dl Final    Urobilinogen, UA 10/10/2024 <2.0  <2.0 mg/dl mg/dl Final    Bilirubin, UA 10/10/2024 Negative  Negative Final    Occult Blood, UA 10/10/2024 Small (A)  Negative Final    RBC, UA 10/10/2024 30-50 (A)  None Seen, 1-2 /hpf Final    WBC, UA 10/10/2024 Innumerable (A)  None Seen, 1-2 /hpf Final    Epithelial Cells 10/10/2024 Occasional  None Seen, Occasional /hpf Final    Bacteria, UA 10/10/2024 Innumerable (A)  None Seen, Occasional /hpf Final    WBC Clumps 10/10/2024 Present   Final   Orders Only on 10/10/2024   Component Date Value Ref Range Status    Urine Culture 10/10/2024 >100,000 cfu/ml Escherichia coli (A)   Final    This organism has been edited. The previous result was Gram Negative Marty on 10/11/2024 at 1813 EDT.    Urine Culture 10/10/2024 30,000-39,000 cfu/ml   Final    Mixed Contaminants X2   Appointment on 10/08/2024   Component Date Value Ref Range Status    Protime 10/08/2024 35.3 (H)  12.3 - 15.0 seconds Final    INR 10/08/2024 3.59 (H)  0.85 - 1.19 Final   Appointment on 09/10/2024   Component Date Value Ref Range Status    Protime 09/10/2024 31.1 (H)  12.3 - 15.0 seconds Final    INR 09/10/2024 3.03 (H)  0.85 - 1.19 Final         Patient Instructions   Pt is symptom free for this problem.  This diagnosis or problem is stable/well controlled  Patient is advised to continue same meds as outlined in medicine list  Pt  "is advised to continue to follow with specialist if they are following for this problme  Pt should get blood test or other testing as per specialist ( or myself) prior to your next visit.        Jorge Colon MD        \"This note has been constructed using a voice recognition system.Therefore there may be syntax, spelling, and/or grammatical errors. Please call if you have any questions. \"  "

## 2024-10-18 NOTE — TELEPHONE ENCOUNTER
Daughter called. Wondered why pt was having cardiac clearance with Dr. Nieves and not her cardiologist. Daughter will call Dr. Nieves to make sure everything is ok to proceed.

## 2024-10-19 NOTE — ASSESSMENT & PLAN NOTE
Patient's rate controlled on Coumadin managed by cardiology asymptomatic no chest pain difficulty breathing    Again continue manage medications follow    Coreg 3.125 mg twice a day    Awaiting to be seen by cardiology for cardiac clearance

## 2024-10-19 NOTE — ASSESSMENT & PLAN NOTE
Blood pressure uncontrolled no chest pain no difficulty breathing asymptomatic    Blood pressure reading reviewed systolic 160 and diastolic 90    Started on Coreg 3.125 mg twice a day low-salt diet monitor blood pressure at home

## 2024-10-19 NOTE — ASSESSMENT & PLAN NOTE
Urinalysis urine culture reviewed E. coli sensitive to Keflex Keflex 500 Q8 for 7 days patient afebrile WBC count is normal asymptomatic

## 2024-10-22 ENCOUNTER — OFFICE VISIT (OUTPATIENT)
Dept: CARDIOLOGY CLINIC | Facility: CLINIC | Age: 84
End: 2024-10-22
Payer: MEDICARE

## 2024-10-22 VITALS
SYSTOLIC BLOOD PRESSURE: 130 MMHG | DIASTOLIC BLOOD PRESSURE: 80 MMHG | BODY MASS INDEX: 27.66 KG/M2 | HEART RATE: 90 BPM | OXYGEN SATURATION: 99 % | HEIGHT: 65 IN | WEIGHT: 166 LBS

## 2024-10-22 DIAGNOSIS — I10 ESSENTIAL HYPERTENSION: ICD-10-CM

## 2024-10-22 DIAGNOSIS — I48.20 CHRONIC ATRIAL FIBRILLATION (HCC): ICD-10-CM

## 2024-10-22 DIAGNOSIS — Z95.2 H/O MITRAL VALVE REPLACEMENT WITH MECHANICAL VALVE: ICD-10-CM

## 2024-10-22 DIAGNOSIS — Z01.810 PRE-OPERATIVE CARDIOVASCULAR EXAMINATION: Primary | ICD-10-CM

## 2024-10-22 DIAGNOSIS — I71.40 ABDOMINAL AORTIC ANEURYSM (AAA) WITHOUT RUPTURE, UNSPECIFIED PART (HCC): ICD-10-CM

## 2024-10-22 DIAGNOSIS — Z01.818 PRE-OP EXAMINATION: ICD-10-CM

## 2024-10-22 PROCEDURE — 99214 OFFICE O/P EST MOD 30 MIN: CPT | Performed by: INTERNAL MEDICINE

## 2024-10-22 PROCEDURE — 93000 ELECTROCARDIOGRAM COMPLETE: CPT | Performed by: INTERNAL MEDICINE

## 2024-10-22 NOTE — PROGRESS NOTES
[facility]  [id number]   [place] [address]  Cardiology Consultation    Pooja Barron  [id number]  1940      Consult for: Preoperative  Appreciate consult by: Jorge Colon MD    1. Pre-operative cardiovascular examination  POCT ECG      2. Chronic atrial fibrillation (HCC)  POCT ECG    Ambulatory Referral to Cardiology      3. H/O mitral valve replacement with mechanical valve  POCT ECG      4. Essential hypertension  POCT ECG      5. Abdominal aortic aneurysm (AAA) without rupture, unspecified part (HCC)  POCT ECG      6. Pre-op examination  Ambulatory Referral to Cardiology         Discussion/Summary:   Pre-operative-she has a normal functioning mechanical mitral valve.  Euvolemic on examination.  She also has known history of chronic atrial fibrillation rate controlled without medication.  She has been symptomatic with rate control agent-but no prolonged pauses or evidence of chronotropic incompetence.  For her mitral valve recommend bridging with Lovenox prior to eye surgery-if Coumadin needs to be discontinued 5 days prior to procedure and at least 2 days post until INR is therapeutic. it would be the safest if the patient can have the procedure under twilight's sedation-she has had previous tachy bradycardia responses..    Atrial fibrillation currently spontaneously rate controlled    Possible prior coronary artery bypass grafting?-Awaiting prior records.    Previous CVA    Diabetes mellitus    History of abdominal aortic aneurysm borderline measuring 3 x 3.2 cm. Not smoking. Bp controlled. monitor    Prior smoking history    Lower extremity edema/varicose vein-compression socks    HPI:   84-year-old with a remote mechanical mitral valve, prior smoking history presents to establish care.  She has been following with a cardiologist at an outside hospital for more than 30 years.  However unfortunately she is having transportation issues.  She is  also pending eye surgery.  She currently denies having major exertional shortness of breath or chest heaviness.  She she has mild lower extremity swelling at times.    Past Medical History:   Diagnosis Date    Bloody nose     slow drip, clots in the morning    Colon polyp     Diabetes mellitus (HCC)     Pre DM diet controlled, metformin DCed     Heart murmur     Hyperlipidemia     Stroke (HCC) 2022     Social History     Socioeconomic History    Marital status: Single     Spouse name: Not on file    Number of children: Not on file    Years of education: Not on file    Highest education level: Not on file   Occupational History    Not on file   Tobacco Use    Smoking status: Former     Current packs/day: 0.00     Average packs/day: 2.0 packs/day for 30.0 years (60.0 ttl pk-yrs)     Types: Cigarettes     Start date: 1959     Quit date:      Years since quittin.8    Smokeless tobacco: Never   Vaping Use    Vaping status: Never Used   Substance and Sexual Activity    Alcohol use: Yes     Comment: special occasional    Drug use: No    Sexual activity: Not on file   Other Topics Concern    Not on file   Social History Narrative    Not on file     Social Determinants of Health     Financial Resource Strain: Low Risk  (2023)    Overall Financial Resource Strain (CARDIA)     Difficulty of Paying Living Expenses: Not hard at all   Food Insecurity: No Food Insecurity (2024)    Nursing - Inadequate Food Risk Classification     Worried About Running Out of Food in the Last Year: Never true     Ran Out of Food in the Last Year: Never true     Ran Out of Food in the Last Year: Not on file   Transportation Needs: No Transportation Needs (2024)    PRAPARE - Transportation     Lack of Transportation (Medical): No     Lack of Transportation (Non-Medical): No   Physical Activity: Not on file   Stress: Not on file   Social Connections: Not on file   Intimate Partner Violence: Not on file   Housing  Stability: Low Risk  (6/11/2024)    Housing Stability Vital Sign     Unable to Pay for Housing in the Last Year: No     Number of Times Moved in the Last Year: 1     Homeless in the Last Year: No      Family History   Problem Relation Age of Onset    Heart failure Mother     Heart attack Father     Sleep apnea Brother      Past Surgical History:   Procedure Laterality Date    CATARACT EXTRACTION Bilateral     COLONOSCOPY      EGD      HYSTERECTOMY      MITRAL VALVE REPLACEMENT  1994       Current Outpatient Medications:     acetaminophen (TYLENOL) 325 mg tablet, Take 2 tablets (650 mg total) by mouth every 6 (six) hours as needed for mild pain or headaches, Disp: , Rfl: 0    amoxicillin (AMOXIL) 500 mg capsule, Take 1 capsule (500 mg total) by mouth every 8 (eight) hours for 10 days, Disp: 21 capsule, Rfl: 0    atorvastatin (LIPITOR) 20 mg tablet, Take 1 tablet by mouth once daily, Disp: 90 tablet, Rfl: 1    carvedilol (Coreg) 3.125 mg tablet, Take 1 tablet (3.125 mg total) by mouth 2 (two) times a day with meals, Disp: 200 tablet, Rfl: 3    Cholecalciferol (VITAMIN D PO), Take by mouth, Disp: , Rfl:     estradiol (ESTRACE) 0.1 mg/g vaginal cream, 0.5 grams of cream intravaginally administered daily for one to two weeks, then reduce to twice weekly, Disp: 42.5 g, Rfl: 3    glucose blood test strip, Use 1 each in the morning Use one daily, Disp: 100 strip, Rfl: 2    metFORMIN (GLUCOPHAGE) 500 mg tablet, Take 1 tablet (500 mg total) by mouth 2 (two) times a day with meals, Disp: 180 tablet, Rfl: 3    Multiple Vitamins-Minerals (PRESERVISION AREDS PO), Take 2 tablets by mouth daily  , Disp: , Rfl:     warfarin (COUMADIN) 2.5 mg tablet, Take 1 tablet (2.5 mg total) by mouth 3 (three) times a week On Monday Wednesday and Friday (Patient taking differently: Take 2.5 mg by mouth 3 (three) times a week Added to 5mg dose only on tuesdays=7.5mg), Disp: , Rfl: 0    warfarin (COUMADIN) 5 mg tablet, Take 1 tablet (5 mg total) by  "mouth 4 (four) times a week On Sunday, Tuesday, Thursday, Saturday (Patient taking differently: Take 5 mg by mouth in the morning), Disp: , Rfl: 0    ketoconazole (NIZORAL) 2 % cream, Apply topically daily, Disp: 60 g, Rfl: 1  Allergies   Allergen Reactions    Sulfa Antibiotics Tongue Swelling    Sulfasalazine Throat Swelling     Vitals:    10/22/24 0959   BP: 130/80   BP Location: Left arm   Patient Position: Sitting   Cuff Size: Standard   Pulse: 90   SpO2: 99%   Weight: 75.3 kg (166 lb)   Height: 5' 5\" (1.651 m)       Review of Systems:   Review of Systems   Constitutional:  Positive for fatigue. Negative for activity change, appetite change, chills, diaphoresis, fever and unexpected weight change.   HENT: Negative.  Negative for congestion, dental problem, drooling, ear discharge, ear pain, facial swelling, hearing loss, mouth sores, nosebleeds, postnasal drip, rhinorrhea, sinus pressure, sinus pain, sneezing, sore throat, tinnitus, trouble swallowing and voice change.    Eyes: Negative.  Negative for photophobia, pain, redness, itching and visual disturbance.   Respiratory:  Positive for shortness of breath. Negative for apnea, cough, choking, chest tightness, wheezing and stridor.    Cardiovascular:  Positive for leg swelling. Negative for chest pain and palpitations.   Gastrointestinal: Negative.  Negative for abdominal distention, abdominal pain, anal bleeding, blood in stool, constipation, diarrhea, nausea, rectal pain and vomiting.   Endocrine: Negative.  Negative for cold intolerance, heat intolerance, polydipsia, polyphagia and polyuria.   Genitourinary: Negative.  Negative for decreased urine volume, difficulty urinating, dyspareunia, dysuria, enuresis, flank pain, frequency, genital sores, hematuria, menstrual problem, pelvic pain, urgency, vaginal bleeding, vaginal discharge and vaginal pain.   Musculoskeletal: Negative.  Negative for arthralgias, back pain, gait problem, joint swelling, myalgias, " "neck pain and neck stiffness.   Skin: Negative.  Negative for color change, pallor, rash and wound.   Allergic/Immunologic: Negative.  Negative for environmental allergies, food allergies and immunocompromised state.   Neurological: Negative.  Negative for dizziness, tremors, seizures, syncope, facial asymmetry, speech difficulty, weakness, light-headedness, numbness and headaches.   Hematological: Negative.  Negative for adenopathy. Does not bruise/bleed easily.   Psychiatric/Behavioral: Negative.  Negative for agitation, behavioral problems, confusion, decreased concentration, dysphoric mood, hallucinations, self-injury, sleep disturbance and suicidal ideas. The patient is not nervous/anxious and is not hyperactive.    All other systems reviewed and are negative.      Vitals:    10/22/24 0959   BP: 130/80   BP Location: Left arm   Patient Position: Sitting   Cuff Size: Standard   Pulse: 90   SpO2: 99%   Weight: 75.3 kg (166 lb)   Height: 5' 5\" (1.651 m)     Physical Examination:   Physical Exam    Labs:     Lab Results   Component Value Date    WBC 4.43 08/02/2024    HGB 12.2 08/02/2024    HCT 37.5 08/02/2024    MCV 96 08/02/2024    RDW 16.3 (H) 08/02/2024     08/02/2024     BMP:  Lab Results   Component Value Date    SODIUM 139 08/02/2024    K 3.9 08/02/2024     08/02/2024    CO2 29 08/02/2024    ANIONGAP 11.1 11/09/2015    BUN 14 08/02/2024    CREATININE 0.67 08/02/2024    GLUC 126 08/02/2024    GLUF 151 (H) 06/03/2024    CALCIUM 9.1 08/02/2024    EGFR 80 08/02/2024    MG 1.8 01/11/2023     LFT:  Lab Results   Component Value Date    AST 26 08/02/2024    ALT 20 08/02/2024    ALKPHOS 81 08/02/2024    TP 7.0 08/02/2024    ALB 4.2 08/02/2024      Lab Results   Component Value Date    OZH4QCDMXTGL 2.804 06/03/2024     Lab Results   Component Value Date    HGBA1C 6.0 (H) 06/03/2024     Lipid Profile:   Lab Results   Component Value Date    CHOLESTEROL 239 (H) 06/03/2024    HDL 49 (L) 06/03/2024    " LDLCALC 142 (H) 06/03/2024    TRIG 240 (H) 06/03/2024     Lab Results   Component Value Date    CHOLESTEROL 239 (H) 06/03/2024    CHOLESTEROL 163 11/27/2023     Lab Results   Component Value Date    TROPONINI <0.02 10/09/2017     Lab Results   Component Value Date    NTBNP 487 (H) 01/10/2023      Recent Results (from the past 672 hour(s))   Protime-INR    Collection Time: 10/08/24  8:47 AM   Result Value Ref Range    Protime 35.3 (H) 12.3 - 15.0 seconds    INR 3.59 (H) 0.85 - 1.19   Urine culture    Collection Time: 10/10/24  1:03 PM    Specimen: Urine, Clean Catch   Result Value Ref Range    Urine Culture >100,000 cfu/ml Escherichia coli (A)     Urine Culture 30,000-39,000 cfu/ml        Susceptibility    Escherichia coli - ROBERT     ZID Performed Yes       Ampicillin ($$) <=8.00 Susceptible ug/ml     Aztreonam ($$$)  <=4 Susceptible ug/ml     Cefazolin ($) <=2.00 Susceptible ug/ml     Ciprofloxacin ($)  >2.00 Resistant ug/ml     Gentamicin ($$) <=2 Susceptible ug/ml     Levofloxacin ($) >4.00 Resistant ug/ml     Minocycline 8 Intermediate ug/ml     Nitrofurantoin <=32 Susceptible ug/ml     Tetracycline >8 Resistant ug/ml     Trimethoprim + Sulfamethoxazole ($$$) <=0.5/9.5 Susceptible ug/ml   Urinalysis with microscopic    Collection Time: 10/10/24  1:03 PM   Result Value Ref Range    Color, UA Dark Yellow     Clarity, UA Turbid     Specific Gravity, UA 1.017 1.003 - 1.030    pH, UA 6.0 4.5, 5.0, 5.5, 6.0, 6.5, 7.0, 7.5, 8.0    Leukocytes, UA Large (A) Negative    Nitrite, UA Positive (A) Negative    Protein, UA 50 (1+) (A) Negative mg/dl    Glucose, UA Negative Negative mg/dl    Ketones, UA Negative Negative mg/dl    Urobilinogen, UA <2.0 <2.0 mg/dl mg/dl    Bilirubin, UA Negative Negative    Occult Blood, UA Small (A) Negative    RBC, UA 30-50 (A) None Seen, 1-2 /hpf    WBC, UA Innumerable (A) None Seen, 1-2 /hpf    Epithelial Cells Occasional None Seen, Occasional /hpf    Bacteria, UA Innumerable (A) None Seen,  "Occasional /hpf    WBC Clumps Present        Imaging & Testing   I have personally reviewed pertinent reports.      Cardiac Testing         EKG: Personally reviewed.    Atrial fibrillation at 90bpm      Panchito Nieves MD [facility]  [phone]  Please call with any questions or suggestions    Counseling :  A description of the counseling:   Goals and Barriers:  Patient's ability to self care:  Medication side effect reviewed with patient in detail and all their questions answered.    \"Portions of the record may have been created with voice recognition software. Occasional wrong word or \"sound a like\" substitutions may have occurred due to the inherent limitations of voice recognition software. Read the chart carefully and recognize, using context, where substitutions have occurred. Please call if you have any questions. \"    "

## 2024-10-24 ENCOUNTER — TELEPHONE (OUTPATIENT)
Age: 84
End: 2024-10-24

## 2024-10-24 DIAGNOSIS — N39.0 URINARY TRACT INFECTION WITHOUT HEMATURIA, SITE UNSPECIFIED: Primary | ICD-10-CM

## 2024-10-24 DIAGNOSIS — R35.0 URINARY FREQUENCY: Primary | ICD-10-CM

## 2024-10-24 NOTE — TELEPHONE ENCOUNTER
Patient is calling in stating that she finished antibiotics on Monday 10/21/24 for a bladder infection and she still has symptoms with pressure when urinating.    She will be going to get labs done on 10/25/24    She wants to know if another culture is needed, please advise

## 2024-10-25 ENCOUNTER — APPOINTMENT (OUTPATIENT)
Dept: LAB | Facility: CLINIC | Age: 84
End: 2024-10-25
Payer: MEDICARE

## 2024-10-25 ENCOUNTER — TELEPHONE (OUTPATIENT)
Dept: INTERNAL MEDICINE CLINIC | Facility: CLINIC | Age: 84
End: 2024-10-25

## 2024-10-25 DIAGNOSIS — Z01.818 PRE-OP EXAMINATION: ICD-10-CM

## 2024-10-25 DIAGNOSIS — Z13.0 SCREENING FOR DEFICIENCY ANEMIA: ICD-10-CM

## 2024-10-25 DIAGNOSIS — R35.0 URINARY FREQUENCY: ICD-10-CM

## 2024-10-25 DIAGNOSIS — I10 PRIMARY HYPERTENSION: ICD-10-CM

## 2024-10-25 DIAGNOSIS — N39.0 URINARY TRACT INFECTION WITHOUT HEMATURIA, SITE UNSPECIFIED: ICD-10-CM

## 2024-10-25 DIAGNOSIS — D53.9 NUTRITIONAL ANEMIA: ICD-10-CM

## 2024-10-25 DIAGNOSIS — E11.9 TYPE 2 DIABETES MELLITUS WITHOUT COMPLICATION, WITHOUT LONG-TERM CURRENT USE OF INSULIN (HCC): ICD-10-CM

## 2024-10-25 DIAGNOSIS — N39.0 URINARY TRACT INFECTION WITHOUT HEMATURIA, SITE UNSPECIFIED: Primary | ICD-10-CM

## 2024-10-25 DIAGNOSIS — I48.20 CHRONIC ATRIAL FIBRILLATION (HCC): ICD-10-CM

## 2024-10-25 DIAGNOSIS — R39.15 URINARY URGENCY: ICD-10-CM

## 2024-10-25 LAB
ALBUMIN SERPL BCG-MCNC: 4.4 G/DL (ref 3.5–5)
ALP SERPL-CCNC: 90 U/L (ref 34–104)
ALT SERPL W P-5'-P-CCNC: 17 U/L (ref 7–52)
ANION GAP SERPL CALCULATED.3IONS-SCNC: 9 MMOL/L (ref 4–13)
AST SERPL W P-5'-P-CCNC: 25 U/L (ref 13–39)
BACTERIA UR QL AUTO: ABNORMAL /HPF
BASOPHILS # BLD AUTO: 0.05 THOUSANDS/ΜL (ref 0–0.1)
BASOPHILS NFR BLD AUTO: 1 % (ref 0–1)
BILIRUB SERPL-MCNC: 0.62 MG/DL (ref 0.2–1)
BILIRUB UR QL STRIP: NEGATIVE
BUN SERPL-MCNC: 13 MG/DL (ref 5–25)
CALCIUM SERPL-MCNC: 9.2 MG/DL (ref 8.4–10.2)
CHLORIDE SERPL-SCNC: 103 MMOL/L (ref 96–108)
CLARITY UR: ABNORMAL
CO2 SERPL-SCNC: 28 MMOL/L (ref 21–32)
COLOR UR: YELLOW
CREAT SERPL-MCNC: 0.6 MG/DL (ref 0.6–1.3)
EOSINOPHIL # BLD AUTO: 0.11 THOUSAND/ΜL (ref 0–0.61)
EOSINOPHIL NFR BLD AUTO: 3 % (ref 0–6)
ERYTHROCYTE [DISTWIDTH] IN BLOOD BY AUTOMATED COUNT: 16.4 % (ref 11.6–15.1)
EST. AVERAGE GLUCOSE BLD GHB EST-MCNC: 120 MG/DL
FOLATE SERPL-MCNC: >22.3 NG/ML
GFR SERPL CREATININE-BSD FRML MDRD: 83 ML/MIN/1.73SQ M
GLUCOSE P FAST SERPL-MCNC: 141 MG/DL (ref 65–99)
GLUCOSE UR STRIP-MCNC: NEGATIVE MG/DL
HBA1C MFR BLD: 5.8 %
HCT VFR BLD AUTO: 38.1 % (ref 34.8–46.1)
HGB BLD-MCNC: 12 G/DL (ref 11.5–15.4)
HGB UR QL STRIP.AUTO: ABNORMAL
IMM GRANULOCYTES # BLD AUTO: 0.01 THOUSAND/UL (ref 0–0.2)
IMM GRANULOCYTES NFR BLD AUTO: 0 % (ref 0–2)
KETONES UR STRIP-MCNC: NEGATIVE MG/DL
LEUKOCYTE ESTERASE UR QL STRIP: ABNORMAL
LYMPHOCYTES # BLD AUTO: 0.81 THOUSANDS/ΜL (ref 0.6–4.47)
LYMPHOCYTES NFR BLD AUTO: 19 % (ref 14–44)
MCH RBC QN AUTO: 30.9 PG (ref 26.8–34.3)
MCHC RBC AUTO-ENTMCNC: 31.5 G/DL (ref 31.4–37.4)
MCV RBC AUTO: 98 FL (ref 82–98)
MONOCYTES # BLD AUTO: 0.4 THOUSAND/ΜL (ref 0.17–1.22)
MONOCYTES NFR BLD AUTO: 9 % (ref 4–12)
NEUTROPHILS # BLD AUTO: 2.96 THOUSANDS/ΜL (ref 1.85–7.62)
NEUTS SEG NFR BLD AUTO: 68 % (ref 43–75)
NITRITE UR QL STRIP: POSITIVE
NON-SQ EPI CELLS URNS QL MICRO: ABNORMAL /HPF
NRBC BLD AUTO-RTO: 0 /100 WBCS
PH UR STRIP.AUTO: 6 [PH]
PLATELET # BLD AUTO: 218 THOUSANDS/UL (ref 149–390)
PMV BLD AUTO: 10.5 FL (ref 8.9–12.7)
POTASSIUM SERPL-SCNC: 4.3 MMOL/L (ref 3.5–5.3)
PROT SERPL-MCNC: 7.2 G/DL (ref 6.4–8.4)
PROT UR STRIP-MCNC: ABNORMAL MG/DL
RBC # BLD AUTO: 3.88 MILLION/UL (ref 3.81–5.12)
RBC #/AREA URNS AUTO: ABNORMAL /HPF
SODIUM SERPL-SCNC: 140 MMOL/L (ref 135–147)
SP GR UR STRIP.AUTO: 1.01 (ref 1–1.03)
TRANS CELLS #/AREA URNS HPF: PRESENT /[HPF]
UROBILINOGEN UR STRIP-ACNC: <2 MG/DL
VIT B12 SERPL-MCNC: 514 PG/ML (ref 180–914)
WBC # BLD AUTO: 4.34 THOUSAND/UL (ref 4.31–10.16)
WBC #/AREA URNS AUTO: ABNORMAL /HPF
WBC CLUMPS # UR AUTO: PRESENT /UL

## 2024-10-25 PROCEDURE — 85025 COMPLETE CBC W/AUTO DIFF WBC: CPT

## 2024-10-25 PROCEDURE — 82746 ASSAY OF FOLIC ACID SERUM: CPT

## 2024-10-25 PROCEDURE — 87086 URINE CULTURE/COLONY COUNT: CPT | Performed by: INTERNAL MEDICINE

## 2024-10-25 PROCEDURE — 87077 CULTURE AEROBIC IDENTIFY: CPT | Performed by: INTERNAL MEDICINE

## 2024-10-25 PROCEDURE — 36415 COLL VENOUS BLD VENIPUNCTURE: CPT

## 2024-10-25 PROCEDURE — 82607 VITAMIN B-12: CPT

## 2024-10-25 PROCEDURE — 80053 COMPREHEN METABOLIC PANEL: CPT

## 2024-10-25 PROCEDURE — 83036 HEMOGLOBIN GLYCOSYLATED A1C: CPT

## 2024-10-25 PROCEDURE — 87186 SC STD MICRODIL/AGAR DIL: CPT | Performed by: INTERNAL MEDICINE

## 2024-10-25 PROCEDURE — 81001 URINALYSIS AUTO W/SCOPE: CPT

## 2024-10-25 RX ORDER — PHENAZOPYRIDINE HYDROCHLORIDE 100 MG/1
100 TABLET, FILM COATED ORAL 3 TIMES DAILY PRN
Qty: 10 TABLET | Refills: 0 | Status: SHIPPED | OUTPATIENT
Start: 2024-10-25

## 2024-10-25 RX ORDER — CEPHALEXIN 500 MG/1
500 CAPSULE ORAL 2 TIMES DAILY
Qty: 10 CAPSULE | Refills: 0 | Status: SHIPPED | OUTPATIENT
Start: 2024-10-25 | End: 2024-10-30

## 2024-10-25 NOTE — PROGRESS NOTES
Spoke to the patient who was previously treated for confirmed UTI w/ E coli 2 weeks ago -- started on ciprofloxacin (which was found to be resistant) and switched to Amoxicillin x 10 days, with recurrence of symptoms following completion.    UA and Cx redrawn today-- will send 5 day course of Keflex and Rx for pyridium    Will follow up with lab results once resulted    The patient indicates understanding of these issues and agrees with the plan.

## 2024-10-25 NOTE — TELEPHONE ENCOUNTER
Left message urine results are not back yet.   Vaginosis bacteriana   LO QUE NECESITA SABER:   La vaginosis bacteriana es luther infección en la vagina  Puede causar vaginitis (irritación e inflamación de la vagina)  La causa es desconocida  Las bacterias que normalmente se encuentran en la vagina están desequilibradas  El riesgo aumenta si usted es sexualmente Dobrovo v Brdi[place], [place] ducha vaginal o tiene un dispositivo intrauterino (DIU)  INSTRUCCIONES SOBRE EL [facility]OSPITALARIA:   Llame a muñoz médico o ginecólogo si:  · Sunshine síntomas regresan o no mejoran con el tratamiento  · Usted tiene sangrado vaginal que no es de muñoz [place]  · Usted tiene preguntas o inquietudes acerca de muñoz condición o cuidado  Medicamentos:  · Los antibióticos pueden administrarse para matar las bacterias  Se los pueden recetar ayush luther píldora o crema para introducir en la vagina  · [place] sunshine medicamentos ayush se le haya indicado  Consulte con muñoz médico si usted lyn que muñoz medicamento no le está ayudando o si presenta efectos secundarios  Infórmele si es alérgico a algún medicamento  Mantenga luther lista actualizada de los [place], las vitaminas y los productos herbales que liliana  Incluya los siguientes datos de los medicamentos: cantidad, frecuencia y motivo de administración  Traiga con usted la lista o los envases de las píldoras a sunshine citas de seguimiento  Lleve la lista de los medicamentos con usted en donny de luther emergencia  Vaginosis bacteriana y embarazo: Si tiene vaginosis bacteriana juno el [facility], es posible que muñoz bebé nazca antes de tiempo o que tenga un peso bajo al nacer  Es posible que muñoz proveedor de atención médica le recomiende que se roney un análisis para detectar la vaginosis bacteriana antes o juno el [place] sobre el riesgo de un parto prematuro y se asegurará de que conozca los beneficios y riesgos de las pruebas  Prevenir la vaginosis bacteriana:  · Mantenga el área vaginal limpia y [place]   Lleve ropa interior y pantimedias hechas de algodón en el área de la entrepierna  Límpiese de adelante hacia atrás después de orinar o de tener luther evacuación intestinal  Después de bañarse, enjuague el jabón de la luciano vaginal para disminuir el riesgo de irritación  · No utilice productos que provocan irritación  Siempre use tampones y toallas sanitarias sin aroma  No use aerosoles para higiene femenina, talcos o tampones perfumados  También podrían provocar Irritación y aumentar muñoz riesgo de vaginosis  Los detergentes y suavizantes para la ropa también pueden causar irritación  · No use duchas vaginales  Estas pueden provocar un desequilibrio de la bacteria saludable de la vagina  · Use condones de látex juno las [employer]  Estos ayudan a prevenir contra otras infecciones y amy que muñoz cony contraiga la infección  Acuda a ruben consultas de control con muñoz médico o ginecólogo según le indicaron: La vaginosis bacteriana aumenta el riesgo de varios problemas de [place], ayush la enfermedad inflamatoria pélvica (EIP) o las infecciones de transmisión sexual  Ronalee [place] con ruben médicos para programar citas regulares para detectar problemas de navya  Anote ruben preguntas para que se acuerde de hacerlas juno ruben visitas  © [employer] [address] Information is for End User's use only and may not be sold, redistributed or otherwise used for commercial purposes  All illustrations and images included in CareNotes® are the copyrighted property of [employer] , [employer]  or [address] [place] [address] es sólo para uso en educación  Muñoz intención no es darle un consejo médico sobre enfermedades o tratamientos  Colsulte con muñoz Lesleigh Ebony farmacéutico antes de seguir cualquier régimen médico para saber si es seguro y efectivo para usted

## 2024-10-25 NOTE — TELEPHONE ENCOUNTER
Patient called, she does not want to go the weekend without medication for recurring UTI.  Asking if provider can get results of urine test from this morning.

## 2024-10-27 LAB
BACTERIA UR CULT: ABNORMAL
BACTERIA UR CULT: ABNORMAL

## 2024-10-28 ENCOUNTER — TELEPHONE (OUTPATIENT)
Dept: HEMATOLOGY ONCOLOGY | Facility: MEDICAL CENTER | Age: 84
End: 2024-10-28

## 2024-10-28 DIAGNOSIS — N39.0 URINARY TRACT INFECTION WITHOUT HEMATURIA, SITE UNSPECIFIED: Primary | ICD-10-CM

## 2024-10-28 RX ORDER — CEPHALEXIN 500 MG/1
500 CAPSULE ORAL EVERY 8 HOURS SCHEDULED
Qty: 21 CAPSULE | Refills: 0 | Status: SHIPPED | OUTPATIENT
Start: 2024-10-28 | End: 2024-11-04

## 2024-10-30 ENCOUNTER — APPOINTMENT (OUTPATIENT)
Dept: LAB | Facility: CLINIC | Age: 84
End: 2024-10-30
Payer: MEDICARE

## 2024-10-30 DIAGNOSIS — Z95.2 HEART VALVE REPLACED BY TRANSPLANT: ICD-10-CM

## 2024-10-30 DIAGNOSIS — I48.20 CHRONIC ATRIAL FIBRILLATION (HCC): ICD-10-CM

## 2024-10-30 LAB
INR PPP: 3.34 (ref 0.85–1.19)
PROTHROMBIN TIME: 33.4 SECONDS (ref 12.3–15)

## 2024-10-30 PROCEDURE — 36415 COLL VENOUS BLD VENIPUNCTURE: CPT

## 2024-10-30 PROCEDURE — 85610 PROTHROMBIN TIME: CPT

## 2024-11-01 ENCOUNTER — NURSE TRIAGE (OUTPATIENT)
Age: 84
End: 2024-11-01

## 2024-11-01 ENCOUNTER — TELEPHONE (OUTPATIENT)
Age: 84
End: 2024-11-01

## 2024-11-01 ENCOUNTER — HOSPITAL ENCOUNTER (EMERGENCY)
Facility: HOSPITAL | Age: 84
Discharge: HOME | End: 2024-11-01
Attending: EMERGENCY MEDICINE
Payer: MEDICARE

## 2024-11-01 VITALS
HEART RATE: 87 BPM | TEMPERATURE: 98.9 F | SYSTOLIC BLOOD PRESSURE: 181 MMHG | OXYGEN SATURATION: 96 % | DIASTOLIC BLOOD PRESSURE: 81 MMHG | RESPIRATION RATE: 20 BRPM

## 2024-11-01 DIAGNOSIS — N39.0 UTI (URINARY TRACT INFECTION): Primary | ICD-10-CM

## 2024-11-01 LAB
ALBUMIN SERPL BCG-MCNC: 4.3 G/DL (ref 3.5–5)
ALP SERPL-CCNC: 78 U/L (ref 34–104)
ALT SERPL W P-5'-P-CCNC: 16 U/L (ref 7–52)
ANION GAP SERPL CALCULATED.3IONS-SCNC: 6 MMOL/L (ref 4–13)
AST SERPL W P-5'-P-CCNC: 22 U/L (ref 13–39)
BACTERIA UR QL AUTO: ABNORMAL /HPF
BASOPHILS # BLD AUTO: 0.03 THOUSANDS/ΜL (ref 0–0.1)
BASOPHILS NFR BLD AUTO: 1 % (ref 0–1)
BILIRUB SERPL-MCNC: 0.63 MG/DL (ref 0.2–1)
BILIRUB UR QL STRIP: NEGATIVE
BUN SERPL-MCNC: 15 MG/DL (ref 5–25)
CALCIUM SERPL-MCNC: 9.4 MG/DL (ref 8.4–10.2)
CHLORIDE SERPL-SCNC: 106 MMOL/L (ref 96–108)
CLARITY UR: ABNORMAL
CO2 SERPL-SCNC: 28 MMOL/L (ref 21–32)
COLOR UR: COLORLESS
CREAT SERPL-MCNC: 0.54 MG/DL (ref 0.6–1.3)
EOSINOPHIL # BLD AUTO: 0.09 THOUSAND/ΜL (ref 0–0.61)
EOSINOPHIL NFR BLD AUTO: 2 % (ref 0–6)
ERYTHROCYTE [DISTWIDTH] IN BLOOD BY AUTOMATED COUNT: 16 % (ref 11.6–15.1)
GFR SERPL CREATININE-BSD FRML MDRD: 86 ML/MIN/1.73SQ M
GLUCOSE SERPL-MCNC: 101 MG/DL (ref 65–140)
GLUCOSE UR STRIP-MCNC: NEGATIVE MG/DL
HCT VFR BLD AUTO: 36.1 % (ref 34.8–46.1)
HGB BLD-MCNC: 11.3 G/DL (ref 11.5–15.4)
HGB UR QL STRIP.AUTO: ABNORMAL
IMM GRANULOCYTES # BLD AUTO: 0.01 THOUSAND/UL (ref 0–0.2)
IMM GRANULOCYTES NFR BLD AUTO: 0 % (ref 0–2)
KETONES UR STRIP-MCNC: NEGATIVE MG/DL
LEUKOCYTE ESTERASE UR QL STRIP: ABNORMAL
LYMPHOCYTES # BLD AUTO: 1.13 THOUSANDS/ΜL (ref 0.6–4.47)
LYMPHOCYTES NFR BLD AUTO: 23 % (ref 14–44)
MCH RBC QN AUTO: 30.1 PG (ref 26.8–34.3)
MCHC RBC AUTO-ENTMCNC: 31.3 G/DL (ref 31.4–37.4)
MCV RBC AUTO: 96 FL (ref 82–98)
MONOCYTES # BLD AUTO: 0.5 THOUSAND/ΜL (ref 0.17–1.22)
MONOCYTES NFR BLD AUTO: 10 % (ref 4–12)
NEUTROPHILS # BLD AUTO: 3.08 THOUSANDS/ΜL (ref 1.85–7.62)
NEUTS SEG NFR BLD AUTO: 64 % (ref 43–75)
NITRITE UR QL STRIP: NEGATIVE
NON-SQ EPI CELLS URNS QL MICRO: ABNORMAL /HPF
NRBC BLD AUTO-RTO: 0 /100 WBCS
PH UR STRIP.AUTO: 6.5 [PH]
PLATELET # BLD AUTO: 203 THOUSANDS/UL (ref 149–390)
PMV BLD AUTO: 9.8 FL (ref 8.9–12.7)
POTASSIUM SERPL-SCNC: 3.9 MMOL/L (ref 3.5–5.3)
PROT SERPL-MCNC: 7.1 G/DL (ref 6.4–8.4)
PROT UR STRIP-MCNC: NEGATIVE MG/DL
RBC # BLD AUTO: 3.76 MILLION/UL (ref 3.81–5.12)
RBC #/AREA URNS AUTO: ABNORMAL /HPF
SODIUM SERPL-SCNC: 140 MMOL/L (ref 135–147)
SP GR UR STRIP.AUTO: 1.01 (ref 1–1.03)
UROBILINOGEN UR STRIP-ACNC: <2 MG/DL
WBC # BLD AUTO: 4.84 THOUSAND/UL (ref 4.31–10.16)
WBC #/AREA URNS AUTO: ABNORMAL /HPF

## 2024-11-01 PROCEDURE — 85025 COMPLETE CBC W/AUTO DIFF WBC: CPT | Performed by: EMERGENCY MEDICINE

## 2024-11-01 PROCEDURE — 80053 COMPREHEN METABOLIC PANEL: CPT | Performed by: EMERGENCY MEDICINE

## 2024-11-01 PROCEDURE — 99284 EMERGENCY DEPT VISIT MOD MDM: CPT | Performed by: EMERGENCY MEDICINE

## 2024-11-01 PROCEDURE — 87077 CULTURE AEROBIC IDENTIFY: CPT | Performed by: EMERGENCY MEDICINE

## 2024-11-01 PROCEDURE — 87186 SC STD MICRODIL/AGAR DIL: CPT | Performed by: EMERGENCY MEDICINE

## 2024-11-01 PROCEDURE — 99284 EMERGENCY DEPT VISIT MOD MDM: CPT

## 2024-11-01 PROCEDURE — 36415 COLL VENOUS BLD VENIPUNCTURE: CPT | Performed by: EMERGENCY MEDICINE

## 2024-11-01 PROCEDURE — 87086 URINE CULTURE/COLONY COUNT: CPT | Performed by: EMERGENCY MEDICINE

## 2024-11-01 PROCEDURE — 81001 URINALYSIS AUTO W/SCOPE: CPT | Performed by: EMERGENCY MEDICINE

## 2024-11-01 RX ORDER — CEPHALEXIN 500 MG/1
500 CAPSULE ORAL EVERY 8 HOURS SCHEDULED
Qty: 30 CAPSULE | Refills: 0 | Status: SHIPPED | OUTPATIENT
Start: 2024-11-01 | End: 2024-11-11

## 2024-11-01 NOTE — ED PROVIDER NOTES
Time reflects when diagnosis was documented in both MDM as applicable and the Disposition within this note       Time User Action Codes Description Comment    11/1/2024  1:11 PM Heber Reveles Add [N39.0] UTI (urinary tract infection)           ED Disposition       ED Disposition   Discharge    Condition   Stable    Date/Time   Fri Nov 1, 2024  1:11 PM    Comment   Pooja Barron discharge to home/self care.                   Assessment & Plan       Medical Decision Making  Patient with recurrent UTI.  Based on sensitivities should be sensitive to amoxicillin.  I ordered and reviewed urinalysis and urine culture.  I ordered a postvoid residual to evaluate for component of urinary retention contributing to UTI.  Patient without symptoms strongly suggestive of pyelonephritis.  Urine suggestive of infection.  Lab work otherwise grossly unremarkable.  Treated with Keflex based on prior sensitivities.  Not retaining on postvoid residual.  Provided with urology follow-up, prescription for Keflex, encouraged to maintain good hygiene in the setting of fecal incontinence, discharged with return precautions.    Amount and/or Complexity of Data Reviewed  External Data Reviewed: notes.     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  Noted to be receiving ongoing treatment for UTI with amoxicillin at that time.  Patient's urinalysis on 10/25/2024 strongly suggestive of infection with leukocytes, nitrates.  Urine culture positive for E. coli which should be sensitive to amoxicillin, Keflex.  Labs: ordered.    Risk  Prescription drug management.             Medications - No data to display    ED Risk Strat Scores                                               History of Present Illness       Chief Complaint   Patient presents with    Possible UTI     Finnished 3 different abx over several weeks. Not getting any better, pain on urination,  constantly feels like bladder full       Past Medical History:   Diagnosis Date    Bloody nose     slow drip, clots in the morning    Colon polyp     Diabetes mellitus (HCC)     Pre DM diet controlled, metformin DCed     Heart murmur     Hyperlipidemia     Stroke (HCC) 2022      Past Surgical History:   Procedure Laterality Date    CATARACT EXTRACTION Bilateral     COLONOSCOPY      EGD      HYSTERECTOMY      MITRAL VALVE REPLACEMENT        Family History   Problem Relation Age of Onset    Heart failure Mother     Heart attack Father     Sleep apnea Brother       Social History     Tobacco Use    Smoking status: Former     Current packs/day: 0.00     Average packs/day: 2.0 packs/day for 30.0 years (60.0 ttl pk-yrs)     Types: Cigarettes     Start date: 1959     Quit date:      Years since quittin.8    Smokeless tobacco: Never   Vaping Use    Vaping status: Never Used   Substance Use Topics    Alcohol use: Yes     Comment: special occasional    Drug use: No      E-Cigarette/Vaping    E-Cigarette Use Never User       E-Cigarette/Vaping Substances    Nicotine No     THC No     CBD No     Flavoring No     Other No     Unknown No       I have reviewed and agree with the history as documented.     Patient is an 84-year-old female presenting for evaluation of possible UTI.  Patient states several days of suprapubic pain and tenderness, urinary frequency, sensation of incomplete emptying of bladder.  Patient additionally feeling some general malaise and had a subjective fever today.  Patient denies flank pain, nausea, vomiting.  Patient denies hematuria, history of nephrolithiasis.  Patient has taken several recent courses of antibiotics and states she feels improvement while she is on the antibiotic but once again starts having urinary symptoms when she stops taking them.  Patient's daughter believes that she has at times incontinent of stoo        Review of Systems   Constitutional:  Positive for  fatigue and fever (Subjective). Negative for chills.   Respiratory:  Negative for cough and shortness of breath.    Gastrointestinal:  Negative for diarrhea, nausea and vomiting.   Genitourinary:  Positive for dysuria and frequency.   Musculoskeletal:  Negative for arthralgias and myalgias.   Neurological:  Negative for headaches.   Psychiatric/Behavioral:  Negative for confusion.    All other systems reviewed and are negative.          Objective       ED Triage Vitals [11/01/24 1120]   Temperature Pulse Blood Pressure Respirations SpO2 Patient Position - Orthostatic VS   98.9 °F (37.2 °C) 87 (!) 181/81 20 96 % Sitting      Temp Source Heart Rate Source BP Location FiO2 (%) Pain Score    Tympanic Monitor Right arm -- No Pain      Vitals      Date and Time Temp Pulse SpO2 Resp BP Pain Score FACES Pain Rating User   11/01/24 1120 98.9 °F (37.2 °C) 87 96 % 20 181/81 No Pain -- LS            Physical Exam  Vitals and nursing note reviewed.   Constitutional:       General: She is not in acute distress.     Appearance: Normal appearance. She is not ill-appearing, toxic-appearing or diaphoretic.      Comments: Appears stated age, nontoxic, nondistressed   HENT:      Head: Normocephalic and atraumatic.      Comments: Moist mucous membranes     Right Ear: External ear normal.      Left Ear: External ear normal.   Eyes:      General:         Right eye: No discharge.         Left eye: No discharge.   Cardiovascular:      Comments: Regular rate and rhythm, no murmurs rubs or gallops.  Extremities warm and well-perfused without mottling  Pulmonary:      Effort: No respiratory distress.      Comments: No increased work of breathing.  Speaking in complete sentences.  Lungs clear to auscultation bilaterally without wheezes, rales, rhonchi.  Satting 96% on room air indicating adequate oxygenation  Abdominal:      General: There is no distension.      Comments: Abdomen soft, nontender, nondistended without rigidity, rebound, guarding    Genitourinary:     Comments: No suprapubic tenderness.  No CVA tenderness.  Musculoskeletal:         General: No deformity.      Cervical back: Normal range of motion.   Skin:     Findings: No lesion or rash.   Neurological:      Mental Status: She is alert and oriented to person, place, and time. Mental status is at baseline.   Psychiatric:         Mood and Affect: Mood and affect normal.         Results Reviewed       Procedure Component Value Units Date/Time    Comprehensive metabolic panel [[id number]]  (Abnormal) Collected: 11/01/24 1241    Lab Status: Final result Specimen: Blood from Arm, Left Updated: 11/01/24 1309     Sodium 140 mmol/L      Potassium 3.9 mmol/L      Chloride 106 mmol/L      CO2 28 mmol/L      ANION GAP 6 mmol/L      BUN 15 mg/dL      Creatinine 0.54 mg/dL      Glucose 101 mg/dL      Calcium 9.4 mg/dL      AST 22 U/L      ALT 16 U/L      Alkaline Phosphatase 78 U/L      Total Protein 7.1 g/dL      Albumin 4.3 g/dL      Total Bilirubin 0.63 mg/dL      eGFR 86 ml/min/1.73sq m     Narrative:      National Kidney Disease Foundation guidelines for Chronic Kidney Disease (CKD):     Stage 1 with normal or high GFR (GFR > 90 mL/min/1.73 square meters)    Stage 2 Mild CKD (GFR = 60-89 mL/min/1.73 square meters)    Stage 3A Moderate CKD (GFR = 45-59 mL/min/1.73 square meters)    Stage 3B Moderate CKD (GFR = 30-44 mL/min/1.73 square meters)    Stage 4 Severe CKD (GFR = 15-29 mL/min/1.73 square meters)    Stage 5 End Stage CKD (GFR <15 mL/min/1.73 square meters)  Note: GFR calculation is accurate only with a steady state creatinine    Urinalysis with microscopic [[id number]]  (Abnormal) Collected: 11/01/24 1242    Lab Status: Final result Specimen: Urine, Clean Catch Updated: 11/01/24 1307     Color, UA Colorless     Clarity, UA Slightly Cloudy     Specific Gravity, UA 1.015     pH, UA 6.5     Leukocytes, UA Moderate     Nitrite, UA Negative     Protein, UA Negative mg/dl      Glucose, UA Negative mg/dl       Ketones, UA Negative mg/dl      Urobilinogen, UA <2.0 mg/dl      Bilirubin, UA Negative     Occult Blood, UA Trace     RBC, UA 0-1 /hpf      WBC, UA 10-20 /hpf      Epithelial Cells Occasional /hpf      Bacteria, UA Innumerable /hpf     CBC and differential [[id number]]  (Abnormal) Collected: 11/01/24 1241    Lab Status: Final result Specimen: Blood from Arm, Left Updated: 11/01/24 1250     WBC 4.84 Thousand/uL      RBC 3.76 Million/uL      Hemoglobin 11.3 g/dL      Hematocrit 36.1 %      MCV 96 fL      MCH 30.1 pg      MCHC 31.3 g/dL      RDW 16.0 %      MPV 9.8 fL      Platelets 203 Thousands/uL      nRBC 0 /100 WBCs      Segmented % 64 %      Immature Grans % 0 %      Lymphocytes % 23 %      Monocytes % 10 %      Eosinophils Relative 2 %      Basophils Relative 1 %      Absolute Neutrophils 3.08 Thousands/µL      Absolute Immature Grans 0.01 Thousand/uL      Absolute Lymphocytes 1.13 Thousands/µL      Absolute Monocytes 0.50 Thousand/µL      Eosinophils Absolute 0.09 Thousand/µL      Basophils Absolute 0.03 Thousands/µL     Urine culture [[id number]] Collected: 11/01/24 1242    Lab Status: In process Specimen: Urine, Clean Catch Updated: 11/01/24 1250            No orders to display       Procedures    ED Medication and Procedure Management   Prior to Admission Medications   Prescriptions Last Dose Informant Patient Reported? Taking?   Cholecalciferol (VITAMIN D PO)  Self Yes No   Sig: Take by mouth   Multiple Vitamins-Minerals (PRESERVISION AREDS PO)  Self Yes No   Sig: Take 2 tablets by mouth daily     acetaminophen (TYLENOL) 325 mg tablet  Self No No   Sig: Take 2 tablets (650 mg total) by mouth every 6 (six) hours as needed for mild pain or headaches   atorvastatin (LIPITOR) 20 mg tablet  Self No No   Sig: Take 1 tablet by mouth once daily   cephalexin (KEFLEX) 500 mg capsule Not Taking  No No   Sig: Take 1 capsule (500 mg total) by mouth every 8 (eight) hours for 7 days   Patient not taking: Reported on  2024   estradiol (ESTRACE) 0.1 mg/g vaginal cream  Self No No   Si.5 grams of cream intravaginally administered daily for one to two weeks, then reduce to twice weekly   glucose blood test strip  Self No No   Sig: Use 1 each in the morning Use one daily   ketoconazole (NIZORAL) 2 % cream   No No   Sig: Apply topically daily   metFORMIN (GLUCOPHAGE) 500 mg tablet  Self No No   Sig: Take 1 tablet (500 mg total) by mouth 2 (two) times a day with meals   phenazopyridine (PYRIDIUM) 100 mg tablet   No No   Sig: Take 1 tablet (100 mg total) by mouth 3 (three) times a day as needed for bladder spasms   warfarin (COUMADIN) 2.5 mg tablet  Self No No   Sig: Take 1 tablet (2.5 mg total) by mouth 3 (three) times a week On  and Friday   Patient taking differently: Take 2.5 mg by mouth 3 (three) times a week Added to 5mg dose only on =7.5mg   warfarin (COUMADIN) 5 mg tablet  Self No No   Sig: Take 1 tablet (5 mg total) by mouth 4 (four) times a week On , Tuesday, Thursday, Saturday   Patient taking differently: Take 5 mg by mouth in the morning      Facility-Administered Medications: None     Discharge Medication List as of 2024  1:12 PM        START taking these medications    Details   !! cephalexin (KEFLEX) 500 mg capsule Take 1 capsule (500 mg total) by mouth every 8 (eight) hours for 10 days, Starting 2024, Until 2024, Normal       !! - Potential duplicate medications found. Please discuss with provider.        CONTINUE these medications which have NOT CHANGED    Details   acetaminophen (TYLENOL) 325 mg tablet Take 2 tablets (650 mg total) by mouth every 6 (six) hours as needed for mild pain or headaches, Starting Mon 3/14/2022, No Print      atorvastatin (LIPITOR) 20 mg tablet Take 1 tablet by mouth once daily, Starting Tue 9/3/2024, Normal      !! cephalexin (KEFLEX) 500 mg capsule Take 1 capsule (500 mg total) by mouth every 8 (eight) hours for 7 days, Starting  Mon 10/28/2024, Until Mon 11/4/2024, Normal      Cholecalciferol (VITAMIN D PO) Take by mouth, Historical Med      estradiol (ESTRACE) 0.1 mg/g vaginal cream 0.5 grams of cream intravaginally administered daily for one to two weeks, then reduce to twice weekly, Normal      glucose blood test strip Use 1 each in the morning Use one daily, Starting Thu 9/22/2022, Normal      ketoconazole (NIZORAL) 2 % cream Apply topically daily, Starting Fri 8/18/2023, Until Thu 10/12/2023, Normal      metFORMIN (GLUCOPHAGE) 500 mg tablet Take 1 tablet (500 mg total) by mouth 2 (two) times a day with meals, Starting Mon 7/22/2024, Normal      Multiple Vitamins-Minerals (PRESERVISION AREDS PO) Take 2 tablets by mouth daily  , Historical Med      phenazopyridine (PYRIDIUM) 100 mg tablet Take 1 tablet (100 mg total) by mouth 3 (three) times a day as needed for bladder spasms, Starting Fri 10/25/2024, Normal      !! warfarin (COUMADIN) 2.5 mg tablet Take 1 tablet (2.5 mg total) by mouth 3 (three) times a week On Monday Wednesday and Friday, Starting Mon 3/14/2022, No Print      !! warfarin (COUMADIN) 5 mg tablet Take 1 tablet (5 mg total) by mouth 4 (four) times a week On Sunday, Tuesday, Thursday, Saturday, Starting Tue 3/15/2022, No Print       !! - Potential duplicate medications found. Please discuss with provider.        No discharge procedures on file.  ED SEPSIS DOCUMENTATION   Time reflects when diagnosis was documented in both MDM as applicable and the Disposition within this note       Time User Action Codes Description Comment    11/1/2024  1:11 PM Heber Reveles Add [N39.0] UTI (urinary tract infection)                  Heber Reveles MD  11/01/24 2734

## 2024-11-01 NOTE — DISCHARGE INSTRUCTIONS
Urinalysis was suggestive of UTI.  We have given you a longer prescription of Keflex and nation of follow-up with urology.  If you have severe worsening pain, severe nausea and vomiting and are not able to drink fluids, persistent fever or shaking chills, return to the emergency department.

## 2024-11-01 NOTE — TELEPHONE ENCOUNTER
"Reason for Disposition   Fever > 100.4 F (38.0 C)   Side (flank) or lower back pain present    Additional Information   Pain or burning with passing urine (urination) and female    Answer Assessment - Initial Assessment Questions  1. SYMPTOM: \"What's the main symptom you're concerned about?\" (e.g., frequency, incontinence)          Feels feverish   Treating UTI and started with symptoms       Last dose 2 days ago       2. ONSET: \"When did the      start?\"        Last night       3. PAIN: \"Is there any pain?\" If Yes, ask: \"How bad is it?\" (Scale: 1-10; mild, moderate, severe)        Yes     4. CAUSE: \"What do you think is causing the symptoms?\"        UTI possible pyleonephritis    5. OTHER SYMPTOMS: \"Do you have any other symptoms?\" (e.g., blood in urine, fever, flank pain, pain with urination)       Yes pain with urination    Protocols used: Urinary Symptoms-Adult-[place], Urination Pain - Female-Adult-[place]    "

## 2024-11-01 NOTE — TELEPHONE ENCOUNTER
Patient daughter calling to find out if patient can still have her surgery on Wednesday. Patient is going to be on a 10 course of ABX. Plus she has to start taking Lovenox. Daughter does not wants her to stop her warfarin if its not guaranteed that patient cannot have surgery.  Belinda Decker     Called office to have this expidited

## 2024-11-01 NOTE — TELEPHONE ENCOUNTER
Patients daughter calls stating the patient finished antibiotics for a UTI 2 days ago and woke up with a fever this morning, pain during urination and feeling very sick in general.   Unsure if patient has flank pain , blood in urine or n/v.         I recommended ER evaluation now. Patient agreed and will head to [facility].   MALDONADO

## 2024-11-03 LAB — BACTERIA UR CULT: ABNORMAL

## 2024-11-03 NOTE — TELEPHONE ENCOUNTER
Discussed with the daughter at length on antibiotic according to culture sensitivity responding well cleared for surgery after 4 days to continue antibiotic and start Lovenox as recommended by cardiology and discontinue Coumadin

## 2024-11-04 ENCOUNTER — VBI (OUTPATIENT)
Dept: INTERNAL MEDICINE CLINIC | Facility: CLINIC | Age: 84
End: 2024-11-04

## 2024-11-04 DIAGNOSIS — Z71.89 COMPLEX CARE COORDINATION: Primary | ICD-10-CM

## 2024-11-04 NOTE — TELEPHONE ENCOUNTER
11/04/24 10:34 AM    Patient contacted post ED visit, [facility] department spoke with patient/caregiver and outreach was successful.    Thank you.  Riddhi Gresham MA  [facility]

## 2024-11-05 ENCOUNTER — PATIENT OUTREACH (OUTPATIENT)
Dept: CASE MANAGEMENT | Facility: OTHER | Age: 84
End: 2024-11-05

## 2024-11-05 NOTE — PROGRESS NOTES
Outpatient Care Management Note:    New Care Management referral received from  team.  Patient was in ER on 11/1/24 with positive UTI. She was discharged on keflex and is to follow up with urology.      CM called Pooja and introduced myself. She states that she is feeling good. She is taking her antibiotic and denies any further urinary symptoms. Her daughter is planning on calling urology today to schedule a follow up appt.     Pooja is having ptosis eye surgery tomorrow. She states that she has all instructions related to her coumadin holds and lovenox coverage.     CM gave Pooja my contact information. She knows that my phone goes through my computer and I do not get messages after hours or on weekends. She is aware to call her PCP office directly with any immediate questions.  She denies any current needs. CM will close the referral.

## 2024-11-13 ENCOUNTER — OFFICE VISIT (OUTPATIENT)
Dept: GASTROENTEROLOGY | Facility: CLINIC | Age: 84
End: 2024-11-13
Payer: MEDICARE

## 2024-11-13 VITALS
BODY MASS INDEX: 27.66 KG/M2 | WEIGHT: 166 LBS | DIASTOLIC BLOOD PRESSURE: 67 MMHG | SYSTOLIC BLOOD PRESSURE: 148 MMHG | HEIGHT: 65 IN | HEART RATE: 93 BPM

## 2024-11-13 DIAGNOSIS — R15.9 INCONTINENCE OF FECES, UNSPECIFIED FECAL INCONTINENCE TYPE: Primary | ICD-10-CM

## 2024-11-13 DIAGNOSIS — K59.00 CONSTIPATION, UNSPECIFIED CONSTIPATION TYPE: ICD-10-CM

## 2024-11-13 PROCEDURE — 99214 OFFICE O/P EST MOD 30 MIN: CPT | Performed by: INTERNAL MEDICINE

## 2024-11-13 NOTE — PROGRESS NOTES
"Name: Pooja Barron      : 1940      MRN: [id number]  Encounter Provider: Hayden Flood MD  Encounter Date: 2024   Encounter department: [facility] [address]  :  Assessment & Plan  Incontinence of feces, unspecified fecal incontinence type  Patient with history of occasional constipation, fecal smearing, and digital exam the anal sphincter muscle tone seems to be satisfactory.  There was no mass or other pathology felt on digital exam.  No blood.  Advised patient to take Metamucil daily, Kegels pelvic floor muscle exercises, use the back toilet after each meal.  If symptoms still persist then we can have our colorectal surgical team and/or incontinence clinic evaluate her.  Discussed with patient and her daughter at length.  Last year colonoscopy results were reviewed generally unremarkable.       Constipation, unspecified constipation type             History of Present Illness   Abdominal Pain      Pooja Barron is a 84 y.o. female who presents with a history of occasional fecal incontinence, she has seen some's liquid stool in her pad.  She also has urinary incontinence.  She does not see well so does not know if there is any blood.  Denies any painful defecation, denies any chest pain shortness of breath fever chills rash, generally move her bowels every other day.  Denies any excessive fatty fried foods, soda coffee, no pelvic surgeries.  Couple some of the current record diet medication last year's colonoscopy reports noted.  Has had a mitral valve metallic on Coumadin    Review of Systems   Gastrointestinal:  Positive for abdominal pain.    diet medications some of the current records reviewed.  Relevant systems reviewed.       Objective   /67 (BP Location: Left arm, Patient Position: Sitting, Cuff Size: Standard)   Pulse 93   Ht 5' 5\" (1.651 m)   Wt 75.3 kg (166 lb)   BMI 27.62 kg/m²      Physical Exam  Vitals and nursing note reviewed. "   Constitutional:       General: She is not in acute distress.     Appearance: She is well-developed.   HENT:      Head: Normocephalic and atraumatic.   Eyes:      Conjunctiva/sclera: Conjunctivae normal.   Cardiovascular:      Rate and Rhythm: Normal rate and regular rhythm.      Heart sounds: No murmur heard.  Pulmonary:      Effort: Pulmonary effort is normal. No respiratory distress.      Breath sounds: Normal breath sounds.   Abdominal:      General: Bowel sounds are normal. There is no distension.      Palpations: Abdomen is soft. There is no shifting dullness, hepatomegaly, splenomegaly or mass.      Tenderness: There is no abdominal tenderness. There is no guarding or rebound.   Musculoskeletal:         General: No swelling.      Cervical back: Neck supple.   Skin:     Capillary Refill: Capillary refill takes less than 2 seconds.   Neurological:      Mental Status: She is alert.   Psychiatric:         Mood and Affect: Mood normal.

## 2024-11-14 ENCOUNTER — NURSE TRIAGE (OUTPATIENT)
Age: 84
End: 2024-11-14

## 2024-11-14 NOTE — TELEPHONE ENCOUNTER
"Patient called in she [facility], states she was told to call [facility] for urine testing as patient has an upcoming appointment with Anirudh Martinez PA-c in December. States she would like a sooner appointment she can not wait that long, appointment added to wait list.   Patient reports constant UTI's she has been on 3 different antibiotics the past 24 days. Finished last dose of antibiotics on Monday. Her symptoms are bladder pressure, reports she is able to urinate.   Denies fever, chills.   States she is aware of how to stay hydrated and avoid bladder irritants.   Reviewed emergency room precautions. Patient would like to know if her urologist can order urine testing. I explained since she is technically a new patient of [facility] typically we can not order testing, but I will ask, she would have to wait 72 hours from when she finished the antibiotics to have her tested which brings us to tomorrow.  Please advise.   Reason for Disposition   All other urine symptoms    Answer Assessment - Initial Assessment Questions  1. SYMPTOM: \"What's the main symptom you're concerned about?\" (e.g., frequency, incontinence)    See note    Protocols used: Urinary Symptoms-Adult-[place]    "

## 2024-11-18 PROBLEM — N39.0 UTI (URINARY TRACT INFECTION), UNCOMPLICATED: Status: RESOLVED | Noted: 2024-06-11 | Resolved: 2024-11-18

## 2024-11-19 ENCOUNTER — APPOINTMENT (OUTPATIENT)
Dept: LAB | Facility: CLINIC | Age: 84
End: 2024-11-19
Payer: MEDICARE

## 2024-11-19 ENCOUNTER — TELEPHONE (OUTPATIENT)
Age: 84
End: 2024-11-19

## 2024-11-19 DIAGNOSIS — B37.31 CANDIDA VAGINITIS: ICD-10-CM

## 2024-11-19 DIAGNOSIS — N39.0 URINARY TRACT INFECTION WITHOUT HEMATURIA, SITE UNSPECIFIED: Primary | ICD-10-CM

## 2024-11-19 DIAGNOSIS — N39.0 URINARY TRACT INFECTION WITHOUT HEMATURIA, SITE UNSPECIFIED: ICD-10-CM

## 2024-11-19 LAB
BACTERIA UR QL AUTO: ABNORMAL /HPF
BILIRUB UR QL STRIP: NEGATIVE
CLARITY UR: ABNORMAL
COLOR UR: YELLOW
GLUCOSE UR STRIP-MCNC: ABNORMAL MG/DL
HGB UR QL STRIP.AUTO: NEGATIVE
KETONES UR STRIP-MCNC: NEGATIVE MG/DL
LEUKOCYTE ESTERASE UR QL STRIP: ABNORMAL
NITRITE UR QL STRIP: POSITIVE
NON-SQ EPI CELLS URNS QL MICRO: ABNORMAL /HPF
PH UR STRIP.AUTO: 6 [PH]
PROT UR STRIP-MCNC: ABNORMAL MG/DL
RBC #/AREA URNS AUTO: ABNORMAL /HPF
SP GR UR STRIP.AUTO: 1.02 (ref 1–1.03)
UROBILINOGEN UR STRIP-ACNC: <2 MG/DL
WBC #/AREA URNS AUTO: ABNORMAL /HPF
WBC CLUMPS # UR AUTO: PRESENT /UL

## 2024-11-19 PROCEDURE — 81001 URINALYSIS AUTO W/SCOPE: CPT

## 2024-11-19 PROCEDURE — 87186 SC STD MICRODIL/AGAR DIL: CPT | Performed by: INTERNAL MEDICINE

## 2024-11-19 PROCEDURE — 87086 URINE CULTURE/COLONY COUNT: CPT | Performed by: INTERNAL MEDICINE

## 2024-11-19 PROCEDURE — 87077 CULTURE AEROBIC IDENTIFY: CPT | Performed by: INTERNAL MEDICINE

## 2024-11-19 RX ORDER — FLUCONAZOLE 150 MG/1
TABLET ORAL
Qty: 2 TABLET | Refills: 0 | Status: SHIPPED | OUTPATIENT
Start: 2024-11-19 | End: 2024-11-19

## 2024-11-19 NOTE — TELEPHONE ENCOUNTER
Patient called that her recent bladder infection has return; she is having itching more than buring, discomfort and pain when urinating, pressure in the baldder. She just finished a course of antibiotics Keflex on 11/11. Urology can not see her until 12/5/24, she can not go like this until then. Please look into this and advise the patient.

## 2024-11-19 NOTE — TELEPHONE ENCOUNTER
Spoke with patient she will go for the urine test today and that her prescription was sent to [facility].

## 2024-11-19 NOTE — TELEPHONE ENCOUNTER
Donna from [facility] called.  They received Rx for fluconazole, but are getting a warning about increased bleeding risk due to her Warfarin usage.  Should they dispense the fluconazole or would Dr Colon like to prescribe something different?  Please advise.

## 2024-11-20 ENCOUNTER — TELEPHONE (OUTPATIENT)
Dept: INTERNAL MEDICINE CLINIC | Facility: CLINIC | Age: 84
End: 2024-11-20

## 2024-11-20 DIAGNOSIS — I48.20 CHRONIC ATRIAL FIBRILLATION (HCC): Primary | ICD-10-CM

## 2024-11-20 DIAGNOSIS — N39.0 URINARY TRACT INFECTION WITHOUT HEMATURIA, SITE UNSPECIFIED: Primary | ICD-10-CM

## 2024-11-20 RX ORDER — CIPROFLOXACIN 500 MG/1
500 TABLET, FILM COATED ORAL EVERY 12 HOURS SCHEDULED
Qty: 14 TABLET | Refills: 0 | Status: SHIPPED | OUTPATIENT
Start: 2024-11-20 | End: 2024-11-27

## 2024-11-20 NOTE — TELEPHONE ENCOUNTER
Dr Colon is aware of the interaction of Cipro and Warfarin. Patient has been educated and will get an extra INR on Friday (2 days). Spoke to patient at 9 am today. She has agreed to the above.    Contacted the pharmacist at [facility] to let them know that patient was educated.

## 2024-11-21 ENCOUNTER — OFFICE VISIT (OUTPATIENT)
Dept: UROLOGY | Facility: CLINIC | Age: 84
End: 2024-11-21

## 2024-11-21 VITALS
HEART RATE: 82 BPM | WEIGHT: 165 LBS | OXYGEN SATURATION: 97 % | HEIGHT: 65 IN | BODY MASS INDEX: 27.49 KG/M2 | SYSTOLIC BLOOD PRESSURE: 138 MMHG | DIASTOLIC BLOOD PRESSURE: 68 MMHG

## 2024-11-21 DIAGNOSIS — N39.0 URINARY TRACT INFECTION WITHOUT HEMATURIA, SITE UNSPECIFIED: Primary | ICD-10-CM

## 2024-11-21 LAB
BACTERIA UR CULT: ABNORMAL
POST-VOID RESIDUAL VOLUME, ML POC: 220 ML

## 2024-11-21 RX ORDER — METHENAMINE HIPPURATE 1000 MG/1
1 TABLET ORAL 2 TIMES DAILY WITH MEALS
Qty: 60 TABLET | Refills: 2 | Status: SHIPPED | OUTPATIENT
Start: 2024-11-21

## 2024-11-21 RX ORDER — ERYTHROMYCIN 5 MG/G
OINTMENT OPHTHALMIC
COMMUNITY
Start: 2024-11-06

## 2024-11-21 RX ORDER — FLUCONAZOLE 150 MG/1
TABLET ORAL
COMMUNITY
Start: 2024-11-20

## 2024-11-21 RX ORDER — ACETAMINOPHEN AND CODEINE PHOSPHATE 300; 30 MG/1; MG/1
TABLET ORAL
COMMUNITY
Start: 2024-11-06

## 2024-11-21 NOTE — PROGRESS NOTES
Pooja Barron is a(n) 84 y.o. female. , :  1940    Subjective     Assessment:  The encounter diagnosis was Urinary tract infection without hematuria, site unspecified.  Recurrent E coli UTI  Recurrent E coli UTIs on D-mannose 1gm BID   DM, vaginal atrophy on estrace, hysterectomy, hx of cervical cancer   Prolapse and no longer with pessary (Dr. [facility] in [place])   Dysuria and abd pressure with voiding.  Currently on cipro   PVR 220cc       Plan:  F/u 1 month to check on UTIs and PVR.  If has persistent UTIs will need cystoscopy    Take D-mannose 1gm (1000mg) in a 8-12 oz glass of water in the AM and PM.  Ok to increase to 4x per day if having symptoms of a UTI.  Methenamine hippurate 1gm BID   Vit C 1000mg 2-3 x per day  Take probiotics daily with food.   Change the brand of the probiotic every 4 months.   Try to increase fluid to help clear urine infection   May need to f/u with GYN for pessary to help with UTIs and incomplete bladder emptying          Radiology  13 Chest X-ray   Mild hyperinflation    13 CT a/p w/o   No renal calculus. No hydronephrosis. Extensive diverticulosis. Ecstatic mid abdominal aorta.    16 Renal US   Mild lobular appearance to the left kidney.    2019 Abd US   No hydro, renal mass or stone. Hepatic steatosis. 21cm liver.    21 CT a/p w   Diverticulosis. Incidentally detected small infarenal abdominal aortic aneurysm which measures about 3.1 cm, surveillance with US can be performed to evaluate growth. Mild hepatic steatosis.      History  Postmenopausal atrophic vaginitis, Estrace cream 3x/wk. Uses pessary since 2018. Dr. Charles.  No longer with pessary   Microscopic & gross hematuria  Remote Hx of renal calculi x 2 (last 10+ years ago. No surgery needed)  Cervical CA - s/p hysterectomy. NO XRT  Chronic cystitis - feels better after antibiotics  2 PPD smoking hx, stopped .     Past  surgical history   13 [facility] UTI Received  Rocephin 1 g in ER  09/22/15 Cystoscopy   05/02/16 Cystoscopy  12/03/20 Cystoscopy Chronic asymptomatic bacteria, cystoscopy negative today. Pelvic negative today.     Labs  07/28/13 C&S E coli  09/15/15 C&S E coli  04/28/16 C&S Klebsiella  06/01/16 C&S Klebsiella  09/06/16 C&S E coli  06/21/17 C&S Klebsiella  10/05/17 C&S Klebsiella  11/16/17 C&S Klebsiella  03/15/18 C&S E coli Amoxil 500mg po TID x 5 days  07/02/18 C&S E coli Amoxil 500mg po TID x 3 days   07/08/19 C&S E coli Doxy 100mg BID x 5 days   08/28/19 C&S E coli Cefadroxil 500mg BID x 5 days   01/20/20 C&S E coli  10/24/23 C&S E coli E coli Keflex 500mg TID x days   10/10/24 Urine culture E coli  10/25/24 Urine culture E coli   11/01/24 Urine culture E coli   11/19/24 Urine culture E coli     [facility]   83 y.o. female with chronic cystitis on D-mannose, cystocele currently wo pessary, vaginal atrophy on estriol cream, and remote hx of stones Presents for f/u Urine culture 10/24/23 indicated e coli and noted was tx twice with antibiotics and still symptomatic. UA today + nitrite and Leuk. Currently with dysuria and discomfort with voiding. Some mild increase in frequency with smaller aoumnts. Not on a fluid restriction.     Assessment:   E coli UTI not resolved with antibiotics x 2   Hx of recurrent UTIs   Vaginal atrophy   Vaginal prolapse- no longer with pessary     Plan:  Patient Instructions   F/u 3 months to check on urine and discuss stopping the keflex 250 HS   Keflex 500mg BID x 5 days followed by Keflex 250mg HS for 3 months  Probiotic daily with food  Increase the D-mannose 1gm BID to QID when feels infected.   F/u with Dr. Charles for her prolapse that may be contributing to her infections  Continue using the estradiol cream TIW  Urine for culture from office       11/21/2024 OV Anirudh Martinez  83 y/o female with hx of CVA, A-fib, AVM, JAKE, pulmonary emphysema, DM, CKD, hysterectomy, cervical cancer, vaginal atrophy on estradiol,  hx  "presents for f/u due to recurrent E coli UTIs.         Review of Systems    No results found for: \"PSA\"  No results found for: \"TESTOSTERONE\"  No components found for: \"CR\"  No results found for: \"HBA1C\"    Objective     /68 (BP Location: Left arm, Patient Position: Sitting, Cuff Size: Standard)   Pulse 82   Ht 5' 5\" (1.651 m)   Wt 74.8 kg (165 lb)   SpO2 97%   BMI 27.46 kg/m²     Physical Exam  HENT:      Head: Normocephalic.   Cardiovascular:      Rate and Rhythm: Normal rate.   Pulmonary:      Effort: Pulmonary effort is normal.   Skin:     General: Skin is warm.   Neurological:      Mental Status: She is alert.   Psychiatric:         Mood and Affect: Mood normal.           Anirudh Martinez [facility]rology [facility]  "

## 2024-11-21 NOTE — TELEPHONE ENCOUNTER
Patient's daughter called in requesting a sooner appointment for patient. States patient's health is declining and would like a call back by today.  # [phone]

## 2024-11-22 ENCOUNTER — APPOINTMENT (OUTPATIENT)
Dept: LAB | Facility: CLINIC | Age: 84
End: 2024-11-22
Payer: MEDICARE

## 2024-11-22 DIAGNOSIS — I48.20 CHRONIC ATRIAL FIBRILLATION (HCC): ICD-10-CM

## 2024-11-22 DIAGNOSIS — N39.0 URINARY TRACT INFECTION WITHOUT HEMATURIA, SITE UNSPECIFIED: Primary | ICD-10-CM

## 2024-11-22 LAB
INR PPP: 2.96 (ref 0.85–1.19)
PROTHROMBIN TIME: 30.5 SECONDS (ref 12.3–15)

## 2024-11-22 PROCEDURE — 36415 COLL VENOUS BLD VENIPUNCTURE: CPT

## 2024-11-22 PROCEDURE — 85610 PROTHROMBIN TIME: CPT

## 2024-11-22 RX ORDER — AMOXICILLIN 500 MG/1
500 CAPSULE ORAL EVERY 8 HOURS SCHEDULED
Qty: 45 CAPSULE | Refills: 0 | Status: SHIPPED | OUTPATIENT
Start: 2024-11-22 | End: 2024-12-02

## 2024-11-22 NOTE — PROGRESS NOTES
Patient been treated previously with Keflex with E. coli UTI did not respond well the sensitivity reviewed E. coli sensitive to amoxicillin this time will give extended.  2 weeks until seen by urology

## 2024-11-25 ENCOUNTER — RESULTS FOLLOW-UP (OUTPATIENT)
Dept: INTERNAL MEDICINE CLINIC | Facility: CLINIC | Age: 84
End: 2024-11-25

## 2024-11-25 ENCOUNTER — ANTICOAG VISIT (OUTPATIENT)
Dept: INTERNAL MEDICINE CLINIC | Facility: CLINIC | Age: 84
End: 2024-11-25

## 2024-11-25 DIAGNOSIS — I48.20 CHRONIC ATRIAL FIBRILLATION (HCC): Primary | ICD-10-CM

## 2024-11-29 ENCOUNTER — OFFICE VISIT (OUTPATIENT)
Age: 84
End: 2024-11-29
Payer: MEDICARE

## 2024-11-29 VITALS — WEIGHT: 165 LBS | RESPIRATION RATE: 16 BRPM | BODY MASS INDEX: 27.49 KG/M2 | HEIGHT: 65 IN

## 2024-11-29 DIAGNOSIS — I87.2 DEEP VENOUS INSUFFICIENCY: ICD-10-CM

## 2024-11-29 DIAGNOSIS — M54.16 RADICULOPATHY OF LUMBAR REGION: ICD-10-CM

## 2024-11-29 DIAGNOSIS — M77.41 METATARSALGIA OF BOTH FEET: Primary | ICD-10-CM

## 2024-11-29 DIAGNOSIS — L03.032 PARONYCHIA OF TOENAIL OF LEFT FOOT: ICD-10-CM

## 2024-11-29 DIAGNOSIS — M77.42 METATARSALGIA OF BOTH FEET: Primary | ICD-10-CM

## 2024-11-29 DIAGNOSIS — R60.9 CHRONIC EDEMA: ICD-10-CM

## 2024-11-29 PROCEDURE — 99213 OFFICE O/P EST LOW 20 MIN: CPT | Performed by: PODIATRIST

## 2024-11-29 NOTE — PROGRESS NOTES
Assessment/Plan: Metatarsalgia secondary to radiculopathy.  Rule out neuropathy secondary to hyperglycemia.  Pain upon ambulation.  Chronic ingrown toenail.  Chronic edema secondary deep venous insufficiency.     Plan.  Chart reviewed.  Primary care notes reviewed.  Venous Doppler reviewed with patient and family.  Patient advised on condition.  She will follow-up vascular surgery.  In the meantime, patient will elevate feet at end of day.  She will consider compression stockings.  Patient advised on proper shoe size.  Nail cutting technique offered.   Aftercare instruction given.  Return for follow-up.            Diagnoses and all orders for this visit:     Metatarsalgia of both feet     Chronic edema     Deep venous insufficiency     Paronychia of toenail of left foot     Radiculopathy of lumbar region              Subjective: Patient has 2 concerns.  She has numbness and tingling of her feet.  She knows she has neuropathy.  She is also concerned about swelling.  She had Doppler test as directed.  Patient has discontinued gabapentin.  She believes it gives her diarrhea             Allergies   Allergen Reactions    Sulfa Antibiotics Tongue Swelling    Sulfasalazine Throat Swelling            Current Outpatient Medications:     gabapentin (NEURONTIN) 100 mg capsule, Take 1 capsule (100 mg total) by mouth daily at bedtime, Disp: 30 capsule, Rfl: 1    acetaminophen (TYLENOL) 325 mg tablet, Take 2 tablets (650 mg total) by mouth every 6 (six) hours as needed for mild pain or headaches, Disp: , Rfl: 0    atorvastatin (LIPITOR) 40 mg tablet, Take 1 tablet (40 mg total) by mouth daily, Disp: 90 tablet, Rfl: 1    Cholecalciferol (VITAMIN D PO), Take by mouth, Disp: , Rfl:     estradiol (ESTRACE) 0.1 mg/g vaginal cream, 0.5 grams of cream intravaginally administered daily for one to two weeks, then reduce to twice weekly, Disp: 42.5 g, Rfl: 3    glucose blood test strip, Use 1 each in the morning Use one daily, Disp:  100 strip, Rfl: 2    ketoconazole (NIZORAL) 2 % cream, Apply topically daily, Disp: 60 g, Rfl: 1    losartan (COZAAR) 25 mg tablet, Take 1 tablet (25 mg total) by mouth daily (Patient not taking: Reported on 1/10/2024), Disp: 30 tablet, Rfl: 1    metFORMIN (GLUCOPHAGE) 500 mg tablet, Take 1 tablet (500 mg total) by mouth 2 (two) times a day with meals, Disp: 180 tablet, Rfl: 3    Multiple Vitamins-Minerals (PRESERVISION AREDS PO), Take 2 tablets by mouth daily  , Disp: , Rfl:     warfarin (COUMADIN) 2.5 mg tablet, Take 1 tablet (2.5 mg total) by mouth 3 (three) times a week On Monday Wednesday and Friday (Patient taking differently: Take 2.5 mg by mouth 3 (three) times a week Added to 5mg dose only on tuesdays=7.5mg), Disp: , Rfl: 0    warfarin (COUMADIN) 5 mg tablet, Take 1 tablet (5 mg total) by mouth 4 (four) times a week On Sunday, Tuesday, Thursday, Saturday (Patient taking differently: Take 5 mg by mouth in the morning), Disp: , Rfl: 0           Patient Active Problem List   Diagnosis    Obstructive sleep apnea    Chronic atrial fibrillation (HCC)    H/O mitral valve replacement with mechanical valve    Long term (current) use of anticoagulants (warfarin)    Presence of prosthetic heart valve    Hypercholesteremia    History of anemia due to CKD    Allergic rhinitis    Type 2 diabetes mellitus without complication, without long-term current use of insulin (HCC)    Iron deficiency anemia    Other specified hypothyroidism    Vitamin B12 deficiency    Other microscopic hematuria    Celiac disease    Abdominal aortic aneurysm (AAA) without rupture, unspecified part (HCC)    Ataxia    Pulmonary emphysema (HCC)    History of renal calculi    History of cervical cancer    Essential hypertension    Other proteinuria    Hepatic steatosis    CVA (cerebral vascular accident) (HCC)    Type 2 diabetes mellitus with other specified complication, without long-term current use of insulin (HCC)    CKD (chronic kidney disease)  stage 2, GFR 60-89 ml/min    Cerebrovascular accident (CVA) (HCC)    Fall    Anemia    Acute blood loss anemia    AVM (arteriovenous malformation) of duodenum, acquired    Balance problem    Edema of left lower leg    Chronic pain of left knee    Onychomycosis    Asymptomatic varicose veins of both lower extremities    Gross hematuria    Trigger ring finger of right hand    Chronic venous insufficiency of lower extremity    Uterine prolapse             Patient ID: Pooja Barron is a 84 y.o. female.     HPI     The following portions of the patient's history were reviewed and updated as appropriate:      family history includes Heart attack in her father; Heart failure in her mother; Sleep apnea in her brother.       reports that she quit smoking about 35 years ago. Her smoking use included cigarettes. She started smoking about 65 years ago. She has a 60.0 pack-year smoking history. She has never used smokeless tobacco. She reports current alcohol use. She reports that she does not use drugs.        Objective:  Patient's shoes and socks removed.   Foot ExamPhysical Exam       Foot Exam     General  General Appearance: appears stated age and healthy   Orientation: alert and oriented to person, place, and time   Affect: appropriate   Gait: antalgic         Right Foot/Ankle      Inspection and Palpation  Tenderness: metatarsals   Swelling: dorsum   Arch: pes planus  Hallux valgus: yes     Neurovascular  Dorsalis pedis: 2+  Posterior tibial: 2+        Left Foot/Ankle       Inspection and Palpation  Tenderness: metatarsals   Swelling: dorsum   Arch: pes planus  Hallux valgus: yes     Neurovascular  Dorsalis pedis: 2+  Posterior tibial: 2+           Physical Exam  Vitals and nursing note reviewed.   Constitutional:       Appearance: Normal appearance.   Cardiovascular:      Rate and Rhythm: Normal rate and regular rhythm.      Pulses:           Dorsalis pedis pulses are 2+ on the right side and 2+ on the left side.         Posterior tibial pulses are 2+ on the right side and 2+ on the left side.      Comments: Patient demonstrates significant amount of varicosities and telangiectasia of the lower extremity bilateral.  Venous Doppler demonstrates deep venous insufficiency.  Musculoskeletal:      Right lower le+ Pitting Edema present.      Left lower le+ Pitting Edema present.      Right foot: Bunion present.      Left foot: Bunion present.   Skin:     Capillary Refill: Capillary refill takes less than 2 seconds.      Comments: All nails are dystrophic.  Left hallux nail demonstrates subungual mycosis.  It is ingrown in the fibular aspect.  Positive paronychia.  Negative pus.   Neurological:      Mental Status: She is alert.   Psychiatric:         Mood and Affect: Mood normal.         Behavior: Behavior normal.         Thought Content: Thought content normal.         Judgment: Judgment normal.

## 2024-12-12 NOTE — PROGRESS NOTES
Pooja Barron is a(n) 84 y.o. female. , :  1940    Subjective     Assessment:  The primary encounter diagnosis was History of renal calculi. Diagnoses of Gross hematuria, Other microscopic hematuria, and Uterine prolapse were also pertinent to this visit.  Recurrent urinary tract infections  Recurrent E. coli urinary tract infections  D-mannose at 1000 mg twice a day  Methenamine hippurate 1000 mg twice a day  Vitamin C 1000 mg 3 times per day  Incomplete bladder emptying with postvoid residual of 193 mL    Vaginal atrophy  Vaginal atrophy on estrogen cream 3 times per week    History of pessary use  Prior history of pessary use  Arranging follow-up with urogyn  Possible prolapse may be contributing to her incomplete bladder emptying and recurrent infections      Plan:  Continue with the vitamin C, methenamine hippurate, and d-mannose.  Increase the estrogen cream to nightly for 2 weeks then 3 times per week.  Patient instructed on using larger dose at time of application  Patient will follow-up for cystoscopy to evaluate for any sort of bladder abnormalities or stones causing recurrent infections  Encourage patient to follow through with evaluation with urogyn       Radiology  13 Chest X-ray   Mild hyperinflation    13 CT a/p w/o   No renal calculus. No hydronephrosis. Extensive diverticulosis. Ecstatic mid abdominal aorta.    16 Renal US   Mild lobular appearance to the left kidney.    2019 Abd US   No hydro, renal mass or stone. Hepatic steatosis. 21cm liver.    21 CT a/p w   Diverticulosis. Incidentally detected small infarenal abdominal aortic aneurysm which measures about 3.1 cm, surveillance with US can be performed to evaluate growth. Mild hepatic steatosis.     History  Postmenopausal atrophic vaginitis, Estrace cream 3x/wk. Uses pessary since 2018. Dr. Charles.  No longer with pessary   Microscopic & gross hematuria  Remote Hx of renal calculi x 2 (last 10+ years  ago. No surgery needed)  Cervical CA - s/p hysterectomy. NO XRT  Chronic cystitis - feels better after antibiotics  2 PPD smoking hx, stopped 1991.      Past  surgical history   07/28/13 [facility] UTI Received Rocephin 1 g in ER  09/22/15 Cystoscopy   05/02/16 Cystoscopy  12/03/20 Cystoscopy Chronic asymptomatic bacteria, cystoscopy negative today. Pelvic negative today.      Labs  07/28/13 C&S E coli  09/15/15 C&S E coli  04/28/16 C&S Klebsiella  06/01/16 C&S Klebsiella  09/06/16 C&S E coli  06/21/17 C&S Klebsiella  10/05/17 C&S Klebsiella  11/16/17 C&S Klebsiella  03/15/18 C&S E coli Amoxil 500mg po TID x 5 days  07/02/18 C&S E coli Amoxil 500mg po TID x 3 days   07/08/19 C&S E coli Doxy 100mg BID x 5 days   08/28/19 C&S E coli Cefadroxil 500mg BID x 5 days   01/20/20 C&S E coli  10/24/23 C&S E coli E coli Keflex 500mg TID x days   10/10/24 Urine culture E coli  10/25/24 Urine culture E coli   11/01/24 Urine culture E coli   11/19/24 Urine culture E coli     Prior Visits  [facility]   83 y.o. female with chronic cystitis on D-mannose, cystocele currently wo pessary, vaginal atrophy on estriol cream, and remote hx of stones Presents for f/u Urine culture 10/24/23 indicated e coli and noted was tx twice with antibiotics and still symptomatic. UA today + nitrite and Leuk. Currently with dysuria and discomfort with voiding. Some mild increase in frequency with smaller aoumnts. Not on a fluid restriction.     Assessment:   E coli UTI not resolved with antibiotics x 2   Hx of recurrent UTIs   Vaginal atrophy   Vaginal prolapse- no longer with pessary     Plan:  Patient Instructions   F/u 3 months to check on urine and discuss stopping the keflex 250 HS   Keflex 500mg BID x 5 days followed by Keflex 250mg HS for 3 months  Probiotic daily with food  Increase the D-mannose 1gm BID to QID when feels infected.   F/u with Dr. Charles for her prolapse that may be contributing to her infections  Continue using the  "estradiol cream TIW  Urine for culture from office         11/21/2024 OV Anirudh Martinez  85 y/o female with hx of CVA, A-fib, AVM, JAKE, pulmonary emphysema, DM, CKD, hysterectomy, cervical cancer, vaginal atrophy on estradiol,  hx presents for f/u due to recurrent E coli UTIs    Assessment:  The encounter diagnosis was Urinary tract infection without hematuria, site unspecified.    Recurrent E coli UTI  Recurrent E coli UTIs on D-mannose 1gm BID   DM, vaginal atrophy on estrace, hysterectomy, hx of cervical cancer   Prolapse and no longer with pessary (Dr. Dickinson [facility] in [place])   Dysuria and abd pressure with voiding.  Currently on cipro   PVR 220cc         Plan:  F/u 1 month to check on UTIs and PVR.  If has persistent UTIs will need cystoscopy    Take D-mannose 1gm (1000mg) in a 8-12 oz glass of water in the AM and PM.  Ok to increase to 4x per day if having symptoms of a UTI.  Methenamine hippurate 1gm BID   Vit C 1000mg 2-3 x per day  Take probiotics daily with food.   Change the brand of the probiotic every 4 months.   Try to increase fluid to help clear urine infection   May need to f/u with GYN for pessary to help with UTIs and incomplete bladder emptying    12/19/2024 OV Anirudh Martinez  85 y/o female with hx of CVA, A-fib, AVM, JAKE, pulmonary emphysema, DM, CKD, hysterectomy, cervical cancer, vaginal atrophy on estradiol, and recurrent urinary tract infections.  Was seen 11/21/2024 and placed on d-mannose 1 g twice a day, methenamine hippurate 1 g twice a day, vitamin C 1000 mg 2-3 times per day, probiotics, and told to follow-up with GYN for possible pessary to help with her prolapse and incomplete bladder.   Pt going to follow with urogyn for evaluation of her prolapse.  Patient notes she is doing well currently feeling okay.  Was recently treated for urinary tract infection by her primary.    Review of Systems    No results found for: \"PSA\"  No results found for: \"TESTOSTERONE\"  No components found " "for: \"CR\"  No results found for: \"HBA1C\"    Objective     /74 (BP Location: Left arm, Patient Position: Sitting, Cuff Size: Large)   Pulse 98   Ht 5' 5\" (1.651 m)   Wt 75.9 kg (167 lb 6.4 oz)   SpO2 98%   BMI 27.86 kg/m²     Physical Exam  HENT:      Head: Normocephalic.   Cardiovascular:      Rate and Rhythm: Normal rate.   Pulmonary:      Effort: Pulmonary effort is normal.   Abdominal:      General: Abdomen is flat.   Skin:     General: Skin is warm.   Neurological:      General: No focal deficit present.      Mental Status: She is alert.   Psychiatric:         Mood and Affect: Mood normal.           Anirudh Martinez [facility] Urology [facility]  "

## 2024-12-19 ENCOUNTER — OFFICE VISIT (OUTPATIENT)
Dept: UROLOGY | Facility: CLINIC | Age: 84
End: 2024-12-19

## 2024-12-19 VITALS
OXYGEN SATURATION: 98 % | HEART RATE: 98 BPM | HEIGHT: 65 IN | WEIGHT: 167.4 LBS | BODY MASS INDEX: 27.89 KG/M2 | SYSTOLIC BLOOD PRESSURE: 130 MMHG | DIASTOLIC BLOOD PRESSURE: 74 MMHG

## 2024-12-19 DIAGNOSIS — Z87.442 HISTORY OF RENAL CALCULI: Primary | ICD-10-CM

## 2024-12-19 DIAGNOSIS — N81.4 UTERINE PROLAPSE: ICD-10-CM

## 2024-12-19 DIAGNOSIS — R31.29 OTHER MICROSCOPIC HEMATURIA: ICD-10-CM

## 2024-12-19 DIAGNOSIS — R31.0 GROSS HEMATURIA: ICD-10-CM

## 2024-12-19 LAB
POST-VOID RESIDUAL VOLUME, ML POC: 193 ML
SL AMB  POCT GLUCOSE, UA: NORMAL
SL AMB LEUKOCYTE ESTERASE,UA: NORMAL
SL AMB POCT BILIRUBIN,UA: NORMAL
SL AMB POCT BLOOD,UA: NORMAL
SL AMB POCT CLARITY,UA: NORMAL
SL AMB POCT COLOR,UA: YELLOW
SL AMB POCT KETONES,UA: NORMAL
SL AMB POCT NITRITE,UA: NORMAL
SL AMB POCT PH,UA: 5.5
SL AMB POCT SPECIFIC GRAVITY,UA: 1.02
SL AMB POCT URINE PROTEIN: 30
SL AMB POCT UROBILINOGEN: 0.2

## 2024-12-23 ENCOUNTER — APPOINTMENT (OUTPATIENT)
Dept: LAB | Facility: CLINIC | Age: 84
End: 2024-12-23
Payer: MEDICARE

## 2024-12-23 DIAGNOSIS — Z95.2 HEART VALVE REPLACED BY TRANSPLANT: ICD-10-CM

## 2024-12-23 DIAGNOSIS — I48.20 CHRONIC ATRIAL FIBRILLATION (HCC): ICD-10-CM

## 2024-12-23 LAB
INR PPP: 3 (ref 0.85–1.19)
PROTHROMBIN TIME: 30.8 SECONDS (ref 12.3–15)

## 2024-12-23 PROCEDURE — 85610 PROTHROMBIN TIME: CPT

## 2024-12-23 PROCEDURE — 36415 COLL VENOUS BLD VENIPUNCTURE: CPT

## 2024-12-30 ENCOUNTER — TELEPHONE (OUTPATIENT)
Dept: GASTROENTEROLOGY | Facility: CLINIC | Age: 84
End: 2024-12-30

## 2025-01-21 ENCOUNTER — OFFICE VISIT (OUTPATIENT)
Dept: INTERNAL MEDICINE CLINIC | Facility: CLINIC | Age: 85
End: 2025-01-21
Payer: MEDICARE

## 2025-01-21 VITALS
SYSTOLIC BLOOD PRESSURE: 136 MMHG | HEIGHT: 65 IN | BODY MASS INDEX: 27.16 KG/M2 | WEIGHT: 163 LBS | RESPIRATION RATE: 18 BRPM | TEMPERATURE: 97.6 F | HEART RATE: 88 BPM | OXYGEN SATURATION: 96 % | DIASTOLIC BLOOD PRESSURE: 66 MMHG

## 2025-01-21 DIAGNOSIS — I10 PRIMARY HYPERTENSION: ICD-10-CM

## 2025-01-21 DIAGNOSIS — E11.9 TYPE 2 DIABETES MELLITUS WITHOUT COMPLICATION, WITHOUT LONG-TERM CURRENT USE OF INSULIN (HCC): Primary | ICD-10-CM

## 2025-01-21 DIAGNOSIS — Z13.0 SCREENING FOR DEFICIENCY ANEMIA: ICD-10-CM

## 2025-01-21 DIAGNOSIS — J43.9 PULMONARY EMPHYSEMA, UNSPECIFIED EMPHYSEMA TYPE (HCC): ICD-10-CM

## 2025-01-21 DIAGNOSIS — Z01.818 PRE-OP EXAMINATION: ICD-10-CM

## 2025-01-21 DIAGNOSIS — E11.69 TYPE 2 DIABETES MELLITUS WITH OTHER SPECIFIED COMPLICATION, WITHOUT LONG-TERM CURRENT USE OF INSULIN (HCC): ICD-10-CM

## 2025-01-21 DIAGNOSIS — D70.8 OTHER NEUTROPENIA (HCC): ICD-10-CM

## 2025-01-21 DIAGNOSIS — E78.2 MIXED HYPERLIPIDEMIA: ICD-10-CM

## 2025-01-21 DIAGNOSIS — I71.40 ABDOMINAL AORTIC ANEURYSM (AAA) WITHOUT RUPTURE, UNSPECIFIED PART (HCC): ICD-10-CM

## 2025-01-21 DIAGNOSIS — I48.20 CHRONIC ATRIAL FIBRILLATION (HCC): ICD-10-CM

## 2025-01-21 PROCEDURE — 99214 OFFICE O/P EST MOD 30 MIN: CPT | Performed by: INTERNAL MEDICINE

## 2025-01-21 PROCEDURE — G2211 COMPLEX E/M VISIT ADD ON: HCPCS | Performed by: INTERNAL MEDICINE

## 2025-01-21 NOTE — ASSESSMENT & PLAN NOTE
Asymptomatic blood pressure very well-controlled    Agree continue manage medication as follow    Coreg 3.125 mg twice a day    Low-salt diet    Awaiting repeat BMP and urinalysis  Orders:  •  Hemoglobin A1C; Future  •  Comprehensive metabolic panel; Future  •  Hemoglobin A1C; Future  •  Urinalysis with microscopic; Future

## 2025-01-21 NOTE — PROGRESS NOTES
Name: Pooja Barron      : 1940      MRN: [id number]  Encounter Provider: Jorge Colon MD  Encounter Date: 2025   Encounter department: [facility] INTERNAL MEDICINE  :  Assessment & Plan  Other neutropenia (HCC)  Previous CBC reviewed    Will repeat CBC to check for neutrophil count no fever chills asymptomatic  Orders:  •  CBC and differential; Future    Pulmonary emphysema, unspecified emphysema type (HCC)    Orders:  •  Comprehensive metabolic panel; Future    Chronic atrial fibrillation (HCC)  Asymptomatic    Rate controlled    Patient's rate controlled on Coumadin managed by cardiology asymptomatic no chest pain difficulty breathing     Again continue manage medications follow     Coreg 3.125 mg twice a day  Awaiting repeat CMP  Orders:  •  Hemoglobin A1C; Future  •  Comprehensive metabolic panel; Future    Abdominal aortic aneurysm (AAA) without rupture, unspecified part (HCC)  2022: Ultrasound:  Interval decrease in size of infrarenal distal abdominal aortic aneurysm which now measures 2.7 x 2.8 cm.  This previously measured 3.0 x 3.2 x 3.2 cm.  Recommend interval 3 year follow-up to ensure stability.   Surveillance ultrasound after 6-month until then continue preventive measures risk modification with control blood pressure controlled LDL       Type 2 diabetes mellitus with other specified complication, without long-term current use of insulin (HCC)    Lab Results   Component Value Date    HGBA1C 5.8 (H) 10/25/2024   Diabetes very well-controlled on the basis of A1c    Continue metformin 500 mg twice a day    Diabetic diet    Up-to-date with dilated eye exam    Foot exam normal    Hypoglycemia protocol in place repeat A1c urine for microalbumin before next visit  Orders:  •  Comprehensive metabolic panel; Future  •  Hemoglobin A1C; Future    Primary hypertension  Asymptomatic blood pressure very well-controlled    Agree continue manage medication as follow    Coreg  3.125 mg twice a day    Low-salt diet    Awaiting repeat BMP and urinalysis  Orders:  •  Hemoglobin A1C; Future  •  Comprehensive metabolic panel; Future  •  Hemoglobin A1C; Future  •  Urinalysis with microscopic; Future    Type 2 diabetes mellitus without complication, without long-term current use of insulin (HCC)    Lab Results   Component Value Date    HGBA1C 5.8 (H) 10/25/2024   Same as above  Orders:  •  Hemoglobin A1C; Future  •  Comprehensive metabolic panel; Future  •  Hemoglobin A1C; Future  •  Lipid Panel with Direct LDL reflex; Future  •  Albumin / creatinine urine ratio; Future  •  Urinalysis with microscopic; Future    Screening for deficiency anemia    Orders:  •  CBC and differential; Future    Mixed hyperlipidemia  Previous LDL reviewed    [place] continue manage medication as follow    Thank Lipitor 20 mg daily    Low-fat low-cholesterol diet    Check LFT lipid profile before next visit  Orders:  •  Comprehensive metabolic panel; Future  •  Lipid Panel with Direct LDL reflex; Future           History of Present Illness   Came in follow-up chronic medical condition list visit diagnosis denies any chest pain difficulty breathing no urinary frequency burning seen by urology UTI resolved no nausea vomiting diarrhea overall health unchanged no new complaint patient was advised to have labs done before next visit new set of labs prescription was given to the patient for detail refer to assessment plan visit diagnosis      Review of Systems   Constitutional:  Positive for activity change. Negative for appetite change, chills, diaphoresis, fatigue, fever and unexpected weight change.   HENT:  Negative for congestion, dental problem, drooling, ear discharge, ear pain, facial swelling, hearing loss, mouth sores, nosebleeds, postnasal drip, rhinorrhea, sinus pressure, sneezing, sore throat, tinnitus, trouble swallowing and voice change.    Eyes:  Negative for photophobia, pain, discharge, redness, itching and  "visual disturbance.   Respiratory:  Negative for apnea, cough, choking, chest tightness, shortness of breath, wheezing and stridor.    Cardiovascular:  Negative for chest pain, palpitations and leg swelling.   Gastrointestinal:  Negative for abdominal distention, abdominal pain, anal bleeding, blood in stool, constipation, diarrhea, nausea, rectal pain and vomiting.   Endocrine: Negative for cold intolerance, heat intolerance, polydipsia, polyphagia and polyuria.   Genitourinary:  Negative for decreased urine volume, difficulty urinating, dysuria, enuresis, flank pain, frequency, genital sores, hematuria and urgency.   Musculoskeletal:  Positive for arthralgias, back pain and joint swelling. Negative for gait problem, neck pain and neck stiffness.   Skin:  Negative for color change, pallor, rash and wound.   Allergic/Immunologic: Negative.  Negative for environmental allergies, food allergies and immunocompromised state.   Neurological:  Negative for dizziness, tremors, seizures, syncope, facial asymmetry, speech difficulty, weakness, light-headedness, numbness and headaches.   Psychiatric/Behavioral:  Negative for agitation, behavioral problems, confusion, decreased concentration, dysphoric mood, hallucinations, self-injury, sleep disturbance and suicidal ideas. The patient is not nervous/anxious and is not hyperactive.        Objective   /66   Pulse 88   Temp 97.6 °F (36.4 °C)   Resp 18   Ht 5' 5\" (1.651 m)   Wt 73.9 kg (163 lb)   SpO2 96%   BMI 27.12 kg/m²      Physical Exam  Vitals and nursing note reviewed.   Constitutional:       General: She is not in acute distress.     Appearance: She is well-developed. She is not ill-appearing, toxic-appearing or diaphoretic.   HENT:      Head: Normocephalic and atraumatic.      Right Ear: External ear normal.      Left Ear: External ear normal.      Nose: Nose normal.      Mouth/Throat:      Pharynx: No oropharyngeal exudate.   Eyes:      General: Lids are " normal. Lids are everted, no foreign bodies appreciated. No scleral icterus.        Right eye: No discharge.         Left eye: No discharge.      Conjunctiva/sclera: Conjunctivae normal.      Pupils: Pupils are equal, round, and reactive to light.   Neck:      Thyroid: No thyromegaly.      Vascular: Normal carotid pulses. No carotid bruit, hepatojugular reflux or JVD.      Trachea: No tracheal tenderness or tracheal deviation.   Cardiovascular:      Rate and Rhythm: Normal rate and regular rhythm.      Pulses: Normal pulses.      Heart sounds: Murmur heard.      No friction rub. No gallop.   Pulmonary:      Effort: Pulmonary effort is normal. No respiratory distress.      Breath sounds: Normal breath sounds. No stridor. No wheezing or rales.   Chest:      Chest wall: No tenderness.   Abdominal:      General: Bowel sounds are normal. There is no distension.      Palpations: Abdomen is soft. There is no mass.      Tenderness: There is no abdominal tenderness. There is no guarding or rebound.   Musculoskeletal:         General: No tenderness or deformity. Normal range of motion.      Cervical back: Normal range of motion and neck supple. No edema, erythema or rigidity. No spinous process tenderness or muscular tenderness. Normal range of motion.   Lymphadenopathy:      Head:      Right side of head: No submental, submandibular, tonsillar, preauricular or posterior auricular adenopathy.      Left side of head: No submental, submandibular, tonsillar, preauricular, posterior auricular or occipital adenopathy.      Cervical: No cervical adenopathy.      Right cervical: No superficial, deep or posterior cervical adenopathy.     Left cervical: No superficial, deep or posterior cervical adenopathy.      Upper Body:      Right upper body: No pectoral adenopathy.      Left upper body: No pectoral adenopathy.   Skin:     General: Skin is warm and dry.      Coloration: Skin is not pale.      Findings: No erythema or rash.    Neurological:      General: No focal deficit present.      Mental Status: She is alert and oriented to person, place, and time.      Cranial Nerves: No cranial nerve deficit.      Sensory: No sensory deficit.      Motor: No tremor, abnormal muscle tone or seizure activity.      Coordination: Coordination normal.      Gait: Gait abnormal.      Deep Tendon Reflexes: Reflexes are normal and symmetric. Reflexes normal.   Psychiatric:         Behavior: Behavior normal.         Thought Content: Thought content normal.         Judgment: Judgment normal.

## 2025-01-21 NOTE — ASSESSMENT & PLAN NOTE
Lab Results   Component Value Date    HGBA1C 5.8 (H) 10/25/2024   Diabetes very well-controlled on the basis of A1c    Continue metformin 500 mg twice a day    Diabetic diet    Up-to-date with dilated eye exam    Foot exam normal    Hypoglycemia protocol in place repeat A1c urine for microalbumin before next visit  Orders:  •  Comprehensive metabolic panel; Future  •  Hemoglobin A1C; Future

## 2025-01-21 NOTE — ASSESSMENT & PLAN NOTE
11/17/2022: Ultrasound:  Interval decrease in size of infrarenal distal abdominal aortic aneurysm which now measures 2.7 x 2.8 cm.  This previously measured 3.0 x 3.2 x 3.2 cm.  Recommend interval 3 year follow-up to ensure stability.   Surveillance ultrasound after 6-month until then continue preventive measures risk modification with control blood pressure controlled LDL

## 2025-01-21 NOTE — ASSESSMENT & PLAN NOTE
Asymptomatic    Rate controlled    Patient's rate controlled on Coumadin managed by cardiology asymptomatic no chest pain difficulty breathing     Again continue manage medications follow     Coreg 3.125 mg twice a day  Awaiting repeat CMP  Orders:  •  Hemoglobin A1C; Future  •  Comprehensive metabolic panel; Future

## 2025-01-21 NOTE — ASSESSMENT & PLAN NOTE
Lab Results   Component Value Date    HGBA1C 5.8 (H) 10/25/2024   Same as above  Orders:  •  Hemoglobin A1C; Future  •  Comprehensive metabolic panel; Future  •  Hemoglobin A1C; Future  •  Lipid Panel with Direct LDL reflex; Future  •  Albumin / creatinine urine ratio; Future  •  Urinalysis with microscopic; Future

## 2025-01-21 NOTE — ASSESSMENT & PLAN NOTE
Previous CBC reviewed    Will repeat CBC to check for neutrophil count no fever chills asymptomatic  Orders:  •  CBC and differential; Future

## 2025-02-07 ENCOUNTER — OFFICE VISIT (OUTPATIENT)
Age: 85
End: 2025-02-07
Payer: MEDICARE

## 2025-02-07 VITALS — WEIGHT: 163 LBS | BODY MASS INDEX: 27.16 KG/M2 | RESPIRATION RATE: 18 BRPM | HEIGHT: 65 IN | HEART RATE: 88 BPM

## 2025-02-07 DIAGNOSIS — L03.032 PARONYCHIA OF TOENAIL OF LEFT FOOT: ICD-10-CM

## 2025-02-07 DIAGNOSIS — B35.1 ONYCHOMYCOSIS: ICD-10-CM

## 2025-02-07 DIAGNOSIS — R60.9 CHRONIC EDEMA: ICD-10-CM

## 2025-02-07 DIAGNOSIS — M54.16 RADICULOPATHY OF LUMBAR REGION: ICD-10-CM

## 2025-02-07 DIAGNOSIS — I87.2 DEEP VENOUS INSUFFICIENCY: ICD-10-CM

## 2025-02-07 DIAGNOSIS — M77.41 METATARSALGIA OF BOTH FEET: Primary | ICD-10-CM

## 2025-02-07 DIAGNOSIS — M77.42 METATARSALGIA OF BOTH FEET: Primary | ICD-10-CM

## 2025-02-07 PROCEDURE — 99213 OFFICE O/P EST LOW 20 MIN: CPT | Performed by: PODIATRIST

## 2025-02-07 NOTE — PROGRESS NOTES
Assessment/Plan: Metatarsalgia secondary to radiculopathy.  Rule out neuropathy secondary to hyperglycemia.  Pain upon ambulation.  Chronic ingrown toenail.  Chronic edema secondary deep venous insufficiency.     Plan.  Chart reviewed.  Primary care notes reviewed.  Venous Doppler reviewed with patient and family.  Patient advised on condition.  She will follow-up vascular surgery.  In the meantime, patient will elevate feet at end of day.  She will consider compression stockings.  Patient advised on proper shoe size.  Nail cutting technique offered.   Aftercare instruction given.  Return for follow-up.            Diagnoses and all orders for this visit:     Metatarsalgia of both feet     Chronic edema     Deep venous insufficiency     Paronychia of toenail of left foot     Radiculopathy of lumbar region               Subjective: Patient has 2 concerns.  She has numbness and tingling of her feet.  She knows she has neuropathy.  She is also concerned about swelling.  She had Doppler test as directed.  Patient has discontinued gabapentin.  She believes it gives her diarrhea             Allergies   Allergen Reactions    Sulfa Antibiotics Tongue Swelling    Sulfasalazine Throat Swelling            Current Outpatient Medications:     gabapentin (NEURONTIN) 100 mg capsule, Take 1 capsule (100 mg total) by mouth daily at bedtime, Disp: 30 capsule, Rfl: 1    acetaminophen (TYLENOL) 325 mg tablet, Take 2 tablets (650 mg total) by mouth every 6 (six) hours as needed for mild pain or headaches, Disp: , Rfl: 0    atorvastatin (LIPITOR) 40 mg tablet, Take 1 tablet (40 mg total) by mouth daily, Disp: 90 tablet, Rfl: 1    Cholecalciferol (VITAMIN D PO), Take by mouth, Disp: , Rfl:     estradiol (ESTRACE) 0.1 mg/g vaginal cream, 0.5 grams of cream intravaginally administered daily for one to two weeks, then reduce to twice weekly, Disp: 42.5 g, Rfl: 3    glucose blood test strip, Use 1 each in the morning Use one daily, Disp: 100  strip, Rfl: 2    ketoconazole (NIZORAL) 2 % cream, Apply topically daily, Disp: 60 g, Rfl: 1    losartan (COZAAR) 25 mg tablet, Take 1 tablet (25 mg total) by mouth daily (Patient not taking: Reported on 1/10/2024), Disp: 30 tablet, Rfl: 1    metFORMIN (GLUCOPHAGE) 500 mg tablet, Take 1 tablet (500 mg total) by mouth 2 (two) times a day with meals, Disp: 180 tablet, Rfl: 3    Multiple Vitamins-Minerals (PRESERVISION AREDS PO), Take 2 tablets by mouth daily  , Disp: , Rfl:     warfarin (COUMADIN) 2.5 mg tablet, Take 1 tablet (2.5 mg total) by mouth 3 (three) times a week On Monday Wednesday and Friday (Patient taking differently: Take 2.5 mg by mouth 3 (three) times a week Added to 5mg dose only on tuesdays=7.5mg), Disp: , Rfl: 0    warfarin (COUMADIN) 5 mg tablet, Take 1 tablet (5 mg total) by mouth 4 (four) times a week On Sunday, Tuesday, Thursday, Saturday (Patient taking differently: Take 5 mg by mouth in the morning), Disp: , Rfl: 0           Patient Active Problem List   Diagnosis    Obstructive sleep apnea    Chronic atrial fibrillation (HCC)    H/O mitral valve replacement with mechanical valve    Long term (current) use of anticoagulants (warfarin)    Presence of prosthetic heart valve    Hypercholesteremia    History of anemia due to CKD    Allergic rhinitis    Type 2 diabetes mellitus without complication, without long-term current use of insulin (HCC)    Iron deficiency anemia    Other specified hypothyroidism    Vitamin B12 deficiency    Other microscopic hematuria    Celiac disease    Abdominal aortic aneurysm (AAA) without rupture, unspecified part (HCC)    Ataxia    Pulmonary emphysema (HCC)    History of renal calculi    History of cervical cancer    Essential hypertension    Other proteinuria    Hepatic steatosis    CVA (cerebral vascular accident) (HCC)    Type 2 diabetes mellitus with other specified complication, without long-term current use of insulin (HCC)    CKD (chronic kidney disease)  stage 2, GFR 60-89 ml/min    Cerebrovascular accident (CVA) (HCC)    Fall    Anemia    Acute blood loss anemia    AVM (arteriovenous malformation) of duodenum, acquired    Balance problem    Edema of left lower leg    Chronic pain of left knee    Onychomycosis    Asymptomatic varicose veins of both lower extremities    Gross hematuria    Trigger ring finger of right hand    Chronic venous insufficiency of lower extremity    Uterine prolapse             Patient ID: Pooja Barron is a 85 y.o. female.     HPI     The following portions of the patient's history were reviewed and updated as appropriate:      family history includes Heart attack in her father; Heart failure in her mother; Sleep apnea in her brother.       reports that she quit smoking about 35 years ago. Her smoking use included cigarettes. She started smoking about 65 years ago. She has a 60.0 pack-year smoking history. She has never used smokeless tobacco. She reports current alcohol use. She reports that she does not use drugs.        Objective:  Patient's shoes and socks removed.   Foot ExamPhysical Exam       Foot Exam     General  General Appearance: appears stated age and healthy   Orientation: alert and oriented to person, place, and time   Affect: appropriate   Gait: antalgic         Right Foot/Ankle      Inspection and Palpation  Tenderness: metatarsals   Swelling: dorsum   Arch: pes planus  Hallux valgus: yes     Neurovascular  Dorsalis pedis: 2+  Posterior tibial: 2+        Left Foot/Ankle       Inspection and Palpation  Tenderness: metatarsals   Swelling: dorsum   Arch: pes planus  Hallux valgus: yes     Neurovascular  Dorsalis pedis: 2+  Posterior tibial: 2+           Physical Exam  Vitals and nursing note reviewed.   Constitutional:       Appearance: Normal appearance.   Cardiovascular:      Rate and Rhythm: Normal rate and regular rhythm.      Pulses:           Dorsalis pedis pulses are 2+ on the right side and 2+ on the left side.         Posterior tibial pulses are 2+ on the right side and 2+ on the left side.      Comments: Patient demonstrates significant amount of varicosities and telangiectasia of the lower extremity bilateral.  Venous Doppler demonstrates deep venous insufficiency.  Musculoskeletal:      Right lower le+ Pitting Edema present.      Left lower le+ Pitting Edema present.      Right foot: Bunion present.      Left foot: Bunion present.   Skin:     Capillary Refill: Capillary refill takes less than 2 seconds.      Comments: All nails are dystrophic.  Left hallux nail demonstrates subungual mycosis.  It is ingrown in the fibular aspect.  Positive paronychia.  Negative pus.   Neurological:      Mental Status: She is alert.   Psychiatric:         Mood and Affect: Mood normal.         Behavior: Behavior normal.         Thought Content: Thought content normal.         Judgment: Judgment normal.

## 2025-02-19 ENCOUNTER — TELEPHONE (OUTPATIENT)
Age: 85
End: 2025-02-19

## 2025-02-19 DIAGNOSIS — N39.0 URINARY TRACT INFECTION WITHOUT HEMATURIA, SITE UNSPECIFIED: ICD-10-CM

## 2025-02-19 RX ORDER — METHENAMINE HIPPURATE 1000 MG/1
1 TABLET ORAL 2 TIMES DAILY WITH MEALS
Qty: 60 TABLET | Refills: 2 | Status: CANCELLED | OUTPATIENT
Start: 2025-02-19

## 2025-02-19 NOTE — TELEPHONE ENCOUNTER
Pt's daughter called requested a refill on methenamine hippurate (HIPREX) 1 g tablet     Daughter will only be available to  the prescription today and pt is out of med.   Daughter will be away for the rest of the week.     Please review

## 2025-02-20 DIAGNOSIS — N39.0 URINARY TRACT INFECTION WITHOUT HEMATURIA, SITE UNSPECIFIED: ICD-10-CM

## 2025-02-20 RX ORDER — METHENAMINE HIPPURATE 1000 MG/1
1 TABLET ORAL 2 TIMES DAILY WITH MEALS
Qty: 60 TABLET | Refills: 11 | Status: SHIPPED | OUTPATIENT
Start: 2025-02-20

## 2025-03-03 DIAGNOSIS — E78.00 HYPERCHOLESTEREMIA: ICD-10-CM

## 2025-03-03 RX ORDER — ATORVASTATIN CALCIUM 20 MG/1
20 TABLET, FILM COATED ORAL DAILY
Qty: 90 TABLET | Refills: 1 | Status: SHIPPED | OUTPATIENT
Start: 2025-03-03

## 2025-04-14 ENCOUNTER — APPOINTMENT (OUTPATIENT)
Dept: LAB | Facility: CLINIC | Age: 85
End: 2025-04-14
Payer: MEDICARE

## 2025-04-14 DIAGNOSIS — I48.20 CHRONIC ATRIAL FIBRILLATION (HCC): ICD-10-CM

## 2025-04-14 DIAGNOSIS — J43.9 PULMONARY EMPHYSEMA, UNSPECIFIED EMPHYSEMA TYPE (HCC): ICD-10-CM

## 2025-04-14 DIAGNOSIS — E11.69 TYPE 2 DIABETES MELLITUS WITH OTHER SPECIFIED COMPLICATION, WITHOUT LONG-TERM CURRENT USE OF INSULIN (HCC): ICD-10-CM

## 2025-04-14 DIAGNOSIS — D70.8 OTHER NEUTROPENIA (HCC): ICD-10-CM

## 2025-04-14 DIAGNOSIS — N39.0 URINARY TRACT INFECTION WITHOUT HEMATURIA, SITE UNSPECIFIED: ICD-10-CM

## 2025-04-14 DIAGNOSIS — Z13.0 SCREENING FOR DEFICIENCY ANEMIA: ICD-10-CM

## 2025-04-14 DIAGNOSIS — E11.9 TYPE 2 DIABETES MELLITUS WITHOUT COMPLICATION, WITHOUT LONG-TERM CURRENT USE OF INSULIN (HCC): ICD-10-CM

## 2025-04-14 DIAGNOSIS — Z01.818 PRE-OP EXAMINATION: ICD-10-CM

## 2025-04-14 DIAGNOSIS — I10 PRIMARY HYPERTENSION: ICD-10-CM

## 2025-04-14 DIAGNOSIS — E78.2 MIXED HYPERLIPIDEMIA: ICD-10-CM

## 2025-04-14 DIAGNOSIS — D72.820 ATYPICAL LYMPHOCYTOSIS: Primary | ICD-10-CM

## 2025-04-14 LAB
ALBUMIN SERPL BCG-MCNC: 4.6 G/DL (ref 3.5–5)
ALP SERPL-CCNC: 82 U/L (ref 34–104)
ALT SERPL W P-5'-P-CCNC: 21 U/L (ref 7–52)
ANION GAP SERPL CALCULATED.3IONS-SCNC: 7 MMOL/L (ref 4–13)
AST SERPL W P-5'-P-CCNC: 33 U/L (ref 13–39)
BILIRUB SERPL-MCNC: 0.67 MG/DL (ref 0.2–1)
BUN SERPL-MCNC: 17 MG/DL (ref 5–25)
CALCIUM SERPL-MCNC: 9.3 MG/DL (ref 8.4–10.2)
CHLORIDE SERPL-SCNC: 101 MMOL/L (ref 96–108)
CHOLEST SERPL-MCNC: 147 MG/DL (ref ?–200)
CO2 SERPL-SCNC: 29 MMOL/L (ref 21–32)
CREAT SERPL-MCNC: 0.61 MG/DL (ref 0.6–1.3)
EST. AVERAGE GLUCOSE BLD GHB EST-MCNC: 128 MG/DL
GFR SERPL CREATININE-BSD FRML MDRD: 82 ML/MIN/1.73SQ M
GLUCOSE P FAST SERPL-MCNC: 138 MG/DL (ref 65–99)
HBA1C MFR BLD: 6.1 %
HDLC SERPL-MCNC: 44 MG/DL
LDLC SERPL CALC-MCNC: 67 MG/DL (ref 0–100)
POTASSIUM SERPL-SCNC: 4.3 MMOL/L (ref 3.5–5.3)
PROT SERPL-MCNC: 7.4 G/DL (ref 6.4–8.4)
SODIUM SERPL-SCNC: 137 MMOL/L (ref 135–147)
TRIGL SERPL-MCNC: 181 MG/DL (ref ?–150)

## 2025-04-14 PROCEDURE — 80053 COMPREHEN METABOLIC PANEL: CPT

## 2025-04-14 PROCEDURE — 80061 LIPID PANEL: CPT

## 2025-04-14 PROCEDURE — 88185 FLOWCYTOMETRY/TC ADD-ON: CPT | Performed by: PATHOLOGY

## 2025-04-14 PROCEDURE — 85027 COMPLETE CBC AUTOMATED: CPT

## 2025-04-14 PROCEDURE — 83036 HEMOGLOBIN GLYCOSYLATED A1C: CPT

## 2025-04-14 PROCEDURE — 36415 COLL VENOUS BLD VENIPUNCTURE: CPT

## 2025-04-14 PROCEDURE — 85007 BL SMEAR W/DIFF WBC COUNT: CPT

## 2025-04-14 PROCEDURE — 88184 FLOWCYTOMETRY/ TC 1 MARKER: CPT

## 2025-04-16 ENCOUNTER — RA CDI HCC (OUTPATIENT)
Dept: OTHER | Facility: HOSPITAL | Age: 85
End: 2025-04-16

## 2025-04-18 ENCOUNTER — RESULTS FOLLOW-UP (OUTPATIENT)
Dept: INTERNAL MEDICINE CLINIC | Facility: CLINIC | Age: 85
End: 2025-04-18

## 2025-04-18 LAB
ERYTHROCYTE [DISTWIDTH] IN BLOOD BY AUTOMATED COUNT: 19.1 % (ref 11.6–15.1)
HCT VFR BLD AUTO: 38.6 % (ref 34.8–46.1)
HGB BLD-MCNC: 12.3 G/DL (ref 11.5–15.4)
MCH RBC QN AUTO: 29.3 PG (ref 26.8–34.3)
MCHC RBC AUTO-ENTMCNC: 31.9 G/DL (ref 31.4–37.4)
MCV RBC AUTO: 92 FL (ref 82–98)
PLATELET # BLD AUTO: 209 THOUSANDS/UL (ref 149–390)
PMV BLD AUTO: 10.4 FL (ref 8.9–12.7)
RBC # BLD AUTO: 4.2 MILLION/UL (ref 3.81–5.12)
SCAN RESULT: NORMAL
WBC # BLD AUTO: 3.05 THOUSAND/UL (ref 4.31–10.16)

## 2025-04-18 PROCEDURE — 85060 BLOOD SMEAR INTERPRETATION: CPT | Performed by: PATHOLOGY

## 2025-04-21 ENCOUNTER — APPOINTMENT (OUTPATIENT)
Dept: LAB | Facility: CLINIC | Age: 85
End: 2025-04-21
Attending: INTERNAL MEDICINE
Payer: MEDICARE

## 2025-04-21 DIAGNOSIS — E11.9 TYPE 2 DIABETES MELLITUS WITHOUT COMPLICATION, WITHOUT LONG-TERM CURRENT USE OF INSULIN (HCC): ICD-10-CM

## 2025-04-21 DIAGNOSIS — N39.0 URINARY TRACT INFECTION WITHOUT HEMATURIA, SITE UNSPECIFIED: ICD-10-CM

## 2025-04-21 DIAGNOSIS — I10 PRIMARY HYPERTENSION: ICD-10-CM

## 2025-04-21 LAB
BACTERIA UR QL AUTO: ABNORMAL /HPF
BILIRUB UR QL STRIP: NEGATIVE
CLARITY UR: ABNORMAL
COLOR UR: YELLOW
CREAT UR-MCNC: 65.9 MG/DL
GLUCOSE UR STRIP-MCNC: NEGATIVE MG/DL
HGB UR QL STRIP.AUTO: ABNORMAL
KETONES UR STRIP-MCNC: NEGATIVE MG/DL
LEUKOCYTE ESTERASE UR QL STRIP: ABNORMAL
MICROALBUMIN UR-MCNC: 158.6 MG/L
MICROALBUMIN/CREAT 24H UR: 241 MG/G CREATININE (ref 0–30)
NITRITE UR QL STRIP: POSITIVE
NON-SQ EPI CELLS URNS QL MICRO: ABNORMAL /HPF
PH UR STRIP.AUTO: 5.5 [PH]
PROT UR STRIP-MCNC: ABNORMAL MG/DL
RBC #/AREA URNS AUTO: ABNORMAL /HPF
SP GR UR STRIP.AUTO: 1.02 (ref 1–1.03)
UROBILINOGEN UR STRIP-ACNC: <2 MG/DL
WBC #/AREA URNS AUTO: ABNORMAL /HPF
WBC CLUMPS # UR AUTO: PRESENT /UL

## 2025-04-21 PROCEDURE — 81001 URINALYSIS AUTO W/SCOPE: CPT

## 2025-04-21 PROCEDURE — 82043 UR ALBUMIN QUANTITATIVE: CPT

## 2025-04-21 PROCEDURE — 82570 ASSAY OF URINE CREATININE: CPT

## 2025-04-21 PROCEDURE — 87077 CULTURE AEROBIC IDENTIFY: CPT

## 2025-04-21 PROCEDURE — 87186 SC STD MICRODIL/AGAR DIL: CPT

## 2025-04-21 PROCEDURE — 87086 URINE CULTURE/COLONY COUNT: CPT

## 2025-04-22 ENCOUNTER — OFFICE VISIT (OUTPATIENT)
Dept: INTERNAL MEDICINE CLINIC | Facility: CLINIC | Age: 85
End: 2025-04-22
Payer: MEDICARE

## 2025-04-22 ENCOUNTER — TELEPHONE (OUTPATIENT)
Age: 85
End: 2025-04-22

## 2025-04-22 VITALS
OXYGEN SATURATION: 96 % | WEIGHT: 164 LBS | HEIGHT: 65 IN | SYSTOLIC BLOOD PRESSURE: 140 MMHG | BODY MASS INDEX: 27.32 KG/M2 | HEART RATE: 76 BPM | DIASTOLIC BLOOD PRESSURE: 86 MMHG

## 2025-04-22 DIAGNOSIS — I48.20 CHRONIC ATRIAL FIBRILLATION (HCC): ICD-10-CM

## 2025-04-22 DIAGNOSIS — D68.2 HEREDITARY DEFICIENCY OF OTHER CLOTTING FACTORS (HCC): ICD-10-CM

## 2025-04-22 DIAGNOSIS — E11.69 TYPE 2 DIABETES MELLITUS WITH OTHER SPECIFIED COMPLICATION, WITHOUT LONG-TERM CURRENT USE OF INSULIN (HCC): Primary | ICD-10-CM

## 2025-04-22 DIAGNOSIS — E78.00 HYPERCHOLESTEREMIA: ICD-10-CM

## 2025-04-22 DIAGNOSIS — N39.0 URINARY TRACT INFECTION WITHOUT HEMATURIA, SITE UNSPECIFIED: ICD-10-CM

## 2025-04-22 LAB
BASOPHILS # BLD AUTO: 0.06 THOUSAND/UL (ref 0–0.1)
BASOPHILS NFR MAR MANUAL: 2 % (ref 0–1)
BURR CELLS BLD QL SMEAR: PRESENT
EOSINOPHIL # BLD AUTO: 0.28 THOUSAND/UL (ref 0–0.61)
EOSINOPHIL NFR BLD MANUAL: 9 % (ref 0–6)
LYMPHOCYTES # BLD AUTO: 1.6 THOUSAND/UL (ref 0.6–4.47)
LYMPHOCYTES # BLD AUTO: 16 %
MACROCYTES BLD QL AUTO: PRESENT
MICROCYTES BLD QL AUTO: PRESENT
MONOCYTES # BLD AUTO: 0.65 THOUSAND/UL (ref 0–1.22)
MONOCYTES NFR BLD AUTO: 21 % (ref 4–12)
NEUTS SEG # BLD: 0.46 THOUSAND/UL (ref 1.81–6.82)
NEUTS SEG NFR BLD AUTO: 15 %
NRBC BLD AUTO-RTO: 1 /100 WBC (ref 0–2)
PATHOLOGY REVIEW: YES
PLATELET BLD QL SMEAR: ADEQUATE
POLYCHROMASIA BLD QL SMEAR: PRESENT
RBC MORPH BLD: PRESENT
TOTAL CELLS COUNTED SPEC: 99
VARIANT LYMPHS # BLD AUTO: 36 % (ref 0–0)

## 2025-04-22 PROCEDURE — G2211 COMPLEX E/M VISIT ADD ON: HCPCS | Performed by: INTERNAL MEDICINE

## 2025-04-22 PROCEDURE — 99214 OFFICE O/P EST MOD 30 MIN: CPT | Performed by: INTERNAL MEDICINE

## 2025-04-22 RX ORDER — CIPROFLOXACIN 500 MG/1
500 TABLET, FILM COATED ORAL EVERY 12 HOURS SCHEDULED
Qty: 20 TABLET | Refills: 0 | Status: SHIPPED | OUTPATIENT
Start: 2025-04-22 | End: 2025-04-22

## 2025-04-22 RX ORDER — CIPROFLOXACIN 500 MG/1
500 TABLET, FILM COATED ORAL EVERY 12 HOURS SCHEDULED
Qty: 20 TABLET | Refills: 0 | Status: SHIPPED | OUTPATIENT
Start: 2025-04-22 | End: 2025-05-02

## 2025-04-22 NOTE — ASSESSMENT & PLAN NOTE
Lab Results   Component Value Date    HGBA1C 6.1 (H) 04/14/2025     Diabetes controlled monitor blood sugar at home very well-controlled    Agree containment medication is full  Continue metformin 500 mg twice a day     Diabetic diet     Up-to-date with dilated eye exam     Foot exam normal urine for microalbumin reviewed positive    Orders:  •  metFORMIN (GLUCOPHAGE) 500 mg tablet; Take 1 tablet (500 mg total) by mouth 2 (two) times a day with meals

## 2025-04-22 NOTE — PROGRESS NOTES
Name: Pooja Barron      : 1940      MRN: [id number]  Encounter Provider: Jorge Colon MD  Encounter Date: 2025   Encounter department: [facility] INTERNAL MEDICINE  :  Assessment & Plan  Type 2 diabetes mellitus with other specified complication, without long-term current use of insulin (HCC)    Lab Results   Component Value Date    HGBA1C 6.1 (H) 2025     Diabetes controlled monitor blood sugar at home very well-controlled    Agree containment medication is full  Continue metformin 500 mg twice a day     Diabetic diet     Up-to-date with dilated eye exam     Foot exam normal urine for microalbumin reviewed positive    Orders:  •  metFORMIN (GLUCOPHAGE) 500 mg tablet; Take 1 tablet (500 mg total) by mouth 2 (two) times a day with meals    Hereditary deficiency of other clotting factors (HCC)  No evidence of any thromboembolic phenomena for now    History of recurrent DVT    On Coumadin    Will monitor closely       Urinary tract infection without hematuria, site unspecified  History of recurrent UTIs the past  Some urinary frequency and burning for now  Evaluated by urology    Evaluated by GYN    History of the polyps    Awaiting to be seen by uro-GYN    Urine analysis reviewed positive for UTI  Awaiting culture    Until then Cipro 5 twice a day for 1 week  Orders:  •  ciprofloxacin (CIPRO) 500 mg tablet; Take 1 tablet (500 mg total) by mouth every 12 (twelve) hours for 10 days    Chronic atrial fibrillation (HCC)  Asymptomatic     Rate controlled     Patient's rate controlled on Coumadin managed by cardiology asymptomatic no chest pain difficulty breathing     Again continue manage medications follow     Coreg 3.125 mg twice a day    Lab reviewed as follows  Lab Results   Component Value Date     2015    SODIUM 137 2025    K 4.3 2025     2025    CO2 29 2025    ANIONGAP 11.1 2015    AGAP 7 2025    BUN 17 2025     CREATININE 0.61 04/14/2025    GLUC 101 11/01/2024    GLUF 138 (H) 04/14/2025    CALCIUM 9.3 04/14/2025    AST 33 04/14/2025    ALT 21 04/14/2025    ALKPHOS 82 04/14/2025    PROT 7.4 11/09/2015    TP 7.4 04/14/2025    BILITOT 0.3 11/09/2015    TBILI 0.67 04/14/2025    EGFR 82 04/14/2025           Hypercholesteremia  Lab Results   Component Value Date    LDLCALC 67 04/14/2025      Lab Results   Component Value Date    ALT 21 04/14/2025    AST 33 04/14/2025    ALKPHOS 82 04/14/2025    BILITOT 0.3 11/09/2015   Above reviewed LDL very well-controlled no side effect LFT normal reviewed as above    Agree continue manage medications for    Lipitor 20 mg daily low-fat low-cholesterol diet will monitor closely              History of Present Illness   Came in follow-up chronic medical clinic list to be diagnosed with complaints of urinary burning frequency and review the lab all the labs reviewed discussed at length evaluated by urology all the preventative measures failed still getting symptoms of UTI urine analysis reviewed also all the labs reviewed for details refer to assessment plan visit diagnosis      Review of Systems   Constitutional:  Positive for activity change. Negative for appetite change, chills, diaphoresis, fatigue, fever and unexpected weight change.   HENT:  Negative for congestion, dental problem, drooling, ear discharge, ear pain, facial swelling, hearing loss, mouth sores, nosebleeds, postnasal drip, rhinorrhea, sinus pressure, sneezing, sore throat, tinnitus, trouble swallowing and voice change.    Eyes:  Negative for photophobia, pain, discharge, redness, itching and visual disturbance.   Respiratory:  Negative for apnea, cough, choking, chest tightness, shortness of breath, wheezing and stridor.    Cardiovascular:  Negative for chest pain, palpitations and leg swelling.   Gastrointestinal:  Negative for abdominal distention, abdominal pain, anal bleeding, blood in stool, constipation, diarrhea,  "nausea, rectal pain and vomiting.   Endocrine: Negative for cold intolerance, heat intolerance, polydipsia, polyphagia and polyuria.   Genitourinary:  Positive for frequency and urgency. Negative for decreased urine volume, difficulty urinating, dysuria, enuresis, flank pain, genital sores and hematuria.   Musculoskeletal:  Positive for arthralgias and back pain. Negative for gait problem, neck pain and neck stiffness.   Skin:  Negative for color change, pallor, rash and wound.   Allergic/Immunologic: Negative.  Negative for environmental allergies, food allergies and immunocompromised state.   Neurological:  Negative for dizziness, tremors, seizures, syncope, facial asymmetry, speech difficulty, weakness, light-headedness, numbness and headaches.   Psychiatric/Behavioral:  Negative for agitation, behavioral problems, confusion, decreased concentration, dysphoric mood, hallucinations, self-injury, sleep disturbance and suicidal ideas. The patient is not nervous/anxious and is not hyperactive.        Objective   /86   Pulse 76   Ht 5' 5\" (1.651 m)   Wt 74.4 kg (164 lb)   SpO2 96%   BMI 27.29 kg/m²      Physical Exam  Vitals and nursing note reviewed.   Constitutional:       General: She is not in acute distress.     Appearance: She is well-developed. She is not ill-appearing, toxic-appearing or diaphoretic.   HENT:      Head: Normocephalic and atraumatic.      Right Ear: External ear normal.      Left Ear: External ear normal.      Nose: Nose normal.      Mouth/Throat:      Pharynx: No oropharyngeal exudate.   Eyes:      General: Lids are normal. Lids are everted, no foreign bodies appreciated. No scleral icterus.        Right eye: No discharge.         Left eye: No discharge.      Conjunctiva/sclera: Conjunctivae normal.      Pupils: Pupils are equal, round, and reactive to light.   Neck:      Thyroid: No thyromegaly.      Vascular: Normal carotid pulses. No carotid bruit, hepatojugular reflux or JVD.     "  Trachea: No tracheal tenderness or tracheal deviation.   Cardiovascular:      Rate and Rhythm: Normal rate and regular rhythm.      Pulses: Normal pulses.      Heart sounds: Murmur heard.      No friction rub. No gallop.   Pulmonary:      Effort: Pulmonary effort is normal. No respiratory distress.      Breath sounds: Normal breath sounds. No stridor. No wheezing or rales.   Chest:      Chest wall: No tenderness.   Abdominal:      General: Bowel sounds are normal. There is no distension.      Palpations: Abdomen is soft. There is no mass.      Tenderness: There is no abdominal tenderness. There is no guarding or rebound.   Musculoskeletal:         General: No tenderness or deformity. Normal range of motion.      Cervical back: Normal range of motion and neck supple. No edema, erythema or rigidity. No spinous process tenderness or muscular tenderness. Normal range of motion.   Lymphadenopathy:      Head:      Right side of head: No submental, submandibular, tonsillar, preauricular or posterior auricular adenopathy.      Left side of head: No submental, submandibular, tonsillar, preauricular, posterior auricular or occipital adenopathy.      Cervical: No cervical adenopathy.      Right cervical: No superficial, deep or posterior cervical adenopathy.     Left cervical: No superficial, deep or posterior cervical adenopathy.      Upper Body:      Right upper body: No pectoral adenopathy.      Left upper body: No pectoral adenopathy.   Skin:     General: Skin is warm and dry.      Coloration: Skin is not pale.      Findings: No erythema or rash.   Neurological:      General: No focal deficit present.      Mental Status: She is alert and oriented to person, place, and time.      Cranial Nerves: No cranial nerve deficit.      Sensory: No sensory deficit.      Motor: No tremor, abnormal muscle tone or seizure activity.      Coordination: Coordination normal.      Gait: Gait abnormal.      Deep Tendon Reflexes: Reflexes are  normal and symmetric. Reflexes normal.   Psychiatric:         Behavior: Behavior normal.         Thought Content: Thought content normal.         Judgment: Judgment normal.

## 2025-04-22 NOTE — ASSESSMENT & PLAN NOTE
Lab Results   Component Value Date    LDLCALC 67 04/14/2025      Lab Results   Component Value Date    ALT 21 04/14/2025    AST 33 04/14/2025    ALKPHOS 82 04/14/2025    BILITOT 0.3 11/09/2015   Above reviewed LDL very well-controlled no side effect LFT normal reviewed as above    Agree continue manage medications for    Lipitor 20 mg daily low-fat low-cholesterol diet will monitor closely

## 2025-04-22 NOTE — TELEPHONE ENCOUNTER
Call from from [facility] stating that the medication below sent in interacts with warfarin (Coumadin).  They are asking the provider to send in another medication that will not interact.    ciprofloxacin (CIPRO) 500 mg tablet

## 2025-04-22 NOTE — ASSESSMENT & PLAN NOTE
Asymptomatic     Rate controlled     Patient's rate controlled on Coumadin managed by cardiology asymptomatic no chest pain difficulty breathing     Again continue manage medications follow     Coreg 3.125 mg twice a day    Lab reviewed as follows  Lab Results   Component Value Date     11/09/2015    SODIUM 137 04/14/2025    K 4.3 04/14/2025     04/14/2025    CO2 29 04/14/2025    ANIONGAP 11.1 11/09/2015    AGAP 7 04/14/2025    BUN 17 04/14/2025    CREATININE 0.61 04/14/2025    GLUC 101 11/01/2024    GLUF 138 (H) 04/14/2025    CALCIUM 9.3 04/14/2025    AST 33 04/14/2025    ALT 21 04/14/2025    ALKPHOS 82 04/14/2025    PROT 7.4 11/09/2015    TP 7.4 04/14/2025    BILITOT 0.3 11/09/2015    TBILI 0.67 04/14/2025    EGFR 82 04/14/2025

## 2025-04-22 NOTE — ASSESSMENT & PLAN NOTE
No evidence of any thromboembolic phenomena for now    History of recurrent DVT    On Coumadin    Will monitor closely

## 2025-04-23 LAB — BACTERIA UR CULT: ABNORMAL

## 2025-04-23 NOTE — TELEPHONE ENCOUNTER
----- Message from Jorge Colon MD sent at 4/23/2025  9:24 AM EDT -----  Call patient to regard the final report culture sensitivity shows the urine culture shows E. coli sensitive to Cipro so she is not on the correct antibiotic continue and finish the antibiotic  ----- Message -----  From: Lab, Background User  Sent: 4/14/2025   8:05 PM EDT  To: Jorge Colon MD

## 2025-04-23 NOTE — TELEPHONE ENCOUNTER
Pt notified of results - no questions at this time    Advised to call back if sx have not improved after completion of abx

## 2025-05-01 ENCOUNTER — OFFICE VISIT (OUTPATIENT)
Dept: CARDIOLOGY CLINIC | Facility: CLINIC | Age: 85
End: 2025-05-01
Payer: MEDICARE

## 2025-05-01 VITALS
WEIGHT: 163 LBS | BODY MASS INDEX: 27.16 KG/M2 | HEART RATE: 100 BPM | HEIGHT: 65 IN | OXYGEN SATURATION: 97 % | SYSTOLIC BLOOD PRESSURE: 134 MMHG | DIASTOLIC BLOOD PRESSURE: 60 MMHG

## 2025-05-01 DIAGNOSIS — Z95.2 H/O MITRAL VALVE REPLACEMENT WITH MECHANICAL VALVE: ICD-10-CM

## 2025-05-01 DIAGNOSIS — R06.02 EXERTIONAL SHORTNESS OF BREATH: ICD-10-CM

## 2025-05-01 DIAGNOSIS — I71.40 ABDOMINAL AORTIC ANEURYSM (AAA) WITHOUT RUPTURE, UNSPECIFIED PART (HCC): ICD-10-CM

## 2025-05-01 DIAGNOSIS — I10 ESSENTIAL HYPERTENSION: ICD-10-CM

## 2025-05-01 DIAGNOSIS — I48.20 CHRONIC ATRIAL FIBRILLATION (HCC): Primary | ICD-10-CM

## 2025-05-01 PROCEDURE — 99214 OFFICE O/P EST MOD 30 MIN: CPT | Performed by: INTERNAL MEDICINE

## 2025-05-01 PROCEDURE — 93000 ELECTROCARDIOGRAM COMPLETE: CPT | Performed by: INTERNAL MEDICINE

## 2025-05-01 RX ORDER — FUROSEMIDE 20 MG/1
20 TABLET ORAL DAILY
Qty: 30 TABLET | Refills: 1 | Status: SHIPPED | OUTPATIENT
Start: 2025-05-01

## 2025-05-01 NOTE — PROGRESS NOTES
[facility]  [id number]   [place] [address]  Cardiology Consultation    Pooja Barron  [id number]  1940      Consult for:Scout  Appreciate consult by: Jorge Colon MD    1. Chronic atrial fibrillation (HCC)  POCT ECG      2. H/O mitral valve replacement with mechanical valve  POCT ECG      3. Essential hypertension  POCT ECG      4. Abdominal aortic aneurysm (AAA) without rupture, unspecified part (HCC)  POCT ECG         Discussion/Summary:   Exertional shortness of breath/leg swelling- she does have hx of uti. Mechanical valve in the past.  We will obtain an updated echocardiogram.  Will place her on Lasix 20 mg.  She will increase potassium supplementation.  She understands to be on a low-sodium diet.     Atrial fibrillation currently spontaneously rate controlled    Possible prior coronary artery bypass grafting?-atorvastatin 20mg    Previous CVA    Diabetes mellitus    History of abdominal aortic aneurysm borderline measuring 3 x 3.2 cm. Not smoking. Bp controlled. monitor    Prior smoking history    Lower extremity edema/varicose vein-compression socks    HPI:   84-year-old with a remote mechanical mitral valve, prior smoking history presents to establish care.  She has been following with a cardiologist at an outside hospital for more than 30 years.  However unfortunately she is having transportation issues.  She is also pending eye surgery.  She currently denies having major exertional shortness of breath or chest heaviness.  She she has mild lower extremity swelling at times.    Recent Visit: Increased swelling in legs. +pnd.  Reports having some increased shortness of breath.  Her INR has been stable.      Past Medical History:   Diagnosis Date    Bloody nose     slow drip, clots in the morning    Colon polyp     Diabetes mellitus (HCC)     Pre DM diet controlled, metformin DCed 2023    Heart murmur     Hyperlipidemia     Stroke (HCC)  2022     Social History     Socioeconomic History    Marital status: Single     Spouse name: Not on file    Number of children: Not on file    Years of education: Not on file    Highest education level: Not on file   Occupational History    Not on file   Tobacco Use    Smoking status: Former     Current packs/day: 0.00     Average packs/day: 2.0 packs/day for 30.0 years (60.0 ttl pk-yrs)     Types: Cigarettes     Start date: 1959     Quit date:      Years since quittin.3    Smokeless tobacco: Never   Vaping Use    Vaping status: Never Used   Substance and Sexual Activity    Alcohol use: Yes     Comment: special occasional    Drug use: No    Sexual activity: Not on file   Other Topics Concern    Not on file   Social History Narrative    Not on file     Social Drivers of Health     Financial Resource Strain: Low Risk  (2023)    Overall Financial Resource Strain (CARDIA)     Difficulty of Paying Living Expenses: Not hard at all   Food Insecurity: No Food Insecurity (2024)    Nursing - Inadequate Food Risk Classification     Worried About Running Out of Food in the Last Year: Never true     Ran Out of Food in the Last Year: Never true     Ran Out of Food in the Last Year: Not on file   Transportation Needs: No Transportation Needs (2024)    PRAPARE - Transportation     Lack of Transportation (Medical): No     Lack of Transportation (Non-Medical): No   Physical Activity: Not on file   Stress: Not on file   Social Connections: Not on file   Intimate Partner Violence: Not on file   Housing Stability: Low Risk  (2024)    Housing Stability Vital Sign     Unable to Pay for Housing in the Last Year: No     Number of Times Moved in the Last Year: 1     Homeless in the Last Year: No      Family History   Problem Relation Age of Onset    Heart failure Mother     Heart attack Father     Sleep apnea Brother      Past Surgical History:   Procedure Laterality Date    CATARACT EXTRACTION Bilateral  "    COLONOSCOPY      EGD      HYSTERECTOMY      MITRAL VALVE REPLACEMENT  1994       Current Outpatient Medications:     acetaminophen (TYLENOL) 325 mg tablet, Take 2 tablets (650 mg total) by mouth every 6 (six) hours as needed for mild pain or headaches, Disp: , Rfl: 0    atorvastatin (LIPITOR) 20 mg tablet, Take 1 tablet (20 mg total) by mouth daily, Disp: 90 tablet, Rfl: 1    Cholecalciferol (VITAMIN D PO), Take by mouth, Disp: , Rfl:     ciprofloxacin (CIPRO) 500 mg tablet, Take 1 tablet (500 mg total) by mouth every 12 (twelve) hours for 10 days, Disp: 20 tablet, Rfl: 0    erythromycin (ILOTYCIN) ophthalmic ointment, , Disp: , Rfl:     estradiol (ESTRACE) 0.1 mg/g vaginal cream, 0.5 grams of cream intravaginally administered daily for one to two weeks, then reduce to twice weekly, Disp: 42.5 g, Rfl: 3    metFORMIN (GLUCOPHAGE) 500 mg tablet, Take 1 tablet (500 mg total) by mouth 2 (two) times a day with meals, Disp: 180 tablet, Rfl: 3    methenamine hippurate (HIPREX) 1 g tablet, Take 1 tablet (1 g total) by mouth 2 (two) times a day with meals, Disp: 60 tablet, Rfl: 11    Multiple Vitamins-Minerals (PRESERVISION AREDS PO), Take 2 tablets by mouth daily, Disp: , Rfl:     warfarin (COUMADIN) 5 mg tablet, Take 1 tablet (5 mg total) by mouth 4 (four) times a week On Sunday, Tuesday, Thursday, Saturday, Disp: , Rfl: 0  Allergies   Allergen Reactions    Sulfa Antibiotics Tongue Swelling    Sulfasalazine Throat Swelling     Vitals:    05/01/25 1101   BP: 134/60   BP Location: Right arm   Patient Position: Sitting   Cuff Size: Standard   Pulse: 100   SpO2: 97%   Weight: 73.9 kg (163 lb)   Height: 5' 5\" (1.651 m)       Review of Systems:   Review of Systems   Constitutional:  Positive for fatigue. Negative for activity change, appetite change, chills, diaphoresis, fever and unexpected weight change.   HENT: Negative.  Negative for congestion, dental problem, drooling, ear discharge, ear pain, facial swelling, hearing " loss, mouth sores, nosebleeds, postnasal drip, rhinorrhea, sinus pressure, sinus pain, sneezing, sore throat, tinnitus, trouble swallowing and voice change.    Eyes: Negative.  Negative for photophobia, pain, redness, itching and visual disturbance.   Respiratory:  Positive for shortness of breath. Negative for apnea, cough, choking, chest tightness, wheezing and stridor.    Cardiovascular:  Positive for leg swelling. Negative for chest pain and palpitations.   Gastrointestinal: Negative.  Negative for abdominal distention, abdominal pain, anal bleeding, blood in stool, constipation, diarrhea, nausea, rectal pain and vomiting.   Endocrine: Negative.  Negative for cold intolerance, heat intolerance, polydipsia, polyphagia and polyuria.   Genitourinary: Negative.  Negative for decreased urine volume, difficulty urinating, dyspareunia, dysuria, enuresis, flank pain, frequency, genital sores, hematuria, menstrual problem, pelvic pain, urgency, vaginal bleeding, vaginal discharge and vaginal pain.   Musculoskeletal: Negative.  Negative for arthralgias, back pain, gait problem, joint swelling, myalgias, neck pain and neck stiffness.   Skin: Negative.  Negative for color change, pallor, rash and wound.   Allergic/Immunologic: Negative.  Negative for environmental allergies, food allergies and immunocompromised state.   Neurological: Negative.  Negative for dizziness, tremors, seizures, syncope, facial asymmetry, speech difficulty, weakness, light-headedness, numbness and headaches.   Hematological: Negative.  Negative for adenopathy. Does not bruise/bleed easily.   Psychiatric/Behavioral: Negative.  Negative for agitation, behavioral problems, confusion, decreased concentration, dysphoric mood, hallucinations, self-injury, sleep disturbance and suicidal ideas. The patient is not nervous/anxious and is not hyperactive.    All other systems reviewed and are negative.      Vitals:    05/01/25 1101   BP: 134/60   BP Location:  "Right arm   Patient Position: Sitting   Cuff Size: Standard   Pulse: 100   SpO2: 97%   Weight: 73.9 kg (163 lb)   Height: 5' 5\" (1.651 m)     Physical Examination:   Physical Exam    Labs:     Lab Results   Component Value Date    WBC 3.05 (L) 04/14/2025    HGB 12.3 04/14/2025    HCT 38.6 04/14/2025    MCV 92 04/14/2025    RDW 19.1 (H) 04/14/2025     04/14/2025     BMP:  Lab Results   Component Value Date    SODIUM 137 04/14/2025    K 4.3 04/14/2025     04/14/2025    CO2 29 04/14/2025    ANIONGAP 11.1 11/09/2015    BUN 17 04/14/2025    CREATININE 0.61 04/14/2025    GLUC 101 11/01/2024    GLUF 138 (H) 04/14/2025    CALCIUM 9.3 04/14/2025    EGFR 82 04/14/2025    MG 1.8 01/11/2023     LFT:  Lab Results   Component Value Date    AST 33 04/14/2025    ALT 21 04/14/2025    ALKPHOS 82 04/14/2025    TP 7.4 04/14/2025    ALB 4.6 04/14/2025      Lab Results   Component Value Date    WSZ4OCKUPJCI 2.804 06/03/2024     Lab Results   Component Value Date    HGBA1C 6.1 (H) 04/14/2025     Lipid Profile:   Lab Results   Component Value Date    CHOLESTEROL 147 04/14/2025    HDL 44 (L) 04/14/2025    LDLCALC 67 04/14/2025    TRIG 181 (H) 04/14/2025     Lab Results   Component Value Date    CHOLESTEROL 147 04/14/2025    CHOLESTEROL 239 (H) 06/03/2024     Lab Results   Component Value Date    TROPONINI <0.02 10/09/2017     Lab Results   Component Value Date    NTBNP 487 (H) 01/10/2023      Recent Results (from the past 4 weeks)   Hemoglobin A1C    Collection Time: 04/14/25  8:45 AM   Result Value Ref Range    Hemoglobin A1C 6.1 (H) Normal 4.0-5.6%; PreDiabetic 5.7-6.4%; Diabetic >=6.5%; Glycemic control for adults with diabetes <7.0% %     mg/dl   CBC and differential    Collection Time: 04/14/25  8:45 AM   Result Value Ref Range    WBC 3.05 (L) 4.31 - 10.16 Thousand/uL    RBC 4.20 3.81 - 5.12 Million/uL    Hemoglobin 12.3 11.5 - 15.4 g/dL    Hematocrit 38.6 34.8 - 46.1 %    MCV 92 82 - 98 fL    MCH 29.3 26.8 - 34.3 " pg    MCHC 31.9 31.4 - 37.4 g/dL    RDW 19.1 (H) 11.6 - 15.1 %    MPV 10.4 8.9 - 12.7 fL    Platelets 209 149 - 390 Thousands/uL   Comprehensive metabolic panel    Collection Time: 04/14/25  8:45 AM   Result Value Ref Range    Sodium 137 135 - 147 mmol/L    Potassium 4.3 3.5 - 5.3 mmol/L    Chloride 101 96 - 108 mmol/L    CO2 29 21 - 32 mmol/L    ANION GAP 7 4 - 13 mmol/L    BUN 17 5 - 25 mg/dL    Creatinine 0.61 0.60 - 1.30 mg/dL    Glucose, Fasting 138 (H) 65 - 99 mg/dL    Calcium 9.3 8.4 - 10.2 mg/dL    AST 33 13 - 39 U/L    ALT 21 7 - 52 U/L    Alkaline Phosphatase 82 34 - 104 U/L    Total Protein 7.4 6.4 - 8.4 g/dL    Albumin 4.6 3.5 - 5.0 g/dL    Total Bilirubin 0.67 0.20 - 1.00 mg/dL    eGFR 82 ml/min/1.73sq m   Lipid Panel with Direct LDL reflex    Collection Time: 04/14/25  8:45 AM   Result Value Ref Range    Cholesterol 147 See Comment mg/dL    Triglycerides 181 (H) See Comment mg/dL    HDL, Direct 44 (L) >=50 mg/dL    LDL Calculated 67 0 - 100 mg/dL   Manual Differential (Non CWS)    Collection Time: 04/14/25  8:45 AM   Result Value Ref Range    Segmented % 15 %    Lymphocytes % 16 %    Monocytes % 21 (H) 4 - 12 %    Eosinophils % 9 (H) 0 - 6 %    Basophils % 2 (H) 0 - 1 %    Atypical Lymphocytes % 36 (H) 0 - 0 %    Absolute Neutrophils 0.46 (L) 1.81 - 6.82 Thousand/uL    Absolute Lymphocytes 1.60 0.60 - 4.47 Thousand/uL    Absolute Monocytes 0.65 0.00 - 1.22 Thousand/uL    Absolute Eosinophils 0.28 0.00 - 0.61 Thousand/uL    Absolute Basophils 0.06 0.00 - 0.10 Thousand/uL    Total Counted 99     nRBC 1 0 - 2 /100 WBC    RBC Morphology Present     Platelet Estimate Adequate Adequate    Pathology Review Yes (A) No    Eldon Cells Present     Macrocytes Present     Microcytes Present     Polychromasia Present    Path Slide Review    Collection Time: 04/14/25  8:45 AM   Result Value Ref Range    Case Report       Clinical Pathology Report                         Case: [id number]                                   Authorizing Provider:  Jorge Colon MD      Collected:           04/14/2025 0845              Ordering Location:     [facility]             Received:            04/15/2025 0316                                     [facility]                                                                                  [facility]                                                           Pathologist:           Jorje Park MD                                                          Specimen:    Arm, Right                                                                                 Path Slide       Peripheral blood smear review shows white blood cells with increased atypical appearing lymphocytes with increased large granular forms.  Granulocytes are decreased with neutropenia and no significant features of dysplasia or circulating myeloblasts.  Red blood cells show mild nonspecific anisopoikilocytosis.  Platelets show normal quantity with occasional large forms without significant features of satellitosis or clumping.  A portion of the sample was submitted for flow cytometric analysis to evaluate the lymphocyte population and shows relatively increased NK cells without distinct features of phenotypic abnormalities or evidence of T-cell clonality (see below for full report).  Overall, the combination of findings is not definitively specific.  The differential diagnosis may include infection, drug effect, toxin exposure, autoimmune disease, postvaccination, or other reactive/inflammatory condition.  Alternatively, the possibility of an atypical lymphoproliferative disorder (particularly NK cell large granular lymphocytic leukemia) may enter the differential diagnosis if the presence of neutropenia and atypical lymphocytes is persistent and chronic with no other etiology identified.  Correlation with clinical impression and other laboratory findings is recommended and if concern for a lymphoid  disorder exists then consideration for hematology/oncology consultation may be of assistance as clinically indicated.         Leukemia/Lymphoma flow cytometry    Collection Time: 04/14/25  8:45 AM   Result Value Ref Range    Scan Result SEE WRITTEN REPORT    Albumin / creatinine urine ratio    Collection Time: 04/21/25 10:25 AM   Result Value Ref Range    Creatinine, Ur 65.9 Reference range not established. mg/dL    Albumin,U,Random 158.6 (H) <20.0 mg/L    Albumin Creat Ratio 241 (H) 0 - 30 mg/g creatinine   Urinalysis with microscopic    Collection Time: 04/21/25 10:25 AM   Result Value Ref Range    Color, UA Yellow     Clarity, UA Turbid     Specific Gravity, UA 1.016 1.003 - 1.030    pH, UA 5.5 4.5, 5.0, 5.5, 6.0, 6.5, 7.0, 7.5, 8.0    Leukocytes, UA Large (A) Negative    Nitrite, UA Positive (A) Negative    Protein, UA 30 (1+) (A) Negative mg/dl    Glucose, UA Negative Negative mg/dl    Ketones, UA Negative Negative mg/dl    Urobilinogen, UA <2.0 <2.0 mg/dl mg/dl    Bilirubin, UA Negative Negative    Occult Blood, UA Moderate (A) Negative    RBC, UA 10-20 (A) None Seen, 1-2 /hpf    WBC, UA Innumerable (A) None Seen, 1-2 /hpf    Epithelial Cells Occasional None Seen, Occasional /hpf    Bacteria, UA None Seen None Seen, Occasional /hpf    WBC Clumps Present    Urine culture    Collection Time: 04/21/25 10:25 AM    Specimen: Urine   Result Value Ref Range    Urine Culture >100,000 cfu/ml Escherichia coli (A)        Susceptibility    Escherichia coli - ROBERT     ZID Performed Yes       Ampicillin ($$) <=8.00 Susceptible ug/ml     Aztreonam ($$$)  <=4 Susceptible ug/ml     Cefazolin ($) <=2.00 Susceptible ug/ml     Ciprofloxacin ($)  <=0.25 Susceptible ug/ml     Gentamicin ($$) <=2 Susceptible ug/ml     Levofloxacin ($) <=0.50 Susceptible ug/ml     Nitrofurantoin <=32 Susceptible ug/ml     Tetracycline <=4 Susceptible ug/ml     Trimethoprim + Sulfamethoxazole ($$$) <=0.5/9.5 Susceptible ug/ml       Imaging & Testing  "  I have personally reviewed pertinent reports.      Cardiac Testing         EKG: Personally reviewed.    Atrial fibrillation at 90bpm      Panchito Nieves MD [facility]  [phone]  Please call with any questions or suggestions    Counseling :  A description of the counseling:   Goals and Barriers:  Patient's ability to self care:  Medication side effect reviewed with patient in detail and all their questions answered.    \"Portions of the record may have been created with voice recognition software. Occasional wrong word or \"sound a like\" substitutions may have occurred due to the inherent limitations of voice recognition software. Read the chart carefully and recognize, using context, where substitutions have occurred. Please call if you have any questions. \"    "

## 2025-05-06 ENCOUNTER — APPOINTMENT (OUTPATIENT)
Dept: LAB | Facility: CLINIC | Age: 85
End: 2025-05-06
Payer: MEDICARE

## 2025-05-06 ENCOUNTER — TELEPHONE (OUTPATIENT)
Age: 85
End: 2025-05-06

## 2025-05-06 ENCOUNTER — OFFICE VISIT (OUTPATIENT)
Dept: INTERNAL MEDICINE CLINIC | Facility: CLINIC | Age: 85
End: 2025-05-06
Payer: MEDICARE

## 2025-05-06 ENCOUNTER — RESULTS FOLLOW-UP (OUTPATIENT)
Dept: INTERNAL MEDICINE CLINIC | Facility: CLINIC | Age: 85
End: 2025-05-06

## 2025-05-06 VITALS
SYSTOLIC BLOOD PRESSURE: 140 MMHG | HEIGHT: 65 IN | DIASTOLIC BLOOD PRESSURE: 80 MMHG | BODY MASS INDEX: 26.76 KG/M2 | WEIGHT: 160.6 LBS | OXYGEN SATURATION: 97 % | HEART RATE: 83 BPM | TEMPERATURE: 98 F

## 2025-05-06 DIAGNOSIS — J20.9 ACUTE BRONCHITIS, UNSPECIFIED ORGANISM: Primary | ICD-10-CM

## 2025-05-06 DIAGNOSIS — E11.9 TYPE 2 DIABETES MELLITUS WITHOUT COMPLICATION, WITHOUT LONG-TERM CURRENT USE OF INSULIN (HCC): ICD-10-CM

## 2025-05-06 DIAGNOSIS — J06.9 URI WITH COUGH AND CONGESTION: ICD-10-CM

## 2025-05-06 DIAGNOSIS — E78.2 MIXED HYPERLIPIDEMIA: ICD-10-CM

## 2025-05-06 DIAGNOSIS — Z13.0 SCREENING FOR DEFICIENCY ANEMIA: ICD-10-CM

## 2025-05-06 DIAGNOSIS — E11.69 TYPE 2 DIABETES MELLITUS WITH OTHER SPECIFIED COMPLICATION, WITHOUT LONG-TERM CURRENT USE OF INSULIN (HCC): ICD-10-CM

## 2025-05-06 DIAGNOSIS — R06.02 EXERTIONAL SHORTNESS OF BREATH: ICD-10-CM

## 2025-05-06 DIAGNOSIS — Z95.2 H/O MITRAL VALVE REPLACEMENT WITH MECHANICAL VALVE: ICD-10-CM

## 2025-05-06 DIAGNOSIS — Z51.81 ENCOUNTER FOR THERAPEUTIC DRUG LEVEL MONITORING: ICD-10-CM

## 2025-05-06 DIAGNOSIS — I48.20 CHRONIC ATRIAL FIBRILLATION (HCC): ICD-10-CM

## 2025-05-06 DIAGNOSIS — D70.8 OTHER NEUTROPENIA (HCC): ICD-10-CM

## 2025-05-06 DIAGNOSIS — I48.20 CHRONIC ATRIAL FIBRILLATION (HCC): Primary | ICD-10-CM

## 2025-05-06 DIAGNOSIS — J43.9 PULMONARY EMPHYSEMA, UNSPECIFIED EMPHYSEMA TYPE (HCC): ICD-10-CM

## 2025-05-06 DIAGNOSIS — Z79.01 ANTICOAGULATED ON WARFARIN: ICD-10-CM

## 2025-05-06 DIAGNOSIS — I10 PRIMARY HYPERTENSION: ICD-10-CM

## 2025-05-06 DIAGNOSIS — Z01.818 PRE-OP EXAMINATION: ICD-10-CM

## 2025-05-06 LAB
ALBUMIN SERPL BCG-MCNC: 4.6 G/DL (ref 3.5–5)
ALP SERPL-CCNC: 91 U/L (ref 34–104)
ALT SERPL W P-5'-P-CCNC: 20 U/L (ref 7–52)
ANION GAP SERPL CALCULATED.3IONS-SCNC: 10 MMOL/L (ref 4–13)
AST SERPL W P-5'-P-CCNC: 33 U/L (ref 13–39)
BASOPHILS # BLD AUTO: 0.02 THOUSANDS/ÂΜL (ref 0–0.1)
BASOPHILS NFR BLD AUTO: 1 % (ref 0–1)
BILIRUB SERPL-MCNC: 0.67 MG/DL (ref 0.2–1)
BNP SERPL-MCNC: 51 PG/ML (ref 0–100)
BUN SERPL-MCNC: 15 MG/DL (ref 5–25)
CALCIUM SERPL-MCNC: 9.5 MG/DL (ref 8.4–10.2)
CHLORIDE SERPL-SCNC: 99 MMOL/L (ref 96–108)
CO2 SERPL-SCNC: 30 MMOL/L (ref 21–32)
CREAT SERPL-MCNC: 0.61 MG/DL (ref 0.6–1.3)
EOSINOPHIL # BLD AUTO: 0.12 THOUSAND/ÂΜL (ref 0–0.61)
EOSINOPHIL NFR BLD AUTO: 3 % (ref 0–6)
ERYTHROCYTE [DISTWIDTH] IN BLOOD BY AUTOMATED COUNT: 18.5 % (ref 11.6–15.1)
EST. AVERAGE GLUCOSE BLD GHB EST-MCNC: 128 MG/DL
GFR SERPL CREATININE-BSD FRML MDRD: 82 ML/MIN/1.73SQ M
GLUCOSE SERPL-MCNC: 112 MG/DL (ref 65–140)
HBA1C MFR BLD: 6.1 %
HCT VFR BLD AUTO: 39.5 % (ref 34.8–46.1)
HGB BLD-MCNC: 12.5 G/DL (ref 11.5–15.4)
IMM GRANULOCYTES # BLD AUTO: 0.01 THOUSAND/UL (ref 0–0.2)
IMM GRANULOCYTES NFR BLD AUTO: 0 % (ref 0–2)
INR PPP: 3.4 (ref 0.85–1.19)
LYMPHOCYTES # BLD AUTO: 1.42 THOUSANDS/ÂΜL (ref 0.6–4.47)
LYMPHOCYTES NFR BLD AUTO: 34 % (ref 14–44)
MCH RBC QN AUTO: 29.2 PG (ref 26.8–34.3)
MCHC RBC AUTO-ENTMCNC: 31.6 G/DL (ref 31.4–37.4)
MCV RBC AUTO: 92 FL (ref 82–98)
MONOCYTES # BLD AUTO: 0.38 THOUSAND/ÂΜL (ref 0.17–1.22)
MONOCYTES NFR BLD AUTO: 9 % (ref 4–12)
NEUTROPHILS # BLD AUTO: 2.25 THOUSANDS/ÂΜL (ref 1.85–7.62)
NEUTS SEG NFR BLD AUTO: 53 % (ref 43–75)
NRBC BLD AUTO-RTO: 0 /100 WBCS
PLATELET # BLD AUTO: 223 THOUSANDS/UL (ref 149–390)
PMV BLD AUTO: 10.7 FL (ref 8.9–12.7)
POTASSIUM SERPL-SCNC: 4.1 MMOL/L (ref 3.5–5.3)
PROT SERPL-MCNC: 7.7 G/DL (ref 6.4–8.4)
PROTHROMBIN TIME: 33.9 SECONDS (ref 12.3–15)
RBC # BLD AUTO: 4.28 MILLION/UL (ref 3.81–5.12)
SODIUM SERPL-SCNC: 139 MMOL/L (ref 135–147)
WBC # BLD AUTO: 4.2 THOUSAND/UL (ref 4.31–10.16)

## 2025-05-06 PROCEDURE — G2211 COMPLEX E/M VISIT ADD ON: HCPCS

## 2025-05-06 PROCEDURE — 83036 HEMOGLOBIN GLYCOSYLATED A1C: CPT

## 2025-05-06 PROCEDURE — 85025 COMPLETE CBC W/AUTO DIFF WBC: CPT

## 2025-05-06 PROCEDURE — 83880 ASSAY OF NATRIURETIC PEPTIDE: CPT

## 2025-05-06 PROCEDURE — 80053 COMPREHEN METABOLIC PANEL: CPT

## 2025-05-06 PROCEDURE — 36415 COLL VENOUS BLD VENIPUNCTURE: CPT

## 2025-05-06 PROCEDURE — 99213 OFFICE O/P EST LOW 20 MIN: CPT

## 2025-05-06 PROCEDURE — 85610 PROTHROMBIN TIME: CPT

## 2025-05-06 RX ORDER — AZITHROMYCIN 250 MG/1
TABLET, FILM COATED ORAL
Qty: 6 TABLET | Refills: 0 | Status: SHIPPED | OUTPATIENT
Start: 2025-05-06 | End: 2025-05-11

## 2025-05-06 NOTE — TELEPHONE ENCOUNTER
Benjie, pharmacist with [facility] called regarding the prescription they received for the Z-pack today.  The patient is taking warfarin and can have increased bleeding while taking the z-pack and warfarin together.  Please advise how to proceed.  Thank you!

## 2025-05-06 NOTE — PROGRESS NOTES
Name: Pooja Barron      : 1940      MRN: [id number]  Encounter Provider: Gely Benavidez MD  Encounter Date: 2025   Encounter department: [facility]  :  Assessment & Plan  Acute bronchitis, unspecified organism  Start Z love (pt also taking warfarin for A fib and mechanical heart valve  Start Mucinex DM  Given smoking hx and SOB, if symptoms fail to improve in 3 days, Consider adding steroids or albuterol inhaler  Orders:  •  azithromycin (Zithromax) 250 mg tablet; Take 2 tablets (500 mg total) by mouth daily for 1 day, THEN 1 tablet (250 mg total) daily for 4 days.  •  dextromethorphan-guaifenesin (MUCINEX DM)  MG per 12 hr tablet; Take 1 tablet by mouth every 12 (twelve) hours for 7 days    URI with cough and congestion  Plan noted above  Orders:  •  azithromycin (Zithromax) 250 mg tablet; Take 2 tablets (500 mg total) by mouth daily for 1 day, THEN 1 tablet (250 mg total) daily for 4 days.  •  dextromethorphan-guaifenesin (MUCINEX DM)  MG per 12 hr tablet; Take 1 tablet by mouth every 12 (twelve) hours for 7 days      Return if symptoms worsen or fail to improve.     History of Present Illness   HPI  85 year old female presents with a persistent cough and shortness of breath that has been ongoing for at least 2.5 weeks. The patient has a history of atrial fibrillation and uses a walker at home.    The patient reports a wet, deep-sounding cough with clear sputum. She experiences difficulty taking deep breaths and feels very tired. The cough and breathing difficulties are impacting her mobility, making it challenging to get up and down on her own without the walker. She denies any fever or chills, stating she has been monitoring her temperature at home. The patient also reports nasal discharge but denies sinus pain, pressure, or ear pain. She occasionally experiences ear popping when blowing her nose but without subsequent pain. The patient denies any  "gastrointestinal symptoms such as diarrhea or abdominal pain.    The patient has not been taking any over-the-counter medications for her cough. She reports a history of upper respiratory infections, particularly after quitting smoking, but does not recall ever having bronchitis. The patient is currently taking warfarin daily for her atrial fibrillation and has a titanium valve. She mentions that a pessary was removed the day before due to discomfort.    Denies any other concerning symptoms including CP, HA, dizziness, abdominal pain, N/V, paresthesia or weakness.    Review of Systems   Constitutional:  Positive for fatigue. Negative for chills and fever.   HENT:  Positive for congestion. Negative for ear pain, sinus pressure, sinus pain, sore throat and trouble swallowing.    Eyes:  Negative for visual disturbance.   Respiratory:  Positive for cough and shortness of breath. Negative for wheezing.    Cardiovascular:  Negative for chest pain and palpitations.   Gastrointestinal:  Negative for abdominal pain, nausea and vomiting.   Genitourinary:  Negative for difficulty urinating.   Musculoskeletal:  Positive for gait problem. Negative for arthralgias and myalgias.   Neurological:  Negative for dizziness, weakness and headaches.   Psychiatric/Behavioral:  Negative for confusion.        Objective   /80   Pulse 83   Temp 98 °F (36.7 °C)   Ht 5' 5\" (1.651 m)   Wt 72.8 kg (160 lb 9.6 oz)   SpO2 97%   BMI 26.73 kg/m²      Physical Exam  Vitals and nursing note reviewed.   Constitutional:       General: She is not in acute distress.     Appearance: Normal appearance. She is normal weight. She is not toxic-appearing.   HENT:      Head: Normocephalic and atraumatic.      Right Ear: Tympanic membrane, ear canal and external ear normal. There is no impacted cerumen.      Left Ear: Tympanic membrane, ear canal and external ear normal. There is no impacted cerumen.      Nose: Congestion present.      Mouth/Throat:    "   Mouth: Mucous membranes are moist.      Pharynx: Oropharynx is clear. No posterior oropharyngeal erythema.   Eyes:      General:         Right eye: No discharge.         Left eye: No discharge.      Extraocular Movements: Extraocular movements intact.      Conjunctiva/sclera: Conjunctivae normal.   Cardiovascular:      Rate and Rhythm: Normal rate. Rhythm irregular.      Pulses: Normal pulses.      Heart sounds: Normal heart sounds. No murmur heard.  Pulmonary:      Effort: Pulmonary effort is normal. No respiratory distress.      Breath sounds: Transmitted upper airway sounds present. No wheezing, rhonchi or rales.   Musculoskeletal:         General: Normal range of motion.      Cervical back: Normal range of motion and neck supple. No tenderness.   Lymphadenopathy:      Cervical: Cervical adenopathy present.   Skin:     General: Skin is warm and dry.   Neurological:      Mental Status: She is alert and oriented to person, place, and time.      Gait: Gait abnormal.   Psychiatric:         Mood and Affect: Mood normal.         Behavior: Behavior normal.

## 2025-05-07 ENCOUNTER — RESULTS FOLLOW-UP (OUTPATIENT)
Dept: INTERNAL MEDICINE CLINIC | Facility: CLINIC | Age: 85
End: 2025-05-07

## 2025-05-07 NOTE — RESULT ENCOUNTER NOTE
Left message to have INR checked Friday morning, please call back to let us know you received this message.

## 2025-05-09 ENCOUNTER — APPOINTMENT (OUTPATIENT)
Dept: LAB | Facility: CLINIC | Age: 85
End: 2025-05-09
Payer: MEDICARE

## 2025-05-09 DIAGNOSIS — Z95.2 H/O MITRAL VALVE REPLACEMENT WITH MECHANICAL VALVE: ICD-10-CM

## 2025-05-09 DIAGNOSIS — I48.20 CHRONIC ATRIAL FIBRILLATION (HCC): ICD-10-CM

## 2025-05-09 DIAGNOSIS — I48.20 CHRONIC ATRIAL FIBRILLATION (HCC): Primary | ICD-10-CM

## 2025-05-09 DIAGNOSIS — R06.02 EXERTIONAL SHORTNESS OF BREATH: ICD-10-CM

## 2025-05-09 LAB
CHOLEST SERPL-MCNC: 141 MG/DL (ref ?–200)
HDLC SERPL-MCNC: 41 MG/DL
INR PPP: 3.93 (ref 0.85–1.19)
LDLC SERPL CALC-MCNC: 65 MG/DL (ref 0–100)
PROTHROMBIN TIME: 37.8 SECONDS (ref 12.3–15)
TRIGL SERPL-MCNC: 175 MG/DL (ref ?–150)

## 2025-05-09 PROCEDURE — 80061 LIPID PANEL: CPT

## 2025-05-09 PROCEDURE — 85610 PROTHROMBIN TIME: CPT

## 2025-05-09 PROCEDURE — 36415 COLL VENOUS BLD VENIPUNCTURE: CPT

## 2025-05-12 ENCOUNTER — RESULTS FOLLOW-UP (OUTPATIENT)
Dept: INTERNAL MEDICINE CLINIC | Facility: CLINIC | Age: 85
End: 2025-05-12

## 2025-05-12 ENCOUNTER — OFFICE VISIT (OUTPATIENT)
Age: 85
End: 2025-05-12
Payer: MEDICARE

## 2025-05-12 VITALS — HEIGHT: 65 IN | WEIGHT: 160 LBS | RESPIRATION RATE: 18 BRPM | BODY MASS INDEX: 26.66 KG/M2

## 2025-05-12 DIAGNOSIS — I48.20 CHRONIC ATRIAL FIBRILLATION (HCC): Primary | ICD-10-CM

## 2025-05-12 DIAGNOSIS — L03.032 PARONYCHIA OF TOENAIL OF LEFT FOOT: ICD-10-CM

## 2025-05-12 DIAGNOSIS — M77.42 METATARSALGIA OF BOTH FEET: Primary | ICD-10-CM

## 2025-05-12 DIAGNOSIS — M54.16 RADICULOPATHY OF LUMBAR REGION: ICD-10-CM

## 2025-05-12 DIAGNOSIS — M77.41 METATARSALGIA OF BOTH FEET: Primary | ICD-10-CM

## 2025-05-12 DIAGNOSIS — R60.9 CHRONIC EDEMA: ICD-10-CM

## 2025-05-12 DIAGNOSIS — I87.2 DEEP VENOUS INSUFFICIENCY: ICD-10-CM

## 2025-05-12 PROCEDURE — 99213 OFFICE O/P EST LOW 20 MIN: CPT | Performed by: PODIATRIST

## 2025-05-14 ENCOUNTER — HOSPITAL ENCOUNTER (OUTPATIENT)
Dept: NON INVASIVE DIAGNOSTICS | Facility: HOSPITAL | Age: 85
Discharge: HOME | End: 2025-05-14
Attending: INTERNAL MEDICINE
Payer: MEDICARE

## 2025-05-14 VITALS
HEIGHT: 65 IN | BODY MASS INDEX: 26.66 KG/M2 | SYSTOLIC BLOOD PRESSURE: 138 MMHG | HEART RATE: 84 BPM | DIASTOLIC BLOOD PRESSURE: 82 MMHG | WEIGHT: 160 LBS

## 2025-05-14 DIAGNOSIS — Z95.2 H/O MITRAL VALVE REPLACEMENT WITH MECHANICAL VALVE: ICD-10-CM

## 2025-05-14 DIAGNOSIS — R06.02 EXERTIONAL SHORTNESS OF BREATH: ICD-10-CM

## 2025-05-14 LAB
AORTIC ROOT: 2.9 CM
AV LVOT MEAN GRADIENT: 2 MMHG
AV LVOT PEAK GRADIENT: 5 MMHG
BSA FOR ECHO PROCEDURE: 1.8 M2
DOP CALC LVOT PEAK VEL VTI: 21.81 CM
DOP CALC LVOT PEAK VEL: 1.08 M/S
DOP CALC MV VTI: 38.94 CM
E WAVE DECELERATION TIME: 294 MS
FRACTIONAL SHORTENING: 29 (ref 28–44)
INTERVENTRICULAR SEPTUM IN DIASTOLE (PARASTERNAL SHORT AXIS VIEW): 0.9 CM
INTERVENTRICULAR SEPTUM: 0.9 CM (ref 0.6–1.1)
LAAS-AP2: 25.9 CM2
LAAS-AP4: 40.7 CM2
LEFT ATRIUM SIZE: 4 CM
LEFT ATRIUM VOLUME (MOD BIPLANE): 121 ML
LEFT ATRIUM VOLUME INDEX (MOD BIPLANE): 67.2 ML/M2
LEFT INTERNAL DIMENSION IN SYSTOLE: 3.2 CM (ref 2.1–4)
LEFT VENTRICULAR INTERNAL DIMENSION IN DIASTOLE: 4.5 CM (ref 3.5–6)
LEFT VENTRICULAR POSTERIOR WALL IN END DIASTOLE: 0.9 CM
LEFT VENTRICULAR STROKE VOLUME: 50 ML
LVSV (TEICH): 50 ML
MV E'TISSUE VEL-LAT: 8 CM/S
MV E'TISSUE VEL-SEP: 5 CM/S
MV MEAN GRADIENT: 6 MMHG
MV PEAK E VEL: 176 CM/S
MV PEAK GRADIENT: 13 MMHG
MV STENOSIS PRESSURE HALF TIME: 85 MS
MV VALVE AREA P 1/2 METHOD: 2.59
RIGHT VENTRICLE ID DIMENSION: 3.5 CM
SL CV LEFT ATRIUM LENGTH A2C: 7.2 CM
SL CV PED ECHO LEFT VENTRICLE DIASTOLIC VOLUME (MOD BIPLANE) 2D: 90 ML
SL CV PED ECHO LEFT VENTRICLE SYSTOLIC VOLUME (MOD BIPLANE) 2D: 40 ML
TR MAX PG: 31 MMHG
TR PEAK VELOCITY: 2.8 M/S
TRICUSPID ANNULAR PLANE SYSTOLIC EXCURSION: 1.4 CM
TRICUSPID VALVE PEAK REGURGITATION VELOCITY: 2.77 M/S

## 2025-05-14 PROCEDURE — 93306 TTE W/DOPPLER COMPLETE: CPT

## 2025-05-14 PROCEDURE — 93306 TTE W/DOPPLER COMPLETE: CPT | Performed by: INTERNAL MEDICINE

## 2025-05-15 ENCOUNTER — APPOINTMENT (OUTPATIENT)
Dept: LAB | Facility: CLINIC | Age: 85
End: 2025-05-15
Payer: MEDICARE

## 2025-05-15 ENCOUNTER — TELEPHONE (OUTPATIENT)
Age: 85
End: 2025-05-15

## 2025-05-15 DIAGNOSIS — I48.20 CHRONIC ATRIAL FIBRILLATION (HCC): ICD-10-CM

## 2025-05-15 LAB
INR PPP: 2.6 (ref 0.85–1.19)
PROTHROMBIN TIME: 27.7 SECONDS (ref 12.3–15)

## 2025-05-15 PROCEDURE — 36415 COLL VENOUS BLD VENIPUNCTURE: CPT

## 2025-05-15 PROCEDURE — 85610 PROTHROMBIN TIME: CPT

## 2025-05-16 ENCOUNTER — RESULTS FOLLOW-UP (OUTPATIENT)
Dept: INTERNAL MEDICINE CLINIC | Facility: CLINIC | Age: 85
End: 2025-05-16

## 2025-05-16 NOTE — TELEPHONE ENCOUNTER
Caller: Dolores / [facility]   Established Doctor: Ezequiel  Call Back Number: [phone]    Does this patient require an office visit for Cardiac Clearance for upcomming surgery or can clearance be given without an office visit?       Date of Last Office Visit: 05/01/2025    Date Of Surgery: 06/10/2025  Surgical Procedure: TVCR  Name of Facility: [facility]  Name of Surgeon: Angélica  Phone Number for Surgical Facility (If the caller does not have this info, please put N/A): [phone] option 4  Fax Number for Surgical Facility (If the caller does not have this info, please put N/A): [phone]    Does the patient have any new or worsening chest pain or shortness of breath since the last office visit? N/A  Does the patient have any new hospitalizations or cardiac procedures since the last office visit? N/A  Does the patient have any specific cardiac concerns regarding the surgery/procedure that they want to discuss with physician before surgery? N/A  Has the patient had an ECG performed anywhere in the last 30 days? N/A    Patient does have upcoming appointment for Follow up on 06/04/2025      Thank You  
Caller: [facility]     Doctor: Dr. Nieves    Call back #: [phone]    Reason for call: Elda stated that patient's procedure was rescheduled to July 28th. She was wondering if Dr. Nieves could make an addendum to the office visit note on 5/1/25 to clear patient for procedure 30 days before (June 28th).   
I called and spoke with Elda.   Would you want to see this patient before the surgery or can I just postpone a clearance note closer to the date?    
Pt has appt, and notes are in appt message     
[FreeTextEntry1] : here for cardiac testing\par still gets occ cp\par happens at rest, self limited and lasts seconds\par feels fine with physical exertion\par otherwise has felt at baseline\par functional capacity unchanged\par no dyspnea or palps\par pain is reproducible on the ant chest wall

## 2025-05-30 ENCOUNTER — APPOINTMENT (OUTPATIENT)
Dept: LAB | Facility: CLINIC | Age: 85
End: 2025-05-30
Payer: MEDICARE

## 2025-05-30 ENCOUNTER — TELEPHONE (OUTPATIENT)
Age: 85
End: 2025-05-30

## 2025-05-30 DIAGNOSIS — R53.83 FATIGUE, UNSPECIFIED TYPE: Primary | ICD-10-CM

## 2025-05-30 DIAGNOSIS — I48.20 CHRONIC ATRIAL FIBRILLATION (HCC): ICD-10-CM

## 2025-05-30 DIAGNOSIS — R53.83 FATIGUE, UNSPECIFIED TYPE: ICD-10-CM

## 2025-05-30 DIAGNOSIS — M85.80 OSTEOPENIA, UNSPECIFIED LOCATION: ICD-10-CM

## 2025-05-30 DIAGNOSIS — E11.9 TYPE 2 DIABETES MELLITUS WITHOUT COMPLICATION, WITHOUT LONG-TERM CURRENT USE OF INSULIN (HCC): ICD-10-CM

## 2025-05-30 LAB
ANION GAP SERPL CALCULATED.3IONS-SCNC: 7 MMOL/L (ref 4–13)
BASOPHILS # BLD AUTO: 0.05 THOUSANDS/ÂΜL (ref 0–0.1)
BASOPHILS NFR BLD AUTO: 1 % (ref 0–1)
BUN SERPL-MCNC: 18 MG/DL (ref 5–25)
CALCIUM SERPL-MCNC: 9 MG/DL (ref 8.4–10.2)
CHLORIDE SERPL-SCNC: 103 MMOL/L (ref 96–108)
CO2 SERPL-SCNC: 28 MMOL/L (ref 21–32)
CREAT SERPL-MCNC: 0.71 MG/DL (ref 0.6–1.3)
EOSINOPHIL # BLD AUTO: 0.08 THOUSAND/ÂΜL (ref 0–0.61)
EOSINOPHIL NFR BLD AUTO: 2 % (ref 0–6)
ERYTHROCYTE [DISTWIDTH] IN BLOOD BY AUTOMATED COUNT: 18.2 % (ref 11.6–15.1)
FERRITIN SERPL-MCNC: 9 NG/ML (ref 30–307)
FOLATE SERPL-MCNC: >22.3 NG/ML
GFR SERPL CREATININE-BSD FRML MDRD: 77 ML/MIN/1.73SQ M
GLUCOSE P FAST SERPL-MCNC: 162 MG/DL (ref 65–99)
HCT VFR BLD AUTO: 35.2 % (ref 34.8–46.1)
HGB BLD-MCNC: 11.6 G/DL (ref 11.5–15.4)
IMM GRANULOCYTES # BLD AUTO: 0.05 THOUSAND/UL (ref 0–0.2)
IMM GRANULOCYTES NFR BLD AUTO: 1 % (ref 0–2)
INR PPP: 2.48 (ref 0.85–1.19)
IRON SATN MFR SERPL: 13 % (ref 15–50)
IRON SERPL-MCNC: 55 UG/DL (ref 50–212)
LYMPHOCYTES # BLD AUTO: 1.49 THOUSANDS/ÂΜL (ref 0.6–4.47)
LYMPHOCYTES NFR BLD AUTO: 43 % (ref 14–44)
MAGNESIUM SERPL-MCNC: 2 MG/DL (ref 1.9–2.7)
MCH RBC QN AUTO: 30.1 PG (ref 26.8–34.3)
MCHC RBC AUTO-ENTMCNC: 33 G/DL (ref 31.4–37.4)
MCV RBC AUTO: 91 FL (ref 82–98)
MONOCYTES # BLD AUTO: 0.43 THOUSAND/ÂΜL (ref 0.17–1.22)
MONOCYTES NFR BLD AUTO: 12 % (ref 4–12)
NEUTROPHILS # BLD AUTO: 1.47 THOUSANDS/ÂΜL (ref 1.85–7.62)
NEUTS SEG NFR BLD AUTO: 41 % (ref 43–75)
NRBC BLD AUTO-RTO: 0 /100 WBCS
PLATELET # BLD AUTO: 188 THOUSANDS/UL (ref 149–390)
PMV BLD AUTO: 10.4 FL (ref 8.9–12.7)
POTASSIUM SERPL-SCNC: 3.8 MMOL/L (ref 3.5–5.3)
PROTHROMBIN TIME: 26.7 SECONDS (ref 12.3–15)
RBC # BLD AUTO: 3.86 MILLION/UL (ref 3.81–5.12)
SODIUM SERPL-SCNC: 138 MMOL/L (ref 135–147)
TIBC SERPL-MCNC: 436.8 UG/DL (ref 250–450)
TRANSFERRIN SERPL-MCNC: 312 MG/DL (ref 203–362)
TSH SERPL DL<=0.05 MIU/L-ACNC: 1.39 UIU/ML (ref 0.45–4.5)
UIBC SERPL-MCNC: 382 UG/DL (ref 155–355)
VIT B12 SERPL-MCNC: 548 PG/ML (ref 180–914)
WBC # BLD AUTO: 3.57 THOUSAND/UL (ref 4.31–10.16)

## 2025-05-30 PROCEDURE — 82607 VITAMIN B-12: CPT

## 2025-05-30 PROCEDURE — 83540 ASSAY OF IRON: CPT

## 2025-05-30 PROCEDURE — 83735 ASSAY OF MAGNESIUM: CPT

## 2025-05-30 PROCEDURE — 82306 VITAMIN D 25 HYDROXY: CPT

## 2025-05-30 PROCEDURE — 85025 COMPLETE CBC W/AUTO DIFF WBC: CPT

## 2025-05-30 PROCEDURE — 36415 COLL VENOUS BLD VENIPUNCTURE: CPT

## 2025-05-30 PROCEDURE — 80048 BASIC METABOLIC PNL TOTAL CA: CPT

## 2025-05-30 PROCEDURE — 83550 IRON BINDING TEST: CPT

## 2025-05-30 PROCEDURE — 82728 ASSAY OF FERRITIN: CPT

## 2025-05-30 PROCEDURE — 82746 ASSAY OF FOLIC ACID SERUM: CPT

## 2025-05-30 PROCEDURE — 84443 ASSAY THYROID STIM HORMONE: CPT

## 2025-05-30 PROCEDURE — 85610 PROTHROMBIN TIME: CPT

## 2025-05-30 NOTE — TELEPHONE ENCOUNTER
Patient called requested a lab order to be placed for an iron level and hemoglobin.Patient stated she has been feeling fatigued lately.No other symptoms reported.

## 2025-05-31 LAB — 25(OH)D3 SERPL-MCNC: 41.8 NG/ML (ref 30–100)

## 2025-06-02 ENCOUNTER — OFFICE VISIT (OUTPATIENT)
Dept: INTERNAL MEDICINE CLINIC | Facility: CLINIC | Age: 85
End: 2025-06-02
Payer: MEDICARE

## 2025-06-02 VITALS
HEIGHT: 65 IN | DIASTOLIC BLOOD PRESSURE: 76 MMHG | OXYGEN SATURATION: 96 % | BODY MASS INDEX: 26.82 KG/M2 | HEART RATE: 83 BPM | SYSTOLIC BLOOD PRESSURE: 130 MMHG | WEIGHT: 161 LBS

## 2025-06-02 DIAGNOSIS — D50.8 IRON DEFICIENCY ANEMIA SECONDARY TO INADEQUATE DIETARY IRON INTAKE: ICD-10-CM

## 2025-06-02 DIAGNOSIS — R79.1 SUBTHERAPEUTIC INTERNATIONAL NORMALIZED RATIO (INR): ICD-10-CM

## 2025-06-02 DIAGNOSIS — D70.9 NEUTROPENIA, UNSPECIFIED TYPE (HCC): ICD-10-CM

## 2025-06-02 DIAGNOSIS — R53.83 FATIGUE, UNSPECIFIED TYPE: Primary | ICD-10-CM

## 2025-06-02 PROCEDURE — 99213 OFFICE O/P EST LOW 20 MIN: CPT

## 2025-06-02 PROCEDURE — G2211 COMPLEX E/M VISIT ADD ON: HCPCS

## 2025-06-02 NOTE — ASSESSMENT & PLAN NOTE
Lab Results   Component Value Date    IRON 55 05/30/2025    FERRITIN 9 (L) 05/30/2025    TIBC 436.8 05/30/2025    CONCFE 13 (L) 05/30/2025     Patient already started PO iron supplements and reports improvement of her symptoms  Declines IV iron infusion--- prefers to stick with PO iron  The side effects of iron are discussed, primarily GI in type, such as cramping, constipation, black stools. Bowel regimen recommended for prevention

## 2025-06-02 NOTE — PROGRESS NOTES
Name: Pooja Barron      : 1940      MRN: [id number]  Encounter Provider: Gely Benavidez MD  Encounter Date: 2025   Encounter department: [facility]IC[facility]  :  Assessment & Plan  Fatigue, unspecified type  Possibly due to a viral illness vs low iron levels  Patient notes significant improvement after restarting PO iron supplements  Hx of bladder prolapse --- currently on prophylactic meds to prevent UTIs  If symptoms persist or recur, consider checking UA/urine Cx       Neutropenia, unspecified type (HCC)  Lab Results   Component Value Date    WBC 3.57 (L) 2025    WBC 4.20 (L) 2025     Lab Results   Component Value Date    NEUTROABS 1.47 (L) 2025    NEUTROABS 2.25 2025     Possible viral illness contributing to symptoms of malaise and fatigue  Symptoms have improved over the weekend, per patient       Iron deficiency anemia secondary to inadequate dietary iron intake  Lab Results   Component Value Date    IRON 55 2025    FERRITIN 9 (L) 2025    TIBC 436.8 2025    CONCFE 13 (L) 2025     Patient already started PO iron supplements and reports improvement of her symptoms  Declines IV iron infusion--- prefers to stick with PO iron  The side effects of iron are discussed, primarily GI in type, such as cramping, constipation, black stools. Bowel regimen recommended for prevention       Subtherapeutic international normalized ratio (INR)  Lab Results   Component Value Date    INR 2.48 (H) 2025    INR 2.60 (H) 05/15/2025    INR 3.93 (H) 2025     Pt in the process of switching Cardiologists for continue surveillance of chronic cardiac conditions  Last INR was subtherapeutic --- recommend increasing coumadin to 7.5 mg once, then resume 5 mg daily dosing (pt will call new Cardiology office to make them aware of findings)  Repeat INR in 4 days ()  Orders:  •  Protime-INR; Future      Return for Next scheduled follow  "up.     History of Present Illness   HPI  Patient presents with c/o recent fatigue symptoms and to review recently completed labs to assess her current symptoms.    She reports her fatigue has improved since starting iron supplementation.    The patient's INR was recently found to be slightly below the target range at 2.48 (target 2.5-3.5). She is currently taking 5 mg of warfarin daily.    Denies any other concerning symptoms including CP, SOB, HA, dizziness, abdominal pain, N/V, paresthesia or weakness.    Review of Systems   Constitutional:  Positive for fatigue. Negative for chills and fever.   HENT:  Negative for congestion.    Eyes:  Positive for visual disturbance.   Respiratory:  Negative for shortness of breath.    Cardiovascular:  Negative for chest pain.   Gastrointestinal:  Negative for abdominal pain.   Genitourinary:  Negative for dysuria.   Musculoskeletal:  Positive for gait problem.   Neurological:  Negative for dizziness and headaches.   Psychiatric/Behavioral:  Negative for confusion.        Objective   /76   Pulse 83   Ht 5' 5\" (1.651 m)   Wt 73 kg (161 lb)   SpO2 96%   BMI 26.79 kg/m²      Physical Exam  Vitals and nursing note reviewed.   Constitutional:       General: She is not in acute distress.     Appearance: She is normal weight. She is not ill-appearing or toxic-appearing.   HENT:      Head: Normocephalic.     Eyes:      General:         Right eye: No discharge.         Left eye: No discharge.      Extraocular Movements: Extraocular movements intact.      Conjunctiva/sclera: Conjunctivae normal.       Cardiovascular:      Rate and Rhythm: Normal rate.   Pulmonary:      Effort: Pulmonary effort is normal. No respiratory distress.     Musculoskeletal:      Cervical back: Normal range of motion.     Skin:     General: Skin is warm and dry.     Neurological:      Mental Status: She is alert and oriented to person, place, and time.      Gait: Gait abnormal.     Psychiatric:         " Behavior: Behavior normal.

## 2025-06-09 ENCOUNTER — APPOINTMENT (OUTPATIENT)
Dept: LAB | Facility: CLINIC | Age: 85
End: 2025-06-09
Payer: MEDICARE

## 2025-06-09 DIAGNOSIS — R79.1 SUBTHERAPEUTIC INTERNATIONAL NORMALIZED RATIO (INR): ICD-10-CM

## 2025-06-11 ENCOUNTER — APPOINTMENT (OUTPATIENT)
Dept: LAB | Facility: CLINIC | Age: 85
End: 2025-06-11
Payer: MEDICARE

## 2025-06-11 LAB
INR PPP: 3.05 (ref 0.85–1.19)
PROTHROMBIN TIME: 31.2 SECONDS (ref 12.3–15)

## 2025-06-11 PROCEDURE — 36415 COLL VENOUS BLD VENIPUNCTURE: CPT

## 2025-06-11 PROCEDURE — 85610 PROTHROMBIN TIME: CPT

## 2025-06-12 ENCOUNTER — RESULTS FOLLOW-UP (OUTPATIENT)
Dept: INTERNAL MEDICINE CLINIC | Facility: CLINIC | Age: 85
End: 2025-06-12

## 2025-07-22 ENCOUNTER — OFFICE VISIT (OUTPATIENT)
Age: 85
End: 2025-07-22
Payer: MEDICARE

## 2025-07-22 VITALS
BODY MASS INDEX: 26.66 KG/M2 | HEART RATE: 76 BPM | WEIGHT: 160 LBS | HEIGHT: 65 IN | OXYGEN SATURATION: 96 % | DIASTOLIC BLOOD PRESSURE: 80 MMHG | SYSTOLIC BLOOD PRESSURE: 150 MMHG

## 2025-07-22 DIAGNOSIS — Z95.2 H/O MITRAL VALVE REPLACEMENT WITH MECHANICAL VALVE: ICD-10-CM

## 2025-07-22 DIAGNOSIS — E55.9 VITAMIN D DEFICIENCY: ICD-10-CM

## 2025-07-22 DIAGNOSIS — E78.00 HYPERCHOLESTEREMIA: ICD-10-CM

## 2025-07-22 DIAGNOSIS — Z00.00 MEDICARE ANNUAL WELLNESS VISIT, SUBSEQUENT: Primary | ICD-10-CM

## 2025-07-22 DIAGNOSIS — I48.20 CHRONIC ATRIAL FIBRILLATION (HCC): ICD-10-CM

## 2025-07-22 PROCEDURE — G2211 COMPLEX E/M VISIT ADD ON: HCPCS

## 2025-07-22 PROCEDURE — 99214 OFFICE O/P EST MOD 30 MIN: CPT

## 2025-07-22 PROCEDURE — G0439 PPPS, SUBSEQ VISIT: HCPCS

## 2025-07-22 RX ORDER — WARFARIN SODIUM 5 MG/1
5 TABLET ORAL
Qty: 90 TABLET | Refills: 1 | Status: SHIPPED | OUTPATIENT
Start: 2025-07-22

## 2025-07-22 RX ORDER — CHLORAL HYDRATE 500 MG
1 CAPSULE ORAL DAILY
Qty: 90 CAPSULE | Refills: 1 | Status: SHIPPED | OUTPATIENT
Start: 2025-07-22

## 2025-07-24 ENCOUNTER — APPOINTMENT (OUTPATIENT)
Dept: RADIOLOGY | Facility: CLINIC | Age: 85
End: 2025-07-24
Attending: ORTHOPAEDIC SURGERY
Payer: MEDICARE

## 2025-07-24 ENCOUNTER — OFFICE VISIT (OUTPATIENT)
Dept: OBGYN CLINIC | Facility: CLINIC | Age: 85
End: 2025-07-24
Payer: MEDICARE

## 2025-07-24 VITALS — WEIGHT: 161 LBS | HEIGHT: 65 IN | BODY MASS INDEX: 26.82 KG/M2

## 2025-07-24 DIAGNOSIS — G89.29 CHRONIC PAIN OF LEFT KNEE: ICD-10-CM

## 2025-07-24 DIAGNOSIS — M25.562 CHRONIC PAIN OF LEFT KNEE: ICD-10-CM

## 2025-07-24 DIAGNOSIS — M17.12 PRIMARY OSTEOARTHRITIS OF LEFT KNEE: Primary | ICD-10-CM

## 2025-07-24 DIAGNOSIS — M17.12 PRIMARY OSTEOARTHRITIS OF LEFT KNEE: ICD-10-CM

## 2025-07-24 PROCEDURE — 20610 DRAIN/INJ JOINT/BURSA W/O US: CPT | Performed by: ORTHOPAEDIC SURGERY

## 2025-07-24 PROCEDURE — 73562 X-RAY EXAM OF KNEE 3: CPT

## 2025-07-24 PROCEDURE — 73560 X-RAY EXAM OF KNEE 1 OR 2: CPT

## 2025-07-24 PROCEDURE — 99214 OFFICE O/P EST MOD 30 MIN: CPT | Performed by: ORTHOPAEDIC SURGERY

## 2025-07-24 RX ORDER — BUPIVACAINE HYDROCHLORIDE 5 MG/ML
2 INJECTION, SOLUTION EPIDURAL; INTRACAUDAL; PERINEURAL
Status: COMPLETED | OUTPATIENT
Start: 2025-07-24 | End: 2025-07-24

## 2025-07-24 RX ORDER — TRIAMCINOLONE ACETONIDE 40 MG/ML
40 INJECTION, SUSPENSION INTRA-ARTICULAR; INTRAMUSCULAR
Status: COMPLETED | OUTPATIENT
Start: 2025-07-24 | End: 2025-07-24

## 2025-07-24 RX ADMIN — TRIAMCINOLONE ACETONIDE 40 MG: 40 INJECTION, SUSPENSION INTRA-ARTICULAR; INTRAMUSCULAR at 10:45

## 2025-07-24 RX ADMIN — BUPIVACAINE HYDROCHLORIDE 2 ML: 5 INJECTION, SOLUTION EPIDURAL; INTRACAUDAL; PERINEURAL at 10:45

## 2025-07-24 NOTE — PROGRESS NOTES
"Name: Pooja Barron      : 1940      MRN: [id number]  Encounter Provider: Cortez Hathaway DO  Encounter Date: 2025   Encounter department: [facility]  :  Assessment & Plan  Primary osteoarthritis of left knee  Chronic pain of left knee      Orders:    XR knee 3 vw left non injury; Future    XR knee 1 or 2 vw right; Future    Large joint arthrocentesis: L knee      Pooja Barron is a pleasant 85 y.o. female presents with painful symptoms associated with known moderate to severe medial and patellofemoral compartment osteoarthritis.  As she did experience excellent symptomatic relief with the intra-articular steroid injection 2 years ago I do find this to be a reasonable course of treatment for her again today.  She verbalized understanding and consent for a left knee intra-articular steroid injection.  She tolerated the injection well without complication.  Postinjection instructions were provided.  She may continue with activities of daily living as tolerated.  She may utilize OTC analgesics as needed.  She can get a repeat injection no sooner than 3 months if symptoms persist or worsen.  Otherwise, I will see her back on an as needed basis.    Large joint arthrocentesis: L knee    Performed by: Cortez Hathaway DO  Authorized by: Cortez Hathaway DO    Universal Protocol:  procedure performed by consultantConsent: Verbal consent not obtained  Risks and benefits: risks, benefits and alternatives were discussed  Consent given by: patient  Time out: Immediately prior to procedure a \"time out\" was called to verify the correct patient, procedure, equipment, support staff and site/side marked as required.  Timeout called at: 2025 11:07 AM.  Patient understanding: patient states understanding of the procedure being performed  Site marked: the operative site was marked  Patient identity confirmed: verbally with patient  Supporting Documentation  Indications: pain     Is " "this a Visco injection? NoProcedure Details  Location: knee - L knee  Preparation: Patient was prepped and draped in the usual sterile fashion  Needle size: 20 G  Ultrasound guidance: no  Approach: anterolateral  Medications administered: 40 mg triamcinolone acetonide 40 mg/mL; 2 mL bupivacaine (PF) 0.5 %    Patient tolerance: patient tolerated the procedure well with no immediate complications  Dressing:  Sterile dressing applied        No follow-ups on file.    I have personally reviewed pertinent imaging.  Trays of the left knee obtained today 7/24/2025 demonstrates moderate to severe tricompartmental osteoarthritis most significant with.  Changes of the medial and patellofemoral compartments.  Subchondral sclerosis osteophytosis observed.  No acute fractures observed.  No obvious lytic or blastic lesions.    History: Pooja Barron is a 85 y.o. female presents for follow-up evaluation of her left knee.  She states that she did undergo a left knee intra-articular steroid injection approximately 2 years ago.  She states that this provided her with excellent symptomatic relief up until approximately 6 months ago.  She states that she has been having worsening symptoms about the anterior aspect of her knee with weightbearing activities.  She does also experience crepitance that can be uncomfortable at times.  She does not acknowledge gross instability.  Pain is typically better while at rest.  She does experience pain and stiffness with her knee when she sleeps with it in an extended position.  She does have less pain she is positioned in 1 spot with her knee flexed.  Today she denies any distal paresthesias.  She does wish to consider repeat steroid injection today.      Estimated body mass index is 26.79 kg/m² as calculated from the following:    Height as of this encounter: 5' 5\" (1.651 m).    Weight as of this encounter: 73 kg (161 lb).    Lab Results   Component Value Date    HGBA1C 6.1 (H) 05/06/2025 "       Social History     Occupational History    Not on file   Tobacco Use    Smoking status: Former     Current packs/day: 0.00     Average packs/day: 2.0 packs/day for 30.0 years (60.0 ttl pk-yrs)     Types: Cigarettes     Start date: 1959     Quit date:      Years since quittin.5    Smokeless tobacco: Never   Vaping Use    Vaping status: Never Used   Substance and Sexual Activity    Alcohol use: Not Currently     Comment: special occasional    Drug use: No    Sexual activity: Not on file       Objective:  Left Knee Exam     Tenderness   The patient is experiencing tenderness in the medial joint line.    Range of Motion   Extension:  0   Flexion:  110     Tests   Varus: negative Valgus: negative  Drawer:  Anterior - negative     Posterior - negative    Other   Erythema: absent  Scars: absent  Sensation: normal  Pulse: present  Swelling: none  Effusion: no effusion present    Comments:  Stable to valgus varus stress at 0, 30 and 90          Observations   Left Knee   Negative for effusion.       There were no vitals filed for this visit.    Subjective:  Past Medical History[1]    Past Surgical History[2]    Family History[3]    Current Medications[4]    Allergies[5]    Review of Systems   Constitutional:  Positive for activity change. Negative for chills, fever and unexpected weight change.   HENT:  Negative for hearing loss, nosebleeds and sore throat.    Eyes:  Negative for pain, redness and visual disturbance.   Respiratory:  Negative for cough, shortness of breath and wheezing.    Cardiovascular:  Negative for chest pain, palpitations and leg swelling.   Gastrointestinal:  Negative for abdominal pain, nausea and vomiting.   Endocrine: Negative for polydipsia and polyuria.   Genitourinary:  Negative for dysuria and hematuria.   Musculoskeletal:  See HPI  Skin:  Negative for rash and wound.   Neurological:  Negative for dizziness, numbness and headaches.   Psychiatric/Behavioral:  Negative for  decreased concentration and suicidal ideas. The patient is not nervous/anxious.      Physical Exam  Vitals and nursing note reviewed.   Constitutional:       Appearance: Normal appearance. She is well-developed.   HENT:      Head: Normocephalic and atraumatic.      Right Ear: External ear normal.      Left Ear: External ear normal.   Eyes:      General: No scleral icterus.     Extraocular Movements: Extraocular movements intact.      Conjunctiva/sclera: Conjunctivae normal.   Cardiovascular:      Rate and Rhythm: Normal rate.   Pulmonary:      Effort: Pulmonary effort is normal. No respiratory distress.   Musculoskeletal:      Cervical back: Normal range of motion and neck supple.      Comments: See Ortho exam   Skin:     General: Skin is warm and dry.   Neurological:      General: No focal deficit present.      Mental Status: She is alert and oriented to person, place, and time.   Psychiatric:         Behavior: Behavior normal.     Scribe Attestation      I,:  Ton Mata am acting as a scribe while in the presence of the attending physician.:       I,:  Cortez Hathaway, DO personally performed the services described in this documentation    as scribed in my presence.:           This document was created using speech voice recognition software.   Grammatical errors, random word insertions, pronoun errors, and incomplete sentences are an occasional consequence of this system due to software limitations, ambient noise, and hardware issues.   Any formal questions or concerns about content, text, or information contained within the body of this dictation should be directly addressed to the provider for clarification.         [1]   Past Medical History:  Diagnosis Date    Bloody nose     slow drip, clots in the morning    Colon polyp     Diabetes mellitus (HCC)     Pre DM diet controlled, metformin DCed 2023    Heart murmur     Hyperlipidemia     Stroke (HCC) 03/11/2022   [2]   Past Surgical History:  Procedure  Laterality Date    CATARACT EXTRACTION Bilateral     COLONOSCOPY      EGD      HYSTERECTOMY      MITRAL VALVE REPLACEMENT  1994   [3]   Family History  Problem Relation Name Age of Onset    Heart failure Mother      Heart attack Father      Sleep apnea Brother     [4]   Current Outpatient Medications:     acetaminophen (TYLENOL) 325 mg tablet, Take 2 tablets (650 mg total) by mouth every 6 (six) hours as needed for mild pain or headaches, Disp: , Rfl: 0    atorvastatin (LIPITOR) 20 mg tablet, Take 1 tablet (20 mg total) by mouth daily, Disp: 90 tablet, Rfl: 1    Cholecalciferol (VITAMIN D PO), Take by mouth, Disp: , Rfl:     erythromycin (ILOTYCIN) ophthalmic ointment, , Disp: , Rfl:     estradiol (ESTRACE) 0.1 mg/g vaginal cream, 0.5 grams of cream intravaginally administered daily for one to two weeks, then reduce to twice weekly, Disp: 42.5 g, Rfl: 3    furosemide (LASIX) 20 mg tablet, Take 1 tablet (20 mg total) by mouth daily, Disp: 30 tablet, Rfl: 1    metFORMIN (GLUCOPHAGE) 500 mg tablet, Take 1 tablet (500 mg total) by mouth 2 (two) times a day with meals, Disp: 180 tablet, Rfl: 3    methenamine hippurate (HIPREX) 1 g tablet, Take 1 tablet (1 g total) by mouth 2 (two) times a day with meals, Disp: 60 tablet, Rfl: 11    Multiple Vitamins-Minerals (PRESERVISION AREDS PO), Take 2 tablets by mouth in the morning., Disp: , Rfl:     Omega-3 1000 MG CAPS, Take 1 capsule (1,000 mg total) by mouth in the morning, Disp: 90 capsule, Rfl: 1    warfarin (COUMADIN) 5 mg tablet, Take 1 tablet (5 mg total) by mouth daily, Disp: 90 tablet, Rfl: 1  [5]   Allergies  Allergen Reactions    Sulfa Antibiotics Tongue Swelling    Sulfasalazine Throat Swelling

## 2025-07-24 NOTE — ASSESSMENT & PLAN NOTE
Orders:    XR knee 3 vw left non injury; Future    XR knee 1 or 2 vw right; Future    Large joint arthrocentesis: L knee

## 2025-07-29 ENCOUNTER — APPOINTMENT (OUTPATIENT)
Dept: LAB | Facility: CLINIC | Age: 85
End: 2025-07-29
Payer: MEDICARE

## 2025-07-29 ENCOUNTER — OFFICE VISIT (OUTPATIENT)
Age: 85
End: 2025-07-29
Payer: MEDICARE

## 2025-07-29 VITALS
HEIGHT: 65 IN | SYSTOLIC BLOOD PRESSURE: 144 MMHG | BODY MASS INDEX: 25.66 KG/M2 | RESPIRATION RATE: 16 BRPM | HEART RATE: 96 BPM | OXYGEN SATURATION: 98 % | DIASTOLIC BLOOD PRESSURE: 82 MMHG | TEMPERATURE: 98 F | WEIGHT: 154 LBS

## 2025-07-29 DIAGNOSIS — R53.81 MALAISE: ICD-10-CM

## 2025-07-29 DIAGNOSIS — R42 INTERMITTENT LIGHTHEADEDNESS: ICD-10-CM

## 2025-07-29 DIAGNOSIS — G44.209 ACUTE NON INTRACTABLE TENSION-TYPE HEADACHE: ICD-10-CM

## 2025-07-29 DIAGNOSIS — R10.13 EPIGASTRIC PAIN: ICD-10-CM

## 2025-07-29 DIAGNOSIS — R07.0 THROAT DISCOMFORT: ICD-10-CM

## 2025-07-29 DIAGNOSIS — R35.0 INCREASED URINARY FREQUENCY: ICD-10-CM

## 2025-07-29 DIAGNOSIS — R53.81 MALAISE: Primary | ICD-10-CM

## 2025-07-29 LAB
ALBUMIN SERPL BCG-MCNC: 4.6 G/DL (ref 3.5–5)
ALP SERPL-CCNC: 84 U/L (ref 34–104)
ALT SERPL W P-5'-P-CCNC: 100 U/L (ref 7–52)
ANION GAP SERPL CALCULATED.3IONS-SCNC: 11 MMOL/L (ref 4–13)
AST SERPL W P-5'-P-CCNC: 44 U/L (ref 13–39)
BACTERIA UR QL AUTO: ABNORMAL /HPF
BASOPHILS # BLD AUTO: 0.01 THOUSANDS/ÂΜL (ref 0–0.1)
BASOPHILS NFR BLD AUTO: 0 % (ref 0–1)
BILIRUB SERPL-MCNC: 0.94 MG/DL (ref 0.2–1)
BILIRUB UR QL STRIP: NEGATIVE
BUN SERPL-MCNC: 26 MG/DL (ref 5–25)
CALCIUM SERPL-MCNC: 9.7 MG/DL (ref 8.4–10.2)
CHLORIDE SERPL-SCNC: 101 MMOL/L (ref 96–108)
CLARITY UR: ABNORMAL
CO2 SERPL-SCNC: 25 MMOL/L (ref 21–32)
COLOR UR: YELLOW
CREAT SERPL-MCNC: 0.64 MG/DL (ref 0.6–1.3)
EOSINOPHIL # BLD AUTO: 0.06 THOUSAND/ÂΜL (ref 0–0.61)
EOSINOPHIL NFR BLD AUTO: 1 % (ref 0–6)
ERYTHROCYTE [DISTWIDTH] IN BLOOD BY AUTOMATED COUNT: 17.5 % (ref 11.6–15.1)
GFR SERPL CREATININE-BSD FRML MDRD: 81 ML/MIN/1.73SQ M
GLUCOSE SERPL-MCNC: 124 MG/DL (ref 65–140)
GLUCOSE UR STRIP-MCNC: NEGATIVE MG/DL
HCT VFR BLD AUTO: 40.4 % (ref 34.8–46.1)
HGB BLD-MCNC: 13.5 G/DL (ref 11.5–15.4)
HGB UR QL STRIP.AUTO: ABNORMAL
IMM GRANULOCYTES # BLD AUTO: 0.14 THOUSAND/UL (ref 0–0.2)
IMM GRANULOCYTES NFR BLD AUTO: 2 % (ref 0–2)
KETONES UR STRIP-MCNC: NEGATIVE MG/DL
LEUKOCYTE ESTERASE UR QL STRIP: ABNORMAL
LYMPHOCYTES # BLD AUTO: 1.23 THOUSANDS/ÂΜL (ref 0.6–4.47)
LYMPHOCYTES NFR BLD AUTO: 18 % (ref 14–44)
MCH RBC QN AUTO: 30.4 PG (ref 26.8–34.3)
MCHC RBC AUTO-ENTMCNC: 33.4 G/DL (ref 31.4–37.4)
MCV RBC AUTO: 91 FL (ref 82–98)
MONOCYTES # BLD AUTO: 0.88 THOUSAND/ÂΜL (ref 0.17–1.22)
MONOCYTES NFR BLD AUTO: 13 % (ref 4–12)
MUCOUS THREADS UR QL AUTO: ABNORMAL
NEUTROPHILS # BLD AUTO: 4.6 THOUSANDS/ÂΜL (ref 1.85–7.62)
NEUTS SEG NFR BLD AUTO: 66 % (ref 43–75)
NITRITE UR QL STRIP: NEGATIVE
NON-SQ EPI CELLS URNS QL MICRO: ABNORMAL /HPF
NRBC BLD AUTO-RTO: 0 /100 WBCS
PH UR STRIP.AUTO: 6 [PH]
PLATELET # BLD AUTO: 213 THOUSANDS/UL (ref 149–390)
PMV BLD AUTO: 11.6 FL (ref 8.9–12.7)
POTASSIUM SERPL-SCNC: 4.2 MMOL/L (ref 3.5–5.3)
PROT SERPL-MCNC: 7.7 G/DL (ref 6.4–8.4)
PROT UR STRIP-MCNC: ABNORMAL MG/DL
RBC # BLD AUTO: 4.44 MILLION/UL (ref 3.81–5.12)
RBC #/AREA URNS AUTO: ABNORMAL /HPF
SODIUM SERPL-SCNC: 137 MMOL/L (ref 135–147)
SP GR UR STRIP.AUTO: 1.02 (ref 1–1.03)
UROBILINOGEN UR STRIP-ACNC: <2 MG/DL
WBC # BLD AUTO: 6.92 THOUSAND/UL (ref 4.31–10.16)
WBC #/AREA URNS AUTO: ABNORMAL /HPF

## 2025-07-29 PROCEDURE — G2211 COMPLEX E/M VISIT ADD ON: HCPCS

## 2025-07-29 PROCEDURE — 85025 COMPLETE CBC W/AUTO DIFF WBC: CPT

## 2025-07-29 PROCEDURE — 80053 COMPREHEN METABOLIC PANEL: CPT

## 2025-07-29 PROCEDURE — 87086 URINE CULTURE/COLONY COUNT: CPT

## 2025-07-29 PROCEDURE — 87077 CULTURE AEROBIC IDENTIFY: CPT

## 2025-07-29 PROCEDURE — 87186 SC STD MICRODIL/AGAR DIL: CPT

## 2025-07-29 PROCEDURE — 36415 COLL VENOUS BLD VENIPUNCTURE: CPT

## 2025-07-29 PROCEDURE — 99214 OFFICE O/P EST MOD 30 MIN: CPT

## 2025-07-29 PROCEDURE — 81001 URINALYSIS AUTO W/SCOPE: CPT

## 2025-07-29 RX ORDER — OMEPRAZOLE 20 MG/1
20 CAPSULE, DELAYED RELEASE ORAL DAILY
Qty: 30 CAPSULE | Refills: 0 | Status: SHIPPED | OUTPATIENT
Start: 2025-07-29

## 2025-07-30 ENCOUNTER — RESULTS FOLLOW-UP (OUTPATIENT)
Age: 85
End: 2025-07-30

## 2025-07-30 ENCOUNTER — TELEPHONE (OUTPATIENT)
Age: 85
End: 2025-07-30

## 2025-07-30 DIAGNOSIS — N30.01 ACUTE CYSTITIS WITH HEMATURIA: Primary | ICD-10-CM

## 2025-07-30 RX ORDER — CIPROFLOXACIN 500 MG/1
500 TABLET, FILM COATED ORAL EVERY 12 HOURS SCHEDULED
Qty: 14 TABLET | Refills: 0 | Status: SHIPPED | OUTPATIENT
Start: 2025-07-30 | End: 2025-08-06

## 2025-07-31 LAB — BACTERIA UR CULT: ABNORMAL

## 2025-08-04 ENCOUNTER — PROCEDURE VISIT (OUTPATIENT)
Age: 85
End: 2025-08-04
Payer: MEDICARE

## 2025-08-04 VITALS — HEIGHT: 65 IN | BODY MASS INDEX: 25.66 KG/M2 | WEIGHT: 154 LBS | RESPIRATION RATE: 16 BRPM

## 2025-08-04 DIAGNOSIS — R60.9 CHRONIC EDEMA: ICD-10-CM

## 2025-08-04 DIAGNOSIS — I87.2 DEEP VENOUS INSUFFICIENCY: ICD-10-CM

## 2025-08-04 DIAGNOSIS — M77.42 METATARSALGIA OF BOTH FEET: Primary | ICD-10-CM

## 2025-08-04 DIAGNOSIS — L03.032 PARONYCHIA OF TOENAIL OF LEFT FOOT: ICD-10-CM

## 2025-08-04 DIAGNOSIS — M54.16 RADICULOPATHY OF LUMBAR REGION: ICD-10-CM

## 2025-08-04 DIAGNOSIS — M77.41 METATARSALGIA OF BOTH FEET: Primary | ICD-10-CM

## 2025-08-04 PROCEDURE — 99213 OFFICE O/P EST LOW 20 MIN: CPT | Performed by: PODIATRIST
